# Patient Record
Sex: MALE | Race: WHITE | NOT HISPANIC OR LATINO | Employment: FULL TIME | URBAN - METROPOLITAN AREA
[De-identification: names, ages, dates, MRNs, and addresses within clinical notes are randomized per-mention and may not be internally consistent; named-entity substitution may affect disease eponyms.]

---

## 2017-01-11 ENCOUNTER — PRE VISIT (OUTPATIENT)
Dept: OTOLARYNGOLOGY | Facility: CLINIC | Age: 37
End: 2017-01-11

## 2017-01-11 DIAGNOSIS — E84.0 CYSTIC FIBROSIS WITH PULMONARY MANIFESTATIONS (H): Primary | ICD-10-CM

## 2017-01-11 RX ORDER — OSELTAMIVIR PHOSPHATE 75 MG/1
75 CAPSULE ORAL 2 TIMES DAILY
Qty: 10 CAPSULE | Refills: 1 | Status: SHIPPED | OUTPATIENT
Start: 2017-01-11 | End: 2018-03-01

## 2017-01-11 NOTE — TELEPHONE ENCOUNTER
1.  Date/reason for appt: 1/16/17 sinus pain   2.  Referring provider: ABBY JOSEPH   3.  Call to patient (Yes / No - short description): no, transferred from    4.  Previous care at / records requested from: [facility] and [facility]   5.  Other: Emailed ROIs

## 2017-01-12 ENCOUNTER — CARE COORDINATION (OUTPATIENT)
Dept: PULMONOLOGY | Facility: CLINIC | Age: 37
End: 2017-01-12

## 2017-01-12 NOTE — PROGRESS NOTES
Pt called requesting a Tamiflu script to have available in case he develops flu-like sx. Also, he inquired about getting in to see Dr. Sara Pickering for his CF sinus disease. Given phone number to call for scheduling an appt.

## 2017-01-16 ENCOUNTER — OFFICE VISIT (OUTPATIENT)
Dept: OTOLARYNGOLOGY | Facility: CLINIC | Age: 37
End: 2017-01-16

## 2017-01-16 DIAGNOSIS — J32.4 CHRONIC PANSINUSITIS: Primary | ICD-10-CM

## 2017-01-16 ASSESSMENT — PAIN SCALES - GENERAL: PAINLEVEL: MILD PAIN (2)

## 2017-01-16 NOTE — PROGRESS NOTES
Otolaryngology Adult Consultation    Patient: Philip Delacruz   : 1980     Patient presents with:  Consult:   Chief Complaint   Patient presents with     Consult     cf pt concerned nebulizing giving ghim ulcers        Referring Provider: Pepe Valenzuela   Consulting Physician:  Sara Pickering MD       Assessment/Plan: ASSESSMENT AND PLAN:  Philip has chronic sinusitis related to cystic fibrosis.  His exam today shows ongoing active disease.  I cultured his secretions.  I am also ordering a CT scan to determine the extent of disease.  We agreed that putting him back on nebulized antibiotics may be beneficial as it has helped him in the past.  We will work with his insurance and the pharmacy on getting back on some form of a nebulized antibiotic.  Depending on his CT scan and cultures, I may consider additional endoscopic sinus surgery.  I will see him back in a month to discuss.  I am happy to continue to follow him for his sinus issues while he is here at the [facility] to care for his cystic fibrosis  condition.          HPI: Philip Delacruz is a 36 year old male seen today in the Otolaryngology Clinic for ongoing issues with chronic sinusitis related to cystic fibrosis.  He states that early on in his life, he began to have sinus difficulties.  In sixth grade, he had his first polyp surgery.  He was diagnosed with allergies and given shots in college as a sophomore.  He had a second polyp surgery and dealt with a lot of problems with nasal congestion and mucus.  As a senior in college, he underwent his last endoscopic sinus surgery.  A few years ago, he and his wife underwent workup for infertility.  At that time, he was diagnosed with cystic fibrosis.  No family history of CF is known.  In the past, he has been following with other ear, nose and throat doctors who have provided nebulized antibiotic treatments.  He had taken a lot of oral antibiotics early on and was  hoping to avoid ongoing use of oral antibiotics.  Due to insurance issues, he no longer was able to obtain antibiotics for the nebulizer and now has been performing nasal saline rinsing.  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  His ophthalmologist would like him to hold off on irrigating as it could be contributing to his eye issues.  The last time he was on antibiotics orally was in November.  A week ago, he had a sore throat.  He has had increased plugging in his nose.  He is currently not irrigating.  He has minimal other issues related to cystic fibrosis.           Current Outpatient Prescriptions on File Prior to Visit:  oseltamivir (TAMIFLU) 75 MG capsule Take 1 capsule (75 mg) by mouth 2 times daily   fluticasone (FLONASE) 50 MCG/ACT spray Spray 2 sprays into both nostrils daily   CREON 73343 UNITS CPEP Take 3-4 capsules (36,000-48,000 Units) by mouth 3 times daily (with meals)   fish oil-omega-3 fatty acids 1000 MG capsule Take 2 g by mouth daily   psyllium (METAMUCIL) 0.52 G capsule Take 1 capsule (0.52 g) by mouth 2 times daily   LACTOBACILLUS EXTRA STRENGTH CAPS Take 1 tablet by mouth 2 times daily (with meals)   Multiple Vitamins-Minerals (ONE-A-DAY MENS HEALTH FORMULA PO) Take 1 tablet by mouth daily   Sodium Chloride-Sodium Bicarb (SINUFLO READYRINSE) KIT    acyclovir (ZOVIRAX) 800 MG tablet Take 1 tablet (800 mg) by mouth daily   albuterol (PROAIR HFA/PROVENTIL HFA/VENTOLIN HFA) 108 (90 BASE) MCG/ACT Inhaler Inhale 2 puffs into the lungs every 6 hours as needed for shortness of breath / dyspnea or wheezing   Cholecalciferol 3000 UNITS TABS Take 3,000 Units by mouth daily     No current facility-administered medications on file prior to visit.       Allergies   Allergen Reactions     Tegaderm Transparent Dressing (Informational Only) Rash       Past Medical History   Diagnosis Date     Cystic fibrosis (H)      Delta F508 and F4922M       Congenital absence of vas deferens, bilateral      Sinusitis, chronic      Chronic sinusitis      Nasal polyps          Review of Systems   ENT ROS 1/16/2017   Ears, Nose, Throat Nasal congestion or drainage, Sore throat        14 point ROS neg other than the symptoms noted above.    Physical Exam:    General Assessment   The patient is alert, oriented and in no acute distress.   Head/Face/Scalp  Grossly normal.   Ears  Normal canals, auricles and tympanic membranes.   Nose  External nose is straight, skin is normal. Intranasal exam (anterior rhinoscopy) reveals normal moist mucosa, turbinate tissue without edema, erythema or crusting.  Septum mainly in the midline.  Nasal crusting, mild to moderate.  Mouth  Oral cavity shows healthy mucosa with out ulceration, masses or other lesions involving the tongue, palate, buccal mucosa, floor of mouth or gingiva.  Dentition in good repair.  Oropharynx with normal tonsils, posterior wall and base of tongue.  Neck  No significant adenopathy, thyroid or salivary gland abnormality.     Procedure:  Nasal endoscopy performed to examine the posterior nasal passages for pathology.    Verbal consent obtained. Topical anesthetic/decongestant solution applied.   A rigid endoscope was passed into each nasal cavity separately.  Findings are as follows:   Evidence of bilateral sinus surgery with altered landmarks.  Bilateral polypoid inflammation, mucopurulence.  Cultures taken of secretions.

## 2017-01-16 NOTE — PATIENT INSTRUCTIONS
Plan of care:  CT at your convenience  We will call you with test results  Clinic contact information:  1. To schedule an appointment call [phone], option 1  2. To talk to the Triage RN call [phone], option 3  3. If you need to speak to Pinky or get a message to your doctor on a Friday, call the triage RN  4. PinkyRN: [phone]  5. Surgery scheduling:      Leticia Diaz: [phone]      Emily Campos: [phone]  6. Fax: [phone]

## 2017-01-16 NOTE — NURSING NOTE
Chief Complaint   Patient presents with     Consult     cf pt concerned nebulizing giving ghim ulcers     Danielle Urban Medical Assistant

## 2017-01-16 NOTE — MR AVS SNAPSHOT
After Visit Summary   1/16/2017    Philip Delacruz    MRN: [id number]           Patient Information     Date Of Birth          1980        Visit Information        Provider Department      1/16/2017 1:15 PM Sara Pickering MD [facility] Ear Nose and Throat        Today's Diagnoses     Chronic pansinusitis    -  1       Care Instructions    Plan of care:  CT at your convenience  We will call you with test results  Clinic contact information:  1. To schedule an appointment call [phone], option 1  2. To talk to the Triage RN call [phone], option 3  3. If you need to speak to Pinky or get a message to your doctor on a Friday, call the triage RN  4. PinkyRN: [phone]  5. Surgery scheduling:      Leticia Diaz: [phone]      Emilyseu Simpsonter: [phone]  6. Fax: [phone]          Follow-ups after your visit        Your next 10 appointments already scheduled     Apr 07, 2017  9:00 AM   Infusion 180 with UC SPEC INFUSION, UC 47 ATC   [facility] Specialty and Procedure ([facility])    [address] [place] [address] [phone]            Apr 07, 2017 12:00 PM   (Arrive by 11:45 AM)   XR CHEST 2 VIEWS with XR1   [facility] Xray ([facility])    [address] [place] [address] [phone]           Please bring a list of your current medicines to your exam. (Include vitamins, minerals and over-thecounter medicines.) Leave your valuables at home.  Tell your doctor if there is a chance you may be pregnant.  You do not need to do anything special for this exam.            Apr 07, 2017 12:30 PM   DX HIP/PELVIS/SPINE with UCDX1   [facility] Dexa ([facility])    [address] [place] [address] [phone]           Please do not take any of the following 48 hours prior to your  exam: vitamins, calcium tablets, antacids.            Apr 07, 2017  1:30 PM   PFT VISIT with ALINE MCKEON   [facility] Pulmonary Function Testing ([facility])    [address] [address] [phone]            Apr 07, 2017  1:50 PM   (Arrive by 1:35 PM)   RETURN CYSTIC FIBROSIS VISIT with True Sahni MD   [facility] ([facility])    [address] [address] [phone]              Future tests that were ordered for you today     Open Future Orders        Priority Expected Expires Ordered    CT Maxillofacial w/o contrast Routine  1/16/2018 1/16/2017            Who to contact     Please call your clinic at [phone] to:    Ask questions about your health    Make or cancel appointments    Discuss your medicines    Learn about your test results    Speak to your doctor   If you have compliments or concerns about an experience at your clinic, or if you wish to file a complaint, please contact [facility] Patient Relations at [phone] or email us at [email or web address]         Additional Information About Your Visit        [employer] Information     [employer] gives you secure access to your electronic health record. If you see a primary care provider, you can also send messages to your care team and make appointments. If you have questions, please call your primary care clinic.  If you do not have a primary care provider, please call [phone] and they will assist you.      [employer] is an electronic gateway that provides easy, online access to your medical records. With [employer], you can request a clinic appointment, read your test results, renew a prescription or communicate with your care team.     To access your existing account, please contact your [facility] or call [phone] for  assistance.        Care EveryWhere ID     This is your Care EveryWhere ID. This could be used by other organizations to access your [place] medical records  [id number]         Blood Pressure from Last 3 Encounters:   12/02/16 122/81   08/22/16 138/82   05/13/16 135/90    Weight from Last 3 Encounters:   12/02/16 77 kg (169 lb 12.1 oz)   08/22/16 76.1 kg (167 lb 12.3 oz)   05/13/16 75.7 kg (166 lb 14.2 oz)              We Performed the Following     Cystic Fibrosis Culture Aerob Bacterial     NASAL ENDOSCOPY, DIAGNOSTIC        Primary Care Provider Fax #    [facility] [phone] [address]  [facility] [address]        Thank you!     Thank you for choosing [facility]AR [facility]SE [facility] THRO[facility]  for your care. Our goal is always to provide you with excellent care. Hearing back from our patients is one way we can continue to improve our services. Please take a few minutes to complete the written survey that you may receive in the mail after your visit with us. Thank you!             Your Updated Medication List - Protect others around you: Learn how to safely use, store and throw away your medicines at www.disposemymeds.org.          This list is accurate as of: 1/16/17  1:52 PM.  Always use your most recent med list.                   Brand Name Dispense Instructions for use    acyclovir 800 MG tablet    ZOVIRAX    50 tablet    Take 1 tablet (800 mg) by mouth daily       albuterol 108 (90 BASE) MCG/ACT Inhaler    PROAIR HFA/PROVENTIL HFA/VENTOLIN HFA    1 Inhaler    Inhale 2 puffs into the lungs every 6 hours as needed for shortness of breath / dyspnea or wheezing       Cholecalciferol 3000 UNITS Tabs      Take 3,000 Units by mouth daily       CREON 53863 UNITS Cpep   Generic drug:  amylase-lipase-protease     270 capsule    Take 3-4 capsules (36,000-48,000 Units) by mouth 3 times daily (with meals)       fish oil-omega-3 fatty acids 1000 MG capsule      Take 2 g by mouth daily        fluticasone 50 MCG/ACT spray    FLONASE    1 Bottle    Spray 2 sprays into both nostrils daily       LACTOBACILLUS EXTRA STRENGTH Caps     60 capsule    Take 1 tablet by mouth 2 times daily (with meals)       METAMUCIL 0.52 G capsule   Generic drug:  psyllium     540 capsule    Take 1 capsule (0.52 g) by mouth 2 times daily       ONE-A-DAY MENS HEALTH FORMULA PO      Take 1 tablet by mouth daily       oseltamivir 75 MG capsule    TAMIFLU    10 capsule    Take 1 capsule (75 mg) by mouth 2 times daily       SINUFLO READYRINSE Kit

## 2017-01-16 NOTE — Clinical Note
2017       RE: Philip Delacruz  [address] [facility] [address]     Dear Colleague,    Thank you for referring your patient, Philip Delacruz, to the [facility] EAR NOSE AND THROAT at [facility]. Please see a copy of my visit note below.      Otolaryngology Adult Consultation    Patient: Philip Delacruz   : 1980     Patient presents with:  Consult:   Chief Complaint   Patient presents with     Consult     cf pt concerned nebulizing giving ghim ulcers        Referring Provider: Pepe Valenzuela   Consulting Physician:  Sara Pickering MD       Assessment/Plan: ASSESSMENT AND PLAN:  Philip has chronic sinusitis related to cystic fibrosis.  His exam today shows ongoing active disease.  I cultured his secretions.  I am also ordering a CT scan to determine the extent of disease.  We agreed that putting him back on nebulized antibiotics may be beneficial as it has helped him in the past.  We will work with his insurance and the pharmacy on getting back on some form of a nebulized antibiotic.  Depending on his CT scan and cultures, I may consider additional endoscopic sinus surgery.  I will see him back in a month to discuss.  I am happy to continue to follow him for his sinus issues while he is here at the [facility] to care for his cystic fibrosis  condition.          HPI: Philip Delacruz is a 36 year old male seen today in the Otolaryngology Clinic for ongoing issues with chronic sinusitis related to cystic fibrosis.  He states that early on in his life, he began to have sinus difficulties.  In sixth grade, he had his first polyp surgery.  He was diagnosed with allergies and given shots in college as a sophomore.  He had a second polyp surgery and dealt with a lot of problems with nasal congestion and mucus.  As a senior in college, he underwent his last endoscopic sinus surgery.  A few years ago, he and his wife underwent  workup for infertility.  At that time, he was diagnosed with cystic fibrosis.  No family history of CF is known.  In the past, he has been following with other ear, nose and throat doctors who have provided nebulized antibiotic treatments.  He had taken a lot of oral antibiotics early on and was hoping to avoid ongoing use of oral antibiotics.  Due to insurance issues, he no longer was able to obtain antibiotics for the nebulizer and now has been performing nasal saline rinsing.  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  His ophthalmologist would like him to hold off on irrigating as it could be contributing to his eye issues.  The last time he was on antibiotics orally was in November.  A week ago, he had a sore throat.  He has had increased plugging in his nose.  He is currently not irrigating.  He has minimal other issues related to cystic fibrosis.           Current Outpatient Prescriptions on File Prior to Visit:  oseltamivir (TAMIFLU) 75 MG capsule Take 1 capsule (75 mg) by mouth 2 times daily   fluticasone (FLONASE) 50 MCG/ACT spray Spray 2 sprays into both nostrils daily   CREON 73192 UNITS CPEP Take 3-4 capsules (36,000-48,000 Units) by mouth 3 times daily (with meals)   fish oil-omega-3 fatty acids 1000 MG capsule Take 2 g by mouth daily   psyllium (METAMUCIL) 0.52 G capsule Take 1 capsule (0.52 g) by mouth 2 times daily   LACTOBACILLUS EXTRA STRENGTH CAPS Take 1 tablet by mouth 2 times daily (with meals)   Multiple Vitamins-Minerals (ONE-A-DAY MENS HEALTH FORMULA PO) Take 1 tablet by mouth daily   Sodium Chloride-Sodium Bicarb (SINUFLO READYRINSE) KIT    acyclovir (ZOVIRAX) 800 MG tablet Take 1 tablet (800 mg) by mouth daily   albuterol (PROAIR HFA/PROVENTIL HFA/VENTOLIN HFA) 108 (90 BASE) MCG/ACT Inhaler Inhale 2 puffs into the lungs every 6 hours as needed for shortness of breath / dyspnea or wheezing   Cholecalciferol 3000 UNITS TABS Take 3,000  Units by mouth daily     No current facility-administered medications on file prior to visit.       Allergies   Allergen Reactions     Tegaderm Transparent Dressing (Informational Only) Rash       Past Medical History   Diagnosis Date     Cystic fibrosis (H)      Delta F508 and T3843E      Congenital absence of vas deferens, bilateral      Sinusitis, chronic      Chronic sinusitis      Nasal polyps          Review of Systems   ENT ROS 1/16/2017   Ears, Nose, Throat Nasal congestion or drainage, Sore throat        14 point ROS neg other than the symptoms noted above.    Physical Exam:    General Assessment   The patient is alert, oriented and in no acute distress.   Head/Face/Scalp  Grossly normal.   Ears  Normal canals, auricles and tympanic membranes.   Nose  External nose is straight, skin is normal. Intranasal exam (anterior rhinoscopy) reveals normal moist mucosa, turbinate tissue without edema, erythema or crusting.  Septum mainly in the midline.  Nasal crusting, mild to moderate.  Mouth  Oral cavity shows healthy mucosa with out ulceration, masses or other lesions involving the tongue, palate, buccal mucosa, floor of mouth or gingiva.  Dentition in good repair.  Oropharynx with normal tonsils, posterior wall and base of tongue.  Neck  No significant adenopathy, thyroid or salivary gland abnormality.     Procedure:  Nasal endoscopy performed to examine the posterior nasal passages for pathology.    Verbal consent obtained. Topical anesthetic/decongestant solution applied.   A rigid endoscope was passed into each nasal cavity separately.  Findings are as follows:   Evidence of bilateral sinus surgery with altered landmarks.  Bilateral polypoid inflammation, mucopurulence.  Cultures taken of secretions.         Again, thank you for allowing me to participate in the care of your patient.      Sincerely,    Sara Pickering MD

## 2017-01-21 LAB
BACTERIA SPEC CULT: ABNORMAL
MICRO REPORT STATUS: ABNORMAL
MICROORGANISM SPEC CULT: ABNORMAL
SPECIMEN SOURCE: ABNORMAL

## 2017-02-20 ENCOUNTER — OFFICE VISIT (OUTPATIENT)
Dept: OTOLARYNGOLOGY | Facility: CLINIC | Age: 37
End: 2017-02-20

## 2017-02-20 DIAGNOSIS — J32.4 CHRONIC PANSINUSITIS: Primary | ICD-10-CM

## 2017-02-20 ASSESSMENT — PAIN SCALES - GENERAL: PAINLEVEL: NO PAIN (0)

## 2017-02-20 NOTE — MR AVS SNAPSHOT
After Visit Summary   2017    Philip Delacruz    MRN: [id number]           Patient Information     Date Of Birth          1980        Visit Information        Provider Department      2017 3:00 PM Sara Pickering MD [facility] Ear Nose and Throat        Today's Diagnoses     Chronic pansinusitis    -  1      Care Instructions    Plan of care:  We have faxed an order to [facility] for gent irrigations and NasoNeb Atomizer. [facility] will contact you prior to shipping   Clinic contact information:  1. To schedule an appointment call [phone], option 1  2. To talk to the Triage RN call [phone], option 3  3. If you need to speak to Pinky or get a message to your doctor on a Friday, call the triage RN  4. PinkyRN: [phone]  5. Surgery scheduling:      Leticia Diaz: [phone]      Emily Campos: [phone]  6. Fax: [phone]  7. [phone]          Follow-ups after your visit        Follow-up notes from your care team     Return in about 2 months (around 2017).      Your next 10 appointments already scheduled     2017  9:00 AM CDT   Infusion 180 with UC SPEC INFUSION, UC 47 ATC   [facility] Specialty and Procedure ([facility] [place])    [address] [place] [address] [phone]            2017 12:00 PM CDT   (Arrive by 11:45 AM)   XR CHEST 2 VIEWS with UCXR1   [facility] Imaging Center Xray ([facility] [place])    [address] [place] [address] [phone]           Please bring a list of your current medicines to your exam. (Include vitamins, minerals and over-thecounter medicines.) Leave your valuables at home.  Tell your doctor if there is a chance you may be pregnant.  You do not need to do anything special for this exam.            2017 12:30 PM CDT   DX HIP/PELVIS/SPINE with UCDX1   [facility] Imaging  Center Dexa ([facility])    [address] [facility]  [address] [facility] [address] [phone]           Please do not take any of the following 48 hours prior to your exam: vitamins, calcium tablets, antacids.            Apr 07, 2017  1:30 PM CDT   PFT VISIT with ALINE MCKEON   [facility] Pulmonary Function Testing ([facility])    [address]  [address] [facility] [address] [phone]            Apr 07, 2017  1:50 PM CDT   (Arrive by 1:35 PM)   RETURN CYSTIC FIBROSIS VISIT with True Sahni MD   [facility] for [facility] Science and Health ([facility])    [address] [address] [phone]              Who to contact     Please call your clinic at [phone] to:    Ask questions about your health    Make or cancel appointments    Discuss your medicines    Learn about your test results    Speak to your doctor   If you have compliments or concerns about an experience at your clinic, or if you wish to file a complaint, please contact [facility] Patient Relations at [phone] or email us at [email or web address]         Additional Information About Your Visit        ConnectureharUserlike Live Chat Information     [employer] gives you secure access to your electronic health record. If you see a primary care provider, you can also send messages to your care team and make appointments. If you have questions, please call your primary care clinic.  If you do not have a primary care provider, please call [phone] and they will assist you.      [employer] is an electronic gateway that provides easy, online access to your medical records. With [employer], you can request a clinic appointment, read your test results, renew a prescription or communicate with your care team.     To access your existing account, please contact your [facility] Clinic or  call [phone] for assistance.        Care EveryWhere ID     This is your Care EveryWhere ID. This could be used by other organizations to access your [place] medical records  [id number]         Blood Pressure from Last 3 Encounters:   12/02/16 122/81   08/22/16 138/82   05/13/16 135/90    Weight from Last 3 Encounters:   12/02/16 77 kg (169 lb 12.1 oz)   08/22/16 76.1 kg (167 lb 12.3 oz)   05/13/16 75.7 kg (166 lb 14.2 oz)              Today, you had the following     No orders found for display       Primary Care Provider Fax #    [facility] [phone] [address]  [facility] [address]        Thank you!     Thank you for choosing [facility] EAR NOSE AND THROAT  for your care. Our goal is always to provide you with excellent care. Hearing back from our patients is one way we can continue to improve our services. Please take a few minutes to complete the written survey that you may receive in the mail after your visit with us. Thank you!             Your Updated Medication List - Protect others around you: Learn how to safely use, store and throw away your medicines at www.disposemymeds.org.          This list is accurate as of: 2/20/17 11:59 PM.  Always use your most recent med list.                   Brand Name Dispense Instructions for use    acyclovir 800 MG tablet    ZOVIRAX    50 tablet    Take 1 tablet (800 mg) by mouth daily       albuterol 108 (90 BASE) MCG/ACT Inhaler    PROAIR HFA/PROVENTIL HFA/VENTOLIN HFA    1 Inhaler    Inhale 2 puffs into the lungs every 6 hours as needed for shortness of breath / dyspnea or wheezing       Cholecalciferol 3000 UNITS Tabs      Take 3,000 Units by mouth daily       CREON 25233 UNITS Cpep   Generic drug:  amylase-lipase-protease     270 capsule    Take 3-4 capsules (36,000-48,000 Units) by mouth 3 times daily (with meals)       fish oil-omega-3 fatty acids 1000 MG capsule      Take 2 g by mouth daily       fluticasone 50 MCG/ACT  spray    FLONASE    1 Bottle    Spray 2 sprays into both nostrils daily       gentamicin 0.008% in 1000ml 0.9% NaCl Soln nasal irrigation    GARAMYCIN    1000 mL    Irrigate 30 mL between each nostril 1x/day       LACTOBACILLUS EXTRA STRENGTH Caps     60 capsule    Take 1 tablet by mouth 2 times daily (with meals)       METAMUCIL 0.52 G capsule   Generic drug:  psyllium     540 capsule    Take 1 capsule (0.52 g) by mouth 2 times daily       ONE-A-DAY MENS HEALTH FORMULA PO      Take 1 tablet by mouth daily       oseltamivir 75 MG capsule    TAMIFLU    10 capsule    Take 1 capsule (75 mg) by mouth 2 times daily       SINUFLO READYRINSE Kit

## 2017-02-20 NOTE — LETTER
2/20/2017       RE: Philip Delacruz  [address] [facility] [address]     Dear Colleague,    Thank you for referring your patient, Philip Delacruz, to the [facility] EAR NOSE AND THROAT at [facility]. Please see a copy of my visit note below.    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  When I saw him previously, he had an acute infection.  We treated him with oral Augmentin.  Cultures grew Moraxella catarrhalis.  He had also asked whether or not we could get him back on antibiotic irrigations as he had been using those in the past and was doing quite well on them but due to insurance issues had gone off of them.  He was unable to get a nebulization device, and the pharmacist that he saw was not very helpful.  He comes back today.  He is getting a CT scan today after he visits with me.      PHYSICAL EXAMINATION:  Examination today shows clear rhinorrhea on the left.  No evidence of purulence, polyps.  On the right, he has some crusting anteriorly.  I cleaned his nose using suction.  I can see back to the level of the middle meatus.  There is no purulence or polyps.  I did not perform endoscopy on him today or take new cultures.      ASSESSMENT AND PLAN:  We are going to continue to work toward getting him back on nebulized antibiotic therapy.  We prescribed gentamicin and will have him use 30 cc in a nebulization reservoir once a day.  We will show him some different options of over-the-counter nebulizer machines that he could use.  He seems comfortable with this plan.  I will see him back in a couple of months.     Again, thank you for allowing me to participate in the care of your patient.      Sincerely,    Sara Pickering MD

## 2017-02-20 NOTE — PATIENT INSTRUCTIONS
Plan of care:  We have faxed an order to [facility] for gent irrigations and NasoNeb Atomizer. [facility] will contact you prior to shipping   Clinic contact information:  1. To schedule an appointment call [phone], option 1  2. To talk to the Triage RN call [phone], option 3  3. If you need to speak to Pinky or get a message to your doctor on a Friday, call the triage RN  4. PinkyRN: [phone]  5. Surgery scheduling:      Leticia Diaz: [phone]      Emily Campos: [phone]  6. Fax: [phone]  7. [phone]

## 2017-02-20 NOTE — PROGRESS NOTES
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  When I saw him previously, he had an acute infection.  We treated him with oral Augmentin.  Cultures grew Moraxella catarrhalis.  He had also asked whether or not we could get him back on antibiotic irrigations as he had been using those in the past and was doing quite well on them but due to insurance issues had gone off of them.  He was unable to get a nebulization device, and the pharmacist that he saw was not very helpful.  He comes back today.  He is getting a CT scan today after he visits with me.      PHYSICAL EXAMINATION:  Examination today shows clear rhinorrhea on the left.  No evidence of purulence, polyps.  On the right, he has some crusting anteriorly.  I cleaned his nose using suction.  I can see back to the level of the middle meatus.  There is no purulence or polyps.  I did not perform endoscopy on him today or take new cultures.      ASSESSMENT AND PLAN:  We are going to continue to work toward getting him back on nebulized antibiotic therapy.  We prescribed gentamicin and will have him use 30 cc in a nebulization reservoir once a day.  We will show him some different options of over-the-counter nebulizer machines that he could use.  He seems comfortable with this plan.  I will see him back in a couple of months.

## 2017-04-07 ENCOUNTER — OFFICE VISIT (OUTPATIENT)
Dept: PULMONOLOGY | Facility: CLINIC | Age: 37
End: 2017-04-07
Attending: INTERNAL MEDICINE
Payer: COMMERCIAL

## 2017-04-07 ENCOUNTER — INFUSION THERAPY VISIT (OUTPATIENT)
Dept: INFUSION THERAPY | Facility: CLINIC | Age: 37
End: 2017-04-07
Attending: INTERNAL MEDICINE
Payer: COMMERCIAL

## 2017-04-07 VITALS
DIASTOLIC BLOOD PRESSURE: 80 MMHG | WEIGHT: 171.3 LBS | SYSTOLIC BLOOD PRESSURE: 122 MMHG | RESPIRATION RATE: 16 BRPM | HEART RATE: 56 BPM | BODY MASS INDEX: 23.2 KG/M2 | OXYGEN SATURATION: 98 % | HEIGHT: 72 IN

## 2017-04-07 VITALS
TEMPERATURE: 96.7 F | OXYGEN SATURATION: 98 % | HEART RATE: 68 BPM | WEIGHT: 169.6 LBS | BODY MASS INDEX: 22.72 KG/M2 | RESPIRATION RATE: 16 BRPM | SYSTOLIC BLOOD PRESSURE: 127 MMHG | DIASTOLIC BLOOD PRESSURE: 90 MMHG

## 2017-04-07 DIAGNOSIS — K86.89 PANCREATIC INSUFFICIENCY: Primary | ICD-10-CM

## 2017-04-07 DIAGNOSIS — E84.9 CF (CYSTIC FIBROSIS) (H): ICD-10-CM

## 2017-04-07 DIAGNOSIS — E84.9 CF (CYSTIC FIBROSIS) (H): Primary | ICD-10-CM

## 2017-04-07 DIAGNOSIS — E84.0 CYSTIC FIBROSIS WITH PULMONARY MANIFESTATIONS (H): ICD-10-CM

## 2017-04-07 DIAGNOSIS — E84.0 CYSTIC FIBROSIS WITH PULMONARY MANIFESTATIONS (H): Primary | ICD-10-CM

## 2017-04-07 LAB
ALBUMIN SERPL-MCNC: 4 G/DL (ref 3.4–5)
ALP SERPL-CCNC: 87 U/L (ref 40–150)
ALT SERPL W P-5'-P-CCNC: 26 U/L (ref 0–70)
ANION GAP SERPL CALCULATED.3IONS-SCNC: 6 MMOL/L (ref 3–14)
AST SERPL W P-5'-P-CCNC: 27 U/L (ref 0–45)
BASOPHILS # BLD AUTO: 0 10E9/L (ref 0–0.2)
BASOPHILS NFR BLD AUTO: 0.5 %
BUN SERPL-MCNC: 18 MG/DL (ref 7–30)
CALCIUM SERPL-MCNC: 8.9 MG/DL (ref 8.5–10.1)
CHLORIDE SERPL-SCNC: 103 MMOL/L (ref 94–109)
CHOLEST SERPL-MCNC: 176 MG/DL
CO2 SERPL-SCNC: 29 MMOL/L (ref 20–32)
CREAT SERPL-MCNC: 0.84 MG/DL (ref 0.66–1.25)
DEPRECATED CALCIDIOL+CALCIFEROL SERPL-MC: 33 UG/L (ref 20–75)
DIFFERENTIAL METHOD BLD: NORMAL
EOSINOPHIL # BLD AUTO: 0.1 10E9/L (ref 0–0.7)
EOSINOPHIL NFR BLD AUTO: 1.5 %
ERYTHROCYTE [DISTWIDTH] IN BLOOD BY AUTOMATED COUNT: 11.5 % (ref 10–15)
ERYTHROCYTE [SEDIMENTATION RATE] IN BLOOD BY WESTERGREN METHOD: 5 MM/H (ref 0–15)
EXPTIME-PRE: 7.2 SEC
FEF2575-%PRED-PRE: 101 %
FEF2575-PRE: 4.68 L/SEC
FEF2575-PRED: 4.6 L/SEC
FEFMAX-%PRED-PRE: 92 %
FEFMAX-PRE: 9.98 L/SEC
FEFMAX-PRED: 10.81 L/SEC
FEV1-%PRED-PRE: 86 %
FEV1-PRE: 4.1 L
FEV1FEV6-PRE: 85 %
FEV1FEV6-PRED: 83 %
FEV1FVC-PRE: 85 %
FEV1FVC-PRED: 81 %
FIFMAX-PRE: 9.18 L/SEC
FVC-%PRED-PRE: 82 %
FVC-PRE: 4.8 L
FVC-PRED: 5.82 L
GFR SERPL CREATININE-BSD FRML MDRD: NORMAL ML/MIN/1.7M2
GGT SERPL-CCNC: 17 U/L (ref 0–75)
GLUCOSE BLDC GLUCOMTR-MCNC: 44 MG/DL (ref 70–99)
GLUCOSE BLDC GLUCOMTR-MCNC: 56 MG/DL (ref 70–99)
GLUCOSE BLDC GLUCOMTR-MCNC: 71 MG/DL (ref 70–99)
GLUCOSE SERPL-MCNC: 120 MG/DL (ref 70–99)
GLUCOSE SERPL-MCNC: 145 MG/DL (ref 70–99)
GLUCOSE SERPL-MCNC: 53 MG/DL (ref 70–99)
GLUCOSE SERPL-MCNC: 85 MG/DL (ref 70–99)
GLUCOSE SERPL-MCNC: 86 MG/DL (ref 70–99)
GLUCOSE SERPL-MCNC: 88 MG/DL (ref 70–99)
HBA1C MFR BLD: 4.9 % (ref 4.3–6)
HCT VFR BLD AUTO: 45 % (ref 40–53)
HDLC SERPL-MCNC: 50 MG/DL
HGB BLD-MCNC: 15.8 G/DL (ref 13.3–17.7)
IGG SERPL-MCNC: 1160 MG/DL (ref 695–1620)
IMM GRANULOCYTES # BLD: 0 10E9/L (ref 0–0.4)
IMM GRANULOCYTES NFR BLD: 0.3 %
INR PPP: 1.04 (ref 0.86–1.14)
INSULIN SERPL-ACNC: 12.7 MU/L (ref 3–25)
INSULIN SERPL-ACNC: 3.9 MU/L (ref 3–25)
INSULIN SERPL-ACNC: 45.2 MU/L (ref 3–25)
IRON SERPL-MCNC: 101 UG/DL (ref 35–180)
LDLC SERPL CALC-MCNC: 116 MG/DL
LYMPHOCYTES # BLD AUTO: 2.7 10E9/L (ref 0.8–5.3)
LYMPHOCYTES NFR BLD AUTO: 41 %
MAGNESIUM SERPL-MCNC: 2.2 MG/DL (ref 1.6–2.3)
MCH RBC QN AUTO: 31.1 PG (ref 26.5–33)
MCHC RBC AUTO-ENTMCNC: 35.1 G/DL (ref 31.5–36.5)
MCV RBC AUTO: 89 FL (ref 78–100)
MONOCYTES # BLD AUTO: 0.6 10E9/L (ref 0–1.3)
MONOCYTES NFR BLD AUTO: 9.2 %
NEUTROPHILS # BLD AUTO: 3.2 10E9/L (ref 1.6–8.3)
NEUTROPHILS NFR BLD AUTO: 47.5 %
NONHDLC SERPL-MCNC: 126 MG/DL
NRBC # BLD AUTO: 0 10*3/UL
NRBC BLD AUTO-RTO: 0 /100
PHOSPHATE SERPL-MCNC: 2.4 MG/DL (ref 2.5–4.5)
PLATELET # BLD AUTO: 197 10E9/L (ref 150–450)
POTASSIUM SERPL-SCNC: 4.1 MMOL/L (ref 3.4–5.3)
PROT SERPL-MCNC: 7.6 G/DL (ref 6.8–8.8)
RBC # BLD AUTO: 5.08 10E12/L (ref 4.4–5.9)
SODIUM SERPL-SCNC: 138 MMOL/L (ref 133–144)
TRIGL SERPL-MCNC: 52 MG/DL
TSH SERPL DL<=0.005 MIU/L-ACNC: 1.25 MU/L (ref 0.4–4)
WBC # BLD AUTO: 6.6 10E9/L (ref 4–11)

## 2017-04-07 PROCEDURE — 80069 RENAL FUNCTION PANEL: CPT | Performed by: INTERNAL MEDICINE

## 2017-04-07 PROCEDURE — 84443 ASSAY THYROID STIM HORMONE: CPT | Performed by: INTERNAL MEDICINE

## 2017-04-07 PROCEDURE — 83525 ASSAY OF INSULIN: CPT | Performed by: INTERNAL MEDICINE

## 2017-04-07 PROCEDURE — 99212 OFFICE O/P EST SF 10 MIN: CPT | Mod: ZF

## 2017-04-07 PROCEDURE — 82785 ASSAY OF IGE: CPT | Performed by: INTERNAL MEDICINE

## 2017-04-07 PROCEDURE — 84075 ASSAY ALKALINE PHOSPHATASE: CPT | Performed by: INTERNAL MEDICINE

## 2017-04-07 PROCEDURE — 25000125 ZZHC RX 250: Mod: ZF | Performed by: INTERNAL MEDICINE

## 2017-04-07 PROCEDURE — 83735 ASSAY OF MAGNESIUM: CPT | Performed by: INTERNAL MEDICINE

## 2017-04-07 PROCEDURE — 82947 ASSAY GLUCOSE BLOOD QUANT: CPT | Mod: 91 | Performed by: INTERNAL MEDICINE

## 2017-04-07 PROCEDURE — 85610 PROTHROMBIN TIME: CPT | Performed by: INTERNAL MEDICINE

## 2017-04-07 PROCEDURE — 85652 RBC SED RATE AUTOMATED: CPT | Performed by: INTERNAL MEDICINE

## 2017-04-07 PROCEDURE — 84446 ASSAY OF VITAMIN E: CPT | Performed by: INTERNAL MEDICINE

## 2017-04-07 PROCEDURE — 85025 COMPLETE CBC W/AUTO DIFF WBC: CPT | Performed by: INTERNAL MEDICINE

## 2017-04-07 PROCEDURE — 84460 ALANINE AMINO (ALT) (SGPT): CPT | Performed by: INTERNAL MEDICINE

## 2017-04-07 PROCEDURE — 80061 LIPID PANEL: CPT | Performed by: INTERNAL MEDICINE

## 2017-04-07 PROCEDURE — 83540 ASSAY OF IRON: CPT | Performed by: INTERNAL MEDICINE

## 2017-04-07 PROCEDURE — 82977 ASSAY OF GGT: CPT | Performed by: INTERNAL MEDICINE

## 2017-04-07 PROCEDURE — 84590 ASSAY OF VITAMIN A: CPT | Performed by: INTERNAL MEDICINE

## 2017-04-07 PROCEDURE — 84155 ASSAY OF PROTEIN SERUM: CPT | Performed by: INTERNAL MEDICINE

## 2017-04-07 PROCEDURE — 82306 VITAMIN D 25 HYDROXY: CPT | Performed by: INTERNAL MEDICINE

## 2017-04-07 PROCEDURE — 84403 ASSAY OF TOTAL TESTOSTERONE: CPT | Performed by: INTERNAL MEDICINE

## 2017-04-07 PROCEDURE — 82784 ASSAY IGA/IGD/IGG/IGM EACH: CPT | Performed by: INTERNAL MEDICINE

## 2017-04-07 PROCEDURE — 82962 GLUCOSE BLOOD TEST: CPT

## 2017-04-07 PROCEDURE — 83036 HEMOGLOBIN GLYCOSYLATED A1C: CPT | Performed by: INTERNAL MEDICINE

## 2017-04-07 PROCEDURE — 97803 MED NUTRITION INDIV SUBSEQ: CPT | Mod: ZF | Performed by: DIETITIAN, REGISTERED

## 2017-04-07 PROCEDURE — 84450 TRANSFERASE (AST) (SGOT): CPT | Performed by: INTERNAL MEDICINE

## 2017-04-07 PROCEDURE — 87070 CULTURE OTHR SPECIMN AEROBIC: CPT | Performed by: INTERNAL MEDICINE

## 2017-04-07 RX ADMIN — ALCOHOL 75 G: 65 GEL TOPICAL at 09:31

## 2017-04-07 ASSESSMENT — ENCOUNTER SYMPTOMS
RESPIRATORY PAIN: 0
BACK PAIN: 0
SORE THROAT: 0
SINUS PAIN: 1
SNORES LOUDLY: 1
DYSPNEA ON EXERTION: 0
TROUBLE SWALLOWING: 0
SHORTNESS OF BREATH: 0
SPUTUM PRODUCTION: 1
COUGH DISTURBING SLEEP: 0
HOARSE VOICE: 0
SINUS CONGESTION: 1
POSTURAL DYSPNEA: 0
NECK PAIN: 0
MUSCLE CRAMPS: 0
WHEEZING: 0
JOINT SWELLING: 0
COUGH: 1
SMELL DISTURBANCE: 0
ARTHRALGIAS: 1
MUSCLE WEAKNESS: 0
HEMOPTYSIS: 0
TASTE DISTURBANCE: 0
STIFFNESS: 1
NECK MASS: 0
MYALGIAS: 0

## 2017-04-07 ASSESSMENT — ACTIVITIES OF DAILY LIVING (ADL)
ARE_THERE_SMOKE_DETECTORS_IN_YOUR_HOME?: Y
DO_MEMBERS_OF_YOUR_HOUSEHOLD_USE_SUNSCREEN?: Y
DO_MEMBERS_OF_YOUR_HOUSEHOLD_WEAR_SEAT_BELTS?: Y
DO_MEMBERS_OF_YOUR_HOUSEHOLD_USE_SAFETY_HELMETS?: Y
ARE_THERE_CARBON_MONOXIDE_DETECTORS_IN_YOUR_HOME?: Y
ARE_THERE_FIREARMS_IN_YOUR_HOME?: N

## 2017-04-07 ASSESSMENT — PAIN SCALES - GENERAL: PAINLEVEL: NO PAIN (0)

## 2017-04-07 NOTE — MR AVS SNAPSHOT
After Visit Summary   4/7/2017    Philip Delacruz    MRN: [id number]           Patient Information     Date Of Birth          1980        Visit Information        Provider Department      4/7/2017 9:00 AM  47 ATC;  SPEC INFUSION [facility] Specialty and Procedure        Today's Diagnoses     Cystic fibrosis with pulmonary manifestations    -  1      Care Instructions    Dear Philip Delacruz    Thank you for choosing [facility] Specialty Infusion and Procedure Center ([facility]) for your procedure.  The following information is a summary of our appointment as well as important reminders.          We look forward in seeing you on your next appointment here at [facility].  Please don t hesitate to call us at [phone] to reschedule any of your appointments or to speak with one of the [facility] registered nurses.  It was a pleasure taking care of you today.    Sincerely,  Dotty George, RN  [facility]  Specialty Infusion & Procedure Center  [address] [place] [address]  [address]  Phone:  [phone]          Follow-ups after your visit        Your next 10 appointments already scheduled     Apr 07, 2017 12:30 PM CDT   DX HIP/PELVIS/SPINE with DX1   [facility] Imaging Center Dexa ([facility])    [address] [place] [address] [phone]           Please do not take any of the following 48 hours prior to your exam: vitamins, calcium tablets, antacids.            Apr 07, 2017  1:30 PM CDT   PFT VISIT with  PFL D   [facility] Pulmonary Function Testing ([facility])    [address] [place] [address] [phone]            Apr 07, 2017  1:50 PM CDT   (Arrive by 1:35 PM)   RETURN CYSTIC FIBROSIS VISIT with True Sahni MD   [facility] for Lung Science and Health ([facility]  [place])    [address] [facility]  [address] [facility] [address] [phone]              Who to contact     If you have questions or need follow up information about today's clinic visit or your schedule please contact [facility] SPECIALTY AND PROCEDURE directly at [phone].  Normal or non-critical lab and imaging results will be communicated to you by MyChart, letter or phone within 4 business days after the clinic has received the results. If you do not hear from us within 7 days, please contact the clinic through [employer] or phone. If you have a critical or abnormal lab result, we will notify you by phone as soon as possible.  Submit refill requests through [employer] or call your pharmacy and they will forward the refill request to us. Please allow 3 business days for your refill to be completed.          Additional Information About Your Visit        MyChart Information     [employer] gives you secure access to your electronic health record. If you see a primary care provider, you can also send messages to your care team and make appointments. If you have questions, please call your primary care clinic.  If you do not have a primary care provider, please call [phone] and they will assist you.        Care EveryWhere ID     This is your Care EveryWhere ID. This could be used by other organizations to access your [place] medical records  [id number]        Your Vitals Were     Pulse Temperature Respirations Pulse Oximetry BMI (Body Mass Index)       52 96.7  F (35.9  C) (Tympanic) 16 98% 22.72 kg/m2        Blood Pressure from Last 3 Encounters:   04/07/17 121/74   12/02/16 122/81   08/22/16 138/82    Weight from Last 3 Encounters:   04/07/17 76.9 kg (169 lb 9.6 oz)   12/02/16 77 kg (169 lb 12.1 oz)   08/22/16 76.1 kg (167 lb 12.3 oz)              We Performed the Following     Albumin level     Alkaline phosphatase     ALT     AST     Basic metabolic panel     CBC with  platelets differential     Erythrocyte sedimentation rate auto     GGT     Glucose in a Series: Draw +120 minutes     Glucose in a Series: Draw +30 minutes     Glucose in a Series: Draw +60 minutes     Glucose in a Series: Draw +90 minutes     Glucose in a Series: Draw Time Zero     Hemoglobin A1c     IgE     IgG     INR     Insulin in a Series: Draw +120 minutes     Insulin in a Series: Draw +30 minutes     Insulin in a Series: Draw +60 minutes     Insulin in a Series: Draw +90 minutes     Insulin in a Series: Draw Time Zero     Iron     Lipid Profile     Magnesium     Nursing Communication 1     Nursing Communication 2     Phosphorus     Protein total     Testosterone total      TSH with free T4 reflex     Vital signs     Vitamin A     Vitamin D Deficiency     Vitamin E     Weigh patient        Primary Care Provider Fax #    [facility] [phone] [address]  [facility] [address]        Thank you!     Thank you for choosing [facility] SPECIALTY AND PROCEDURE  for your care. Our goal is always to provide you with excellent care. Hearing back from our patients is one way we can continue to improve our services. Please take a few minutes to complete the written survey that you may receive in the mail after your visit with us. Thank you!             Your Updated Medication List - Protect others around you: Learn how to safely use, store and throw away your medicines at www.disposemymeds.org.          This list is accurate as of: 4/7/17 12:14 PM.  Always use your most recent med list.                   Brand Name Dispense Instructions for use    acyclovir 800 MG tablet    ZOVIRAX    50 tablet    Take 1 tablet (800 mg) by mouth daily       albuterol 108 (90 BASE) MCG/ACT Inhaler    PROAIR HFA/PROVENTIL HFA/VENTOLIN HFA    1 Inhaler    Inhale 2 puffs into the lungs every 6 hours as needed for shortness of breath / dyspnea or wheezing       Cholecalciferol 3000  UNITS Tabs      Take 3,000 Units by mouth daily       CREON 95817 UNITS Cpep   Generic drug:  amylase-lipase-protease     270 capsule    Take 3-4 capsules (36,000-48,000 Units) by mouth 3 times daily (with meals)       fish oil-omega-3 fatty acids 1000 MG capsule      Take 2 g by mouth daily       fluticasone 50 MCG/ACT spray    FLONASE    1 Bottle    Spray 2 sprays into both nostrils daily       gentamicin 0.008% in 1000ml 0.9% NaCl Soln nasal irrigation    GARAMYCIN    1000 mL    Reported on 4/7/2017       LACTOBACILLUS EXTRA STRENGTH Caps     60 capsule    Take 1 tablet by mouth 2 times daily (with meals)       METAMUCIL 0.52 G capsule   Generic drug:  psyllium     540 capsule    Take 1 capsule (0.52 g) by mouth 2 times daily       ONE-A-DAY MENS HEALTH FORMULA PO      Take 1 tablet by mouth daily       oseltamivir 75 MG capsule    TAMIFLU    10 capsule    Take 1 capsule (75 mg) by mouth 2 times daily       SINUFLO READYRINSE Kit

## 2017-04-07 NOTE — NURSING NOTE
Chief Complaint   Patient presents with     Cystic Fibrosis     Follow up on Shar and his CF     Boaz Adams CMA at 1:42 PM on 4/7/2017

## 2017-04-07 NOTE — PATIENT INSTRUCTIONS
Dear Philip Delacruz    Thank you for choosing [facility] Specialty Infusion and Procedure Center ([facility]) for your procedure.  The following information is a summary of our appointment as well as important reminders.          We look forward in seeing you on your next appointment here at [facility].  Please don t hesitate to call us at [phone] to reschedule any of your appointments or to speak with one of the [facility] registered nurses.  It was a pleasure taking care of you today.    Sincerely,  Dotty George, ANTON  [facility]  Specialty Infusion & Procedure Center  [address] [place] [address]  [address]  Phone:  [phone]

## 2017-04-07 NOTE — PATIENT INSTRUCTIONS
Cystic Fibrosis Self-Care Plan       MRN: [id number]   Clinic Date: April 7, 2017   Patient: Philip Delacruz     Annual Studies: Done today  IGG   Date Value Ref Range Status   02/05/2016 1120 695 - 1620 mg/dL Final     Insulin   Date Value Ref Range Status   04/07/2017 3.9 3 - 25 mU/L Final     There are no preventive care reminders to display for this patient.      Pulmonary Function Tests  FEV1: amount of air you can blow out in 1 second  FVC: total amount of air you can take in and blow out    Your Goals:         PFT Latest Ref Rng & Units 4/7/2017   FVC L 4.80   FEV1 L 4.10   FVC% % 82   FEV1% % 86          Airway Clearance: The Most Important Way to Keep Your Lungs Healthy  Aerobika twice daily      Good Nutrition Can Improve Lung Function and Overall Health     Take ALL of your vitamins with food     Take 1/2 of your enzymes before EVERY meal/snack and the other 1/2 mid-meal/snack    Wt Readings from Last 3 Encounters:   04/07/17 77.7 kg (171 lb 4.8 oz)   04/07/17 76.9 kg (169 lb 9.6 oz)   12/02/16 77 kg (169 lb 12.1 oz)       Body mass index is 22.95 kg/(m^2).         National CF Foundation Recommendations for BMI in CF Adults: Women: at least 22 Men: at least 23        Controlling Blood Sugars Helps Prevent Lung Infections & Improves Nutrition  Test blood sugar:     In the morning before eating (goal is )     2 hours after a meal (goal is less than 150)     When pre-meal glucose is greater than 150 add correction     At bedtime (if less than 100 eat a snack with 15 grams of carbohydrates  Last A1C Results:   Hemoglobin A1C   Date Value Ref Range Status   04/07/2017 4.9 4.3 - 6.0 % Final         If diabetic, measure A1C every 6 months. Goal: Under 7%    Staying Healthy    Research:  If you are interested in learning about research opportunities or have questions, please contact Olga Meléndez at [phone] or [email or web address].      CF Foundation:  Compass is a personalized resource service to help  you with the insurance, financial, legal and other issues you are facing.  It's free, confidential and available to anyone with CF.  Ask your  for more information or contact Compass directly at 248-NCTGUZH (066-1323) or compass@cff.org, or learn more at cff.org/compass.          RECOMMENDATIONS:   Aerobika twice daily for 5-10 minutes with purposeful coughing.  Albuterol 2 puffs with spacer before aerobika or exercise.  Healthier diet choices.  Contact your eye doctor about the herpes and then start gentamycin nasal nebs.  Otherwise continue current medication.   Nutrition: slowly increase enzyme dose to 5-6 capsules per meal. Select MVW Complete Formulation softgel from enzyme program- take 1 per day (can stop taking additional Vitamin D, multivitamin).           CF Nurse Line:  Love Martins: [phone]    Kristi Ndiaye, RT: [phone]     Nisa Dumont: [phone] or April Cardona, Dieticians: [phone]   Migdalia Jacques, Diabetes Nurse: [phone]      Naheed Matthews: [phone] or Yvonne Gupta [phone], Social Workers   [email or web address]

## 2017-04-07 NOTE — PROGRESS NOTES
"Reason for Visit  Philip Delacruz is a 36 year old male who is being seen for Cystic Fibrosis (Follow up on Shar and his CF)    Assessment and plan: Philip Coley is a 36-year-old male with cystic fibrosis with mild lung disease and pancreatic insufficiency.  1. Pulmonary: The patient appears to be doing well from a pulmonary standpoint. He continues to oxygenate well and his exercise tolerance is stable. CXR was reviewed with patient, some CF-related changes noted but overall it is relatively stable. PFTs have been trending downward for the past few visits, and there has been an overall decline in lung function over the past few years. This recent decline is probably related to decreased airway clearance due to inadequate exercise with the birth of his child. I do not think he is having an exacerbation at this time. I have encouraged him to continue exercising as frequently as he can. We discussed a few treatment plans to optimize his lung function, and he has decided to start using albuterol and then Aerobika BID regardless of his exercise regimen. We discussed effective coughing during and after his Aerobika for optimal airway clearance, and the CF coordinator will stop in after today's appointment to discuss proper inhaler and spacer technique. If no improvement we will need to consider nebs and possibly vest therapy. He will follow-up in 6 weeks to re-evaluate his decreased PFTs.  2. CF related sinus disease: The patient has been seen by Dr. Pickering twice since his last visit. He had a sinus CT scan on 02/20/17 and was treated with oral Augmentin in 01/10/17 for an acute sinus infection. He is planning to started nebulized gentamicin once daily for his sinuses after getting clearance from the provider who managed his prior herpes outbreak. The patient has been taking Mucinex and I have advised him to avoid any \"D\" formulations to avoid drying out his secretions.  3. Pancreatic insufficiency: The " patient notes increased gas and loose stool, however he acknowledges his diet has been poor recently. He is taking 2-3 capsules of enzymes with each meal. We discussed that it would be very beneficial for him to eat a healthier diet for a variety of reasons, including the correlation between good lung function and sufficient nutrition.  4. Healthcare maintenance: Annual studies were completed today including a DEXA scan. Phosphorous mildly low (2.4), however I do not think this is significant and should improve with a healthier diet. OGTT was normal however the patient's blood glucose level continues to drop fairly low and so I have advised him to eat complex carbohydrates and a mixed diet to avoid episodes of hypoglycemia. His DEXA scan indicates bone density is low but improved from a previous scan. I have encouraged him to continue exercising regularly. Given his adequate vitamin D and calcium levels, no intervention is indicated at this time. LDL is slightly elevated (116), total cholesterol has improved. His LDL may improve as he makes an effort to eat more healthily.   5. 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  I will see the patient follow-up in three months with PFTs and sputum culture.    45 minutes of attending (Kaitlyn) time with the patient more than half in counseling regarding airway clearance options and occupations risks.  CF HPI  The patient was seen and examined by True Sahni   Philip Coley is a 36-year-old male with cystic fibrosis with mild lung disease.   The patient reports he is better rested however still tired due to his infant's sleeping habits. He has had increased nasal congestion in the past week. He has been sneezing and nasal drainage has been dry and yellow. The patient has  been using Mucinex for the past few days. He has sinus pain occasionally at night, for which he takes Mucinex. The patient has got the inhaled antibiotics but has not started that yet. He is waiting to hear back from his ophthalmologist to see if it is safe for him to take with his history of herpes. He reports a tickle in his anterior sternal area, which often precipitates his cough has increased within the past year.  Breathing is comfortable at rest. Occasional cough and intermittent sputum production. Sputum is white in color. No hemoptysis. No CP. He uses Aerobika on days he does not work out. He has been going to the gym twice weekly and has been walking. He would like to start running but activity is limited with his sore left ankle.  Review of Systems:  ENT: The patient reports a slight increase in sneezing and nasal congestion, see HPI.  Pulmonary complaints as noted in history of present illness  GI: No nausea, vomiting, or diarrhea. He has been slightly gassy and has had loose stool, but attributes this to his diet, which has been less healthy. He has been eating a lot of fatty/greasy foods. The patient has been taking 2-3 enzyme capsules with meals.  MS: He reports his joints have been very tight and his hips bilaterally and left ankle have been very sore. He has been doing PT for 6 months and has been improving - he can now jump and run. His ankle pain has not resolved but it is getting better. Previously he had been seen at [facility] and was referred to PT.    A complete ROS was otherwise negative except as noted in the HPI.    Current Outpatient Prescriptions   Medication     gentamicin 0.008% in 1000ml 0.9% NaCl (GARAMYCIN) SOLN nasal irrigation     oseltamivir (TAMIFLU) 75 MG capsule     fluticasone (FLONASE) 50 MCG/ACT spray     albuterol (PROAIR HFA/PROVENTIL HFA/VENTOLIN HFA) 108 (90 BASE) MCG/ACT Inhaler     CREON 95810 UNITS CPEP     fish oil-omega-3 fatty acids 1000 MG capsule      "Cholecalciferol 3000 UNITS TABS     psyllium (METAMUCIL) 0.52 G capsule     LACTOBACILLUS EXTRA STRENGTH CAPS     Multiple Vitamins-Minerals (ONE-A-DAY MENS HEALTH FORMULA PO)     Sodium Chloride-Sodium Bicarb (SINUFLO READYRINSE) KIT     acyclovir (ZOVIRAX) 800 MG tablet     No current facility-administered medications for this visit.      Allergies   Allergen Reactions     Tegaderm Transparent Dressing (Informational Only) Rash     Past Medical History:   Diagnosis Date     Chronic sinusitis      Congenital absence of vas deferens, bilateral      Cystic fibrosis (H)     Delta F508 and [id number]      Nasal polyps      Sinusitis, chronic        Past Surgical History:   Procedure Laterality Date     HC TOOTH EXTRACTION W/FORCEP       NASAL/SINUS POLYPECTOMY       REPAIR PECTUS EXCAVATUM  1997     SINUS SURGERY  1992, 2002, 2004    Removed tubinates and polyps OSH     Sperm harvest         Social History     Social History     Marital status: Single     Spouse name: N/A     Number of children: N/A     Years of education: N/A     Occupational History     Teacher      Social History Main Topics     Smoking status: Never Smoker     Smokeless tobacco: Not on file     Alcohol use 0.0 oz/week      Comment: 1 drink 3X per week     Drug use: No     Sexual activity: Yes     Partners: Female     Birth control/ protection: None     Other Topics Concern     Not on file     Social History Narrative    #d .  Lives in [place] with significant other         /80  Pulse 56  Resp 16  Ht 1.84 m (6' 0.44\")  Wt 77.7 kg (171 lb 4.8 oz)  SpO2 98%  BMI 22.95 kg/m2  Body mass index is 22.95 kg/(m^2).  Exam:   GENERAL APPEARANCE: Well developed, well nourished, alert, and in no apparent distress.  EYES: PERRL, EOMI  HENT: Nasal mucosa with mild edema and hyperemia. No nasal polyps.  EARS: TMs and canals obscured by cerumen  MOUTH: Oral mucosa is moist, without any lesions, no tonsillar enlargement, no oropharyngeal " exudate.  NECK: supple, no masses, no thyromegaly.  LYMPHATICS: No significant axillary, cervical, or supraclavicular nodes.  RESP: normal percussion, good air flow throughout.  No crackles. No rhonchi. No wheezes.  CV: Normal S1, S2, regular rhythm, normal rate. No murmur.  No rub. No gallop. No LE edema.   ABDOMEN:  Bowel sounds normal, soft, nontender, no HSM or masses.   MS: extremities normal. No clubbing. No cyanosis.  SKIN: no rash on limited exam  NEURO: Mentation intact, speech normal, normal strength and tone, normal gait and stance  PSYCH: mentation appears normal. and affect normal/bright  Results:  Recent Results (from the past 168 hour(s))   Glucose in a Series: Draw Time Zero    Collection Time: 04/07/17  9:32 AM   Result Value Ref Range    Glucose 86 70 - 99 mg/dL   Insulin in a Series: Draw Time Zero    Collection Time: 04/07/17  9:32 AM   Result Value Ref Range    Insulin 3.9 3 - 25 mU/L   ALT    Collection Time: 04/07/17  9:32 AM   Result Value Ref Range    ALT 26 0 - 70 U/L   Basic metabolic panel    Collection Time: 04/07/17  9:32 AM   Result Value Ref Range    Sodium 138 133 - 144 mmol/L    Potassium 4.1 3.4 - 5.3 mmol/L    Chloride 103 94 - 109 mmol/L    Carbon Dioxide 29 20 - 32 mmol/L    Anion Gap 6 3 - 14 mmol/L    Glucose 85 70 - 99 mg/dL    Urea Nitrogen 18 7 - 30 mg/dL    Creatinine 0.84 0.66 - 1.25 mg/dL    GFR Estimate >90  Non  GFR Calc   >60 mL/min/1.7m2    GFR Estimate If Black >90   GFR Calc   >60 mL/min/1.7m2    Calcium 8.9 8.5 - 10.1 mg/dL   Lipid Profile    Collection Time: 04/07/17  9:32 AM   Result Value Ref Range    Cholesterol 176 <200 mg/dL    Triglycerides 52 <150 mg/dL    HDL Cholesterol 50 >39 mg/dL    LDL Cholesterol Calculated 116 (H) <100 mg/dL    Non HDL Cholesterol 126 <130 mg/dL   Albumin level    Collection Time: 04/07/17  9:32 AM   Result Value Ref Range    Albumin 4.0 3.4 - 5.0 g/dL   Alkaline phosphatase    Collection Time:  04/07/17  9:32 AM   Result Value Ref Range    Alkaline Phosphatase 87 40 - 150 U/L   AST    Collection Time: 04/07/17  9:32 AM   Result Value Ref Range    AST 27 0 - 45 U/L   Iron    Collection Time: 04/07/17  9:32 AM   Result Value Ref Range    Iron 101 35 - 180 ug/dL   GGT    Collection Time: 04/07/17  9:32 AM   Result Value Ref Range    GGT 17 0 - 75 U/L   Hemoglobin A1c    Collection Time: 04/07/17  9:32 AM   Result Value Ref Range    Hemoglobin A1C 4.9 4.3 - 6.0 %   INR    Collection Time: 04/07/17  9:32 AM   Result Value Ref Range    INR 1.04 0.86 - 1.14   Magnesium    Collection Time: 04/07/17  9:32 AM   Result Value Ref Range    Magnesium 2.2 1.6 - 2.3 mg/dL   Phosphorus    Collection Time: 04/07/17  9:32 AM   Result Value Ref Range    Phosphorus 2.4 (L) 2.5 - 4.5 mg/dL   Protein total    Collection Time: 04/07/17  9:32 AM   Result Value Ref Range    Protein Total 7.6 6.8 - 8.8 g/dL   TSH with free T4 reflex    Collection Time: 04/07/17  9:32 AM   Result Value Ref Range    TSH 1.25 0.40 - 4.00 mU/L   Vitamin D Deficiency    Collection Time: 04/07/17  9:32 AM   Result Value Ref Range    Vitamin D Deficiency screening 33 20 - 75 ug/L   CBC with platelets differential    Collection Time: 04/07/17  9:32 AM   Result Value Ref Range    WBC 6.6 4.0 - 11.0 10e9/L    RBC Count 5.08 4.4 - 5.9 10e12/L    Hemoglobin 15.8 13.3 - 17.7 g/dL    Hematocrit 45.0 40.0 - 53.0 %    MCV 89 78 - 100 fl    MCH 31.1 26.5 - 33.0 pg    MCHC 35.1 31.5 - 36.5 g/dL    RDW 11.5 10.0 - 15.0 %    Platelet Count 197 150 - 450 10e9/L    Diff Method Automated Method     % Neutrophils 47.5 %    % Lymphocytes 41.0 %    % Monocytes 9.2 %    % Eosinophils 1.5 %    % Basophils 0.5 %    % Immature Granulocytes 0.3 %    Nucleated RBCs 0 0 /100    Absolute Neutrophil 3.2 1.6 - 8.3 10e9/L    Absolute Lymphocytes 2.7 0.8 - 5.3 10e9/L    Absolute Monocytes 0.6 0.0 - 1.3 10e9/L    Absolute Eosinophils 0.1 0.0 - 0.7 10e9/L    Absolute Basophils 0.0 0.0 -  0.2 10e9/L    Abs Immature Granulocytes 0.0 0 - 0.4 10e9/L    Absolute Nucleated RBC 0.0    Erythrocyte sedimentation rate auto    Collection Time: 04/07/17  9:32 AM   Result Value Ref Range    Sed Rate 5 0 - 15 mm/h   Glucose in a Series: Draw +30 minutes    Collection Time: 04/07/17 10:05 AM   Result Value Ref Range    Glucose 145 (H) 70 - 99 mg/dL   Glucose in a Series: Draw +60 minutes    Collection Time: 04/07/17 10:35 AM   Result Value Ref Range    Glucose 120 (H) 70 - 99 mg/dL   Glucose in a Series: Draw +90 minutes    Collection Time: 04/07/17 11:05 AM   Result Value Ref Range    Glucose 88 70 - 99 mg/dL   Glucose in a Series: Draw +120 minutes    Collection Time: 04/07/17 11:45 AM   Result Value Ref Range    Glucose 53 (L) 70 - 99 mg/dL   Glucose by meter    Collection Time: 04/07/17 11:45 AM   Result Value Ref Range    Glucose 56 (L) 70 - 99 mg/dL   Glucose by meter    Collection Time: 04/07/17 11:58 AM   Result Value Ref Range    Glucose 44 (LL) 70 - 99 mg/dL   Glucose by meter    Collection Time: 04/07/17 12:13 PM   Result Value Ref Range    Glucose 71 70 - 99 mg/dL   General PFT Lab (Please always keep checked)    Collection Time: 04/07/17  1:11 PM   Result Value Ref Range    FVC-Pred 5.82 L    FVC-Pre 4.80 L    FVC-%Pred-Pre 82 %    FEV1-Pre 4.10 L    FEV1-%Pred-Pre 86 %    FEV1FVC-Pred 81 %    FEV1FVC-Pre 85 %    FEFMax-Pred 10.81 L/sec    FEFMax-Pre 9.98 L/sec    FEFMax-%Pred-Pre 92 %    FEF2575-Pred 4.60 L/sec    FEF2575-Pre 4.68 L/sec    LXZ9863-%Pred-Pre 101 %    ExpTime-Pre 7.20 sec    FIFMax-Pre 9.18 L/sec    FEV1FEV6-Pred 83 %    FEV1FEV6-Pre 85 %                        Results as noted above.    PFT Interpretation:  Normal spirometry.  Decreased from previous.  Below recent best.   Valid Maneuver          CF Exacerbation:  absent        Scribe Disclosure:   Bonita HUERTA, am serving as a scribe; to document services personally performed by SHELBY MEDINA MD based on data collection and  the provider's statements to me.     Provider Disclosure:  I agree with above History, Review of Systems, Physical exam and Plan.  I have reviewed the content of the documentation and have edited it as needed. I have personally performed the services documented here and the documentation accurately represents those services and the decisions I have made.      Electronically signed by:  True Sahni     Answers for HPI/ROS submitted by the patient on 4/7/2017   Annual Exam:  General Symptoms: No  Skin Symptoms: No  HENT Symptoms: Yes  EYE SYMPTOMS: No  HEART SYMPTOMS: No  LUNG SYMPTOMS: Yes  INTESTINAL SYMPTOMS: No  URINARY SYMPTOMS: No  REPRODUCTIVE SYMPTOMS: No  SKELETAL SYMPTOMS: Yes  BLOOD SYMPTOMS: No  NERVOUS SYSTEM SYMPTOMS: No  MENTAL HEALTH SYMPTOMS: No  Ear pain: No  Ear discharge: No  Hearing loss: No  Tinnitus: Yes  Nosebleeds: No  Congestion: Yes  Sinus pain: Yes  Trouble swallowing: No   Voice hoarseness: No  Mouth sores: No  Sore throat: No  Tooth pain: No  Gum tenderness: No  Bleeding gums: No  Change in taste: No  Change in sense of smell: No  Dry mouth: No  Hearing aid used: No  Neck lump: No  Cough: Yes  Sputum or phlegm: Yes  Coughing up blood: No  Difficulty breating or shortness of breath: No  Snoring: Yes  Wheezing: No  Difficulty breathing on exertion: No  Respiratory pain: No  Nighttime Cough: No  Difficulty breathing when lying flat: No  Back pain: No  Muscle aches: No  Neck pain: No  Swollen joints: No  Joint pain: Yes  Bone pain: No  Muscle cramps: No  Muscle weakness: No  Joint stiffness: Yes  Bone fracture: No  Frequency of exercise:: 4-5 days/week  Duration of exercise:: 15-30 minutes

## 2017-04-07 NOTE — LETTER
2017       RE: Philip Delacruz  [address] [facility] [address]     Dear Colleague,    Thank you for referring your patient, Philip Delacruz, to the [facility] FOR LUNG SCIENCE AND HEALTH at [facility]. Please see a copy of my visit note below.    CF Annual Nutrition Assessment    Reason for Assessment  Assessed during [facility] r/t increased nutrition risk with diagnosis of CF per protocol    Nutrition Significant PMH  Mild Lung Disease   Pancreatic Insufficient - fecal elastase wnl, however benefits from enzymes  H/o sinus issues and surgeries    Social Assessment  Living situation: wife and son  Work/School/Disability: works full-time as a teacher  Food Insecurity:  None    Anthropometric Assessment  Height: 184 cm  IBW based on BMI 22 for females and 23 for males per CF Foundation recs: 78 kg  Today's Weight: 77.7 kg (actual weight)   %IBW: 100%  BMI: Body mass index is  Body mass index is 22.95 kg/(m^2).   Current weight is considered: Normal BMI and at CF goal  Weight stable, ranges 168-172 lbs.     Wt Readings from Last 5 Encounters:   17 77.7 kg (171 lb 4.8 oz)   17 76.9 kg (169 lb 9.6 oz)   16 77 kg (169 lb 12.1 oz)   16 76.1 kg (167 lb 12.3 oz)   16 75.7 kg (166 lb 14.2 oz)     Physical Activity/Exercise:  Cross-fit 2x/week, likes to walk    MALNUTRITION  % Intake:  No decreased intake noted  % Weight Loss:  None noted  Subcutaneous Fat Loss:  None observed  Muscle Loss:  None observed  Handgrip Strength:  Not Applicable  Fluid Accumulation/Edema:  None noted  Malnutrition Diagnosis: Patient does not meet two of the above criteria necessary for diagnosing malnutrition    Pancreatic Enzymes  Brand:  Creon 3000  Dosin-3 with meals, 0-1 with snacks  Are you taking enzymes as recommended: Yes, although more difficult to remember with snacks and when eating out.     High dose providing 115 units  "lipase/kg/meal which is within (low-end) the recommended range per CF Foundation to inhibit fibrosing colonopathy    Signs of Malabsorption:  Yes - continues to c/o loose, floating stools that vary in color but frequently yellow/tan appearing. Gassiness and abdominal discomfort have improved since starting on enzymes, but still noting signs of malabsorption. Pt continues to take soluble fiber (psyllium- Metamucil capsules) 4 per day (2 in PM and 2 in AM), now taking probiotics.     Enzyme Program:  None - received information about [facility] program today    Diet History and Assessment  Diet Preferences/Allergies/Intolerances: Regular diet  Appetite Stimulant Rx:  No  Intake Recall/Comments:    Dietary Recall  Breakfast: eggs, cereal with milk  Lunch: very hungry by this time, typically eats a school lunch but may buy 2 entrees  PM Snack: \"stress eating\", often nuts but getting tired of these  Dinner: more healthy, tries for a lean protein and vegetable    Pt also eats out 1x/week (fast food, pizza, ice cream, etc). Lately acknowledges that he has been making poorer choices with less healthy options d/t busy schedule and difficulty finding time for preparation. Pt and his wife share grocery shopping/cooking responsibilities.     Calcium: 2-3 servings daily, mostly milk  Salt: adds to some foods, likely adequate  Hydration: inadequate, unable to drink much during the day while teaching    Supplements: protein shake from [employer], interested in shakes/bars through Creon enzyme program    Tube Feeding:  no    Estimated Energy and Protein Needs  Estimation based on weight maintenance with Mild lung disease and mild pancreatic insufficient.    BEE: 1825  [id number] kcals/day =  150-175% BEE   g protein/day = 1.2-1.5 g/kg    Laboratory Assessment     Annual Studies Completed Today  OGTT- hypoglycemia    Vitamin A   Date Value Ref Range Status   02/05/2016 0.58  Final     Comment:     Reference range: 0.30 to " 1.20  Unit: mg/L       Vitamin D Deficiency screening   Date Value Ref Range Status   04/07/2017 33 20 - 75 ug/L Final     Comment:     Season, race, dietary intake, and treatment affect the concentration of   25-hydroxy-Vitamin D. Values may decrease during winter months and increase   during summer months. Values 20-29 ug/L may indicate Vitamin D insufficiency   and values <20 ug/L may indicate Vitamin D deficiency.   Vitamin D determination is routinely performed by an immunoassay specific for   25 hydroxyvitamin D3.  If an individual is on vitamin D2 (ergocalciferol)   supplementation, please specify 25 OH vitamin D2 and D3 level determination   by   LCMSMS test VITD23.       Vitamin E   Date Value Ref Range Status   02/05/2016 10.6  Final     Comment:     Reference range: 5.5 to 18.0  Unit: mg/L  (Note)  Test developed and characteristics determined by "[employer]. See Compliance Statement B: [email or web address]       Iron   Date Value Ref Range Status   04/07/2017 101 35 - 180 ug/dL Final     Cholesterol   Date Value Ref Range Status   04/07/2017 176 <200 mg/dL Final     Triglycerides   Date Value Ref Range Status   04/07/2017 52 <150 mg/dL Final     HDL Cholesterol   Date Value Ref Range Status   04/07/2017 50 >39 mg/dL Final     LDL Cholesterol Calculated   Date Value Ref Range Status   04/07/2017 116 (H) <100 mg/dL Final     Comment:     Above desirable:  100-129 mg/dl   Borderline High:  130-159 mg/dL   High:             160-189 mg/dL   Very high:       >189 mg/dl       VLDL-Cholesterol   Date Value Ref Range Status   12/22/2014 32 (H) 0 - 30 mg/dL Final     Cholesterol/HDL Ratio   Date Value Ref Range Status   12/22/2014 3.3 0.0 - 5.0 Final     Current Vitamin/Mineral Prescription R/T deficiency/malabsorption: general MVI (One-A-Day Men's formulation), Vitamin D 1,000 units, fish oil  Comments:  Taking daily        FOLLOW-UP FROM RECENT VISITS   --    NUTRITION DIAGNOSIS  Impaired nutrient utilization  "r/t CF hypermetabolism and suspected pancreatic insufficiency as evidenced by symptomatic improvement in steatorrhea with pancreatic enzyme replacement therapy and pt requires high kcal/protein diet to maintain nutrition and health status.   INTERVENTIONS/RECOMMENDATIONS  1) Reviewed oral intake and diet quality. Encouraged pt to try to consume more regular meals/snacks to prevent over-eating later in the day during his \"stressful eating\" time. 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 Plan to f/u with pt via [employer] with healthy snack ideas and healthy eating tips.   2) Discussed GI history and ongoing variable stools. Strongly encouraged pt to slowly increase capsule intake as he continues to show signs of malabsorption. Encouraged pt to increase by 1 capsule each meal until taking 5-6 capsules (238 units lipase/kg/meal). Discussed ability to increase capsule size to reduce the need for so many capsules. As pt increases dose, encouraged him to consume adequate fluid.   3) Provided information regarding [facility] enzyme program including co-pay assistance, supplements/shakes, and CF specific MVIs. Explained how to use the program, pt plans to sign up after visit today. Encouraged pt to select the MVW Complete softgel and start taking 1 per day, d/c additional vitamin d.      Patient Understanding: Good  Expected Compliance: Good  Follow-Up Plans: per protocol    GOALS:  1) Increase enzymes to 5-6 with meals  2) Maintain BMI >23    FOLLOW-UP/MONITORING:  Visit patient within 3 month(s) to check-in on increased enzyme dose, as well as further discussion regarding healthy eating.     Time Spent In Face-to-Face Patient Interactions: 15 minutes    Nisa Dumont RD, LD  Pager [phone]      Reason for Visit  Philip Delacruz is a 36 year old male who is being seen for Cystic Fibrosis (Follow up on Shar and his " "CF)    Assessment and plan: Philip Coley is a 36-year-old male with cystic fibrosis with mild lung disease and pancreatic insufficiency.  1. Pulmonary: The patient appears to be doing well from a pulmonary standpoint. He continues to oxygenate well and his exercise tolerance is stable. CXR was reviewed with patient, some CF-related changes noted but overall it is relatively stable. PFTs have been trending downward for the past few visits, and there has been an overall decline in lung function over the past few years. This recent decline is probably related to decreased airway clearance due to inadequate exercise with the birth of his child. I do not think he is having an exacerbation at this time. I have encouraged him to continue exercising as frequently as he can. We discussed a few treatment plans to optimize his lung function, and he has decided to start using albuterol and then Aerobika BID regardless of his exercise regimen. We discussed effective coughing during and after his Aerobika for optimal airway clearance, and the CF coordinator will stop in after today's appointment to discuss proper inhaler and spacer technique. If no improvement we will need to consider nebs and possibly vest therapy. He will follow-up in 6 weeks to re-evaluate his decreased PFTs.  2. CF related sinus disease: The patient has been seen by Dr. Pickering twice since his last visit. He had a sinus CT scan on 02/20/17 and was treated with oral Augmentin in 01/10/17 for an acute sinus infection. He is planning to started nebulized gentamicin once daily for his sinuses after getting clearance from the provider who managed his prior herpes outbreak. The patient has been taking Mucinex and I have advised him to avoid any \"D\" formulations to avoid drying out his secretions.  3. Pancreatic insufficiency: The patient notes increased gas and loose stool, however he acknowledges his diet has been poor recently. He is taking 2-3 capsules of " enzymes with each meal. We discussed that it would be very beneficial for him to eat a healthier diet for a variety of reasons, including the correlation between good lung function and sufficient nutrition.  4. Healthcare maintenance: Annual studies were completed today including a DEXA scan. Phosphorous mildly low (2.4), however I do not think this is significant and should improve with a healthier diet. OGTT was normal however the patient's blood glucose level continues to drop fairly low and so I have advised him to eat complex carbohydrates and a mixed diet to avoid episodes of hypoglycemia. His DEXA scan indicates bone density is low but improved from a previous scan. I have encouraged him to continue exercising regularly. Given his adequate vitamin D and calcium levels, no intervention is indicated at this time. LDL is slightly elevated (116), total cholesterol has improved. His LDL may improve as he makes an effort to eat more healthily.   5. 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  I will see the patient follow-up in three months with PFTs and sputum culture.    45 minutes of attending (Kaitlyn) time with the patient more than half in counseling regarding airway clearance options and occupations risks.  CF HPI  The patient was seen and examined by True Sahni   Philip Coley is a 36-year-old male with cystic fibrosis with mild lung disease.   The patient reports he is better rested however still tired due to his infant's sleeping habits. He has had increased nasal congestion in the past week. He has been sneezing and nasal drainage has been dry and yellow. The patient has been using Mucinex for the past few days. He has sinus pain occasionally at night, for which he takes Mucinex. The patient has got the  inhaled antibiotics but has not started that yet. He is waiting to hear back from his ophthalmologist to see if it is safe for him to take with his history of herpes. He reports a tickle in his anterior sternal area, which often precipitates his cough has increased within the past year.  Breathing is comfortable at rest. Occasional cough and intermittent sputum production. Sputum is white in color. No hemoptysis. No CP. He uses Aerobika on days he does not work out. He has been going to the gym twice weekly and has been walking. He would like to start running but activity is limited with his sore left ankle.  Review of Systems:  ENT: The patient reports a slight increase in sneezing and nasal congestion, see HPI.  Pulmonary complaints as noted in history of present illness  GI: No nausea, vomiting, or diarrhea. He has been slightly gassy and has had loose stool, but attributes this to his diet, which has been less healthy. He has been eating a lot of fatty/greasy foods. The patient has been taking 2-3 enzyme capsules with meals.  MS: He reports his joints have been very tight and his hips bilaterally and left ankle have been very sore. He has been doing PT for 6 months and has been improving - he can now jump and run. His ankle pain has not resolved but it is getting better. Previously he had been seen at [facility] and was referred to PT.    A complete ROS was otherwise negative except as noted in the HPI.    Current Outpatient Prescriptions   Medication     gentamicin 0.008% in 1000ml 0.9% NaCl (GARAMYCIN) SOLN nasal irrigation     oseltamivir (TAMIFLU) 75 MG capsule     fluticasone (FLONASE) 50 MCG/ACT spray     albuterol (PROAIR HFA/PROVENTIL HFA/VENTOLIN HFA) 108 (90 BASE) MCG/ACT Inhaler     CREON 82133 UNITS CPEP     fish oil-omega-3 fatty acids 1000 MG capsule     Cholecalciferol 3000 UNITS TABS     psyllium (METAMUCIL) 0.52 G capsule     LACTOBACILLUS EXTRA STRENGTH CAPS     Multiple Vitamins-Minerals  "(ONE-A-DAY MENS HEALTH FORMULA PO)     Sodium Chloride-Sodium Bicarb (SINUFLO READYRINSE) KIT     acyclovir (ZOVIRAX) 800 MG tablet     No current facility-administered medications for this visit.      Allergies   Allergen Reactions     Tegaderm Transparent Dressing (Informational Only) Rash     Past Medical History:   Diagnosis Date     Chronic sinusitis      Congenital absence of vas deferens, bilateral      Cystic fibrosis (H)     Delta F508 and [id number]      Nasal polyps      Sinusitis, chronic        Past Surgical History:   Procedure Laterality Date     HC TOOTH EXTRACTION W/FORCEP       NASAL/SINUS POLYPECTOMY       REPAIR PECTUS EXCAVATUM  1997     SINUS SURGERY  1992, 2002, 2004    Removed tubinates and polyps OSH     Sperm harvest         Social History     Social History     Marital status: Single     Spouse name: N/A     Number of children: N/A     Years of education: N/A     Occupational History     Teacher      Social History Main Topics     Smoking status: Never Smoker     Smokeless tobacco: Not on file     Alcohol use 0.0 oz/week      Comment: 1 drink 3X per week     Drug use: No     Sexual activity: Yes     Partners: Female     Birth control/ protection: None     Other Topics Concern     Not on file     Social History Narrative    #d .  Lives in [place] with significant other         /80  Pulse 56  Resp 16  Ht 1.84 m (6' 0.44\")  Wt 77.7 kg (171 lb 4.8 oz)  SpO2 98%  BMI 22.95 kg/m2  Body mass index is 22.95 kg/(m^2).  Exam:   GENERAL APPEARANCE: Well developed, well nourished, alert, and in no apparent distress.  EYES: PERRL, EOMI  HENT: Nasal mucosa with mild edema and hyperemia. No nasal polyps.  EARS: TMs and canals obscured by cerumen  MOUTH: Oral mucosa is moist, without any lesions, no tonsillar enlargement, no oropharyngeal exudate.  NECK: supple, no masses, no thyromegaly.  LYMPHATICS: No significant axillary, cervical, or supraclavicular nodes.  RESP: normal " percussion, good air flow throughout.  No crackles. No rhonchi. No wheezes.  CV: Normal S1, S2, regular rhythm, normal rate. No murmur.  No rub. No gallop. No LE edema.   ABDOMEN:  Bowel sounds normal, soft, nontender, no HSM or masses.   MS: extremities normal. No clubbing. No cyanosis.  SKIN: no rash on limited exam  NEURO: Mentation intact, speech normal, normal strength and tone, normal gait and stance  PSYCH: mentation appears normal. and affect normal/bright  Results:  Recent Results (from the past 168 hour(s))   Glucose in a Series: Draw Time Zero    Collection Time: 04/07/17  9:32 AM   Result Value Ref Range    Glucose 86 70 - 99 mg/dL   Insulin in a Series: Draw Time Zero    Collection Time: 04/07/17  9:32 AM   Result Value Ref Range    Insulin 3.9 3 - 25 mU/L   ALT    Collection Time: 04/07/17  9:32 AM   Result Value Ref Range    ALT 26 0 - 70 U/L   Basic metabolic panel    Collection Time: 04/07/17  9:32 AM   Result Value Ref Range    Sodium 138 133 - 144 mmol/L    Potassium 4.1 3.4 - 5.3 mmol/L    Chloride 103 94 - 109 mmol/L    Carbon Dioxide 29 20 - 32 mmol/L    Anion Gap 6 3 - 14 mmol/L    Glucose 85 70 - 99 mg/dL    Urea Nitrogen 18 7 - 30 mg/dL    Creatinine 0.84 0.66 - 1.25 mg/dL    GFR Estimate >90  Non  GFR Calc   >60 mL/min/1.7m2    GFR Estimate If Black >90   GFR Calc   >60 mL/min/1.7m2    Calcium 8.9 8.5 - 10.1 mg/dL   Lipid Profile    Collection Time: 04/07/17  9:32 AM   Result Value Ref Range    Cholesterol 176 <200 mg/dL    Triglycerides 52 <150 mg/dL    HDL Cholesterol 50 >39 mg/dL    LDL Cholesterol Calculated 116 (H) <100 mg/dL    Non HDL Cholesterol 126 <130 mg/dL   Albumin level    Collection Time: 04/07/17  9:32 AM   Result Value Ref Range    Albumin 4.0 3.4 - 5.0 g/dL   Alkaline phosphatase    Collection Time: 04/07/17  9:32 AM   Result Value Ref Range    Alkaline Phosphatase 87 40 - 150 U/L   AST    Collection Time: 04/07/17  9:32 AM   Result Value  Ref Range    AST 27 0 - 45 U/L   Iron    Collection Time: 04/07/17  9:32 AM   Result Value Ref Range    Iron 101 35 - 180 ug/dL   GGT    Collection Time: 04/07/17  9:32 AM   Result Value Ref Range    GGT 17 0 - 75 U/L   Hemoglobin A1c    Collection Time: 04/07/17  9:32 AM   Result Value Ref Range    Hemoglobin A1C 4.9 4.3 - 6.0 %   INR    Collection Time: 04/07/17  9:32 AM   Result Value Ref Range    INR 1.04 0.86 - 1.14   Magnesium    Collection Time: 04/07/17  9:32 AM   Result Value Ref Range    Magnesium 2.2 1.6 - 2.3 mg/dL   Phosphorus    Collection Time: 04/07/17  9:32 AM   Result Value Ref Range    Phosphorus 2.4 (L) 2.5 - 4.5 mg/dL   Protein total    Collection Time: 04/07/17  9:32 AM   Result Value Ref Range    Protein Total 7.6 6.8 - 8.8 g/dL   TSH with free T4 reflex    Collection Time: 04/07/17  9:32 AM   Result Value Ref Range    TSH 1.25 0.40 - 4.00 mU/L   Vitamin D Deficiency    Collection Time: 04/07/17  9:32 AM   Result Value Ref Range    Vitamin D Deficiency screening 33 20 - 75 ug/L   CBC with platelets differential    Collection Time: 04/07/17  9:32 AM   Result Value Ref Range    WBC 6.6 4.0 - 11.0 10e9/L    RBC Count 5.08 4.4 - 5.9 10e12/L    Hemoglobin 15.8 13.3 - 17.7 g/dL    Hematocrit 45.0 40.0 - 53.0 %    MCV 89 78 - 100 fl    MCH 31.1 26.5 - 33.0 pg    MCHC 35.1 31.5 - 36.5 g/dL    RDW 11.5 10.0 - 15.0 %    Platelet Count 197 150 - 450 10e9/L    Diff Method Automated Method     % Neutrophils 47.5 %    % Lymphocytes 41.0 %    % Monocytes 9.2 %    % Eosinophils 1.5 %    % Basophils 0.5 %    % Immature Granulocytes 0.3 %    Nucleated RBCs 0 0 /100    Absolute Neutrophil 3.2 1.6 - 8.3 10e9/L    Absolute Lymphocytes 2.7 0.8 - 5.3 10e9/L    Absolute Monocytes 0.6 0.0 - 1.3 10e9/L    Absolute Eosinophils 0.1 0.0 - 0.7 10e9/L    Absolute Basophils 0.0 0.0 - 0.2 10e9/L    Abs Immature Granulocytes 0.0 0 - 0.4 10e9/L    Absolute Nucleated RBC 0.0    Erythrocyte sedimentation rate auto    Collection  Time: 04/07/17  9:32 AM   Result Value Ref Range    Sed Rate 5 0 - 15 mm/h   Glucose in a Series: Draw +30 minutes    Collection Time: 04/07/17 10:05 AM   Result Value Ref Range    Glucose 145 (H) 70 - 99 mg/dL   Glucose in a Series: Draw +60 minutes    Collection Time: 04/07/17 10:35 AM   Result Value Ref Range    Glucose 120 (H) 70 - 99 mg/dL   Glucose in a Series: Draw +90 minutes    Collection Time: 04/07/17 11:05 AM   Result Value Ref Range    Glucose 88 70 - 99 mg/dL   Glucose in a Series: Draw +120 minutes    Collection Time: 04/07/17 11:45 AM   Result Value Ref Range    Glucose 53 (L) 70 - 99 mg/dL   Glucose by meter    Collection Time: 04/07/17 11:45 AM   Result Value Ref Range    Glucose 56 (L) 70 - 99 mg/dL   Glucose by meter    Collection Time: 04/07/17 11:58 AM   Result Value Ref Range    Glucose 44 (LL) 70 - 99 mg/dL   Glucose by meter    Collection Time: 04/07/17 12:13 PM   Result Value Ref Range    Glucose 71 70 - 99 mg/dL   General PFT Lab (Please always keep checked)    Collection Time: 04/07/17  1:11 PM   Result Value Ref Range    FVC-Pred 5.82 L    FVC-Pre 4.80 L    FVC-%Pred-Pre 82 %    FEV1-Pre 4.10 L    FEV1-%Pred-Pre 86 %    FEV1FVC-Pred 81 %    FEV1FVC-Pre 85 %    FEFMax-Pred 10.81 L/sec    FEFMax-Pre 9.98 L/sec    FEFMax-%Pred-Pre 92 %    FEF2575-Pred 4.60 L/sec    FEF2575-Pre 4.68 L/sec    GAR7008-%Pred-Pre 101 %    ExpTime-Pre 7.20 sec    FIFMax-Pre 9.18 L/sec    FEV1FEV6-Pred 83 %    FEV1FEV6-Pre 85 %                        Results as noted above.    PFT Interpretation:  Normal spirometry.  Decreased from previous.  Below recent best.   Valid Maneuver          CF Exacerbation:  absent        Scribe Disclosure:   I, Bonita Guadalupe, am serving as a scribe; to document services personally performed by SHELBY MEDINA MD based on data collection and the provider's statements to me.     Provider Disclosure:  I agree with above History, Review of Systems, Physical exam and Plan.  I have  reviewed the content of the documentation and have edited it as needed. I have personally performed the services documented here and the documentation accurately represents those services and the decisions I have made.      Electronically signed by:  True Sahni

## 2017-04-07 NOTE — NURSING NOTE
"Respiratory Therapist Note:    Vest    Model:    Settings:    Size Vest:    Frequency of therapy: times per day    Oxygen:     Review Cleaning: Yes    Alternative Airway Clearance: Aerobika    Manual Therapy:     Pulmonary Rehab:     Other/ Comments: I reviewed with Shar the proper technique of using the Aerobika,10minutes  6-8 breaths then \"purposely\" cough. I also instructed how to use the inhaler with a spacer. Shar verbalized good understanding    "

## 2017-04-07 NOTE — MR AVS SNAPSHOT
After Visit Summary   4/7/2017    Philip Delacruz    MRN: [id number]           Patient Information     Date Of Birth          1980        Visit Information        Provider Department      4/7/2017 1:50 PM True Sahni MD [facility]        Today's Diagnoses     Pancreatic insufficiency    -  1    CF (cystic fibrosis) (H)          Care Instructions    Cystic Fibrosis Self-Care Plan       MRN: [id number]   Clinic Date: April 7, 2017   Patient: Philip Delacruz     Annual Studies: Done today  IGG   Date Value Ref Range Status   02/05/2016 1120 695 - 1620 mg/dL Final     Insulin   Date Value Ref Range Status   04/07/2017 3.9 3 - 25 mU/L Final     There are no preventive care reminders to display for this patient.      Pulmonary Function Tests  FEV1: amount of air you can blow out in 1 second  FVC: total amount of air you can take in and blow out    Your Goals:         PFT Latest Ref Rng & Units 4/7/2017   FVC L 4.80   FEV1 L 4.10   FVC% % 82   FEV1% % 86          Airway Clearance: The Most Important Way to Keep Your Lungs Healthy  Aerobika twice daily      Good Nutrition Can Improve Lung Function and Overall Health     Take ALL of your vitamins with food     Take 1/2 of your enzymes before EVERY meal/snack and the other 1/2 mid-meal/snack    Wt Readings from Last 3 Encounters:   04/07/17 77.7 kg (171 lb 4.8 oz)   04/07/17 76.9 kg (169 lb 9.6 oz)   12/02/16 77 kg (169 lb 12.1 oz)       Body mass index is 22.95 kg/(m^2).         National CF Foundation Recommendations for BMI in CF Adults: Women: at least 22 Men: at least 23        Controlling Blood Sugars Helps Prevent Lung Infections & Improves Nutrition  Test blood sugar:     In the morning before eating (goal is )     2 hours after a meal (goal is less than 150)     When pre-meal glucose is greater than 150 add correction     At bedtime (if less than 100 eat a snack with 15 grams of  carbohydrates  Last A1C Results:   Hemoglobin A1C   Date Value Ref Range Status   04/07/2017 4.9 4.3 - 6.0 % Final         If diabetic, measure A1C every 6 months. Goal: Under 7%    Staying Healthy    Research:  If you are interested in learning about research opportunities or have questions, please contact Olga Meléndez at [phone] or [email or web address].      CF Foundation:  Compass is a personalized resource service to help you with the insurance, financial, legal and other issues you are facing.  It's free, confidential and available to anyone with CF.  Ask your  for more information or contact Compass directly at [phone]-COMPASS ([phone]) or compass@cff.org, or learn more at cff.org/compass.          RECOMMENDATIONS:   Aerobika twice daily for 5-10 minutes with purposeful coughing.  Albuterol 2 puffs with spacer before aerobika or exercise.  Healthier diet choices.  Contact your eye doctor about the herpes and then start gentamycin nasal nebs.  Otherwise continue current medication.   Nutrition: slowly increase enzyme dose to 5-6 capsules per meal. Select MVW Complete Formulation softgel from enzyme program- take 1 per day (can stop taking additional Vitamin D, multivitamin).           CF Nurse Line:  Love Martins: [phone]    Kristi Ndiaye, RT: [phone]     Nisa Dumont: [phone] or April Cardona, [employer]: [phone]   Migdalia Jacques, Diabetes Nurse: [phone]      Naheed Matthews: [phone] or Yvonne Gupta [phone], Social Workers   [email or web address]                 Follow-ups after your visit        Follow-up notes from your care team     Return in about 6 weeks (around 5/19/2017).      Your next 10 appointments already scheduled     Jun 09, 2017  2:00 PM CDT   PFT VISIT with ALINE JAMESON   [facility] Function [facility] ([facility])    [address] [place] [id number] [phone]             Jun 09, 2017  2:30 PM CDT   (Arrive by 2:15 PM)   RETURN CYSTIC FIBROSIS VISIT with True Sahni MD   [facility] ([facility])    [address] [id number] [phone]              Future tests that were ordered for you today     Open Future Orders        Priority Expected Expires Ordered    Cystic Fibrosis Culture Aerob Bacterial Routine 5/19/2017 6/9/2017 4/7/2017    Spirometry, Breathing Capacity Routine 5/19/2017 6/9/2017 4/7/2017            Who to contact     If you have questions or need follow up information about today's clinic visit or your schedule please contact [facility] directly at [phone].  Normal or non-critical lab and imaging results will be communicated to you by MyChart, letter or phone within 4 business days after the clinic has received the results. If you do not hear from us within 7 days, please contact the clinic through [employer] or phone. If you have a critical or abnormal lab result, we will notify you by phone as soon as possible.  Submit refill requests through [employer] or call your pharmacy and they will forward the refill request to us. Please allow 3 business days for your refill to be completed.          Additional Information About Your Visit        [employer] Information     [employer] gives you secure access to your electronic health record. If you see a primary care provider, you can also send messages to your care team and make appointments. If you have questions, please call your primary care clinic.  If you do not have a primary care provider, please call [phone] and they will assist you.        Care EveryWhere ID     This is your Care EveryWhere ID. This could be used by other organizations to access your [facility] medical records  [id number]        Your Vitals Were     Pulse Respirations Height Pulse Oximetry BMI (Body Mass Index)        "56 16 1.84 m (6' 0.44\") 98% 22.95 kg/m2        Blood Pressure from Last 3 Encounters:   04/07/17 122/80   04/07/17 127/90   12/02/16 122/81    Weight from Last 3 Encounters:   04/07/17 77.7 kg (171 lb 4.8 oz)   04/07/17 76.9 kg (169 lb 9.6 oz)   12/02/16 77 kg (169 lb 12.1 oz)                 Today's Medication Changes          These changes are accurate as of: 4/7/17  3:21 PM.  If you have any questions, ask your nurse or doctor.               These medicines have changed or have updated prescriptions.        Dose/Directions    METAMUCIL 0.52 G capsule   This may have changed:    - how much to take  - when to take this   Generic drug:  psyllium   Changed by:  True Sahni MD        Dose:  2 capsule   Take 2 capsules (1.04 g) by mouth daily   Quantity:  540 capsule   Refills:  0         Stop taking these medicines if you haven't already. Please contact your care team if you have questions.     acyclovir 800 MG tablet   Commonly known as:  ZOVIRAX   Stopped by:  True Sahni MD                    Primary Care Provider Fax #    [facility] [phone] [address]  [facility] [address]        Thank you!     Thank you for choosing [facility]  for your care. Our goal is always to provide you with excellent care. Hearing back from our patients is one way we can continue to improve our services. Please take a few minutes to complete the written survey that you may receive in the mail after your visit with us. Thank you!             Your Updated Medication List - Protect others around you: Learn how to safely use, store and throw away your medicines at www.disposemymeds.org.          This list is accurate as of: 4/7/17  3:21 PM.  Always use your most recent med list.                   Brand Name Dispense Instructions for use    albuterol 108 (90 BASE) MCG/ACT Inhaler    PROAIR HFA/PROVENTIL HFA/VENTOLIN HFA    1 Inhaler    Inhale 2 puffs " into the lungs every 6 hours as needed for shortness of breath / dyspnea or wheezing       Cholecalciferol 3000 UNITS Tabs      Take 3,000 Units by mouth daily       CREON 75029 UNITS Cpep   Generic drug:  amylase-lipase-protease     270 capsule    Take 3-4 capsules (36,000-48,000 Units) by mouth 3 times daily (with meals)       fish oil-omega-3 fatty acids 1000 MG capsule      Take 2 g by mouth daily       fluticasone 50 MCG/ACT spray    FLONASE    1 Bottle    Spray 2 sprays into both nostrils daily       gentamicin 0.008% in 1000ml 0.9% NaCl Soln nasal irrigation    GARAMYCIN    1000 mL    Reported on 4/7/2017       LACTOBACILLUS EXTRA STRENGTH Caps     60 capsule    Take 1 tablet by mouth 2 times daily (with meals)       METAMUCIL 0.52 G capsule   Generic drug:  psyllium     540 capsule    Take 2 capsules (1.04 g) by mouth daily       ONE-A-DAY MENS HEALTH FORMULA PO      Take 1 tablet by mouth daily       oseltamivir 75 MG capsule    TAMIFLU    10 capsule    Take 1 capsule (75 mg) by mouth 2 times daily       SINUFLO READYRINSE Kit

## 2017-04-07 NOTE — PROGRESS NOTES
Philip Delacruz [facility] for [facility] glucose tolerance test.  During today's [facility] appointment orders from Dr. Okeefe were completed.    Patient NPO: YES  CF annual labs completed YES  Patient drank 75 grams glucola at 0935 within 10 minutes.    Administrations This Visit     glucose tolerence test (GLUCOLA) 25% oral liquid 75 g     Admin Date Action Dose Route Administered By             04/07/2017 Given 75 g Oral Dotty George RN                          Labs drawn at baseline, +30, +60, +90 and +120 minutes post glucola.    Pt tolerated glucose tolerance testfairly well    Post test blood sugar 56, double snack provided. Recheck BG 44 at 1200, a second snack provided. Recheck BG 71 at 1215. Pt report a headache at the end of the test and stated his headache was slightly better upon discharge.     Patient given snack YES    Patient discharged at 1215 [facility] to [facility].    Dotty George RN

## 2017-04-07 NOTE — PROGRESS NOTES
CF Annual Nutrition Assessment    Reason for Assessment  Assessed during Dr. Sahni [facility] r/t increased nutrition risk with diagnosis of CF per protocol    Nutrition Significant PMH  Mild Lung Disease   Pancreatic Insufficient - fecal elastase wnl, however benefits from enzymes  H/o sinus issues and surgeries    Social Assessment  Living situation: wife and son  Work/School/Disability: works full-time as a teacher  Food Insecurity:  None    Anthropometric Assessment  Height: 184 cm  IBW based on BMI 22 for females and 23 for males per CF Foundation recs: 78 kg  Today's Weight: 77.7 kg (actual weight)   %IBW: 100%  BMI: Body mass index is  Body mass index is 22.95 kg/(m^2).   Current weight is considered: Normal BMI and at CF goal  Weight stable, ranges 168-172 lbs.     Wt Readings from Last 5 Encounters:   17 77.7 kg (171 lb 4.8 oz)   17 76.9 kg (169 lb 9.6 oz)   16 77 kg (169 lb 12.1 oz)   16 76.1 kg (167 lb 12.3 oz)   16 75.7 kg (166 lb 14.2 oz)     Physical Activity/Exercise:  Cross-fit 2x/week, likes to walk    MALNUTRITION  % Intake:  No decreased intake noted  % Weight Loss:  None noted  Subcutaneous Fat Loss:  None observed  Muscle Loss:  None observed  Handgrip Strength:  Not Applicable  Fluid Accumulation/Edema:  None noted  Malnutrition Diagnosis: Patient does not meet two of the above criteria necessary for diagnosing malnutrition    Pancreatic Enzymes  Brand:  Creon 3000  Dosin-3 with meals, 0-1 with snacks  Are you taking enzymes as recommended: Yes, although more difficult to remember with snacks and when eating out.     High dose providing 115 units lipase/kg/meal which is within (low-end) the recommended range per CF Foundation to inhibit fibrosing colonopathy    Signs of Malabsorption:  Yes - continues to c/o loose, floating stools that vary in color but frequently yellow/tan appearing. Gassiness and abdominal discomfort have improved since starting on enzymes, but  "still noting signs of malabsorption. Pt continues to take soluble fiber (psyllium- Metamucil capsules) 4 per day (2 in PM and 2 in AM), now taking probiotics.     Enzyme Program:  None - received information about Care Forward program today    Diet History and Assessment  Diet Preferences/Allergies/Intolerances: Regular diet  Appetite Stimulant Rx:  No  Intake Recall/Comments:    Dietary Recall  Breakfast: eggs, cereal with milk  Lunch: very hungry by this time, typically eats a school lunch but may buy 2 entrees  PM Snack: \"stress eating\", often nuts but getting tired of these  Dinner: more healthy, tries for a lean protein and vegetable    Pt also eats out 1x/week (fast food, pizza, ice cream, etc). Lately acknowledges that he has been making poorer choices with less healthy options d/t busy schedule and difficulty finding time for preparation. Pt and his wife share grocery shopping/cooking responsibilities.     Calcium: 2-3 servings daily, mostly milk  Salt: adds to some foods, likely adequate  Hydration: inadequate, unable to drink much during the day while teaching    Supplements: protein shake from [employer], interested in shakes/bars through [employer] enzyme program    Tube Feeding:  no    Estimated Energy and Protein Needs  Estimation based on weight maintenance with Mild lung disease and mild pancreatic insufficient.    BEE: 1825  2867-7083 kcals/day =  150-175% BEE   g protein/day = 1.2-1.5 g/kg    Laboratory Assessment     Annual Studies Completed Today  OGTT- hypoglycemia    Vitamin A   Date Value Ref Range Status   02/05/2016 0.58  Final     Comment:     Reference range: 0.30 to 1.20  Unit: mg/L       Vitamin D Deficiency screening   Date Value Ref Range Status   04/07/2017 33 20 - 75 ug/L Final     Comment:     Season, race, dietary intake, and treatment affect the concentration of   25-hydroxy-Vitamin D. Values may decrease during winter months and increase   during summer months. Values 20-29 ug/L " may indicate Vitamin D insufficiency   and values <20 ug/L may indicate Vitamin D deficiency.   Vitamin D determination is routinely performed by an immunoassay specific for   25 hydroxyvitamin D3.  If an individual is on vitamin D2 (ergocalciferol)   supplementation, please specify 25 OH vitamin D2 and D3 level determination   by   LCMSMS test VITD23.       Vitamin E   Date Value Ref Range Status   02/05/2016 10.6  Final     Comment:     Reference range: 5.5 to 18.0  Unit: mg/L  (Note)  Test developed and characteristics determined by [employer]. See Compliance Statement B: [employer]/CS       Iron   Date Value Ref Range Status   04/07/2017 101 35 - 180 ug/dL Final     Cholesterol   Date Value Ref Range Status   04/07/2017 176 <200 mg/dL Final     Triglycerides   Date Value Ref Range Status   04/07/2017 52 <150 mg/dL Final     HDL Cholesterol   Date Value Ref Range Status   04/07/2017 50 >39 mg/dL Final     LDL Cholesterol Calculated   Date Value Ref Range Status   04/07/2017 116 (H) <100 mg/dL Final     Comment:     Above desirable:  100-129 mg/dl   Borderline High:  130-159 mg/dL   High:             160-189 mg/dL   Very high:       >189 mg/dl       VLDL-Cholesterol   Date Value Ref Range Status   12/22/2014 32 (H) 0 - 30 mg/dL Final     Cholesterol/HDL Ratio   Date Value Ref Range Status   12/22/2014 3.3 0.0 - 5.0 Final     Current Vitamin/Mineral Prescription R/T deficiency/malabsorption: general MVI (One-A-Day Men's formulation), Vitamin D 1,000 units, fish oil  Comments:  Taking daily        FOLLOW-UP FROM RECENT VISITS   --    NUTRITION DIAGNOSIS  Impaired nutrient utilization r/t CF hypermetabolism and suspected pancreatic insufficiency as evidenced by symptomatic improvement in steatorrhea with pancreatic enzyme replacement therapy and pt requires high kcal/protein diet to maintain nutrition and health status.   INTERVENTIONS/RECOMMENDATIONS  1) Reviewed oral intake and diet quality. Encouraged pt  "to try to consume more regular meals/snacks to prevent over-eating later in the day during his \"stressful eating\" time. 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 Plan to f/u with pt via [employer] with healthy snack ideas and healthy eating tips.   2) Discussed GI history and ongoing variable stools. Strongly encouraged pt to slowly increase capsule intake as he continues to show signs of malabsorption. Encouraged pt to increase by 1 capsule each meal until taking 5-6 capsules (238 units lipase/kg/meal). Discussed ability to increase capsule size to reduce the need for so many capsules. As pt increases dose, encouraged him to consume adequate fluid.   3) Provided information regarding [facility] CF Care Forward enzyme program including co-pay assistance, supplements/shakes, and CF specific MVIs. Explained how to use the program, pt plans to sign up after visit today. Encouraged pt to select the MVW Complete softgel and start taking 1 per day, d/c additional vitamin d.      Patient Understanding: Good  Expected Compliance: Good  Follow-Up Plans: per protocol    GOALS:  1) Increase enzymes to 5-6 with meals  2) Maintain BMI >23    FOLLOW-UP/MONITORING:  Visit patient within 3 month(s) to check-in on increased enzyme dose, as well as further discussion regarding healthy eating.     Time Spent In Face-to-Face Patient Interactions: 15 minutes    Nisa Dumont RD, LD  Pager [phone]    "

## 2017-04-08 LAB
INSULIN SERPL-ACNC: 23.8 MU/L (ref 3–25)
INSULIN SERPL-ACNC: 3.2 MU/L (ref 3–25)

## 2017-04-09 LAB
A-TOCOPHEROL VIT E SERPL-MCNC: 8.2
ANNOTATION COMMENT IMP: NORMAL
BETA+GAMMA TOCOPHEROL SERPL-MCNC: 0.5 MG/DL
RETINYL PALMITATE SERPL-MCNC: NORMAL UG/ML
VIT A SERPL-MCNC: 0.41 UG/ML

## 2017-04-10 LAB — TESTOST SERPL-MCNC: 440 NG/DL (ref 240–950)

## 2017-04-11 LAB — IGE SERPL-ACNC: 19 KIU/L (ref 0–114)

## 2017-04-12 LAB
BACTERIA SPEC CULT: NORMAL
MICRO REPORT STATUS: NORMAL
SPECIMEN SOURCE: NORMAL

## 2017-05-02 ENCOUNTER — TELEPHONE (OUTPATIENT)
Dept: OTOLARYNGOLOGY | Facility: CLINIC | Age: 37
End: 2017-05-02

## 2017-05-02 NOTE — TELEPHONE ENCOUNTER
Using nebulizer to instill gentimicin solution but the nebulizer gets gummed up and  asks to soak in vinegar, still with problems. Offered new prescription for alternative nebulizer brand. He would like to know more about different brands, and asks to speak with Dr garces's nurse for recommendations,

## 2017-05-09 DIAGNOSIS — J32.4 CHRONIC PANSINUSITIS: Primary | ICD-10-CM

## 2017-05-09 DIAGNOSIS — E84.9 CF (CYSTIC FIBROSIS) (H): ICD-10-CM

## 2017-06-08 DIAGNOSIS — E84.0 CYSTIC FIBROSIS WITH PULMONARY MANIFESTATIONS (H): Primary | ICD-10-CM

## 2017-06-09 ENCOUNTER — OFFICE VISIT (OUTPATIENT)
Dept: CARE COORDINATION | Facility: CLINIC | Age: 37
End: 2017-06-09

## 2017-06-09 ENCOUNTER — OFFICE VISIT (OUTPATIENT)
Dept: PULMONOLOGY | Facility: CLINIC | Age: 37
End: 2017-06-09
Attending: INTERNAL MEDICINE
Payer: COMMERCIAL

## 2017-06-09 VITALS
WEIGHT: 171 LBS | OXYGEN SATURATION: 97 % | HEART RATE: 60 BPM | DIASTOLIC BLOOD PRESSURE: 77 MMHG | SYSTOLIC BLOOD PRESSURE: 133 MMHG | RESPIRATION RATE: 18 BRPM | HEIGHT: 72 IN | TEMPERATURE: 98.1 F | BODY MASS INDEX: 23.16 KG/M2

## 2017-06-09 DIAGNOSIS — K86.89 PANCREATIC INSUFFICIENCY: ICD-10-CM

## 2017-06-09 DIAGNOSIS — E84.0 CYSTIC FIBROSIS WITH PULMONARY MANIFESTATIONS (H): ICD-10-CM

## 2017-06-09 DIAGNOSIS — E84.9 CF (CYSTIC FIBROSIS) (H): ICD-10-CM

## 2017-06-09 DIAGNOSIS — Z71.9 ENCOUNTER FOR COUNSELING: Primary | ICD-10-CM

## 2017-06-09 LAB
ALBUMIN SERPL-MCNC: 4.2 G/DL (ref 3.4–5)
ALP SERPL-CCNC: 90 U/L (ref 40–150)
ALT SERPL W P-5'-P-CCNC: 28 U/L (ref 0–70)
AST SERPL W P-5'-P-CCNC: 24 U/L (ref 0–45)
BILIRUB DIRECT SERPL-MCNC: 0.2 MG/DL (ref 0–0.2)
BILIRUB SERPL-MCNC: 0.7 MG/DL (ref 0.2–1.3)
EXPTIME-PRE: 7.31 SEC
FEF2575-%PRED-PRE: 110 %
FEF2575-PRE: 5.09 L/SEC
FEF2575-PRED: 4.59 L/SEC
FEFMAX-%PRED-PRE: 97 %
FEFMAX-PRE: 10.56 L/SEC
FEFMAX-PRED: 10.8 L/SEC
FEV1-%PRED-PRE: 89 %
FEV1-PRE: 4.19 L
FEV1FEV6-PRE: 86 %
FEV1FEV6-PRED: 82 %
FEV1FVC-PRE: 86 %
FEV1FVC-PRED: 81 %
FIFMAX-PRE: 8.76 L/SEC
FVC-%PRED-PRE: 83 %
FVC-PRE: 4.87 L
FVC-PRED: 5.81 L
PROT SERPL-MCNC: 7.5 G/DL (ref 6.8–8.8)

## 2017-06-09 PROCEDURE — 36415 COLL VENOUS BLD VENIPUNCTURE: CPT | Performed by: INTERNAL MEDICINE

## 2017-06-09 PROCEDURE — 87186 SC STD MICRODIL/AGAR DIL: CPT | Performed by: INTERNAL MEDICINE

## 2017-06-09 PROCEDURE — 87077 CULTURE AEROBIC IDENTIFY: CPT | Performed by: INTERNAL MEDICINE

## 2017-06-09 PROCEDURE — 99212 OFFICE O/P EST SF 10 MIN: CPT | Mod: ZF

## 2017-06-09 PROCEDURE — 87070 CULTURE OTHR SPECIMN AEROBIC: CPT | Performed by: INTERNAL MEDICINE

## 2017-06-09 PROCEDURE — 80076 HEPATIC FUNCTION PANEL: CPT | Performed by: INTERNAL MEDICINE

## 2017-06-09 RX ORDER — PANCRELIPASE 60000; 12000; 38000 [USP'U]/1; [USP'U]/1; [USP'U]/1
4-5 CAPSULE, DELAYED RELEASE PELLETS ORAL
Qty: 630 CAPSULE | Refills: 11 | Status: SHIPPED | OUTPATIENT
Start: 2017-06-09 | End: 2018-03-01

## 2017-06-09 RX ORDER — FLUTICASONE PROPIONATE 50 MCG
2 SPRAY, SUSPENSION (ML) NASAL DAILY
Qty: 1 BOTTLE | Refills: 3 | Status: SHIPPED | OUTPATIENT
Start: 2017-06-09 | End: 2017-10-30

## 2017-06-09 ASSESSMENT — PAIN SCALES - GENERAL: PAINLEVEL: NO PAIN (0)

## 2017-06-09 NOTE — MR AVS SNAPSHOT
After Visit Summary   6/9/2017    Philip Delacruz    MRN: [id number]           Patient Information     Date Of Birth          1980        Visit Information        Provider Department      6/9/2017 2:30 PM True Sahni MD [facility]        Today's Diagnoses     Pancreatic insufficiency        Cystic fibrosis with pulmonary manifestations          Care Instructions    Cystic Fibrosis Self-Care Plan       MRN: [id number]   Clinic Date: June 9, 2017   Patient: Philip Delacruz     Annual Studies:   IGG   Date Value Ref Range Status   04/07/2017 1160 695 - 1620 mg/dL Final     Insulin   Date Value Ref Range Status   04/07/2017 3.2 3 - 25 mU/L Final     Comment:     Assay Method:  Chemiluminescence using Siemens Centaur XP     There are no preventive care reminders to display for this patient.      Pulmonary Function Tests  FEV1: amount of air you can blow out in 1 second  FVC: total amount of air you can take in and blow out    Your Goals:         PFT Latest Ref Rng & Units 6/9/2017   FVC L 4.87   FEV1 L 4.19   FVC% % 83   FEV1% % 89          Airway Clearance: The Most Important Way to Keep Your Lungs Healthy  Vest Settings:    Hill-Rom Frequencies: 8, 9, 10 Pressure 10 Then, Frequencies 18, 19, 20 Pressure 6      RespirTech: Quick Start with Pressure of     Do each frequency for 5 minutes; Deflate vest after each frequency & cough 3 times before beginning the next setting.    Vest and Neb Therapy should be done 2 times/day.    Good Nutrition Can Improve Lung Function and Overall Health     Take ALL of your vitamins with food     Take 1/2 of your enzymes before EVERY meal/snack and the other 1/2 mid-meal/snack    Wt Readings from Last 3 Encounters:   06/09/17 77.6 kg (171 lb)   04/07/17 77.7 kg (171 lb 4.8 oz)   04/07/17 76.9 kg (169 lb 9.6 oz)       Body mass index is 23.19 kg/(m^2).         National CF Foundation Recommendations for BMI in CF  Adults: Women: at least 22 Men: at least 23        Controlling Blood Sugars Helps Prevent Lung Infections & Improves Nutrition  Test blood sugar:     In the morning before eating (goal is )     2 hours after a meal (goal is less than 150)     When pre-meal glucose is greater than 150 add correction     At bedtime (if less than 100 eat a snack with 15 grams of carbohydrates  Last A1C Results:   Hemoglobin A1C   Date Value Ref Range Status   04/07/2017 4.9 4.3 - 6.0 % Final         If diabetic, measure A1C every 6 months. Goal: Under 7%    Staying Healthy    Research:  If you are interested in learning about research opportunities or have questions, please contact Olga Meléndez at [phone] or [email or web address].      CF Foundation:  Compass is a personalized resource service to help you with the insurance, financial, legal and other issues you are facing.  It's free, confidential and available to anyone with CF.  Ask your  for more information or contact Compass directly at [facility] ([phone]) or compass@cff.org, or learn more at cff.org/compass.          RECOMMENDATIONS:   Gentamicin: use 30 cc in a nebulization reservoir once a day.   ENT Contact info:  1. To schedule an appointment call [phone], option 1  2. To talk to the Triage RN call [phone], option 3  3. If you need to speak to Pinky or get a message to your doctor on a Friday, call the triage RN  4. PinkyRN: [phone]      Ivacaftor (Kalydeco) 150mg every 12 hours with fatty food.  Increase enzymes with protein shake.  Otherwise continue current medication, nebs and aerobika therapy.             CF Nurse Line:  Love Martins: [phone]   Kristi Ndiaye, RT: [phone]     Nisa Dumont: [phone] or April Cardona, Dieticians: [phone]   Migdalia Jacques, Diabetes Nurse: [phone]      Naheed Matthews: [phone] or Yvonne Gupta [phone], Social Workers    [email or web address]                   Follow-ups after your visit        Follow-up notes from your care team     Return in about 12 weeks (around 9/1/2017).      Your next 10 appointments already scheduled     Jun 09, 2017  4:30 PM CDT   Lab with UC LAB   [facility] ([facility])    [address] [address]   [phone]            Sep 15, 2017  3:15 PM CDT   Lab with UC LAB   [facility] ([facility])    [address] [place] [address]   [phone]            Sep 15, 2017  3:30 PM CDT   CF LOOP with  PFL CF   [facility] Function [facility] ([facility])    [address] [place] [address]   [phone]            Sep 15, 2017  3:50 PM CDT   (Arrive by 3:35 PM)   RETURN CYSTIC FIBROSIS VISIT with True Sahni MD   [facility] ([facility])    [address] [place] [address]   [phone]              Future tests that were ordered for you today     Open Standing Orders        Priority Remaining Interval Expires Ordered    AFB Stain Non Blood Routine 5/5 6/8/2018 6/8/2017    PFT General Lab Testing Routine 50/50  6/8/2018 6/8/2017    Cystic Fibrosis Culture Aerob Bacterial Routine 99/100  6/8/2018 6/8/2017    AFB Culture Non Blood Routine 5/5 6/8/2018 6/8/2017          Open Future Orders        Priority Expected Expires Ordered    Cystic Fibrosis Culture Aerob Bacterial Routine 8/18/2017 9/29/2017 6/9/2017    Spirometry, Breathing Capacity Routine 8/18/2017 9/29/2017 6/9/2017    Hepatic panel Routine 8/18/2017 9/29/2017 6/9/2017    Hepatic panel Routine 6/9/2017 6/14/2017 6/9/2017            Who to contact     If you have questions or need follow up information about today's clinic visit or your schedule please contact [facility] LUNG SCIENCE  AND [facility] directly at [phone].  Normal or non-critical lab and imaging results will be communicated to you by DeNovaMedhart, letter or phone within 4 business days after the clinic has received the results. If you do not hear from us within 7 days, please contact the clinic through DeNovaMedhart or phone. If you have a critical or abnormal lab result, we will notify you by phone as soon as possible.  Submit refill requests through KonaWare or call your pharmacy and they will forward the refill request to us. Please allow 3 business days for your refill to be completed.          Additional Information About Your Visit        DeNovaMedharFlash Valet Information     KonaWare gives you secure access to your electronic health record. If you see a primary care provider, you can also send messages to your care team and make appointments. If you have questions, please call your primary care clinic.  If you do not have a primary care provider, please call [phone] and they will assist you.        Care EveryWhere ID     This is your Care EveryWhere ID. This could be used by other organizations to access your [facility] medical records  [id number]        Your Vitals Were     Pulse Temperature Respirations Height Pulse Oximetry BMI (Body Mass Index)    60 98.1  F (36.7  C) (Oral) 18 1.829 m (6') 97% 23.19 kg/m2       Blood Pressure from Last 3 Encounters:   06/09/17 133/77   04/07/17 122/80   04/07/17 127/90    Weight from Last 3 Encounters:   06/09/17 77.6 kg (171 lb)   04/07/17 77.7 kg (171 lb 4.8 oz)   04/07/17 76.9 kg (169 lb 9.6 oz)                 Today's Medication Changes          These changes are accurate as of: 6/9/17  4:16 PM.  If you have any questions, ask your nurse or doctor.               Start taking these medicines.        Dose/Directions    ivacaftor 150 MG tablet   Commonly known as:  ivacaftor   Used for:  Cystic fibrosis with pulmonary manifestations (H)   Started by:  True Sahni MD        Dose:  150 mg   Take 1  tablet (150 mg) by mouth every 12 hours with fat-containing food.   Quantity:  60 tablet   Refills:  11         These medicines have changed or have updated prescriptions.        Dose/Directions    CREON 63866 UNITS Cpep   This may have changed:    - how much to take  - additional instructions   Used for:  Pancreatic insufficiency   Generic drug:  amylase-lipase-protease   Changed by:  True Sahni MD        Dose:  4-5 capsule   Take 4-5 capsules (48,000-60,000 Units) by mouth 3 times daily (with meals) 2 with snacks. 3 meals and 3 snacks per day.   Quantity:  630 capsule   Refills:  11       MVW COMPLETE FORMULATION PO   This may have changed:  Another medication with the same name was removed. Continue taking this medication, and follow the directions you see here.   Changed by:  True Sahni MD        Refills:  0         Stop taking these medicines if you haven't already. Please contact your care team if you have questions.     Cholecalciferol 3000 UNITS Tabs   Stopped by:  True Sahni MD                Where to get your medicines      These medications were sent to [facility] - [address] [address]    Hours:  Mon-Fri 8:30am-5:00pm Toll Free [phone] Phone:  [phone]     ivacaftor 150 MG tablet         These medications were sent to [facility] [address] [place] [address] [facility] [place] [facility] [address] AT [facility] OF  [address]  [address] [facility] [address]     Phone:  [phone]     CREON 66966 UNITS Cpep    fluticasone 50 MCG/ACT spray                Primary Care Provider Fax #    [facility] [phone] [address] [place] [address]        Thank you!     Thank you for choosing [facility]  for your care. Our goal is always to provide you with excellent care. Hearing back from our patients is one  way we can continue to improve our services. Please take a few minutes to complete the written survey that you may receive in the mail after your visit with us. Thank you!             Your Updated Medication List - Protect others around you: Learn how to safely use, store and throw away your medicines at www.disposemymeds.org.          This list is accurate as of: 6/9/17  4:16 PM.  Always use your most recent med list.                   Brand Name Dispense Instructions for use    albuterol 108 (90 BASE) MCG/ACT Inhaler    PROAIR HFA/PROVENTIL HFA/VENTOLIN HFA    1 Inhaler    Inhale 2 puffs into the lungs every 6 hours as needed for shortness of breath / dyspnea or wheezing       CREON 81650 UNITS Cpep   Generic drug:  amylase-lipase-protease     630 capsule    Take 4-5 capsules (48,000-60,000 Units) by mouth 3 times daily (with meals) 2 with snacks. 3 meals and 3 snacks per day.       fish oil-omega-3 fatty acids 1000 MG capsule      Take 2 g by mouth daily       fluticasone 50 MCG/ACT spray    FLONASE    1 Bottle    Spray 2 sprays into both nostrils daily       gentamicin 0.008% in 1000ml 0.9% NaCl Soln nasal irrigation    GARAMYCIN    1000 mL    Reported on 4/7/2017       ivacaftor 150 MG tablet    ivacaftor    60 tablet    Take 1 tablet (150 mg) by mouth every 12 hours with fat-containing food.       LACTOBACILLUS EXTRA STRENGTH Caps     60 capsule    Take 1 tablet by mouth 2 times daily (with meals)       METAMUCIL 0.52 G capsule   Generic drug:  psyllium     540 capsule    Take 2 capsules (1.04 g) by mouth daily       MVW COMPLETE FORMULATION PO          order for DME     1 each    Use [employer] Vios nebulizer for nasal/sinus nebulizing as instructed       oseltamivir 75 MG capsule    TAMIFLU    10 capsule    Take 1 capsule (75 mg) by mouth 2 times daily       SINUFLO READYRINSE Kit

## 2017-06-09 NOTE — PROGRESS NOTES
Adult Cystic Fibrosis Program  Social Work Clinic Consult    Data:   Met with SHAR to review psychosocial needs and provide counseling as needed.    Shar reports that he has been doing generally well overall. His son will be turning 1 on 6/27. Shar reports that his son has been walking for nearly 2 months and is sleeping better at night, which has allowed him and his wife to get more rest. Shar has resumed exercising a few times per week and feels that his mood has improved and stabilized. 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 He plans to go on vacations with his family to [place] and [place] and enjoy the rest of the summer at home. He denied any questions, concerns or needs for SW at this time. He is aware that SW remains available as needs arise.      Intervention:  -Re-Assessment of recent/chronic concern(s)    Assessment: Shar was engaged and generally open during this encounter. He presented with a normal affect. He reports general psychosocial stability overall with access to relevant resources and supports. No new concerns expressed or noted.    Plan:   -Continue to follow for any psychosocial needs that may arise.  -Complete full psychosocial assessment annually.       AGUSTÍN Oropeza, [facility]  Adult Cystic Fibrosis   (Pager) [phone]  (Phone) [phone]

## 2017-06-09 NOTE — NURSING NOTE
Chief Complaint   Patient presents with     Cystic Fibrosis     Return Visit for jose angel/genesis Adams CMA at 2:51 PM on 6/9/2017

## 2017-06-09 NOTE — NURSING NOTE
Chief Complaint   Patient presents with     Cystic Fibrosis     Return Visit for f/u    Norma Jade at 2:39 PM on 6/9/2017

## 2017-06-09 NOTE — LETTER
6/9/2017       RE: Philip Delacruz  [address] [facility] [address]     Dear Colleague,    Thank you for referring your patient, Philip Delacruz, to the [facility] at [facility]. Please see a copy of my visit note below.    Reason for Visit  Philip Delacruz is a 37 year old male who is being seen for Cystic Fibrosis (Return Visit for f/u)    Assessment and plan: Philip Coley is a 37-year-old male with cystic fibrosis with mild lung disease and pancreatic insufficiency.  1. Pulmonary: The patient appears to be doing well from a pulmonary standpoint. He is oxygenating well and his exercise tolerance is stable. PFTs have increased since his last appointment. The patient's PFTs had been trending downward for the past few visits, and he has had an overall decline in lung function over the past few years. This is presumably attributed to inadequate airway clearance. Since his last visit, he started using the aerobika daily and has had some improvement with his PFTs and symptoms. He does not appears to be having a pulmonary exacerbation at this time. I have encouraged him to continue exercising as frequently as he can. He will continue current albuterol and Aerobika BID.  Additionally, the patient will initiate ivacaftor when paperwork has been processed, see item 5 for more information.  2. CF related sinus disease: The patient is followed by Dr. Pickering and reports an improvement in his sinus symptoms after using gentamicin nasal rinses for 1 month. He will continue to alternate gentamicin nasal rinses monthly. Today the patient was provided with the recommended concentration of Gentamicin in saline solution.  3. Pancreatic insufficiency: The patient notes increased gas and loose stool since starting to drink protein shakes, however he has been trying to increase his enzymes and notes symptoms of malabsorption are improving.  He reports no other symptoms consistent with malabsorption. He reports weight gain since starting protein shakes. I cautioned him against increasing enzymes too quickly due to risk of constipation. He has been seen by the CF dietician and will continue current vitamins and supplements.  4. Healthcare maintenance: Annual studies were completed in April 2017 including a DEXA scan. He was seen by the CF  today.   5. 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 [id number] was recently approved as a target mutation for ivacaftor. This medication, including possible side effects and benefits, was discussed with the patient at length today. The patient is interested in moving forward with ivacaftor. We will plan to initiate this medication as soon as his paperwork is processed. He will notify the CF clinic prior to starting any new medications. In addition he has been instructed to avoid Arnett oranges and grapefruit. He was also instructed to take Ivacaftor with fatty foods for adequate absorption. I will ask our dietician if protein shakes provide sufficient fat content. LFTs will be checked after today's appointment and then quarterly per standard protocol.  I will see the patient in follow-up in three months with PFTs and sputum culture.  45 minutes minutes face to face time with the patient, more than half spent in counseling and education regarding the risks, benefits, mechanism of action and proper administration of ivacaftor.    CF HPI  The patient was seen and examined by True Sahni   Philip Coley is a 37-year-old male with cystic fibrosis with mild lung disease.   The patient has started using albuterol prior to working out since is last appointment and feels as though his exercise tolerance has improved.  Breathing is comfortable at rest. His cough has recently diminished compared to a few months ago. He reports he will cough when he is laying down and going to bed,  however it used to occur nightly and is now less frequent. The patient does not notice any reflux symptoms when he lays down. The patient reports he used to have heartburn frequently as a child. He is producing no sputum and denies hemoptysis and chest pain. The patient will wake up with occasional night sweats.  The patient uses Aerobika 1-2 times daily. He does not expectorate sputum during or after therapies.    Review of Systems:  Constitutional: The patient was able to put on 6-7 lbs after starting protein shakes.  ENT: The patient reports his sinuses have been much better since finishing Gentamycin rinses about a month ago. Initially he used a battery operated nebulizer, and then changed to mixing Gentamycin with saline rinses. He has no sinus pain, ear pain, sore throat or rhinorrhea.   Pulmonary complaints as noted in history of present illness  GI: No nausea, vomiting, or diarrhea. The patient reports protein shakes from the CF nutrition program have caused him some GI distress. He reports increased gas and loose stools with insufficient enzyme dosing. The patient thinks he is making progress with enzyme dosing as malabsorptive symptoms are occurring less frequently.     MS: The patient reports wrist aches from picking up his baby. No joint swelling.      A complete ROS was otherwise negative except as noted in the HPI.    Current Outpatient Prescriptions   Medication     Multiple Vitamins-Minerals (MVW COMPLETE FORMULATION PO)     CREON 03612 UNITS CPEP per EC capsule     fluticasone (FLONASE) 50 MCG/ACT spray     order for DME     psyllium (METAMUCIL) 0.52 G capsule     gentamicin 0.008% in 1000ml 0.9% NaCl (GARAMYCIN) SOLN nasal irrigation     oseltamivir (TAMIFLU) 75 MG capsule     albuterol (PROAIR HFA/PROVENTIL HFA/VENTOLIN HFA) 108 (90 BASE) MCG/ACT Inhaler     fish oil-omega-3 fatty acids 1000 MG capsule     LACTOBACILLUS EXTRA STRENGTH CAPS     Sodium Chloride-Sodium Bicarb (SINUFLO READYRINSE) KIT      No current facility-administered medications for this visit.      Allergies   Allergen Reactions     Tegaderm Transparent Dressing (Informational Only) Rash     Past Medical History:   Diagnosis Date     Chronic sinusitis      Congenital absence of vas deferens, bilateral      Cystic fibrosis (H)     Delta F508 and N6309R      Nasal polyps      Sinusitis, chronic        Past Surgical History:   Procedure Laterality Date     HC TOOTH EXTRACTION W/FORCEP       NASAL/SINUS POLYPECTOMY       REPAIR PECTUS EXCAVATUM  1997     SINUS SURGERY  1992, 2002, 2004    Removed tubinates and polyps OSH     Sperm harvest         Social History     Social History     Marital status: Single     Spouse name: N/A     Number of children: N/A     Years of education: N/A     Occupational History     Teacher      Social History Main Topics     Smoking status: Never Smoker     Smokeless tobacco: Not on file     Alcohol use 0.0 oz/week      Comment: 1 drink 3X per week     Drug use: No     Sexual activity: Yes     Partners: Female     Birth control/ protection: None     Other Topics Concern     Not on file     Social History Narrative    #d .  Lives in [place] with significant other         /77  Pulse 60  Temp 98.1  F (36.7  C) (Oral)  Resp 18  Ht 1.829 m (6')  Wt 77.6 kg (171 lb)  SpO2 97%  BMI 23.19 kg/m2  Body mass index is 23.19 kg/(m^2).    Exam:   GENERAL APPEARANCE: Well developed, well nourished, alert, and in no apparent distress.  EYES: PERRL, EOMI  HENT: Nasal mucosa with mild edema and hyperemia. No nasal polyps.  EARS: Canals clear, TMs normal.   MOUTH: Oral mucosa is moist, without any lesions, no tonsillar enlargement, no oropharyngeal exudate.  NECK: supple, no masses, no thyromegaly.  LYMPHATICS: No significant axillary, cervical, or supraclavicular nodes.  RESP: normal percussion, good air flow throughout.  No crackles. No rhonchi. No wheezes.  CV: Normal S1, S2, regular rhythm, normal rate. No  murmur.  No rub. No gallop. No LE edema.   ABDOMEN:  Bowel sounds normal, soft, nontender, no HSM or masses.    MS: extremities normal. No clubbing. No cyanosis.  SKIN: no rash on limited exam  NEURO: Mentation intact, speech normal, normal strength and tone, normal gait and stance  PSYCH: mentation appears normal. and affect normal/bright  Results:  Recent Results (from the past 168 hour(s))   General PFT Lab (Please always keep checked)    Collection Time: 06/09/17  1:56 PM   Result Value Ref Range    FVC-Pred 5.81 L    FVC-Pre 4.87 L    FVC-%Pred-Pre 83 %    FEV1-Pre 4.19 L    FEV1-%Pred-Pre 89 %    FEV1FVC-Pred 81 %    FEV1FVC-Pre 86 %    FEFMax-Pred 10.80 L/sec    FEFMax-Pre 10.56 L/sec    FEFMax-%Pred-Pre 97 %    FEF2575-Pred 4.59 L/sec    FEF2575-Pre 5.09 L/sec    NGQ7834-%Pred-Pre 110 %    ExpTime-Pre 7.31 sec    FIFMax-Pre 8.76 L/sec    FEV1FEV6-Pred 82 %    FEV1FEV6-Pre 86 %                   Results as noted above.    PFT Interpretation:  Normal spirometry.  Increased from previous.  Below recent best.   Valid Maneuver      CF Exacerbation:  absent        Scribe Disclosure:   I, Bonita Guadalupe, am serving as a scribe; to document services personally performed by TRUE MEDINA MD based on data collection and the provider's statements to me.     Provider Disclosure:  I agree with above History, Review of Systems, Physical exam and Plan.  I have reviewed the content of the documentation and have edited it as needed. I have personally performed the services documented here and the documentation accurately represents those services and the decisions I have made.      Electronically signed by:  True Medina       Nutrition Note    Reason for Visit: Fat intake with starting Ivacaftor       Interventions/Recommendations:  Plans for pt to begin ivacaftor. Reviewed recommendation for taking medication with fat-containing meal or snack, in general aiming for 12-15 grams/fat per dose + enzymes to promote  adequate absorption. Pt has been obtaining nutrition supplements through the [facility] program and enjoys ENU shakes. Explained that this would be adequate - contains 17 grams fat per container. Pt also interested in trying Noemi [employer] (9 gm fat) -- encouraged pt to read product labels to assess fat intake. Also provided handouts with healthy fat ideas for taking Orkambi/Ivacaftor.     Encouraged pt to contact RD if any GI issues or further questions/concerns.       Nisa Dumont RD, LD  Cystic Fibrosis/Lung Transplant Dietitian  Pager [phone]  On weekends/holidays contact coverage RD at [phone] (inpatient use only)           Again, thank you for allowing me to participate in the care of your patient.      Sincerely,    True Sahni MD

## 2017-06-09 NOTE — PROGRESS NOTES
Reason for Visit  Philip Delacruz is a 37 year old male who is being seen for Cystic Fibrosis (Return Visit for f/u)    Assessment and plan: Philip Coley is a 37-year-old male with cystic fibrosis with mild lung disease and pancreatic insufficiency.  1. Pulmonary: The patient appears to be doing well from a pulmonary standpoint. He is oxygenating well and his exercise tolerance is stable. PFTs have increased since his last appointment. The patient's PFTs had been trending downward for the past few visits, and he has had an overall decline in lung function over the past few years. This is presumably attributed to inadequate airway clearance. Since his last visit, he started using the aerobika daily and has had some improvement with his PFTs and symptoms. He does not appears to be having a pulmonary exacerbation at this time. I have encouraged him to continue exercising as frequently as he can. He will continue current albuterol and Aerobika BID.  Additionally, the patient will initiate ivacaftor when paperwork has been processed, see item 5 for more information.  2. CF related sinus disease: The patient is followed by Dr. Pickering and reports an improvement in his sinus symptoms after using gentamicin nasal rinses for 1 month. He will continue to alternate gentamicin nasal rinses monthly. Today the patient was provided with the recommended concentration of Gentamicin in saline solution.  3. Pancreatic insufficiency: The patient notes increased gas and loose stool since starting to drink protein shakes, however he has been trying to increase his enzymes and notes symptoms of malabsorption are improving. He reports no other symptoms consistent with malabsorption. He reports weight gain since starting protein shakes. I cautioned him against increasing enzymes too quickly due to risk of constipation. He has been seen by the CF dietician and will continue current vitamins and supplements.  4. Healthcare  maintenance: Annual studies were completed in April 2017 including a DEXA scan. He was seen by the CF  today.   5. 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 [id number] was recently approved as a target mutation for ivacaftor. This medication, including possible side effects and benefits, was discussed with the patient at length today. The patient is interested in moving forward with ivacaftor. We will plan to initiate this medication as soon as his paperwork is processed. He will notify the CF clinic prior to starting any new medications. In addition he has been instructed to avoid Letts oranges and grapefruit. He was also instructed to take Ivacaftor with fatty foods for adequate absorption. I will ask our dietician if protein shakes provide sufficient fat content. LFTs will be checked after today's appointment and then quarterly per standard protocol.  I will see the patient in follow-up in three months with PFTs and sputum culture.  45 minutes minutes face to face time with the patient, more than half spent in counseling and education regarding the risks, benefits, mechanism of action and proper administration of ivacaftor.    CF HPI  The patient was seen and examined by True Sahni   Philip Coley is a 37-year-old male with cystic fibrosis with mild lung disease.   The patient has started using albuterol prior to working out since is last appointment and feels as though his exercise tolerance has improved.  Breathing is comfortable at rest. His cough has recently diminished compared to a few months ago. He reports he will cough when he is laying down and going to bed, however it used to occur nightly and is now less frequent. The patient does not notice any reflux symptoms when he lays down. The patient reports he used to have heartburn frequently as a child. He is producing no sputum and denies hemoptysis and chest pain. The patient will wake up with occasional  night sweats.  The patient uses Aerobika 1-2 times daily. He does not expectorate sputum during or after therapies.    Review of Systems:  Constitutional: The patient was able to put on 6-7 lbs after starting protein shakes.  ENT: The patient reports his sinuses have been much better since finishing Gentamycin rinses about a month ago. Initially he used a battery operated nebulizer, and then changed to mixing Gentamycin with saline rinses. He has no sinus pain, ear pain, sore throat or rhinorrhea.   Pulmonary complaints as noted in history of present illness  GI: No nausea, vomiting, or diarrhea. The patient reports protein shakes from the CF nutrition program have caused him some GI distress. He reports increased gas and loose stools with insufficient enzyme dosing. The patient thinks he is making progress with enzyme dosing as malabsorptive symptoms are occurring less frequently.     MS: The patient reports wrist aches from picking up his baby. No joint swelling.      A complete ROS was otherwise negative except as noted in the HPI.    Current Outpatient Prescriptions   Medication     Multiple Vitamins-Minerals (MVW COMPLETE FORMULATION PO)     CREON 52456 UNITS CPEP per EC capsule     fluticasone (FLONASE) 50 MCG/ACT spray     order for DME     psyllium (METAMUCIL) 0.52 G capsule     gentamicin 0.008% in 1000ml 0.9% NaCl (GARAMYCIN) SOLN nasal irrigation     oseltamivir (TAMIFLU) 75 MG capsule     albuterol (PROAIR HFA/PROVENTIL HFA/VENTOLIN HFA) 108 (90 BASE) MCG/ACT Inhaler     fish oil-omega-3 fatty acids 1000 MG capsule     LACTOBACILLUS EXTRA STRENGTH CAPS     Sodium Chloride-Sodium Bicarb (SINUFLO READYRINSE) KIT     No current facility-administered medications for this visit.      Allergies   Allergen Reactions     Tegaderm Transparent Dressing (Informational Only) Rash     Past Medical History:   Diagnosis Date     Chronic sinusitis      Congenital absence of vas deferens, bilateral      Cystic fibrosis  (H)     Delta F508 and X1369O      Nasal polyps      Sinusitis, chronic        Past Surgical History:   Procedure Laterality Date     HC TOOTH EXTRACTION W/FORCEP       NASAL/SINUS POLYPECTOMY       REPAIR PECTUS EXCAVATUM  1997     SINUS SURGERY  1992, 2002, 2004    Removed tubinates and polyps OSH     Sperm harvest         Social History     Social History     Marital status: Single     Spouse name: N/A     Number of children: N/A     Years of education: N/A     Occupational History     Teacher      Social History Main Topics     Smoking status: Never Smoker     Smokeless tobacco: Not on file     Alcohol use 0.0 oz/week      Comment: 1 drink 3X per week     Drug use: No     Sexual activity: Yes     Partners: Female     Birth control/ protection: None     Other Topics Concern     Not on file     Social History Narrative    #d .  Lives in [place] with significant other         /77  Pulse 60  Temp 98.1  F (36.7  C) (Oral)  Resp 18  Ht 1.829 m (6')  Wt 77.6 kg (171 lb)  SpO2 97%  BMI 23.19 kg/m2  Body mass index is 23.19 kg/(m^2).    Exam:   GENERAL APPEARANCE: Well developed, well nourished, alert, and in no apparent distress.  EYES: PERRL, EOMI  HENT: Nasal mucosa with mild edema and hyperemia. No nasal polyps.  EARS: Canals clear, TMs normal.   MOUTH: Oral mucosa is moist, without any lesions, no tonsillar enlargement, no oropharyngeal exudate.  NECK: supple, no masses, no thyromegaly.  LYMPHATICS: No significant axillary, cervical, or supraclavicular nodes.  RESP: normal percussion, good air flow throughout.  No crackles. No rhonchi. No wheezes.  CV: Normal S1, S2, regular rhythm, normal rate. No murmur.  No rub. No gallop. No LE edema.   ABDOMEN:  Bowel sounds normal, soft, nontender, no HSM or masses.    MS: extremities normal. No clubbing. No cyanosis.  SKIN: no rash on limited exam  NEURO: Mentation intact, speech normal, normal strength and tone, normal gait and stance  PSYCH:  mentation appears normal. and affect normal/bright  Results:  Recent Results (from the past 168 hour(s))   General PFT Lab (Please always keep checked)    Collection Time: 06/09/17  1:56 PM   Result Value Ref Range    FVC-Pred 5.81 L    FVC-Pre 4.87 L    FVC-%Pred-Pre 83 %    FEV1-Pre 4.19 L    FEV1-%Pred-Pre 89 %    FEV1FVC-Pred 81 %    FEV1FVC-Pre 86 %    FEFMax-Pred 10.80 L/sec    FEFMax-Pre 10.56 L/sec    FEFMax-%Pred-Pre 97 %    FEF2575-Pred 4.59 L/sec    FEF2575-Pre 5.09 L/sec    BCL3936-%Pred-Pre 110 %    ExpTime-Pre 7.31 sec    FIFMax-Pre 8.76 L/sec    FEV1FEV6-Pred 82 %    FEV1FEV6-Pre 86 %                   Results as noted above.    PFT Interpretation:  Normal spirometry.  Increased from previous.  Below recent best.   Valid Maneuver      CF Exacerbation:  absent        Scribe Disclosure:   I, Bonita Guadalupe, am serving as a scribe; to document services personally performed by TRUE MEDINA MD based on data collection and the provider's statements to me.     Provider Disclosure:  I agree with above History, Review of Systems, Physical exam and Plan.  I have reviewed the content of the documentation and have edited it as needed. I have personally performed the services documented here and the documentation accurately represents those services and the decisions I have made.      Electronically signed by:  True Medina

## 2017-06-09 NOTE — MR AVS SNAPSHOT
After Visit Summary   6/9/2017    Philip Delacruz    MRN: [id number]           Patient Information     Date Of Birth          1980        Visit Information        Provider Department      6/9/2017 3:02 PM Yvonne Gupta  CASE MANAGEMENT        Today's Diagnoses     Encounter for counseling    -  1       Follow-ups after your visit        Future tests that were ordered for you today     Open Standing Orders        Priority Remaining Interval Expires Ordered    AFB Stain Non Blood Routine 5/5 6/8/2018 6/8/2017    PFT General Lab Testing Routine 50/50  6/8/2018 6/8/2017    Cystic Fibrosis Culture Aerob Bacterial Routine 99/100  6/8/2018 6/8/2017    AFB Culture Non Blood Routine 5/5 6/8/2018 6/8/2017            Who to contact     If you have questions or need follow up information about today's clinic visit or your schedule please contact  CASE MANAGEMENT directly at [phone].  Normal or non-critical lab and imaging results will be communicated to you by Speedment[employer], letter or phone within 4 business days after the clinic has received the results. If you do not hear from us within 7 days, please contact the clinic through Speedmenthart or phone. If you have a critical or abnormal lab result, we will notify you by phone as soon as possible.  Submit refill requests through [employer] or call your pharmacy and they will forward the refill request to us. Please allow 3 business days for your refill to be completed.          Additional Information About Your Visit        MyChart Information     [employer] gives you secure access to your electronic health record. If you see a primary care provider, you can also send messages to your care team and make appointments. If you have questions, please call your primary care clinic.  If you do not have a primary care provider, please call [phone] and they will assist you.        Care EveryWhere ID     This is your Care EveryWhere ID. This could be used by  other organizations to access your [place] medical records  [id number]         Blood Pressure from Last 3 Encounters:   06/09/17 133/77   04/07/17 122/80   04/07/17 127/90    Weight from Last 3 Encounters:   06/09/17 77.6 kg (171 lb)   04/07/17 77.7 kg (171 lb 4.8 oz)   04/07/17 76.9 kg (169 lb 9.6 oz)              Today, you had the following     No orders found for display         Today's Medication Changes          These changes are accurate as of: 6/9/17  3:13 PM.  If you have any questions, ask your nurse or doctor.               These medicines have changed or have updated prescriptions.        Dose/Directions    CREON 85190 UNITS Cpep   This may have changed:    - how much to take  - additional instructions   Used for:  Pancreatic insufficiency   Generic drug:  amylase-lipase-protease   Changed by:  True Sahni MD        Dose:  4-5 capsule   Take 4-5 capsules (48,000-60,000 Units) by mouth 3 times daily (with meals) 2 with snacks. 3 meals and 3 snacks per day.   Quantity:  630 capsule   Refills:  11       MVW COMPLETE FORMULATION PO   This may have changed:  Another medication with the same name was removed. Continue taking this medication, and follow the directions you see here.   Changed by:  True Sahni MD        Refills:  0         Stop taking these medicines if you haven't already. Please contact your care team if you have questions.     Cholecalciferol 3000 UNITS Tabs   Stopped by:  True Sahni MD                Where to get your medicines      These medications were sent to [facility] [address] [place] [address] ARCHANA SPEARS  [address] AT [facility] OF  [address]  [address] [address]     Phone:  [phone]     CREON 64083 UNITS Cpep                Primary Care Provider Fax #    [facility] [phone] [address] [place] [address]        Thank you!     Thank you for choosing [facility]   for your care. Our goal is always to provide you with excellent care. Hearing back from our patients is one way we can continue to improve our services. Please take a few minutes to complete the written survey that you may receive in the mail after your visit with us. Thank you!             Your Updated Medication List - Protect others around you: Learn how to safely use, store and throw away your medicines at www.disposemymeds.org.          This list is accurate as of: 6/9/17  3:13 PM.  Always use your most recent med list.                   Brand Name Dispense Instructions for use    albuterol 108 (90 BASE) MCG/ACT Inhaler    PROAIR HFA/PROVENTIL HFA/VENTOLIN HFA    1 Inhaler    Inhale 2 puffs into the lungs every 6 hours as needed for shortness of breath / dyspnea or wheezing       CREON 52144 UNITS Cpep   Generic drug:  amylase-lipase-protease     630 capsule    Take 4-5 capsules (48,000-60,000 Units) by mouth 3 times daily (with meals) 2 with snacks. 3 meals and 3 snacks per day.       fish oil-omega-3 fatty acids 1000 MG capsule      Take 2 g by mouth daily       fluticasone 50 MCG/ACT spray    FLONASE    1 Bottle    Spray 2 sprays into both nostrils daily       gentamicin 0.008% in 1000ml 0.9% NaCl Soln nasal irrigation    GARAMYCIN    1000 mL    Reported on 4/7/2017       LACTOBACILLUS EXTRA STRENGTH Caps     60 capsule    Take 1 tablet by mouth 2 times daily (with meals)       METAMUCIL 0.52 G capsule   Generic drug:  psyllium     540 capsule    Take 2 capsules (1.04 g) by mouth daily       MVW COMPLETE FORMULATION PO          order for DME     1 each    Use [employer] Vios nebulizer for nasal/sinus nebulizing as instructed       oseltamivir 75 MG capsule    TAMIFLU    10 capsule    Take 1 capsule (75 mg) by mouth 2 times daily       SINUFLO READYRINSE Kit

## 2017-06-09 NOTE — PATIENT INSTRUCTIONS
Cystic Fibrosis Self-Care Plan       MRN: [id number]   Clinic Date: June 9, 2017   Patient: Philip Delacruz     Annual Studies:   IGG   Date Value Ref Range Status   04/07/2017 1160 695 - 1620 mg/dL Final     Insulin   Date Value Ref Range Status   04/07/2017 3.2 3 - 25 mU/L Final     Comment:     Assay Method:  Chemiluminescence using Siemens Centaur XP     There are no preventive care reminders to display for this patient.      Pulmonary Function Tests  FEV1: amount of air you can blow out in 1 second  FVC: total amount of air you can take in and blow out    Your Goals:         PFT Latest Ref Rng & Units 6/9/2017   FVC L 4.87   FEV1 L 4.19   FVC% % 83   FEV1% % 89          Airway Clearance: The Most Important Way to Keep Your Lungs Healthy  Vest Settings:    Hill-Rom Frequencies: 8, 9, 10 Pressure 10 Then, Frequencies 18, 19, 20 Pressure 6      RespirTech: Quick Start with Pressure of     Do each frequency for 5 minutes; Deflate vest after each frequency & cough 3 times before beginning the next setting.    Vest and Neb Therapy should be done 2 times/day.    Good Nutrition Can Improve Lung Function and Overall Health     Take ALL of your vitamins with food     Take 1/2 of your enzymes before EVERY meal/snack and the other 1/2 mid-meal/snack    Wt Readings from Last 3 Encounters:   06/09/17 77.6 kg (171 lb)   04/07/17 77.7 kg (171 lb 4.8 oz)   04/07/17 76.9 kg (169 lb 9.6 oz)       Body mass index is 23.19 kg/(m^2).         National CF Foundation Recommendations for BMI in CF Adults: Women: at least 22 Men: at least 23        Controlling Blood Sugars Helps Prevent Lung Infections & Improves Nutrition  Test blood sugar:     In the morning before eating (goal is )     2 hours after a meal (goal is less than 150)     When pre-meal glucose is greater than 150 add correction     At bedtime (if less than 100 eat a snack with 15 grams of carbohydrates  Last A1C Results:   Hemoglobin A1C   Date Value Ref  Range Status   04/07/2017 4.9 4.3 - 6.0 % Final         If diabetic, measure A1C every 6 months. Goal: Under 7%    Staying Healthy    Research:  If you are interested in learning about research opportunities or have questions, please contact Olga Meléndez at [phone] or [email or web address].      CF Foundation:  Compass is a personalized resource service to help you with the insurance, financial, legal and other issues you are facing.  It's free, confidential and available to anyone with CF.  Ask your  for more information or contact Compass directly at 343-COMPASS ([phone]) or compass@cff.org, or learn more at cff.org/compass.          RECOMMENDATIONS:   Gentamicin: use 30 cc in a nebulization reservoir once a day.   ENT Contact info:  1. To schedule an appointment call [phone], option 1  2. To talk to the Triage RN call [phone], option 3  3. If you need to speak to Pinky or get a message to your doctor on a Friday, call the triage RN  4. PinkyRN: [phone]      Ivacaftor (Kalydeco) 150mg every 12 hours with fatty food.  Increase enzymes with protein shake.  Otherwise continue current medication, nebs and aerobika therapy.             CF Nurse Line:  Love Martins: [phone]   Kristi Ndiaye, RT: [phone]     Nisa Dumont: [phone] or April Cardona, Dieticians: [phone]   Migdalia Jacques, Diabetes Nurse: [phone]      Naheed Matthews: [phone] or Yvonne Gupta [phone], Social Workers   [email or web address]

## 2017-06-13 NOTE — PROGRESS NOTES
Nutrition Note    Reason for Visit: Fat intake with starting Ivacaftor       Interventions/Recommendations:  Plans for pt to begin ivacaftor. Reviewed recommendation for taking medication with fat-containing meal or snack, in general aiming for 12-15 grams/fat per dose + enzymes to promote adequate absorption. Pt has been obtaining nutrition supplements through the [facility] program and enjoys ENU shakes. Explained that this would be adequate - contains 17 grams fat per container. Pt also interested in trying MediCard (9 gm fat) -- encouraged pt to read product labels to assess fat intake. Also provided handouts with healthy fat ideas for taking Orkambi/Ivacaftor.     Encouraged pt to contact RD if any GI issues or further questions/concerns.       Nisa Dumont RD, LD  Cystic Fibrosis/Lung Transplant Dietitian  Pager [phone]  On weekends/holidays contact coverage RD at [phone] (inpatient use only)

## 2017-07-18 ENCOUNTER — TELEPHONE (OUTPATIENT)
Dept: OTOLARYNGOLOGY | Facility: CLINIC | Age: 37
End: 2017-07-18

## 2017-07-18 ENCOUNTER — CARE COORDINATION (OUTPATIENT)
Dept: PULMONOLOGY | Facility: CLINIC | Age: 37
End: 2017-07-18

## 2017-07-18 DIAGNOSIS — E84.9 CF (CYSTIC FIBROSIS) (H): Primary | ICD-10-CM

## 2017-07-18 RX ORDER — AMOXICILLIN AND CLAVULANATE POTASSIUM 500; 125 MG/1; MG/1
1 TABLET, FILM COATED ORAL 2 TIMES DAILY
Qty: 20 TABLET | Refills: 0 | Status: SHIPPED | OUTPATIENT
Start: 2017-07-18 | End: 2017-07-28

## 2017-07-18 NOTE — PROGRESS NOTES
[facility]  July 18, 2017    Physicians, [facility]    CF Provider: True Sahni MD    Caller: Patient     Clinical information:  Philip Delacruz called with complaint of sore throat, swollen lymph nodes under jaw, L >R and yellow sputum for 3 days. He denies FCNS, states when he is feeling well he does not produce any sputum and is doing his Aerobika 2x / day with albuterol. He started Kalydeco ~ 3 weeks ago.    Plan: Per Dr. Burnette, Augmentin 500 mg twice daily for 10 days.       Call back with any new or worsening symptoms/concerns.    Caller verbalized understanding of plan and agrees with advice given.

## 2017-07-18 NOTE — TELEPHONE ENCOUNTER
This pt called with questions regarding his gentamycin.  He wants to clarify the frequency and dosage when reconstituting with saline.  He said his CF doctors were under the impression that the gentamycin was a continuous medication but the pt understood from Dr. Pickering that it was a PRN basis.  He also wanted to know how to reconstitute it when using a nasal irrigation vs a neb solution.  RN will will communicate pt questions with RNCC for Dr. Pickering.    Lennie CAVAZOSN, RN  [phone]  [facility]   Head & Neck Surgery

## 2017-09-15 ENCOUNTER — OFFICE VISIT (OUTPATIENT)
Dept: PULMONOLOGY | Facility: CLINIC | Age: 37
End: 2017-09-15
Attending: INTERNAL MEDICINE
Payer: COMMERCIAL

## 2017-09-15 VITALS
HEIGHT: 72 IN | RESPIRATION RATE: 16 BRPM | OXYGEN SATURATION: 98 % | HEART RATE: 61 BPM | WEIGHT: 172.84 LBS | DIASTOLIC BLOOD PRESSURE: 82 MMHG | SYSTOLIC BLOOD PRESSURE: 126 MMHG | BODY MASS INDEX: 23.41 KG/M2

## 2017-09-15 DIAGNOSIS — E84.0 CYSTIC FIBROSIS WITH PULMONARY MANIFESTATIONS (H): ICD-10-CM

## 2017-09-15 DIAGNOSIS — E84.0 CYSTIC FIBROSIS WITH PULMONARY MANIFESTATIONS (H): Primary | ICD-10-CM

## 2017-09-15 DIAGNOSIS — K86.89 PANCREATIC INSUFFICIENCY: ICD-10-CM

## 2017-09-15 DIAGNOSIS — E84.9 CF (CYSTIC FIBROSIS) (H): ICD-10-CM

## 2017-09-15 LAB
ALBUMIN SERPL-MCNC: 4.2 G/DL (ref 3.4–5)
ALP SERPL-CCNC: 87 U/L (ref 40–150)
ALT SERPL W P-5'-P-CCNC: 32 U/L (ref 0–70)
AST SERPL W P-5'-P-CCNC: 21 U/L (ref 0–45)
BILIRUB DIRECT SERPL-MCNC: 0.2 MG/DL (ref 0–0.2)
BILIRUB SERPL-MCNC: 0.7 MG/DL (ref 0.2–1.3)
EXPTIME-PRE: 8.58 SEC
FEF2575-%PRED-PRE: 113 %
FEF2575-PRE: 5.2 L/SEC
FEF2575-PRED: 4.58 L/SEC
FEFMAX-%PRED-PRE: 98 %
FEFMAX-PRE: 10.69 L/SEC
FEFMAX-PRED: 10.8 L/SEC
FEV1-%PRED-PRE: 92 %
FEV1-PRE: 4.35 L
FEV1FEV6-PRE: 87 %
FEV1FEV6-PRED: 82 %
FEV1FVC-PRE: 87 %
FEV1FVC-PRED: 81 %
FIFMAX-PRE: 8.29 L/SEC
FVC-%PRED-PRE: 86 %
FVC-PRE: 5.01 L
FVC-PRED: 5.81 L
PROT SERPL-MCNC: 7.8 G/DL (ref 6.8–8.8)

## 2017-09-15 PROCEDURE — 36415 COLL VENOUS BLD VENIPUNCTURE: CPT | Performed by: INTERNAL MEDICINE

## 2017-09-15 PROCEDURE — 80076 HEPATIC FUNCTION PANEL: CPT | Performed by: INTERNAL MEDICINE

## 2017-09-15 PROCEDURE — 87070 CULTURE OTHR SPECIMN AEROBIC: CPT | Performed by: INTERNAL MEDICINE

## 2017-09-15 PROCEDURE — 99212 OFFICE O/P EST SF 10 MIN: CPT

## 2017-09-15 RX ORDER — SODIUM CHLORIDE/SODIUM BICARB
KIT NASAL
COMMUNITY
Start: 2017-09-15

## 2017-09-15 RX ORDER — OMEGA-3S/DHA/EPA/FISH OIL 1000-1400
1 CAPSULE,DELAYED RELEASE (ENTERIC COATED) ORAL DAILY
COMMUNITY
End: 2018-11-23

## 2017-09-15 RX ORDER — PEDIATRIC MULTIVIT 61/D3/VIT K 1500-800
1 CAPSULE ORAL 2 TIMES DAILY
COMMUNITY
Start: 2017-09-15 | End: 2018-06-28

## 2017-09-15 RX ORDER — ACETAMINOPHEN 160 MG
1 TABLET,DISINTEGRATING ORAL DAILY
Qty: 60 CAPSULE | Refills: 4 | COMMUNITY
Start: 2017-09-15 | End: 2018-03-01

## 2017-09-15 ASSESSMENT — PAIN SCALES - GENERAL: PAINLEVEL: NO PAIN (0)

## 2017-09-15 NOTE — MR AVS SNAPSHOT
After Visit Summary   9/15/2017    Philip Delacruz    MRN: [id number]           Patient Information     Date Of Birth          1980        Visit Information        Provider Department      9/15/2017 3:50 PM True Sahni MD [facility]        Today's Diagnoses     Cystic fibrosis with pulmonary manifestations    -  1    CF (cystic fibrosis) (H)        Pancreatic insufficiency          Care Instructions    Cystic Fibrosis Self-Care Plan       MRN: [id number]   Clinic Date: September 15, 2017   Patient: Philip Delacruz     Annual Studies: April 2018  IGG   Date Value Ref Range Status   04/07/2017 1160 695 - 1620 mg/dL Final     Insulin   Date Value Ref Range Status   04/07/2017 3.2 3 - 25 mU/L Final     Comment:     Assay Method:  Chemiluminescence using Siemens Centaur XP     There are no preventive care reminders to display for this patient.      Pulmonary Function Tests  FEV1: amount of air you can blow out in 1 second  FVC: total amount of air you can take in and blow out    Your Goals:         PFT Latest Ref Rng & Units 9/15/2017   FVC L 5.01   FEV1 L 4.35   FVC% % 86   FEV1% % 92          Airway Clearance: The Most Important Way to Keep Your Lungs Healthy      Aerobika and exercise regularly    Good Nutrition Can Improve Lung Function and Overall Health     Take ALL of your vitamins with food     Take 1/2 of your enzymes before EVERY meal/snack and the other 1/2 mid-meal/snack    Wt Readings from Last 3 Encounters:   09/15/17 78.4 kg (172 lb 13.5 oz)   06/09/17 77.6 kg (171 lb)   04/07/17 77.7 kg (171 lb 4.8 oz)       Body mass index is 23.16 kg/(m^2).         National CF Foundation Recommendations for BMI in CF Adults: Women: at least 22 Men: at least 23        Controlling Blood Sugars Helps Prevent Lung Infections & Improves Nutrition  Test blood sugar:     In the morning before eating (goal is )     2 hours after a meal (goal is less  than 150)     When pre-meal glucose is greater than 150 add correction     At bedtime (if less than 100 eat a snack with 15 grams of carbohydrates  Last A1C Results:   Hemoglobin A1C   Date Value Ref Range Status   04/07/2017 4.9 4.3 - 6.0 % Final         If diabetic, measure A1C every 6 months. Goal: Under 7%    Staying Healthy    Research:  If you are interested in learning about research opportunities or have questions, please contact Olga Meléndez at [phone] or [email or web address].      CF Foundation:  Compass is a personalized resource service to help you with the insurance, financial, legal and other issues you are facing.  It's free, confidential and available to anyone with CF.  Ask your  for more information or contact Compass directly at [phone]-COMPASS ([phone]) or compass@cff.org, or learn more at cff.org/compass.          RECOMMENDATIONS:   Appointment with Dr. Watts in GI.  Otherwise continue current medication, exercise and aerobika.           CF Nurse Line:  Love Martins: [phone]   Kristi Ndiaye, RT: [phone]     Nisa Dumont: [phone] or April Cardona Dieticians: [phone]   Migdalia Jacques, Diabetes Nurse: [phone]      Naheed Matthews: [phone] or Yvonne Gupta [phone], Social Workers   [email or web address]                   Follow-ups after your visit        Additional Services     GASTROENTEROLOGY ADULT REF CONSULT ONLY       Preferred Location: Dr. Eliu Watts  - CF with gas, bloating and steatorrhea      Please be aware that coverage of these services is subject to the terms and limitations of your health insurance plan.  Call member services at your health plan with any benefit or coverage questions.  Any procedures must be performed at a [place] facility OR coordinated by your clinic's referral office.    Please bring the following with you to your appointment:    (1) Any X-Rays, CTs or MRIs which have been performed.  Contact  the facility where they were done to arrange for  prior to your scheduled appointment.    (2) List of current medications   (3) This referral request   (4) Any documents/labs given to you for this referral                  Follow-up notes from your care team     Return in about 12 weeks (around 12/8/2017).      Future tests that were ordered for you today     Open Future Orders        Priority Expected Expires Ordered    Cystic Fibrosis Culture Aerob Bacterial Routine 12/8/2017 1/5/2018 9/15/2017    Spirometry, Breathing Capacity Routine 12/8/2017 1/5/2018 9/15/2017    Hemoglobin A1c Routine 12/8/2017 1/5/2018 9/15/2017            Who to contact     If you have questions or need follow up information about today's clinic visit or your schedule please contact [facility] directly at [phone].  Normal or non-critical lab and imaging results will be communicated to you by [employer], letter or phone within 4 business days after the clinic has received the results. If you do not hear from us within 7 days, please contact the clinic through [employer] or phone. If you have a critical or abnormal lab result, we will notify you by phone as soon as possible.  Submit refill requests through [employer] or call your pharmacy and they will forward the refill request to us. Please allow 3 business days for your refill to be completed.          Additional Information About Your Visit        [employer] Information     [employer] gives you secure access to your electronic health record. If you see a primary care provider, you can also send messages to your care team and make appointments. If you have questions, please call your primary care clinic.  If you do not have a primary care provider, please call [phone] and they will assist you.        Care EveryWhere ID     This is your Care EveryWhere ID. This could be used by other organizations to access your [facility] medical records  [id number]       "  Your Vitals Were     Pulse Respirations Height Pulse Oximetry BMI (Body Mass Index)       61 16 1.84 m (6' 0.44\") 98% 23.16 kg/m2        Blood Pressure from Last 3 Encounters:   09/15/17 126/82   06/09/17 133/77   04/07/17 122/80    Weight from Last 3 Encounters:   09/15/17 78.4 kg (172 lb 13.5 oz)   06/09/17 77.6 kg (171 lb)   04/07/17 77.7 kg (171 lb 4.8 oz)              We Performed the Following     GASTROENTEROLOGY ADULT REF CONSULT ONLY          Today's Medication Changes          These changes are accurate as of: 9/15/17  4:46 PM.  If you have any questions, ask your nurse or doctor.               These medicines have changed or have updated prescriptions.        Dose/Directions    FISH OIL ULTRA 1400 MG Caps   This may have changed:  Another medication with the same name was removed. Continue taking this medication, and follow the directions you see here.   Changed by:  True Sahni MD        Dose:  1 capsule   Take 1 capsule by mouth daily   Refills:  0       LACTOBACILLUS EXTRA STRENGTH Caps   This may have changed:  when to take this   Used for:  CF (cystic fibrosis) (H)   Changed by:  True Sahni MD        Dose:  1 tablet   Take 1 tablet by mouth daily   Quantity:  60 capsule   Refills:  4       MVW COMPLETE FORMULATION Chew   This may have changed:    - how much to take  - when to take this   Changed by:  True Sahni MD        Dose:  1 capsule   Take 1 capsule by mouth 2 times daily   Refills:  0       SINUFLO READYRINSE Kit   This may have changed:  additional instructions   Changed by:  True Sahni MD        Daily PRN   Refills:  0         Stop taking these medicines if you haven't already. Please contact your care team if you have questions.     gentamicin 0.008% in 1000ml 0.9% NaCl Soln nasal irrigation   Commonly known as:  GARAMYCIN   Stopped by:  True Sahni MD                    Primary Care Provider Fax #    [facility] [phone]       [address]  [facility] [address]        Equal Access to Services     GALOMADIE GUS : Hadii aad ku hadawaiso Sojd, wamercyda luqadaha, qaybta kaalmada bao, merrill rose maryin hayaamegha aguiarkarl robin lajenniemegha smith. So [facility] [phone].    ATENCIÓN: Si habla español, tiene a s[place]land disposición servicios gratuitos de asistencia lingüística. Llame al [phone].    We comply with applicable federal civil rights laws and [place] laws. We do not discriminate on the basis of race, color, national origin, age, disability sex, sexual orientation or gender identity.            Thank you!     Thank you for choosing [facility]  for your care. Our goal is always to provide you with excellent care. Hearing back from our patients is one way we can continue to improve our services. Please take a few minutes to complete the written survey that you may receive in the mail after your visit with us. Thank you!             Your Updated Medication List - Protect others around you: Learn how to safely use, store and throw away your medicines at www.disposemymeds.org.          This list is accurate as of: 9/15/17  4:46 PM.  Always use your most recent med list.                   Brand Name Dispense Instructions for use Diagnosis    albuterol 108 (90 BASE) MCG/ACT Inhaler    PROAIR HFA/PROVENTIL HFA/VENTOLIN HFA    1 Inhaler    Inhale 2 puffs into the lungs every 6 hours as needed for shortness of breath / dyspnea or wheezing    Cystic fibrosis with pulmonary manifestations (H)       CREON 92289 UNITS Cpep   Generic drug:  amylase-lipase-protease     630 capsule    Take 4-5 capsules (48,000-60,000 Units) by mouth 3 times daily (with meals) 2 with snacks. 3 meals and 3 snacks per day.    Pancreatic insufficiency       FISH OIL ULTRA 1400 MG Caps      Take 1 capsule by mouth daily        fluticasone 50 MCG/ACT spray    FLONASE    1 Bottle    Spray 2 sprays into both nostrils daily     Cystic fibrosis with pulmonary manifestations (H)       ivacaftor 150 MG tablet    ivacaftor    60 tablet    Take 1 tablet (150 mg) by mouth every 12 hours with fat-containing food.    Cystic fibrosis with pulmonary manifestations (H)       LACTOBACILLUS EXTRA STRENGTH Caps     60 capsule    Take 1 tablet by mouth daily    CF (cystic fibrosis) (H)       METAMUCIL 0.52 G capsule   Generic drug:  psyllium     540 capsule    Take 2 capsules (1.04 g) by mouth daily        MVW COMPLETE FORMULATION Chew      Take 1 capsule by mouth 2 times daily        order for DME     1 each    Use Wendy Vios nebulizer for nasal/sinus nebulizing as instructed    Chronic pansinusitis, CF (cystic fibrosis) (H)       oseltamivir 75 MG capsule    TAMIFLU    10 capsule    Take 1 capsule (75 mg) by mouth 2 times daily    Cystic fibrosis with pulmonary manifestations (H)       PAZEO OP           SINUFLO READYRINSE Kit      Daily PRN

## 2017-09-15 NOTE — PATIENT INSTRUCTIONS
Cystic Fibrosis Self-Care Plan       MRN: [id number]   Clinic Date: September 15, 2017   Patient: Philip Delacruz     Annual Studies: April 2018  IGG   Date Value Ref Range Status   04/07/2017 1160 695 - 1620 mg/dL Final     Insulin   Date Value Ref Range Status   04/07/2017 3.2 3 - 25 mU/L Final     Comment:     Assay Method:  Chemiluminescence using Siemens Centaur XP     There are no preventive care reminders to display for this patient.      Pulmonary Function Tests  FEV1: amount of air you can blow out in 1 second  FVC: total amount of air you can take in and blow out    Your Goals:         PFT Latest Ref Rng & Units 9/15/2017   FVC L 5.01   FEV1 L 4.35   FVC% % 86   FEV1% % 92          Airway Clearance: The Most Important Way to Keep Your Lungs Healthy      Aerobika and exercise regularly    Good Nutrition Can Improve Lung Function and Overall Health     Take ALL of your vitamins with food     Take 1/2 of your enzymes before EVERY meal/snack and the other 1/2 mid-meal/snack    Wt Readings from Last 3 Encounters:   09/15/17 78.4 kg (172 lb 13.5 oz)   06/09/17 77.6 kg (171 lb)   04/07/17 77.7 kg (171 lb 4.8 oz)       Body mass index is 23.16 kg/(m^2).         National CF Foundation Recommendations for BMI in CF Adults: Women: at least 22 Men: at least 23        Controlling Blood Sugars Helps Prevent Lung Infections & Improves Nutrition  Test blood sugar:     In the morning before eating (goal is )     2 hours after a meal (goal is less than 150)     When pre-meal glucose is greater than 150 add correction     At bedtime (if less than 100 eat a snack with 15 grams of carbohydrates  Last A1C Results:   Hemoglobin A1C   Date Value Ref Range Status   04/07/2017 4.9 4.3 - 6.0 % Final         If diabetic, measure A1C every 6 months. Goal: Under 7%    Staying Healthy    Research:  If you are interested in learning about research opportunities or have questions, please contact Olga Meléndez at [phone] or  [email or web address].      CF Foundation:  Compass is a personalized resource service to help you with the insurance, financial, legal and other issues you are facing.  It's free, confidential and available to anyone with CF.  Ask your  for more information or contact Compass directly at 641-XVHSHBG ([phone]) or compass@cff.org, or learn more at cff.org/compass.          RECOMMENDATIONS:   Appointment with Dr. Watts in GI.  Otherwise continue current medication, exercise and aerobika.           CF Nurse Line:  Love Martins: [phone]   Kristi Ndiaye, RT: [phone]     Nisa Dumont: [phone] or April Cardona, Dieticians: [phone]   Migdalia Jacques, Diabetes Nurse: [phone]      Naheed Matthews: [phone] or Yvonne Gupta [phone], Social Workers   [email or web address]

## 2017-09-15 NOTE — PROGRESS NOTES
Reason for Visit  Philip Delacruz is a 37 year old male who is being seen for Cystic Fibrosis (Patient is being seen for CF follow up)    Assessment and plan: Philip Coley is a 37-year-old male with cystic fibrosis with mild lung disease and pancreatic insufficiency. Since his last appointment, the patient called in July with symptoms including a sore throat, swollen lymph nodes and yellow sputum for the last 3 days. He was given a 10 day course of  Augmentin 500 mg BID.  1. Pulmonary: The patient appears to be doing well from a pulmonary standpoint. He is oxygenating well and his exercise tolerance is stable. PFTs have increased since his last appointment. This may be due to a combination of initiating ivacaftor and increased consistency with airway clearance therapy. He does not appears to be having a pulmonary exacerbation at this time. I have encouraged him to continue exercising as frequently as he can. He will continue all medications and airway clearance therapy.  Difficulty with rapid inspiration during weightlifting maneuvers may be more related to his pectus excavatum and previous surgery than to his cystic fibrosis.  2. CF related sinus disease: The patient is followed by Dr. Pickering as well as an outside otolaryngologist. He has discontinued gentamicin nasal rinses as he could not figure out a time-efficient nebulization method.   3. Pancreatic insufficiency: The patient has ongoing greasy, loose stool, etiology is unclear. He does note a few recent dietary changes he has made - switching to whole milk and protein shakes - however he also states symptoms began around the time ivacaftor was initiated.The patient reports no other symptoms consistent with malabsorption. He is on probiotics at this time. I will refer the patient to Dr. Watts for further evaluation of current GI symptoms. He has been seen by the CF dietician today. For now, he will continue current vitamins and supplements. I have  encouraged him to increase enzymes when he is drinking whole milk.  4. Healthcare maintenance: Annual studies were completed in April 2017 including a DEXA scan. He will get an influenza vaccine as it becomes available.  5. CFTR modulation: The patient is a [id number]. He has started ivacaftor and appears to have had a good response with improved PFTs and possibly decreased cough. He has been instructed to take Ivacaftor with fatty foods for adequate absorption. It is unlikely that his current GI symptoms are related to ivacaftor. LFTs will be checked quarterly per standard protocol.   6. Family Planning: The patient reports he and his wife are currently pursuing another round of IVF. His son is now 15 months old.  I will see the patient in follow-up in three months with PFTs and sputum culture.      45 minutes face to face time with the patient, more than half spent in counseling and coordination of care regarding ongoing G.I. Symptoms, respiratory limitations and the possible impact of ivacaftor.  CF HPI  The patient was seen and examined by True Sahni   Philip Coley is a 37-year-old male with cystic fibrosis with mild lung disease. Since his last appointment, the patient called in July with symptoms including a sore throat, swollen lymph nodes and yellow sputum for the last 3 days. He was given a 10 day course of Augmentin 500 mg BID.  He reports he has not noticed a significant difference in his breathing since starting Ivacaftor. Breathing is comfortable at rest. Coughing is at baseline. No sputum production or chest pain. The patient reports excellent exercise tolerance. He continues to do cross fit classes for 10-25 minutes every other day and does Aerobika treatments daily. He feels some limitation in taking a deep breath during certain weightlifting maneuvers. In general he feels his exercise tolerance is similar to his peers.  He does note his GI symptoms have been quite variable. The  "patient started a probiotic around the same time he started ivacaftor, and he was having formed bowel movements for a while. However, his stools became loose again around the time he started drinking whole milk instead of 2%. The patient also notes he has had increased loose stools since starting a protein shake. The patient reports stools have been very greasy and foul smelling which cause chafing. He has not re-started the probiotic but has tried to \"clean up\" his diet.The patient reports his loose stools have improved, however he continues to struggle with loose, greasy stool and his symptoms wax and wane. It is unclear if this is related to his dietary choices.  Review of Systems:  ENT: He has no sinus pain, ear pain, sore throat or rhinorrhea. The patient has been using an electrical nerve stimulator to stimulate his eyes and nose provided by his otolaryngologist.  Pulmonary complaints as noted in history of present illness  GI: No nausea, vomiting. See HPI for loose, greasy stools.   MS: Hip and ankle pain, baseline and unchanged. He was having some wrist pain from picking up his kid, however it resolved after a steroid injection.    A complete ROS was otherwise negative except as noted in the HPI.    Current Outpatient Prescriptions   Medication     Olopatadine HCl (PAZEO OP)     Omega-3 Fatty Acids (FISH OIL ULTRA) 1400 MG CAPS     LACTOBACILLUS EXTRA STRENGTH CAPS     Multiple Vitamins-Minerals (MVW COMPLETE FORMULATION) CHEW     Sodium Chloride-Sodium Bicarb (SINUFLO READYRINSE) KIT     CREON 83175 UNITS CPEP per EC capsule     fluticasone (FLONASE) 50 MCG/ACT spray     ivacaftor (IVACAFTOR) 150 MG tablet     order for DME     psyllium (METAMUCIL) 0.52 G capsule     albuterol (PROAIR HFA/PROVENTIL HFA/VENTOLIN HFA) 108 (90 BASE) MCG/ACT Inhaler     oseltamivir (TAMIFLU) 75 MG capsule     No current facility-administered medications for this visit.      Allergies   Allergen Reactions     Tegaderm " "Transparent Dressing (Informational Only) Rash     Past Medical History:   Diagnosis Date     Chronic sinusitis      Congenital absence of vas deferens, bilateral      Cystic fibrosis (H)     Delta F508 and Q3145V      Nasal polyps      Sinusitis, chronic        Past Surgical History:   Procedure Laterality Date     HC TOOTH EXTRACTION W/FORCEP       NASAL/SINUS POLYPECTOMY       REPAIR PECTUS EXCAVATUM  1997     SINUS SURGERY  1992, 2002, 2004    Removed tubinates and polyps OSH     Sperm harvest         Social History     Social History     Marital status: Single     Spouse name: N/A     Number of children: N/A     Years of education: N/A     Occupational History     Teacher      Social History Main Topics     Smoking status: Never Smoker     Smokeless tobacco: Not on file     Alcohol use 0.0 oz/week      Comment: 1 drink 3X per week     Drug use: No     Sexual activity: Yes     Partners: Female     Birth control/ protection: None     Other Topics Concern     Not on file     Social History Narrative    #d .  Lives in [place] with significant other         /82 (BP Location: Right arm, Patient Position: Chair, Cuff Size: Adult Regular)  Pulse 61  Resp 16  Ht 1.84 m (6' 0.44\")  Wt 78.4 kg (172 lb 13.5 oz)  SpO2 98%  BMI 23.16 kg/m2  Body mass index is 23.16 kg/(m^2).    Exam:   GENERAL APPEARANCE: Well developed, well nourished, alert, and in no apparent distress.  EYES: PERRL, EOMI  HENT: Nasal mucosa with mild edema and no hyperemia. No nasal polyps.  EARS: Canals clear, TMs normal.   MOUTH: Oral mucosa is moist, without any lesions, no tonsillar enlargement, no oropharyngeal exudate.  NECK: supple, no masses, no thyromegaly.  LYMPHATICS: No significant axillary, cervical, or supraclavicular nodes.  RESP: mild pectus excavatum, normal percussion, good air flow throughout.  No crackles. No rhonchi. No wheezes.  CV: Normal S1, S2, regular rhythm, normal rate. No murmur.  No rub. No gallop. No " LE edema.   ABDOMEN:  Bowel sounds normal, soft, nontender, no HSM or masses.    MS: extremities normal. No clubbing. No cyanosis.  SKIN: no rash on limited exam  NEURO: Mentation intact, speech normal, normal strength and tone, normal gait and stance  PSYCH: mentation appears normal. and affect normal/bright  Results:  Recent Results (from the past 168 hour(s))   Hepatic panel    Collection Time: 09/15/17  3:37 PM   Result Value Ref Range    Bilirubin Direct 0.2 0.0 - 0.2 mg/dL    Bilirubin Total 0.7 0.2 - 1.3 mg/dL    Albumin 4.2 3.4 - 5.0 g/dL    Protein Total 7.8 6.8 - 8.8 g/dL    Alkaline Phosphatase 87 40 - 150 U/L    ALT 32 0 - 70 U/L    AST 21 0 - 45 U/L   PFT General Lab Testing    Collection Time: 09/15/17  3:46 PM   Result Value Ref Range    FVC-Pred 5.81 L    FVC-Pre 5.01 L    FVC-%Pred-Pre 86 %    FEV1-Pre 4.35 L    FEV1-%Pred-Pre 92 %    FEV1FVC-Pred 81 %    FEV1FVC-Pre 87 %    FEFMax-Pred 10.80 L/sec    FEFMax-Pre 10.69 L/sec    FEFMax-%Pred-Pre 98 %    FEF2575-Pred 4.58 L/sec    FEF2575-Pre 5.20 L/sec    YCP5860-%Pred-Pre 113 %    ExpTime-Pre 8.58 sec    FIFMax-Pre 8.29 L/sec    FEV1FEV6-Pred 82 %    FEV1FEV6-Pre 87 %                             Results as noted above.    PFT Interpretation:  Normal spirometry.  Increased from previous.  Below recent best.   Valid Maneuver            CF Exacerbation:  absent          Scribe Disclosure:   I, Bonita Guadalupe, am serving as a scribe; to document services personally performed by TRUE MEDINA MD based on data collection and the provider's statements to me.     Provider Disclosure:  I agree with above History, Review of Systems, Physical exam and Plan.  I have reviewed the content of the documentation and have edited it as needed. I have personally performed the services documented here and the documentation accurately represents those services and the decisions I have made.      Electronically signed by:  True Medina

## 2017-09-15 NOTE — LETTER
9/15/2017       RE: Philip Delacruz  [address] [facility] [address]     Dear Colleague,    Thank you for referring your patient, Philip Delacruz, to the [facility] FOR LUNG SCIENCE AND HEALTH at [facility]. Please see a copy of my visit note below.    Reason for Visit  Philip Delacruz is a 37 year old male who is being seen for Cystic Fibrosis (Patient is being seen for CF follow up)    Assessment and plan: Philip Coley is a 37-year-old male with cystic fibrosis with mild lung disease and pancreatic insufficiency. Since his last appointment, the patient called in July with symptoms including a sore throat, swollen lymph nodes and yellow sputum for the last 3 days. He was given a 10 day course of  Augmentin 500 mg BID.  1. Pulmonary: The patient appears to be doing well from a pulmonary standpoint. He is oxygenating well and his exercise tolerance is stable. PFTs have increased since his last appointment. This may be due to a combination of initiating ivacaftor and increased consistency with airway clearance therapy. He does not appears to be having a pulmonary exacerbation at this time. I have encouraged him to continue exercising as frequently as he can. He will continue all medications and airway clearance therapy.  Difficulty with rapid inspiration during weightlifting maneuvers may be more related to his pectus excavatum and previous surgery than to his cystic fibrosis.  2. CF related sinus disease: The patient is followed by Dr. Pickering as well as an outside otolaryngologist. He has discontinued gentamicin nasal rinses as he could not figure out a time-efficient nebulization method.   3. Pancreatic insufficiency: The patient has ongoing greasy, loose stool, etiology is unclear. He does note a few recent dietary changes he has made - switching to whole milk and protein shakes - however he also states symptoms began around the time ivacaftor was  initiated.The patient reports no other symptoms consistent with malabsorption. He is on probiotics at this time. I will refer the patient to Dr. Watts for further evaluation of current GI symptoms. He has been seen by the CF dietician today. For now, he will continue current vitamins and supplements. I have encouraged him to increase enzymes when he is drinking whole milk.  4. Healthcare maintenance: Annual studies were completed in April 2017 including a DEXA scan. He will get an influenza vaccine as it becomes available.  5. CFTR modulation: The patient is a [id number]. He has started ivacaftor and appears to have had a good response with improved PFTs and possibly decreased cough. He has been instructed to take Ivacaftor with fatty foods for adequate absorption. It is unlikely that his current GI symptoms are related to ivacaftor. LFTs will be checked quarterly per standard protocol.   6. Family Planning: The patient reports he and his wife are currently pursuing another round of IVF. His son is now 15 months old.  I will see the patient in follow-up in three months with PFTs and sputum culture.      45 minutes face to face time with the patient, more than half spent in counseling and coordination of care regarding ongoing G.I. Symptoms, respiratory limitations and the possible impact of ivacaftor.  CF HPI  The patient was seen and examined by True Sahni   Philip Coley is a 37-year-old male with cystic fibrosis with mild lung disease. Since his last appointment, the patient called in July with symptoms including a sore throat, swollen lymph nodes and yellow sputum for the last 3 days. He was given a 10 day course of Augmentin 500 mg BID.  He reports he has not noticed a significant difference in his breathing since starting Ivacaftor. Breathing is comfortable at rest. Coughing is at baseline. No sputum production or chest pain. The patient reports excellent exercise tolerance. He continues  "to do cross fit classes for 10-25 minutes every other day and does Aerobika treatments daily. He feels some limitation in taking a deep breath during certain weightlifting maneuvers. In general he feels his exercise tolerance is similar to his peers.  He does note his GI symptoms have been quite variable. The patient started a probiotic around the same time he started ivacaftor, and he was having formed bowel movements for a while. However, his stools became loose again around the time he started drinking whole milk instead of 2%. The patient also notes he has had increased loose stools since starting a protein shake. The patient reports stools have been very greasy and foul smelling which cause chafing. He has not re-started the probiotic but has tried to \"clean up\" his diet.The patient reports his loose stools have improved, however he continues to struggle with loose, greasy stool and his symptoms wax and wane. It is unclear if this is related to his dietary choices.  Review of Systems:  ENT: He has no sinus pain, ear pain, sore throat or rhinorrhea. The patient has been using an electrical nerve stimulator to stimulate his eyes and nose provided by his otolaryngologist.  Pulmonary complaints as noted in history of present illness  GI: No nausea, vomiting. See HPI for loose, greasy stools.   MS: Hip and ankle pain, baseline and unchanged. He was having some wrist pain from picking up his kid, however it resolved after a steroid injection.    A complete ROS was otherwise negative except as noted in the HPI.    Current Outpatient Prescriptions   Medication     Olopatadine HCl (PAZEO OP)     Omega-3 Fatty Acids (FISH OIL ULTRA) 1400 MG CAPS     LACTOBACILLUS EXTRA STRENGTH CAPS     Multiple Vitamins-Minerals (MVW COMPLETE FORMULATION) CHEW     Sodium Chloride-Sodium Bicarb (SINUFLO READYRINSE) KIT     CREON 61632 UNITS CPEP per EC capsule     fluticasone (FLONASE) 50 MCG/ACT spray     ivacaftor (IVACAFTOR) 150 MG " "tablet     order for DME     psyllium (METAMUCIL) 0.52 G capsule     albuterol (PROAIR HFA/PROVENTIL HFA/VENTOLIN HFA) 108 (90 BASE) MCG/ACT Inhaler     oseltamivir (TAMIFLU) 75 MG capsule     No current facility-administered medications for this visit.      Allergies   Allergen Reactions     Tegaderm Transparent Dressing (Informational Only) Rash     Past Medical History:   Diagnosis Date     Chronic sinusitis      Congenital absence of vas deferens, bilateral      Cystic fibrosis (H)     Delta F508 and J3457O      Nasal polyps      Sinusitis, chronic        Past Surgical History:   Procedure Laterality Date     HC TOOTH EXTRACTION W/FORCEP       NASAL/SINUS POLYPECTOMY       REPAIR PECTUS EXCAVATUM  1997     SINUS SURGERY  1992, 2002, 2004    Removed tubinates and polyps OSH     Sperm harvest         Social History     Social History     Marital status: Single     Spouse name: N/A     Number of children: N/A     Years of education: N/A     Occupational History     Teacher      Social History Main Topics     Smoking status: Never Smoker     Smokeless tobacco: Not on file     Alcohol use 0.0 oz/week      Comment: 1 drink 3X per week     Drug use: No     Sexual activity: Yes     Partners: Female     Birth control/ protection: None     Other Topics Concern     Not on file     Social History Narrative    #d .  Lives in [place] with significant other         /82 (BP Location: Right arm, Patient Position: Chair, Cuff Size: Adult Regular)  Pulse 61  Resp 16  Ht 1.84 m (6' 0.44\")  Wt 78.4 kg (172 lb 13.5 oz)  SpO2 98%  BMI 23.16 kg/m2  Body mass index is 23.16 kg/(m^2).    Exam:   GENERAL APPEARANCE: Well developed, well nourished, alert, and in no apparent distress.  EYES: PERRL, EOMI  HENT: Nasal mucosa with mild edema and no hyperemia. No nasal polyps.  EARS: Canals clear, TMs normal.   MOUTH: Oral mucosa is moist, without any lesions, no tonsillar enlargement, no oropharyngeal exudate.  NECK: " supple, no masses, no thyromegaly.  LYMPHATICS: No significant axillary, cervical, or supraclavicular nodes.  RESP: mild pectus excavatum, normal percussion, good air flow throughout.  No crackles. No rhonchi. No wheezes.  CV: Normal S1, S2, regular rhythm, normal rate. No murmur.  No rub. No gallop. No LE edema.   ABDOMEN:  Bowel sounds normal, soft, nontender, no HSM or masses.    MS: extremities normal. No clubbing. No cyanosis.  SKIN: no rash on limited exam  NEURO: Mentation intact, speech normal, normal strength and tone, normal gait and stance  PSYCH: mentation appears normal. and affect normal/bright  Results:  Recent Results (from the past 168 hour(s))   Hepatic panel    Collection Time: 09/15/17  3:37 PM   Result Value Ref Range    Bilirubin Direct 0.2 0.0 - 0.2 mg/dL    Bilirubin Total 0.7 0.2 - 1.3 mg/dL    Albumin 4.2 3.4 - 5.0 g/dL    Protein Total 7.8 6.8 - 8.8 g/dL    Alkaline Phosphatase 87 40 - 150 U/L    ALT 32 0 - 70 U/L    AST 21 0 - 45 U/L   PFT General Lab Testing    Collection Time: 09/15/17  3:46 PM   Result Value Ref Range    FVC-Pred 5.81 L    FVC-Pre 5.01 L    FVC-%Pred-Pre 86 %    FEV1-Pre 4.35 L    FEV1-%Pred-Pre 92 %    FEV1FVC-Pred 81 %    FEV1FVC-Pre 87 %    FEFMax-Pred 10.80 L/sec    FEFMax-Pre 10.69 L/sec    FEFMax-%Pred-Pre 98 %    FEF2575-Pred 4.58 L/sec    FEF2575-Pre 5.20 L/sec    WRY7758-%Pred-Pre 113 %    ExpTime-Pre 8.58 sec    FIFMax-Pre 8.29 L/sec    FEV1FEV6-Pred 82 %    FEV1FEV6-Pre 87 %                             Results as noted above.    PFT Interpretation:  Normal spirometry.  Increased from previous.  Below recent best.   Valid Maneuver            CF Exacerbation:  absent          Scribe Disclosure:   Bonita HUERTA, am serving as a scribe; to document services personally performed by SHELBY MEDINA MD based on data collection and the provider's statements to me.     Provider Disclosure:  I agree with above History, Review of Systems, Physical exam and Plan.   I have reviewed the content of the documentation and have edited it as needed. I have personally performed the services documented here and the documentation accurately represents those services and the decisions I have made.      Electronically signed by:  True Sahni

## 2017-09-15 NOTE — NURSING NOTE
Chief Complaint   Patient presents with     Cystic Fibrosis     Patient is being seen for CF follow up      Kate Jaquez CMA at 4:06 PM on 9/15/2017

## 2017-09-20 LAB
BACTERIA SPEC CULT: NORMAL
Lab: NORMAL
SPECIMEN SOURCE: NORMAL

## 2017-10-30 DIAGNOSIS — E84.0 CYSTIC FIBROSIS WITH PULMONARY MANIFESTATIONS (H): ICD-10-CM

## 2017-10-30 RX ORDER — FLUTICASONE PROPIONATE 50 MCG
2 SPRAY, SUSPENSION (ML) NASAL DAILY
Qty: 1 BOTTLE | Refills: 3 | Status: SHIPPED | OUTPATIENT
Start: 2017-10-30 | End: 2018-06-18

## 2017-12-27 DIAGNOSIS — E84.0 CYSTIC FIBROSIS WITH PULMONARY MANIFESTATIONS (H): Primary | ICD-10-CM

## 2018-01-05 ENCOUNTER — OFFICE VISIT (OUTPATIENT)
Dept: PULMONOLOGY | Facility: CLINIC | Age: 38
End: 2018-01-05
Attending: INTERNAL MEDICINE
Payer: COMMERCIAL

## 2018-01-05 VITALS
RESPIRATION RATE: 16 BRPM | OXYGEN SATURATION: 97 % | SYSTOLIC BLOOD PRESSURE: 118 MMHG | DIASTOLIC BLOOD PRESSURE: 80 MMHG | HEART RATE: 72 BPM | BODY MASS INDEX: 23.5 KG/M2 | WEIGHT: 175.4 LBS

## 2018-01-05 DIAGNOSIS — E84.9 CF (CYSTIC FIBROSIS) (H): ICD-10-CM

## 2018-01-05 DIAGNOSIS — R14.0 ABDOMINAL BLOATING: Primary | ICD-10-CM

## 2018-01-05 DIAGNOSIS — E84.0 CYSTIC FIBROSIS WITH PULMONARY MANIFESTATIONS (H): ICD-10-CM

## 2018-01-05 DIAGNOSIS — K86.89 PANCREATIC INSUFFICIENCY: ICD-10-CM

## 2018-01-05 DIAGNOSIS — L29.0 PRURITUS ANI: ICD-10-CM

## 2018-01-05 LAB
ALBUMIN SERPL-MCNC: 4.1 G/DL (ref 3.4–5)
ALP SERPL-CCNC: 83 U/L (ref 40–150)
ALT SERPL W P-5'-P-CCNC: 31 U/L (ref 0–70)
AST SERPL W P-5'-P-CCNC: 24 U/L (ref 0–45)
BILIRUB DIRECT SERPL-MCNC: 0.2 MG/DL (ref 0–0.2)
BILIRUB SERPL-MCNC: 1 MG/DL (ref 0.2–1.3)
HBA1C MFR BLD: 4.8 % (ref 4.3–6)
PROT SERPL-MCNC: 7.7 G/DL (ref 6.8–8.8)

## 2018-01-05 PROCEDURE — 36415 COLL VENOUS BLD VENIPUNCTURE: CPT | Performed by: INTERNAL MEDICINE

## 2018-01-05 PROCEDURE — G0463 HOSPITAL OUTPT CLINIC VISIT: HCPCS | Mod: ZF

## 2018-01-05 PROCEDURE — 80076 HEPATIC FUNCTION PANEL: CPT | Performed by: INTERNAL MEDICINE

## 2018-01-05 PROCEDURE — 83036 HEMOGLOBIN GLYCOSYLATED A1C: CPT | Performed by: INTERNAL MEDICINE

## 2018-01-05 ASSESSMENT — PAIN SCALES - GENERAL: PAINLEVEL: NO PAIN (0)

## 2018-01-05 NOTE — MR AVS SNAPSHOT
After Visit Summary   1/5/2018    Philip Delacruz    MRN: [id number]           Patient Information     Date Of Birth          1980        Visit Information        Provider Department      1/5/2018 8:00 AM Eliu Watts MD [facility] for Lung Science and Health        Care Instructions    I've included a brief summary of our discussion and care plan from today's visit below.  Please review this information with your primary care provider.  _______________________________________________________________________      1. It was wonderful getting a chance to meet with you to discuss your Chronic Abdominal Bloating/Discomfort, particularly given its association with intermittent fecal leakage of mucus and variable stool consistencies - discussed potential next steps in evaluation and management at length together today.  - Suspect some component of pancreatic enzyme (PERT, Creon) and oral intake mismatch which might be contributing to these symptoms, might warrant adjustment of your PERT dosing accordingly as we gather more data.  - Cannot rule out component of Small Intestinal Bacterial Overgrowth as well (common in CF) which might warrant consideration for a short course of oral antibiotic with close monitoring of response (potentially cycled at some interval if effective/needed).  - Discussed component of stool variability and bloating as common symptoms sometimes associated with use of CF potentiators/correctors (currently on Kalydeco). I do think the benefit outweighs the risks from a GI symptom standpoint at this time.  - Perianal/Rectal exam was encouraging for normal intact external anal sphincter function, no evidence to suggest fecal incontinence related to sphincter laxity at this time.    2. I would recommend keeping a food/symptom diary to review at next visit, particularly as this may help identify other dietary/stress/volume triggers for your symptoms.  - Please be in contact  with [facility] and CF Nutrition team in the next 2-3 weeks via Drivewyzet as we may need to adjust your PERT dosing (possibly changing to Creon 24K for ease of use and dose escalation).    3. Recommend routine studies including nutritional markers as we discussed today. These will be done at your upcoming CF Annual Studies in February 2018.  - Will readdress role/timing for endoscopic evaluation (i.e. Upper Endoscopy + Colonoscopy) in your ongoing care, sooner depending on CBC/iron profiles.    4. Continue to monitor for the danger signs/symptoms we reviewed together today:  worsening abdominal pain, worsening diarrhea, obstructive symptoms (nausea/vomiting, abdominal distention, inability to pass stool/flatus), blood mixed into stools, persistent fevers/chills, progressive anemia (particulary with iron deficiency), unexpected weight loss, etc.  - Contact us via orangutrans should these symptoms occur, particularly as we may advise further evaluation accordingly.    5. Return to GI Clinic with me in 2-3 months to review your progress, sooner if symptomatic.   - If you are unable to schedule this follow-up appointment today, please contact Zora Guidry at [phone] within the next week to help set up this necessary appointment.    _______________________________________________________________________    It was a pleasure seeing you in clinic today - please be in touch if there are any further questions that arise following today's visit.  During business hours, you may reach Clinic Nurse Triage Line at [phone].  For urgent/emergent questions after business hours, you may reach the on-call GI Fellow by contacting the [facility]  at [phone].    Any benign/non-urgent test results are usually communicated via letter or PayDivvyhart message within 1-2 weeks after completion.  Urgent results (those that require a change in the previously-discussed care plan) are usually communicated via a  phone call once available from our clinic staff to discuss the results and the next steps in your evaluation.    I recommend signing up for Snaptu access if you have not already done so and are comfortable with using a computer.  This allows for online access to your lab results and also helps you communicate efficiently with my clinic should any questions arise in your care.    We have Financial Counseling services available through our clinic.  If you have questions about your insurance coverage or payment responsibilities, particularly before you undergo any tests or procedures, please let us know and we can arrange a consultation accordingly to help you make informed decisions about your healthcare.    Sincerely,    Eliu Watts MD    [facility] - Department of Medicine  Division of Gastroenterology            Follow-ups after your visit        Follow-up notes from your care team     Return in about 3 months (around 4/5/2018).      Your next 10 appointments already scheduled     Jovan 15, 2018  6:15 PM CST   (Arrive by 6:00 PM)   RETURN RHINOLOGY with Sara Pickering MD   [facility]ro[facility] ([facility])    [address] [place] [address]  4th [facility] [id number] [phone]            Feb 19, 2018  7:00 AM CST   LAB with  LAB   [facility] ([facility])    [address] [place] [id number] [phone]           Please do not eat 10-12 hours before your appointment if you are coming in fasting for labs on lipids, cholesterol, or glucose (sugar). This does not apply to pregnant women. Water, hot tea and black coffee (with nothing added) are okay. Do not drink other fluids, diet soda or chew gum.            Mar 01, 2018 12:00 PM CST   CF LOOP with  PFL CF   [facility] Pulmonary Function Testing ([facility])    [address] [place] [address]  [id number] [phone]            Mar 01, 2018  2:10 PM CST   (Arrive by 1:55 PM)   RETURN CYSTIC FIBROSIS VISIT with True Sahni MD   [facility] Lung Science and Health ([facility])    [address] [facility]  [address] [place] [address] [id number] [phone]              Who to contact     If you have questions or need follow up information about today's clinic visit or your schedule please contact [facility] LUNG SCIENCE AND HEALTH directly at [phone].  Normal or non-critical lab and imaging results will be communicated to you by KVK TEAMhart, letter or phone within 4 business days after the clinic has received the results. If you do not hear from us within 7 days, please contact the clinic through NetClarityt or phone. If you have a critical or abnormal lab result, we will notify you by phone as soon as possible.  Submit refill requests through PlaySight or call your pharmacy and they will forward the refill request to us. Please allow 3 business days for your refill to be completed.          Additional Information About Your Visit        MyChart Information     PlaySight gives you secure access to your electronic health record. If you see a primary care provider, you can also send messages to your care team and make appointments. If you have questions, please call your primary care clinic.  If you do not have a primary care provider, please call [phone] and they will assist you.        Care EveryWhere ID     This is your Care EveryWhere ID. This could be used by other organizations to access your [facility] medical records  [id number]        Your Vitals Were     Pulse Respirations Pulse Oximetry BMI (Body Mass Index)          72 16 97% 23.5 kg/m2         Blood Pressure from Last 3 Encounters:   01/05/18 118/80   09/15/17 126/82   06/09/17 133/77    Weight from Last 3 Encounters:   01/05/18 79.6 kg (175 lb 6.4 oz)   09/15/17 78.4 kg (172 lb 13.5 oz)   06/09/17  77.6 kg (171 lb)              Today, you had the following     No orders found for display       Primary Care Provider Fax #    [facility] [phone] [address]  [facility] [address]        Equal Access to Services     MINA WEBER : Hadverónica ekta ku mireyao Somanishali, waaxda luqadaha, qaybta kaalmada adeegyada, merrill robin laNguyễnginger smith. So [facility] [phone].    ATENCIÓN: Si habla español, tiene a sutherland disposición servicios gratuitos de asistencia lingüística. Llame al [phone].    We comply with applicable federal civil rights laws and [place] laws. We do not discriminate on the basis of race, color, national origin, age, disability, sex, sexual orientation, or gender identity.            Thank you!     Thank you for choosing [facility] FOR LUNG SCIENCE AND HEALTH  for your care. Our goal is always to provide you with excellent care. Hearing back from our patients is one way we can continue to improve our services. Please take a few minutes to complete the written survey that you may receive in the mail after your visit with us. Thank you!             Your Updated Medication List - Protect others around you: Learn how to safely use, store and throw away your medicines at www.disposemymeds.org.          This list is accurate as of: 1/5/18  9:04 AM.  Always use your most recent med list.                   Brand Name Dispense Instructions for use Diagnosis    albuterol 108 (90 BASE) MCG/ACT Inhaler    PROAIR HFA/PROVENTIL HFA/VENTOLIN HFA    1 Inhaler    Inhale 2 puffs into the lungs every 6 hours as needed for shortness of breath / dyspnea or wheezing    Cystic fibrosis with pulmonary manifestations (H)       CREON 92757 UNITS Cpep   Generic drug:  amylase-lipase-protease     630 capsule    Take 4-5 capsules (48,000-60,000 Units) by mouth 3 times daily (with meals) 2 with snacks. 3 meals and 3 snacks per day.    Pancreatic insufficiency       FISH OIL ULTRA 1400  MG Caps      Take 1 capsule by mouth daily        fluticasone 50 MCG/ACT spray    FLONASE    1 Bottle    Spray 2 sprays into both nostrils daily    Cystic fibrosis with pulmonary manifestations (H)       ivacaftor 150 MG tablet    KALYDECO    60 tablet    Take 1 tablet (150 mg) by mouth every 12 hours with fat-containing food.    Cystic fibrosis with pulmonary manifestations (H)       LACTOBACILLUS EXTRA STRENGTH Caps     60 capsule    Take 1 tablet by mouth daily    CF (cystic fibrosis) (H)       METAMUCIL 0.52 G capsule   Generic drug:  psyllium     540 capsule    Take 2 capsules (1.04 g) by mouth daily        MVW COMPLETE FORMULATION Chew      Take 1 capsule by mouth 2 times daily        order for DME     1 each    Use Rattle Vios nebulizer for nasal/sinus nebulizing as instructed    Chronic pansinusitis, CF (cystic fibrosis) (H)       oseltamivir 75 MG capsule    TAMIFLU    10 capsule    Take 1 capsule (75 mg) by mouth 2 times daily    Cystic fibrosis with pulmonary manifestations (H)       PAZEO OP           SINUFLO READYRINSE Kit      Daily PRN

## 2018-01-05 NOTE — PROGRESS NOTES
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 Reports having 3-5 variable consistency stools/day w/o blood (baseline), typically feels better when closer to 5 soft stools/day w/ decr bloating. 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 +intermittent fecal leakage of mucus and moisture, +assoc perianal irritation/sensitivity (more likely to occur at current PERT supplementation, has improved w/ tighter adherence). Denies any tenesmus or insecurity passing flatus, no fecal incontinence or new perianal/nocturnal sxs noted. Denies any N/V/F/C/HA/NS or other const/syst/cardiopulmonary sxs, no BRBPR/melena/urinary changes, no unintentional wt loss or appetite/satiety changes. No other bowel/bladder habit changes, no dysphagia/odynophagia. No jaundice/icterus/pruritus, no acholic stools/steatorrhea, no new lumps/bumps, no jt pain/oral ulcer/rash/eye sxs noted.    ROS: 10pt ROS performed and otherwise negative.    PERTINENT PAST MEDICAL HISTORY:  As noted above.    PREVIOUS ABDOMINAL/GYNECOLOGIC SURGERIES:  None.    PREVIOUS ENDOSCOPY:  None.    PERTINENT MEDICATIONS:  - Kalydeco  - Creon 12K 5-6tabs/meals, 3-4tabs/snacks (~30tabs/day reported)  Medications reviewed with patient today,  see Medication List/Assessment for details.  No other NSAID/anticoagulation reported by patient.  No other OTC/herbal/supplements reported by patient.    SOCIAL HISTORY:  Denies any tob/rd/ivda. Drinks 1 drink (usually wine or beer w/ dinner) 3-4x/wk. Works as a  in the [employer] system. Previous Cross-Fit, stopped d/t increasing L wrist discomfort.    FAMILY HISTORY:  MGGF w/ CRC, Sister w/ psoriasis. No panc/esophageal/other GI CA, no other Childers or other HPS-related [place]. No IBD/celiac, no other AI/liver/thyroid disease.    PHYSICAL EXAMINATION (w/ MA Nathan present):  Vitals reviewed, AFVSS  Wt 175# today ( incr)  Gen: aaox3, cooperative, pleasant, not diaphoretic, nad  HEENT: ncat, neck supple, no clad/sclad, normal op w/o ulcer/exudate, anicteric, mmm  Mallampati Score 2  Resp/CV without acute findings, not dyspneic/tachycardic  Abd: +nabs, soft, nt, nd, no peritoneal s/s noted. No ecchymoses, +subxiphoid partial chevron scar w/ sternotomy scars from prior Pectus Excavatum repair, no CVA/spinal tenderness noted.  Ext: no c/c/e  Skin: warm, perfused, no jaundice  Neuro: grossly intact, no asterixis noted  Perianal/Rectal: No external anal sphincter laxity on challenge/voluntary squeeze. +mild perianal moisture w/ assoc perianal redness (likely contributing to pruritus ani). +normal PBR descent w/ Valsalva, no large IH w/ small EH noted. No perianal skin tags.    PERTINENT STUDIES:  None today, CF Annual Studies pending 2/2018    ASSESSMENT/PLAN:    1. Chronic abdominal bloating/discomfort + intermittent fecal mucoid leakage, clinically suspicious for PERT/intake mismatch +/- component of SIBO - ongoing diagnostic and therapeutic evaluation  1. It was wonderful getting a chance to meet with you to discuss your Chronic Abdominal Bloating/Discomfort, particularly given its association with intermittent fecal leakage of mucus and variable stool consistencies - discussed potential next  steps in evaluation and management at length together today.  - Suspect some component of pancreatic enzyme (PERT, Creon) and oral intake mismatch which might be contributing to these symptoms, might warrant adjustment of your PERT dosing accordingly as we gather more data.  - Cannot rule out component of Small Intestinal Bacterial Overgrowth as well (common in CF) which might warrant consideration for a short course of oral antibiotic with close monitoring of response (potentially cycled at some interval if effective/needed).  - Discussed component of stool variability and bloating as common symptoms sometimes associated with use of CF potentiators/correctors (currently on Kalydeco). I do think the benefit outweighs the risks from a GI symptom standpoint at this time.  - Perianal/Rectal exam was encouraging for normal intact external anal sphincter function, no evidence to suggest fecal incontinence related to sphincter laxity at this time.    2. I would recommend keeping a food/symptom diary to review at next visit, particularly as this may help identify other dietary/stress/volume triggers for your symptoms.  - Please be in contact with [facility] and CF Nutrition team in the next 2-3 weeks via [employer] as we may need to adjust your PERT dosing (possibly changing to Creon 24K for ease of use and dose escalation).    3. Recommend routine studies including nutritional markers as we discussed today. These will be done at your upcoming CF Annual Studies in February 2018.  - Will readdress role/timing for endoscopic evaluation (i.e. Upper Endoscopy + Colonoscopy) in your ongoing care, sooner depending on CBC/iron profiles.    4. Continue to monitor for the danger signs/symptoms we reviewed together today:  worsening abdominal pain, worsening diarrhea, obstructive symptoms (nausea/vomiting, abdominal distention, inability to pass stool/flatus), blood mixed into stools, persistent fevers/chills, progressive anemia  (particulary with iron deficiency), unexpected weight loss, etc.  - Contact us via MyChart should these symptoms occur, particularly as we may advise further evaluation accordingly.    5. Recommend perianal hygiene as we discussed today (loose-fitting cotton underwear, gentle cleansing during bathing and with adequate air-drying of the area to reduce further irritation), recommend checking the perianal area at least monthly for any new skin changes that would warrant further evaluation.  - Continue to monitor for dietary/lifestyle triggers for these symptoms (including dietary irritants likely alcohol, tobacco, acidic drinks/foods, caffeine, etc.).    2. Colorectal Cancer Screening  No high-risk FH CRC (MGGF w/ CRC), will readdress once current diagnostic evaluation is completed.    RTC 2-3 months, sooner if symptomatic.      Thank you for this consultation. It was a pleasure to participate in the care of this patient; please contact us with any further questions.    Eliu Watts MD   of Medicine  [facility] - Department of Medicine  Division of Gastroenterology

## 2018-01-05 NOTE — LETTER
1/5/2018       RE: Philip Delacruz  [address] [facility] [address]     Dear Colleague,    Thank you for referring your patient, Philip Delacruz, to the [facility] at [facility]. Please see a copy of my visit note below.    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 Reports having 3-5 variable consistency stools/day w/o blood (baseline), typically feels better when closer to 5 soft stools/day w/ decr bloating. 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 +intermittent fecal leakage of mucus and moisture, +assoc perianal irritation/sensitivity (more likely to occur at current PERT supplementation, has improved w/ tighter adherence). Denies any tenesmus or insecurity passing flatus, no fecal incontinence or new perianal/nocturnal sxs noted. Denies any N/V/F/C/HA/NS or other const/syst/cardiopulmonary sxs, no BRBPR/melena/urinary changes, no unintentional wt loss or appetite/satiety changes. No other bowel/bladder habit changes, no dysphagia/odynophagia. No jaundice/icterus/pruritus, no acholic stools/steatorrhea, no new lumps/bumps, no jt pain/oral ulcer/rash/eye sxs noted.    ROS: 10pt ROS  performed and otherwise negative.    PERTINENT PAST MEDICAL HISTORY:  As noted above.    PREVIOUS ABDOMINAL/GYNECOLOGIC SURGERIES:  None.    PREVIOUS ENDOSCOPY:  None.    PERTINENT MEDICATIONS:  - Kalydeco  - Creon 12K 5-6tabs/meals, 3-4tabs/snacks (~30tabs/day reported)  Medications reviewed with patient today, see Medication List/Assessment for details.  No other NSAID/anticoagulation reported by patient.  No other OTC/herbal/supplements reported by patient.    SOCIAL HISTORY:  Denies any tob/rd/ivda. Drinks 1 drink (usually wine or beer w/ dinner) 3-4x/wk. Works as a  in the [employer] system. Previous Cross-Fit, stopped d/t increasing L wrist discomfort.    FAMILY HISTORY:  MGGF w/ CRC, Sister w/ psoriasis. No panc/esophageal/other GI CA, no other Childers or other HPS-related [place]. No IBD/celiac, no other AI/liver/thyroid disease.    PHYSICAL EXAMINATION (w/ MA Nathan present):  Vitals reviewed, AFVSS  Wt 175# today ( incr)  Gen: aaox3, cooperative, pleasant, not diaphoretic, nad  HEENT: ncat, neck supple, no clad/sclad, normal op w/o ulcer/exudate, anicteric, mmm  Mallampati Score 2  Resp/CV without acute findings, not dyspneic/tachycardic  Abd: +nabs, soft, nt, nd, no peritoneal s/s noted. No ecchymoses, +subxiphoid partial chevron scar w/ sternotomy scars from prior Pectus Excavatum repair, no CVA/spinal tenderness noted.  Ext: no c/c/e  Skin: warm, perfused, no jaundice  Neuro: grossly intact, no asterixis noted  Perianal/Rectal: No external anal sphincter laxity on challenge/voluntary squeeze. +mild perianal moisture w/ assoc perianal redness (likely contributing to pruritus ani). +normal PBR descent w/ Valsalva, no large IH w/ small EH noted. No perianal skin tags.    PERTINENT STUDIES:  None today, CF Annual Studies pending 2/2018    ASSESSMENT/PLAN:    1. Chronic abdominal bloating/discomfort + intermittent fecal mucoid leakage, clinically suspicious for PERT/intake mismatch +/-  component of SIBO - ongoing diagnostic and therapeutic evaluation  1. It was wonderful getting a chance to meet with you to discuss your Chronic Abdominal Bloating/Discomfort, particularly given its association with intermittent fecal leakage of mucus and variable stool consistencies - discussed potential next steps in evaluation and management at length together today.  - Suspect some component of pancreatic enzyme (PERT, Creon) and oral intake mismatch which might be contributing to these symptoms, might warrant adjustment of your PERT dosing accordingly as we gather more data.  - Cannot rule out component of Small Intestinal Bacterial Overgrowth as well (common in CF) which might warrant consideration for a short course of oral antibiotic with close monitoring of response (potentially cycled at some interval if effective/needed).  - Discussed component of stool variability and bloating as common symptoms sometimes associated with use of CF potentiators/correctors (currently on Kalydeco). I do think the benefit outweighs the risks from a GI symptom standpoint at this time.  - Perianal/Rectal exam was encouraging for normal intact external anal sphincter function, no evidence to suggest fecal incontinence related to sphincter laxity at this time.    2. I would recommend keeping a food/symptom diary to review at next visit, particularly as this may help identify other dietary/stress/volume triggers for your symptoms.  - Please be in contact with [facility] and CF Nutrition team in the next 2-3 weeks via [email or web address] as we may need to adjust your PERT dosing (possibly changing to Creon 24K for ease of use and dose escalation).    3. Recommend routine studies including nutritional markers as we discussed today. These will be done at your upcoming CF Annual Studies in February 2018.  - Will readdress role/timing for endoscopic evaluation (i.e. Upper Endoscopy + Colonoscopy) in your ongoing care, sooner depending on  CBC/iron profiles.    4. Continue to monitor for the danger signs/symptoms we reviewed together today:  worsening abdominal pain, worsening diarrhea, obstructive symptoms (nausea/vomiting, abdominal distention, inability to pass stool/flatus), blood mixed into stools, persistent fevers/chills, progressive anemia (particulary with iron deficiency), unexpected weight loss, etc.  - Contact us via MyChart should these symptoms occur, particularly as we may advise further evaluation accordingly.    5. Recommend perianal hygiene as we discussed today (loose-fitting cotton underwear, gentle cleansing during bathing and with adequate air-drying of the area to reduce further irritation), recommend checking the perianal area at least monthly for any new skin changes that would warrant further evaluation.  - Continue to monitor for dietary/lifestyle triggers for these symptoms (including dietary irritants likely alcohol, tobacco, acidic drinks/foods, caffeine, etc.).    2. Colorectal Cancer Screening  No high-risk FH CRC (MGGF w/ CRC), will readdress once current diagnostic evaluation is completed.    RTC 2-3 months, sooner if symptomatic.      Thank you for this consultation. It was a pleasure to participate in the care of this patient; please contact us with any further questions.    Elui Watts MD   of Medicine  [facility] - Department of Medicine  Division of Gastroenterology

## 2018-01-05 NOTE — PATIENT INSTRUCTIONS
I've included a brief summary of our discussion and care plan from today's visit below.  Please review this information with your primary care provider.  _______________________________________________________________________      1. It was wonderful getting a chance to meet with you to discuss your Chronic Abdominal Bloating/Discomfort, particularly given its association with intermittent fecal leakage of mucus and variable stool consistencies - discussed potential next steps in evaluation and management at length together today.  - Suspect some component of pancreatic enzyme (PERT, Creon) and oral intake mismatch which might be contributing to these symptoms, might warrant adjustment of your PERT dosing accordingly as we gather more data.  - Cannot rule out component of Small Intestinal Bacterial Overgrowth as well (common in CF) which might warrant consideration for a short course of oral antibiotic with close monitoring of response (potentially cycled at some interval if effective/needed).  - Discussed component of stool variability and bloating as common symptoms sometimes associated with use of CF potentiators/correctors (currently on Kalydeco). I do think the benefit outweighs the risks from a GI symptom standpoint at this time.  - Perianal/Rectal exam was encouraging for normal intact external anal sphincter function, no evidence to suggest fecal incontinence related to sphincter laxity at this time.    2. I would recommend keeping a food/symptom diary to review at next visit, particularly as this may help identify other dietary/stress/volume triggers for your symptoms.  - Please be in contact with [facility] and CF Nutrition team in the next 2-3 weeks via [employer] as we may need to adjust your PERT dosing (possibly changing to Creon 24K for ease of use and dose escalation).    3. Recommend routine studies including nutritional markers as we discussed today. These will be done at your upcoming CF Annual  Studies in February 2018.  - Will readdress role/timing for endoscopic evaluation (i.e. Upper Endoscopy + Colonoscopy) in your ongoing care, sooner depending on CBC/iron profiles.    4. Continue to monitor for the danger signs/symptoms we reviewed together today:  worsening abdominal pain, worsening diarrhea, obstructive symptoms (nausea/vomiting, abdominal distention, inability to pass stool/flatus), blood mixed into stools, persistent fevers/chills, progressive anemia (particulary with iron deficiency), unexpected weight loss, etc.  - Contact us via [employer] should these symptoms occur, particularly as we may advise further evaluation accordingly.    5. Recommend perianal hygiene as we discussed today (loose-fitting cotton underwear, gentle cleansing during bathing and with adequate air-drying of the area to reduce further irritation), recommend checking the perianal area at least monthly for any new skin changes that would warrant further evaluation.  - Continue to monitor for dietary/lifestyle triggers for these symptoms (including dietary irritants likely alcohol, tobacco, acidic drinks/foods, caffeine, etc.).    6. Return to GI Clinic with me in 2-3 months to review your progress, sooner if symptomatic.   - If you are unable to schedule this follow-up appointment today, please contact Zora Guidry at [phone] within the next week to help set up this necessary appointment.    _______________________________________________________________________    It was a pleasure seeing you in clinic today - please be in touch if there are any further questions that arise following today's visit.  During business hours, you may reach Clinic Nurse Triage Line at [phone].  For urgent/emergent questions after business hours, you may reach the on-call GI Fellow by contacting the [facility] at [phone].    Any benign/non-urgent test results are usually communicated via letter or MyChart message  within 1-2 weeks after completion.  Urgent results (those that require a change in the previously-discussed care plan) are usually communicated via a phone call once available from our clinic staff to discuss the results and the next steps in your evaluation.    I recommend signing up for [employer] access if you have not already done so and are comfortable with using a computer.  This allows for online access to your lab results and also helps you communicate efficiently with my clinic should any questions arise in your care.    We have Financial Counseling services available through our clinic.  If you have questions about your insurance coverage or payment responsibilities, particularly before you undergo any tests or procedures, please let us know and we can arrange a consultation accordingly to help you make informed decisions about your healthcare.    Sincerely,    Eliu Watts MD    [facility] - Department of Medicine  Division of Gastroenterology

## 2018-01-05 NOTE — NURSING NOTE
Chief Complaint   Patient presents with     Gastrointestinal Problem     Patient is being seen for consultation of GI discomfort      Kate Jaquez CMA at 7:57 AM on 1/5/2018

## 2018-02-12 DIAGNOSIS — E84.0 CYSTIC FIBROSIS WITH PULMONARY MANIFESTATIONS (H): Primary | ICD-10-CM

## 2018-02-21 ENCOUNTER — OFFICE VISIT (OUTPATIENT)
Dept: OTOLARYNGOLOGY | Facility: CLINIC | Age: 38
End: 2018-02-21
Payer: COMMERCIAL

## 2018-02-21 VITALS — BODY MASS INDEX: 23.46 KG/M2 | HEIGHT: 73 IN | WEIGHT: 177 LBS

## 2018-02-21 DIAGNOSIS — J32.4 CHRONIC PANSINUSITIS: Primary | ICD-10-CM

## 2018-02-21 DIAGNOSIS — J34.89 NASAL DRYNESS: ICD-10-CM

## 2018-02-21 RX ORDER — MUPIROCIN 20 MG/G
OINTMENT TOPICAL
Qty: 22 G | Refills: 1 | Status: SHIPPED | OUTPATIENT
Start: 2018-02-21 | End: 2018-03-03

## 2018-02-21 ASSESSMENT — PAIN SCALES - GENERAL: PAINLEVEL: NO PAIN (0)

## 2018-02-21 NOTE — NURSING NOTE
"Chief Complaint   Patient presents with     RECHECK     Follow up chronic sinusittis      Height 1.842 m (6' 0.5\"), weight 80.3 kg (177 lb).    Eze Boyd    "

## 2018-02-21 NOTE — LETTER
2/21/2018       RE: Philip Delacruz  [address] [facility] [address]     Dear Colleague,    Thank you for referring your patient, Philip Delacruz, to the [facility] EAR NOSE [facility] THROAT at [facility]. Please see a copy of my visit note below.    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 S/P prior surgery.  Last time I saw him he wanted to try nebulized gentamicin, has transitioned into gent irrigations for convenience.  Does have some nasal irritation.  Also uses a device called Truetear for dry eyes.  Thinks maybe it helps with the nose.     Exam;  No acute distress, anterior rhinoscopy with nasal vestibule irritation.    Endoscopy to look for infection:  Rigid scope passed to middle meatus - clear with post surgical changes.  Same on left.      A/P:  Nasal vestibulitis, clear sinuses.  Will prescribe mupirocin.  See as needed for stable sinus disease.    Again, thank you for allowing me to participate in the care of your patient.      Sincerely,    Sara Pickering MD

## 2018-02-21 NOTE — PROGRESS NOTES
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 S/P prior surgery.  Last time I saw him he wanted to try nebulized gentamicin, has transitioned into gent irrigations for convenience.  Does have some nasal irritation.  Also uses a device called Truetear for dry eyes.  Thinks maybe it helps with the nose.     Exam;  No acute distress, anterior rhinoscopy with nasal vestibule irritation.    Endoscopy to look for infection:  Rigid scope passed to middle meatus - clear with post surgical changes.  Same on left.      A/P:  Nasal vestibulitis, clear sinuses.  Will prescribe mupirocin.  See as needed for stable sinus disease.

## 2018-02-21 NOTE — MR AVS SNAPSHOT
After Visit Summary   2018    Philip Delacruz    MRN: [id number]           Patient Information     Date Of Birth          1980        Visit Information        Provider Department      2018 3:45 PM Sara Pickering MD [facility] Nose [facility] Th[facility]        Today's Diagnoses     Chronic pansinusitis    -  1    Nasal dryness          Care Instructions    Plan of care:  Mupirocin ointment 2 x/day for 10 days, then as needed  Clinic contact information:  1. To schedule an appointment call [phone], option 1  2. To talk to the Triage RN call [phone], option 3  3. If you need to speak to Pinky or get a message to your doctor on a Friday, call the triage RN  4. PinkyRN: [phone]  5. Surgery scheduling:      Leticia Diaz: [phone]      Emily Campos: [phone]  6. Fax: [phone]  7. [phone]            Follow-ups after your visit        Your next 10 appointments already scheduled     Mar 01, 2018  1:10 PM CST   (Arrive by 12:55 PM)   XR CHEST 2 VIEWS with ORTHXR1   [facility] Orthopaedics XRay ([facility])    [address]  4th [employer] [address] [phone]           Please bring a list of your current medicines to your exam. (Include vitamins, minerals and over-thecounter medicines.) Leave your valuables at home.  Tell your doctor if there is a chance you may be pregnant.  You do not need to do anything special for this exam.            Mar 01, 2018  1:30 PM CST   CF LOOP with  PFL CF   [facility] Pulmonary Function Testing ([facility])    [address]  3rd Floor  [facility] [address] [phone]            Mar 01, 2018  2:10 PM CST   (Arrive by 1:55 PM)   RETURN CYSTIC FIBROSIS VISIT with True Sahni MD,  Cf    [facility] ([facility])    [address] [place] [address]  [address] [place] [address]  "[id number] [phone]            Apr 06, 2018  7:00 AM CDT   Infusion 180 with UC SPEC INFUSION, UC 51 ATC   [facility] Specialty [facility] Procedure ([facility])    [address]  [address]  [facility] [id number] [phone]            Apr 06, 2018 10:40 AM CDT   (Arrive by 10:25 AM)   NEW CYSTIC FIBROSIS VISIT with Eliu Watts MD   [facility] for [facility] Science [facility] Health ([facility])    [address]  [address]  [facility] [address] [phone]              Who to contact     Please call your clinic at [phone] to:    Ask questions about your health    Make or cancel appointments    Discuss your medicines    Learn about your test results    Speak to your doctor            Additional Information About Your Visit        milabenthart Information     SmartNews gives you secure access to your electronic health record. If you see a primary care provider, you can also send messages to your care team and make appointments. If you have questions, please call your primary care clinic.  If you do not have a primary care provider, please call [phone] and they will assist you.      SmartNews is an electronic gateway that provides easy, online access to your medical records. With SmartNews, you can request a clinic appointment, read your test results, renew a prescription or communicate with your care team.     To access your existing account, please contact your [facility] or call [phone] for assistance.        Care EveryWhere ID     This is your Care EveryWhere ID. This could be used by other organizations to access your [place] medical records  [id number]        Your Vitals Were     Height BMI (Body Mass Index)                1.842 m (6' 0.5\") 23.68 kg/m2           Blood Pressure from Last 3 Encounters:   01/05/18 118/80   09/15/17 126/82   06/09/17 133/77    Weight from Last 3 " Encounters:   02/21/18 80.3 kg (177 lb)   01/05/18 79.6 kg (175 lb 6.4 oz)   09/15/17 78.4 kg (172 lb 13.5 oz)              We Performed the Following     NASAL ENDOSCOPY, DIAGNOSTIC          Today's Medication Changes          These changes are accurate as of 2/21/18  4:28 PM.  If you have any questions, ask your nurse or doctor.               Start taking these medicines.        Dose/Directions    mupirocin 2 % ointment   Commonly known as:  BACTROBAN   Used for:  Chronic pansinusitis, Nasal dryness   Started by:  Sara Pickering MD        Apply a small amount to affected nostrils2 times a day for 10 days   Quantity:  22 g   Refills:  1            Where to get your medicines      These medications were sent to [facility] [address] [place] [address] - [address] AT [facility] OF  [address]  [address] [facility] [address]     Phone:  [phone]     mupirocin 2 % ointment                Primary Care Provider Fax #    [facility] [phone] [address]  [facility] [address]        Equal Access to Services     MADIE WEBER AH: Hadii aad ku hadasho Soomaali, waaxda luqadaha, qaybta kaalmada adeegyada, waxay rose maryin haykamilan tiffanie smith. So [facility] [phone].    ATENCIÓN: Si habla español, tiene a [place] disposición servicios gratuitos de asistencia lingüística. [facility] [phone].    We comply with applicable federal civil rights laws and [place] laws. We do not discriminate on the basis of race, color, national origin, age, disability, sex, sexual orientation, or gender identity.            Thank you!     Thank you for choosing [facility] EAR NOSE [facility] TH[facility]AT  for your care. Our goal is always to provide you with excellent care. Hearing back from our patients is one way we can continue to improve our services. Please take a few minutes to complete the written survey that you may receive in the mail after your visit with us. Thank you!             Your  Updated Medication List - Protect others around you: Learn how to safely use, store and throw away your medicines at www.disposemymeds.org.          This list is accurate as of 2/21/18  4:28 PM.  Always use your most recent med list.                   Brand Name Dispense Instructions for use Diagnosis    albuterol 108 (90 BASE) MCG/ACT Inhaler    PROAIR HFA/PROVENTIL HFA/VENTOLIN HFA    1 Inhaler    Inhale 2 puffs into the lungs every 6 hours as needed for shortness of breath / dyspnea or wheezing    Cystic fibrosis with pulmonary manifestations (H)       CREON 11329 UNITS Cpep   Generic drug:  amylase-lipase-protease     630 capsule    Take 4-5 capsules (48,000-60,000 Units) by mouth 3 times daily (with meals) 2 with snacks. 3 meals and 3 snacks per day.    Pancreatic insufficiency       FISH OIL ULTRA 1400 MG Caps      Take 1 capsule by mouth daily        fluticasone 50 MCG/ACT spray    FLONASE    1 Bottle    Spray 2 sprays into both nostrils daily    Cystic fibrosis with pulmonary manifestations (H)       ivacaftor 150 MG tablet    KALYDECO    60 tablet    Take 1 tablet (150 mg) by mouth every 12 hours with fat-containing food.    Cystic fibrosis with pulmonary manifestations (H)       LACTOBACILLUS EXTRA STRENGTH Caps     60 capsule    Take 1 tablet by mouth daily    CF (cystic fibrosis) (H)       METAMUCIL 0.52 G capsule   Generic drug:  psyllium     540 capsule    Take 2 capsules (1.04 g) by mouth daily        mupirocin 2 % ointment    BACTROBAN    22 g    Apply a small amount to affected nostrils2 times a day for 10 days    Chronic pansinusitis, Nasal dryness       MVW COMPLETE FORMULATION Chew      Take 1 capsule by mouth 2 times daily        order for DME     1 each    Use Wendy Vios nebulizer for nasal/sinus nebulizing as instructed    Chronic pansinusitis, CF (cystic fibrosis) (H)       oseltamivir 75 MG capsule    TAMIFLU    10 capsule    Take 1 capsule (75 mg) by mouth 2 times daily    Cystic fibrosis  with pulmonary manifestations (H)       PAZEO OP           SINUFLO READYRINSE Kit      Daily PRN

## 2018-02-21 NOTE — PATIENT INSTRUCTIONS
Plan of care:  Mupirocin ointment 2 x/day for 10 days, then as needed  Clinic contact information:  1. To schedule an appointment call [phone], option 1  2. To talk to the Triage RN call [phone], option 3  3. If you need to speak to Pinky or get a message to your doctor on a Friday, call the triage RN  4. PinkyRN: [phone]  5. Surgery scheduling:      Leticia Diaz: [phone]      Emily Campos: [phone]  6. Fax: [phone]  7. [phone]

## 2018-02-27 ENCOUNTER — ALLIED HEALTH/NURSE VISIT (OUTPATIENT)
Dept: PHARMACY | Facility: CLINIC | Age: 38
End: 2018-02-27
Payer: COMMERCIAL

## 2018-02-27 DIAGNOSIS — E84.0 CYSTIC FIBROSIS WITH PULMONARY MANIFESTATIONS (H): Primary | ICD-10-CM

## 2018-02-27 PROCEDURE — 99207 ZZC NO CHARGE LOS: CPT | Performed by: PHARMACIST

## 2018-02-27 NOTE — PROGRESS NOTES
At the request of patient's provider, a pre-visit chart review was completed.    CFTR:   Patient is eligible for treatment with Symdeko (tezacaftor/ivacaftor) based on their CF genotype and age.  Patient s genotype is [id number]  Patient is currently on Kalydeco 150mg twice daily which was started June 2017.  Side effects of Kalydeco/Orkambi include loose stools and gas  LFTs have been normal  Drug-drug interactions with Symdeko (tezacaftor/ivacaftor) no significant drug-drug interactions identified  Dose adjustment needed for Symdeko none  Dose adjustment needed for concomitant medications none    Recommendation: If Philip is still having difficulty with GI side effects on Kalydeco he could try switching to Symdeko to see if this medication will produce the same benefits without the GI side effects.        Iris August PharmD  [facility] Clinic Pharmacist  Phone: [phone]  E-mail: [email or web address]

## 2018-02-27 NOTE — MR AVS SNAPSHOT
After Visit Summary   2/27/2018    Philip Delacruz    MRN: [id number]           Patient Information     Date Of Birth          1980        Visit Information        Provider Department      2/27/2018 1:45 PM Iris August RPH [facility]        Today's Diagnoses     Cystic fibrosis with pulmonary manifestations    -  1       Follow-ups after your visit        Your next 10 appointments already scheduled     Mar 01, 2018  1:10 PM CST   (Arrive by 12:55 PM)   XR CHEST 2 VIEWS with UCXR1   [facility] [place] Xray ([facility])    [address]  1st Floor  [facility] [address] [phone]           Please bring a list of your current medicines to your exam. (Include vitamins, minerals and over-thecounter medicines.) Leave your valuables at home.  Tell your doctor if there is a chance you may be pregnant.  You do not need to do anything special for this exam.            Mar 01, 2018  1:30 PM CST   CF LOOP with  PFL CF   [facility] Pulmonary Function Testing ([facility])    [address]  3rd Floor  [facility] [address]   [phone]            Mar 01, 2018  2:10 PM CST   (Arrive by 1:55 PM)   RETURN CYSTIC FIBROSIS VISIT with True Sahni MD,  Cf Nutritionist,  Cf    [facility] Health ([facility])    [address]  [address]  [facility] [address]   [phone]            Apr 06, 2018  7:00 AM CDT   Infusion 180 with  SPEC INFUSION,  51 ATC   [facility] Specialty and Procedure ([facility])    [address] [place] [address]  [address]  [facility] [address]   [phone]            Apr 06, 2018 10:40 AM CDT   (Arrive by 10:25 AM)   NEW CYSTIC FIBROSIS VISIT with Eliu Watts MD   [facility] Health (  [facility])    [address]  [address]  [facility] [address] [phone]              Who to contact     If you have questions or need follow up information about today's clinic visit or your schedule please contact [facility] CYSTIC FIBROSIS CENTER [facility] directly at [phone].  Normal or non-critical lab and imaging results will be communicated to you by MyChart, letter or phone within 4 business days after the clinic has received the results. If you do not hear from us within 7 days, please contact the clinic through MyChart or phone. If you have a critical or abnormal lab result, we will notify you by phone as soon as possible.  Submit refill requests through Norse or call your pharmacy and they will forward the refill request to us. Please allow 3 business days for your refill to be completed.          Additional Information About Your Visit        [email or web address] Information     Norse gives you secure access to your electronic health record. If you see a primary care provider, you can also send messages to your care team and make appointments. If you have questions, please call your primary care clinic.  If you do not have a primary care provider, please call [phone] and they will assist you.        Care EveryWhere ID     This is your Care EveryWhere ID. This could be used by other organizations to access your [place] medical records  [id number]         Blood Pressure from Last 3 Encounters:   01/05/18 118/80   09/15/17 126/82   06/09/17 133/77    Weight from Last 3 Encounters:   02/21/18 177 lb (80.3 kg)   01/05/18 175 lb 6.4 oz (79.6 kg)   09/15/17 172 lb 13.5 oz (78.4 kg)              Today, you had the following     No orders found for display       Primary Care Provider Fax #    [facility] [phone] [address]  [facility] [address]        Equal Access to Services     MINA WEBER AH: Aarti reed  Teenajd, jeremiahda luqadaha, qasimranta karonal goer, merrill rose maryjuan moreno alhajistevan lajenniemegha ulis. So [facility] [phone].    ATENCIÓN: Si festus messer, tiene a [place] disposición servicios gratuitos de asistencia lingüística. Miguel al [phone].    We comply with applicable federal civil rights laws and [place] laws. We do not discriminate on the basis of race, color, national origin, age, disability, sex, sexual orientation, or gender identity.            Thank you!     Thank you for choosing [facility] CYSTIC FIBROSIS CENTER [facility] PEDIATRIC CLINIC  for your care. Our goal is always to provide you with excellent care. Hearing back from our patients is one way we can continue to improve our services. Please take a few minutes to complete the written survey that you may receive in the mail after your visit with us. Thank you!             Your Updated Medication List - Protect others around you: Learn how to safely use, store and throw away your medicines at www.disposemymeds.org.          This list is accurate as of 2/27/18  1:48 PM.  Always use your most recent med list.                   Brand Name Dispense Instructions for use Diagnosis    albuterol 108 (90 BASE) MCG/ACT Inhaler    PROAIR HFA/PROVENTIL HFA/VENTOLIN HFA    1 Inhaler    Inhale 2 puffs into the lungs every 6 hours as needed for shortness of breath / dyspnea or wheezing    Cystic fibrosis with pulmonary manifestations (H)       CREON 73806 UNITS Cpep   Generic drug:  amylase-lipase-protease     630 capsule    Take 4-5 capsules (48,000-60,000 Units) by mouth 3 times daily (with meals) 2 with snacks. 3 meals and 3 snacks per day.    Pancreatic insufficiency       FISH OIL ULTRA 1400 MG Caps      Take 1 capsule by mouth daily        fluticasone 50 MCG/ACT spray    FLONASE    1 Bottle    Spray 2 sprays into both nostrils daily    Cystic fibrosis with pulmonary manifestations (H)       ivacaftor 150 MG tablet    KALYDECO    60 tablet    Take 1 tablet  (150 mg) by mouth every 12 hours with fat-containing food.    Cystic fibrosis with pulmonary manifestations (H)       LACTOBACILLUS EXTRA STRENGTH Caps     60 capsule    Take 1 tablet by mouth daily    CF (cystic fibrosis) (H)       METAMUCIL 0.52 G capsule   Generic drug:  psyllium     540 capsule    Take 2 capsules (1.04 g) by mouth daily        mupirocin 2 % ointment    BACTROBAN    22 g    Apply a small amount to affected nostrils2 times a day for 10 days    Chronic pansinusitis, Nasal dryness       MVW COMPLETE FORMULATION Chew      Take 1 capsule by mouth 2 times daily        order for DME     1 each    Use Wendy Vios nebulizer for nasal/sinus nebulizing as instructed    Chronic pansinusitis, CF (cystic fibrosis) (H)       oseltamivir 75 MG capsule    TAMIFLU    10 capsule    Take 1 capsule (75 mg) by mouth 2 times daily    Cystic fibrosis with pulmonary manifestations (H)       PAZEO OP           SINUFLO READYRINSE Kit      Daily PRN

## 2018-03-01 ENCOUNTER — RADIANT APPOINTMENT (OUTPATIENT)
Dept: GENERAL RADIOLOGY | Facility: CLINIC | Age: 38
End: 2018-03-01
Attending: INTERNAL MEDICINE
Payer: COMMERCIAL

## 2018-03-01 ENCOUNTER — OFFICE VISIT (OUTPATIENT)
Dept: PULMONOLOGY | Facility: CLINIC | Age: 38
End: 2018-03-01
Attending: INTERNAL MEDICINE
Payer: COMMERCIAL

## 2018-03-01 ENCOUNTER — ALLIED HEALTH/NURSE VISIT (OUTPATIENT)
Dept: CARE COORDINATION | Facility: CLINIC | Age: 38
End: 2018-03-01

## 2018-03-01 ENCOUNTER — OFFICE VISIT (OUTPATIENT)
Dept: PHARMACY | Facility: CLINIC | Age: 38
End: 2018-03-01
Payer: COMMERCIAL

## 2018-03-01 VITALS
WEIGHT: 171.08 LBS | RESPIRATION RATE: 16 BRPM | BODY MASS INDEX: 22.88 KG/M2 | SYSTOLIC BLOOD PRESSURE: 146 MMHG | HEART RATE: 69 BPM | OXYGEN SATURATION: 96 % | DIASTOLIC BLOOD PRESSURE: 92 MMHG

## 2018-03-01 DIAGNOSIS — Z13.9 RISK AND FUNCTIONAL ASSESSMENT: Primary | ICD-10-CM

## 2018-03-01 DIAGNOSIS — E84.0 CYSTIC FIBROSIS WITH PULMONARY MANIFESTATIONS (H): ICD-10-CM

## 2018-03-01 DIAGNOSIS — K86.89 PANCREATIC INSUFFICIENCY: ICD-10-CM

## 2018-03-01 DIAGNOSIS — E84.0 CYSTIC FIBROSIS WITH PULMONARY MANIFESTATIONS (H): Primary | ICD-10-CM

## 2018-03-01 DIAGNOSIS — J32.4 CHRONIC PANSINUSITIS: ICD-10-CM

## 2018-03-01 LAB
EXPTIME-PRE: 7.48 SEC
FEF2575-%PRED-PRE: 115 %
FEF2575-PRE: 5.29 L/SEC
FEF2575-PRED: 4.56 L/SEC
FEFMAX-%PRED-PRE: 101 %
FEFMAX-PRE: 10.91 L/SEC
FEFMAX-PRED: 10.8 L/SEC
FEV1-%PRED-PRE: 94 %
FEV1-PRE: 4.44 L
FEV1FEV6-PRE: 87 %
FEV1FEV6-PRED: 82 %
FEV1FVC-PRE: 87 %
FEV1FVC-PRED: 81 %
FIFMAX-PRE: 8 L/SEC
FVC-%PRED-PRE: 87 %
FVC-PRE: 5.1 L
FVC-PRED: 5.8 L

## 2018-03-01 PROCEDURE — 97803 MED NUTRITION INDIV SUBSEQ: CPT | Mod: ZF | Performed by: DIETITIAN, REGISTERED

## 2018-03-01 PROCEDURE — 99605 MTMS BY PHARM NP 15 MIN: CPT | Performed by: PHARMACIST

## 2018-03-01 PROCEDURE — G0463 HOSPITAL OUTPT CLINIC VISIT: HCPCS | Mod: ZF

## 2018-03-01 RX ORDER — OSELTAMIVIR PHOSPHATE 75 MG/1
75 CAPSULE ORAL 2 TIMES DAILY
Qty: 10 CAPSULE | Refills: 1 | Status: SHIPPED | OUTPATIENT
Start: 2018-03-01 | End: 2018-11-23

## 2018-03-01 RX ORDER — PANCRELIPASE 60000; 12000; 38000 [USP'U]/1; [USP'U]/1; [USP'U]/1
CAPSULE, DELAYED RELEASE PELLETS ORAL
Qty: 1200 CAPSULE | Refills: 11 | Status: SHIPPED | OUTPATIENT
Start: 2018-03-01 | End: 2019-05-02

## 2018-03-01 ASSESSMENT — PAIN SCALES - GENERAL: PAINLEVEL: NO PAIN (0)

## 2018-03-01 NOTE — PROGRESS NOTES
Reason for Visit  Philip Delacruz is a 37 year old male who is being seen for Cystic Fibrosis (Patient is being seen for CF follow up)    Assessment and plan: Philip Coley is a 37-year-old male with cystic fibrosis with mild lung disease and pancreatic insufficiency.  1. Pulmonary: The patient appears to be doing well from a pulmonary standpoint. PFTs are improved from his last appointment and at the high end of his baseline range. He is oxygenating well at 96%. CXR was reviewed with the patient and unremarkable. He does not appears to be having a pulmonary exacerbation at this time. He will continue all medications and airway clearance therapy.  2. CF related sinus disease: The patient is followed by Dr. Pickering as well as an outside otolaryngologist.  3. Pancreatic insufficiency: The patient has ongoing greasy, loose stool which he notes being worse in the last month.  He notes reduced symptoms with Creon. I have recommended that he increase Creon to 4-5 capsules with snacks and 6-7 tablets with meal. He may further increase Creon to 8-9 capsules with meals if his symptoms do not improve after one week. I have advising the patient that he should also drink about 60 oz water per day to prevent constipation with increased Creon. The patient will continue to follow with Dr. Watts. He will continue current vitamins and supplements.  4. CFTR modulation: The patient is a [id number]. He will switch to Symdeko from Kalydeco. LFTs from his 1/5 appointment will be used as a baseline. Will continue to check LFTs quarterly as is standard.  5. Family Planning: The patient reports he and his wife are currently pursuing another round of IVF. His son is now 20 months old.  6. Healthcare maintenance: Annual studies were completed in April 2017 including a DEXA scan. The patient will receive his influenza vaccine after today's appointment. Insurance company denied Tamiflu prescription - I will re-order it.     I will  see the patient in follow-up in 3 months with labs, PFTs and sputum culture.    CF HPI  The patient was seen and examined by True Sahni   Philip Coley is a 37-year-old male with cystic fibrosis with mild lung disease.   The patient reports a recent illness with chills and sweats which has resolved. He noted associated abdominal pain in the LLQ and lower left back which has improved but is not yet back to baseline. He also notes being more tired than usual after not sleeping as much with this illness. No nausea or vomiting. BMs are soft but formed occasionally x1 month. He also reports continued gas and leakage. GI symptoms have been worse since starting Kalydeco and are improved with Creon. He recently saw Dr. Watts (1/5) and increased his Creon dose from 4-5 to 5-6 with meals and 3 to 3-4 with snacks.    Breathing is comfortable at rest and exercise is well tolerated. He is coughing infrequently with minimal sputum production. In January he was exercising more regularly - weight lifting and walking. He has not been exercising as regularly in the last month however. No hemoptysis. No CP. He is not doing vest therapy.    Review of Systems:  CONST: Appetite is good.  ENT: Green PND. No sinus/ear pain, sore throat, or rhinorrhea.   GI: No nausea, vomiting, or loose stools. No abdominal pain.  MS: Joint pain persists - improved with cortisone injection. Muscle tension in the head an neck.     A complete ROS was otherwise negative except as noted in the HPI.    Current Outpatient Prescriptions   Medication     Nutritional Supplements (BIFIDO-GENIC GROWTH FACTORS PO)     mupirocin (BACTROBAN) 2 % ointment     fluticasone (FLONASE) 50 MCG/ACT spray     Omega-3 Fatty Acids (FISH OIL ULTRA) 1400 MG CAPS     Multiple Vitamins-Minerals (MVW COMPLETE FORMULATION) CHEW     Sodium Chloride-Sodium Bicarb (SINUFLO READYRINSE) KIT     CREON 85570 UNITS CPEP per EC capsule     ivacaftor (IVACAFTOR) 150 MG tablet      order for DME     psyllium (METAMUCIL) 0.52 G capsule     albuterol (PROAIR HFA/PROVENTIL HFA/VENTOLIN HFA) 108 (90 BASE) MCG/ACT Inhaler     LACTOBACILLUS EXTRA STRENGTH CAPS     oseltamivir (TAMIFLU) 75 MG capsule     No current facility-administered medications for this visit.      Allergies   Allergen Reactions     Tegaderm Transparent Dressing (Informational Only) Rash     Past Medical History:   Diagnosis Date     Chronic sinusitis      Congenital absence of vas deferens, bilateral      Cystic fibrosis (H)     Delta F508 and [id number]      Nasal polyps      Sinusitis, chronic        Past Surgical History:   Procedure Laterality Date     HC TOOTH EXTRACTION W/FORCEP       NASAL/SINUS POLYPECTOMY       REPAIR PECTUS EXCAVATUM  1997     SINUS SURGERY  1992, 2002, 2004    Removed tubinates and polyps OSH     Sperm harvest         Social History     Social History     Marital status: Single     Spouse name: N/A     Number of children: N/A     Years of education: N/A     Occupational History     Teacher      Social History Main Topics     Smoking status: Never Smoker     Smokeless tobacco: Never Used     Alcohol use 0.0 oz/week      Comment: 1 drink 3X per week     Drug use: No     Sexual activity: Yes     Partners: Female     Birth control/ protection: None     Other Topics Concern     Not on file     Social History Narrative    #d .  Lives in [place] with significant other         BP (!) 146/92 (BP Location: Right arm, Patient Position: Chair, Cuff Size: Adult Regular)  Pulse 69  Resp 16  Wt 77.6 kg (171 lb 1.2 oz)  SpO2 96%  BMI 22.88 kg/m2  Body mass index is 22.88 kg/(m^2).    Exam:   GENERAL APPEARANCE: Well developed, well nourished, alert, and in no apparent distress.  EYES: PERRL, EOMI  HENT: Nasal mucosa with mild edema and mild hyperemia. No nasal polyps.  EARS: Canals clear, TMs normal.   MOUTH: Oral mucosa is moist, without any lesions, no tonsillar enlargement, no oropharyngeal  exudate.  NECK: supple, no masses, no thyromegaly.  LYMPHATICS: No significant axillary, cervical, or supraclavicular nodes.  RESP: mild pectus excavatum, normal percussion, good air flow throughout.  No crackles. No rhonchi. No wheezes.  CV: Normal S1, S2, regular rhythm, normal rate. No murmur.  No rub. No gallop. No LE edema.   ABDOMEN:  Bowel sounds normal, soft, nontender, no HSM or masses.    MS: extremities normal. No clubbing. No cyanosis.  SKIN: no rash on limited exam  NEURO: Mentation intact, speech normal, normal strength and tone, normal gait and stance  PSYCH: mentation appears normal. and affect normal/bright  Results:  Recent Results (from the past 168 hour(s))   General PFT Lab (Please always keep checked)    Collection Time: 03/01/18  1:07 PM   Result Value Ref Range    FVC-Pred 5.80 L    FVC-Pre 5.10 L    FVC-%Pred-Pre 87 %    FEV1-Pre 4.44 L    FEV1-%Pred-Pre 94 %    FEV1FVC-Pred 81 %    FEV1FVC-Pre 87 %    FEFMax-Pred 10.80 L/sec    FEFMax-Pre 10.91 L/sec    FEFMax-%Pred-Pre 101 %    FEF2575-Pred 4.56 L/sec    FEF2575-Pre 5.29 L/sec    ZNP2408-%Pred-Pre 115 %    ExpTime-Pre 7.48 sec    FIFMax-Pre 8.00 L/sec    FEV1FEV6-Pred 82 %    FEV1FEV6-Pre 87 %                 Results as noted above.    PFT Interpretation:  Normal spirometry.  Increased from previous.  Similar to recent best.  Valid Maneuver      CF Exacerbation:  absent      Scribe Disclosure:   I, Akash Guadarrama, am serving as a scribe; to document services personally performed by True Sahni MD based on data collection and the provider's statements to me.     Provider Disclosure:  I agree with above History, Review of Systems, Physical exam and Plan.  I have reviewed the content of the documentation and have edited it as needed. I have personally performed the services documented here and the documentation accurately represents those services and the decisions I have made.      Electronically signed by:  True Sahni

## 2018-03-01 NOTE — LETTER
3/1/2018       RE: Philip Delacruz  [address] [facility] [address]     Dear Colleague,    Thank you for referring your patient, Philip Delacruz, to the [facility] FOR LUNG SCIENCE AND HEALTH at [facility]. Please see a copy of my visit note below.    Reason for Visit  Philip Delacruz is a 37 year old male who is being seen for Cystic Fibrosis (Patient is being seen for CF follow up)    Assessment and plan: Philip Coley is a 37-year-old male with cystic fibrosis with mild lung disease and pancreatic insufficiency.  1. Pulmonary: The patient appears to be doing well from a pulmonary standpoint. PFTs are improved from his last appointment and at the high end of his baseline range. He is oxygenating well at 96%. CXR was reviewed with the patient and unremarkable. He does not appears to be having a pulmonary exacerbation at this time. He will continue all medications and airway clearance therapy.  2. CF related sinus disease: The patient is followed by Dr. Pickering as well as an outside otolaryngologist.  3. Pancreatic insufficiency: The patient has ongoing greasy, loose stool which he notes being worse in the last month.  He notes reduced symptoms with Creon. I have recommended that he increase Creon to 4-5 capsules with snacks and 6-7 tablets with meal. He may further increase Creon to 8-9 capsules with meals if his symptoms do not improve after one week. I have advising the patient that he should also drink about 60 oz water per day to prevent constipation with increased Creon. The patient will continue to follow with Dr. Watts. He will continue current vitamins and supplements.  4. CFTR modulation: The patient is a [id number]. He will switch to Symdeko from Kalydeco. LFTs from his 1/5 appointment will be used as a baseline. Will continue to check LFTs quarterly as is standard.  5. Family Planning: The patient reports he and his wife are  currently pursuing another round of IVF. His son is now 20 months old.  6. Healthcare maintenance: Annual studies were completed in April 2017 including a DEXA scan. The patient will receive his influenza vaccine after today's appointment. Insurance company denied Tamiflu prescription - I will re-order it.     I will see the patient in follow-up in 3 months with labs, PFTs and sputum culture.    CF HPI  The patient was seen and examined by True Sahni   Philip Coley is a 37-year-old male with cystic fibrosis with mild lung disease.   The patient reports a recent illness with chills and sweats which has resolved. He noted associated abdominal pain in the LLQ and lower left back which has improved but is not yet back to baseline. He also notes being more tired than usual after not sleeping as much with this illness. No nausea or vomiting. BMs are soft but formed occasionally x1 month. He also reports continued gas and leakage. GI symptoms have been worse since starting Kalydeco and are improved with Creon. He recently saw Dr. Watts (1/5) and increased his Creon dose from 4-5 to 5-6 with meals and 3 to 3-4 with snacks.    Breathing is comfortable at rest and exercise is well tolerated. He is coughing infrequently with minimal sputum production. In January he was exercising more regularly - weight lifting and walking. He has not been exercising as regularly in the last month however. No hemoptysis. No CP. He is not doing vest therapy.    Review of Systems:  CONST: Appetite is good.  ENT: Green PND. No sinus/ear pain, sore throat, or rhinorrhea.   GI: No nausea, vomiting, or loose stools. No abdominal pain.  MS: Joint pain persists - improved with cortisone injection. Muscle tension in the head an neck.     A complete ROS was otherwise negative except as noted in the HPI.    Current Outpatient Prescriptions   Medication     Nutritional Supplements (BIFIDO-GENIC GROWTH FACTORS PO)     mupirocin  (BACTROBAN) 2 % ointment     fluticasone (FLONASE) 50 MCG/ACT spray     Omega-3 Fatty Acids (FISH OIL ULTRA) 1400 MG CAPS     Multiple Vitamins-Minerals (MVW COMPLETE FORMULATION) CHEW     Sodium Chloride-Sodium Bicarb (SINUFLO READYRINSE) KIT     CREON 59007 UNITS CPEP per EC capsule     ivacaftor (IVACAFTOR) 150 MG tablet     order for DME     psyllium (METAMUCIL) 0.52 G capsule     albuterol (PROAIR HFA/PROVENTIL HFA/VENTOLIN HFA) 108 (90 BASE) MCG/ACT Inhaler     LACTOBACILLUS EXTRA STRENGTH CAPS     oseltamivir (TAMIFLU) 75 MG capsule     No current facility-administered medications for this visit.      Allergies   Allergen Reactions     Tegaderm Transparent Dressing (Informational Only) Rash     Past Medical History:   Diagnosis Date     Chronic sinusitis      Congenital absence of vas deferens, bilateral      Cystic fibrosis (H)     Delta F508 and [id number]      Nasal polyps      Sinusitis, chronic        Past Surgical History:   Procedure Laterality Date     HC TOOTH EXTRACTION W/FORCEP       NASAL/SINUS POLYPECTOMY       REPAIR PECTUS EXCAVATUM  1997     SINUS SURGERY  1992, 2002, 2004    Removed tubinates and polyps OSH     Sperm harvest         Social History     Social History     Marital status: Single     Spouse name: N/A     Number of children: N/A     Years of education: N/A     Occupational History     Teacher      Social History Main Topics     Smoking status: Never Smoker     Smokeless tobacco: Never Used     Alcohol use 0.0 oz/week      Comment: 1 drink 3X per week     Drug use: No     Sexual activity: Yes     Partners: Female     Birth control/ protection: None     Other Topics Concern     Not on file     Social History Narrative    #d .  Lives in [place] with significant other         BP (!) 146/92 (BP Location: Right arm, Patient Position: Chair, Cuff Size: Adult Regular)  Pulse 69  Resp 16  Wt 77.6 kg (171 lb 1.2 oz)  SpO2 96%  BMI 22.88 kg/m2  Body mass index is 22.88  kg/(m^2).    Exam:   GENERAL APPEARANCE: Well developed, well nourished, alert, and in no apparent distress.  EYES: PERRL, EOMI  HENT: Nasal mucosa with mild edema and mild hyperemia. No nasal polyps.  EARS: Canals clear, TMs normal.   MOUTH: Oral mucosa is moist, without any lesions, no tonsillar enlargement, no oropharyngeal exudate.  NECK: supple, no masses, no thyromegaly.  LYMPHATICS: No significant axillary, cervical, or supraclavicular nodes.  RESP: mild pectus excavatum, normal percussion, good air flow throughout.  No crackles. No rhonchi. No wheezes.  CV: Normal S1, S2, regular rhythm, normal rate. No murmur.  No rub. No gallop. No LE edema.   ABDOMEN:  Bowel sounds normal, soft, nontender, no HSM or masses.    MS: extremities normal. No clubbing. No cyanosis.  SKIN: no rash on limited exam  NEURO: Mentation intact, speech normal, normal strength and tone, normal gait and stance  PSYCH: mentation appears normal. and affect normal/bright  Results:  Recent Results (from the past 168 hour(s))   General PFT Lab (Please always keep checked)    Collection Time: 03/01/18  1:07 PM   Result Value Ref Range    FVC-Pred 5.80 L    FVC-Pre 5.10 L    FVC-%Pred-Pre 87 %    FEV1-Pre 4.44 L    FEV1-%Pred-Pre 94 %    FEV1FVC-Pred 81 %    FEV1FVC-Pre 87 %    FEFMax-Pred 10.80 L/sec    FEFMax-Pre 10.91 L/sec    FEFMax-%Pred-Pre 101 %    FEF2575-Pred 4.56 L/sec    FEF2575-Pre 5.29 L/sec    OGX1012-%Pred-Pre 115 %    ExpTime-Pre 7.48 sec    FIFMax-Pre 8.00 L/sec    FEV1FEV6-Pred 82 %    FEV1FEV6-Pre 87 %                 Results as noted above.    PFT Interpretation:  Normal spirometry.  Increased from previous.  Similar to recent best.  Valid Maneuver      CF Exacerbation:  absent      Scribe Disclosure:   Akash HUERTA, am serving as a scribe; to document services personally performed by True Sahni MD based on data collection and the provider's statements to me.     Provider Disclosure:  I agree with above  History, Review of Systems, Physical exam and Plan.  I have reviewed the content of the documentation and have edited it as needed. I have personally performed the services documented here and the documentation accurately represents those services and the decisions I have made.      Electronically signed by:  True Sahni         CF Annual Nutrition Assessment    Reason for Assessment  Assessed during [facility] r/t increased nutrition risk with diagnosis of CF per protocol    Nutrition Significant PMH  Mild Lung Disease   -- Taking Kalydeco, will switch to Symdeko  Pancreatic Insufficient - fecal elastase wnl, however benefits from enzymes  H/o sinus issues and surgeries    Social Assessment  Living situation: wife and son  Work/School/Disability: works full-time as a teacher  Food Insecurity:  None    Anthropometric Assessment  Height: 184 cm  IBW based on BMI 22 for females and 23 for males per CF Foundation recs: 78 kg / 172#  Today's Weight: 77.6 kg (actual weight)   %IBW: 99%    Body mass index is 22.88 kg/(m^2).   Current weight is considered: Normal BMI, slightly below CF goal. No malnutrition.   Weight has been relatively stable between 170-175#; pt believes that measurement from 18 (177#) is an outlier and still had his winter coat/boots/keys. Overall, weight trending up from previous baseline ~165-168#.     Wt Readings from Last 5 Encounters:   18 77.6 kg (171 lb 1.2 oz)   18 80.3 kg (177 lb)   18 79.6 kg (175 lb 6.4 oz)   09/15/17 78.4 kg (172 lb 13.5 oz)   17 77.6 kg (171 lb)     Physical Activity/Exercise: no longer doing Cross-fit but enjoys strength training.     Pancreatic Enzymes  Brand: Creon 12,000  Dosin-6 with meals, 2-3 with snacks -- increased, previously 1-3 Creon 3s with meals (100 units lipase/kg/meal)  Estimated Daily Intake: 30 caps/day = 4600 units lipase/kg/day  Are you taking enzymes as recommended: Yes, although more difficult to  "remember with snacks and when eating out.     High dose providing 923 units lipase/kg/meal which is within (lower-end) the recommended range per CF Foundation to inhibit fibrosing colonopathy    Signs of Malabsorption:  Yes - see below.     Enzyme Program: Yes - [facility]    Gastrointestinal/Malabsorption Assessment  GI Complaints: Continues to c/o loose, floating, and greasy/oily stools that vary in color but frequently yellow/tan appearing. Notes worse symptoms when he consumes high-fat foods. Continues to complain of abdominal pain, gas, and leakage. Symptoms seem to have worsened since starting Kalydeco.   Medications: Take soluble fiber (psyllium- Metamucil capsules) 4 per day (2 in PM and 2 in AM).  Other Therapies: also taking probiotic.   GI Physician: Dr. Watts - clinic visit 1/5/18; recommended increased PERT and food/symptom diary. May also have some component of SIBO.     Diet History and Assessment  Diet Preferences/Allergies/Intolerances: Regular diet  Appetite Stimulant Rx:  No  Intake Recall/Comments:    Patient has been using a phone lin to track his daily intakes; unfortunately when he downloaded the reports this only provided nutrient breakdown, not the food diary. Will try to follow-up in order to better correlate symptoms with foods consumed.     Pt says that he started eating healthier in January when he started tracking foods, and found some symptom improvement because he was avoiding high-fat/greasy foods. Also noticed that he eats a lot of peanut butter. Has now been eating less \"healthy\" due to vacations/parties/etc.     Dietary Recall  Breakfast: eggs, cereal with milk  Lunch: very hungry by this time, typically eats a school lunch but may buy 2 entrees  PM Snack: \"stress eating\", often nuts or candy but getting tired of these  Dinner: more healthy, tries for a lean protein and vegetable    Pt also eats out 1x/week (fast food, pizza, ice cream, etc).Lately acknowledges that he has " been making poorer choices with less healthy options d/t busy schedule and difficulty finding time for preparation. Pt and his wife share grocery shopping/cooking responsibilities.     Calcium: 2-3 servings daily, mostly milk  Salt: adds to some foods, likely adequate  Hydration: inadequate, unable to drink much during the day while teaching    Supplements: ENU shakes or [employer] daily from [facility]    Tube Feeding:  no    Estimated Energy and Protein Needs  Estimation based on weight maintenance with Mild lung disease and mild pancreatic insufficient.    BEE: 1825  7501-7676 kcals/day =  150-175% BEE   g protein/day = 1.2-1.5 g/kg    Laboratory Assessment   OGTT- hypoglycemia    Vitamin A   Date Value Ref Range Status   04/07/2017 0.41  Final     Comment:     Reference range: 0.30 to 1.20  Unit: mg/L       Vitamin D Deficiency screening   Date Value Ref Range Status   04/07/2017 33 20 - 75 ug/L Final     Comment:     Season, race, dietary intake, and treatment affect the concentration of   25-hydroxy-Vitamin D. Values may decrease during winter months and increase   during summer months. Values 20-29 ug/L may indicate Vitamin D insufficiency   and values <20 ug/L may indicate Vitamin D deficiency.   Vitamin D determination is routinely performed by an immunoassay specific for   25 hydroxyvitamin D3.  If an individual is on vitamin D2 (ergocalciferol)   supplementation, please specify 25 OH vitamin D2 and D3 level determination   by   LCMSMS test VITD23.       Vitamin E   Date Value Ref Range Status   04/07/2017 8.2  Final     Comment:     Reference range: 5.5 to 18.0  Unit: mg/L  (Note)  Test developed and characteristics determined by [facility]. See Compliance Statement B: [email or web address]       Iron   Date Value Ref Range Status   04/07/2017 101 35 - 180 ug/dL Final     Cholesterol   Date Value Ref Range Status   04/07/2017 176 <200 mg/dL Final     Triglycerides   Date Value Ref Range Status    04/07/2017 52 <150 mg/dL Final     HDL Cholesterol   Date Value Ref Range Status   04/07/2017 50 >39 mg/dL Final     LDL Cholesterol Calculated   Date Value Ref Range Status   04/07/2017 116 (H) <100 mg/dL Final     Comment:     Above desirable:  100-129 mg/dl   Borderline High:  130-159 mg/dL   High:             160-189 mg/dL   Very high:       >189 mg/dl       VLDL-Cholesterol   Date Value Ref Range Status   12/22/2014 32 (H) 0 - 30 mg/dL Final     Cholesterol/HDL Ratio   Date Value Ref Range Status   12/22/2014 3.3 0.0 - 5.0 Final     Current Vitamin/Mineral Prescription R/T deficiency/malabsorption: general MVI (One-A-Day Men's formulation), MVW Complete 2 softgels/day (enzyme program), fish oil  Comments:  Taking daily    NUTRITION DIAGNOSIS  Impaired nutrient utilization r/t CF hypermetabolism and suspected pancreatic insufficiency as evidenced by symptomatic improvement in steatorrhea with pancreatic enzyme replacement therapy and pt requires high kcal/protein diet to maintain nutrition and health status.   INTERVENTIONS/RECOMMENDATIONS  1) Reviewed oral intake and weight trends. Unfortunately, unable to see pt's food diary from his phone lin as the reports only show nutrient content - will attempt to follow-up as pt in a hurry to leave after today's apt. Discussed the benefits of seeing daily food intakes in order to correlate symptoms with specific foods.     2) Reviewed pt's GI symptoms and recommendations from GI apt. Pt's symptoms continue to appear malabsorptive and will therefore continue to increase enzyme dose. Pt will increase to 6-7 caps with meals and 3-4 with snacks. Reviewed that pt needs to take enzymes with protein shakes and whole milk. Encouraged pt to increase to 8-10 caps if he has a very high-fat/greasy meal (like pizza) as he tends to notice worse GI symptoms with these high-fat foods (1500 units lipase/kg/meal). Discussed that we can increase his capsule size once we have a set  meal-time dose that works for him while we are continuing to adjust dose.     Patient Understanding: Good  Expected Compliance: Good  Follow-Up Plans: per protocol    GOALS:  1) Increase enzymes to 6-7 with meals  2) Weight to increase towards BMI >23 kg/m2    FOLLOW-UP/MONITORING:  Visit patient within 6-12 month(s) to follow-up on GI issues/increase in enzyme dose    Time Spent In Face-to-Face Patient Interactions: 15 minutes      Nisa Dumont RD, LD  Cystic Fibrosis/Lung Transplant Dietitian  Pager [phone]  On weekends/holidays contact coverage RD at [phone] (inpatient use only)       Again, thank you for allowing me to participate in the care of your patient.      Sincerely,    True Sahni MD

## 2018-03-01 NOTE — NURSING NOTE
Chief Complaint   Patient presents with     Cystic Fibrosis     Patient is being seen for CF follow up      Kate Jaquez CMA at 1:33 PM on 3/1/2018

## 2018-03-01 NOTE — PROGRESS NOTES
SUBJECTIVE/OBJECTIVE:                           Philip Delacruz is a 37 year old male coming in for routine clinic visit.  His/her primary pulmonologist is Dr. Sahni.    Allergies/ADRs: Reviewed in Epic  Tobacco: No tobacco use  Alcohol: 1-3 beverages / week  PMH: Reviewed in Epic  CF Genotype: [id number]    Medication Adherence  The patient misses their medication 1-2 times per week.    Patient is responsible for his/her own medications.   Patient estimated adherence level: %  Pharmacy MPR is 0.98 for Kalydeco which indicates excellent adherence  Barriers to medication adherence include: no issues identified  Facilitators to medication adherence include: pill box and schedule/routine    Medication Access  The patient fills specialty prescriptions at [facility] and non-specialty medications at [facility].    Medication access barriers: no issues reported by patient.  Has the patient been offered to fill at [facility]? Yes  Programs or coupons used: GPS    CF  Inhaled medications   Bronchodilator: albuterol inhaler as needed   Mucolytic: none  Antibiotic: not indicated  Other: none  Oral medications   Azithromycin: not indicated  CFTR modulator: Kalydeco, which was started June 2017.  He has had a good response from a pulmonary standpoint but has had ongoing GI symptoms (greasy, loose stools) which seem to have gotten worse since starting Kalydeco.  He also had increased joint pain in his wrist but is not sure it is related to Kalydeco.   Other: Tamiflu if needed for influenza  Pulmonary symptoms are stable  PFTs are increased  Current FEV1 94%  Cultures: throat cultures grow Staph  Current exacerbation: no    Sinusitis: Taking Flonase nasal spray and an outside ENT doctor also recommended inhaled gentamicin via face mask.    Pancreatic Insufficiency/Nutrition: Pancreatic enzyme replacement with Creon 38613 units.  Patient is taking 4-5 capsules with meals and 2 capsules with snacks.    Acid  Reducer: none  Bowel regimen: probiotics and fiber  Weight and BMI are decreased  Vitamins include: MVW and fish oil    Lab Results   Component Value Date    VITDT 33 04/07/2017    VITDT 45 02/05/2016       PFTs:      Weight/BMI:      ASSESSMENT:                             Current medications were reviewed today.     Medication Adherence: no issues identified    Medication Access: no issues identified    CF: Needs Improvement. Patient would benefit from the following medication changes: change Kalydeco (ivacaftor) to Symdeko (tezacaftor/ivacaftor).  Tezacaftor/ivacaftor has demonstrated better efficacy in terms of improvement in lung function when compared to ivacaftor alone.      Sinusitis: Stable, no changes.    Pancreatic Insufficiency/Nutrition: Needs Improvement.  Patient would benefit from increasing Creon dose based on recommendation from RD and Dr. Sahni.      PLAN:                            1. Complete remaining supply of Kalydeco at home (has about 8 days left).  2. When Kalydeco is gone, start Symdeko 1 tablet BID.  3. LFTs will be checked today, then every 3 months x 1 year, then annually thereafter.    I spent 15 minutes with this patient today.      Will follow up in 1 month to assess response to Symdeko.    The patient was provided a summary of these recommendations in the AVS from CF care team visit.    Amanda Gonzalez PharmD  CF Medication Therapy Management Pharmacist  [facility]  [phone]

## 2018-03-01 NOTE — MR AVS SNAPSHOT
After Visit Summary   3/1/2018    Philip Delacruz    MRN: [id number]           Patient Information     Date Of Birth          1980        Visit Information        Provider Department      3/1/2018 3:00 PM Amber Gonzalez RPH [facility]        Today's Diagnoses     Cystic fibrosis with pulmonary manifestations    -  1    Pancreatic insufficiency           Follow-ups after your visit        Your next 10 appointments already scheduled     Apr 06, 2018  7:00 AM CDT   Infusion 180 with UC SPEC INFUSION, UC 51 ATC   [facility] [place] [facility] ([facility])    [address]  [address]  [facility] [id number] [phone]            Apr 06, 2018 10:40 AM CDT   (Arrive by 10:25 AM)   NEW CYSTIC FIBROSIS VISIT with Eliu Watts MD   [facility] ([facility])    [address] [place] [address]  [address]  [facility] [address] [phone]              Future tests that were ordered for you today     Open Future Orders        Priority Expected Expires Ordered    Cystic Fibrosis Culture Aerob Bacterial Routine 5/24/2018 6/21/2018 3/1/2018    Spirometry, Breathing Capacity Routine 5/24/2018 6/21/2018 3/1/2018    Hepatic panel Routine 5/24/2018 6/21/2018 3/1/2018            Who to contact     If you have questions or need follow up information about today's clinic visit or your schedule please contact [facility]STIC FIBROSIS CENTER [facility] directly at [phone].  Normal or non-critical lab and imaging results will be communicated to you by MyChart, letter or phone within 4 business days after the clinic has received the results. If you do not hear from us within 7 days, please contact the clinic through MyChart or phone. If you have a critical or abnormal lab result, we will notify you by phone as soon as  possible.  Submit refill requests through Pretty Simple or call your pharmacy and they will forward the refill request to us. Please allow 3 business days for your refill to be completed.          Additional Information About Your Visit        PublikDemandhart Information     Pretty Simple gives you secure access to your electronic health record. If you see a primary care provider, you can also send messages to your care team and make appointments. If you have questions, please call your primary care clinic.  If you do not have a primary care provider, please call [phone] and they will assist you.        Care EveryWhere ID     This is your Care EveryWhere ID. This could be used by other organizations to access your [facility] medical records  [id number]         Blood Pressure from Last 3 Encounters:   03/01/18 (!) 146/92   01/05/18 118/80   09/15/17 126/82    Weight from Last 3 Encounters:   03/01/18 171 lb 1.2 oz (77.6 kg)   02/21/18 177 lb (80.3 kg)   01/05/18 175 lb 6.4 oz (79.6 kg)              Today, you had the following     No orders found for display         Today's Medication Changes          These changes are accurate as of 3/1/18  4:22 PM.  If you have any questions, ask your nurse or doctor.               These medicines have changed or have updated prescriptions.        Dose/Directions    CREON 77626 UNITS Cpep   This may have changed:    - how much to take  - how to take this  - when to take this  - additional instructions   Used for:  Pancreatic insufficiency   Generic drug:  amylase-lipase-protease   Changed by:  True Sahni MD        Take 8-9 capsules with meals and and 4-5 capsules with meals for 3 meals and 3 snacks per day   Quantity:  1200 capsule   Refills:  11       METAMUCIL 0.52 G capsule   This may have changed:  how much to take   Generic drug:  psyllium   Changed by:  True Sahni MD        Dose:  3 capsule   Take 3 capsules (1.56 g) by mouth daily   Quantity:  540 capsule    Refills:  0       oseltamivir 75 MG capsule   Commonly known as:  TAMIFLU   This may have changed:  additional instructions   Used for:  Cystic fibrosis with pulmonary manifestations (H)   Changed by:  True Sahni MD        Dose:  75 mg   Take 1 capsule (75 mg) by mouth 2 times daily The patient will call and  the medication if needed.   Quantity:  10 capsule   Refills:  1         Stop taking these medicines if you haven't already. Please contact your care team if you have questions.     BIFIDO-GENIC GROWTH FACTORS PO   Stopped by:  True Sahni MD           LACTOBACILLUS EXTRA STRENGTH Caps   Stopped by:  True Sahni MD                Where to get your medicines      These medications were sent to [facility] [address] [place] [address] - [address] AT [facility] OF  [address]  [address] [place] [address]     Phone:  [phone]     CREON 48339 UNITS Cpep    oseltamivir 75 MG capsule                Primary Care Provider Fax #    [facility] [phone] [address]  [facility] [address]        Equal Access to Services     [facility] HospitalMAN[facility] : Hadii aad ku hadasho Soomaali, waaxda luqadaha, qaybta kaalmada adefidel, merrill smiht. So [facility] [phone].    ATENCIÓN: Si habla español, tiene a sutherland disposición servicios gratuitos de asistencia lingüística. Miguel al [phone].    We comply with applicable federal civil rights laws and [place] laws. We do not discriminate on the basis of race, color, national origin, age, disability, sex, sexual orientation, or gender identity.            Thank you!     Thank you for choosing [facility]STIC FIBROSIS [facility] ADULT [facility]  for your care. Our goal is always to provide you with excellent care. Hearing back from our patients is one way we can continue to improve our services. Please take a few minutes to complete the written  survey that you may receive in the mail after your visit with us. Thank you!             Your Updated Medication List - Protect others around you: Learn how to safely use, store and throw away your medicines at www.disposemymeds.org.          This list is accurate as of 3/1/18  4:22 PM.  Always use your most recent med list.                   Brand Name Dispense Instructions for use Diagnosis    albuterol 108 (90 BASE) MCG/ACT Inhaler    PROAIR HFA/PROVENTIL HFA/VENTOLIN HFA    1 Inhaler    Inhale 2 puffs into the lungs every 6 hours as needed for shortness of breath / dyspnea or wheezing    Cystic fibrosis with pulmonary manifestations (H)       CREON 21927 UNITS Cpep   Generic drug:  amylase-lipase-protease     1200 capsule    Take 8-9 capsules with meals and and 4-5 capsules with meals for 3 meals and 3 snacks per day    Pancreatic insufficiency       FISH OIL ULTRA 1400 MG Caps      Take 1 capsule by mouth daily        FLORAJEN BIFIDOBLEND PO      Take 1 tablet by mouth 3 times daily (before meals)        fluticasone 50 MCG/ACT spray    FLONASE    1 Bottle    Spray 2 sprays into both nostrils daily    Cystic fibrosis with pulmonary manifestations (H)       ivacaftor 150 MG tablet    KALYDECO    60 tablet    Take 1 tablet (150 mg) by mouth every 12 hours with fat-containing food.    Cystic fibrosis with pulmonary manifestations (H)       METAMUCIL 0.52 G capsule   Generic drug:  psyllium     540 capsule    Take 3 capsules (1.56 g) by mouth daily        mupirocin 2 % ointment    BACTROBAN    22 g    Apply a small amount to affected nostrils2 times a day for 10 days    Chronic pansinusitis, Nasal dryness       MVW COMPLETE FORMULATION Chew      Take 1 capsule by mouth 2 times daily        order for DME     1 each    Use Wendy Vios nebulizer for nasal/sinus nebulizing as instructed    Chronic pansinusitis, CF (cystic fibrosis) (H)       oseltamivir 75 MG capsule    TAMIFLU    10 capsule    Take 1 capsule (75 mg) by  mouth 2 times daily The patient will call and  the medication if needed.    Cystic fibrosis with pulmonary manifestations (H)       SINUFLO READYRINSE Kit      Daily PRN

## 2018-03-01 NOTE — MR AVS SNAPSHOT
After Visit Summary   3/1/2018    Philip Delacruz    MRN: [id number]           Patient Information     Date Of Birth          1980        Visit Information        Provider Department      3/1/2018 2:10 PM Worker,  Cf Social; Nutritionist,  Cf; True Sahni MD [facility]        Today's Diagnoses     Cystic fibrosis with pulmonary manifestations    -  1    Pancreatic insufficiency        Chronic pansinusitis        Cystic fibrosis with pulmonary manifestations (H)          Care Instructions    Cystic Fibrosis Self-Care Plan       MRN: [id number]   Clinic Date: March 1, 2018   Patient: Philip Delacruz     RECOMMENDATIONS:   Increase creon to 7-8 capsules with meals and if improving but still loose stool increase to 8-9 with meals.  Increase creon to 4-5 with snacks.  Drink at least 60oz water per day.  Otherwise continue current medication.    YOUR GOAL:    CF Nurse Line:  Love Martins: [phone]   Kristi Ndiaye, RT: [phone]     Nisa Dumont: [phone] or April Cardona, Dieticians: [phone]   Barbara Birmingham, Diabetes Nurse: [phone]      Melissa Calle: [phone] or Yvonne Gupta [phone], Social Workers   [email or web address]    Annual Studies:   IGG   Date Value Ref Range Status   04/07/2017 1160 695 - 1620 mg/dL Final     Insulin   Date Value Ref Range Status   04/07/2017 3.2 3 - 25 mU/L Final     Comment:     Assay Method:  Chemiluminescence using Siemens Centaur XP     There are no preventive care reminders to display for this patient.      Pulmonary Function Tests  FEV1: amount of air you can blow out in 1 second  FVC: total amount of air you can take in and blow out    Your Goals:         PFT Latest Ref Rng & Units 3/1/2018   FVC L 5.10   FEV1 L 4.44   FVC% % 87   FEV1% % 94          Airway Clearance: The Most Important Way to Keep Your Lungs Healthy  Vest Settings:    Hill-Rom Frequencies: 8,  9, 10 Pressure 10 Then, Frequencies 18, 19, 20 Pressure 6      RespirTech: Quick Start with Pressure of     Do each frequency for 5 minutes; Deflate vest after each frequency & cough 3 times before beginning the next setting.    Vest and Neb Therapy should be done 1-2 times/day.    Good Nutrition Can Improve Lung Function and Overall Health     Take ALL of your vitamins with food     Take 1/2 of your enzymes before EVERY meal/snack and the other 1/2 mid-meal/snack    Wt Readings from Last 3 Encounters:   03/01/18 77.6 kg (171 lb 1.2 oz)   02/21/18 80.3 kg (177 lb)   01/05/18 79.6 kg (175 lb 6.4 oz)       Body mass index is 22.88 kg/(m^2).         National CF Foundation Recommendations for BMI in CF Adults: Women: at least 22 Men: at least 23        Controlling Blood Sugars Helps Prevent Lung Infections & Improves Nutrition  Test blood sugar:     In the morning before eating (goal is )     2 hours after a meal (goal is less than 150)     When pre-meal glucose is greater than 150 add correction     At bedtime (if less than 100 eat a snack with 15 grams of carbohydrates  Last A1C Results:   Hemoglobin A1C   Date Value Ref Range Status   01/05/2018 4.8 4.3 - 6.0 % Final         If diabetic, measure A1C every 6 months. Goal: Under 7%    Staying Healthy    Research:  If you are interested in learning about research opportunities or have questions, please contact Olga Meléndez at [phone] or [email or web address].      CF Foundation:  Compass is a personalized resource service to help you with the insurance, financial, legal and other issues you are facing.  It's free, confidential and available to anyone with CF.  Ask your  for more information or contact Compass directly at 223-COMPASS (090-7046) or compass@cff.org, or learn more at cff.org/compass.                             Follow-ups after your visit        Follow-up notes from your care team     Return in about 12 weeks (around 5/24/2018).       Your next 10 appointments already scheduled     Apr 06, 2018  7:00 AM CDT   Infusion 180 with UC SPEC INFUSION, UC 51 ATC   [facility] ([facility])    [address]  [address]  [facility] [address] [phone]            Apr 06, 2018 10:40 AM CDT   (Arrive by 10:25 AM)   NEW CYSTIC FIBROSIS VISIT with Eliu Watts MD   [facility] ([facility] [place])    [address]  [address]  [facility] [address] [phone]              Who to contact     If you have questions or need follow up information about today's clinic visit or your schedule please contact [facility] L[facility]G SCIENCE [facility] HEALTH directly at [phone].  Normal or non-critical lab and imaging results will be communicated to you by CribFroghart, letter or phone within 4 business days after the clinic has received the results. If you do not hear from us within 7 days, please contact the clinic through CribFroghart or phone. If you have a critical or abnormal lab result, we will notify you by phone as soon as possible.  Submit refill requests through Phase Vision or call your pharmacy and they will forward the refill request to us. Please allow 3 business days for your refill to be completed.          Additional Information About Your Visit        [email or web address] Information     Phase Vision gives you secure access to your electronic health record. If you see a primary care provider, you can also send messages to your care team and make appointments. If you have questions, please call your primary care clinic.  If you do not have a primary care provider, please call [phone] and they will assist you.        Care EveryWhere ID     This is your Care EveryWhere ID. This could be used by other organizations to access your [facility] medical records  [id number]        Your Vitals Were     Pulse Respirations Pulse Oximetry BMI  (Body Mass Index)          69 16 96% 22.88 kg/m2         Blood Pressure from Last 3 Encounters:   03/01/18 (!) 146/92   01/05/18 118/80   09/15/17 126/82    Weight from Last 3 Encounters:   03/01/18 77.6 kg (171 lb 1.2 oz)   02/21/18 80.3 kg (177 lb)   01/05/18 79.6 kg (175 lb 6.4 oz)                 Today's Medication Changes          These changes are accurate as of 3/1/18 11:59 PM.  If you have any questions, ask your nurse or doctor.               These medicines have changed or have updated prescriptions.        Dose/Directions    CREON 96595 UNITS Cpep   This may have changed:    - how much to take  - how to take this  - when to take this  - additional instructions   Used for:  Pancreatic insufficiency   Generic drug:  amylase-lipase-protease   Changed by:  True Sahni MD        Take 8-9 capsules with meals and and 4-5 capsules with meals for 3 meals and 3 snacks per day   Quantity:  1200 capsule   Refills:  11       METAMUCIL 0.52 G capsule   This may have changed:  how much to take   Generic drug:  psyllium   Changed by:  True Sahni MD        Dose:  3 capsule   Take 3 capsules (1.56 g) by mouth daily   Quantity:  540 capsule   Refills:  0       oseltamivir 75 MG capsule   Commonly known as:  TAMIFLU   This may have changed:  additional instructions   Used for:  Cystic fibrosis with pulmonary manifestations (H)   Changed by:  True Sahni MD        Dose:  75 mg   Take 1 capsule (75 mg) by mouth 2 times daily The patient will call and  the medication if needed.   Quantity:  10 capsule   Refills:  1         Stop taking these medicines if you haven't already. Please contact your care team if you have questions.     BIFIDO-GENIC GROWTH FACTORS PO   Stopped by:  True Sahni MD           LACTOBACILLUS EXTRA STRENGTH Caps   Stopped by:  True Sahni MD                Where to get your medicines      These medications were sent to [facility]  [id number] - [place] - [address] AT [facility] OF [address]  [address] [place] [address]     Phone:  [phone]     CREON 42763 UNITS Cpep    oseltamivir 75 MG capsule                Primary Care Provider Fax #    [facility] [phone] [address] [place] [address]        Equal Access to Services     MINA WEBER AH: Hadii aad ku hadasho Soomaali, waaxda luqadaha, qaybta kaalmada adeegyada, waxay idiin hayaan adeeg kharash la'aan ah. So [facility] [phone].    ATENCIÓN: Si xiomarala espnigel, tiene a sutherland disposición servicios gratuitos de asistencia lingüística. [facility] [phone].    We comply with applicable federal civil rights laws and [place] laws. We do not discriminate on the basis of race, color, national origin, age, disability, sex, sexual orientation, or gender identity.            Thank you!     Thank you for choosing [facility] FOR LUNG SCIENCE AND HEALTH  for your care. Our goal is always to provide you with excellent care. Hearing back from our patients is one way we can continue to improve our services. Please take a few minutes to complete the written survey that you may receive in the mail after your visit with us. Thank you!             Your Updated Medication List - Protect others around you: Learn how to safely use, store and throw away your medicines at www.disposemymeds.org.          This list is accurate as of 3/1/18 11:59 PM.  Always use your most recent med list.                   Brand Name Dispense Instructions for use Diagnosis    albuterol 108 (90 BASE) MCG/ACT Inhaler    PROAIR HFA/PROVENTIL HFA/VENTOLIN HFA    1 Inhaler    Inhale 2 puffs into the lungs every 6 hours as needed for shortness of breath / dyspnea or wheezing    Cystic fibrosis with pulmonary manifestations (H)       CREON 70150 UNITS Cpep   Generic drug:  amylase-lipase-protease     1200 capsule    Take 8-9 capsules with meals and and 4-5 capsules with  meals for 3 meals and 3 snacks per day    Pancreatic insufficiency       FISH OIL ULTRA 1400 MG Caps      Take 1 capsule by mouth daily        FLORAJEN BIFIDOBLEND PO      Take 1 tablet by mouth 3 times daily (before meals)        fluticasone 50 MCG/ACT spray    FLONASE    1 Bottle    Spray 2 sprays into both nostrils daily    Cystic fibrosis with pulmonary manifestations (H)       ivacaftor 150 MG tablet    KALYDECO    60 tablet    Take 1 tablet (150 mg) by mouth every 12 hours with fat-containing food.    Cystic fibrosis with pulmonary manifestations (H)       METAMUCIL 0.52 G capsule   Generic drug:  psyllium     540 capsule    Take 3 capsules (1.56 g) by mouth daily        mupirocin 2 % ointment    BACTROBAN    22 g    Apply a small amount to affected nostrils2 times a day for 10 days    Chronic pansinusitis, Nasal dryness       MVW COMPLETE FORMULATION Chew      Take 1 capsule by mouth 2 times daily        order for DME     1 each    Use GOOD Vios nebulizer for nasal/sinus nebulizing as instructed    Chronic pansinusitis, CF (cystic fibrosis) (H)       oseltamivir 75 MG capsule    TAMIFLU    10 capsule    Take 1 capsule (75 mg) by mouth 2 times daily The patient will call and  the medication if needed.    Cystic fibrosis with pulmonary manifestations (H)       SINUFLO READYRINSE Kit      Daily PRN

## 2018-03-01 NOTE — PATIENT INSTRUCTIONS
Cystic Fibrosis Self-Care Plan       MRN: [id number]   Clinic Date: March 1, 2018   Patient: Philip Delacruz     RECOMMENDATIONS:   Increase creon to 7-8 capsules with meals and if improving but still loose stool increase to 8-9 with meals.  Increase creon to 4-5 with snacks.  Drink at least 60oz water per day.  Otherwise continue current medication.    YOUR GOAL:    CF Nurse Line:  Love Martins: [phone]   Kristi Ndiaye, RT: [phone]     Nisa Dumont: [phone] or April Cardona, Dieticians: [phone]   Barbara Birmingham, Diabetes Nurse: [phone]      Melissa Calle: [phone] or Yvonne Gupta [phone], Social Workers   [email or web address]    Annual Studies:   IGG   Date Value Ref Range Status   04/07/2017 1160 695 - 1620 mg/dL Final     Insulin   Date Value Ref Range Status   04/07/2017 3.2 3 - 25 mU/L Final     Comment:     Assay Method:  Chemiluminescence using Siemens Centaur XP     There are no preventive care reminders to display for this patient.      Pulmonary Function Tests  FEV1: amount of air you can blow out in 1 second  FVC: total amount of air you can take in and blow out    Your Goals:         PFT Latest Ref Rng & Units 3/1/2018   FVC L 5.10   FEV1 L 4.44   FVC% % 87   FEV1% % 94          Airway Clearance: The Most Important Way to Keep Your Lungs Healthy  Vest Settings:    Hill-Rom Frequencies: 8, 9, 10 Pressure 10 Then, Frequencies 18, 19, 20 Pressure 6      RespirTech: Quick Start with Pressure of     Do each frequency for 5 minutes; Deflate vest after each frequency & cough 3 times before beginning the next setting.    Vest and Neb Therapy should be done 1-2 times/day.    Good Nutrition Can Improve Lung Function and Overall Health     Take ALL of your vitamins with food     Take 1/2 of your enzymes before EVERY meal/snack and the other 1/2 mid-meal/snack    Wt Readings from Last 3 Encounters:   03/01/18 77.6 kg (171 lb 1.2 oz)   02/21/18 80.3 kg  (177 lb)   01/05/18 79.6 kg (175 lb 6.4 oz)       Body mass index is 22.88 kg/(m^2).         National CF Foundation Recommendations for BMI in CF Adults: Women: at least 22 Men: at least 23        Controlling Blood Sugars Helps Prevent Lung Infections & Improves Nutrition  Test blood sugar:     In the morning before eating (goal is )     2 hours after a meal (goal is less than 150)     When pre-meal glucose is greater than 150 add correction     At bedtime (if less than 100 eat a snack with 15 grams of carbohydrates  Last A1C Results:   Hemoglobin A1C   Date Value Ref Range Status   01/05/2018 4.8 4.3 - 6.0 % Final         If diabetic, measure A1C every 6 months. Goal: Under 7%    Staying Healthy    Research:  If you are interested in learning about research opportunities or have questions, please contact Olga Meléndez at [phone] or [email or web address].      CF Foundation:  Compass is a personalized resource service to help you with the insurance, financial, legal and other issues you are facing.  It's free, confidential and available to anyone with CF.  Ask your  for more information or contact Compass directly at 317-COMPASS ([phone]) or compass@cff.org, or learn more at cff.org/compass.

## 2018-03-02 ENCOUNTER — TELEPHONE (OUTPATIENT)
Dept: PULMONOLOGY | Facility: CLINIC | Age: 38
End: 2018-03-02

## 2018-03-02 ASSESSMENT — ANXIETY QUESTIONNAIRES
1. FEELING NERVOUS, ANXIOUS, OR ON EDGE: NOT AT ALL
6. BECOMING EASILY ANNOYED OR IRRITABLE: SEVERAL DAYS
IF YOU CHECKED OFF ANY PROBLEMS ON THIS QUESTIONNAIRE, HOW DIFFICULT HAVE THESE PROBLEMS MADE IT FOR YOU TO DO YOUR WORK, TAKE CARE OF THINGS AT HOME, OR GET ALONG WITH OTHER PEOPLE: NOT DIFFICULT AT ALL
5. BEING SO RESTLESS THAT IT IS HARD TO SIT STILL: NOT AT ALL
2. NOT BEING ABLE TO STOP OR CONTROL WORRYING: NOT AT ALL
3. WORRYING TOO MUCH ABOUT DIFFERENT THINGS: SEVERAL DAYS
7. FEELING AFRAID AS IF SOMETHING AWFUL MIGHT HAPPEN: SEVERAL DAYS
GAD7 TOTAL SCORE: 4

## 2018-03-02 ASSESSMENT — PATIENT HEALTH QUESTIONNAIRE - PHQ9: 5. POOR APPETITE OR OVEREATING: SEVERAL DAYS

## 2018-03-02 NOTE — PROGRESS NOTES
CF Annual Nutrition Assessment    Reason for Assessment  Assessed during Dr. Sahni [facility] r/t increased nutrition risk with diagnosis of CF per protocol    Nutrition Significant PMH  Mild Lung Disease   -- Taking Kalydeco, will switch to Symdeko  Pancreatic Insufficient - fecal elastase wnl, however benefits from enzymes  H/o sinus issues and surgeries    Social Assessment  Living situation: wife and son  Work/School/Disability: works full-time as a teacher  Food Insecurity:  None    Anthropometric Assessment  Height: 184 cm  IBW based on BMI 22 for females and 23 for males per CF Foundation recs: 78 kg / 172#  Today's Weight: 77.6 kg (actual weight)   %IBW: 99%    Body mass index is 22.88 kg/(m^2).   Current weight is considered: Normal BMI, slightly below CF goal. No malnutrition.   Weight has been relatively stable between 170-175#; pt believes that measurement from 18 (177#) is an outlier and still had his winter coat/boots/keys. Overall, weight trending up from previous baseline ~165-168#.     Wt Readings from Last 5 Encounters:   18 77.6 kg (171 lb 1.2 oz)   18 80.3 kg (177 lb)   18 79.6 kg (175 lb 6.4 oz)   09/15/17 78.4 kg (172 lb 13.5 oz)   17 77.6 kg (171 lb)     Physical Activity/Exercise: no longer doing Cross-fit but enjoys strength training.     Pancreatic Enzymes  Brand: Creon 12,000  Dosin-6 with meals, 2-3 with snacks -- increased, previously 1-3 Creon 3s with meals (100 units lipase/kg/meal)  Estimated Daily Intake: 30 caps/day = 4600 units lipase/kg/day  Are you taking enzymes as recommended: Yes, although more difficult to remember with snacks and when eating out.     High dose providing 923 units lipase/kg/meal which is within (lower-end) the recommended range per CF Foundation to inhibit fibrosing colonopathy    Signs of Malabsorption:  Yes - see below.     Enzyme Program: Yes - [facility]    Gastrointestinal/Malabsorption Assessment  GI Complaints:  "Continues to c/o loose, floating, and greasy/oily stools that vary in color but frequently yellow/tan appearing. Notes worse symptoms when he consumes high-fat foods. Continues to complain of abdominal pain, gas, and leakage. Symptoms seem to have worsened since starting Kalydeco.   Medications: Take soluble fiber (psyllium- Metamucil capsules) 4 per day (2 in PM and 2 in AM).  Other Therapies: also taking probiotic.   GI Physician: Dr. Watts - clinic visit 1/5/18; recommended increased PERT and food/symptom diary. May also have some component of SIBO.     Diet History and Assessment  Diet Preferences/Allergies/Intolerances: Regular diet  Appetite Stimulant Rx:  No  Intake Recall/Comments:    Patient has been using a phone lin to track his daily intakes; unfortunately when he downloaded the reports this only provided nutrient breakdown, not the food diary. Will try to follow-up in order to better correlate symptoms with foods consumed.     Pt says that he started eating healthier in January when he started tracking foods, and found some symptom improvement because he was avoiding high-fat/greasy foods. Also noticed that he eats a lot of peanut butter. Has now been eating less \"healthy\" due to vacations/parties/etc.     Dietary Recall  Breakfast: eggs, cereal with milk  Lunch: very hungry by this time, typically eats a school lunch but may buy 2 entrees  PM Snack: \"stress eating\", often nuts or candy but getting tired of these  Dinner: more healthy, tries for a lean protein and vegetable    Pt also eats out 1x/week (fast food, pizza, ice cream, etc).Lately acknowledges that he has been making poorer choices with less healthy options d/t busy schedule and difficulty finding time for preparation. Pt and his wife share grocery shopping/cooking responsibilities.     Calcium: 2-3 servings daily, mostly milk  Salt: adds to some foods, likely adequate  Hydration: inadequate, unable to drink much during the day while " teaching    Supplements: LAURA hicks or Noemilinsey Milligan daily from [facility]    Tube Feeding:  no    Estimated Energy and Protein Needs  Estimation based on weight maintenance with Mild lung disease and mild pancreatic insufficient.    BEE: 1825  6096-6570 kcals/day =  150-175% BEE   g protein/day = 1.2-1.5 g/kg    Laboratory Assessment   OGTT- hypoglycemia    Vitamin A   Date Value Ref Range Status   04/07/2017 0.41  Final     Comment:     Reference range: 0.30 to 1.20  Unit: mg/L       Vitamin D Deficiency screening   Date Value Ref Range Status   04/07/2017 33 20 - 75 ug/L Final     Comment:     Season, race, dietary intake, and treatment affect the concentration of   25-hydroxy-Vitamin D. Values may decrease during winter months and increase   during summer months. Values 20-29 ug/L may indicate Vitamin D insufficiency   and values <20 ug/L may indicate Vitamin D deficiency.   Vitamin D determination is routinely performed by an immunoassay specific for   25 hydroxyvitamin D3.  If an individual is on vitamin D2 (ergocalciferol)   supplementation, please specify 25 OH vitamin D2 and D3 level determination   by   LCMSMS test VITD23.       Vitamin E   Date Value Ref Range Status   04/07/2017 8.2  Final     Comment:     Reference range: 5.5 to 18.0  Unit: mg/L  (Note)  Test developed and characteristics determined by [employer]. See Compliance Statement B: [email or web address]       Iron   Date Value Ref Range Status   04/07/2017 101 35 - 180 ug/dL Final     Cholesterol   Date Value Ref Range Status   04/07/2017 176 <200 mg/dL Final     Triglycerides   Date Value Ref Range Status   04/07/2017 52 <150 mg/dL Final     HDL Cholesterol   Date Value Ref Range Status   04/07/2017 50 >39 mg/dL Final     LDL Cholesterol Calculated   Date Value Ref Range Status   04/07/2017 116 (H) <100 mg/dL Final     Comment:     Above desirable:  100-129 mg/dl   Borderline High:  130-159 mg/dL   High:             160-189 mg/dL    Very high:       >189 mg/dl       VLDL-Cholesterol   Date Value Ref Range Status   12/22/2014 32 (H) 0 - 30 mg/dL Final     Cholesterol/HDL Ratio   Date Value Ref Range Status   12/22/2014 3.3 0.0 - 5.0 Final     Current Vitamin/Mineral Prescription R/T deficiency/malabsorption: general MVI (One-A-Day Men's formulation), MVW Complete 2 softgels/day (enzyme program), fish oil  Comments:  Taking daily    NUTRITION DIAGNOSIS  Impaired nutrient utilization r/t CF hypermetabolism and suspected pancreatic insufficiency as evidenced by symptomatic improvement in steatorrhea with pancreatic enzyme replacement therapy and pt requires high kcal/protein diet to maintain nutrition and health status.   INTERVENTIONS/RECOMMENDATIONS  1) Reviewed oral intake and weight trends. Unfortunately, unable to see pt's food diary from his phone lin as the reports only show nutrient content - will attempt to follow-up as pt in a hurry to leave after today's apt. Discussed the benefits of seeing daily food intakes in order to correlate symptoms with specific foods.     2) Reviewed pt's GI symptoms and recommendations from GI apt. Pt's symptoms continue to appear malabsorptive and will therefore continue to increase enzyme dose. Pt will increase to 6-7 caps with meals and 3-4 with snacks. Reviewed that pt needs to take enzymes with protein shakes and whole milk. Encouraged pt to increase to 8-10 caps if he has a very high-fat/greasy meal (like pizza) as he tends to notice worse GI symptoms with these high-fat foods (1500 units lipase/kg/meal). Discussed that we can increase his capsule size once we have a set meal-time dose that works for him while we are continuing to adjust dose.     Patient Understanding: Good  Expected Compliance: Good  Follow-Up Plans: per protocol    GOALS:  1) Increase enzymes to 6-7 with meals  2) Weight to increase towards BMI >23 kg/m2    FOLLOW-UP/MONITORING:  Visit patient within 6-12 month(s) to follow-up on GI  issues/increase in enzyme dose    Time Spent In Face-to-Face Patient Interactions: 15 minutes      Nisa Dumont RD, ENRRIQUE  Cystic Fibrosis/Lung Transplant Dietitian  Pager [phone]  On weekends/holidays contact coverage RD at [phone] (inpatient use only)

## 2018-03-02 NOTE — PROGRESS NOTES
"Adult Cystic Fibrosis Program  Annual Psychosocial Assessment    Presenting Information:  SHAR is a 37-year-old male with cystic fibrosis, presenting in CF clinic for a regular follow up with primary CF provider, Dr. True Sahni.  Met with Shar for annual psychosocial assessment.     Living situation:  Shar lives with his wife, Aimee, and their 20 month old son, Shay in [place], [place].   They own a house and have lived there since 2012.  They have 2 cats.  He denies any current concerns about his living situation.    Family Constellation:  Shra was raised by his biological parents, in [place], [place].  He has 3 sibling(s): 2 older brothers and a younger sister. Many of his family members are located in the [place] area. He is not aware of any other family members having CF. One brother has children.     Shar and Aimee have been  since 2012 and together since 2006.  Their 20 month old son Shay was conceived via IVF. He notes that he is enjoying being a father but that it is a lot of work, he is low on sleep and often quite tired, but acknowledges that this is normal for a parent of a young child.    Social Support:  Shar reports good social support.  He gets along well with his wife and other family members and draws additional support from friends as well as a mentor who he tries to meet with regularly. Shar does not have any connections in the CF Community.     Adjustment to Illness:  Shar  was diagnosed with CF at age 34, in the summer of 2014. 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 As a child, he notes he experienced allergy and sinus problems, including 3 surgeries, one at age 12, and 2 in college. He was bothered by congestion and headaches and thought of himself as \"the sick one\" in his family. He had pectus excavatum in adolescence.    Overall, Shar describes his current health status as \"okay\" " noting that he has a cold and has had it since Tuesday. He does acknowledge feeling better than he did yesterday, so he is hopeful that it will pass quickly. He previously noted experiencing adjustment challenges with the new CF diagnosis, with a mix of relief as the diagnosis explained some concerns, yet also caused some confusion, sadness, and anger in adjusting to the new diagnosis.   We did not discuss this specifically today. He has not been advised to use vest therapy, and uses Aerobika and exercise for airway clearance. He notes that he used to belong to a CrossFit gym, however he quit due to joint pain issues which he thinks may be due to Kalydeco. He also wonders if his GI symptoms could be related to this medication as well. He now belongs to a regular gym where he uses the pool to swim laps and rows and runs when he is feeling up to higher impact activity.    At this point, Shar is private about his CF diagnosis.  He did share the information with his wife, mentor, and some close family members.    Health Care Directive:  This SW reviewed HCD education, including concept/purpose of health care directive, default health care agent (his wife) and how to complete a directive.  Shar reported that he is comfortable with his default decision-maker (his wife) and declined interest in completing a directive today.    Education:  Shar completed a Masters degree in Teaching at [employer] in Fall 2013. 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 He denies plans for further education at this time.    Employment:  Shar is employed full-time as a  at a [employer] school. He teaches a small group of children at a time, which he enjoys but notes that it is stressful and hard work. He worries about exposure to germs and reports questioning the pros and cons. He is hoping to change roles at the end of this school year and take on a role in administrative school work or at another school.  "Shar has discussed his CF diagnosis with the principal and so far his employer has been supportive re: appointments for CF care. He has access to health insurance, long term disability, and short term disability through work.     Shar's wife Aimee is employed as a . She also has health insurance and other benefits through work.    Finances:  Shar and his wife receive income from wages. IVF was expensive, but they were able to manage the OOP costs. Shar denies any financial concerns.      Insurance:  Shar is insured through his employer ([employer] insurance). The coverage is adequate although there is some OOP cost stress especially related to his clinic appointments (affordable but more expensive than he would prefer). He noted that some specialty providers were more expensive than others and he was not sure why. SW encouraged him to call his insurance company to learn more about his benefits or suggested he reach out to [facility] to see how services were being billed. His wife is insured through her employer. This SW previously provided education re: [employer] and Compass as resources.    Mental Health/Coping:  Shar denies any current or past symptoms indicative of mood, anxiety, eating, learning or other mental health disorder. He also denies family history of mental health concerns.     Shar describes his mood recently as \"pretty good; stable.\" He does not take any psychotropic medications and he does not currently see a therapist (although he discusses stressors with his mentor). His FELIX-7 score is a 4 and his PHQ-9 score is a 1 (negative for SI), indicating minimal symptoms of anxiety and depression (both described as \"not difficult\"). He feels that any positive symptoms are likely related to work stress and healthcare maintenance. He notes that, he has undergone several treatments for his joint pain and if they do not work, he may have to have surgery. KAHLIL encouraged him to " discuss this SE with CF PharmD to see if it was related to his medication. SW also encouraged him to consider working with a Health Psychologist for extra support. He declined a referral today. He is also working with a specialist outside of this clinic. He generally kenan with stressors through taking some time to relax, exercising, and spending time with his son.     Shar does not currently identify tg/spirituality as important in his life.    Chemical Health:  Shar denies tobacco or illicit drug use.  He drinks alcohol on occasion, consuming 1 - 2 drinks per occasion, 4x/month on average.  He wonders if any amount of alcohol is safe/recommended with Kalydeco. He plans to discuss this with CF PharmD today. He denies any current/past psychosocial impairment caused by alcohol use.      Leisure Activities/Interests:   Shar enjoys traveling, going running or working out with his wife, and spending time with friends.     Intervention:  -Psychosocial Assessment  -Supportive counseling; psychoeducation    Assessment:  Shar was open and reflective in his responses. He presented with a flat affect but this appears to be his baseline. Risk factors/vulnerabilities include the stresses of parenthood, stressful job, joint pain/GI symptoms & late CF diagnosis. Strengths include academic and professional history of success; plans to look for a new/less stressful job, strong social supports; global psychosocial stability.  Shar seems to be psychosocially stable overall, with access to relevant resources and supports.  Recommend f/u SW support as noted in plan below.      Plan:  - Continue to follow for intermittent clinic consult to assess level of stress and coping.  - Re-consult for any psychosocial needs that may arise.    - Complete psychosocial assessment annually.      AGUSTÍN Oropeza, [facility]  Adult Cystic Fibrosis   (Pager) [phone]  (Phone) [phone]

## 2018-03-02 NOTE — TELEPHONE ENCOUNTER
PA Initiation    Medication: SYMDEKO  Insurance Company: [facility] - Phone [phone] Fax [phone]  Pharmacy Filling the Rx: [facility] - [place], [place] - [facility] [address]  Filling Pharmacy Phone:    Filling Pharmacy Fax:    Start Date: 3/2/2018

## 2018-03-03 ASSESSMENT — PATIENT HEALTH QUESTIONNAIRE - PHQ9: SUM OF ALL RESPONSES TO PHQ QUESTIONS 1-9: 1

## 2018-03-03 ASSESSMENT — ANXIETY QUESTIONNAIRES: GAD7 TOTAL SCORE: 4

## 2018-03-05 NOTE — TELEPHONE ENCOUNTER
Prior Authorization Approval    Authorization Effective Date: 2/2/2018  Authorization Expiration Date: 3/2/2019  Medication: SYMDEKO (APPROVED)  Approved Dose/Quantity: 56 PER 28 DAYS  Reference #:     Insurance Company: [employer] - Phone [phone] Fax [phone]  Expected CoPay:       CoPay Card Available:      Foundation Assistance Needed:    Which Pharmacy is filling the prescription (Not needed for infusion/clinic administered): [facility] - [place] [address] [address]  Pharmacy Notified:  YES  Patient Notified:

## 2018-04-06 ENCOUNTER — OFFICE VISIT (OUTPATIENT)
Dept: PULMONOLOGY | Facility: CLINIC | Age: 38
End: 2018-04-06
Attending: INTERNAL MEDICINE
Payer: COMMERCIAL

## 2018-04-06 ENCOUNTER — INFUSION THERAPY VISIT (OUTPATIENT)
Dept: INFUSION THERAPY | Facility: CLINIC | Age: 38
End: 2018-04-06
Attending: INTERNAL MEDICINE
Payer: COMMERCIAL

## 2018-04-06 VITALS
TEMPERATURE: 97.8 F | SYSTOLIC BLOOD PRESSURE: 120 MMHG | HEART RATE: 63 BPM | DIASTOLIC BLOOD PRESSURE: 75 MMHG | WEIGHT: 174.5 LBS | OXYGEN SATURATION: 97 % | RESPIRATION RATE: 16 BRPM | BODY MASS INDEX: 23.34 KG/M2

## 2018-04-06 VITALS
HEIGHT: 72 IN | BODY MASS INDEX: 23.63 KG/M2 | RESPIRATION RATE: 18 BRPM | WEIGHT: 174.5 LBS | DIASTOLIC BLOOD PRESSURE: 85 MMHG | SYSTOLIC BLOOD PRESSURE: 132 MMHG | HEART RATE: 65 BPM | OXYGEN SATURATION: 100 %

## 2018-04-06 DIAGNOSIS — E84.0 CYSTIC FIBROSIS WITH PULMONARY MANIFESTATIONS (H): Primary | ICD-10-CM

## 2018-04-06 DIAGNOSIS — K86.81 EXOCRINE PANCREATIC INSUFFICIENCY: ICD-10-CM

## 2018-04-06 DIAGNOSIS — E84.0 CYSTIC FIBROSIS WITH PULMONARY MANIFESTATIONS (H): ICD-10-CM

## 2018-04-06 LAB
ALBUMIN SERPL-MCNC: 4 G/DL (ref 3.4–5)
ALBUMIN UR-MCNC: NEGATIVE MG/DL
ALP SERPL-CCNC: 80 U/L (ref 40–150)
ALT SERPL W P-5'-P-CCNC: 31 U/L (ref 0–70)
AMORPH CRY #/AREA URNS HPF: ABNORMAL /HPF
ANION GAP SERPL CALCULATED.3IONS-SCNC: 6 MMOL/L (ref 3–14)
APPEARANCE UR: CLEAR
AST SERPL W P-5'-P-CCNC: 25 U/L (ref 0–45)
BASOPHILS # BLD AUTO: 0 10E9/L (ref 0–0.2)
BASOPHILS NFR BLD AUTO: 0.3 %
BILIRUB DIRECT SERPL-MCNC: 0.2 MG/DL (ref 0–0.2)
BILIRUB SERPL-MCNC: 0.9 MG/DL (ref 0.2–1.3)
BILIRUB UR QL STRIP: NEGATIVE
BUN SERPL-MCNC: 17 MG/DL (ref 7–30)
CALCIUM SERPL-MCNC: 8.9 MG/DL (ref 8.5–10.1)
CHLORIDE SERPL-SCNC: 104 MMOL/L (ref 94–109)
CHOLEST SERPL-MCNC: 191 MG/DL
CO2 SERPL-SCNC: 30 MMOL/L (ref 20–32)
COLOR UR AUTO: YELLOW
CREAT SERPL-MCNC: 0.88 MG/DL (ref 0.66–1.25)
CREAT UR-MCNC: 78 MG/DL
DEPRECATED CALCIDIOL+CALCIFEROL SERPL-MC: 38 UG/L (ref 20–75)
DIFFERENTIAL METHOD BLD: NORMAL
EOSINOPHIL # BLD AUTO: 0.1 10E9/L (ref 0–0.7)
EOSINOPHIL NFR BLD AUTO: 1.5 %
ERYTHROCYTE [DISTWIDTH] IN BLOOD BY AUTOMATED COUNT: 11.9 % (ref 10–15)
ERYTHROCYTE [SEDIMENTATION RATE] IN BLOOD BY WESTERGREN METHOD: 2 MM/H (ref 0–15)
GFR SERPL CREATININE-BSD FRML MDRD: >90 ML/MIN/1.7M2
GGT SERPL-CCNC: 19 U/L (ref 0–75)
GLUCOSE BLDC GLUCOMTR-MCNC: 85 MG/DL (ref 70–99)
GLUCOSE SERPL-MCNC: 104 MG/DL (ref 70–99)
GLUCOSE SERPL-MCNC: 111 MG/DL (ref 70–99)
GLUCOSE SERPL-MCNC: 126 MG/DL (ref 70–99)
GLUCOSE SERPL-MCNC: 155 MG/DL (ref 70–99)
GLUCOSE SERPL-MCNC: 84 MG/DL (ref 70–99)
GLUCOSE SERPL-MCNC: 88 MG/DL (ref 70–99)
GLUCOSE UR STRIP-MCNC: NEGATIVE MG/DL
HBA1C MFR BLD: 4.6 % (ref 0–6.4)
HCT VFR BLD AUTO: 46.7 % (ref 40–53)
HDLC SERPL-MCNC: 52 MG/DL
HGB BLD-MCNC: 16.5 G/DL (ref 13.3–17.7)
HGB UR QL STRIP: NEGATIVE
IGE SERPL-ACNC: 11 KIU/L (ref 0–114)
IGG SERPL-MCNC: 1140 MG/DL (ref 695–1620)
IMM GRANULOCYTES # BLD: 0 10E9/L (ref 0–0.4)
IMM GRANULOCYTES NFR BLD: 0.3 %
INR PPP: 1.05 (ref 0.86–1.14)
INSULIN SERPL-ACNC: 13 MU/L (ref 3–25)
INSULIN SERPL-ACNC: 20.3 MU/L (ref 3–25)
INSULIN SERPL-ACNC: 21.7 MU/L (ref 3–25)
INSULIN SERPL-ACNC: 4.2 MU/L (ref 3–25)
INSULIN SERPL-ACNC: 46.3 MU/L (ref 3–25)
IRON SERPL-MCNC: 127 UG/DL (ref 35–180)
KETONES UR STRIP-MCNC: NEGATIVE MG/DL
LDLC SERPL CALC-MCNC: 118 MG/DL
LEUKOCYTE ESTERASE UR QL STRIP: NEGATIVE
LYMPHOCYTES # BLD AUTO: 2.3 10E9/L (ref 0.8–5.3)
LYMPHOCYTES NFR BLD AUTO: 37.3 %
MAGNESIUM SERPL-MCNC: 2.2 MG/DL (ref 1.6–2.3)
MCH RBC QN AUTO: 32 PG (ref 26.5–33)
MCHC RBC AUTO-ENTMCNC: 35.3 G/DL (ref 31.5–36.5)
MCV RBC AUTO: 91 FL (ref 78–100)
MICROALBUMIN UR-MCNC: <5 MG/L
MICROALBUMIN/CREAT UR: NORMAL MG/G CR (ref 0–17)
MONOCYTES # BLD AUTO: 0.4 10E9/L (ref 0–1.3)
MONOCYTES NFR BLD AUTO: 7.2 %
MUCOUS THREADS #/AREA URNS LPF: PRESENT /LPF
NEUTROPHILS # BLD AUTO: 3.3 10E9/L (ref 1.6–8.3)
NEUTROPHILS NFR BLD AUTO: 53.4 %
NITRATE UR QL: NEGATIVE
NONHDLC SERPL-MCNC: 139 MG/DL
NRBC # BLD AUTO: 0 10*3/UL
NRBC BLD AUTO-RTO: 0 /100
PH UR STRIP: 7 PH (ref 5–7)
PHOSPHATE SERPL-MCNC: 3.1 MG/DL (ref 2.5–4.5)
PLATELET # BLD AUTO: 181 10E9/L (ref 150–450)
POTASSIUM SERPL-SCNC: 4 MMOL/L (ref 3.4–5.3)
PROT SERPL-MCNC: 7.4 G/DL (ref 6.8–8.8)
RBC # BLD AUTO: 5.16 10E12/L (ref 4.4–5.9)
RBC #/AREA URNS AUTO: 1 /HPF (ref 0–2)
SODIUM SERPL-SCNC: 139 MMOL/L (ref 133–144)
SOURCE: ABNORMAL
SP GR UR STRIP: 1.01 (ref 1–1.03)
TRIGL SERPL-MCNC: 102 MG/DL
TSH SERPL DL<=0.005 MIU/L-ACNC: 1.41 MU/L (ref 0.4–4)
UROBILINOGEN UR STRIP-MCNC: 0 MG/DL (ref 0–2)
WBC # BLD AUTO: 6.1 10E9/L (ref 4–11)
WBC #/AREA URNS AUTO: 0 /HPF (ref 0–5)

## 2018-04-06 PROCEDURE — 25000125 ZZHC RX 250: Mod: ZF | Performed by: INTERNAL MEDICINE

## 2018-04-06 PROCEDURE — 36415 COLL VENOUS BLD VENIPUNCTURE: CPT | Performed by: INTERNAL MEDICINE

## 2018-04-06 PROCEDURE — 83525 ASSAY OF INSULIN: CPT | Mod: 91 | Performed by: INTERNAL MEDICINE

## 2018-04-06 PROCEDURE — 82947 ASSAY GLUCOSE BLOOD QUANT: CPT | Performed by: INTERNAL MEDICINE

## 2018-04-06 PROCEDURE — 82962 GLUCOSE BLOOD TEST: CPT

## 2018-04-06 PROCEDURE — G0463 HOSPITAL OUTPT CLINIC VISIT: HCPCS | Mod: ZF

## 2018-04-06 RX ADMIN — ALCOHOL 75 G: 65 GEL TOPICAL at 07:59

## 2018-04-06 ASSESSMENT — PAIN SCALES - GENERAL: PAINLEVEL: NO PAIN (0)

## 2018-04-06 NOTE — PATIENT INSTRUCTIONS
Dear Philip Delacruz    Thank you for choosing [facility] ([facility]) for your GTT testing.  The following information is a summary of our appointment as well as important reminders.      Additional information: Your CF glucola testing today.      We look forward in seeing you on your next appointment here at [facility].  Please don t hesitate to call us at [phone] to reschedule any of your appointments or to speak with one of the [facility] registered nurses.  It was a pleasure taking care of you today.    Sincerely,    [facility]  [address] [place] [address]  [id number]  Phone:  [phone]

## 2018-04-06 NOTE — PATIENT INSTRUCTIONS
I've included a brief summary of our discussion and care plan from today's visit below.  Please review this information with your primary care provider.  _______________________________________________________________________      1. 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  - Continue Creon 12K 8-9tabs/meals, 4-5tabs/snacks as ordered for now. I'm very proud of your clinical response thus far, particularly given the interval reduction in bloating/discomfort and loose stools, as well as the marked reduction in fecal leakage (now only occurring once weekly).  - Recommend transition to daily powdered fiber supplementation with Benefiber or Citrucel as the first step to help improve your stool consistency, particularly as these formulations are generally very well-tolerated in terms of bloating. Use this consistently for at least 2-3 months daily for best effect, particularly as this does take some time to work.  - No immediate indication for Imodium to treat the fecal mucoid leakage for now, particularly given the clinical response to the above measures.  - No immediate need for diagnostic Colonoscopy at this time given the clinical improvement, will readdress in your ongoing care depending on clinical progress. Recommend starting routine screening at age 40 unless symptoms sooner.    2. Can readdress role for empiric antibiotic therapy for SIBO depending on your ongoing clinical progress, will hold off for now given the interval improvement with the above measures.  - Discussed potential  antibiotic options and parameter to  improvement (and role for cycled therapy) today.    3. Continue follow-up with CF Nutrition and your primary CF physician to help further dial in your PERT dosage, excellent response/progress thus far!  - Can readdress role for transition to higher-dose formulation (simplified regimen) once your average daily dosage has been further titrated.    4. Return to GI Clinic with me in 3-4 months to review your progress, sooner if symptomatic.   - If you are unable to schedule this follow-up appointment today, please contact our  at [phone] within the next week to help set up this necessary appointment.    _______________________________________________________________________    It was a pleasure seeing you in clinic today - please be in touch if there are any further questions that arise following today's visit.  During business hours, you may reach Clinic Nurse Triage Line at [phone].  For urgent/emergent questions after business hours, you may reach the on-call GI Fellow by contacting the [facility]  at [phone].    Any benign/non-urgent test results are usually communicated via letter or Shuropodyhart message within 1-2 weeks after completion.  Urgent results (those that require a change in the previously-discussed care plan) are usually communicated via a phone call once available from our clinic staff to discuss the results and the next steps in your evaluation.    I recommend signing up for [employer] access if you have not already done so and are comfortable with using a computer.  This allows for online access to your lab results and also helps you communicate efficiently with my clinic should any questions arise in your care.    We have Financial Counseling services available through our clinic.  If you have questions about your insurance coverage or payment responsibilities, particularly before you undergo any tests or procedures,  please let us know and we can arrange a consultation accordingly to help you make informed decisions about your healthcare.    Sincerely,    Eliu Watts MD    [facility] - Department of Medicine  Division of Gastroenterology

## 2018-04-06 NOTE — MR AVS SNAPSHOT
After Visit Summary   4/6/2018    Philip Delacruz    MRN: [id number]           Patient Information     Date Of Birth          1980        Visit Information        Provider Department      4/6/2018 7:00 AM  51 ATC; [facility]        Today's Diagnoses     Cystic fibrosis with pulmonary manifestations    -  1      Care Instructions    Dear Philip Delacruz    Thank you for choosing [facility] ([facility]) for your GTT testing.  The following information is a summary of our appointment as well as important reminders.      Additional information: Your CF glucola testing today.      We look forward in seeing you on your next appointment here at [facility].  Please don t hesitate to call us at [phone] to reschedule any of your appointments or to speak with one of the [facility] registered nurses.  It was a pleasure taking care of you today.    Sincerely,    [facility]  [facility] Center  [address] [place] [address]  [id number]  Phone:  [phone]            Follow-ups after your visit        Your next 10 appointments already scheduled     Apr 06, 2018 10:15 AM CDT   Lab with  LAB   [facility] ([facility])    [address] [place] [address]  1st Floor  [facility] [address] [phone]            Apr 06, 2018 10:40 AM CDT   (Arrive by 10:25 AM)   RETURN CYSTIC FIBROSIS VISIT with Eliu Watts MD   [facility] ([facility])    [address] [place] [address]  [address] [place] [id number] [phone]              Who to contact     If you have questions or need follow up information about today's clinic visit or your schedule please contact [facility] SPECIAL[facility] PROCEDURE directly at [phone].  Normal or  non-critical lab and imaging results will be communicated to you by MyChart, letter or phone within 4 business days after the clinic has received the results. If you do not hear from us within 7 days, please contact the clinic through [employer] or phone. If you have a critical or abnormal lab result, we will notify you by phone as soon as possible.  Submit refill requests through [employer] or call your pharmacy and they will forward the refill request to us. Please allow 3 business days for your refill to be completed.          Additional Information About Your Visit        [employer] Information     [employer] gives you secure access to your electronic health record. If you see a primary care provider, you can also send messages to your care team and make appointments. If you have questions, please call your primary care clinic.  If you do not have a primary care provider, please call [phone] and they will assist you.        Care EveryWhere ID     This is your Care EveryWhere ID. This could be used by other organizations to access your [facility] medical records  [id number]        Your Vitals Were     Pulse Temperature Respirations Pulse Oximetry BMI (Body Mass Index)       63 97.8  F (36.6  C) (Oral) 16 97% 23.34 kg/m2        Blood Pressure from Last 3 Encounters:   04/06/18 120/75   03/01/18 (!) 146/92   01/05/18 118/80    Weight from Last 3 Encounters:   04/06/18 79.2 kg (174 lb 8 oz)   03/01/18 77.6 kg (171 lb 1.2 oz)   02/21/18 80.3 kg (177 lb)              We Performed the Following     Glucose in a Series: Draw +120 minutes     Glucose in a Series: Draw +30 minutes     Glucose in a Series: Draw +60 minutes     Glucose in a Series: Draw +90 minutes     Glucose in a Series: Draw Time Zero     Insulin in a Series: Draw +120 minutes     Insulin in a Series: Draw +30 minutes     Insulin in a Series: Draw +60 minutes     Insulin in a Series: Draw +90 minutes     Insulin in a Series: Draw Time Zero        Primary Care  Provider Fax #    [facility] [phone] [address]  [facility] [address]        Equal Access to Services     MINA WEBER : Hadii ekta loredo brianna Dominguez, wamercyda luqsandra, qaybta kaalmada bao, merrill robin lajenniemegha luis. So [facility] [phone].    ATENCIÓN: Si habla español, tiene a s[place]land disposición servicios gratuitos de asistencia lingüística. Miguel al [phone].    We comply with applicable federal civil rights laws and [place] laws. We do not discriminate on the basis of race, color, national origin, age, disability, sex, sexual orientation, or gender identity.            Thank you!     Thank you for choosing [facility] SPECIAL[facility] PROCEDURE  for your care. Our goal is always to provide you with excellent care. Hearing back from our patients is one way we can continue to improve our services. Please take a few minutes to complete the written survey that you may receive in the mail after your visit with us. Thank you!             Your Updated Medication List - Protect others around you: Learn how to safely use, store and throw away your medicines at www.disposemymeds.org.          This list is accurate as of 4/6/18 10:12 AM.  Always use your most recent med list.                   Brand Name Dispense Instructions for use Diagnosis    albuterol 108 (90 BASE) MCG/ACT Inhaler    PROAIR HFA/PROVENTIL HFA/VENTOLIN HFA    1 Inhaler    Inhale 2 puffs into the lungs every 6 hours as needed for shortness of breath / dyspnea or wheezing    Cystic fibrosis with pulmonary manifestations (H)       CREON 31895 UNITS Cpep   Generic drug:  amylase-lipase-protease     1200 capsule    Take 8-9 capsules with meals and and 4-5 capsules with meals for 3 meals and 3 snacks per day    Pancreatic insufficiency       FISH OIL ULTRA 1400 MG Caps      Take 1 capsule by mouth daily        FLORAJEN BIFIDOBLEND PO      Take 1 tablet by mouth 3 times daily  (before meals)        fluticasone 50 MCG/ACT spray    FLONASE    1 Bottle    Spray 2 sprays into both nostrils daily    Cystic fibrosis with pulmonary manifestations (H)       ivacaftor 150 MG tablet    KALYDECO    60 tablet    Take 1 tablet (150 mg) by mouth every 12 hours with fat-containing food.    Cystic fibrosis with pulmonary manifestations (H)       METAMUCIL 0.52 G capsule   Generic drug:  psyllium     540 capsule    Take 3 capsules (1.56 g) by mouth daily        MVW COMPLETE FORMULATION Chew      Take 1 capsule by mouth 2 times daily        order for DME     1 each    Use Wendy Vios nebulizer for nasal/sinus nebulizing as instructed    Chronic pansinusitis, CF (cystic fibrosis) (H)       oseltamivir 75 MG capsule    TAMIFLU    10 capsule    Take 1 capsule (75 mg) by mouth 2 times daily The patient will call and  the medication if needed.    Cystic fibrosis with pulmonary manifestations (H)       SINUFLO READYRINSE Kit      Daily PRN        tezacaftor-ivacaftor & ivacaftor 100-150 & 150 MG tablet pack    SYMDEKO     Take 1 tablet by mouth 2 times daily Take 1 tablet (yellow) by mouth every morning and 1 tablet (light blue) in the evening.  Swallow whole and take with fat-containing food.

## 2018-04-06 NOTE — LETTER
4/6/2018       RE: Philip Delacruz  [address] [facility] [address]     Dear Colleague,    Thank you for referring your patient, Philip Delacruz, to the [facility] at [facility]. Please see a copy of my visit note below.    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 Doing much better overall w/ adjustment in PERT dosage/adherence since last visit, +much improved post-prandial abd bloating/discomfort (as well as late-day discomfort) and marked reduction in fecal mucoid leakage (now occurring ~1x/wk vs. daily prior to adjustment). Did prove the mechanism of sxs w/ recent trip to [place] when he forgot his PERT at home, +prompt return of prior sxs (since abated w/ resumption of PERT). Denies any tenesmus or insecurity passing flatus, no fecal incontinence or new perianal/nocturnal sxs noted. Denies any N/V/F/C/HA/NS or other const/syst/cardiopulmonary sxs, no BRBPR/melena/urinary changes, no unintentional wt loss or appetite/satiety changes. No other bowel/bladder habit changes, no dysphagia/odynophagia. No jaundice/icterus/pruritus, no acholic stools/steatorrhea, no new lumps/bumps, no jt pain/oral ulcer/rash/eye sxs noted.    ROS: 10pt ROS performed and otherwise negative.    PERTINENT PAST MEDICAL/SURGICAL HISTORY:  As noted above.    PERTINENT MEDICATIONS:  - Kalydeco  - Creon 12K 8-9tabs/meals, 4-5tabs/snacks  Medications reviewed with patient today, see Medication List/Assessment for details.  No  other NSAID/anticoagulation reported by patient.  No other OTC/herbal/supplements reported by patient.    SOCIAL HISTORY: Tobacco: none.    PHYSICAL EXAMINATION:  Vitals reviewed, AFVSS  Wt 174# today (stable)  Gen: aaox3, cooperative, pleasant, not diaphoretic, nad  HEENT: ncat, neck supple, no clad/sclad, normal op w/o ulcer/exudate, anicteric, mmm  Resp/CV without acute findings, not dyspneic/tachycardic  Abd: +nabs, soft, nt, nd, no peritoneal s/s noted  Ext: no c/c/e  Skin: warm, perfused, no jaundice  Neuro: grossly intact, no asterixis noted    PERTINENT STUDIES:  None today    ASSESSMENT/PLAN:    1. Chronic abdominal bloating/discomfort + variable stool consistencies + intermittent fecal mucoid leakage, excellent interval clinical response further mindful adjustment and adherence to PERT, suspect symptoms driven by EPI and evolving treatment mismatch - continue supportive care as ordered for now  1. 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  - Continue Creon 12K 8-9tabs/meals, 4-5tabs/snacks as ordered for now. I'm very proud of your clinical response thus far, particularly given the interval reduction in bloating/discomfort and loose stools, as well as the marked reduction in fecal leakage (now only occurring once weekly).  - Recommend transition to daily powdered fiber supplementation with Benefiber or Citrucel as the first step to help improve your stool consistency, particularly as these formulations are generally very well-tolerated in terms of bloating. Use this consistently  for at least 2-3 months daily for best effect, particularly as this does take some time to work.  - No immediate indication for Imodium to treat the fecal mucoid leakage for now, particularly given the clinical response to the above measures.  - No immediate need for diagnostic Colonoscopy at this time given the clinical improvement, will readdress in your ongoing care depending on clinical progress. Recommend starting routine screening at age 40 unless symptoms sooner.    2. Can readdress role for empiric antibiotic therapy for SIBO depending on your ongoing clinical progress, will hold off for now given the interval improvement with the above measures.  - Discussed potential antibiotic options and parameters to  improvement (and role for cycled therapy) today.    3. Continue follow-up with CF Nutrition and your primary CF physician to help further dial in your PERT dosage, excellent response/progress thus far!  - Can readdress role for transition to higher-dose formulation (simplified regimen) once your average daily dosage has been further titrated.    2. Cancer Screening  No high-risk FH CRC (MGGF w/ CRC), recommend starting routine CRC screening at age 40 unless symptomatic sooner.    RTC 3-4 months, sooner if symptomatic.      Thank you for this consultation. It was a pleasure to participate in the care of this patient; please contact us with any further questions.     Eliu Watts MD   of Medicine  [facility] - Department of Medicine  Division of Gastroenterology

## 2018-04-06 NOTE — PROGRESS NOTES
Philip Delacruz [facility] for a glucose tolerance test.  During today's [facility] appointment orders from Dr. Mccarthy were completed.    Patient NPO: YES  CF annual labs completed YES  Patient drank 75 grams glucola at 8:00am within 10 minutes.    Administrations This Visit     glucose tolerence test (GLUCOLA) 25% oral liquid 75 g     Admin Date Action Dose Route Administered By             04/06/2018 Given 75 g Oral Carmenza Stephen RN                          Labs drawn at baseline, +30, +60, +90 and +120 minutes post glucola.    Pt tolerated glucose tolerance testwell    Post test blood sugar 84 mg/dL    Patient given snack YES    Patient discharged at 10:20 and sent to next appointment.    ANTON Jenkins, CMA

## 2018-04-06 NOTE — MR AVS SNAPSHOT
After Visit Summary   4/6/2018    Philip Delacruz    MRN: [id number]           Patient Information     Date Of Birth          1980        Visit Information        Provider Department      4/6/2018 10:40 AM Eliu Watts MD [facility]        Care Instructions    I've included a brief summary of our discussion and care plan from today's visit below.  Please review this information with your primary care provider.  _______________________________________________________________________      1. It was wonderful getting a chance to meet with you to discuss your chronic abdominal bloating/discomfort + variable stool consistencies + intermittent fecal mucoid leakage, particularly given the excellent clinical response you've had following further mindful adjustment and adherence to your Pancreatic Enzyme Replacement Therapy (PERT) - this is great to see, keep up the good work!  - Continue Creon 12K 8-9tabs/meals, 4-5tabs/snacks as ordered for now. I'm very proud of your clinical response thus far, particularly given the interval reduction in bloating/discomfort and loose stools, as well as the marked reduction in fecal leakage (now only occurring once weekly).  - Recommend transition to daily powdered fiber supplementation with Benefiber or Citrucel as the first step to help improve your stool consistency, particularly as these formulations are generally very well-tolerated in terms of bloating. Use this consistently for at least 2-3 months daily for best effect, particularly as this does take some time to work.  - No immediate indication for Imodium to treat the fecal mucoid leakage for now, particularly given the clinical response to the above measures.  - No immediate need for diagnostic Colonoscopy at this time given the clinical improvement, will readdress in your ongoing care depending on clinical progress. Recommend starting routine screening at age 40 unless  symptoms sooner.    2. Can readdress role for empiric antibiotic therapy for SIBO depending on your ongoing clinical progress, will hold off for now given the interval improvement with the above measures.  - Discussed potential antibiotic options and parameter to  improvement (and role for cycled therapy) today.    3. Continue follow-up with CF Nutrition and your primary CF physician to help further dial in your PERT dosage, excellent response/progress thus far!  - Can readdress role for transition to higher-dose formulation (simplified regimen) once your average daily dosage has been further titrated.    4. Return to GI Clinic with me in 3-4 months to review your progress, sooner if symptomatic.   - If you are unable to schedule this follow-up appointment today, please contact our  at [phone] within the next week to help set up this necessary appointment.    _______________________________________________________________________    It was a pleasure seeing you in clinic today - please be in touch if there are any further questions that arise following today's visit.  During business hours, you may reach Clinic Nurse Triage Line at [phone].  For urgent/emergent questions after business hours, you may reach the on-call GI Fellow by contacting the [facility]  at [phone].    Any benign/non-urgent test results are usually communicated via letter or VesLabshart message within 1-2 weeks after completion.  Urgent results (those that require a change in the previously-discussed care plan) are usually communicated via a phone call once available from our clinic staff to discuss the results and the next steps in your evaluation.    I recommend signing up for [employer] access if you have not already done so and are comfortable with using a computer.  This allows for online access to your lab results and also helps you communicate efficiently with my clinic should any questions  arise in your care.    We have Financial Counseling services available through our clinic.  If you have questions about your insurance coverage or payment responsibilities, particularly before you undergo any tests or procedures, please let us know and we can arrange a consultation accordingly to help you make informed decisions about your healthcare.    Sincerely,    Eliu Watts MD    [facility] Department of Medicine  Division of Gastroenterology            Follow-ups after your visit        Follow-up notes from your care team     Return in about 3 months (around 7/6/2018).      Who to contact     If you have questions or need follow up information about today's clinic visit or your schedule please contact [facility] directly at [phone].  Normal or non-critical lab and imaging results will be communicated to you by Open[employer], letter or phone within 4 business days after the clinic has received the results. If you do not hear from us within 7 days, please contact the clinic through Openethart or phone. If you have a critical or abnormal lab result, we will notify you by phone as soon as possible.  Submit refill requests through [employer] or call your pharmacy and they will forward the refill request to us. Please allow 3 business days for your refill to be completed.          Additional Information About Your Visit        MyChart Information     [employer] gives you secure access to your electronic health record. If you see a primary care provider, you can also send messages to your care team and make appointments. If you have questions, please call your primary care clinic.  If you do not have a primary care provider, please call [phone] and they will assist you.        Care EveryWhere ID     This is your Care EveryWhere ID. This could be used by other organizations to access your [facility] medical records  [id number]        Your Vitals Were      Pulse Respirations Height Pulse Oximetry BMI (Body Mass Index)       65 18 1.829 m (6') 100% 23.67 kg/m2        Blood Pressure from Last 3 Encounters:   04/06/18 132/85   04/06/18 120/75   03/01/18 (!) 146/92    Weight from Last 3 Encounters:   04/06/18 79.2 kg (174 lb 8 oz)   04/06/18 79.2 kg (174 lb 8 oz)   03/01/18 77.6 kg (171 lb 1.2 oz)              We Performed the Following     Glucose by meter        Primary Care Provider Fax #    [facility] [phone] [address]  [facility] [address]        Equal Access to Services     MINA WEBER : Aarti Dominguez, vincent lam, yamil kaalmatruong gore, merrill koehler . So [facility] [phone].    ATENCIÓN: Si habla español, tiene a [place] disposición servicios gratuitos de asistencia lingüística. Llame al [phone].    We comply with applicable federal civil rights laws and [place] laws. We do not discriminate on the basis of race, color, national origin, age, disability, sex, sexual orientation, or gender identity.            Thank you!     Thank you for choosing [facility]  for your care. Our goal is always to provide you with excellent care. Hearing back from our patients is one way we can continue to improve our services. Please take a few minutes to complete the written survey that you may receive in the mail after your visit with us. Thank you!             Your Updated Medication List - Protect others around you: Learn how to safely use, store and throw away your medicines at www.disposemymeds.org.          This list is accurate as of 4/6/18 11:01 AM.  Always use your most recent med list.                   Brand Name Dispense Instructions for use Diagnosis    albuterol 108 (90 BASE) MCG/ACT Inhaler    PROAIR HFA/PROVENTIL HFA/VENTOLIN HFA    1 Inhaler    Inhale 2 puffs into the lungs every 6 hours as needed for shortness of breath / dyspnea or  wheezing    Cystic fibrosis with pulmonary manifestations (H)       CREON 22774 UNITS Cpep   Generic drug:  amylase-lipase-protease     1200 capsule    Take 8-9 capsules with meals and and 4-5 capsules with meals for 3 meals and 3 snacks per day    Pancreatic insufficiency       FISH OIL ULTRA 1400 MG Caps      Take 1 capsule by mouth daily        FLORAJEN BIFIDOBLEND PO      Take 1 tablet by mouth 3 times daily (before meals)        fluticasone 50 MCG/ACT spray    FLONASE    1 Bottle    Spray 2 sprays into both nostrils daily    Cystic fibrosis with pulmonary manifestations (H)       ivacaftor 150 MG tablet    KALYDECO    60 tablet    Take 1 tablet (150 mg) by mouth every 12 hours with fat-containing food.    Cystic fibrosis with pulmonary manifestations (H)       METAMUCIL 0.52 G capsule   Generic drug:  psyllium     540 capsule    Take 3 capsules (1.56 g) by mouth daily        MVW COMPLETE FORMULATION Chew      Take 1 capsule by mouth 2 times daily        order for DME     1 each    Use [email or web address] Vios nebulizer for nasal/sinus nebulizing as instructed    Chronic pansinusitis, CF (cystic fibrosis) (H)       oseltamivir 75 MG capsule    TAMIFLU    10 capsule    Take 1 capsule (75 mg) by mouth 2 times daily The patient will call and  the medication if needed.    Cystic fibrosis with pulmonary manifestations (H)       SINUFLO READYRINSE Kit      Daily PRN        tezacaftor-ivacaftor & ivacaftor 100-150 & 150 MG tablet pack    SYMDEKO     Take 1 tablet by mouth 2 times daily Take 1 tablet (yellow) by mouth every morning and 1 tablet (light blue) in the evening.  Swallow whole and take with fat-containing food.

## 2018-04-06 NOTE — PROGRESS NOTES
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 Doing much better overall w/ adjustment in PERT dosage/adherence since last visit, +much improved post-prandial abd bloating/discomfort (as well as late-day discomfort) and marked reduction in fecal mucoid leakage (now occurring ~1x/wk vs. daily prior to adjustment). Did prove the mechanism of sxs w/ recent trip to [place] when he forgot his PERT at home, +prompt return of prior sxs (since abated w/ resumption of PERT). Denies any tenesmus or insecurity passing flatus, no fecal incontinence or new perianal/nocturnal sxs noted. Denies any N/V/F/C/HA/NS or other const/syst/cardiopulmonary sxs, no BRBPR/melena/urinary changes, no unintentional wt loss or appetite/satiety changes. No other bowel/bladder habit changes, no dysphagia/odynophagia. No jaundice/icterus/pruritus, no acholic stools/steatorrhea, no new lumps/bumps, no jt pain/oral ulcer/rash/eye sxs noted.    ROS: 10pt ROS performed and otherwise negative.    PERTINENT PAST MEDICAL/SURGICAL HISTORY:  As noted above.    PERTINENT MEDICATIONS:  - Kalydeco  - Creon 12K 8-9tabs/meals, 4-5tabs/snacks  Medications reviewed with patient today, see Medication List/Assessment for details.  No other NSAID/anticoagulation reported by patient.  No other OTC/herbal/supplements reported by patient.    SOCIAL HISTORY: Tobacco: none.    PHYSICAL EXAMINATION:  Vitals reviewed, AFVSS  Wt 174# today (stable)  Gen: aaox3, cooperative, pleasant, not diaphoretic, nad  HEENT: ncat, neck supple, no clad/sclad,  normal op w/o ulcer/exudate, anicteric, mmm  Resp/CV without acute findings, not dyspneic/tachycardic  Abd: +nabs, soft, nt, nd, no peritoneal s/s noted  Ext: no c/c/e  Skin: warm, perfused, no jaundice  Neuro: grossly intact, no asterixis noted    PERTINENT STUDIES:  None today    ASSESSMENT/PLAN:    1. Chronic abdominal bloating/discomfort + variable stool consistencies + intermittent fecal mucoid leakage, excellent interval clinical response further mindful adjustment and adherence to PERT, suspect symptoms driven by EPI and evolving treatment mismatch - continue supportive care as ordered for now  1. 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  - Continue Creon 12K 8-9tabs/meals, 4-5tabs/snacks as ordered for now. I'm very proud of your clinical response thus far, particularly given the interval reduction in bloating/discomfort and loose stools, as well as the marked reduction in fecal leakage (now only occurring once weekly).  - Recommend transition to daily powdered fiber supplementation with Benefiber or Citrucel as the first step to help improve your stool consistency, particularly as these formulations are generally very well-tolerated in terms of bloating. Use this consistently for at least 2-3 months daily for best effect, particularly as this does take some time to work.  - No immediate indication for Imodium to treat the fecal mucoid leakage for now, particularly given the clinical response to the above measures.  - No immediate need for diagnostic Colonoscopy at this time  given the clinical improvement, will readdress in your ongoing care depending on clinical progress. Recommend starting routine screening at age 40 unless symptoms sooner.    2. Can readdress role for empiric antibiotic therapy for SIBO depending on your ongoing clinical progress, will hold off for now given the interval improvement with the above measures.  - Discussed potential antibiotic options and parameters to  improvement (and role for cycled therapy) today.    3. Continue follow-up with CF Nutrition and your primary CF physician to help further dial in your PERT dosage, excellent response/progress thus far!  - Can readdress role for transition to higher-dose formulation (simplified regimen) once your average daily dosage has been further titrated.    2. Cancer Screening  No high-risk FH CRC (MGGF w/ CRC), recommend starting routine CRC screening at age 40 unless symptomatic sooner.    RTC 3-4 months, sooner if symptomatic.      Thank you for this consultation. It was a pleasure to participate in the care of this patient; please contact us with any further questions.     Eliu Watts MD   of Medicine  [facility] - Department of Medicine  Division of Gastroenterology

## 2018-04-06 NOTE — PROGRESS NOTES
"Philip is here for a glucose tolerance test for a history of Cystic Fibrosis.  Orders written by *** on ***.  Philip arrived NPO, last eating at ***.  IV placed *** difficulty by ***.  Requested labs drawn.  Glucose and Insulin levels drawn at -0- ***, +30 ***, +60 ***, +90 ***, and + 120 ***.  Philip started drinking the Glucola at *** and completed within *** minutes.  Post blood sugar tested and was ***.  Snack given to patient prior to discharge.   PIV discontinued without difficulty.    Philip will follow up, as planned, with {HIS/HER:[id number]::\"his\",\"her\"} CF team for test results and all future appointments.     "

## 2018-04-06 NOTE — NURSING NOTE
Chief Complaint   Patient presents with     RECHECK     Follow up on Shar and his CF GI issues     Boaz Adams CMA at 10:23 AM on 4/6/2018

## 2018-04-09 LAB
A-TOCOPHEROL VIT E SERPL-MCNC: 23.5 MG/L (ref 5.5–18)
ANNOTATION COMMENT IMP: NORMAL
BETA+GAMMA TOCOPHEROL SERPL-MCNC: 0.2 MG/L (ref 0–6)
RETINYL PALMITATE SERPL-MCNC: 0.06 MG/L (ref 0–0.1)
TESTOST SERPL-MCNC: 566 NG/DL (ref 240–950)
VIT A SERPL-MCNC: 0.81 MG/L (ref 0.3–1.2)

## 2018-04-10 PROBLEM — K86.81 EXOCRINE PANCREATIC INSUFFICIENCY: Status: ACTIVE | Noted: 2018-04-10

## 2018-05-01 DIAGNOSIS — E84.9 CF (CYSTIC FIBROSIS) (H): Primary | ICD-10-CM

## 2018-05-04 DIAGNOSIS — J32.4 CHRONIC PANSINUSITIS: ICD-10-CM

## 2018-06-18 DIAGNOSIS — E84.0 CYSTIC FIBROSIS WITH PULMONARY MANIFESTATIONS (H): ICD-10-CM

## 2018-06-18 RX ORDER — FLUTICASONE PROPIONATE 50 MCG
2 SPRAY, SUSPENSION (ML) NASAL DAILY
Qty: 1 BOTTLE | Refills: 0 | Status: SHIPPED | OUTPATIENT
Start: 2018-06-18 | End: 2018-07-19

## 2018-06-28 ENCOUNTER — OFFICE VISIT (OUTPATIENT)
Dept: PULMONOLOGY | Facility: CLINIC | Age: 38
End: 2018-06-28
Attending: INTERNAL MEDICINE
Payer: COMMERCIAL

## 2018-06-28 VITALS
HEART RATE: 65 BPM | OXYGEN SATURATION: 98 % | BODY MASS INDEX: 23.74 KG/M2 | SYSTOLIC BLOOD PRESSURE: 122 MMHG | RESPIRATION RATE: 17 BRPM | DIASTOLIC BLOOD PRESSURE: 80 MMHG | HEIGHT: 72 IN | WEIGHT: 175.27 LBS

## 2018-06-28 DIAGNOSIS — K86.89 PANCREATIC INSUFFICIENCY: ICD-10-CM

## 2018-06-28 DIAGNOSIS — E84.0 CYSTIC FIBROSIS WITH PULMONARY MANIFESTATIONS (H): Primary | ICD-10-CM

## 2018-06-28 DIAGNOSIS — J32.4 CHRONIC PANSINUSITIS: ICD-10-CM

## 2018-06-28 LAB
EXPTIME-PRE: 7 SEC
FEF2575-%PRED-PRE: 123 %
FEF2575-PRE: 5.61 L/SEC
FEF2575-PRED: 4.54 L/SEC
FEFMAX-%PRED-PRE: 104 %
FEFMAX-PRE: 11.33 L/SEC
FEFMAX-PRED: 10.79 L/SEC
FEV1-%PRED-PRE: 96 %
FEV1-PRE: 4.5 L
FEV1FEV6-PRE: 88 %
FEV1FEV6-PRED: 82 %
FEV1FVC-PRE: 88 %
FEV1FVC-PRED: 81 %
FIFMAX-PRE: 8.06 L/SEC
FVC-%PRED-PRE: 88 %
FVC-PRE: 5.11 L
FVC-PRED: 5.79 L

## 2018-06-28 PROCEDURE — 87070 CULTURE OTHR SPECIMN AEROBIC: CPT | Performed by: INTERNAL MEDICINE

## 2018-06-28 PROCEDURE — G0463 HOSPITAL OUTPT CLINIC VISIT: HCPCS | Mod: ZF

## 2018-06-28 RX ORDER — PEDIATRIC MULTIVIT 61/D3/VIT K 1500-800
1 CAPSULE ORAL DAILY
COMMUNITY
Start: 2018-06-28 | End: 2022-10-27

## 2018-06-28 RX ORDER — LORATADINE 10 MG/1
10 TABLET ORAL DAILY
COMMUNITY
End: 2018-11-23

## 2018-06-28 RX ORDER — GATIFLOXACIN 5 MG/ML
SOLUTION/ DROPS OPHTHALMIC
Refills: 0 | COMMUNITY
Start: 2018-06-16 | End: 2018-11-23

## 2018-06-28 RX ORDER — GANCICLOVIR 1.5 MG/G
GEL OPHTHALMIC
COMMUNITY
Start: 2018-06-18 | End: 2018-11-23

## 2018-06-28 ASSESSMENT — PAIN SCALES - GENERAL: PAINLEVEL: NO PAIN (0)

## 2018-06-28 NOTE — PROGRESS NOTES
Reason for Visit  Philip Delacruz is a 38 year old male who is being seen for RECHECK (Cystic Fibrosis)    Assessment and plan: Philip Coley is a 38-year-old male with cystic fibrosis with mild lung disease and pancreatic insufficiency.  1. Pulmonary: The patient appears to be doing well from a pulmonary standpoint. PFTs are improved from his last appointment and at his recent best. He is oxygenating well at 98%. He does not appear to be having a pulmonary exacerbation at this time. He will continue his current medications and exercise for airway clearance.  2. CF related sinus disease: Patient reports symptomatic improvement following recent operation. No acute infection. Continue sinus irrigations.  3. Pancreatic insufficiency: The patient reports less frequent greasy stools since increasing enzymes with meals. He also notes that stools improved while he was on antibiotics for his sinuses. This suggests that there may be a component of small bowel overgrowth as previously suggested by Dr. Watts. If new or worsening greasy/ loose stools, may consider further treatment for small bowel overgrowth. He will continue current vitamins and supplements.  4. CFTR modulation: The patient is a [id number]. Patient appears to be doing well on Symdeko with improved PFTs and rare exacerbations. Will continue to check LFTs quarterly as is standard.  5. Family Planning: The patient reports that recent round of IVF was unsuccessful in producing embryos. Will be trying another round in the near future.   6. Healthcare maintenance: Annual studies were completed in two parts preceding today's appointment (today and 4/6/18). Vitamin E is slightly elevated - reduce CF multivitamin to QD. LDL cholesterol is elevated though HDL and total cholesterol were wnl. Patient does report family history of heart disease with paternal grandfather having multiple MIs in his 50s and maternal grandfather having CHF in his 80s. I will  review the current recommendations for prescribing statins before adding another medication.     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    I will see the patient in follow-up in 3 months with labs, PFTs and sputum culture.    ADDENDUM: On review of current lipid lowering recommendations, will continue to monitor LDL but will not initiate therapy at this time.    CF HPI  The patient was seen and examined by True Sahni   Philip Coley is a 38-year-old male with cystic fibrosis with mild lung disease.   He reports no recent acute respiratory illnesses. Breathing is comfortable at rest and exercise is well tolerated. He has been doing a yoga/crossfit class daily and occasionally biking or running for regular exercise. Infrequent dry cough. No hemoptysis. No CP. No fever, chills, or night sweats. He is not doing vest therapy.    Review of Systems:  CONST: Appetite is good.  ENT: No sinus/ear pain, sore throat, or rhinorrhea.   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 Initially with pain, dry/buring/gritty sensation. Slowly improving with Zirgan and steroid treatment.   GI: No nausea or vomiting. No abdominal pain. Occasional leakage but largely improved with increased enzyme dose. Was also improved while on antibiotics for sinuses.   NEURO: Reports having a few headaches attributed to eye infection.    A complete ROS was otherwise negative except as noted in the HPI.    Current Outpatient Prescriptions   Medication     albuterol (PROAIR HFA/PROVENTIL HFA/VENTOLIN HFA) 108 (90 BASE) MCG/ACT Inhaler     CREON 39684 UNITS CPEP per EC capsule     fluticasone (FLONASE) 50 MCG/ACT spray     gatifloxacin (ZYMAXID) 0.5 % ophthalmic solution     gentamicin 0.008% in 1000ml 0.9% NaCl (GARAMYCIN) SOLN nasal irrigation     ivacaftor (IVACAFTOR) 150 MG tablet     Multiple Vitamins-Minerals (MVW COMPLETE FORMULATION) CHEW     Omega-3 Fatty Acids (FISH OIL ULTRA) 1400 MG CAPS     order for  DME     Probiotic Product (FLORAJEN BIFIDOBLEND PO)     psyllium (METAMUCIL) 0.52 G capsule     Sodium Chloride-Sodium Bicarb (SINUFLO READYRINSE) KIT     tezacaftor-ivacaftor & ivacaftor (SYMDEKO) 100-150 & 150 mg tablet pack     ZIRGAN 0.15 % GEL     oseltamivir (TAMIFLU) 75 MG capsule     No current facility-administered medications for this visit.      Allergies   Allergen Reactions     Tegaderm Transparent Dressing (Informational Only) Rash     Past Medical History:   Diagnosis Date     Chronic sinusitis      Congenital absence of vas deferens, bilateral      Cystic fibrosis (H)     Delta F508 and [id number]      Nasal polyps      Sinusitis, chronic        Past Surgical History:   Procedure Laterality Date     HC TOOTH EXTRACTION W/FORCEP       NASAL/SINUS POLYPECTOMY       REPAIR PECTUS EXCAVATUM  1997     SINUS SURGERY  1992, 2002, 2004    Removed tubinates and polyps OSH     Sperm harvest         Social History     Social History     Marital status: Single     Spouse name: N/A     Number of children: N/A     Years of education: N/A     Occupational History     Teacher      Social History Main Topics     Smoking status: Never Smoker     Smokeless tobacco: Never Used     Alcohol use 0.0 oz/week      Comment: 1 drink 3X per week     Drug use: No     Sexual activity: Yes     Partners: Female     Birth control/ protection: None     Other Topics Concern     Not on file     Social History Narrative    #d .  Lives in [place] with significant other         /80  Pulse 65  Resp 17  Ht 1.829 m (6')  Wt 79.5 kg (175 lb 4.3 oz)  SpO2 98%  BMI 23.77 kg/m2  Body mass index is 23.77 kg/(m^2).    Exam:   GENERAL APPEARANCE: Well developed, well nourished, alert, and in no apparent distress.  EYES: PERRL, EOMI  HENT: Nasal mucosa with mild edema and no hyperemia. No nasal polyps.  EARS: Canals clear, TMs normal.   MOUTH: Oral mucosa is moist, without any lesions, no tonsillar enlargement, no oropharyngeal  exudate.  NECK: supple, no masses, no thyromegaly.  LYMPHATICS: No significant axillary, cervical, or supraclavicular nodes.  RESP: mild pectus excavatum, normal percussion, good air flow throughout.  No crackles. No rhonchi. No wheezes.  CV: Normal S1, S2, regular rhythm, normal rate. No murmur.  No rub. No gallop. No LE edema.   ABDOMEN:  Bowel sounds normal, soft, nontender, no HSM or masses.    MS: extremities normal. No clubbing. No cyanosis.  SKIN: no rash on limited exam  NEURO: Mentation intact, speech normal, normal strength and tone, normal gait and stance  PSYCH: mentation appears normal. and affect normal/bright  Results:  Recent Results (from the past 168 hour(s))   General PFT Lab (Please always keep checked)    Collection Time: 06/28/18  3:51 PM   Result Value Ref Range    FVC-Pred 5.79 L    FVC-Pre 5.11 L    FVC-%Pred-Pre 88 %    FEV1-Pre 4.50 L    FEV1-%Pred-Pre 96 %    FEV1FVC-Pred 81 %    FEV1FVC-Pre 88 %    FEFMax-Pred 10.79 L/sec    FEFMax-Pre 11.33 L/sec    FEFMax-%Pred-Pre 104 %    FEF2575-Pred 4.54 L/sec    FEF2575-Pre 5.61 L/sec    JAT0386-%Pred-Pre 123 %    ExpTime-Pre 7.00 sec    FIFMax-Pre 8.06 L/sec    FEV1FEV6-Pred 82 %    FEV1FEV6-Pre 88 %                 Results as noted above.    PFT Interpretation:  Normal spirometry.  Increased from previous.  Similar to recent best.  Valid Maneuver        CF Exacerbation  Absent          Scribe Disclosure:   I, Akash Guadarrama, am serving as a scribe; to document services personally performed by True Sahni MD based on data collection and the provider's statements to me.     Provider Disclosure:  I agree with above History, Review of Systems, Physical Exam and Plan.  I have reviewed the content of the documentation and have edited it as needed. I have personally performed the services documented here and the documentation accurately represents those services and the decisions I have made.      Electronically signed by:  True Wayne  Kaitlyn

## 2018-06-28 NOTE — PROGRESS NOTES
Respiratory Therapist Note:    Alternative Airway Clearance: Exercise and (rarely) AerobiKa    Other/ Comments: Premedicates with Albuterol MDI prior to both exercise and AerobiKa. Reviewed AerobiKa instruction with patient.

## 2018-06-28 NOTE — NURSING NOTE
Chief Complaint   Patient presents with     RECHECK     Cystic Fibrosis    Darrian Velázquez, PARTH

## 2018-06-28 NOTE — MR AVS SNAPSHOT
After Visit Summary   6/28/2018    Philip Delacruz    MRN: [id number]           Patient Information     Date Of Birth          1980        Visit Information        Provider Department      6/28/2018 4:10 PM True Sahni MD [facility]        Today's Diagnoses     Cystic fibrosis with pulmonary manifestations    -  1    Pancreatic insufficiency        Chronic pansinusitis          Care Instructions    Cystic Fibrosis Self-Care Plan       MRN: [id number]   Clinic Date: June 28, 2018   Patient: Philip Delacruz     RECOMMENDATIONS:   Continue exercise.  Reduce MVW vitamin to once daily.  Otherwise continue current medication, nebs and vest therapy.       YOUR GOAL:    CF Nurse Line:  Love Martins: [phone]   Kristi Ndiaye, RT: [phone]     Nisa Dumont: [phone] or April Cardona Dieticians: [phone]   Barbara Birmingham, Diabetes Nurse: [phone]      Melissa Calle: [phone] or Yvonne Gupta [phone], Social Workers   [email or web address]    Annual Studies:   IGG   Date Value Ref Range Status   04/06/2018 1140 695 - 1620 mg/dL Final     Insulin   Date Value Ref Range Status   04/06/2018 13.0 3 - 25 mU/L Final     Comment:     Starting 7/13/2017, insulin results will decrease by approximately 37%   compared to insulin results reported in EPIC between (12/16/2016-7/12/2017),   and should be interpreted accordingly. Insulin results reported prior to   12/16/16 are comparable to current insulin results and therefore no adjustment   is needed.       There are no preventive care reminders to display for this patient.      Pulmonary Function Tests  FEV1: amount of air you can blow out in 1 second  FVC: total amount of air you can take in and blow out    Your Goals:         PFT Latest Ref Rng & Units 6/28/2018   FVC L 5.11   FEV1 L 4.50   FVC% % 88   FEV1% % 96          Airway Clearance: The Most Important Way to  Keep Your Lungs Healthy  Daily exercise    Good Nutrition Can Improve Lung Function and Overall Health     Take ALL of your vitamins with food     Take 1/2 of your enzymes before EVERY meal/snack and the other 1/2 mid-meal/snack    Wt Readings from Last 3 Encounters:   06/28/18 79.5 kg (175 lb 4.3 oz)   04/06/18 79.2 kg (174 lb 8 oz)   04/06/18 79.2 kg (174 lb 8 oz)       Body mass index is 23.77 kg/(m^2).         National CF Foundation Recommendations for BMI in CF Adults: Women: at least 22 Men: at least 23        Controlling Blood Sugars Helps Prevent Lung Infections & Improves Nutrition  Test blood sugar:     In the morning before eating (goal is )     2 hours after a meal (goal is less than 150)     When pre-meal glucose is greater than 150 add correction     At bedtime (if less than 100 eat a snack with 15 grams of carbohydrates  Last A1C Results:   Hemoglobin A1C   Date Value Ref Range Status   04/06/2018 4.6 0 - 6.4 % Final     Comment:     Normal <5.7% Prediabetes 5.7-6.4%  Diabetes 6.5% or higher - adopted from ADA   consensus guidelines.           If diabetic, measure A1C every 6 months. Goal: Under 7%    Staying Healthy    Research:  If you are interested in learning about research opportunities or have questions, please contact Olga Meléndez at [phone] or [email or web address].      CF Foundation:  Compass is a personalized resource service to help you with the insurance, financial, legal and other issues you are facing.  It's free, confidential and available to anyone with CF.  Ask your  for more information or contact Compass directly at [phone]-COMPASS ([phone]) or compass@cff.org, or learn more at cff.org/compass.                             Follow-ups after your visit        Follow-up notes from your care team     Return in about 12 weeks (around 9/20/2018).      Your next 10 appointments already scheduled     Jul 13, 2018  8:00 AM CDT   (Arrive by 7:45 AM)   RETURN CYSTIC FIBROSIS  VISIT with Eliu Watts MD   [facility] ([facility])    [address]  [address]  [place] [address] [phone]            Oct 04, 2018  3:15 PM CDT   Lab with  LAB   [facility] ([facility])    [address]  1st [address]  [place] [address]   [phone]            Oct 04, 2018  3:30 PM CDT   CF LOOP with  PFL CF   [facility] Pulmonary Function Testing ([facility])    [address]  3rd Floor  [place] [address] [phone]            Oct 04, 2018  4:10 PM CDT   (Arrive by 3:55 PM)   RETURN CYSTIC FIBROSIS VISIT with True Sahni MD   [facility] ([facility])    [address]  [address]  [place] [address] [phone]              Future tests that were ordered for you today     Open Future Orders        Priority Expected Expires Ordered    Cystic Fibrosis Culture Aerob Bacterial Routine 10/4/2018 10/28/2018 6/28/2018    Spirometry, Breathing Capacity Routine 10/4/2018 10/28/2018 6/28/2018    Hepatic panel Routine 10/4/2018 10/28/2018 6/28/2018            Who to contact     If you have questions or need follow up information about today's clinic visit or your schedule please contact [facility] directly at [phone].  Normal or non-critical lab and imaging results will be communicated to you by MyChart, letter or phone within 4 business days after the clinic has received the results. If you do not hear from us within 7 days, please contact the clinic through MyChart or phone. If you have a critical or abnormal lab result, we will notify you by phone as soon as possible.  Submit refill requests through [employer] or call your pharmacy and they will forward the refill request to us. Please allow 3 business days for your refill to be completed.           Additional Information About Your Visit        [employer] Information     [employer] gives you secure access to your electronic health record. If you see a primary care provider, you can also send messages to your care team and make appointments. If you have questions, please call your primary care clinic.  If you do not have a primary care provider, please call [phone] and they will assist you.        Care EveryWhere ID     This is your Care EveryWhere ID. This could be used by other organizations to access your [place] medical records  [id number]        Your Vitals Were     Pulse Respirations Height Pulse Oximetry BMI (Body Mass Index)       65 17 1.829 m (6') 98% 23.77 kg/m2        Blood Pressure from Last 3 Encounters:   06/28/18 122/80   04/06/18 132/85   04/06/18 120/75    Weight from Last 3 Encounters:   06/28/18 79.5 kg (175 lb 4.3 oz)   04/06/18 79.2 kg (174 lb 8 oz)   04/06/18 79.2 kg (174 lb 8 oz)              We Performed the Following     Cystic Fibrosis Culture Aerob Bacterial          Today's Medication Changes          These changes are accurate as of 6/28/18  4:59 PM.  If you have any questions, ask your nurse or doctor.               These medicines have changed or have updated prescriptions.        Dose/Directions    FLORAJEN BIFIDOBLEND Caps   This may have changed:    - how much to take  - when to take this   Changed by:  True Sahni MD        Dose:  1 capsule   Take 1 capsule by mouth daily   Refills:  0       multivitamin CF formula chewable tablet   This may have changed:    - how much to take  - when to take this   Changed by:  True Sahni MD        Dose:  1 tablet   Take 1 tablet by mouth daily   Refills:  0       psyllium 58.6 % Powd   Commonly known as:  METAMUCIL   This may have changed:  Another medication with the same name was removed. Continue taking this medication, and follow the directions you see here.   Changed by:  True Sahni MD         Dose:  1 Tablespoonful   Take 1 Tablespoonful by mouth daily   Refills:  0         Stop taking these medicines if you haven't already. Please contact your care team if you have questions.     ivacaftor 150 MG tablet   Commonly known as:  KALYDECO   Stopped by:  True Sahni MD                    Primary Care Provider Fax #    [facility] [phone] [address] [place] [address]        Equal Access to Services     [facility]: Hadii aad ku hadasho Soomaali, waaxda luqadaha, qaybta kaalmada adeegyada, waxay idiin hayaan adeeg kharash la'aan . So [facility] [phone].    ATENCIÓN: Si habla español, tiene a sutherland disposición servicios gratuitos de asistencia lingüística. Llame al [phone].    We comply with applicable federal civil rights laws and [place] laws. We do not discriminate on the basis of race, color, national origin, age, disability, sex, sexual orientation, or gender identity.            Thank you!     Thank you for choosing [facility]  for your care. Our goal is always to provide you with excellent care. Hearing back from our patients is one way we can continue to improve our services. Please take a few minutes to complete the written survey that you may receive in the mail after your visit with us. Thank you!             Your Updated Medication List - Protect others around you: Learn how to safely use, store and throw away your medicines at www.disposemymeds.org.          This list is accurate as of 6/28/18  4:59 PM.  Always use your most recent med list.                   Brand Name Dispense Instructions for use Diagnosis    albuterol 108 (90 Base) MCG/ACT Inhaler    PROAIR HFA/PROVENTIL HFA/VENTOLIN HFA    1 Inhaler    Inhale 2 puffs into the lungs every 6 hours as needed for shortness of breath / dyspnea or wheezing    Cystic fibrosis with pulmonary manifestations (H)       CREON 26266 units Cpep   Generic drug:   "amylase-lipase-protease     1200 capsule    Take 8-9 capsules with meals and and 4-5 capsules with meals for 3 meals and 3 snacks per day    Pancreatic insufficiency       FISH OIL ULTRA 1400 MG Caps      Take 1 capsule by mouth daily        FLORAJEN BIFIDOBLEND Caps      Take 1 capsule by mouth daily        fluticasone 50 MCG/ACT spray    FLONASE    1 Bottle    Spray 2 sprays into both nostrils daily    Cystic fibrosis with pulmonary manifestations (H)       gatifloxacin 0.5 % ophthalmic solution    ZYMAXID     INT 1 GTT IN OD QID        gentamicin 0.008% in 1000ml 0.9% NaCl Soln nasal irrigation    GARAMYCIN    1000 mL    Gentamicin 80 mg capsule - Dissolve contents of 1 capsule to 240 mL of saline and irrigate each nostril with 120 mL once daily for 30 days\".    Chronic pansinusitis       loratadine 10 MG tablet    CLARITIN     Take 10 mg by mouth daily        multivitamin CF formula chewable tablet      Take 1 tablet by mouth daily        order for DME     1 each    Use Vizify Vios nebulizer for nasal/sinus nebulizing as instructed    Chronic pansinusitis, CF (cystic fibrosis) (H)       oseltamivir 75 MG capsule    TAMIFLU    10 capsule    Take 1 capsule (75 mg) by mouth 2 times daily The patient will call and  the medication if needed.    Cystic fibrosis with pulmonary manifestations (H)       psyllium 58.6 % Powd    METAMUCIL     Take 1 Tablespoonful by mouth daily        SINUFLO READYRINSE Kit      Daily PRN        tezacaftor-ivacaftor & ivacaftor 100-150 & 150 MG tablet pack    SYMDEKO     Take 1 tablet by mouth 2 times daily Take 1 tablet (yellow) by mouth every morning and 1 tablet (light blue) in the evening.  Swallow whole and take with fat-containing food.        ZIRGAN 0.15 % Gel   Generic drug:  ganciclovir             "

## 2018-06-28 NOTE — LETTER
6/28/2018       RE: Philip Delacruz  [address] [place] [facility] [address]     Dear Colleague,    Thank you for referring your patient, Philip Delacruz, to the [facility] at [facility]. Please see a copy of my visit note below.    Reason for Visit  Philip Delacruz is a 38 year old male who is being seen for RECHECK (Cystic Fibrosis)    Assessment and plan: Philip Coley is a 38-year-old male with cystic fibrosis with mild lung disease and pancreatic insufficiency.  1. Pulmonary: The patient appears to be doing well from a pulmonary standpoint. PFTs are improved from his last appointment and at his recent best. He is oxygenating well at 98%. He does not appear to be having a pulmonary exacerbation at this time. He will continue his current medications and exercise for airway clearance.  2. CF related sinus disease: Patient reports symptomatic improvement following recent operation. No acute infection. Continue sinus irrigations.  3. Pancreatic insufficiency: The patient reports less frequent greasy stools since increasing enzymes with meals. He also notes that stools improved while he was on antibiotics for his sinuses. This suggests that there may be a component of small bowel overgrowth as previously suggested by Dr. Watts. If new or worsening greasy/ loose stools, may consider further treatment for small bowel overgrowth. He will continue current vitamins and supplements.  4. CFTR modulation: The patient is a D5302Y/[id number]. Patient appears to be doing well on Symdeko with improved PFTs and rare exacerbations. Will continue to check LFTs quarterly as is standard.  5. Family Planning: The patient reports that recent round of IVF was unsuccessful in producing embryos. Will be trying another round in the near future.   6. Healthcare maintenance: Annual studies were completed in two parts preceding today's appointment (today  and 4/6/18). Vitamin E is slightly elevated - reduce CF multivitamin to QD. LDL cholesterol is elevated though HDL and total cholesterol were wnl. Patient does report family history of heart disease with paternal grandfather having multiple MIs in his 50s and maternal grandfather having CHF in his 80s. I will review the current recommendations for prescribing statins before adding another medication.     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    I will see the patient in follow-up in 3 months with labs, PFTs and sputum culture.    ADDENDUM: On review of current lipid lowering recommendations, will continue to monitor LDL but will not initiate therapy at this time.    CF HPI  The patient was seen and examined by True Sahni   Philip Coley is a 38-year-old male with cystic fibrosis with mild lung disease.   He reports no recent acute respiratory illnesses. Breathing is comfortable at rest and exercise is well tolerated. He has been doing a yoga/crossfit class daily and occasionally biking or running for regular exercise. Infrequent dry cough. No hemoptysis. No CP. No fever, chills, or night sweats. He is not doing vest therapy.    Review of Systems:  CONST: Appetite is good.  ENT: No sinus/ear pain, sore throat, or rhinorrhea.   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 Initially with pain, dry/buring/gritty sensation. Slowly improving with Zirgan and steroid treatment.   GI: No nausea or vomiting. No abdominal pain. Occasional leakage but largely improved with increased enzyme dose. Was also improved while on antibiotics for sinuses.   NEURO: Reports having a few headaches attributed to eye infection.    A complete ROS was otherwise negative except as noted in the HPI.    Current Outpatient Prescriptions   Medication     albuterol (PROAIR HFA/PROVENTIL HFA/VENTOLIN HFA) 108 (90 BASE) MCG/ACT Inhaler     CREON 42986 UNITS CPEP per EC capsule     fluticasone (FLONASE) 50  MCG/ACT spray     gatifloxacin (ZYMAXID) 0.5 % ophthalmic solution     gentamicin 0.008% in 1000ml 0.9% NaCl (GARAMYCIN) SOLN nasal irrigation     ivacaftor (IVACAFTOR) 150 MG tablet     Multiple Vitamins-Minerals (MVW COMPLETE FORMULATION) CHEW     Omega-3 Fatty Acids (FISH OIL ULTRA) 1400 MG CAPS     order for DME     Probiotic Product (FLORAJEN BIFIDOBLEND PO)     psyllium (METAMUCIL) 0.52 G capsule     Sodium Chloride-Sodium Bicarb (SINUFLO READYRINSE) KIT     tezacaftor-ivacaftor & ivacaftor (SYMDEKO) 100-150 & 150 mg tablet pack     ZIRGAN 0.15 % GEL     oseltamivir (TAMIFLU) 75 MG capsule     No current facility-administered medications for this visit.      Allergies   Allergen Reactions     Tegaderm Transparent Dressing (Informational Only) Rash     Past Medical History:   Diagnosis Date     Chronic sinusitis      Congenital absence of vas deferens, bilateral      Cystic fibrosis (H)     Delta F508 and N0740H      Nasal polyps      Sinusitis, chronic        Past Surgical History:   Procedure Laterality Date     HC TOOTH EXTRACTION W/FORCEP       NASAL/SINUS POLYPECTOMY       REPAIR PECTUS EXCAVATUM  1997     SINUS SURGERY  1992, 2002, 2004    Removed tubinates and polyps OSH     Sperm harvest         Social History     Social History     Marital status: Single     Spouse name: N/A     Number of children: N/A     Years of education: N/A     Occupational History     Teacher      Social History Main Topics     Smoking status: Never Smoker     Smokeless tobacco: Never Used     Alcohol use 0.0 oz/week      Comment: 1 drink 3X per week     Drug use: No     Sexual activity: Yes     Partners: Female     Birth control/ protection: None     Other Topics Concern     Not on file     Social History Narrative    #d .  Lives in [place] with significant other         /80  Pulse 65  Resp 17  Ht 1.829 m (6')  Wt 79.5 kg (175 lb 4.3 oz)  SpO2 98%  BMI 23.77 kg/m2  Body mass index is 23.77  kg/(m^2).    Exam:   GENERAL APPEARANCE: Well developed, well nourished, alert, and in no apparent distress.  EYES: PERRL, EOMI  HENT: Nasal mucosa with mild edema and no hyperemia. No nasal polyps.  EARS: Canals clear, TMs normal.   MOUTH: Oral mucosa is moist, without any lesions, no tonsillar enlargement, no oropharyngeal exudate.  NECK: supple, no masses, no thyromegaly.  LYMPHATICS: No significant axillary, cervical, or supraclavicular nodes.  RESP: mild pectus excavatum, normal percussion, good air flow throughout.  No crackles. No rhonchi. No wheezes.  CV: Normal S1, S2, regular rhythm, normal rate. No murmur.  No rub. No gallop. No LE edema.   ABDOMEN:  Bowel sounds normal, soft, nontender, no HSM or masses.    MS: extremities normal. No clubbing. No cyanosis.  SKIN: no rash on limited exam  NEURO: Mentation intact, speech normal, normal strength and tone, normal gait and stance  PSYCH: mentation appears normal. and affect normal/bright  Results:  Recent Results (from the past 168 hour(s))   General PFT Lab (Please always keep checked)    Collection Time: 06/28/18  3:51 PM   Result Value Ref Range    FVC-Pred 5.79 L    FVC-Pre 5.11 L    FVC-%Pred-Pre 88 %    FEV1-Pre 4.50 L    FEV1-%Pred-Pre 96 %    FEV1FVC-Pred 81 %    FEV1FVC-Pre 88 %    FEFMax-Pred 10.79 L/sec    FEFMax-Pre 11.33 L/sec    FEFMax-%Pred-Pre 104 %    FEF2575-Pred 4.54 L/sec    FEF2575-Pre 5.61 L/sec    KIB2894-%Pred-Pre 123 %    ExpTime-Pre 7.00 sec    FIFMax-Pre 8.06 L/sec    FEV1FEV6-Pred 82 %    FEV1FEV6-Pre 88 %                 Results as noted above.    PFT Interpretation:  Normal spirometry.  Increased from previous.  Similar to recent best.  Valid Maneuver        CF Exacerbation  Absent          Scribe Disclosure:   Akash HUERTA, am serving as a scribe; to document services personally performed by True Sahni MD based on data collection and the provider's statements to me.     Provider Disclosure:  I agree with above  History, Review of Systems, Physical Exam and Plan.  I have reviewed the content of the documentation and have edited it as needed. I have personally performed the services documented here and the documentation accurately represents those services and the decisions I have made.      Electronically signed by:  True Sahni       Respiratory Therapist Note:    Alternative Airway Clearance: Exercise and (rarely) AerobiKa    Other/ Comments: Premedicates with Albuterol MDI prior to both exercise and AerobiKa. Reviewed AerobiKa instruction with patient.      Again, thank you for allowing me to participate in the care of your patient.      Sincerely,    True Sahni MD

## 2018-06-28 NOTE — PATIENT INSTRUCTIONS
Cystic Fibrosis Self-Care Plan       MRN: [id number]   Clinic Date: June 28, 2018   Patient: Philip Delacruz     RECOMMENDATIONS:   Continue exercise.  Reduce MVW vitamin to once daily.  Otherwise continue current medication, nebs and vest therapy.       YOUR GOAL:    CF Nurse Line:  Aura Martins and Jennifer: [phone]   Kristi Ndiaye, RT: [phone]     Nisa Dumont: [phone] or April Cardona, Dieticians: [phone]   Barbara Birmingham, Diabetes Nurse: [phone]      Melissa Calle: [phone] or Yvonne Gupta [phone], Social Workers   [email or web address]    Annual Studies:   IGG   Date Value Ref Range Status   04/06/2018 1140 695 - 1620 mg/dL Final     Insulin   Date Value Ref Range Status   04/06/2018 13.0 3 - 25 mU/L Final     Comment:     Starting 7/13/2017, insulin results will decrease by approximately 37%   compared to insulin results reported in EPIC between (12/16/2016-7/12/2017),   and should be interpreted accordingly. Insulin results reported prior to   12/16/16 are comparable to current insulin results and therefore no adjustment   is needed.       There are no preventive care reminders to display for this patient.      Pulmonary Function Tests  FEV1: amount of air you can blow out in 1 second  FVC: total amount of air you can take in and blow out    Your Goals:         PFT Latest Ref Rng & Units 6/28/2018   FVC L 5.11   FEV1 L 4.50   FVC% % 88   FEV1% % 96          Airway Clearance: The Most Important Way to Keep Your Lungs Healthy  Daily exercise    Good Nutrition Can Improve Lung Function and Overall Health     Take ALL of your vitamins with food     Take 1/2 of your enzymes before EVERY meal/snack and the other 1/2 mid-meal/snack    Wt Readings from Last 3 Encounters:   06/28/18 79.5 kg (175 lb 4.3 oz)   04/06/18 79.2 kg (174 lb 8 oz)   04/06/18 79.2 kg (174 lb 8 oz)       Body mass index is 23.77 kg/(m^2).         National CF Foundation Recommendations for BMI in CF  Adults: Women: at least 22 Men: at least 23        Controlling Blood Sugars Helps Prevent Lung Infections & Improves Nutrition  Test blood sugar:     In the morning before eating (goal is )     2 hours after a meal (goal is less than 150)     When pre-meal glucose is greater than 150 add correction     At bedtime (if less than 100 eat a snack with 15 grams of carbohydrates  Last A1C Results:   Hemoglobin A1C   Date Value Ref Range Status   04/06/2018 4.6 0 - 6.4 % Final     Comment:     Normal <5.7% Prediabetes 5.7-6.4%  Diabetes 6.5% or higher - adopted from ADA   consensus guidelines.           If diabetic, measure A1C every 6 months. Goal: Under 7%    Staying Healthy    Research:  If you are interested in learning about research opportunities or have questions, please contact Olga Meléndez at [phone] or [email or web address].      CF Foundation:  Compass is a personalized resource service to help you with the insurance, financial, legal and other issues you are facing.  It's free, confidential and available to anyone with CF.  Ask your  for more information or contact Compass directly at 922-COMPASS ([phone]) or compass@cff.org, or learn more at cff.org/compass.

## 2018-07-03 LAB
BACTERIA SPEC CULT: NORMAL
SPECIMEN SOURCE: NORMAL

## 2018-07-13 ENCOUNTER — OFFICE VISIT (OUTPATIENT)
Dept: PULMONOLOGY | Facility: CLINIC | Age: 38
End: 2018-07-13
Attending: INTERNAL MEDICINE
Payer: COMMERCIAL

## 2018-07-13 VITALS
HEART RATE: 60 BPM | WEIGHT: 174.9 LBS | SYSTOLIC BLOOD PRESSURE: 117 MMHG | DIASTOLIC BLOOD PRESSURE: 79 MMHG | OXYGEN SATURATION: 98 % | BODY MASS INDEX: 23.69 KG/M2 | RESPIRATION RATE: 18 BRPM | HEIGHT: 72 IN

## 2018-07-13 DIAGNOSIS — K86.81 EXOCRINE PANCREATIC INSUFFICIENCY: Primary | ICD-10-CM

## 2018-07-13 DIAGNOSIS — K63.8219 SMALL INTESTINAL BACTERIAL OVERGROWTH: ICD-10-CM

## 2018-07-13 DIAGNOSIS — E84.0 CYSTIC FIBROSIS WITH PULMONARY MANIFESTATIONS (H): ICD-10-CM

## 2018-07-13 PROCEDURE — G0463 HOSPITAL OUTPT CLINIC VISIT: HCPCS | Mod: ZF

## 2018-07-13 ASSESSMENT — PAIN SCALES - GENERAL: PAINLEVEL: NO PAIN (0)

## 2018-07-13 NOTE — LETTER
7/13/2018       RE: Philip Delacruz  [address] [facility] [address]     Dear Colleague,    Thank you for referring your patient, Philip Delacruz, to the [facility] at [facility]. Please see a copy of my visit note below.    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 Doing well overall, reports having 3-4 soft stools/day w/o blood (baseline). +denies any further fecal mucoid leakage for the last 2-3wks (much improved!). Denies any tenesmus or insecurity passing flatus, no fecal incontinence or new perianal/nocturnal sxs noted. Denies any N/V/F/C/HA/NS or other const/syst/cardiopulmonary sxs, no BRBPR/melena/urinary changes, no unintentional wt loss or appetite/satiety changes. No other bowel/bladder habit changes, no dysphagia/odynophagia. No jaundice/icterus/pruritus, no acholic stools/steatorrhea, no new lumps/bumps, no jt pain/oral ulcer/rash/eye sxs noted.    ROS: 10pt ROS performed and otherwise negative.    PERTINENT PAST MEDICAL/SURGICAL HISTORY:  As noted above.    PERTINENT MEDICATIONS:  - Symdeko  - Creon 12K 8-9tabs/meals, 4-5tabs/snacks  Medications reviewed with patient today, see Medication List/Assessment for details.  No other NSAID/anticoagulation reported by patient.  No other OTC/herbal/supplements reported by patient.    SOCIAL HISTORY: Tobacco: none.    PHYSICAL EXAMINATION:  Vitals reviewed, AFVSS  Wt 174# today (stable)  Gen: aaox3, cooperative,  pleasant, not diaphoretic, nad  HEENT: ncat, neck supple, no clad/sclad, normal op w/o ulcer/exudate, anicteric, mmm  Resp/CV without acute findings, not dyspneic/tachycardic  Abd: +nabs, soft, nt, nd, no peritoneal s/s noted  Ext: no c/c/e  Skin: warm, perfused, no jaundice  Neuro: grossly intact, no asterixis noted    PERTINENT STUDIES:  None today    ASSESSMENT/PLAN:    1. Chronic abdominal bloating/discomfort + variable stool consistencies + intermittent fecal mucoid leakage, excellent interval clinical response further mindful adjustment and adherence to PERT (and some recent additional response to adjunct PO antibiotic exposure) - suspect EPI + SIBO at play with some possible component of P/C therapy effect, continue supportive care as ordered for now  1. 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  - Continue Creon 12K 8-9tabs/meals, 4-5tabs/snacks as ordered for now. I'm very proud of your clinical response thus far, particularly given the interval reduction in bloating/discomfort and loose stools, as well as the marked reduction in fecal leakage (no such symptoms in the last 2-3 weeks).  - Cannot rule out some contribution of Symdeko to symptom set (common), not intolerable at present and would not discontinue therapy if otherwise deriving clinical response.  - Recommend transition to daily powdered fiber supplementation with Benefiber or Citrucel as the first step to help improve your stool consistency, particularly as these  formulations are generally very well-tolerated in terms of bloating. Use this consistently for at least 2-3 months daily for best effect, particularly as this does take some time to work.  - No immediate indication for Imodium to treat the fecal mucoid leakage for now, particularly given the clinical response to the above measures.  - No immediate need for diagnostic Colonoscopy at this time given the clinical improvement, will readdress in your ongoing care depending on clinical progress. Recommend starting routine screening at age 40 unless symptoms sooner.     2. Can readdress role for empiric antibiotic therapy for SIBO depending on your ongoing clinical progress, will hold off for now given the interval improvement with the above measures.  - Discussed potential antibiotic options and parameters to  improvement (and role for cycled therapy) today.     3. Continue follow-up with CF Nutrition and your primary CF physician to help further dial in your PERT dosage, excellent response/progress thus far!  - Can readdress role for transition to higher-dose formulation (simplified regimen) once your average daily dosage has been further titrated.    2. Cancer Screening  No high-risk FH CRC (MGGF w/ CRC), recommend starting routine CRC screening at age 40 unless symptomatic sooner.    RTC as needed.     Thank you for this consultation. It was a pleasure to participate in the care of this patient; please contact us with any further questions.     Eliu Watts MD   of Medicine  [facility] - Department of Medicine  Division of Gastroenterology

## 2018-07-13 NOTE — NURSING NOTE
Chief Complaint   Patient presents with     Cystic Fibrosis     Follow up on Shar and his CF related GI issues     Boaz Adams CMA at 7:46 AM on 7/13/2018

## 2018-07-13 NOTE — PATIENT INSTRUCTIONS
I've included a brief summary of our discussion and care plan from today's visit below.  Please review this information with your primary care provider.  _______________________________________________________________________      1. 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  - Continue Creon 12K 8-9tabs/meals, 4-5tabs/snacks as ordered for now. I'm very proud of your clinical response thus far, particularly given the interval reduction in bloating/discomfort and loose stools, as well as the marked reduction in fecal leakage (no such symptoms in the last 2-3 weeks).  - Cannot rule out some contribution of Symdeko to symptom set (common), not intolerable at present and would not discontinue therapy if otherwise deriving clinical response.  - Recommend transition to daily powdered fiber supplementation with Benefiber or Citrucel as the first step to help improve your stool consistency, particularly as these formulations are generally very well-tolerated in terms of bloating. Use this consistently for at least 2-3 months daily for best effect, particularly as this does take some time to work.  - No immediate indication for Imodium to treat the fecal mucoid leakage for now, particularly given the clinical response to the above measures.  - No immediate need for diagnostic Colonoscopy at this time given the clinical improvement, will readdress in your ongoing care depending on clinical progress. Recommend starting routine screening at age  40 unless symptoms sooner.     2. Can readdress role for empiric antibiotic therapy for SIBO depending on your ongoing clinical progress, will hold off for now given the interval improvement with the above measures.  - Discussed potential antibiotic options and parameters to  improvement (and role for cycled therapy) today.     3. Continue follow-up with CF Nutrition and your primary CF physician to help further dial in your PERT dosage, excellent response/progress thus far!  - Can readdress role for transition to higher-dose formulation (simplified regimen) once your average daily dosage has been further titrated.    4. Return to GI Clinic with me as needed.   - If you are unable to schedule this follow-up appointment today, please contact our  at [phone] within the next week to help set up this necessary appointment.    _______________________________________________________________________    It was a pleasure seeing you in clinic today - please be in touch if there are any further questions that arise following today's visit.  During business hours, you may reach Clinic Nurse Triage Line at [phone].  For urgent/emergent questions after business hours, you may reach the on-call GI Fellow by contacting the [facility]  at [phone].    Any benign/non-urgent test results are usually communicated via letter or thinkingphoneshart message within 1-2 weeks after completion.  Urgent results (those that require a change in the previously-discussed care plan) are usually communicated via a phone call once available from our clinic staff to discuss the results and the next steps in your evaluation.    I recommend signing up for Abzenat access if you have not already done so and are comfortable with using a computer.  This allows for online access to your lab results and also helps you communicate efficiently with my clinic should any questions arise in your care.    We have Financial  Counseling services available through our clinic.  If you have questions about your insurance coverage or payment responsibilities, particularly before you undergo any tests or procedures, please let us know and we can arrange a consultation accordingly to help you make informed decisions about your healthcare.    Sincerely,    Eliu Watts MD    [facility] - Department of Medicine  Division of Gastroenterology

## 2018-07-13 NOTE — MR AVS SNAPSHOT
After Visit Summary   7/13/2018    Philip Delacruz    MRN: [id number]           Patient Information     Date Of Birth          1980        Visit Information        Provider Department      7/13/2018 8:00 AM Eliu Watts MD [facility] for Lung Science and Health        Care Instructions    I've included a brief summary of our discussion and care plan from today's visit below.  Please review this information with your primary care provider.  _______________________________________________________________________      1. 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  - Continue Creon 12K 8-9tabs/meals, 4-5tabs/snacks as ordered for now. I'm very proud of your clinical response thus far, particularly given the interval reduction in bloating/discomfort and loose stools, as well as the marked reduction in fecal leakage (no such symptoms in the last 2-3 weeks).  - Cannot rule out some contribution of Symdeko to symptom set (common), not intolerable at present and would not discontinue therapy if otherwise deriving clinical response.  - Recommend transition to daily powdered fiber supplementation with Benefiber or Citrucel as the first step to help improve your stool consistency, particularly as these formulations are generally very well-tolerated in terms of bloating. Use this consistently for at least 2-3 months daily for best effect, particularly as this does take some time to work.  - No immediate indication for  Imodium to treat the fecal mucoid leakage for now, particularly given the clinical response to the above measures.  - No immediate need for diagnostic Colonoscopy at this time given the clinical improvement, will readdress in your ongoing care depending on clinical progress. Recommend starting routine screening at age 40 unless symptoms sooner.     2. Can readdress role for empiric antibiotic therapy for SIBO depending on your ongoing clinical progress, will hold off for now given the interval improvement with the above measures.  - Discussed potential antibiotic options and parameters to  improvement (and role for cycled therapy) today.     3. Continue follow-up with CF Nutrition and your primary CF physician to help further dial in your PERT dosage, excellent response/progress thus far!  - Can readdress role for transition to higher-dose formulation (simplified regimen) once your average daily dosage has been further titrated.    4. Return to GI Clinic with me as needed.   - If you are unable to schedule this follow-up appointment today, please contact our  at [phone] within the next week to help set up this necessary appointment.    _______________________________________________________________________    It was a pleasure seeing you in clinic today - please be in touch if there are any further questions that arise following today's visit.  During business hours, you may reach Clinic Nurse Triage Line at [phone].  For urgent/emergent questions after business hours, you may reach the on-call GI Fellow by contacting the [facility]  at [phone].    Any benign/non-urgent test results are usually communicated via letter or MyChart message within 1-2 weeks after completion.  Urgent results (those that require a change in the previously-discussed care plan) are usually communicated via a phone call once available from our clinic staff to discuss the results and the  next steps in your evaluation.    I recommend signing up for Showkickerhart access if you have not already done so and are comfortable with using a computer.  This allows for online access to your lab results and also helps you communicate efficiently with my clinic should any questions arise in your care.    We have Financial Counseling services available through our clinic.  If you have questions about your insurance coverage or payment responsibilities, particularly before you undergo any tests or procedures, please let us know and we can arrange a consultation accordingly to help you make informed decisions about your healthcare.    Sincerely,    Eliu Watts MD    [facility] - Department of Medicine  Division of Gastroenterology            Follow-ups after your visit        Follow-up notes from your care team     Return if symptoms worsen or fail to improve.      Your next 10 appointments already scheduled     Oct 04, 2018  3:15 PM CDT   Lab with  LAB   [facility] ([facility])    [address]  1st Floor  [facility] [id number] [phone]            Oct 04, 2018  3:30 PM CDT   CF LOOP with  PFL CF   [facility] Pulmonary Function Testing ([facility])    [address]  3rd Floor  [facility] [address] [phone]            Oct 04, 2018  4:10 PM CDT   (Arrive by 3:55 PM)   RETURN CYSTIC FIBROSIS VISIT with True Sahni MD   [facility] ([facility])    [address] [place] [address]  [address] [place] [address] [phone]              Who to contact     If you have questions or need follow up information about today's clinic visit or your schedule please contact [facility] directly at [phone].  Normal or non-critical lab and imaging results will be communicated to you by MyChart, letter or phone  within 4 business days after the clinic has received the results. If you do not hear from us within 7 days, please contact the clinic through CGTrader or phone. If you have a critical or abnormal lab result, we will notify you by phone as soon as possible.  Submit refill requests through [employer] or call your pharmacy and they will forward the refill request to us. Please allow 3 business days for your refill to be completed.          Additional Information About Your Visit        [employer] (formerly [employer] Notify) Information     [employer] gives you secure access to your electronic health record. If you see a primary care provider, you can also send messages to your care team and make appointments. If you have questions, please call your primary care clinic.  If you do not have a primary care provider, please call [phone] and they will assist you.        Care EveryWhere ID     This is your Care EveryWhere ID. This could be used by other organizations to access your [place] medical records  [id number]        Your Vitals Were     Pulse Respirations Height Pulse Oximetry BMI (Body Mass Index)       60 18 1.829 m (6') 98% 23.72 kg/m2        Blood Pressure from Last 3 Encounters:   07/13/18 117/79   06/28/18 122/80   04/06/18 132/85    Weight from Last 3 Encounters:   07/13/18 79.3 kg (174 lb 14.4 oz)   06/28/18 79.5 kg (175 lb 4.3 oz)   04/06/18 79.2 kg (174 lb 8 oz)              Today, you had the following     No orders found for display       Primary Care Provider Fax #    [facility] [phone] [address]  [facility] [address]        Equal Access to Services     [facility]: Hadii aad ku hadasho Soomaali, waaxda luqadaha, qaybta kaalmada merrill gore . So [facility] [phone].    ATENCIÓN: Si habla español, tiene a [place] disposición servicios gratuitos de asistencia lingüística. Miguel al [phone].    We comply with applicable federal civil rights laws and [place]  laws. We do not discriminate on the basis of race, color, national origin, age, disability, sex, sexual orientation, or gender identity.            Thank you!     Thank you for choosing [facility] FOR LUNG SCIENCE AND HEALTH  for your care. Our goal is always to provide you with excellent care. Hearing back from our patients is one way we can continue to improve our services. Please take a few minutes to complete the written survey that you may receive in the mail after your visit with us. Thank you!             Your Updated Medication List - Protect others around you: Learn how to safely use, store and throw away your medicines at www.disposemymeds.org.          This list is accurate as of 7/13/18  8:19 AM.  Always use your most recent med list.                   Brand Name Dispense Instructions for use Diagnosis    ACYCLOVIR PO      Take 400 mg by mouth daily        albuterol 108 (90 Base) MCG/ACT Inhaler    PROAIR HFA/PROVENTIL HFA/VENTOLIN HFA    1 Inhaler    Inhale 2 puffs into the lungs every 6 hours as needed for shortness of breath / dyspnea or wheezing    Cystic fibrosis with pulmonary manifestations (H)       CREON 04053 units Cpep   Generic drug:  amylase-lipase-protease     1200 capsule    Take 8-9 capsules with meals and and 4-5 capsules with meals for 3 meals and 3 snacks per day    Pancreatic insufficiency       FISH OIL ULTRA 1400 MG Caps      Take 1 capsule by mouth daily        FLORAJEN BIFIDOBLEND Caps      Take 1 capsule by mouth daily        fluticasone 50 MCG/ACT spray    FLONASE    1 Bottle    Spray 2 sprays into both nostrils daily    Cystic fibrosis with pulmonary manifestations (H)       gatifloxacin 0.5 % ophthalmic solution    ZYMAXID     INT 1 GTT IN OD QID        gentamicin 0.008% in 1000ml 0.9% NaCl Soln nasal irrigation    GARAMYCIN    1000 mL    Gentamicin 80 mg capsule - Dissolve contents of 1 capsule to 240 mL of saline and irrigate each nostril with 120 mL once daily for 30  "days\".    Chronic pansinusitis       loratadine 10 MG tablet    CLARITIN     Take 10 mg by mouth daily        multivitamin CF formula chewable tablet      Take 1 tablet by mouth daily        order for DME     1 each    Use Wendy Vios nebulizer for nasal/sinus nebulizing as instructed    Chronic pansinusitis, CF (cystic fibrosis) (H)       oseltamivir 75 MG capsule    TAMIFLU    10 capsule    Take 1 capsule (75 mg) by mouth 2 times daily The patient will call and  the medication if needed.    Cystic fibrosis with pulmonary manifestations (H)       psyllium 58.6 % Powd    METAMUCIL     Take 1 Tablespoonful by mouth daily        SINUFLO READYRINSE Kit      Daily PRN        tezacaftor-ivacaftor & ivacaftor 100-150 & 150 MG tablet pack    SYMDEKO     Take 1 tablet by mouth 2 times daily Take 1 tablet (yellow) by mouth every morning and 1 tablet (light blue) in the evening.  Swallow whole and take with fat-containing food.        ZIRGAN 0.15 % Gel   Generic drug:  ganciclovir             "

## 2018-07-19 DIAGNOSIS — E84.0 CYSTIC FIBROSIS WITH PULMONARY MANIFESTATIONS (H): ICD-10-CM

## 2018-07-19 RX ORDER — FLUTICASONE PROPIONATE 50 MCG
2 SPRAY, SUSPENSION (ML) NASAL DAILY
Qty: 1 BOTTLE | Refills: 3 | Status: SHIPPED | OUTPATIENT
Start: 2018-07-19 | End: 2018-11-19

## 2018-08-23 DIAGNOSIS — E84.0 CYSTIC FIBROSIS WITH PULMONARY MANIFESTATIONS (H): ICD-10-CM

## 2018-08-23 RX ORDER — ALBUTEROL SULFATE 90 UG/1
2 AEROSOL, METERED RESPIRATORY (INHALATION) EVERY 6 HOURS PRN
Qty: 1 INHALER | Refills: 11 | Status: SHIPPED | OUTPATIENT
Start: 2018-08-23 | End: 2022-10-27

## 2018-11-19 DIAGNOSIS — E84.0 CYSTIC FIBROSIS WITH PULMONARY MANIFESTATIONS (H): ICD-10-CM

## 2018-11-19 RX ORDER — FLUTICASONE PROPIONATE 50 MCG
2 SPRAY, SUSPENSION (ML) NASAL DAILY
Qty: 1 BOTTLE | Refills: 3 | Status: SHIPPED | OUTPATIENT
Start: 2018-11-19 | End: 2019-07-28

## 2018-11-23 ENCOUNTER — OFFICE VISIT (OUTPATIENT)
Dept: PULMONOLOGY | Facility: CLINIC | Age: 38
End: 2018-11-23
Attending: INTERNAL MEDICINE
Payer: COMMERCIAL

## 2018-11-23 VITALS
DIASTOLIC BLOOD PRESSURE: 77 MMHG | RESPIRATION RATE: 16 BRPM | HEIGHT: 72 IN | SYSTOLIC BLOOD PRESSURE: 124 MMHG | HEART RATE: 81 BPM | WEIGHT: 176.37 LBS | BODY MASS INDEX: 23.89 KG/M2 | OXYGEN SATURATION: 96 %

## 2018-11-23 DIAGNOSIS — J32.4 CHRONIC PANSINUSITIS: ICD-10-CM

## 2018-11-23 DIAGNOSIS — E84.0 CYSTIC FIBROSIS WITH PULMONARY MANIFESTATIONS (H): Primary | ICD-10-CM

## 2018-11-23 DIAGNOSIS — E84.0 CYSTIC FIBROSIS WITH PULMONARY MANIFESTATIONS (H): ICD-10-CM

## 2018-11-23 DIAGNOSIS — K86.89 PANCREATIC INSUFFICIENCY: ICD-10-CM

## 2018-11-23 DIAGNOSIS — K86.81 EXOCRINE PANCREATIC INSUFFICIENCY: ICD-10-CM

## 2018-11-23 LAB
ALBUMIN SERPL-MCNC: 4 G/DL (ref 3.4–5)
ALP SERPL-CCNC: 94 U/L (ref 40–150)
ALT SERPL W P-5'-P-CCNC: 30 U/L (ref 0–70)
AST SERPL W P-5'-P-CCNC: 24 U/L (ref 0–45)
BILIRUB DIRECT SERPL-MCNC: 0.2 MG/DL (ref 0–0.2)
BILIRUB SERPL-MCNC: 0.6 MG/DL (ref 0.2–1.3)
EXPTIME-PRE: 6.35 SEC
FEF2575-%PRED-PRE: 116 %
FEF2575-PRE: 5.25 L/SEC
FEF2575-PRED: 4.52 L/SEC
FEFMAX-%PRED-PRE: 101 %
FEFMAX-PRE: 10.93 L/SEC
FEFMAX-PRED: 10.79 L/SEC
FEV1-%PRED-PRE: 95 %
FEV1-PRE: 4.43 L
FEV1FEV6-PRE: 87 %
FEV1FEV6-PRED: 82 %
FEV1FVC-PRE: 87 %
FEV1FVC-PRED: 81 %
FIFMAX-PRE: 9.23 L/SEC
FVC-%PRED-PRE: 88 %
FVC-PRE: 5.12 L
FVC-PRED: 5.78 L
PROT SERPL-MCNC: 7.8 G/DL (ref 6.8–8.8)

## 2018-11-23 PROCEDURE — 25000128 H RX IP 250 OP 636: Mod: ZF | Performed by: INTERNAL MEDICINE

## 2018-11-23 PROCEDURE — 36415 COLL VENOUS BLD VENIPUNCTURE: CPT | Performed by: INTERNAL MEDICINE

## 2018-11-23 PROCEDURE — G0008 ADMIN INFLUENZA VIRUS VAC: HCPCS | Mod: ZF

## 2018-11-23 PROCEDURE — 87070 CULTURE OTHR SPECIMN AEROBIC: CPT | Performed by: INTERNAL MEDICINE

## 2018-11-23 PROCEDURE — 80076 HEPATIC FUNCTION PANEL: CPT | Performed by: INTERNAL MEDICINE

## 2018-11-23 PROCEDURE — 90686 IIV4 VACC NO PRSV 0.5 ML IM: CPT | Mod: ZF | Performed by: INTERNAL MEDICINE

## 2018-11-23 PROCEDURE — G0463 HOSPITAL OUTPT CLINIC VISIT: HCPCS | Mod: 25

## 2018-11-23 RX ORDER — PANCRELIPASE 24000; 76000; 120000 [USP'U]/1; [USP'U]/1; [USP'U]/1
4-5 CAPSULE, DELAYED RELEASE PELLETS ORAL
Qty: 750 CAPSULE | Refills: 11 | Status: SHIPPED | OUTPATIENT
Start: 2018-11-23 | End: 2019-11-29

## 2018-11-23 RX ORDER — OSELTAMIVIR PHOSPHATE 75 MG/1
75 CAPSULE ORAL 2 TIMES DAILY
Qty: 10 CAPSULE | Refills: 1 | Status: SHIPPED | OUTPATIENT
Start: 2018-11-23 | End: 2019-12-26

## 2018-11-23 RX ADMIN — INFLUENZA A VIRUS A/MICHIGAN/45/2015 X-275 (H1N1) ANTIGEN (FORMALDEHYDE INACTIVATED), INFLUENZA A VIRUS A/SINGAPORE/INFIMH-16-0019/2016 IVR-186 (H3N2) ANTIGEN (FORMALDEHYDE INACTIVATED), INFLUENZA B VIRUS B/PHUKET/3073/2013 ANTIGEN (FORMALDEHYDE INACTIVATED), AND INFLUENZA B VIRUS B/MARYLAND/15/2016 BX-69A ANTIGEN (FORMALDEHYDE INACTIVATED) 0.5 ML: 15; 15; 15; 15 INJECTION, SUSPENSION INTRAMUSCULAR at 10:27

## 2018-11-23 ASSESSMENT — PAIN SCALES - GENERAL: PAINLEVEL: NO PAIN (0)

## 2018-11-23 NOTE — MR AVS SNAPSHOT
After Visit Summary   11/23/2018    Philip Delacruz    MRN: [id number]           Patient Information     Date Of Birth          1980        Visit Information        Provider Department      11/23/2018 9:20 AM True Sahni MD [facility]        Today's Diagnoses     Cystic fibrosis with pulmonary manifestations    -  1    Cystic fibrosis with pulmonary manifestations (H)        Chronic pansinusitis        Pancreatic insufficiency        Exocrine pancreatic insufficiency          Care Instructions    Cystic Fibrosis Self-Care Plan    RECOMMENDATIONS:   Sports medicine visit for your elbow.  Increase your water intake.  Restart exercise when you can.  Melissa will call you about counseling.  Change to Creon 24 4-5 capsules with meals and 2-3 capsules with snacks.  Otherwise continue current medication.          CF Nurse line:          Love Martins   [phone]            CF Resp Therapist: Kristi Ndiaye            [phone]   CF Dietitians:           Nisa Dumont            [phone]                       April Cardona                       [phone]   CF Diabetes Nurse: Barbara Birmingham             [phone]    CF Social Workers:  Melissa Calle             [phone]                Yvonne Gupta            [phone]  CF Pharmacist:        Amanda Gonzalez                              [phone]  [email or web address]         MRN: [id number]   Clinic Date: November 23, 2018   Patient: Philip Delacruz     Annual Studies:   IGG   Date Value Ref Range Status   04/06/2018 1140 695 - 1620 mg/dL Final     Insulin   Date Value Ref Range Status   04/06/2018 13.0 3 - 25 mU/L Final     Comment:     Starting 7/13/2017, insulin results will decrease by approximately 37%   compared to insulin results reported in EPIC between (12/16/2016-7/12/2017),   and should be interpreted accordingly. Insulin results reported prior to   12/16/16 are  comparable to current insulin results and therefore no adjustment   is needed.       There are no preventive care reminders to display for this patient.      Pulmonary Function Tests  FEV1: amount of air you can blow out in 1 second  FVC: total amount of air you can take in and blow out    Your Goals:         PFT Latest Ref Rng & Units 11/23/2018   FVC L 5.12   FEV1 L 4.43   FVC% % 88   FEV1% % 95          Airway Clearance: The Most Important Way to Keep Your Lungs Healthy  EXERCISE!!    Good Nutrition Can Improve Lung Function and Overall Health     Take ALL of your vitamins with food     Take 1/2 of your enzymes before EVERY meal/snack and the other 1/2 mid-meal/snack    Wt Readings from Last 3 Encounters:   11/23/18 80 kg (176 lb 5.9 oz)   07/13/18 79.3 kg (174 lb 14.4 oz)   06/28/18 79.5 kg (175 lb 4.3 oz)       Body mass index is 23.91 kg/(m^2).         National CF Foundation Recommendations for BMI in CF Adults: Women: at least 22 Men: at least 23        Controlling Blood Sugars Helps Prevent Lung Infections & Improves Nutrition  Test blood sugar:     In the morning before eating (goal is )     2 hours after a meal (goal is less than 150)     When pre-meal glucose is greater than 150 add correction     At bedtime (if less than 100 eat a snack with 15 grams of carbohydrates  Last A1C Results:   Hemoglobin A1C   Date Value Ref Range Status   04/06/2018 4.6 0 - 6.4 % Final     Comment:     Normal <5.7% Prediabetes 5.7-6.4%  Diabetes 6.5% or higher - adopted from ADA   consensus guidelines.           If diabetic, measure A1C every 6 months. Goal: Under 7%    Staying Healthy    Research:  If you are interested in learning about research opportunities or have questions, please contact the CF Research Team at [phone] or [email or web address].      CF Foundation:  Compass is a personalized resource service to help you with the insurance, financial, legal and other issues you are facing.  It's free, confidential  and available to anyone with CF.  Ask your  for more information or contact Compass directly at [facility] ([phone]) or compass@cff.org, or learn more at cff.org/compass.                             Follow-ups after your visit        Follow-up notes from your care team     Return in about 12 weeks (around 2/15/2019).      Your next 10 appointments already scheduled     Feb 28, 2019  3:15 PM CST   Lab with  LAB   [facility] ([facility])    [address]  1st Floor  [facility] [id number] [phone]            Feb 28, 2019  3:30 PM CST   CF LOOP with  PFL CF   [facility] Pulmonary Function Testing ([facility])    [address]  3rd Floor  [facility] [address] [phone]            Feb 28, 2019  4:10 PM CST   (Arrive by 3:55 PM)   RETURN CYSTIC FIBROSIS VISIT with True Sahni MD   [facility] ([facility])    [address] [place] [address]  [address]  [facility] [address] [phone]              Future tests that were ordered for you today     Open Future Orders        Priority Expected Expires Ordered    Cystic Fibrosis Culture Aerob Bacterial Routine 2/15/2019 3/15/2019 11/23/2018    Spirometry, Breathing Capacity Routine 2/15/2019 3/15/2019 11/23/2018    Hepatic panel Routine 2/15/2019 3/15/2019 11/23/2018            Who to contact     If you have questions or need follow up information about today's clinic visit or your schedule please contact [facility] directly at [phone].  Normal or non-critical lab and imaging results will be communicated to you by MyChart, letter or phone within 4 business days after the clinic has received the results. If you do not hear from us within 7 days, please contact the clinic through MyChart or phone. If you have a critical or abnormal lab result, we will notify you by phone as soon  "as possible.  Submit refill requests through [employer] or call your pharmacy and they will forward the refill request to us. Please allow 3 business days for your refill to be completed.          Additional Information About Your Visit        [employer] Information     [employer] gives you secure access to your electronic health record. If you see a primary care provider, you can also send messages to your care team and make appointments. If you have questions, please call your primary care clinic.  If you do not have a primary care provider, please call [phone] and they will assist you.        Care EveryWhere ID     This is your Care EveryWhere ID. This could be used by other organizations to access your [facility] medical records  [id number]        Your Vitals Were     Pulse Respirations Height Pulse Oximetry BMI (Body Mass Index)       81 16 1.829 m (6' 0.01\") 96% 23.91 kg/m2        Blood Pressure from Last 3 Encounters:   11/23/18 124/77   07/13/18 117/79   06/28/18 122/80    Weight from Last 3 Encounters:   11/23/18 80 kg (176 lb 5.9 oz)   07/13/18 79.3 kg (174 lb 14.4 oz)   06/28/18 79.5 kg (175 lb 4.3 oz)              We Performed the Following     Cystic Fibrosis Culture Aerob Bacterial          Today's Medication Changes          These changes are accurate as of 11/23/18 12:35 PM.  If you have any questions, ask your nurse or doctor.               These medicines have changed or have updated prescriptions.        Dose/Directions    * CREON 84349 units Cpep   This may have changed:  Another medication with the same name was added. Make sure you understand how and when to take each.   Used for:  Pancreatic insufficiency   Generic drug:  amylase-lipase-protease   Changed by:  True Sahni MD        Take 8-9 capsules with meals and and 4-5 capsules with meals for 3 meals and 3 snacks per day   Quantity:  1200 capsule   Refills:  11       * CREON [address]9835-44241 units Cpep per EC capsule   This may have " changed:  You were already taking a medication with the same name, and this prescription was added. Make sure you understand how and when to take each.   Used for:  Exocrine pancreatic insufficiency   Generic drug:  amylase-lipase-protease   Changed by:  True Sahni MD        Dose:  4-5 capsule   Take 4-5 capsules (96,000-120,000 Units) by mouth 3 times daily (with meals) And 2-3 with snacks. Maximum of 25 capsules per day.   Quantity:  750 capsule   Refills:  11       Fish Oil 1200 MG Cpdr   This may have changed:  Another medication with the same name was removed. Continue taking this medication, and follow the directions you see here.   Changed by:  True Sahni MD        Dose:  2 capsule   Take 2 capsules by mouth daily   Refills:  0       FLORAJEN BIFIDOBLEND Caps   This may have changed:  when to take this   Changed by:  True Sahni MD        Dose:  1 capsule   Take 1 capsule by mouth 2 times daily   Refills:  0       * Notice:  This list has 2 medication(s) that are the same as other medications prescribed for you. Read the directions carefully, and ask your doctor or other care provider to review them with you.      Stop taking these medicines if you haven't already. Please contact your care team if you have questions.     gatifloxacin 0.5 % ophthalmic solution   Commonly known as:  ZYMAXID   Stopped by:  True Sahni MD           loratadine 10 MG tablet   Commonly known as:  CLARITIN   Stopped by:  True Sahni MD           ZIRGAN 0.15 % Gel   Generic drug:  ganciclovir   Stopped by:  True Sahni MD                Where to get your medicines      These medications were sent to [facility] [address] [place] [address] [place], [place] - [address] AT [facility] OF  [address]  [address] [facility] [place] [address]     Phone:  [phone]     CREON 20167-[address]484 units Cpep per EC capsule    oseltamivir 75 MG capsule                Primary  Care Provider Fax #    [facility] [phone] [address]  [facility] [address]        Equal Access to Services     MINA WEBER : Hadii aad ku hadjayda Dominguez, wamercyda suzanjr, yamil kasomda bao, merrill maldonadomegha luis. So [facility] [phone].    ATENCIÓN: Si habla español, tiene a sutherland disposición servicios gratuitos de asistencia lingüística. Llame al [phone].    We comply with applicable federal civil rights laws and [place] laws. We do not discriminate on the basis of race, color, national origin, age, disability, sex, sexual orientation, or gender identity.            Thank you!     Thank you for choosing [facility]  for your care. Our goal is always to provide you with excellent care. Hearing back from our patients is one way we can continue to improve our services. Please take a few minutes to complete the written survey that you may receive in the mail after your visit with us. Thank you!             Your Updated Medication List - Protect others around you: Learn how to safely use, store and throw away your medicines at www.disposemymeds.org.          This list is accurate as of 11/23/18 12:35 PM.  Always use your most recent med list.                   Brand Name Dispense Instructions for use Diagnosis    ACYCLOVIR PO      Take 400 mg by mouth daily        albuterol 108 (90 Base) MCG/ACT inhaler    PROAIR HFA/PROVENTIL HFA/VENTOLIN HFA    1 Inhaler    Inhale 2 puffs into the lungs every 6 hours as needed for shortness of breath / dyspnea or wheezing    Cystic fibrosis with pulmonary manifestations (H)       * CREON 09724 units Cpep   Generic drug:  amylase-lipase-protease     1200 capsule    Take 8-9 capsules with meals and and 4-5 capsules with meals for 3 meals and 3 snacks per day    Pancreatic insufficiency       * CREON [address]9529-17758 units Cpep per EC capsule   Generic drug:  amylase-lipase-protease     750  "capsule    Take 4-5 capsules (96,000-120,000 Units) by mouth 3 times daily (with meals) And 2-3 with snacks. Maximum of 25 capsules per day.    Exocrine pancreatic insufficiency       Fish Oil 1200 MG Cpdr      Take 2 capsules by mouth daily        FLORAJEN BIFIDOBLEND Caps      Take 1 capsule by mouth 2 times daily        fluticasone 50 MCG/ACT spray    FLONASE    1 Bottle    Spray 2 sprays into both nostrils daily    Cystic fibrosis with pulmonary manifestations (H)       gentamicin 0.008% in 1000ml 0.9% NaCl Soln nasal irrigation    GARAMYCIN    1000 mL    Gentamicin 80 mg capsule - Dissolve contents of 1 capsule to 240 mL of saline and irrigate each nostril with 120 mL once daily for 30 days\".    Chronic pansinusitis       multivitamin CF formula chewable tablet      Take 1 tablet by mouth daily        order for DME     1 each    Use Wendy Vios nebulizer for nasal/sinus nebulizing as instructed    Chronic pansinusitis, CF (cystic fibrosis) (H)       oseltamivir 75 MG capsule    TAMIFLU    10 capsule    Take 1 capsule (75 mg) by mouth 2 times daily The patient will call and  the medication if needed.    Cystic fibrosis with pulmonary manifestations (H)       psyllium 58.6 % Powd    METAMUCIL     Take 1 Tablespoonful by mouth daily        SINUFLO READYRINSE Kit      Daily PRN        tezacaftor-ivacaftor & ivacaftor 100-150 & 150 MG tablet pack    SYMDEKO     Take 1 tablet by mouth 2 times daily Take 1 tablet (yellow) by mouth every morning and 1 tablet (light blue) in the evening.  Swallow whole and take with fat-containing food.        * Notice:  This list has 2 medication(s) that are the same as other medications prescribed for you. Read the directions carefully, and ask your doctor or other care provider to review them with you.      "

## 2018-11-23 NOTE — PROGRESS NOTES
Reason for Visit  Philip Delacruz is a 38 year old year old male who is being seen for RECHECK (Cystic Fibrosis )      Assessment and plan:   Philip Delacruz is a 38-year-old male with cystic fibrosis with mild lung disease and pancreatic insufficiency.    Pulmonary: The patient reports excellent exercise tolerance.  He is oxygenating well.  PFTs are in a range similar to his baseline.  He did have a recent cold but it appears that it has not precipitated a pulmonary exacerbation.  I have encouraged him to reinitiate exercise as this is his main form of airway clearance.  He does not appear to be having an exacerbation at this time.    CF-related sinusitis: The patient had a recent cold but appears to have responded well to Augmentin.  I do not think she requires any additional antibiotics at this time.  He will continue with his antibiotic nasal washes.  He is managed by an ENT outside the [facility] system.    Pancreatic insufficiency: The patient reports symptoms are generally improved with consistent use of pancreatic enzymes.  His current increase in loose stool is likely related to antibiotics.  He will change from Creon 12 to Creon 24 today in an effort to reduce the number of pills he was to take.  He will otherwise continue his current enzymes and vitamins.    Gas and bloating: This has improved previously with the addition of pancreatic enzymes.  He also notes symptomatic improvement with antibiotics suggesting the possibility of small bowel overgrowth syndrome.  If symptoms do not improve with Augmentin, a trial of metronidazole could be considered.    CFTR Modulation: The patient appears to be tolerating Symdeko well.  He rarely has exacerbations.  LFTs were checked today and reviewed, within normal limits.  These will be rechecked with his 3-month follow-up.    Herpes conjunctivitis: Adequately controlled with current daily acyclovir.    Family planning: Not addressed with today's  "visit.    Wrist and elbow pain: The patient was referred to the walk-in sports medicine clinic.    Work stress: I will ask our  to contact the patient to discuss the option of counseling related to his work-related stressors.    Healthcare maintenance: The patient is up-to-date with his annual studies.  He will receive his influenza vaccine today.  I have encouraged him to be more consistent with his water intake.    I will see the patient in follow-up in 3 months with PFTs, sputum culture and LFTs for Symdeko monitoring.    True Sahni   CF HPI  The patient was seen and examined by True Sahni   Philip Delacruz is a 38-year-old male with cystic fibrosis with mild lung disease and pancreatic insufficiency.  The patient recently caught a \"cold\" from his toddler son.  He noted foul tasting mucus and some chest tightness.  He was seen by his primary care physician.  Strep test was negative.  He was started on a 10-day course of Augmentin which will finish in the next couple of days.  He reports that he is generally feeling better.  He still has a slight \"tickle\" in his chest.  Breathing is comfortable at rest.  He ran a 5K yesterday without difficulty.  Currently he has no sputum.  He did have increased cough with his cold but it is improving.  He still coughs when he first lies down.  He denies any hemoptysis.  He denies any chest pain.  He did have fever to 100.9 with his cold but this has since resolved.  No associated chills or night sweats.  He does not do any formal airway clearance therapy.    Review of systems:  Appetite is good.  Sore throat and hoarse voice with his recent cold which has since resolved.  Noted increased gas recently prior to initiating antibiotics.  He has noted that antibiotics typically improve his GI symptoms but thus far it has not been the case.  He was having loose stool earlier in this week but it has normalized.  No abdominal pain.  No nausea or " vomiting.  Left wrist and elbow pain which he attributes to activities of childcare with his toddler son.  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  A complete ROS was otherwise negative except as noted in the HPI.    Current Outpatient Prescriptions   Medication     ACYCLOVIR PO     albuterol (PROAIR HFA/PROVENTIL HFA/VENTOLIN HFA) 108 (90 Base) MCG/ACT inhaler     CREON 34744 UNITS CPEP per EC capsule     CREON 19942-46106 units CPEP per EC capsule     fluticasone (FLONASE) 50 MCG/ACT spray     gentamicin 0.008% in 1000ml 0.9% NaCl (GARAMYCIN) SOLN nasal irrigation     multivitamin CF formula (MVW COMPLETE FORMULATION) chewable tablet     Omega-3 Fatty Acids (FISH OIL) 1200 MG CPDR     order for DME     oseltamivir (TAMIFLU) 75 MG capsule     Probiotic Product (FLORAJEN BIFIDOBLEND) CAPS     psyllium (METAMUCIL) 58.6 % POWD     tezacaftor-ivacaftor & ivacaftor (SYMDEKO) 100-150 & 150 mg tablet pack     Sodium Chloride-Sodium Bicarb (SINUFLO READYRINSE) KIT     [DISCONTINUED] oseltamivir (TAMIFLU) 75 MG capsule     No current facility-administered medications for this visit.      Allergies   Allergen Reactions     Tegaderm Transparent Dressing (Informational Only) Rash     Past Medical History:   Diagnosis Date     Chronic sinusitis      Congenital absence of vas deferens, bilateral      Cystic fibrosis (H)     Delta F508 and [id number]      Nasal polyps      Sinusitis, chronic        Past Surgical History:   Procedure Laterality Date     HC TOOTH EXTRACTION W/FORCEP       NASAL/SINUS POLYPECTOMY       REPAIR PECTUS EXCAVATUM  1997     SINUS SURGERY  1992, 2002, 2004    Removed tubinates and polyps OSH     Sperm harvest         Social History     Social History     Marital status: Single     Spouse name: N/A     Number of children: N/A     Years of education: N/A     Occupational History     Teacher      Social History Main  "Topics     Smoking status: Never Smoker     Smokeless tobacco: Never Used     Alcohol use 0.0 oz/week      Comment: 1 drink 3X per week     Drug use: No     Sexual activity: Yes     Partners: Female     Birth control/ protection: None     Other Topics Concern     Not on file     Social History Narrative    #d .  Lives in [place] with significant other         /77 (BP Location: Right arm, Patient Position: Chair, Cuff Size: Adult Regular)  Pulse 81  Resp 16  Ht 1.829 m (6' 0.01\")  Wt 80 kg (176 lb 5.9 oz)  SpO2 96%  BMI 23.91 kg/m2  Body mass index is 23.91 kg/(m^2).  Exam:   GENERAL APPEARANCE: Well developed, well nourished, alert, and in no apparent distress.  EYES: PERRL, EOMI  HENT: Nasal mucosa with no edema and no hyperemia. No nasal polyps.  EARS: Canals clear, TMs normal  MOUTH: Oral mucosa is moist, without any lesions, no tonsillar enlargement, no oropharyngeal exudate.  NECK: supple, no masses, no thyromegaly.  LYMPHATICS: No significant axillary, cervical, or supraclavicular nodes.  RESP: normal percussion, good air flow throughout.  No crackles. No rhonchi. No wheezes.  CV: Normal S1, S2, regular rhythm, normal rate. No murmur.  No rub. No gallop. No LE edema.   ABDOMEN:  Bowel sounds normal, soft, nontender, no HSM or masses.   MS: extremities normal. No clubbing. No cyanosis.  SKIN: no rash on limited exam  NEURO: Mentation intact, speech normal, normal strength and tone, normal gait and stance  PSYCH: mentation appears normal. and affect normal/bright  Results:  Recent Results (from the past 168 hour(s))   Hepatic panel    Collection Time: 11/23/18  8:45 AM   Result Value Ref Range    Bilirubin Direct 0.2 0.0 - 0.2 mg/dL    Bilirubin Total 0.6 0.2 - 1.3 mg/dL    Albumin 4.0 3.4 - 5.0 g/dL    Protein Total 7.8 6.8 - 8.8 g/dL    Alkaline Phosphatase 94 40 - 150 U/L    ALT 30 0 - 70 U/L    AST 24 0 - 45 U/L   General PFT Lab (Please always keep checked)    Collection Time: " 11/23/18  8:54 AM   Result Value Ref Range    FVC-Pred 5.78 L    FVC-Pre 5.12 L    FVC-%Pred-Pre 88 %    FEV1-Pre 4.43 L    FEV1-%Pred-Pre 95 %    FEV1FVC-Pred 81 %    FEV1FVC-Pre 87 %    FEFMax-Pred 10.79 L/sec    FEFMax-Pre 10.93 L/sec    FEFMax-%Pred-Pre 101 %    FEF2575-Pred 4.52 L/sec    FEF2575-Pre 5.25 L/sec    YNL1119-%Pred-Pre 116 %    ExpTime-Pre 6.35 sec    FIFMax-Pre 9.23 L/sec    FEV1FEV6-Pred 82 %    FEV1FEV6-Pre 87 %   Cystic Fibrosis Culture Aerob Bacterial    Collection Time: 11/23/18  9:00 AM   Result Value Ref Range    Specimen Description Throat     Special Requests Specimen collected in eSwab transport (white cap)     Culture Micro PENDING                              Results as noted above.    PFT Interpretation:  Normal spirometry.  Unchanged from previous.   Similar to recent best.  Valid Maneuver                  CF Exacerbation  Absent

## 2018-11-23 NOTE — NURSING NOTE
Chief Complaint   Patient presents with     RECHECK     Cystic Fibrosis      Patricia Prince CMA

## 2018-11-23 NOTE — LETTER
11/23/2018       RE: Philip Delacruz  [address] [facility] [address]     Dear Colleague,    Thank you for referring your patient, Philip Delacruz, to the [facility] LUNG [facility]ENCE AND HE[facility] at [facility]. Please see a copy of my visit note below.    Reason for Visit  Philip Delacruz is a 38 year old year old male who is being seen for RECHECK (Cystic Fibrosis )      Assessment and plan:   Philip Delacruz is a 38-year-old male with cystic fibrosis with mild lung disease and pancreatic insufficiency.    Pulmonary: The patient reports excellent exercise tolerance.  He is oxygenating well.  PFTs are in a range similar to his baseline.  He did have a recent cold but it appears that it has not precipitated a pulmonary exacerbation.  I have encouraged him to reinitiate exercise as this is his main form of airway clearance.  He does not appear to be having an exacerbation at this time.    CF-related sinusitis: The patient had a recent cold but appears to have responded well to Augmentin.  I do not think she requires any additional antibiotics at this time.  He will continue with his antibiotic nasal washes.  He is managed by an ENT outside the [place] system.    Pancreatic insufficiency: The patient reports symptoms are generally improved with consistent use of pancreatic enzymes.  His current increase in loose stool is likely related to antibiotics.  He will change from Creon 12 to Creon 24 today in an effort to reduce the number of pills he was to take.  He will otherwise continue his current enzymes and vitamins.    Gas and bloating: This has improved previously with the addition of pancreatic enzymes.  He also notes symptomatic improvement with antibiotics suggesting the possibility of small bowel overgrowth syndrome.  If symptoms do not improve with Augmentin, a trial of metronidazole could be considered.    CFTR Modulation: The patient appears  "to be tolerating Symdeko well.  He rarely has exacerbations.  LFTs were checked today and reviewed, within normal limits.  These will be rechecked with his 3-month follow-up.    Herpes conjunctivitis: Adequately controlled with current daily acyclovir.    Family planning: Not addressed with today's visit.    Wrist and elbow pain: The patient was referred to the walk-in sports medicine clinic.    Work stress: I will ask our  to contact the patient to discuss the option of counseling related to his work-related stressors.    Healthcare maintenance: The patient is up-to-date with his annual studies.  He will receive his influenza vaccine today.  I have encouraged him to be more consistent with his water intake.    I will see the patient in follow-up in 3 months with PFTs, sputum culture and LFTs for Symdeko monitoring.    True Sahni   CF HPI  The patient was seen and examined by True Mongebernard Delacruz is a 38-year-old male with cystic fibrosis with mild lung disease and pancreatic insufficiency.  The patient recently caught a \"cold\" from his toddler son.  He noted foul tasting mucus and some chest tightness.  He was seen by his primary care physician.  Strep test was negative.  He was started on a 10-day course of Augmentin which will finish in the next couple of days.  He reports that he is generally feeling better.  He still has a slight \"tickle\" in his chest.  Breathing is comfortable at rest.  He ran a 5K yesterday without difficulty.  Currently he has no sputum.  He did have increased cough with his cold but it is improving.  He still coughs when he first lies down.  He denies any hemoptysis.  He denies any chest pain.  He did have fever to 100.9 with his cold but this has since resolved.  No associated chills or night sweats.  He does not do any formal airway clearance therapy.    Review of systems:  Appetite is good.  Sore throat and hoarse voice with his recent cold " which has since resolved.  Noted increased gas recently prior to initiating antibiotics.  He has noted that antibiotics typically improve his GI symptoms but thus far it has not been the case.  He was having loose stool earlier in this week but it has normalized.  No abdominal pain.  No nausea or vomiting.  Left wrist and elbow pain which he attributes to activities of childcare with his toddler son.  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  A complete ROS was otherwise negative except as noted in the HPI.    Current Outpatient Prescriptions   Medication     ACYCLOVIR PO     albuterol (PROAIR HFA/PROVENTIL HFA/VENTOLIN HFA) 108 (90 Base) MCG/ACT inhaler     CREON 03858 UNITS CPEP per EC capsule     CREON 23172-10366 units CPEP per EC capsule     fluticasone (FLONASE) 50 MCG/ACT spray     gentamicin 0.008% in 1000ml 0.9% NaCl (GARAMYCIN) SOLN nasal irrigation     multivitamin CF formula (MVW COMPLETE FORMULATION) chewable tablet     Omega-3 Fatty Acids (FISH OIL) 1200 MG CPDR     order for DME     oseltamivir (TAMIFLU) 75 MG capsule     Probiotic Product (FLORAJEN BIFIDOBLEND) CAPS     psyllium (METAMUCIL) 58.6 % POWD     tezacaftor-ivacaftor & ivacaftor (SYMDEKO) 100-150 & 150 mg tablet pack     Sodium Chloride-Sodium Bicarb (SINUFLO READYRINSE) KIT     [DISCONTINUED] oseltamivir (TAMIFLU) 75 MG capsule     No current facility-administered medications for this visit.      Allergies   Allergen Reactions     Tegaderm Transparent Dressing (Informational Only) Rash     Past Medical History:   Diagnosis Date     Chronic sinusitis      Congenital absence of vas deferens, bilateral      Cystic fibrosis (H)     Delta F508 and H1569R      Nasal polyps      Sinusitis, chronic        Past Surgical History:   Procedure Laterality Date     HC TOOTH EXTRACTION W/FORCEP       NASAL/SINUS POLYPECTOMY       REPAIR PECTUS EXCAVATUM  1997  "    SINUS SURGERY  1992, 2002, 2004    Removed tubinates and polyps OSH     Sperm harvest         Social History     Social History     Marital status: Single     Spouse name: N/A     Number of children: N/A     Years of education: N/A     Occupational History     Teacher      Social History Main Topics     Smoking status: Never Smoker     Smokeless tobacco: Never Used     Alcohol use 0.0 oz/week      Comment: 1 drink 3X per week     Drug use: No     Sexual activity: Yes     Partners: Female     Birth control/ protection: None     Other Topics Concern     Not on file     Social History Narrative    #d .  Lives in [place] with significant other         /77 (BP Location: Right arm, Patient Position: Chair, Cuff Size: Adult Regular)  Pulse 81  Resp 16  Ht 1.829 m (6' 0.01\")  Wt 80 kg (176 lb 5.9 oz)  SpO2 96%  BMI 23.91 kg/m2  Body mass index is 23.91 kg/(m^2).  Exam:   GENERAL APPEARANCE: Well developed, well nourished, alert, and in no apparent distress.  EYES: PERRL, EOMI  HENT: Nasal mucosa with no edema and no hyperemia. No nasal polyps.  EARS: Canals clear, TMs normal  MOUTH: Oral mucosa is moist, without any lesions, no tonsillar enlargement, no oropharyngeal exudate.  NECK: supple, no masses, no thyromegaly.  LYMPHATICS: No significant axillary, cervical, or supraclavicular nodes.  RESP: normal percussion, good air flow throughout.  No crackles. No rhonchi. No wheezes.  CV: Normal S1, S2, regular rhythm, normal rate. No murmur.  No rub. No gallop. No LE edema.   ABDOMEN:  Bowel sounds normal, soft, nontender, no HSM or masses.   MS: extremities normal. No clubbing. No cyanosis.  SKIN: no rash on limited exam  NEURO: Mentation intact, speech normal, normal strength and tone, normal gait and stance  PSYCH: mentation appears normal. and affect normal/bright  Results:  Recent Results (from the past 168 hour(s))   Hepatic panel    Collection Time: 11/23/18  8:45 AM   Result Value Ref Range "    Bilirubin Direct 0.2 0.0 - 0.2 mg/dL    Bilirubin Total 0.6 0.2 - 1.3 mg/dL    Albumin 4.0 3.4 - 5.0 g/dL    Protein Total 7.8 6.8 - 8.8 g/dL    Alkaline Phosphatase 94 40 - 150 U/L    ALT 30 0 - 70 U/L    AST 24 0 - 45 U/L   General PFT Lab (Please always keep checked)    Collection Time: 11/23/18  8:54 AM   Result Value Ref Range    FVC-Pred 5.78 L    FVC-Pre 5.12 L    FVC-%Pred-Pre 88 %    FEV1-Pre 4.43 L    FEV1-%Pred-Pre 95 %    FEV1FVC-Pred 81 %    FEV1FVC-Pre 87 %    FEFMax-Pred 10.79 L/sec    FEFMax-Pre 10.93 L/sec    FEFMax-%Pred-Pre 101 %    FEF2575-Pred 4.52 L/sec    FEF2575-Pre 5.25 L/sec    XSH0293-%Pred-Pre 116 %    ExpTime-Pre 6.35 sec    FIFMax-Pre 9.23 L/sec    FEV1FEV6-Pred 82 %    FEV1FEV6-Pre 87 %   Cystic Fibrosis Culture Aerob Bacterial    Collection Time: 11/23/18  9:00 AM   Result Value Ref Range    Specimen Description Throat     Special Requests Specimen collected in eSwab transport (white cap)     Culture Micro PENDING      Results as noted above.    PFT Interpretation:  Normal spirometry.  Unchanged from previous.   Similar to recent best.  Valid Maneuver    CF Exacerbation  Absent    Again, thank you for allowing me to participate in the care of your patient.      Sincerely,    True Sahni MD

## 2018-11-23 NOTE — PATIENT INSTRUCTIONS
Cystic Fibrosis Self-Care Plan    RECOMMENDATIONS:   Sports medicine visit for your elbow.  Increase your water intake.  Restart exercise when you can.  Melissa will call you about counseling.  Change to Creon 24 4-5 capsules with meals and 2-3 capsules with snacks.  Otherwise continue current medication.          CF Nurse line:          Lakshmi Love   [phone]            CF Resp Therapist: Kristi Ndiaye            [phone]   CF Dietitians:           Nisa Dumont            [phone]                       April Cardona                       [phone]   CF Diabetes Nurse: Barbara Birmingham             [phone]    CF Social Workers:  Melissa Calle             [phone]                Yvonne Gupta            [phone]  CF Pharmacist:        Amanda Gonzalez                              [phone]  [email or web address]         MRN: [id number]   Clinic Date: November 23, 2018   Patient: Philip Delacruz     Annual Studies:   IGG   Date Value Ref Range Status   04/06/2018 1140 695 - 1620 mg/dL Final     Insulin   Date Value Ref Range Status   04/06/2018 13.0 3 - 25 mU/L Final     Comment:     Starting 7/13/2017, insulin results will decrease by approximately 37%   compared to insulin results reported in EPIC between (12/16/2016-7/12/2017),   and should be interpreted accordingly. Insulin results reported prior to   12/16/16 are comparable to current insulin results and therefore no adjustment   is needed.       There are no preventive care reminders to display for this patient.      Pulmonary Function Tests  FEV1: amount of air you can blow out in 1 second  FVC: total amount of air you can take in and blow out    Your Goals:         PFT Latest Ref Rng & Units 11/23/2018   FVC L 5.12   FEV1 L 4.43   FVC% % 88   FEV1% % 95          Airway Clearance: The Most Important Way to Keep Your Lungs Healthy  EXERCISE!!    Good Nutrition Can Improve Lung Function and Overall Health     Take ALL of your  vitamins with food     Take 1/2 of your enzymes before EVERY meal/snack and the other 1/2 mid-meal/snack    Wt Readings from Last 3 Encounters:   11/23/18 80 kg (176 lb 5.9 oz)   07/13/18 79.3 kg (174 lb 14.4 oz)   06/28/18 79.5 kg (175 lb 4.3 oz)       Body mass index is 23.91 kg/(m^2).         National CF Foundation Recommendations for BMI in CF Adults: Women: at least 22 Men: at least 23        Controlling Blood Sugars Helps Prevent Lung Infections & Improves Nutrition  Test blood sugar:     In the morning before eating (goal is )     2 hours after a meal (goal is less than 150)     When pre-meal glucose is greater than 150 add correction     At bedtime (if less than 100 eat a snack with 15 grams of carbohydrates  Last A1C Results:   Hemoglobin A1C   Date Value Ref Range Status   04/06/2018 4.6 0 - 6.4 % Final     Comment:     Normal <5.7% Prediabetes 5.7-6.4%  Diabetes 6.5% or higher - adopted from ADA   consensus guidelines.           If diabetic, measure A1C every 6 months. Goal: Under 7%    Staying Healthy    Research:  If you are interested in learning about research opportunities or have questions, please contact the CF Research Team at [phone] or [email or web address].      CF Foundation:  Compass is a personalized resource service to help you with the insurance, financial, legal and other issues you are facing.  It's free, confidential and available to anyone with CF.  Ask your  for more information or contact Compass directly at 798-OSZEAFW ([phone]) or compass@[email or web address], or learn more at [email or web address].[email or web address].

## 2018-11-28 LAB
BACTERIA SPEC CULT: NORMAL
Lab: NORMAL
SPECIMEN SOURCE: NORMAL

## 2018-11-30 ENCOUNTER — TELEPHONE (OUTPATIENT)
Dept: CARE COORDINATION | Facility: CLINIC | Age: 38
End: 2018-11-30

## 2018-12-06 NOTE — TELEPHONE ENCOUNTER
Adult Cystic Fibrosis Program  Social Work Phone Consult    Data:   This SW is covering for Philip's primary SW (Yvonne).  SW notified by Dr. 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    Intervention/Plan:  SW left Philip a message, offering to help discuss resource options, will await call back.      AGUSTÍN [place], [facility]  Adult Cystic Fibrosis   Ph: [phone]  Pager: [phone]

## 2019-02-25 DIAGNOSIS — E84.0 CYSTIC FIBROSIS WITH PULMONARY MANIFESTATIONS (H): Primary | ICD-10-CM

## 2019-02-25 RX ORDER — TEZACAFTOR AND IVACAFTOR 100-150 MG
KIT ORAL
Qty: 56 TABLET | Refills: 12 | Status: SHIPPED | OUTPATIENT
Start: 2019-02-25 | End: 2019-08-15

## 2019-02-28 ENCOUNTER — OFFICE VISIT (OUTPATIENT)
Dept: PULMONOLOGY | Facility: CLINIC | Age: 39
End: 2019-02-28
Attending: INTERNAL MEDICINE
Payer: COMMERCIAL

## 2019-02-28 VITALS
OXYGEN SATURATION: 96 % | HEIGHT: 72 IN | HEART RATE: 62 BPM | SYSTOLIC BLOOD PRESSURE: 129 MMHG | RESPIRATION RATE: 16 BRPM | DIASTOLIC BLOOD PRESSURE: 82 MMHG | WEIGHT: 176 LBS | BODY MASS INDEX: 23.84 KG/M2

## 2019-02-28 DIAGNOSIS — K86.81 EXOCRINE PANCREATIC INSUFFICIENCY: ICD-10-CM

## 2019-02-28 DIAGNOSIS — E84.0 CYSTIC FIBROSIS WITH PULMONARY MANIFESTATIONS (H): Primary | ICD-10-CM

## 2019-02-28 DIAGNOSIS — E84.0 CYSTIC FIBROSIS WITH PULMONARY MANIFESTATIONS (H): ICD-10-CM

## 2019-02-28 DIAGNOSIS — K86.89 PANCREATIC INSUFFICIENCY: ICD-10-CM

## 2019-02-28 DIAGNOSIS — J32.4 CHRONIC PANSINUSITIS: ICD-10-CM

## 2019-02-28 LAB
ALBUMIN SERPL-MCNC: 4 G/DL (ref 3.4–5)
ALP SERPL-CCNC: 82 U/L (ref 40–150)
ALT SERPL W P-5'-P-CCNC: 21 U/L (ref 0–70)
AST SERPL W P-5'-P-CCNC: 20 U/L (ref 0–45)
BILIRUB DIRECT SERPL-MCNC: 0.2 MG/DL (ref 0–0.2)
BILIRUB SERPL-MCNC: 0.6 MG/DL (ref 0.2–1.3)
EXPTIME-PRE: 6.27 SEC
FEF2575-%PRED-PRE: 107 %
FEF2575-PRE: 4.91 L/SEC
FEF2575-PRED: 4.55 L/SEC
FEFMAX-%PRED-PRE: 94 %
FEFMAX-PRE: 10.25 L/SEC
FEFMAX-PRED: 10.8 L/SEC
FEV1-%PRED-PRE: 90 %
FEV1-PRE: 4.22 L
FEV1FEV6-PRE: 86 %
FEV1FEV6-PRED: 82 %
FEV1FVC-PRE: 86 %
FEV1FVC-PRED: 81 %
FIFMAX-PRE: 8.4 L/SEC
FVC-%PRED-PRE: 84 %
FVC-PRE: 4.91 L
FVC-PRED: 5.79 L
PROT SERPL-MCNC: 7.5 G/DL (ref 6.8–8.8)

## 2019-02-28 PROCEDURE — 97803 MED NUTRITION INDIV SUBSEQ: CPT | Mod: ZF | Performed by: DIETITIAN, REGISTERED

## 2019-02-28 PROCEDURE — 87070 CULTURE OTHR SPECIMN AEROBIC: CPT | Performed by: INTERNAL MEDICINE

## 2019-02-28 PROCEDURE — 36415 COLL VENOUS BLD VENIPUNCTURE: CPT | Performed by: INTERNAL MEDICINE

## 2019-02-28 PROCEDURE — 80076 HEPATIC FUNCTION PANEL: CPT | Performed by: INTERNAL MEDICINE

## 2019-02-28 PROCEDURE — G0463 HOSPITAL OUTPT CLINIC VISIT: HCPCS

## 2019-02-28 ASSESSMENT — PAIN SCALES - GENERAL: PAINLEVEL: NO PAIN (0)

## 2019-02-28 ASSESSMENT — MIFFLIN-ST. JEOR: SCORE: 1766.46

## 2019-02-28 NOTE — PATIENT INSTRUCTIONS
"Cystic Fibrosis Self-Care Plan    RECOMMENDATIONS:   Nutrition:  - Can increase Creon to 8 caps with very high fat meals (like pizza). If dosing based on amount of fat, would take 1 capsule of Creon 24 per 8 grams of fat.   - Can trial dairy-free diet for ~1 week and monitor symptoms. If no improvement, ok to add back into diet.   - Trial switch from Metamucil to Benefiber or Citrucel fiber supplements.  - May benefit from different probiotic.    Try Loratadine (Claritin), Fexofenadine (Allegra) or Cetirizine (Zyrtec) for allergy symptoms. Use according to package directions. Do NOT use the decongestant (\"D\") formulation.  Albuterol inhaler, 2 puffs and aerobika before bed.  Otherwise continue current medication.            CF Nurse line:          Love Martins   [phone]            CF Resp Therapist: Kristi Ndiaye            [phone]   CF Dietitians:           Nisa Dumont            [phone]                       April Cardona                       [phone]   CF Diabetes Nurse: Barbara Birmingham             [phone]    CF Social Workers:  Melissa Calle             [phone]                Yvonne Gupta            [phone]  CF Pharmacist:        Amanda Gonzalez                              [phone]  [email or web address]         MRN: [id number]   Clinic Date: February 28, 2019   Patient: Philip Delacruz     Annual Studies:   IGG   Date Value Ref Range Status   04/06/2018 1,140 695 - 1,620 mg/dL Final     Insulin   Date Value Ref Range Status   04/06/2018 13.0 3 - 25 mU/L Final     Comment:     Starting 7/13/2017, insulin results will decrease by approximately 37%   compared to insulin results reported in EPIC between (12/16/2016-7/12/2017),   and should be interpreted accordingly. Insulin results reported prior to   12/16/16 are comparable to current insulin results and therefore no adjustment   is needed.       There are no preventive care reminders to display for this " patient.      Pulmonary Function Tests  FEV1: amount of air you can blow out in 1 second  FVC: total amount of air you can take in and blow out    Your Goals:         PFT Latest Ref Rng & Units 2/28/2019   FVC L 4.91   FEV1 L 4.22   FVC% % 84   FEV1% % 90          Airway Clearance: The Most Important Way to Keep Your Lungs Healthy  Aerobika Daily    Wt Readings from Last 3 Encounters:   02/28/19 79.8 kg (176 lb)   11/23/18 80 kg (176 lb 5.9 oz)   07/13/18 79.3 kg (174 lb 14.4 oz)       Body mass index is 23.86 kg/m .         National CF Foundation Recommendations for BMI in CF Adults: Women: at least 22 Men: at least 23        Controlling Blood Sugars Helps Prevent Lung Infections & Improves Nutrition  Test blood sugar:     In the morning before eating (goal is )     2 hours after a meal (goal is less than 150)     When pre-meal glucose is greater than 150 add correction     At bedtime (if less than 100 eat a snack with 15 grams of carbohydrates  Last A1C Results:   Hemoglobin A1C   Date Value Ref Range Status   04/06/2018 4.6 0 - 6.4 % Final     Comment:     Normal <5.7% Prediabetes 5.7-6.4%  Diabetes 6.5% or higher - adopted from ADA   consensus guidelines.           If diabetic, measure A1C every 6 months. Goal: Under 7%    Staying Healthy    Research:  If you are interested in learning about research opportunities or have questions, please contact the CF Research Team at [phone] or [email or web address].      CF Foundation:  Compass is a personalized resource service to help you with the insurance, financial, legal and other issues you are facing.  It's free, confidential and available to anyone with CF.  Ask your  for more information or contact Compass directly at [phone]-COMPASS ([phone]) or compass@[email or web address], or learn more at [email or web address].[email or web address].

## 2019-02-28 NOTE — PROGRESS NOTES
"Attending attestation: I, True Sahni M.D., have seen and examined the patient with Dr. Keegan Alcaraz MD. I have reviewed labs and radiographs. We have discussed the patient and I agree with the findings and plan of care as noted below.  Briefly, the patient is a 38-year-old male with cystic fibrosis with mild lung disease and pancreatic insufficiency.  He is generally feeling well except for an occasional \"tickle\" with a mild nonproductive cough which is worse when the patient is supine.  Generally he has good exercise tolerance although some limitations with his rowing machine.  He has had multiple sick exposures.  He does not do any formal airway clearance.  He reports left foot pain.  Lungs are clear with good airflow.  The patient is generally doing well.  He does not appear to be having an exacerbation at this time.  I suspect that his nighttime cough is either related to reflux or postnasal drip.  I recommended a trial of antihistamines.  If symptoms persist, PPI or H2 blocker will be initiated.  Recommended nighttime albuterol and Aerobika for airway clearance including case respiratory secretions are contributing to his nighttime cough.  Follow-up in 1 month annual studies.        [facility] CF-Pulmonology   Date: February 28, 2019    Reason for Visit  Philip Delacruz is a 38 year old year old male who is being seen for RECHECK (3 month follow up for cystic fibrosis )      Assessment and plan:  Shar Delacruz is a 39 yo man w/ PMHx of CF w/ mild lung disease and pancreatic insufficiency who presents for 3 month follow-up of his CF.     Chronic intermittent cough: worse at night. Worsened when he started intranasal gentamicin but has improved somewhat. Likely secondary to post-nasal drip. Other DDx include GERD, lower-respiratory tree mucus production, or gentamicin-related irritation.   - recommend daily 2nd generation anti-histamine w/o decongestant   - 2 puffs albuterol w/ aerobika 30 min before bed "    -  Consider anti-acid therapy if no improvement     CF pulmonary disease: Decreased FEV1, FVC on PFTs today compared to prior. FEV1 95% --> 90% (4.43 --> 4.22) and FVC 88% --> 84% (5.12-->4.91). Decreased time exercising over the winter. Possible mild upper respiratory illness, has had many sick contacts. Less likely bacterial CF pulmonary exacerbation.    - continue exercise as method of airway clearance    - added nightly aerobika w/ albuterol, as above    - no antibiotics needed at this time    - RTC in 5 weeks for annual visit w/ labs, cultures, CXR, repeat PFTs    CF-related sinusitis: previously responded to amoxacillin-clavulanate. Following w/ ENT. Taking intranasal gentamicin washes.    - continue gentamicin and follow-up w/ ENT, as scheduled     Pancreatic insufficiency: worse w/ heavy pizza intake/high fat meals. Taking Creon. Saw RD today.    - increase Creon to 8 caps w/ high-fat meals (Creon 24 1 capsule per 8g fat)    Gas and bloating: improved after amoxacillin-clavulanate course for sinusitis in November. Likely component of SIBO. Still w/ some bloating.    - switch Metamucil to Benefiber or Citrucel     CFTR modulation: tolerating Symdeko. Normal transaminases.    - continue Symdeko w/ laboratory monitoring     Pt seen and discussed w/ Dr. Kaitlyn Alcaraz MD  Internal Medicine PGY-2        CF HPI  The patient was seen and examined by Keegan Alcaraz (resident) and True Sahni (attending).    Shar Delacruz is a 37 yo man w/ PMHx of CF w/ mild lung disease and pancreatic insufficiency who presents for 3 month follow-up of his CF.     He is feeling pretty well apart from a persistent intermittent substernal chest tickle. Associated w/ mild, non-productive cough that is worse when lying down. Some decreased exercise-rowing tolerance, but able to tolerate other forms of exercise. No resting dyspnea, orthopnea, chest pain, rhinorrhea, or sinus/chest congestion. No fevers, night sweats, chills,  weight loss, rashes, or myalgias. People around him have been sick, including toddler son who got over a respiratory illness and his in-laws who both had a cough. Sister in law had bronchitis. Multiple people at school have been sick. Has been exercising less the past couple of months. Does not have vest or nebs as part of therapy plan.     Finished his amoxacillin-clavulanate he was taking in November for sinusitis. This also helped his abdomenal bloating/SIBO. Feels this has been better lately, the still has some occasional bloating. No diarrhea. Appetite normal.     Pt also notes he has some L foot tendon discomfort similar to his L elbow pain this summer. Involved the dorsal aspect and has since involved the lateral ankle close to the heal. No trauma or injury that he can recall.     PFTs today showed slight reduction in FEV1 (95-->90%, 4.43-->4.22) and FVC (88-->84%, 5.12-->4.91). Pt wondering if that can be worse in the winter. Also noted some black discoloration around windows at home and if he should be tested for lung fungal infection.     A complete ROS was otherwise negative except as noted in the HPI.    Current Outpatient Medications   Medication     ACYCLOVIR PO     albuterol (PROAIR HFA/PROVENTIL HFA/VENTOLIN HFA) 108 (90 Base) MCG/ACT inhaler     CREON 58597 UNITS CPEP per EC capsule     CREON 69585-53561 units CPEP per EC capsule     fluticasone (FLONASE) 50 MCG/ACT spray     gentamicin 0.008% in 1000ml 0.9% NaCl (GARAMYCIN) SOLN nasal irrigation     multivitamin CF formula (MVW COMPLETE FORMULATION) chewable tablet     Omega-3 Fatty Acids (FISH OIL) 1200 MG CPDR     order for DME     oseltamivir (TAMIFLU) 75 MG capsule     Probiotic Product (FLORAJEN BIFIDOBLEND) CAPS     psyllium (METAMUCIL) 58.6 % POWD     Sodium Chloride-Sodium Bicarb (SINUFLO READYRINSE) KIT     SYMDEKO 100-150 & 150 MG tablet pack     No current facility-administered medications for this visit.      Allergies   Allergen  Reactions     Tegaderm Transparent Dressing (Informational Only) Rash     Past Medical History:   Diagnosis Date     Chronic sinusitis      Congenital absence of vas deferens, bilateral      Cystic fibrosis (H)     Delta F508 and [id number]      Nasal polyps      Sinusitis, chronic       -  Past Surgical History:   Procedure Laterality Date     HC TOOTH EXTRACTION W/FORCEP       NASAL/SINUS POLYPECTOMY       REPAIR PECTUS EXCAVATUM  1997     SINUS SURGERY  1992, 2002, 2004    Removed tubinates and polyps OSH     Sperm harvest         Social History     Socioeconomic History     Marital status: Single     Spouse name: Not on file     Number of children: Not on file     Years of education: Not on file     Highest education level: Not on file   Occupational History     Occupation: Teacher   Social Needs     Financial resource strain: Not on file     Food insecurity:     Worry: Not on file     Inability: Not on file     Transportation needs:     Medical: Not on file     Non-medical: Not on file   Tobacco Use     Smoking status: Never Smoker     Smokeless tobacco: Never Used   Substance and Sexual Activity     Alcohol use: Yes     Alcohol/week: 0.0 oz     Comment: 1 drink 3X per week     Drug use: No     Sexual activity: Yes     Partners: Female     Birth control/protection: None   Lifestyle     Physical activity:     Days per week: Not on file     Minutes per session: Not on file     Stress: Not on file   Relationships     Social connections:     Talks on phone: Not on file     Gets together: Not on file     Attends Orthodox service: Not on file     Active member of club or organization: Not on file     Attends meetings of clubs or organizations: Not on file     Relationship status: Not on file     Intimate partner violence:     Fear of current or ex partner: Not on file     Emotionally abused: Not on file     Physically abused: Not on file     Forced sexual activity: Not on file   Other Topics Concern     Parent/sibling  "w/ CABG, MI or angioplasty before 65F 55M? Not Asked   Social History Narrative    #d .  Lives in [place] with significant other         /82 (BP Location: Right arm, Patient Position: Chair, Cuff Size: Adult Regular)   Pulse 62   Resp 16   Ht 1.829 m (6' 0.01\")   Wt 79.8 kg (176 lb)   SpO2 96%   BMI 23.86 kg/m    Body mass index is 23.86 kg/m .  Exam:   GENERAL APPEARANCE: Well developed, well nourished, alert, and in no apparent distress.  EYES: PERRL, EOMI  HENT: Nasal mucosa with no edema and no hyperemia. No nasal polyps.  EARS: Canals clear, TMs normal  MOUTH: Oral mucosa is moist, without any lesions, no tonsillar enlargement, no oropharyngeal exudate.  NECK: supple, no masses, no thyromegaly.  LYMPHATICS: No significant axillary, cervical, or supraclavicular nodes.  RESP: normal percussion, good air flow throughout.  No crackles. No rhonchi. No wheezes.  CV: Normal S1, S2, regular rhythm, normal rate. No murmur.  No rub. No gallop. No LE edema.   ABDOMEN:  Bowel sounds normal, soft, nontender, no HSM or masses.   MS: extremities normal. No clubbing. No cyanosis.  SKIN: no rash on limited exam  NEURO: Mentation intact, speech normal, normal strength and tone, normal gait and stance  PSYCH: mentation appears normal. and affect normal/bright    Results:  Recent Results (from the past 168 hour(s))   Hepatic panel    Collection Time: 02/28/19  3:21 PM   Result Value Ref Range    Bilirubin Direct 0.2 0.0 - 0.2 mg/dL    Bilirubin Total 0.6 0.2 - 1.3 mg/dL    Albumin 4.0 3.4 - 5.0 g/dL    Protein Total 7.5 6.8 - 8.8 g/dL    Alkaline Phosphatase 82 40 - 150 U/L    ALT 21 0 - 70 U/L    AST 20 0 - 45 U/L   General PFT Lab (Please always keep checked)    Collection Time: 02/28/19  3:33 PM   Result Value Ref Range    FVC-Pred 5.79 L    FVC-Pre 4.91 L    FVC-%Pred-Pre 84 %    FEV1-Pre 4.22 L    FEV1-%Pred-Pre 90 %    FEV1FVC-Pred 81 %    FEV1FVC-Pre 86 %    FEFMax-Pred 10.80 L/sec    FEFMax-Pre " 10.25 L/sec    FEFMax-%Pred-Pre 94 %    FEF2575-Pred 4.55 L/sec    FEF2575-Pre 4.91 L/sec    EGE5258-%Pred-Pre 107 %    ExpTime-Pre 6.27 sec    FIFMax-Pre 8.40 L/sec    FEV1FEV6-Pred 82 %    FEV1FEV6-Pre 86 %                Results as noted above.    PFT Interpretation:  Normal spirometry.  Decreased from previous.  Below recent best.   Valid Maneuver      CF Exacerbation:  Absent

## 2019-02-28 NOTE — NURSING NOTE
Chief Complaint   Patient presents with     RECHECK     3 month follow up for cystic fibrosis      Patricia Prince CMA

## 2019-03-01 NOTE — PROGRESS NOTES
CF Annual Nutrition Assessment    Reason for Assessment  Assessed during Dr. [facility] r/t increased nutrition risk with diagnosis of CF per protocol    Nutrition Significant PMH  Mild Lung Disease   -- Taking Symdeko  Pancreatic Insufficient - fecal elastase wnl, however benefits from enzymes  H/o sinus issues and surgeries    Social Assessment  Living situation: wife (pregnant and due in ) and toddler son  Work/School/Disability: works full-time as a teacher  Food Insecurity:  None    Anthropometric Assessment  Height: 184 cm  IBW based on BMI 22 for females and 23 for males per CF Foundation recs: 78 kg / 172#  Today's Weight: 79.8 kg (actual weight)   %IBW: 102%    Body mass index is 23.86 kg/m .   Current weight is considered: Normal BMI and at CF goal. No malnutrition.   Weight has trended up from previous baseline 165-168#, now at 176#.     Wt Readings from Last 5 Encounters:   19 79.8 kg (176 lb)   18 80 kg (176 lb 5.9 oz)   18 79.3 kg (174 lb 14.4 oz)   18 79.5 kg (175 lb 4.3 oz)   18 79.2 kg (174 lb 8 oz)     Physical Activity/Exercise: trying to do strength training although has some concerns with joints    Pancreatic Enzymes  Brand: Creon 24,000  Dosin-5 with meals, 2-3 with snacks -- switched from Creon 12s  Estimated Daily Intake: 20 caps/day = 6000 units lipase/kg/day  Are you taking enzymes as recommended: Yes - makes sure to take dose throughout the meal    High dose providing 1500 units lipase/kg/meal which is within the recommended range per CF Foundation to inhibit fibrosing colonopathy    Signs of Malabsorption:  Yes - see below.     Enzyme Program: Yes - [facility]    Gastrointestinal/Malabsorption Assessment  GI Complaints: Continues to c/o loose, floating, and greasy/oily stools that vary in color but frequently yellow/tan appearing. Symptoms worsen after high-fat foods. For example, took several days for GI to return to normal after pizza for  both lunch and dinner. Continues to complain of abdominal pain, gas, and leakage.   Medications: Take soluble fiber (psyllium- Metamucil) - 1 tbsp in evening  Other Therapies: also taking Florajen probiotic BID.   GI Physician: Dr. Watts - clinic visit July 2018.  Pt notices greatest improvement in GI symptoms/gas after taking antibiotics (likely r/t SIBO).     Said today that he always has gas, it is just somewhat improved after oral abx.   Experiences greasy/oily leakage if he misses enzymes or if he is eating poorly (high-fat foods)    Diet History and Assessment  Diet Preferences/Allergies/Intolerances: Regular diet  Intake Recall/Comments: Continues with good appetite/PO. Makes an effort to choose healthy options as avoiding high-fat/greasy foods like pizza helps improve GI symptoms. Consumes large amounts of milk, either 2% of whole milk ([place] Continues to track intake in [facility].     Diet Recall:  Breakfast: eggs, cereal with milk, or english muffin with toast  Lunch: eats school lunch, may buy 2 entrees  PM Snack: often candy or nuts  Dinner: tries for healthy option like lean protein and vegetable.     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    BEE: 1825  1261-8752 kcals/day =  150-175% BEE   g protein/day = 1.2-1.5 g/kg    Laboratory Assessment     Vitamin A   Date Value Ref Range Status   04/06/2018 0.81 0.30 - 1.20 mg/L Final     Vitamin D Deficiency screening   Date Value Ref Range Status   04/06/2018 38 20 - 75 ug/L Final      Comment:     Season, race, dietary intake, and treatment affect the concentration of   25-hydroxy-Vitamin D. Values may decrease during winter months and increase   during summer months. Values 20-29 ug/L may indicate Vitamin D insufficiency   and values <20 ug/L may indicate Vitamin D deficiency.  Vitamin D determination is routinely performed by an immunoassay specific for   25 hydroxyvitamin D3.  If an individual is on vitamin D2 (ergocalciferol)   supplementation, please specify 25 OH vitamin D2 and D3 level determination by   LCMSMS test [id number].       Vitamin E   Date Value Ref Range Status   04/06/2018 23.5 (H) 5.5 - 18.0 mg/L Final     Comment:     (Note)  Test developed and characteristics determined by [employer]. See Compliance Statement B: [email or web address]       Iron   Date Value Ref Range Status   04/06/2018 127 35 - 180 ug/dL Final     Cholesterol   Date Value Ref Range Status   04/06/2018 191 <200 mg/dL Final     Triglycerides   Date Value Ref Range Status   04/06/2018 102 <150 mg/dL Final     HDL Cholesterol   Date Value Ref Range Status   04/06/2018 52 >39 mg/dL Final     LDL Cholesterol Calculated   Date Value Ref Range Status   04/06/2018 118 (H) <100 mg/dL Final     Comment:     Above desirable:  100-129 mg/dl  Borderline High:  130-159 mg/dL  High:             160-189 mg/dL  Very high:       >189 mg/dl       VLDL-Cholesterol   Date Value Ref Range Status   12/22/2014 32 (H) 0 - 30 mg/dL Final     Cholesterol/HDL Ratio   Date Value Ref Range Status   12/22/2014 3.3 0.0 - 5.0 Final     OGTT- 2018, WNL    Current Vitamin/Mineral Prescription R/T deficiency/malabsorption: MVW Complete 1 softgels/day (enzyme program), fish oil  Comments:  Taking daily. Obtains MVW from [facility]. Dose was decreased following annual studies due to elevated Vitamin E.     NUTRITION DIAGNOSIS  Impaired nutrient utilization r/t CF hypermetabolism and suspected pancreatic insufficiency as evidenced by  symptomatic improvement in steatorrhea with pancreatic enzyme replacement therapy and pt requires high kcal/protein diet to maintain nutrition and health status.   INTERVENTIONS/RECOMMENDATIONS  1) Discussed current nutritional status including reviewed adequacy of oral intakes and weight trends. Discussed that pt is now at CFF goal BMI but would aim for maintenance at this time vs increased LBM. Encouraged ongoing healthy eating choices and strength training.     2) Discussed GI symptoms and correlation with food intake. Pt continues to associate worsening GI symptoms with higher-fat foods (which also tend to include dairy). Reviewed several interventions that pt may trial, including an increase in enzyme dose up to 8 caps with very high fat meals = 2400 units lipase/kg/meal. Pt wondering if there is a more exact way to dose enzymes. Briefly discussed dosing based on gram of fat. Provided instructions for 1 cap Creon 24 per 8 grams of fat (3000 units lipase/g fat). Discussed other interventions including brief dairy elimination as pt currently consuming 40+ oz 2% or whole milk daily. Per GI recs, encouraged pt to trial switching fiber supplements which may improve gas, as well as trial different probiotic brand.     Patient Understanding: Good  Expected Compliance: Good  Follow-Up Plans: per protocol    GOALS:  1) Takes enzymes/vitamins as recommended  2) Weight maintenance with BMI >23 kg/m2    FOLLOW-UP/MONITORING:  Visit patient within 6-12 month(s) to follow-up on GI issues/increase in enzyme dose    Time Spent In Face-to-Face Patient Interactions: 15 minutes      Nisa Dumont RD, LD  Cystic Fibrosis/Lung Transplant Dietitian  Pager [phone]  On weekends/holidays contact coverage RD at [phone] (inpatient use only)

## 2019-03-04 ENCOUNTER — TELEPHONE (OUTPATIENT)
Dept: PULMONOLOGY | Facility: CLINIC | Age: 39
End: 2019-03-04

## 2019-03-04 NOTE — TELEPHONE ENCOUNTER
PA Initiation    Medication: SYMDEKO 100-150 & 150 MG tablet pack- pa initaited  Insurance Company: [employer] - Phone [phone] Fax [phone]  Pharmacy Filling the Rx: [facility] - [place], [place] - [facility] [address]  Filling Pharmacy Phone: [phone]  Filling Pharmacy Fax: [phone]  Start Date: 3/4/2019

## 2019-03-05 LAB
BACTERIA SPEC CULT: NORMAL
Lab: NORMAL
SPECIMEN SOURCE: NORMAL

## 2019-03-05 NOTE — TELEPHONE ENCOUNTER
Insurance called to have me answer additional questions   If medication is well tolerated and effective and when the patient was last seen at clinic

## 2019-03-06 NOTE — TELEPHONE ENCOUNTER
Prior Authorization Approval    Authorization Effective Date: 2/5/2019  Authorization Expiration Date: 3/5/2020  Medication: SYMDEKO 100-150 & 150 MG tablet pack- pa approved  Approved Dose/Quantity: ud  Reference #:     Insurance Company: [employer] - Phone [phone] Fax [phone]  Expected CoPay:       CoPay Card Available:      Foundation Assistance Needed:    Which Pharmacy is filling the prescription (Not needed for infusion/clinic administered): [facility] - [place], [place] - [address]  Pharmacy Notified: Yes  Patient Notified: NoComment:  proactive pa

## 2019-04-02 ENCOUNTER — TELEPHONE (OUTPATIENT)
Dept: OTOLARYNGOLOGY | Facility: CLINIC | Age: 39
End: 2019-04-02

## 2019-04-02 NOTE — TELEPHONE ENCOUNTER
[facility] Call Center    Phone Message    May a detailed message be left on voicemail: yes    Reason for Call: Other: PT:  Requesting a call back from a Nurse, pt states having sinus issues and would like to discuss his SX's over the phone. Writer did offer to Critical access hospital appt, pt is Critical access hospital for 6/3/19 next available.     Action Taken: Message routed to:  [facility] ([facility] ENT

## 2019-05-02 ENCOUNTER — OFFICE VISIT (OUTPATIENT)
Dept: PULMONOLOGY | Facility: CLINIC | Age: 39
End: 2019-05-02
Attending: INTERNAL MEDICINE
Payer: COMMERCIAL

## 2019-05-02 ENCOUNTER — OFFICE VISIT (OUTPATIENT)
Dept: PHARMACY | Facility: CLINIC | Age: 39
End: 2019-05-02
Payer: COMMERCIAL

## 2019-05-02 ENCOUNTER — ANCILLARY PROCEDURE (OUTPATIENT)
Dept: BONE DENSITY | Facility: CLINIC | Age: 39
End: 2019-05-02
Attending: INTERNAL MEDICINE
Payer: COMMERCIAL

## 2019-05-02 ENCOUNTER — ANCILLARY PROCEDURE (OUTPATIENT)
Dept: GENERAL RADIOLOGY | Facility: CLINIC | Age: 39
End: 2019-05-02
Attending: INTERNAL MEDICINE
Payer: COMMERCIAL

## 2019-05-02 ENCOUNTER — INFUSION THERAPY VISIT (OUTPATIENT)
Dept: INFUSION THERAPY | Facility: CLINIC | Age: 39
End: 2019-05-02
Attending: INTERNAL MEDICINE
Payer: COMMERCIAL

## 2019-05-02 VITALS
HEIGHT: 73 IN | SYSTOLIC BLOOD PRESSURE: 138 MMHG | HEART RATE: 80 BPM | WEIGHT: 172.5 LBS | DIASTOLIC BLOOD PRESSURE: 85 MMHG | OXYGEN SATURATION: 97 % | BODY MASS INDEX: 22.86 KG/M2 | TEMPERATURE: 98.1 F

## 2019-05-02 VITALS
TEMPERATURE: 98 F | WEIGHT: 172.8 LBS | HEART RATE: 67 BPM | OXYGEN SATURATION: 96 % | BODY MASS INDEX: 23.43 KG/M2 | DIASTOLIC BLOOD PRESSURE: 93 MMHG | RESPIRATION RATE: 16 BRPM | SYSTOLIC BLOOD PRESSURE: 130 MMHG

## 2019-05-02 DIAGNOSIS — K86.89 PANCREATIC INSUFFICIENCY: ICD-10-CM

## 2019-05-02 DIAGNOSIS — K86.81 EXOCRINE PANCREATIC INSUFFICIENCY: Primary | ICD-10-CM

## 2019-05-02 DIAGNOSIS — E84.0 CYSTIC FIBROSIS WITH PULMONARY MANIFESTATIONS (H): ICD-10-CM

## 2019-05-02 DIAGNOSIS — E84.0 CYSTIC FIBROSIS WITH PULMONARY MANIFESTATIONS (H): Primary | ICD-10-CM

## 2019-05-02 DIAGNOSIS — R31.29 MICROSCOPIC HEMATURIA: ICD-10-CM

## 2019-05-02 LAB
ALBUMIN SERPL-MCNC: 4.2 G/DL (ref 3.4–5)
ALBUMIN UR-MCNC: NEGATIVE MG/DL
ALP SERPL-CCNC: 84 U/L (ref 40–150)
ALT SERPL W P-5'-P-CCNC: 25 U/L (ref 0–70)
ANION GAP SERPL CALCULATED.3IONS-SCNC: 7 MMOL/L (ref 3–14)
APPEARANCE UR: ABNORMAL
AST SERPL W P-5'-P-CCNC: 22 U/L (ref 0–45)
BASOPHILS # BLD AUTO: 0 10E9/L (ref 0–0.2)
BASOPHILS NFR BLD AUTO: 0.4 %
BILIRUB UR QL STRIP: NEGATIVE
BUN SERPL-MCNC: 19 MG/DL (ref 7–30)
CALCIUM SERPL-MCNC: 9.4 MG/DL (ref 8.5–10.1)
CHLORIDE SERPL-SCNC: 104 MMOL/L (ref 94–109)
CHOLEST SERPL-MCNC: 211 MG/DL
CO2 SERPL-SCNC: 27 MMOL/L (ref 20–32)
COLOR UR AUTO: YELLOW
CREAT SERPL-MCNC: 0.96 MG/DL (ref 0.66–1.25)
CREAT UR-MCNC: 363 MG/DL
DEPRECATED CALCIDIOL+CALCIFEROL SERPL-MC: 40 UG/L (ref 20–75)
DIFFERENTIAL METHOD BLD: ABNORMAL
EOSINOPHIL # BLD AUTO: 0.1 10E9/L (ref 0–0.7)
EOSINOPHIL NFR BLD AUTO: 1.1 %
ERYTHROCYTE [DISTWIDTH] IN BLOOD BY AUTOMATED COUNT: 12 % (ref 10–15)
ERYTHROCYTE [SEDIMENTATION RATE] IN BLOOD BY WESTERGREN METHOD: 4 MM/H (ref 0–15)
EXPTIME-PRE: 7.03 SEC
FEF2575-%PRED-PRE: 111 %
FEF2575-PRE: 5.09 L/SEC
FEF2575-PRED: 4.55 L/SEC
FEFMAX-%PRED-PRE: 96 %
FEFMAX-PRE: 10.38 L/SEC
FEFMAX-PRED: 10.8 L/SEC
FEV1-%PRED-PRE: 93 %
FEV1-PRE: 4.39 L
FEV1FEV6-PRE: 86 %
FEV1FEV6-PRED: 82 %
FEV1FVC-PRE: 86 %
FEV1FVC-PRED: 81 %
FIFMAX-PRE: 8.66 L/SEC
FVC-%PRED-PRE: 87 %
FVC-PRE: 5.09 L
FVC-PRED: 5.79 L
GFR SERPL CREATININE-BSD FRML MDRD: >90 ML/MIN/{1.73_M2}
GGT SERPL-CCNC: 20 U/L (ref 0–75)
GLUCOSE BLDC GLUCOMTR-MCNC: 96 MG/DL (ref 70–99)
GLUCOSE SERPL-MCNC: 114 MG/DL (ref 70–99)
GLUCOSE SERPL-MCNC: 136 MG/DL (ref 70–99)
GLUCOSE SERPL-MCNC: 140 MG/DL (ref 70–99)
GLUCOSE SERPL-MCNC: 89 MG/DL (ref 70–99)
GLUCOSE SERPL-MCNC: 99 MG/DL (ref 70–99)
GLUCOSE UR STRIP-MCNC: NEGATIVE MG/DL
HBA1C MFR BLD: 5.2 % (ref 0–5.6)
HCT VFR BLD AUTO: 46.4 % (ref 40–53)
HDLC SERPL-MCNC: 59 MG/DL
HGB BLD-MCNC: 17 G/DL (ref 13.3–17.7)
HGB UR QL STRIP: NEGATIVE
IMM GRANULOCYTES # BLD: 0.1 10E9/L (ref 0–0.4)
IMM GRANULOCYTES NFR BLD: 0.6 %
INR PPP: 1.1 (ref 0.86–1.14)
INSULIN SERPL-ACNC: 12.8 MU/L (ref 3–25)
INSULIN SERPL-ACNC: 20.3 MU/L (ref 3–25)
INSULIN SERPL-ACNC: 25.3 MU/L (ref 3–25)
INSULIN SERPL-ACNC: 5.6 MU/L (ref 3–25)
INSULIN SERPL-ACNC: 9 MU/L (ref 3–25)
IRON SERPL-MCNC: 150 UG/DL (ref 35–180)
KETONES UR STRIP-MCNC: NEGATIVE MG/DL
LDLC SERPL CALC-MCNC: 129 MG/DL
LEUKOCYTE ESTERASE UR QL STRIP: NEGATIVE
LYMPHOCYTES # BLD AUTO: 2.4 10E9/L (ref 0.8–5.3)
LYMPHOCYTES NFR BLD AUTO: 29.2 %
MAGNESIUM SERPL-MCNC: 2.3 MG/DL (ref 1.6–2.3)
MCH RBC QN AUTO: 32.1 PG (ref 26.5–33)
MCHC RBC AUTO-ENTMCNC: 36.6 G/DL (ref 31.5–36.5)
MCV RBC AUTO: 88 FL (ref 78–100)
MICROALBUMIN UR-MCNC: 21 MG/L
MICROALBUMIN/CREAT UR: 5.9 MG/G CR (ref 0–17)
MONOCYTES # BLD AUTO: 0.7 10E9/L (ref 0–1.3)
MONOCYTES NFR BLD AUTO: 8.6 %
MUCOUS THREADS #/AREA URNS LPF: PRESENT /LPF
NEUTROPHILS # BLD AUTO: 5 10E9/L (ref 1.6–8.3)
NEUTROPHILS NFR BLD AUTO: 60.1 %
NITRATE UR QL: NEGATIVE
NONHDLC SERPL-MCNC: 152 MG/DL
NRBC # BLD AUTO: 0 10*3/UL
NRBC BLD AUTO-RTO: 0 /100
PH UR STRIP: 6 PH (ref 5–7)
PHOSPHATE SERPL-MCNC: 3.3 MG/DL (ref 2.5–4.5)
PLATELET # BLD AUTO: 196 10E9/L (ref 150–450)
POTASSIUM SERPL-SCNC: 3.9 MMOL/L (ref 3.4–5.3)
PROT SERPL-MCNC: 7.8 G/DL (ref 6.8–8.8)
RBC # BLD AUTO: 5.3 10E12/L (ref 4.4–5.9)
RBC #/AREA URNS AUTO: 4 /HPF (ref 0–2)
SODIUM SERPL-SCNC: 138 MMOL/L (ref 133–144)
SOURCE: ABNORMAL
SP GR UR STRIP: 1.02 (ref 1–1.03)
SQUAMOUS #/AREA URNS AUTO: <1 /HPF (ref 0–1)
TRIGL SERPL-MCNC: 113 MG/DL
TSH SERPL DL<=0.005 MIU/L-ACNC: 1.26 MU/L (ref 0.4–4)
UROBILINOGEN UR STRIP-MCNC: 0 MG/DL (ref 0–2)
WBC # BLD AUTO: 8.3 10E9/L (ref 4–11)
WBC #/AREA URNS AUTO: 1 /HPF (ref 0–5)

## 2019-05-02 PROCEDURE — 84446 ASSAY OF VITAMIN E: CPT | Performed by: INTERNAL MEDICINE

## 2019-05-02 PROCEDURE — 82306 VITAMIN D 25 HYDROXY: CPT | Performed by: INTERNAL MEDICINE

## 2019-05-02 PROCEDURE — 85652 RBC SED RATE AUTOMATED: CPT | Performed by: INTERNAL MEDICINE

## 2019-05-02 PROCEDURE — 82784 ASSAY IGA/IGD/IGG/IGM EACH: CPT | Performed by: INTERNAL MEDICINE

## 2019-05-02 PROCEDURE — 84075 ASSAY ALKALINE PHOSPHATASE: CPT | Performed by: INTERNAL MEDICINE

## 2019-05-02 PROCEDURE — 84450 TRANSFERASE (AST) (SGOT): CPT | Performed by: INTERNAL MEDICINE

## 2019-05-02 PROCEDURE — 85610 PROTHROMBIN TIME: CPT | Performed by: INTERNAL MEDICINE

## 2019-05-02 PROCEDURE — 25000125 ZZHC RX 250: Mod: ZF | Performed by: INTERNAL MEDICINE

## 2019-05-02 PROCEDURE — 84155 ASSAY OF PROTEIN SERUM: CPT | Performed by: INTERNAL MEDICINE

## 2019-05-02 PROCEDURE — 85025 COMPLETE CBC W/AUTO DIFF WBC: CPT | Performed by: INTERNAL MEDICINE

## 2019-05-02 PROCEDURE — 99605 MTMS BY PHARM NP 15 MIN: CPT | Performed by: PHARMACIST

## 2019-05-02 PROCEDURE — 82785 ASSAY OF IGE: CPT | Performed by: INTERNAL MEDICINE

## 2019-05-02 PROCEDURE — 83036 HEMOGLOBIN GLYCOSYLATED A1C: CPT | Performed by: INTERNAL MEDICINE

## 2019-05-02 PROCEDURE — 80069 RENAL FUNCTION PANEL: CPT | Performed by: INTERNAL MEDICINE

## 2019-05-02 PROCEDURE — 84403 ASSAY OF TOTAL TESTOSTERONE: CPT | Performed by: INTERNAL MEDICINE

## 2019-05-02 PROCEDURE — 80061 LIPID PANEL: CPT | Performed by: INTERNAL MEDICINE

## 2019-05-02 PROCEDURE — 84460 ALANINE AMINO (ALT) (SGPT): CPT | Performed by: INTERNAL MEDICINE

## 2019-05-02 PROCEDURE — 82977 ASSAY OF GGT: CPT | Performed by: INTERNAL MEDICINE

## 2019-05-02 PROCEDURE — 83735 ASSAY OF MAGNESIUM: CPT | Performed by: INTERNAL MEDICINE

## 2019-05-02 PROCEDURE — G0463 HOSPITAL OUTPT CLINIC VISIT: HCPCS

## 2019-05-02 PROCEDURE — 84590 ASSAY OF VITAMIN A: CPT | Performed by: INTERNAL MEDICINE

## 2019-05-02 PROCEDURE — 87070 CULTURE OTHR SPECIMN AEROBIC: CPT | Performed by: INTERNAL MEDICINE

## 2019-05-02 PROCEDURE — 83540 ASSAY OF IRON: CPT | Performed by: INTERNAL MEDICINE

## 2019-05-02 PROCEDURE — 82947 ASSAY GLUCOSE BLOOD QUANT: CPT | Mod: 91 | Performed by: INTERNAL MEDICINE

## 2019-05-02 PROCEDURE — 82962 GLUCOSE BLOOD TEST: CPT

## 2019-05-02 PROCEDURE — 82043 UR ALBUMIN QUANTITATIVE: CPT | Performed by: INTERNAL MEDICINE

## 2019-05-02 PROCEDURE — 83525 ASSAY OF INSULIN: CPT | Performed by: INTERNAL MEDICINE

## 2019-05-02 PROCEDURE — 84443 ASSAY THYROID STIM HORMONE: CPT | Performed by: INTERNAL MEDICINE

## 2019-05-02 PROCEDURE — 81001 URINALYSIS AUTO W/SCOPE: CPT | Performed by: INTERNAL MEDICINE

## 2019-05-02 RX ORDER — GANCICLOVIR 1.5 MG/G
GEL OPHTHALMIC
Refills: 0 | COMMUNITY
Start: 2019-03-26 | End: 2019-08-15

## 2019-05-02 RX ORDER — LOTEPREDNOL ETABONATE 5 MG/ML
SUSPENSION/ DROPS OPHTHALMIC
Refills: 0 | COMMUNITY
Start: 2019-04-29 | End: 2019-06-03

## 2019-05-02 RX ADMIN — ALCOHOL 75 G: 65 GEL TOPICAL at 07:50

## 2019-05-02 ASSESSMENT — MIFFLIN-ST. JEOR: SCORE: 1766.33

## 2019-05-02 ASSESSMENT — PAIN SCALES - GENERAL
PAINLEVEL: NO PAIN (0)
PAINLEVEL: NO PAIN (0)
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
## 2019-05-02 NOTE — NURSING NOTE
Chief Complaint   Patient presents with     Follow Up     3mo fu     Vitals were taken and medications were reconciled.     JENNIFER Barry

## 2019-05-02 NOTE — PROGRESS NOTES
Respiratory Therapist Note:    Vest    Brand:    Settings:    Cough Pause:    Vest Garment Size:    Last Fitting Date:    Frequency of therapy:  times per week   Concerns:     Exercise: rides a bike or runs daily for airway clearance    Alternative Airway Clearance: AerobiKa      Nebulized Medications   Bronchodilators: Albuterol   Mucolytic:    Antibiotics:    Additional Inhaled Medications:    Spacer Use: yes     Review Cleaning:     Education and Transition Information   Correct order of inhaled medications: Yes   Mechanism of Action of inhaled medications: Yes   Frequency of inhaled medications: Yes   Dosage of inhaled medications: Yes   Other:     Home Care:   Nebulizer Cups (Brand/Type):    Nebulizer Compressor    Year Purchased:    Home Care Company:         Oxygen:    Pulmonary Rehab   Site:    Date Completed:     Plan of Care and Goals for next visit: will try to increase exercise daily for airway clearance

## 2019-05-02 NOTE — PATIENT INSTRUCTIONS
Cystic Fibrosis Self-Care Plan    RECOMMENDATIONS:   Continue current medication and aerobika.   Sport medicine clinic for joint pains.          [facility] Nurse line:  Suzie Martins    [phone]     [facility] Fax Number:      [phone]         Cystic fibrosis Respiratory Therapist:   Kristi Ndiaye              [phone]   Cystic fibrosis Dietitians:              Nisa Dumont              [phone]                            April Cardona                        [phone]   Cystic fibrosis Diabetes Nurse:    Barbara Birmingham   [phone]    Cystic fibrosis Social Workers:     Melissa Calle               [phone]                     Debora Joseph               [phone]  Cystic fibrosis Pharmacist:           Amanda Gonzalez                               [phone]         Iris August   [phone]  Cystic Fibrosis Genetic Counselor:   Faby Flores    [phone]    [facility] website:  [email or web address]         MRN: [id number]   Clinic Date: May 2, 2019   Patient: Philip Delacruz     Annual Studies:   IGG   Date Value Ref Range Status   04/06/2018 1,140 695 - 1,620 mg/dL Final     Insulin   Date Value Ref Range Status   05/02/2019 9.0 3 - 25 mU/L Final     There are no preventive care reminders to display for this patient.    Pulmonary Function Tests  FEV1: amount of air you can blow out in 1 second  FVC: total amount of air you can take in and blow out    Your Goals:         PFT Latest Ref Rng & Units 5/2/2019   FVC L 5.09   FEV1 L 4.39   FVC% % 87   FEV1% % 93          Airway Clearance: The Most Important Way to Keep Your Lungs Healthy  Aerobika 1-2 times daily    Good Nutrition Can Improve Lung Function and Overall Health     Take ALL of your vitamins with food     Take 1/2 of your enzymes before EVERY meal/snack and the other 1/2 mid-meal/snack    Wt Readings from Last 3 Encounters:   05/02/19 78.2 kg (172  lb 8 oz)   05/02/19 78.4 kg (172 lb 12.8 oz)   02/28/19 79.8 kg (176 lb)       Body mass index is 22.76 kg/m .         National CF Foundation Recommendations for BMI in CF Adults: Women: at least 22 Men: at least 23        Controlling Blood Sugars Helps Prevent Lung Infections & Improves Nutrition  Test blood sugar:     In the morning before eating (goal is )     2 hours after a meal (goal is less than 150)     When pre-meal glucose is greater than 150 add correction     At bedtime (if less than 100 eat a snack with 15 grams of carbohydrates  Last A1C Results:   Hemoglobin A1C   Date Value Ref Range Status   05/02/2019 5.2 0 - 5.6 % Final     Comment:     Normal <5.7% Prediabetes 5.7-6.4%  Diabetes 6.5% or higher - adopted from ADA   consensus guidelines.           If diabetic, measure A1C every 6 months. Goal: Under 7%    Staying Healthy    Research:  If you are interested in learning about research opportunities or have questions, please contact the CF Research Team at [phone] or [email or web address].      CF Foundation:  Compass is a personalized resource service to help you with the insurance, financial, legal and other issues you are facing.  It's free, confidential and available to anyone with CF.  Ask your  for more information or contact Compass directly at 390-COMPASS ([phone]) or compass@cff.org, or learn more at cff.org/compass.

## 2019-05-02 NOTE — PROGRESS NOTES
Saleembernard NBA Jayme is here for a glucose tolerance test for a history of Cystic Fibrosis. Orders written by True Sahni MD on 05/02/2019. Patient arrived NPO, last eating at 05/01/2019.  IV placed in left upper forearm without difficulty.  Requested labs drawn. Urine collected. Glucose and Insulin levels drawn at -0- 0747, +30 0820, +60 0850, +90 0920, and +120 0950. Patient started drinking the Glucola at 0750 and completed within 10 minutes. Post blood sugar tested and was 96.  Snack given to patient prior to discharge. PIV discontinued without difficulty.    Patient will follow up, as planned, with his CF team for test results and all future appointments.     /79   Pulse 61   Temp 98  F (36.7  C) (Oral)   Resp 16   Wt 78.4 kg (172 lb 12.8 oz)   SpO2 96%   BMI 23.43 kg/m      Administrations This Visit     glucose tolerence test (GLUCOLA) 25% oral liquid 75 g     Admin Date  05/02/2019 Action  Given Dose  75 g Route  Oral Administered By  Kaci Romero, CMA
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
## 2019-05-02 NOTE — PROGRESS NOTES
SUBJECTIVE/OBJECTIVE:                           Philip Delacruz is a 38 year old male coming in for routine clinic visit.      Allergies/ADRs: Reviewed in Epic  Tobacco: No tobacco use  Alcohol: 1-3 beverages / week  PMH: Reviewed in Epic  CF Genotype: [id number]    Medication Adherence  Medication adherence flowsheet 5/6/2019   Patient medication administration: Responsible for own medications   Patient estimated adherence level: %   Pharmacist assessment of adherence: Good   Patient reported doses missed per week: 0-1   Pharmacy MPR: MPR   Pharmacy MPR: 1.0 for Symdeko   Facilitators to medication adherence  Schedule/routine   Patient reported barriers to medication adherence  No issues identified      Medication Access  Medication access flowsheet 5/6/2019   Number of pharmacies used: 2   Pharmacy: [facility]   Enrolled in [facility]? Yes   Programs or coupons used: [facility] [employer];GPS   Patient reported barriers to accessing medications: No issues reported by patient   Medication access interventions: No issues identified        CF  Inhaled medications   Bronchodilator: albuterol inhaler   Mucolytic: none  Antibiotic: not indicated  Other: Flonase nasal spray, gentamicin sinus rinse  Oral medications   Azithromycin: not indicated  CFTR modulator: Symdeko, which he is tolerating well and rarely misses doses.  LFTs today are WNL.  Pulmonary symptoms are stable  PFTs are increased  Current FEV1 93%  Cultures: sputum cultures grow normal haile  Current exacerbation: no    Pancreatic Insufficiency/Nutrition: Pancreatic enzyme replacement with Creon 01988 units.  Patient is taking 4-5 capsules with meals and 2-3 capsules with snacks. Patient is not experiencing sign/symptoms of malabsorption.  Bowel regimen: probiotics and fiber  Weight and BMI are stable  Vitamins include: MVW, fish oil    Lab Results   Component Value Date    VITDT 40 05/02/2019    VITDT 38 04/06/2018  "      PFTs:      Weight/BMI:  Estimated body mass index is 22.76 kg/m  as calculated from the following:    Height as of an earlier encounter on 5/2/19: 6' 1\" (1.854 m).    Weight as of an earlier encounter on 5/2/19: 172 lb 8 oz (78.2 kg).      ASSESSMENT:                             Current medications were reviewed today.     CF: Stable. Patient would benefit from no changes at this time. Continue Symdeko and monitor LFTs yearly.    Pancreatic Insufficiency/Nutrition: Stable.  Patient would benefit from no changes at this time.    PLAN:                            Continue current medications and keep up the good work!    I spent 15 minutes with this patient today.      Will follow up in 1 year or sooner if needed.    The patient was provided a summary of these recommendations in the AVS from CF care team visit.    Amanda Gonzalez PharmD  CF Medication Therapy Management Pharmacist  [facility]  [phone]  "

## 2019-05-03 LAB
IGE SERPL-ACNC: 23 KIU/L (ref 0–114)
IGG SERPL-MCNC: 1210 MG/DL (ref 695–1620)
TESTOST SERPL-MCNC: 531 NG/DL (ref 240–950)

## 2019-05-05 LAB
A-TOCOPHEROL VIT E SERPL-MCNC: 17.4 MG/L (ref 5.5–18)
ANNOTATION COMMENT IMP: NORMAL
BETA+GAMMA TOCOPHEROL SERPL-MCNC: 0.4 MG/L (ref 0–6)
RETINYL PALMITATE SERPL-MCNC: 0.03 MG/L (ref 0–0.1)
VIT A SERPL-MCNC: 0.74 MG/L (ref 0.3–1.2)

## 2019-05-07 ENCOUNTER — TELEPHONE (OUTPATIENT)
Dept: PULMONOLOGY | Facility: CLINIC | Age: 39
End: 2019-05-07

## 2019-05-07 DIAGNOSIS — M81.0 OSTEOPOROSIS: Primary | ICD-10-CM

## 2019-05-07 LAB
BACTERIA SPEC CULT: NORMAL
Lab: NORMAL
SPECIMEN SOURCE: NORMAL

## 2019-05-07 NOTE — TELEPHONE ENCOUNTER
Left patient VM that endo referral was placed with schedulers number to call to set up appt.     ----- Message from Khushboo Knapp MD sent at 5/7/2019  8:15 AM CDT -----  Regarding: RE: Abnormal DEXA  I would be happy to see him and assess if there are any other risk factors that may be effecting his bones    Thanks  Khushboo      ----- Message -----  From: True Sahni MD  Sent: 5/2/2019   7:15 PM  To: Khushboo Knapp MD, Genesis Arshad RN, #  Subject: Abnormal DEXA                                    Amir,    Can you take a look at Shar's DEXA scan. It is much worse than I would have expected. He was only diagnosed with CF recently. He has never received prednisone. He is rarely infected. He does not smoke. He has a fairly good diet. He is very physically active.    Do you think it warrants an endocrine appointment?    ThanksTrue

## 2019-05-13 ENCOUNTER — MYC MEDICAL ADVICE (OUTPATIENT)
Dept: PHARMACY | Facility: CLINIC | Age: 39
End: 2019-05-13

## 2019-05-15 ENCOUNTER — ANCILLARY PROCEDURE (OUTPATIENT)
Dept: GENERAL RADIOLOGY | Facility: CLINIC | Age: 39
End: 2019-05-15
Attending: FAMILY MEDICINE
Payer: COMMERCIAL

## 2019-05-15 ENCOUNTER — OFFICE VISIT (OUTPATIENT)
Dept: ORTHOPEDICS | Facility: CLINIC | Age: 39
End: 2019-05-15
Payer: COMMERCIAL

## 2019-05-15 ENCOUNTER — ALLIED HEALTH/NURSE VISIT (OUTPATIENT)
Dept: PHARMACY | Facility: CLINIC | Age: 39
End: 2019-05-15
Payer: COMMERCIAL

## 2019-05-15 VITALS — RESPIRATION RATE: 16 BRPM | HEIGHT: 73 IN | WEIGHT: 172 LBS | BODY MASS INDEX: 22.8 KG/M2

## 2019-05-15 DIAGNOSIS — M77.02 MEDIAL EPICONDYLITIS OF ELBOW, LEFT: ICD-10-CM

## 2019-05-15 DIAGNOSIS — M79.672 LEFT FOOT PAIN: Primary | ICD-10-CM

## 2019-05-15 DIAGNOSIS — E84.0 CYSTIC FIBROSIS WITH PULMONARY MANIFESTATIONS (H): Primary | ICD-10-CM

## 2019-05-15 DIAGNOSIS — M76.72 PERONEAL TENDINITIS OF LEFT LOWER EXTREMITY: ICD-10-CM

## 2019-05-15 PROCEDURE — 99207 ZZC NO CHARGE LOS: CPT | Performed by: PHARMACIST

## 2019-05-15 ASSESSMENT — MIFFLIN-ST. JEOR: SCORE: 1764.07

## 2019-05-15 NOTE — PROGRESS NOTES
SPORTS & ORTHOPEDIC WALK-IN VISIT 5/15/2019    Primary Care Physician:      General issues with joints and tendons but most problematic is left foot. This winter was on treadmill and started intermittemently having pain in lateral left foot. Doesn't notice it when he starts moving around but more painful when standing or sitting in one spot for a long time.   Also reports sharp pain in medial elbow with lifting his 3 yr old child    Just talked to pharmacist, on symdeko. Plans to stop taking it because issues have started since taking it.      Reason for visit:     What part of your body is injured / painful?  left foot    What caused the injury /pain? Treadmill walking    How long ago did your injury occur or pain begin? several months ago    What are your most bothersome symptoms? Pain    How would you characterize your symptom?  sharp    What makes your symptoms better? Other: keep walking    What makes your symptoms worse? Standing and Sitting in one spot    Have you been previously seen for this problem? Yes, for elbow and wrist     Medical History:    Any recent changes to your medical history? No    Any new medication prescribed since last visit? No    Have you had surgery on this body part before? No    Social History:    Occupation: [employer]     Handedness:     Exercise: limited because of this     Review of Systems:    Do you have fever, chills, weight loss? No    Do you have any vision problems? No    Do you have any chest pain or edema? No    Do you have any shortness of breath or wheezing?  No    Do you have stomach problems? No    Do you have any numbness or focal weakness? No    Do you have diabetes? No    Do you have problems with bleeding or clotting? No    Do you have an rashes or other skin lesions? No           PMH:  Past Medical History:   Diagnosis Date     Chronic sinusitis      Congenital absence of vas deferens, bilateral      Cystic fibrosis (H)     Delta F508 and  [id number]      Nasal polyps      Sinusitis, chronic        Active problem list:  Patient Active Problem List   Diagnosis     Cystic fibrosis with pulmonary manifestations     Chronic sinusitis     Pancreatic insufficiency     Exocrine pancreatic insufficiency     Small intestinal bacterial overgrowth       FH:  Family History   Problem Relation Age of Onset     C.A.D. Maternal Grandfather      Diabetes Maternal Grandfather      Heart Disease Maternal Grandfather      Aneurysm Paternal Grandfather         Aortic     Gastrointestinal Disease Father         Reflux     Cancer Paternal Grandmother         Lymphoma     Diabetes Maternal Grandmother      Thyroid Disease Mother       () Other        SH:  Social History     Socioeconomic History     Marital status: Single     Spouse name: Not on file     Number of children: Not on file     Years of education: Not on file     Highest education level: Not on file   Occupational History     Occupation: Teacher   Social Needs     Financial resource strain: Not on file     Food insecurity:     Worry: Not on file     Inability: Not on file     Transportation needs:     Medical: Not on file     Non-medical: Not on file   Tobacco Use     Smoking status: Never Smoker     Smokeless tobacco: Never Used   Substance and Sexual Activity     Alcohol use: Yes     Alcohol/week: 0.0 oz     Comment: 1 drink 3X per week     Drug use: No     Sexual activity: Yes     Partners: Female     Birth control/protection: None   Lifestyle     Physical activity:     Days per week: Not on file     Minutes per session: Not on file     Stress: Not on file   Relationships     Social connections:     Talks on phone: Not on file     Gets together: Not on file     Attends Quaker service: Not on file     Active member of club or organization: Not on file     Attends meetings of clubs or organizations: Not on file     Relationship status: Not on file     Intimate partner violence:     Fear of current or ex partner:  Not on file     Emotionally abused: Not on file     Physically abused: Not on file     Forced sexual activity: Not on file   Other Topics Concern     Parent/sibling w/ CABG, MI or angioplasty before 65F 55M? Not Asked   Social History Narrative    #d .  Lives in [place] with significant other       MEDS:  See EMR, reviewed  ALL:  See EMR, reviewed    REVIEW OF SYSTEMS:  CONSTITUTIONAL:NEGATIVE for fever, chills, change in weight  INTEGUMENTARY/SKIN: NEGATIVE for worrisome rashes, moles or lesions  EYES: NEGATIVE for vision changes or irritation  ENT/MOUTH: NEGATIVE for ear, mouth and throat problems  RESP:NEGATIVE for significant cough or SOB  BREAST: NEGATIVE for masses, tenderness or discharge  CV: NEGATIVE for chest pain, palpitations or peripheral edema  GI: NEGATIVE for nausea, abdominal pain, heartburn, or change in bowel habits  :NEGATIVE for frequency, dysuria, or hematuria  :NEGATIVE for frequency, dysuria, or hematuria  NEURO: NEGATIVE for weakness, dizziness or paresthesias  ENDOCRINE: NEGATIVE for temperature intolerance, skin/hair changes  HEME/ALLERGY/IMMUNE: NEGATIVE for bleeding problems  PSYCHIATRIC: NEGATIVE for changes in mood or affect        Objective: The left foot appears normal.  He is mildly tender over the peroneal attachment at the proximal fifth metatarsal but nontender over the bony metatarsal proximally or over the midshaft of the metatarsal.  Nontender at the fourth metatarsal.  Nontender at the Achilles or posterior tibial tendon.  He has normal range of motion of the great toe.  He has a tendency towards a pronated heel.  There is no effusion at the ankle or midfoot.  He has some mild tenderness at the medial epicondyle of the left elbow with full range of motion through flexion extension.  He is nontender at the ulnar groove or olecranon or lateral epicondyle.  Grasp strength is normal.  Wrist flexion against resistance reproduces medial elbow discomfort.   Appropriate in conversation and affect.    An x-ray of the left foot shows no bony abnormality at the proximal fifth metatarsal    Assessment peroneal tendinitis left ankle.  Pronated heel.  Medial epicondylitis left elbow.    Plan: For the elbow we discussed localized friction massage, relative rest, and he has a counterforce brace that he use for the next 3 weeks during the day.  He was taught how to use it properly.  For the peroneal tendinitis he is recently started his outdoor running program and been running a mile at a time on flat surfaces.  He can make it through the run but he would have discomfort afterwards.  I asked him to try to cross train over the next 3 to 4 weeks with biking and weightlifting and things that do not involve running.  He will do hamstring calf and heel cord stretches.  He has a custom orthotic already with a proper shoe.  And then this is helpful he may need to spend 2 weeks in a cam walker boot so he can rest the peroneal tendon completely.  He will follow-up in sports medicine clinic if not improving.      This note was created with the use of Dragon software and unintentional spelling or errors may have occurred.

## 2019-05-15 NOTE — LETTER
5/15/2019       RE: Philip Delacruz  [address] [facility] [address]     Dear Colleague,    Thank you for referring your patient, Philip Delacruz, to the [facility] SPORTS AND ORTHOPAEDIC WALK IN CLINIC at [facility]. Please see a copy of my visit note below.          SPORTS & ORTHOPEDIC WALK-IN VISIT 5/15/2019    Primary Care Physician:      General issues with joints and tendons but most problematic is left foot. This winter was on treadmill and started intermittemently having pain in lateral left foot. Doesn't notice it when he starts moving around but more painful when standing or sitting in one spot for a long time.   Also reports sharp pain in medial elbow with lifting his 3 yr old child    Just talked to pharmacist, on symdeko. Plans to stop taking it because issues have started since taking it.      Reason for visit:     What part of your body is injured / painful?  left foot    What caused the injury /pain? Treadmill walking    How long ago did your injury occur or pain begin? several months ago    What are your most bothersome symptoms? Pain    How would you characterize your symptom?  sharp    What makes your symptoms better? Other: keep walking    What makes your symptoms worse? Standing and Sitting in one spot    Have you been previously seen for this problem? Yes, for elbow and wrist     Medical History:    Any recent changes to your medical history? No    Any new medication prescribed since last visit? No    Have you had surgery on this body part before? No    Social History:    Occupation: [employer]     Handedness:     Exercise: limited because of this     Review of Systems:    Do you have fever, chills, weight loss? No    Do you have any vision problems? No    Do you have any chest pain or edema? No    Do you have any shortness of breath or wheezing?  No    Do you have stomach problems? No    Do you have any numbness or focal  weakness? No    Do you have diabetes? No    Do you have problems with bleeding or clotting? No    Do you have an rashes or other skin lesions? No           PMH:  Past Medical History:   Diagnosis Date     Chronic sinusitis      Congenital absence of vas deferens, bilateral      Cystic fibrosis (H)     Delta F508 and [id number]      Nasal polyps      Sinusitis, chronic        Active problem list:  Patient Active Problem List   Diagnosis     Cystic fibrosis with pulmonary manifestations     Chronic sinusitis     Pancreatic insufficiency     Exocrine pancreatic insufficiency     Small intestinal bacterial overgrowth       FH:  Family History   Problem Relation Age of Onset     C.A.D. Maternal Grandfather      Diabetes Maternal Grandfather      Heart Disease Maternal Grandfather      Aneurysm Paternal Grandfather         Aortic     Gastrointestinal Disease Father         Reflux     Cancer Paternal Grandmother         Lymphoma     Diabetes Maternal Grandmother      Thyroid Disease Mother       () Other        SH:  Social History     Socioeconomic History     Marital status: Single     Spouse name: Not on file     Number of children: Not on file     Years of education: Not on file     Highest education level: Not on file   Occupational History     Occupation: Teacher   Social Needs     Financial resource strain: Not on file     Food insecurity:     Worry: Not on file     Inability: Not on file     Transportation needs:     Medical: Not on file     Non-medical: Not on file   Tobacco Use     Smoking status: Never Smoker     Smokeless tobacco: Never Used   Substance and Sexual Activity     Alcohol use: Yes     Alcohol/week: 0.0 oz     Comment: 1 drink 3X per week     Drug use: No     Sexual activity: Yes     Partners: Female     Birth control/protection: None   Lifestyle     Physical activity:     Days per week: Not on file     Minutes per session: Not on file     Stress: Not on file   Relationships     Social connections:      Talks on phone: Not on file     Gets together: Not on file     Attends Jainism service: Not on file     Active member of club or organization: Not on file     Attends meetings of clubs or organizations: Not on file     Relationship status: Not on file     Intimate partner violence:     Fear of current or ex partner: Not on file     Emotionally abused: Not on file     Physically abused: Not on file     Forced sexual activity: Not on file   Other Topics Concern     Parent/sibling w/ CABG, MI or angioplasty before 65F 55M? Not Asked   Social History Narrative    #d .  Lives in [place] with significant other       MEDS:  See EMR, reviewed  ALL:  See EMR, reviewed    REVIEW OF SYSTEMS:  CONSTITUTIONAL:NEGATIVE for fever, chills, change in weight  INTEGUMENTARY/SKIN: NEGATIVE for worrisome rashes, moles or lesions  EYES: NEGATIVE for vision changes or irritation  ENT/MOUTH: NEGATIVE for ear, mouth and throat problems  RESP:NEGATIVE for significant cough or SOB  BREAST: NEGATIVE for masses, tenderness or discharge  CV: NEGATIVE for chest pain, palpitations or peripheral edema  GI: NEGATIVE for nausea, abdominal pain, heartburn, or change in bowel habits  :NEGATIVE for frequency, dysuria, or hematuria  :NEGATIVE for frequency, dysuria, or hematuria  NEURO: NEGATIVE for weakness, dizziness or paresthesias  ENDOCRINE: NEGATIVE for temperature intolerance, skin/hair changes  HEME/ALLERGY/IMMUNE: NEGATIVE for bleeding problems  PSYCHIATRIC: NEGATIVE for changes in mood or affect        Objective: The left foot appears normal.  He is mildly tender over the peroneal attachment at the proximal fifth metatarsal but nontender over the bony metatarsal proximally or over the midshaft of the metatarsal.  Nontender at the fourth metatarsal.  Nontender at the Achilles or posterior tibial tendon.  He has normal range of motion of the great toe.  He has a tendency towards a pronated heel.  There is no effusion at the  ankle or midfoot.  He has some mild tenderness at the medial epicondyle of the left elbow with full range of motion through flexion extension.  He is nontender at the ulnar groove or olecranon or lateral epicondyle.  Grasp strength is normal.  Wrist flexion against resistance reproduces medial elbow discomfort.  Appropriate in conversation and affect.    An x-ray of the left foot shows no bony abnormality at the proximal fifth metatarsal    Assessment peroneal tendinitis left ankle.  Pronated heel.  Medial epicondylitis left elbow.    Plan: For the elbow we discussed localized friction massage, relative rest, and he has a counterforce brace that he use for the next 3 weeks during the day.  He was taught how to use it properly.  For the peroneal tendinitis he is recently started his outdoor running program and been running a mile at a time on flat surfaces.  He can make it through the run but he would have discomfort afterwards.  I asked him to try to cross train over the next 3 to 4 weeks with biking and weightlifting and things that do not involve running.  He will do hamstring calf and heel cord stretches.  He has a custom orthotic already with a proper shoe.  And then this is helpful he may need to spend 2 weeks in a cam walker boot so he can rest the peroneal tendon completely.  He will follow-up in sports medicine clinic if not improving.      This note was created with the use of Dragon software and unintentional spelling or errors may have occurred.        Again, thank you for allowing me to participate in the care of your patient.      Sincerely,    Jose Bowman MD

## 2019-05-15 NOTE — PROGRESS NOTES
Therapy Management:                                                    Philip Delacruz is a 38 year old male called for a therapy management visit.  He was referred to me from the CF care coordinator nurse.     Reason for Consult: Symdeko discontinuation    Discussion: Shar is contemplating stopping his Symdeko.  He has been on CFTR modulators for about 3 years and feels like there have been many negative changes to his health since that time.  He was started on Kalydeco in June 2017 and was switched to Symdeko in March 2018.  He had an increase in FEV1 from 89% to 96% from June 2017 to June 2018 and PFTs have declined slightly since that time.  Despite these improvements, Shar feels there have been other negative changes to his health during that time.  He reports increased GI symptoms (abdominal bloating/discomfort, gas, bloating, loose stools) and has needed to double his pancreatic enzyme dose.  He has experienced pain in a variety of joints (wrist, leg, ankle) requiring physical therapy.  Shra would like to stop Symdeko to see if any of these symptoms improve when he is not on a CFTR modulator.    Plan:  Discussed expected benefits of CFTR modulators, that it is difficult to assess if the medication is helpful on a day-to-day basis, and that the main benefit comes from long term stability and/or increased lung function.  It appears Kalydeco and Symdeko have improved his lung function.  However, since the only way to know for sure is to stop the medication, Shar feels strongly that he would like to try a couple months off Symdeko to see how he feels.  He plans to stop Symdeko now and will re-assess at his next appointment with Dr. Sahni in August.    Will inform Dr. Sahni and will follow-up with patient in 1 month to see how he is feeling.    Amanda Gonzalez PharmD  CF Medication Therapy Management Pharmacist  [facility]  [phone]

## 2019-06-03 ENCOUNTER — OFFICE VISIT (OUTPATIENT)
Dept: OTOLARYNGOLOGY | Facility: CLINIC | Age: 39
End: 2019-06-03
Payer: COMMERCIAL

## 2019-06-03 VITALS
TEMPERATURE: 98.3 F | DIASTOLIC BLOOD PRESSURE: 82 MMHG | HEIGHT: 72 IN | WEIGHT: 174 LBS | BODY MASS INDEX: 23.57 KG/M2 | SYSTOLIC BLOOD PRESSURE: 122 MMHG | RESPIRATION RATE: 18 BRPM | HEART RATE: 81 BPM

## 2019-06-03 DIAGNOSIS — J32.4 CHRONIC PANSINUSITIS: Primary | ICD-10-CM

## 2019-06-03 ASSESSMENT — PAIN SCALES - GENERAL: PAINLEVEL: NO PAIN (0)

## 2019-06-03 ASSESSMENT — MIFFLIN-ST. JEOR: SCORE: 1753.01

## 2019-06-03 NOTE — PATIENT INSTRUCTIONS
Thank you for choosing [facility] today.    Please follow-up with Dr. Pickering as needed.     If you have any further questions or concerns, call us at [phone].

## 2019-06-03 NOTE — LETTER
6/3/2019       RE: Philip Delacruz  [address] [facility] [address]     Dear Colleague,    Thank you for referring your patient, Philip Delacruz, to the [facility] EAR NOSE AND THROAT at [facility]. Please see a copy of my visit note below.    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 Multiple prior sinus surgeries, last was around 2004. His sinus symptoms flared last fall. He was on antibiotics in November (amoxicilling) and March. He tried to use Gentamicin irrigations but felt that made his cough worse. Additionally, he took Augmentin and eventually his symptoms improved. He again had increased symptoms 10 days ago and has been taking doxycycline. Feels pain on left, green drainage. Has been irrigating with plain saline as well. Last CT was 2017 - sinuses surgically open and very clear.     Patient Supplied Answers to Review of Systems  [facility] ROS 6/3/2019   Ears, Nose, Throat Ringing/noise in ears, Nasal congestion or drainage   Cardiopulmonary Cough     Exam: alert oriented. Anterior rhinoscopy appears clear.     Procedure:  Endoscopy indicated for exam to rule out sinusitis  Topical anesthetic/decongestant spray applied.  Rigid scope used for visualization.  Findings: bilateral passages and middle meatus clear and disease free    A/P: No signs of infection today. He would like to try other topical agents and different deliver mechanisms in the event that he has a flare up again. This is a reasonable approach. Will review prior meds to determine which other topicals he has tried.           Again, thank you for allowing me to participate in the care of your patient.      Sincerely,    Sara Pickering MD

## 2019-06-03 NOTE — NURSING NOTE
"Chief Complaint   Patient presents with     RECHECK     sinus problems      Resp. rate 18, height 1.83 m (6' 0.05\"), weight 78.9 kg (174 lb).    Angel Ponce LPN    "

## 2019-06-03 NOTE — PROGRESS NOTES
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 Multiple prior sinus surgeries, last was around 2004. His sinus symptoms flared last fall. He was on antibiotics in November (amoxicilling) and March. He tried to use Gentamicin irrigations but felt that made his cough worse. Additionally, he took Augmentin and eventually his symptoms improved. He again had increased symptoms 10 days ago and has been taking doxycycline. Feels pain on left, green drainage. Has been irrigating with plain saline as well. Last CT was 2017 - sinuses surgically open and very clear.     Patient Supplied Answers to Review of Systems   ENT ROS 6/3/2019   Ears, Nose, Throat Ringing/noise in ears, Nasal congestion or drainage   Cardiopulmonary Cough     Exam: alert oriented. Anterior rhinoscopy appears clear.     Procedure:  Endoscopy indicated for exam to rule out sinusitis  Topical anesthetic/decongestant spray applied.  Rigid scope used for visualization.  Findings: bilateral passages and middle meatus clear and disease free    A/P: No signs of infection today. He would like to try other topical agents and different deliver mechanisms in the event that he has a flare up again. This is a reasonable approach. Will review prior meds to determine which other topicals he has tried.

## 2019-07-02 ENCOUNTER — OFFICE VISIT (OUTPATIENT)
Dept: ENDOCRINOLOGY | Facility: CLINIC | Age: 39
End: 2019-07-02
Attending: INTERNAL MEDICINE
Payer: COMMERCIAL

## 2019-07-02 VITALS
DIASTOLIC BLOOD PRESSURE: 84 MMHG | RESPIRATION RATE: 16 BRPM | HEART RATE: 82 BPM | WEIGHT: 176.5 LBS | BODY MASS INDEX: 23.91 KG/M2 | HEIGHT: 72 IN | OXYGEN SATURATION: 95 % | SYSTOLIC BLOOD PRESSURE: 129 MMHG | TEMPERATURE: 98.3 F

## 2019-07-02 DIAGNOSIS — M85.80 BONE LOSS: Primary | ICD-10-CM

## 2019-07-02 PROCEDURE — G0463 HOSPITAL OUTPT CLINIC VISIT: HCPCS | Mod: ZF

## 2019-07-02 ASSESSMENT — ENCOUNTER SYMPTOMS
BACK PAIN: 0
JAUNDICE: 0
MUSCLE WEAKNESS: 0
BLOOD IN STOOL: 0
EYE IRRITATION: 1
EYE REDNESS: 1
MYALGIAS: 1
DIARRHEA: 0
CONSTIPATION: 0
NAUSEA: 0
MUSCLE CRAMPS: 0
BLOATING: 0
ABDOMINAL PAIN: 0
STIFFNESS: 0
BOWEL INCONTINENCE: 0
VOMITING: 0
ARTHRALGIAS: 1
NECK PAIN: 1
HEARTBURN: 0
RECTAL PAIN: 0
JOINT SWELLING: 0

## 2019-07-02 ASSESSMENT — PAIN SCALES - GENERAL: PAINLEVEL: NO PAIN (0)

## 2019-07-02 ASSESSMENT — MIFFLIN-ST. JEOR: SCORE: 1764.35

## 2019-07-02 NOTE — PROGRESS NOTES
CF Endocrinology  Consultation:  Low bone density  :   Patient: Philip Delacruz MRN# [id number]   YOB: 1980 Age: 39 year old   Date of Visit: 2019    Dear Dr. Sahni    I had the pleasure of seeing your patient, Philip Delacruz in the CF Endocrinology Clinic, [facility], on 2019 for a consultation regarding bone loss.           Problem list:     Patient Active Problem List    Diagnosis Date Noted     Small intestinal bacterial overgrowth 2018     Priority: Medium     Exocrine pancreatic insufficiency 04/10/2018     Priority: Medium     Pancreatic insufficiency 2018     Priority: Medium     Chronic sinusitis      Priority: Medium     Cystic fibrosis with pulmonary manifestations 2014     Priority: Medium     SWEAT TEST:  Date:  2014           Laboratory: [facility]  Sample #1   mg     30  mmol/L Cl  Sample #2   mg     27  mmol/L Cl     GENOTYPING:  Date:  2014        [facility]  Genotype:  [id number]  Poly T Variant:  [ 7T ]/[9T  ]              HPI:   Philip is a 39 year old male with Bone loss.    I have reviewed the available past laboratory evaluations, imaging studies, and medical records available to me at this visit. I have reviewed  Philip'height and weight.    History was obtained from the patient and the medical record.  I have reviewed the notes of the pulmonary care team entered into the medical record.    On last DEXA scan he was noted to have significant decline in bone density about 8% at the lumbar spine and 4% at hip.  Lowest Z score was -1.6 at lumbar spine L1-L3    Patient reports that he was diagnosed with CF in his early 30s while undergoing evaluation for infertility.  He reports that last 2 to 3 years have been difficult due to several medical issues.  He complains of worsening GI symptoms with requiring higher doses of Creon.  Reports that his lung function has also fluctuated but overall  stable.  He stopped Symdeko recently due to concern about side effects.    He reports remote history of 2 hand bone fractures in the setting of trauma.  No history of osteoporotic type fractures.  He takes CF multivitamin with 1500 units of vitamin D daily.  Does not take calcium supplements.  Usually has 1 serving of dairy products daily.  Reports that higher intake of dairy products worsen GI symptoms in the past.  No history of renal stones.    His recent vitamin D, testosterone and TSH levels were normal.  Any history of hospitalization related to CF exacerbation.  Complains of joint pains.  Reports that over the last 2 to 3 years he has not been able to exercise regularly.  There was some improvement in bone density between 2014 and 2017 which he attributes to doing more CrossFit.  Complains of some anxiety and mood issues related to increased stress, he attributes to work.    No weight loss over the last few years.          Past Medical History:     Past Medical History:   Diagnosis Date     Chronic sinusitis      Congenital absence of vas deferens, bilateral      Cystic fibrosis (H)     Delta F508 and H4817C      Nasal polyps      Sinusitis, chronic             Past Surgical History:     Past Surgical History:   Procedure Laterality Date     HC TOOTH EXTRACTION W/FORCEP       NASAL/SINUS POLYPECTOMY       REPAIR PECTUS EXCAVATUM  1997     SINUS SURGERY  1992, 2002, 2004    Removed tubinates and polyps OSH     Sperm harvest                 Social History:     Social History     Social History Narrative    #d .  Lives in [place] with significant other              Family History:     Family History   Problem Relation Age of Onset     C.A.D. Maternal Grandfather      Diabetes Maternal Grandfather      Heart Disease Maternal Grandfather      Aneurysm Paternal Grandfather         Aortic     Gastrointestinal Disease Father         Reflux     Cancer Paternal Grandmother         Lymphoma     Diabetes  "Maternal Grandmother      Thyroid Disease Mother       () Other    Both parents had history of renal stones         Allergies:     Allergies   Allergen Reactions     Liquid Adhesive Rash     Tagederm     Tegaderm Transparent Dressing (Informational Only) Rash             Medications:     Current Outpatient Rx   Medication Sig Dispense Refill     ACYCLOVIR PO Take 400 mg by mouth daily       albuterol (PROAIR HFA/PROVENTIL HFA/VENTOLIN HFA) 108 (90 Base) MCG/ACT inhaler Inhale 2 puffs into the lungs every 6 hours as needed for shortness of breath / dyspnea or wheezing 1 Inhaler 11     CREON 36470-39838 units CPEP per EC capsule Take 4-5 capsules (96,000-120,000 Units) by mouth 3 times daily (with meals) And 2-3 with snacks. Maximum of 25 capsules per day. 750 capsule 11     fluticasone (FLONASE) 50 MCG/ACT spray Spray 2 sprays into both nostrils daily 1 Bottle 3     gentamicin 0.008% in 1000ml 0.9% NaCl (GARAMYCIN) SOLN nasal irrigation Gentamicin 80 mg capsule - Dissolve contents of 1 capsule to 240 mL of saline and irrigate each nostril with 120 mL once daily for 30 days\". 1000 mL 6     multivitamin CF formula (MVW COMPLETE FORMULATION) chewable tablet Take 1 tablet by mouth daily       Omega-3 Fatty Acids (FISH OIL) 1200 MG CPDR Take 2 capsules by mouth daily       order for DME Use Wendy Vios nebulizer for nasal/sinus nebulizing as instructed 1 each 1     Probiotic Product (FLORAJEN BIFIDOBLEND) CAPS Take 1 capsule by mouth 2 times daily       psyllium (METAMUCIL) 58.6 % POWD Take 1 Tablespoonful by mouth daily       SYMDEKO 100-150 & 150 MG tablet pack TAKE ONE TABLET BY MOUTH TWICE A DAY WITH FAT-CONTAINING FOOD APPROXIMATELY 12 HOURS APART AS DIRECTED BY PACKAGE 56 tablet 12     oseltamivir (TAMIFLU) 75 MG capsule Take 1 capsule (75 mg) by mouth 2 times daily The patient will call and  the medication if needed. (Patient not taking: Reported on 6/3/2019) 10 capsule 1     Sodium Chloride-Sodium Bicarb " "(SINUFLO READYRINSE) KIT Daily PRN       ZIRGAN 0.15 % GEL INT 1 GTT IN OU 6 TIMES QD  0             Review of Systems:     See below          Physical Exam:   Blood pressure 129/84, pulse 82, temperature 98.3  F (36.8  C), temperature source Oral, resp. rate 16, height 1.83 m (6' 0.05\"), weight 80.1 kg (176 lb 8 oz), SpO2 95 %.  Blood pressure percentiles are not available for patients who are 18 years or older.  Height: 6' .047\", Normalized stature-for-age data not available for patients older than 20 years.  Weight: 176 lbs 8 oz, Normalized weight-for-age data not available for patients older than 20 years.  BMI: Body mass index is 23.91 kg/m ., Normalized BMI data available only for age 0 to 20 years.      CONSTITUTIONAL:   Awake, alert, and in no apparent distress.  HEAD: Normocephalic, without obvious abnormality.  EYES: sclera clear, conjunctiva normal.  ENT:oral pharynx with moist mucus membranes.  NECK: Supple, symmetrical, trachea midline.  THYROID: symmetric, not enlarged and no tenderness.  HEMATOLOGIC/LYMPHATIC: No cervical lymphadenopathy.  LUNGS: No increased work of breathing, clear to auscultation  with good air entry  CARDIOVASCULAR: Regular rate and rhythm, no murmurs.  ABDOMEN: Soft, non-distended, non-tender, no masses palpated, no hepatosplenomegally.  NEUROLOGIC:No focal deficits noted.   PSYCHIATRIC: Cooperative, no agitation.  SKIN: No history of lower abdomen  MUSCULOSKELETAL: No spinal tenderness or deformity          Laboratory results:     Reviewed in epic           Assessment and Plan:   Philip is a 39 year old male with CF and decline in bone density    Bone loss: Patient was counseled about factors that contribute to bone loss in patients with CF.  We discussed the various modifiable risk factors for bone loss.  His recent  thyroid, testosterone and vitamin D levels were normal.  He has had significant decline in bone density but overall Z score are still within the expected " range for age.  He is not on steroids.  Does not meet criteria for pharmacological therapy for low bone density at this time.    Patient inquired about the impact of CFTR modulators on bone density.  Reviewed that there is limited data on that but preliminary studies suggest potential benefit.  However future studies are needed to understand if CF care modulation would be directly beneficial for bone.    Recommend that he increase weightbearing exercise as tolerated.  Also add calcium supplement 600 mg daily.  Continue CF multivitamin with vitamin D.  May consider bisphosphonate therapy in case he requires steroid therapy for more than 3 months.  Follow-up DEXA in 2 years    Return to clinic as needed    Thank you for allowing me to participate in the care of your patient.  Please do not hesitate to call with questions or concerns.    Sincerely,    AVIS Mckeon    CC  [facility]    Note: Chart documentation done in part with Dragon Voice Recognition software. Although reviewed after completion, some word and grammatical errors may remain.  Please consider this when interpreting information in this chart    Answers for HPI/ROS submitted by the patient on 7/2/2019   General Symptoms: No  Skin Symptoms: No  HENT Symptoms: No  EYE SYMPTOMS: Yes  HEART SYMPTOMS: No  LUNG SYMPTOMS: No  INTESTINAL SYMPTOMS: Yes  URINARY SYMPTOMS: No  REPRODUCTIVE SYMPTOMS: No  SKELETAL SYMPTOMS: Yes  BLOOD SYMPTOMS: No  NERVOUS SYSTEM SYMPTOMS: No  MENTAL HEALTH SYMPTOMS: No  Dry eyes: Yes  Redness: Yes  Eye irritation: Yes  Heart burn or indigestion: No  Nausea: No  Vomiting: No  Abdominal pain: No  Bloating: No  Constipation: No  Diarrhea: No  Blood in stool: No  Black stools: No  Rectal or Anal pain: No  Fecal incontinence: No  Yellowing of skin or eyes: No  Vomit with blood: No  Change in stools: Yes  Back pain: No  Muscle aches: Yes  Neck pain: Yes  Swollen joints: No  Joint pain: Yes  Bone pain: No  Muscle  cramps: No  Muscle weakness: No  Joint stiffness: No  Bone fracture: No

## 2019-07-02 NOTE — NURSING NOTE
Chief Complaint   Patient presents with     Consult     Cystic Fibrosis, Abnormal DEXA     Patricia Prince, CMA

## 2019-07-02 NOTE — LETTER
2019       RE: Philip Delacruz  [address] [facility] [address]     Dear Colleague,    Thank you for referring your patient, Philip Delacruz, to the [facility] at [facility]. Please see a copy of my visit note below.    CF Endocrinology  Consultation:  Low bone density  :   Patient: Philip Delacruz MRN# [id number]   YOB: 1980 Age: 39 year old   Date of Visit: 2019    Dear Dr. Sahni    I had the pleasure of seeing your patient, Philip Delacruz in the CF Endocrinology Clinic, [facility], on 2019 for a consultation regarding bone loss.           Problem list:     Patient Active Problem List    Diagnosis Date Noted     Small intestinal bacterial overgrowth 2018     Priority: Medium     Exocrine pancreatic insufficiency 04/10/2018     Priority: Medium     Pancreatic insufficiency 2018     Priority: Medium     Chronic sinusitis      Priority: Medium     Cystic fibrosis with pulmonary manifestations 2014     Priority: Medium     SWEAT TEST:  Date:  2014           Laboratory: [facility]  Sample #1   mg     30  mmol/L Cl  Sample #2   mg     27  mmol/L Cl     GENOTYPING:  Date:  2014        [facility]  Genotype:  [id number]  Poly T Variant:  [ 7T ]/[9T  ]              HPI:   Philip is a 39 year old male with Bone loss.    I have reviewed the available past laboratory evaluations, imaging studies, and medical records available to me at this visit. I have reviewed  Philip'height and weight.    History was obtained from the patient and the medical record.  I have reviewed the notes of the pulmonary care team entered into the medical record.    On last DEXA scan he was noted to have significant decline in bone density about 8% at the lumbar spine and 4% at hip.  Lowest Z score was -1.6 at lumbar spine L1-L3    Patient reports that he  was diagnosed with CF in his early 30s while undergoing evaluation for infertility.  He reports that last 2 to 3 years have been difficult due to several medical issues.  He complains of worsening GI symptoms with requiring higher doses of Creon.  Reports that his lung function has also fluctuated but overall stable.  He stopped Symdeko recently due to concern about side effects.    He reports remote history of 2 hand bone fractures in the setting of trauma.  No history of osteoporotic type fractures.  He takes CF multivitamin with 1500 units of vitamin D daily.  Does not take calcium supplements.  Usually has 1 serving of dairy products daily.  Reports that higher intake of dairy products worsen GI symptoms in the past.  No history of renal stones.    His recent vitamin D, testosterone and TSH levels were normal.  Any history of hospitalization related to CF exacerbation.  Complains of joint pains.  Reports that over the last 2 to 3 years he has not been able to exercise regularly.  There was some improvement in bone density between 2014 and 2017 which he attributes to doing more CrossFit.  Complains of some anxiety and mood issues related to increased stress, he attributes to work.    No weight loss over the last few years.          Past Medical History:     Past Medical History:   Diagnosis Date     Chronic sinusitis      Congenital absence of vas deferens, bilateral      Cystic fibrosis (H)     Delta F508 and [id number]      Nasal polyps      Sinusitis, chronic             Past Surgical History:     Past Surgical History:   Procedure Laterality Date     HC TOOTH EXTRACTION W/FORCEP       NASAL/SINUS POLYPECTOMY       REPAIR PECTUS EXCAVATUM  1997     SINUS SURGERY  1992, 2002, 2004    Removed tubinates and polyps OSH     Sperm harvest                 Social History:     Social History     Social History Narrative    #d .  Lives in [place] with significant other              Family History:     Family  "History   Problem Relation Age of Onset     C.A.D. Maternal Grandfather      Diabetes Maternal Grandfather      Heart Disease Maternal Grandfather      Aneurysm Paternal Grandfather         Aortic     Gastrointestinal Disease Father         Reflux     Cancer Paternal Grandmother         Lymphoma     Diabetes Maternal Grandmother      Thyroid Disease Mother       () Other    Both parents had history of renal stones         Allergies:     Allergies   Allergen Reactions     Liquid Adhesive Rash     Tagederm     Tegaderm Transparent Dressing (Informational Only) Rash             Medications:     Current Outpatient Rx   Medication Sig Dispense Refill     ACYCLOVIR PO Take 400 mg by mouth daily       albuterol (PROAIR HFA/PROVENTIL HFA/VENTOLIN HFA) 108 (90 Base) MCG/ACT inhaler Inhale 2 puffs into the lungs every 6 hours as needed for shortness of breath / dyspnea or wheezing 1 Inhaler 11     CREON 72422-49033 units CPEP per EC capsule Take 4-5 capsules (96,000-120,000 Units) by mouth 3 times daily (with meals) And 2-3 with snacks. Maximum of 25 capsules per day. 750 capsule 11     fluticasone (FLONASE) 50 MCG/ACT spray Spray 2 sprays into both nostrils daily 1 Bottle 3     gentamicin 0.008% in 1000ml 0.9% NaCl (GARAMYCIN) SOLN nasal irrigation Gentamicin 80 mg capsule - Dissolve contents of 1 capsule to 240 mL of saline and irrigate each nostril with 120 mL once daily for 30 days\". 1000 mL 6     multivitamin CF formula (MVW COMPLETE FORMULATION) chewable tablet Take 1 tablet by mouth daily       Omega-3 Fatty Acids (FISH OIL) 1200 MG CPDR Take 2 capsules by mouth daily       order for DME Use Wendy Vios nebulizer for nasal/sinus nebulizing as instructed 1 each 1     Probiotic Product (FLORAJEN BIFIDOBLEND) CAPS Take 1 capsule by mouth 2 times daily       psyllium (METAMUCIL) 58.6 % POWD Take 1 Tablespoonful by mouth daily       SYMDEKO 100-150 & 150 MG tablet pack TAKE ONE TABLET BY MOUTH TWICE A DAY WITH FAT-CONTAINING " "FOOD APPROXIMATELY 12 HOURS APART AS DIRECTED BY PACKAGE 56 tablet 12     oseltamivir (TAMIFLU) 75 MG capsule Take 1 capsule (75 mg) by mouth 2 times daily The patient will call and  the medication if needed. (Patient not taking: Reported on 6/3/2019) 10 capsule 1     Sodium Chloride-Sodium Bicarb (SINUFLO READYRINSE) KIT Daily PRN       ZIRGAN 0.15 % GEL INT 1 GTT IN OU 6 TIMES QD  0             Review of Systems:     See below          Physical Exam:   Blood pressure 129/84, pulse 82, temperature 98.3  F (36.8  C), temperature source Oral, resp. rate 16, height 1.83 m (6' 0.05\"), weight 80.1 kg (176 lb 8 oz), SpO2 95 %.  Blood pressure percentiles are not available for patients who are 18 years or older.  Height: 6' .047\", Normalized stature-for-age data not available for patients older than 20 years.  Weight: 176 lbs 8 oz, Normalized weight-for-age data not available for patients older than 20 years.  BMI: Body mass index is 23.91 kg/m ., Normalized BMI data available only for age 0 to 20 years.      CONSTITUTIONAL:   Awake, alert, and in no apparent distress.  HEAD: Normocephalic, without obvious abnormality.  EYES: sclera clear, conjunctiva normal.  ENT:oral pharynx with moist mucus membranes.  NECK: Supple, symmetrical, trachea midline.  THYROID: symmetric, not enlarged and no tenderness.  HEMATOLOGIC/LYMPHATIC: No cervical lymphadenopathy.  LUNGS: No increased work of breathing, clear to auscultation  with good air entry  CARDIOVASCULAR: Regular rate and rhythm, no murmurs.  ABDOMEN: Soft, non-distended, non-tender, no masses palpated, no hepatosplenomegally.  NEUROLOGIC:No focal deficits noted.   PSYCHIATRIC: Cooperative, no agitation.  SKIN: No history of lower abdomen  MUSCULOSKELETAL: No spinal tenderness or deformity          Laboratory results:     Reviewed in epic           Assessment and Plan:   Philip is a 39 year old male with CF and decline in bone density    Bone loss: Patient was " counseled about factors that contribute to bone loss in patients with CF.  We discussed the various modifiable risk factors for bone loss.  His recent  thyroid, testosterone and vitamin D levels were normal.  He has had significant decline in bone density but overall Z score are still within the expected range for age.  He is not on steroids.  Does not meet criteria for pharmacological therapy for low bone density at this time.    Patient inquired about the impact of CFTR modulators on bone density.  Reviewed that there is limited data on that but preliminary studies suggest potential benefit.  However future studies are needed to understand if CF care modulation would be directly beneficial for bone.    Recommend that he increase weightbearing exercise as tolerated.  Also add calcium supplement 600 mg daily.  Continue CF multivitamin with vitamin D.  May consider bisphosphonate therapy in case he requires steroid therapy for more than 3 months.  Follow-up DEXA in 2 years    Return to clinic as needed    Thank you for allowing me to participate in the care of your patient.  Please do not hesitate to call with questions or concerns.    Sincerely,    AVIS Mckeon    CC  [facility]    Note: Chart documentation done in part with Dragon Voice Recognition software. Although reviewed after completion, some word and grammatical errors may remain.  Please consider this when interpreting information in this chart    Answers for HPI/ROS submitted by the patient on 7/2/2019   General Symptoms: No  Skin Symptoms: No  HENT Symptoms: No  EYE SYMPTOMS: Yes  HEART SYMPTOMS: No  LUNG SYMPTOMS: No  INTESTINAL SYMPTOMS: Yes  URINARY SYMPTOMS: No  REPRODUCTIVE SYMPTOMS: No  SKELETAL SYMPTOMS: Yes  BLOOD SYMPTOMS: No  NERVOUS SYSTEM SYMPTOMS: No  MENTAL HEALTH SYMPTOMS: No  Dry eyes: Yes  Redness: Yes  Eye irritation: Yes  Heart burn or indigestion: No  Nausea: No  Vomiting: No  Abdominal pain: No  Bloating:  No  Constipation: No  Diarrhea: No  Blood in stool: No  Black stools: No  Rectal or Anal pain: No  Fecal incontinence: No  Yellowing of skin or eyes: No  Vomit with blood: No  Change in stools: Yes  Back pain: No  Muscle aches: Yes  Neck pain: Yes  Swollen joints: No  Joint pain: Yes  Bone pain: No  Muscle cramps: No  Muscle weakness: No  Joint stiffness: No  Bone fracture: No      Again, thank you for allowing me to participate in the care of your patient.      Sincerely,    Khushboo Knapp MD

## 2019-07-16 ENCOUNTER — OFFICE VISIT (OUTPATIENT)
Dept: PULMONOLOGY | Facility: CLINIC | Age: 39
End: 2019-07-16
Attending: PSYCHIATRY & NEUROLOGY
Payer: COMMERCIAL

## 2019-07-16 VITALS
OXYGEN SATURATION: 96 % | BODY MASS INDEX: 23.81 KG/M2 | TEMPERATURE: 97.7 F | DIASTOLIC BLOOD PRESSURE: 80 MMHG | RESPIRATION RATE: 18 BRPM | HEIGHT: 72 IN | SYSTOLIC BLOOD PRESSURE: 133 MMHG | HEART RATE: 54 BPM | WEIGHT: 175.8 LBS

## 2019-07-16 DIAGNOSIS — F41.1 GENERALIZED ANXIETY DISORDER: ICD-10-CM

## 2019-07-16 DIAGNOSIS — F41.1 GENERALIZED ANXIETY DISORDER: Primary | ICD-10-CM

## 2019-07-16 LAB
ALBUMIN UR-MCNC: NEGATIVE MG/DL
APPEARANCE UR: CLEAR
BILIRUB UR QL STRIP: NEGATIVE
COLOR UR AUTO: NORMAL
FERRITIN SERPL-MCNC: 211 NG/ML (ref 26–388)
GLUCOSE UR STRIP-MCNC: NEGATIVE MG/DL
HGB UR QL STRIP: NEGATIVE
KETONES UR STRIP-MCNC: NEGATIVE MG/DL
LEUKOCYTE ESTERASE UR QL STRIP: NEGATIVE
NITRATE UR QL: NEGATIVE
PH UR STRIP: 6 PH (ref 5–7)
PTH-INTACT SERPL-MCNC: 28 PG/ML (ref 18–80)
SOURCE: NORMAL
SP GR UR STRIP: 1 (ref 1–1.03)
T3 SERPL-MCNC: 95 NG/DL (ref 60–181)
T3FREE SERPL-MCNC: 3 PG/ML (ref 2.3–4.2)
T4 FREE SERPL-MCNC: 0.94 NG/DL (ref 0.76–1.46)
T4 SERPL-MCNC: 8.6 UG/DL (ref 4.5–13.9)
UROBILINOGEN UR STRIP-MCNC: 0 MG/DL (ref 0–2)
VIT B12 SERPL-MCNC: 903 PG/ML (ref 193–986)

## 2019-07-16 PROCEDURE — 84481 FREE ASSAY (FT-3): CPT | Performed by: PSYCHIATRY & NEUROLOGY

## 2019-07-16 PROCEDURE — 93005 ELECTROCARDIOGRAM TRACING: CPT | Mod: ZF

## 2019-07-16 PROCEDURE — 86038 ANTINUCLEAR ANTIBODIES: CPT | Performed by: PSYCHIATRY & NEUROLOGY

## 2019-07-16 PROCEDURE — 82747 ASSAY OF FOLIC ACID RBC: CPT | Performed by: PSYCHIATRY & NEUROLOGY

## 2019-07-16 PROCEDURE — 84439 ASSAY OF FREE THYROXINE: CPT | Performed by: PSYCHIATRY & NEUROLOGY

## 2019-07-16 PROCEDURE — 36415 COLL VENOUS BLD VENIPUNCTURE: CPT | Performed by: PSYCHIATRY & NEUROLOGY

## 2019-07-16 PROCEDURE — 86780 TREPONEMA PALLIDUM: CPT | Performed by: PSYCHIATRY & NEUROLOGY

## 2019-07-16 PROCEDURE — G0463 HOSPITAL OUTPT CLINIC VISIT: HCPCS | Mod: ZF

## 2019-07-16 PROCEDURE — 93010 ELECTROCARDIOGRAM REPORT: CPT | Mod: ZP | Performed by: INTERNAL MEDICINE

## 2019-07-16 PROCEDURE — 82728 ASSAY OF FERRITIN: CPT | Performed by: PSYCHIATRY & NEUROLOGY

## 2019-07-16 PROCEDURE — 82607 VITAMIN B-12: CPT | Performed by: PSYCHIATRY & NEUROLOGY

## 2019-07-16 PROCEDURE — 83970 ASSAY OF PARATHORMONE: CPT | Performed by: PSYCHIATRY & NEUROLOGY

## 2019-07-16 PROCEDURE — 84436 ASSAY OF TOTAL THYROXINE: CPT | Performed by: PSYCHIATRY & NEUROLOGY

## 2019-07-16 PROCEDURE — 40000358 ZZHCL STATISTIC DRUG SCREEN MULTIPLE (METRO): Performed by: PSYCHIATRY & NEUROLOGY

## 2019-07-16 PROCEDURE — 84480 ASSAY TRIIODOTHYRONINE (T3): CPT | Performed by: PSYCHIATRY & NEUROLOGY

## 2019-07-16 PROCEDURE — 80307 DRUG TEST PRSMV CHEM ANLYZR: CPT | Performed by: PSYCHIATRY & NEUROLOGY

## 2019-07-16 PROCEDURE — 81003 URINALYSIS AUTO W/O SCOPE: CPT | Mod: XU | Performed by: PSYCHIATRY & NEUROLOGY

## 2019-07-16 PROCEDURE — 86431 RHEUMATOID FACTOR QUANT: CPT | Performed by: PSYCHIATRY & NEUROLOGY

## 2019-07-16 RX ORDER — ESCITALOPRAM OXALATE 10 MG/1
10 TABLET ORAL
Qty: 30 TABLET | Refills: 1 | Status: SHIPPED | OUTPATIENT
Start: 2019-07-16 | End: 2019-12-26

## 2019-07-16 ASSESSMENT — ENCOUNTER SYMPTOMS
ARTHRALGIAS: 1
MYALGIAS: 1

## 2019-07-16 ASSESSMENT — MIFFLIN-ST. JEOR: SCORE: 1761.17

## 2019-07-16 ASSESSMENT — PAIN SCALES - GENERAL: PAINLEVEL: NO PAIN (0)

## 2019-07-16 NOTE — NURSING NOTE
Chief Complaint   Patient presents with     Consult      HEALTH PSYCH     Patricia Prince, PARTH     Subjective:       Patient ID: Floyd Nunes is a 63 y.o. male.    Chief Complaint: Follow-up (one month follow up) and Stress (patient has been very stressed )    HPI patient is here for follow-up as scheduled his main complaint today stressed how patient wife has been sick due to the aortic valve need to be replaced has been the hospital for 3 time in the last 4 weeks patient working full-time had spent a time in hospice his wife stressed the fill not appreciated but also explained the patient his wife probably very scare and fearful for her life now try to support her as much he can patient deny any physical symptoms still smoking but aware that he needs to quit hard to do with the situation at the current time no short of breath chest pain suicidal thought or ideation or depression  Review of Systems    Objective:      Physical Exam   Constitutional: He is oriented to person, place, and time. He appears well-developed and well-nourished. No distress.   HENT:   Head: Normocephalic and atraumatic.   Right Ear: External ear normal.   Left Ear: External ear normal.   Nose: Nose normal.   Mouth/Throat: Oropharynx is clear and moist. No oropharyngeal exudate.   Eyes: Pupils are equal, round, and reactive to light. Conjunctivae and EOM are normal. Right eye exhibits no discharge. Left eye exhibits no discharge.   Neck: Normal range of motion. Neck supple. No thyromegaly present.   Cardiovascular: Normal rate, regular rhythm, normal heart sounds and intact distal pulses. Exam reveals no gallop and no friction rub.   No murmur heard.  Pulmonary/Chest: Effort normal and breath sounds normal. No respiratory distress. He has no wheezes. He has no rales. He exhibits no tenderness.   Abdominal: Soft. Bowel sounds are normal. He exhibits no distension. There is no tenderness. There is no rebound and no guarding.   Musculoskeletal: Normal range of motion. He exhibits no edema, tenderness or deformity.   Lymphadenopathy:     He  has no cervical adenopathy.   Neurological: He is alert and oriented to person, place, and time.   Skin: Skin is warm and dry. Capillary refill takes less than 2 seconds. No rash noted. No erythema.   Psychiatric: He has a normal mood and affect. Judgment and thought content normal.   Nursing note and vitals reviewed.      Assessment:       1. Anxiety    2. Stress and adjustment reaction    3. Essential hypertension    4. Tobacco use        Plan:       Anxiety  -     diazePAM (VALIUM) 5 MG tablet; Take 1 tablet (5 mg total) by mouth every 12 (twelve) hours as needed for Anxiety.  Dispense: 30 tablet; Refill: 0    Stress and adjustment reaction  Comments:  Speak and counseling patient and for 20 min patient aware of situation and will try medications and awareness of situation to reduce stress  Orders:  -     escitalopram oxalate (LEXAPRO) 20 MG tablet; Take 1 tablet (20 mg total) by mouth once daily.  Dispense: 30 tablet; Refill: 11    Essential hypertension  -     losartan (COZAAR) 50 MG tablet; Take 1 tablet (50 mg total) by mouth once daily.  Dispense: 90 tablet; Refill: 3    Tobacco use  -     nicotine (NICODERM CQ) 21 mg/24 hr; Place 1 patch onto the skin once daily.; Refill: 0

## 2019-07-16 NOTE — LETTER
7/16/2019       RE: Philip Delacruz  [address] [facility] [address]     Dear Colleague,    Thank you for referring your patient, Philip Delacruz, to the [facility] FOR LUNG SCIENCE AND HEALTH at [facility]. Please see a copy of my visit note below.    Service Date: 07/16/2019      IDENTIFICATION:  The patient is a 39-year-old,  male with 2 children, presenting here for his first psychiatric evaluation at the [facility].      HISTORY OF PRESENT ILLNESS:  The patient denies depression.  Patient denies suicidal or homicidal ideations or plans.  Patient denies any past history of suicide attempts.  While patient denies overt anxiety or panic attacks, he refers to continous worries about his health, especially his diagnosis of cystic fibrosis for the past 6 years, which impact his life and job adversely.  Patient denies hallucinations or delusions.  Patient denies ideas of reference or paranoid ideations.  Patient denies giovanni or hypomania.  Patient denies obsessive-compulsive symptoms.      PAST PSYCHIATRIC HISTORY:  Remarkable for health issue concerns and worries for the past 6 years.  Patient has not sought psychiatric help nor been hospitalized psychiatrically.  The patient has not been given any psychiatric medications nor been counseled by a psychologist.      CURRENT MEDICATIONS:   1. Albuterol.   2. Creon.   3. Flonase.   4. Oral acyclovir.   5. Multivitamins.   6. Fish oil.   7. Probiotics.   8. Metamucil.   9. Calcium.   10. Claritin.      MEDICAL HISTORY:  Significant for cystic fibrosis, multiple sinus surgeries for chronic sinusitis, exocrine pancreatic disease, small intestinal overgrowth, and pectus excavatum surgery.      ALLERGIES:  Tegaderm dressing.      CHEMICAL DEPENDENCY HISTORY:  Positive for remote history of increased alcohol use while in college with current history of 1-2 drinks per week.  Patient has 1 positive  answer out of 4 in response to the CAGE questionnaire and admits to remote history of blackouts, but not recently.  There is no evidence for delirium tremens.  The patient admits to remote history of cannabis use, but denies other recreational drug use.      REVIEW OF SYSTEMS:  Positive for unilateral headaches once to twice a month, light sensitivity secondary to herpes of the right eye, and urinary blood of unknown etiology.      FAMILY HISTORY:  Positive for cancer, heart disease, and diabetes.      SOCIAL HISTORY:  Patient has 1 brother and 1 sister, and denies any history of abuse as a child.  Patient has a BA and a Masters in psychology and teaching respectively.  He currently works as an .  Patient feels that his family and wife are supportive.      MENTAL STATUS EXAMINATION:  The patient is cooperative and denies any history of dystonia or tardive dyskinesia.  Affect is depressed and anxious.  Mood is anxious.  Speech is of normal rate, rhythm, and volume.  Thought form is devoid of tangentiality or circumstantiality.  Thought content is positive for anxiety, but negative for depression, suicidal ideations, suicidal history, or delusions.  Perceptual abnormalities are negative.  Patient denies auditory or visual hallucinations.  Cognition was tested using a mini-mental status exam.  Patient received a score of 30 out of 30.  Knowledge base is normal.  Judgment is fair.  Insight is partial.      DIAGNOSES:   1. Generalized anxiety disorder.   2. Rule out anxiety disorder of unknown etiology.   3. Rule out depression of unknown etiology.      TEST SCORES:   1. Mini Mental Status Exam = 30.   2. PHQ-9 = 2.   3. FELIX-7 = 2.   4. Goel Depression Inventory = 4.      PLAN:   1. Start escitalopram 10 mg po qam.  We discussed side effects, benefits, risks, and alternatives to this medication.  Patient fully consented to its use.   2. Obtain labs to rule out various etiologies.  Patient fully  consented.   3. Return to see me in 4-5 weeks.      TOTAL TIME SPENT:  I spent a total of 60 minutes face-to-face with patient during today's office visit.  Over 50% of this time was spent counseling the patient about the etiology of anxiety, and depression, and how to treat these symptoms.      BHAKTI Crystal MD, PhD               ljl         RADHA CRYSTAL MD             D: 2019   T: 2019   MT:       Name:     DAVE RINCON   MRN:      [id number]        Account:      [id number]   :      1980           Service Date: 2019      Document: [id number]        Again, thank you for allowing me to participate in the care of your patient.      Sincerely,    BHAKTI Crystal MD

## 2019-07-17 LAB
ACETAMINOPHEN QUAL: NEGATIVE
AMANTADINE: NEGATIVE
AMITRIPTYLINE QUAL: NEGATIVE
AMOXAPINE: NEGATIVE
AMPHETAMINES QUAL: NEGATIVE
ANA SER QL IF: NEGATIVE
ATROPINE: NEGATIVE
BENZODIAZ UR QL: NEGATIVE
BUPROPION QUAL: NEGATIVE
CAFFEINE QUAL: POSITIVE
CANNABINOIDS UR QL SCN: NEGATIVE
CARBAMAZEPINE QUAL: NEGATIVE
CHLORPHENIRAMINE: NEGATIVE
CHLORPROMAZINE: NEGATIVE
CITALOPRAM QUAL: NEGATIVE
CLOMIPRAMINE QUAL: NEGATIVE
COCAINE QUAL: NEGATIVE
COCAINE UR QL: NEGATIVE
CODEINE QUAL: NEGATIVE
DESIPRAMINE QUAL: NEGATIVE
DEXTROMETHORPHAN: NEGATIVE
DIPHENHYDRAMINE: NEGATIVE
DOXEPIN/METABOLITE: NEGATIVE
DOXYLAMINE: NEGATIVE
EPHEDRINE OR PSEUDO: NEGATIVE
FENTANYL QUAL: NEGATIVE
FLUOXETINE AND METAB: NEGATIVE
HYDROCODONE QUAL: NEGATIVE
HYDROMORPHONE QUAL: NEGATIVE
IBUPROFEN QUAL: NEGATIVE
IMIPRAMINE QUAL: NEGATIVE
INTERPRETATION ECG - MUSE: NORMAL
KETAMINE QUAL: NEGATIVE
LAMOTRIGINE QUAL: NEGATIVE
LIDOCAIN SPEC QL: NEGATIVE
LOXAPINE: NEGATIVE
MAPROTYLINE: NEGATIVE
MDMA QUAL: NEGATIVE
MEPERIDINE QUAL: NEGATIVE
METHAMPHETAMINE: NEGATIVE
METHODONE QUAL: NEGATIVE
MIRTAZAPINE QUAL: NEGATIVE
MORPHINE QUAL: NEGATIVE
NICOTINE: NEGATIVE
NORTRIPTYLINE QUAL: NEGATIVE
OLANZAPINE QUAL: NEGATIVE
OPIATES UR QL SCN: NEGATIVE
OXYCODONE QUAL: NEGATIVE
PENTAZOCINE: NEGATIVE
PHENCYCLIDINE QUAL: NEGATIVE
PHENTERMINE: NEGATIVE
PROPOFOL QUAL: NEGATIVE
PROPOXPHENE QUAL: NEGATIVE
PROPRANOLOL QUAL: NEGATIVE
PYRILAMINE: NEGATIVE
QUETIAPINE METAB QUAL: NEGATIVE
RHEUMATOID FACT SER NEPH-ACNC: <20 IU/ML (ref 0–20)
SALICYLATE QUAL: NEGATIVE
SERTRALINE QUAL: NEGATIVE
T PALLIDUM AB SER QL: NONREACTIVE
THEOBROMINE: POSITIVE
TOPIRAMATE QUAL: NEGATIVE
TRAMADOL QUAL: NEGATIVE
TRIMIPRAMINE QUAL: NEGATIVE
VENLAFAXINE QUAL: NEGATIVE

## 2019-07-17 NOTE — PROGRESS NOTES
Service Date: 07/16/2019      IDENTIFICATION:  The patient is a 39-year-old,  male with 2 children, presenting here for his first psychiatric evaluation at the [facility].      HISTORY OF PRESENT ILLNESS:  The patient denies depression.  Patient denies suicidal or homicidal ideations or plans.  Patient denies any past history of suicide attempts.  While patient denies overt anxiety or panic attacks, he refers to continous worries about his health, especially his diagnosis of cystic fibrosis for the past 6 years, which impact his life and job adversely.  Patient denies hallucinations or delusions.  Patient denies ideas of reference or paranoid ideations.  Patient denies giovanni or hypomania.  Patient denies obsessive-compulsive symptoms.      PAST PSYCHIATRIC HISTORY:  Remarkable for health issue concerns and worries for the past 6 years.  Patient has not sought psychiatric help nor been hospitalized psychiatrically.  The patient has not been given any psychiatric medications nor been counseled by a psychologist.      CURRENT MEDICATIONS:   1. Albuterol.   2. Creon.   3. Flonase.   4. Oral acyclovir.   5. Multivitamins.   6. Fish oil.   7. Probiotics.   8. Metamucil.   9. Calcium.   10. Claritin.      MEDICAL HISTORY:  Significant for cystic fibrosis, multiple sinus surgeries for chronic sinusitis, exocrine pancreatic disease, small intestinal overgrowth, and pectus excavatum surgery.      ALLERGIES:  Tegaderm dressing.      CHEMICAL DEPENDENCY HISTORY:  Positive for remote history of increased alcohol use while in college with current history of 1-2 drinks per week.  Patient has 1 positive answer out of 4 in response to the CAGE questionnaire and admits to remote history of blackouts, but not recently.  There is no evidence for delirium tremens.  The patient admits to remote history of cannabis use, but denies other recreational drug use.      REVIEW OF SYSTEMS:  Positive for unilateral headaches  once to twice a month, light sensitivity secondary to herpes of the right eye, and urinary blood of unknown etiology.      FAMILY HISTORY:  Positive for cancer, heart disease, and diabetes.      SOCIAL HISTORY:  Patient has 1 brother and 1 sister, and denies any history of abuse as a child.  Patient has a BA and a Masters in psychology and teaching respectively.  He currently works as an .  Patient feels that his family and wife are supportive.      MENTAL STATUS EXAMINATION:  The patient is cooperative and denies any history of dystonia or tardive dyskinesia.  Affect is depressed and anxious.  Mood is anxious.  Speech is of normal rate, rhythm, and volume.  Thought form is devoid of tangentiality or circumstantiality.  Thought content is positive for anxiety, but negative for depression, suicidal ideations, suicidal history, or delusions.  Perceptual abnormalities are negative.  Patient denies auditory or visual hallucinations.  Cognition was tested using a mini-mental status exam.  Patient received a score of 30 out of 30.  Knowledge base is normal.  Judgment is fair.  Insight is partial.      DIAGNOSES:   1. Generalized anxiety disorder.   2. Rule out anxiety disorder of unknown etiology.   3. Rule out depression of unknown etiology.      TEST SCORES:   1. Mini Mental Status Exam = 30.   2. PHQ-9 = 2.   3. FELIX-7 = 2.   4. Goel Depression Inventory = 4.      PLAN:   1. Start escitalopram 10 mg po qam.  We discussed side effects, benefits, risks, and alternatives to this medication.  Patient fully consented to its use.   2. Obtain labs to rule out various etiologies.  Patient fully consented.   3. Return to see me in 4-5 weeks.      TOTAL TIME SPENT:  I spent a total of 60 minutes face-to-face with patient during today's office visit.  Over 50% of this time was spent counseling the patient about the etiology of anxiety, and depression, and how to treat these symptoms.      BHAKTI Goetz  MD Marah, PhD               [facility]         RADHA JUILO MD             D: 2019   T: 2019   MT: MONICA      Name:     DAVE RINCON   MRN:      [id number]        Account:      [id number]   :      1980           Service Date: 2019      Document: [id number]

## 2019-07-19 LAB — PAIN DRUG SCR UR W RPTD MEDS: NORMAL

## 2019-07-24 ENCOUNTER — TELEPHONE (OUTPATIENT)
Dept: OTOLARYNGOLOGY | Facility: CLINIC | Age: 39
End: 2019-07-24

## 2019-07-24 ENCOUNTER — MYC MEDICAL ADVICE (OUTPATIENT)
Dept: OTOLARYNGOLOGY | Facility: CLINIC | Age: 39
End: 2019-07-24

## 2019-07-24 DIAGNOSIS — J32.4 CHRONIC PANSINUSITIS: Primary | ICD-10-CM

## 2019-07-24 NOTE — TELEPHONE ENCOUNTER
Health Call Center    Phone Message    May a detailed message be left on voicemail: yes    Reason for Call: Medication Question or concern regarding medication   Prescription Clarification  Name of Medication: Levofloxacin 1 mg/mL nasal irrigation  6 Liters  Prescribing Provider: Dr. Sara Pickering    [facility]   What on the order needs clarification?  [facility] as they received RX; however, it was not signed and it needs to be faxed to the  [facility] at [phone].  Thank you!      Action Taken: Message routed to:  [facility] ([facility]): [facility]

## 2019-07-28 DIAGNOSIS — E84.0 CYSTIC FIBROSIS WITH PULMONARY MANIFESTATIONS (H): ICD-10-CM

## 2019-07-29 RX ORDER — FLUTICASONE PROPIONATE 50 MCG
SPRAY, SUSPENSION (ML) NASAL
Qty: 16 ML | Refills: 11 | Status: SHIPPED | OUTPATIENT
Start: 2019-07-29 | End: 2019-12-30

## 2019-08-07 ENCOUNTER — TELEPHONE (OUTPATIENT)
Dept: OTOLARYNGOLOGY | Facility: CLINIC | Age: 39
End: 2019-08-07

## 2019-08-13 LAB
FOLATE RBC-MCNC: NORMAL NG/ML
HCT VFR BLD CALC: NORMAL %

## 2019-08-15 ENCOUNTER — ALLIED HEALTH/NURSE VISIT (OUTPATIENT)
Dept: CARE COORDINATION | Facility: CLINIC | Age: 39
End: 2019-08-15

## 2019-08-15 ENCOUNTER — OFFICE VISIT (OUTPATIENT)
Dept: PULMONOLOGY | Facility: CLINIC | Age: 39
End: 2019-08-15
Attending: INTERNAL MEDICINE
Payer: COMMERCIAL

## 2019-08-15 VITALS
HEART RATE: 66 BPM | DIASTOLIC BLOOD PRESSURE: 87 MMHG | BODY MASS INDEX: 23.08 KG/M2 | WEIGHT: 174.16 LBS | SYSTOLIC BLOOD PRESSURE: 133 MMHG | HEIGHT: 73 IN | OXYGEN SATURATION: 97 % | TEMPERATURE: 99.1 F | RESPIRATION RATE: 18 BRPM

## 2019-08-15 DIAGNOSIS — Z13.9 RISK AND FUNCTIONAL ASSESSMENT: Primary | ICD-10-CM

## 2019-08-15 DIAGNOSIS — K86.89 PANCREATIC INSUFFICIENCY: ICD-10-CM

## 2019-08-15 DIAGNOSIS — E84.0 CYSTIC FIBROSIS WITH PULMONARY MANIFESTATIONS (H): ICD-10-CM

## 2019-08-15 DIAGNOSIS — E84.0 CYSTIC FIBROSIS WITH PULMONARY MANIFESTATIONS (H): Primary | ICD-10-CM

## 2019-08-15 LAB
EXPTIME-PRE: 7.94 SEC
FEF2575-%PRED-PRE: 109 %
FEF2575-PRE: 4.92 L/SEC
FEF2575-PRED: 4.5 L/SEC
FEFMAX-%PRED-PRE: 96 %
FEFMAX-PRE: 10.44 L/SEC
FEFMAX-PRED: 10.78 L/SEC
FEV1-%PRED-PRE: 94 %
FEV1-PRE: 4.38 L
FEV1FEV6-PRE: 84 %
FEV1FEV6-PRED: 82 %
FEV1FVC-PRE: 84 %
FEV1FVC-PRED: 81 %
FIFMAX-PRE: 8.24 L/SEC
FVC-%PRED-PRE: 90 %
FVC-PRE: 5.21 L
FVC-PRED: 5.77 L

## 2019-08-15 PROCEDURE — 87070 CULTURE OTHR SPECIMN AEROBIC: CPT | Performed by: INTERNAL MEDICINE

## 2019-08-15 PROCEDURE — G0463 HOSPITAL OUTPT CLINIC VISIT: HCPCS | Mod: ZF

## 2019-08-15 RX ORDER — GANCICLOVIR 1.5 MG/G
GEL OPHTHALMIC
Refills: 0 | COMMUNITY
Start: 2019-08-15 | End: 2022-10-27

## 2019-08-15 ASSESSMENT — PAIN SCALES - GENERAL: PAINLEVEL: NO PAIN (0)

## 2019-08-15 ASSESSMENT — MIFFLIN-ST. JEOR: SCORE: 1768.88

## 2019-08-15 NOTE — LETTER
8/15/2019       RE: Philip Delacruz  [address] [facility] [address]     Dear Colleague,    Thank you for referring your patient, Philip Delacruz, to the [facility] FOR LUNG SCIENCE AND HEALTH at [facility]. Please see a copy of my visit note below.    Reason for Visit  Philip Delacruz is a 39 year old year old male who is being seen for RECHECK (3 month follow up for Cystic Fibrosis)    Assessment and plan:   Shar Delacruz is a 39-year-old male with cystic fibrosis with mild lung disease and pancreatic insufficiency.     Pulmonary: Patient appears to be doing well from pulmonary standpoint.  He has excellent exercise tolerance.  He is oxygenating well.  PFTs are similar to his recent best.  He does not appear to have had any significant decline since stopping Symdeko.  He continues to use exercise as his main form of airway clearance.  He does not appear to be having an exacerbation at this time.    CFTR Modulation: The patient was on Symdeko but feels that it worsens his GI symptoms and caused some clouding of his thinking.  He generally feels better since stopping shortly after his last visit.    CF sinusitis: Patient reports symptoms are adequately controlled after a sinus cleanout and regular nasal washes.  Levofloxacin nasal infusions were recommended but he is still trying to work through insurance issues and availability.    Pancreatic insufficiency: Patient's weight is stable at the low end of the adequate range.  He continues to have difficulty with gas.  I recommended he reinitiate his Prilosec to improve pancreatic enzyme efficacy.    Bone density: Bone density was noted to be reduced on a recent DEXA scan.  He was evaluated by endocrinology who recommended calcium 600 mg daily and continued vitamin D supplementation.  They also recommended biphosphonate if he requires steroids for more than 3 months and follow-up DEXA with the standard  2-year interval.    Psych: Patient was recently evaluated in the CF psychiatry clinic.  He was diagnosed with generalized anxiety disorder.  He started escitalopram 10 mg daily but feels it is too soon to  whether it is working.    Healthcare maintenance: Patient is up-to-date on annual studies.    Patient will be seen in follow-up in 3 months with PFTs and sputum he will follow-up with sports medicine as needed for his orthopedic complaints.      CF HPI:  The patient was seen and examined by True Sahni   Shar Delacruz is a 39-year-old male with cystic fibrosis with mild lung disease and pancreatic insufficiency.   Breathing is comfortable at rest.  No dyspnea on exertion.  Biking 20-30 minutes daily.  Reduced running due to joint stress.  Occasional cough.  No sputum production.  No chest pain.  No hemoptysis.  He does not do any formal airway clearance therapy.  No recent fever, chills or night sweats.    Review of systems:  Appetite is very good  Patient was seen by Dr. Pickering as well as his local ENT.  He underwent an office cleanout with his local ENT and has remained clear with daily nasal washes.  No nausea, vomiting or diarrhea.  Reports left lower abdominal discomfort attributed to abdominal wall muscles.  Intermittent gas, at baseline. Leakage resolved with consistent use of enzymes.  GI symptoms generally better since stopping Symdeko.  Occasional foot and elbow pain, improved from previous.  Feels that he is thinking more clearly since stopping Symdeko.  A complete ROS was otherwise negative except as noted in the HPI.    Current Outpatient Medications   Medication     Wheat Dextrin (BENEFIBER PO)     ZIRGAN 0.15 % GEL     ACYCLOVIR PO     albuterol (PROAIR HFA/PROVENTIL HFA/VENTOLIN HFA) 108 (90 Base) MCG/ACT inhaler     CREON 16805-52613 units CPEP per EC capsule     escitalopram (LEXAPRO) 10 MG tablet     fluticasone (FLONASE) 50 MCG/ACT nasal spray     multivitamin CF formula (MVW  COMPLETE FORMULATION) chewable tablet     Omega-3 Fatty Acids (FISH OIL) 1200 MG CPDR     order for DME     order for DME     oseltamivir (TAMIFLU) 75 MG capsule     Probiotic Product (FLORAJEN PHILOMENA) CAPS     Sodium Chloride-Sodium Bicarb (SINUFLO READYRINSE) KIT     No current facility-administered medications for this visit.      Allergies   Allergen Reactions     Liquid Adhesive Rash     Tagederm     Tegaderm Transparent Dressing (Informational Only) Rash     Past Medical History:   Diagnosis Date     Chronic sinusitis      Congenital absence of vas deferens, bilateral      Cystic fibrosis (H)     Delta F508 and T4461D      Nasal polyps      Sinusitis, chronic        Past Surgical History:   Procedure Laterality Date     HC TOOTH EXTRACTION W/FORCEP       NASAL/SINUS POLYPECTOMY       REPAIR PECTUS EXCAVATUM  1997     SINUS SURGERY  1992, 2002, 2004    Removed tubinates and polyps OSH     Sperm harvest         Social History     Socioeconomic History     Marital status: Single     Spouse name: Not on file     Number of children: Not on file     Years of education: Not on file     Highest education level: Not on file   Occupational History     Occupation: Teacher   Social Needs     Financial resource strain: Not on file     Food insecurity:     Worry: Not on file     Inability: Not on file     Transportation needs:     Medical: Not on file     Non-medical: Not on file   Tobacco Use     Smoking status: Never Smoker     Smokeless tobacco: Never Used   Substance and Sexual Activity     Alcohol use: Yes     Alcohol/week: 0.0 oz     Comment: 1 drink 3X per week     Drug use: No     Sexual activity: Yes     Partners: Female     Birth control/protection: None   Lifestyle     Physical activity:     Days per week: Not on file     Minutes per session: Not on file     Stress: Not on file   Relationships     Social connections:     Talks on phone: Not on file     Gets together: Not on file     Attends Zoroastrian  "service: Not on file     Active member of club or organization: Not on file     Attends meetings of clubs or organizations: Not on file     Relationship status: Not on file     Intimate partner violence:     Fear of current or ex partner: Not on file     Emotionally abused: Not on file     Physically abused: Not on file     Forced sexual activity: Not on file   Other Topics Concern     Parent/sibling w/ CABG, MI or angioplasty before 65F 55M? Not Asked   Social History Narrative    #d .  Lives in [place] with significant other         /87 (BP Location: Right arm, Patient Position: Chair, Cuff Size: Adult Regular)   Pulse 66   Temp 99.1  F (37.3  C) (Oral)   Resp 18   Ht 1.854 m (6' 1\")   Wt 79 kg (174 lb 2.6 oz)   SpO2 97%   BMI 22.98 kg/m     Body mass index is 22.98 kg/m .  Exam:   GENERAL APPEARANCE: Well developed, well nourished, alert, and in no apparent distress.  EYES: PERRL, EOMI  HENT: Nasal mucosa with edema and hyperemia. No nasal polyps.  EARS: Canals clear, TMs normal  MOUTH: Oral mucosa is moist, without any lesions, no tonsillar enlargement, no oropharyngeal exudate.  NECK: supple, no masses, no thyromegaly.  LYMPHATICS: No significant axillary, cervical, or supraclavicular nodes.  RESP: Pectus excavatum, normal percussion, good air flow throughout.  No crackles. No rhonchi. No wheezes.  CV: Normal S1, S2, regular rhythm, normal rate. No murmur.  No rub. No gallop. No LE edema.   ABDOMEN:  Bowel sounds normal, soft, nontender, no HSM or masses.   MS: extremities normal. No clubbing. No cyanosis.  SKIN: no rash on limited exam  NEURO: Mentation intact, speech normal, normal strength and tone, normal gait and stance  PSYCH: mentation appears normal. and affect normal/bright  Results:  Recent Results (from the past 168 hour(s))   General PFT Lab (Please always keep checked)    Collection Time: 08/15/19  3:31 PM   Result Value Ref Range    FVC-Pred 5.77 L    FVC-Pre 5.21 L    " FVC-%Pred-Pre 90 %    FEV1-Pre 4.38 L    FEV1-%Pred-Pre 94 %    FEV1FVC-Pred 81 %    FEV1FVC-Pre 84 %    FEFMax-Pred 10.78 L/sec    FEFMax-Pre 10.44 L/sec    FEFMax-%Pred-Pre 96 %    FEF2575-Pred 4.50 L/sec    FEF2575-Pre 4.92 L/sec    XSC0156-%Pred-Pre 109 %    ExpTime-Pre 7.94 sec    FIFMax-Pre 8.24 L/sec    FEV1FEV6-Pred 82 %    FEV1FEV6-Pre 84 %                   Results as noted above.    PFT Interpretation:  Normal spirometry.  Unchanged from previous.   Similar to recent best.  Valid Maneuver             CF Exacerbation  Absent          Again, thank you for allowing me to participate in the care of your patient.      Sincerely,    True Sahni MD

## 2019-08-15 NOTE — PATIENT INSTRUCTIONS
Cystic Fibrosis Self-Care Plan    RECOMMENDATIONS:   Prilosec 20mg once daily to see if it helps your enzymes work better.  Works best if taken on an empty stomach.  Otherwise continue current medication and exercise.       [facility] Nurse line:  Eladia Martinsia    [phone]     [facility] Fax Number:      [phone]         Cystic fibrosis Respiratory Therapist:   Kristi Ndiaye              [phone]   Cystic fibrosis Dietitians:              Nisa Dumont              [phone]                            April Cardona                        [phone]   Cystic fibrosis Diabetes Nurse:    Barbara Birmingham   [phone]    Cystic fibrosis Social Workers:     Melissa Calle               [phone]                     Debora Joseph               [phone]  Cystic fibrosis Pharmacist:           Amanda Gonzalez                               [phone]         Iris August   [phone]  Cystic Fibrosis Genetic Counselor:   Faby Flores    [phone]    [facility] website:  [email or web address]         MRN: [id number]   Clinic Date: August 15, 2019   Patient: Philip Delacruz     Annual Studies:   IGG   Date Value Ref Range Status   05/02/2019 1,210 695 - 1,620 mg/dL Final     Insulin   Date Value Ref Range Status   05/02/2019 9.0 3 - 25 mU/L Final     There are no preventive care reminders to display for this patient.    Pulmonary Function Tests  FEV1: amount of air you can blow out in 1 second  FVC: total amount of air you can take in and blow out    Your Goals:         PFT Latest Ref Rng & Units 8/15/2019   FVC L 5.21   FEV1 L 4.38   FVC% % 90   FEV1% % 94          Airway Clearance: The Most Important Way to Keep Your Lungs Healthy  Continue regular exercise    Good Nutrition Can Improve Lung Function and Overall Health     Take ALL of your vitamins with food     Take 1/2 of your enzymes before EVERY meal/snack and the  other 1/2 mid-meal/snack    Wt Readings from Last 3 Encounters:   08/15/19 79 kg (174 lb 2.6 oz)   07/16/19 79.7 kg (175 lb 12.8 oz)   07/02/19 80.1 kg (176 lb 8 oz)       Body mass index is 22.98 kg/m .         National CF Foundation Recommendations for BMI in CF Adults: Women: at least 22 Men: at least 23        Controlling Blood Sugars Helps Prevent Lung Infections & Improves Nutrition  Test blood sugar:     In the morning before eating (goal is )     2 hours after a meal (goal is less than 150)     When pre-meal glucose is greater than 150 add correction     At bedtime (if less than 100 eat a snack with 15 grams of carbohydrates  Last A1C Results:   Hemoglobin A1C   Date Value Ref Range Status   05/02/2019 5.2 0 - 5.6 % Final     Comment:     Normal <5.7% Prediabetes 5.7-6.4%  Diabetes 6.5% or higher - adopted from ADA   consensus guidelines.           If diabetic, measure A1C every 6 months. Goal: Under 7%    Staying Healthy    Research:  If you are interested in learning about research opportunities or have questions, please contact the CF Research Team at [phone] or [email or web address].      CF Foundation:  Compass is a personalized resource service to help you with the insurance, financial, legal and other issues you are facing.  It's free, confidential and available to anyone with CF.  Ask your  for more information or contact Compass directly at 799-COMPASS ([phone]) or compass@cff.org, or learn more at cff.org/compass.

## 2019-08-15 NOTE — PROGRESS NOTES
Reason for Visit  Philip Delacruz is a 39 year old year old male who is being seen for RECHECK (3 month follow up for Cystic Fibrosis)    Assessment and plan:   Shar Delacruz is a 39-year-old male with cystic fibrosis with mild lung disease and pancreatic insufficiency.     Pulmonary: Patient appears to be doing well from pulmonary standpoint.  He has excellent exercise tolerance.  He is oxygenating well.  PFTs are similar to his recent best.  He does not appear to have had any significant decline since stopping Symdeko.  He continues to use exercise as his main form of airway clearance.  He does not appear to be having an exacerbation at this time.    CFTR Modulation: The patient was on Symdeko but feels that it worsens his GI symptoms and caused some clouding of his thinking.  He generally feels better since stopping shortly after his last visit.    CF sinusitis: Patient reports symptoms are adequately controlled after a sinus cleanout and regular nasal washes.  Levofloxacin nasal infusions were recommended but he is still trying to work through insurance issues and availability.    Pancreatic insufficiency: Patient's weight is stable at the low end of the adequate range.  He continues to have difficulty with gas.  I recommended he reinitiate his Prilosec to improve pancreatic enzyme efficacy.    Bone density: Bone density was noted to be reduced on a recent DEXA scan.  He was evaluated by endocrinology who recommended calcium 600 mg daily and continued vitamin D supplementation.  They also recommended biphosphonate if he requires steroids for more than 3 months and follow-up DEXA with the standard 2-year interval.    Psych: Patient was recently evaluated in the CF psychiatry clinic.  He was diagnosed with generalized anxiety disorder.  He started escitalopram 10 mg daily but feels it is too soon to  whether it is working.    Healthcare maintenance: Patient is up-to-date on annual studies.    Patient will  be seen in follow-up in 3 months with PFTs and sputum he will follow-up with sports medicine as needed for his orthopedic complaints.      CF HPI:  The patient was seen and examined by True Sahni   Shar Delacruz is a 39-year-old male with cystic fibrosis with mild lung disease and pancreatic insufficiency.   Breathing is comfortable at rest.  No dyspnea on exertion.  Biking 20-30 minutes daily.  Reduced running due to joint stress.  Occasional cough.  No sputum production.  No chest pain.  No hemoptysis.  He does not do any formal airway clearance therapy.  No recent fever, chills or night sweats.    Review of systems:  Appetite is very good  Patient was seen by Dr. Pickering as well as his local ENT.  He underwent an office cleanout with his local ENT and has remained clear with daily nasal washes.  No nausea, vomiting or diarrhea.  Reports left lower abdominal discomfort attributed to abdominal wall muscles.  Intermittent gas, at baseline. Leakage resolved with consistent use of enzymes.  GI symptoms generally better since stopping Symdeko.  Occasional foot and elbow pain, improved from previous.  Feels that he is thinking more clearly since stopping Symdeko.  A complete ROS was otherwise negative except as noted in the HPI.    Current Outpatient Medications   Medication     Wheat Dextrin (BENEFIBER PO)     ZIRGAN 0.15 % GEL     ACYCLOVIR PO     albuterol (PROAIR HFA/PROVENTIL HFA/VENTOLIN HFA) 108 (90 Base) MCG/ACT inhaler     CREON 74488-87909 units CPEP per EC capsule     escitalopram (LEXAPRO) 10 MG tablet     fluticasone (FLONASE) 50 MCG/ACT nasal spray     multivitamin CF formula (MVW COMPLETE FORMULATION) chewable tablet     Omega-3 Fatty Acids (FISH OIL) 1200 MG CPDR     order for DME     order for DME     oseltamivir (TAMIFLU) 75 MG capsule     Probiotic Product (FLORAJEN BIFIDOBLEND) CAPS     Sodium Chloride-Sodium Bicarb (SINUFLO READYRINSE) KIT     No current facility-administered medications for  this visit.      Allergies   Allergen Reactions     Liquid Adhesive Rash     Tagederm     Tegaderm Transparent Dressing (Informational Only) Rash     Past Medical History:   Diagnosis Date     Chronic sinusitis      Congenital absence of vas deferens, bilateral      Cystic fibrosis (H)     Delta F508 and N9519T      Nasal polyps      Sinusitis, chronic        Past Surgical History:   Procedure Laterality Date     HC TOOTH EXTRACTION W/FORCEP       NASAL/SINUS POLYPECTOMY       REPAIR PECTUS EXCAVATUM  1997     SINUS SURGERY  1992, 2002, 2004    Removed tubinates and polyps OSH     Sperm harvest         Social History     Socioeconomic History     Marital status: Single     Spouse name: Not on file     Number of children: Not on file     Years of education: Not on file     Highest education level: Not on file   Occupational History     Occupation: Teacher   Social Needs     Financial resource strain: Not on file     Food insecurity:     Worry: Not on file     Inability: Not on file     Transportation needs:     Medical: Not on file     Non-medical: Not on file   Tobacco Use     Smoking status: Never Smoker     Smokeless tobacco: Never Used   Substance and Sexual Activity     Alcohol use: Yes     Alcohol/week: 0.0 oz     Comment: 1 drink 3X per week     Drug use: No     Sexual activity: Yes     Partners: Female     Birth control/protection: None   Lifestyle     Physical activity:     Days per week: Not on file     Minutes per session: Not on file     Stress: Not on file   Relationships     Social connections:     Talks on phone: Not on file     Gets together: Not on file     Attends Oriental orthodox service: Not on file     Active member of club or organization: Not on file     Attends meetings of clubs or organizations: Not on file     Relationship status: Not on file     Intimate partner violence:     Fear of current or ex partner: Not on file     Emotionally abused: Not on file     Physically abused: Not on file      "Forced sexual activity: Not on file   Other Topics Concern     Parent/sibling w/ CABG, MI or angioplasty before 65F 55M? Not Asked   Social History Narrative    #d .  Lives in [place] with significant other         /87 (BP Location: Right arm, Patient Position: Chair, Cuff Size: Adult Regular)   Pulse 66   Temp 99.1  F (37.3  C) (Oral)   Resp 18   Ht 1.854 m (6' 1\")   Wt 79 kg (174 lb 2.6 oz)   SpO2 97%   BMI 22.98 kg/m    Body mass index is 22.98 kg/m .  Exam:   GENERAL APPEARANCE: Well developed, well nourished, alert, and in no apparent distress.  EYES: PERRL, EOMI  HENT: Nasal mucosa with edema and hyperemia. No nasal polyps.  EARS: Canals clear, TMs normal  MOUTH: Oral mucosa is moist, without any lesions, no tonsillar enlargement, no oropharyngeal exudate.  NECK: supple, no masses, no thyromegaly.  LYMPHATICS: No significant axillary, cervical, or supraclavicular nodes.  RESP: Pectus excavatum, normal percussion, good air flow throughout.  No crackles. No rhonchi. No wheezes.  CV: Normal S1, S2, regular rhythm, normal rate. No murmur.  No rub. No gallop. No LE edema.   ABDOMEN:  Bowel sounds normal, soft, nontender, no HSM or masses.   MS: extremities normal. No clubbing. No cyanosis.  SKIN: no rash on limited exam  NEURO: Mentation intact, speech normal, normal strength and tone, normal gait and stance  PSYCH: mentation appears normal. and affect normal/bright  Results:  Recent Results (from the past 168 hour(s))   General PFT Lab (Please always keep checked)    Collection Time: 08/15/19  3:31 PM   Result Value Ref Range    FVC-Pred 5.77 L    FVC-Pre 5.21 L    FVC-%Pred-Pre 90 %    FEV1-Pre 4.38 L    FEV1-%Pred-Pre 94 %    FEV1FVC-Pred 81 %    FEV1FVC-Pre 84 %    FEFMax-Pred 10.78 L/sec    FEFMax-Pre 10.44 L/sec    FEFMax-%Pred-Pre 96 %    FEF2575-Pred 4.50 L/sec    FEF2575-Pre 4.92 L/sec    YNU8528-%Pred-Pre 109 %    ExpTime-Pre 7.94 sec    FIFMax-Pre 8.24 L/sec    FEV1FEV6-Pred 82 " %    FEV1FEV6-Pre 84 %                   Results as noted above.    PFT Interpretation:  Normal spirometry.  Unchanged from previous.   Similar to recent best.  Valid Maneuver             CF Exacerbation  Absent

## 2019-08-16 ASSESSMENT — ANXIETY QUESTIONNAIRES
2. NOT BEING ABLE TO STOP OR CONTROL WORRYING: NOT AT ALL
GAD7 TOTAL SCORE: 1
7. FEELING AFRAID AS IF SOMETHING AWFUL MIGHT HAPPEN: NOT AT ALL
5. BEING SO RESTLESS THAT IT IS HARD TO SIT STILL: NOT AT ALL
1. FEELING NERVOUS, ANXIOUS, OR ON EDGE: NOT AT ALL
3. WORRYING TOO MUCH ABOUT DIFFERENT THINGS: SEVERAL DAYS
6. BECOMING EASILY ANNOYED OR IRRITABLE: NOT AT ALL
IF YOU CHECKED OFF ANY PROBLEMS ON THIS QUESTIONNAIRE, HOW DIFFICULT HAVE THESE PROBLEMS MADE IT FOR YOU TO DO YOUR WORK, TAKE CARE OF THINGS AT HOME, OR GET ALONG WITH OTHER PEOPLE: NOT DIFFICULT AT ALL

## 2019-08-16 ASSESSMENT — PATIENT HEALTH QUESTIONNAIRE - PHQ9
SUM OF ALL RESPONSES TO PHQ QUESTIONS 1-9: 2
5. POOR APPETITE OR OVEREATING: NOT AT ALL

## 2019-08-17 ASSESSMENT — ANXIETY QUESTIONNAIRES: GAD7 TOTAL SCORE: 1

## 2019-08-20 LAB
BACTERIA SPEC CULT: NORMAL
Lab: NORMAL
SPECIMEN SOURCE: NORMAL

## 2019-08-23 NOTE — PROGRESS NOTES
"Adult Cystic Fibrosis Program  Annual Psychosocial Assessment    Presenting Information:  SHAR is a 39-year-old male with cystic fibrosis, presenting in CF clinic for a regular follow up with primary CF provider, Dr. True Sahni.  Met with Shar for annual psychosocial assessment.     Living situation:  Shar lives with his wife, Aimee, and their 3 year old son, Shay, and 3 month old son, Serafin, in [place], [place].   They own a house and have lived there since 2012.  They have 2 cats.  He denies any current concerns about his living situation.     Family Constellation:  Shar was raised by his biological parents, in [place], [place].  He has 3 sibling(s): 2 older brothers and a younger sister. He has several nieces and nephews Many of his family members are located in the [place] area. He is not aware of any other family members having CF. One brother has children.      Shar and Aimee have been  since 2012 and together since 2006.  Their sons were conceived via IVF. He notes that he is enjoying being a father but that it is a lot of work, he is low on sleep and often quite tired, but acknowledges that this is normal for a parent of a young children.     Social Support:  Shar reports good social support.  He gets along well with his wife and other family members and draws additional support from friends as well as a mentor who he tries to meet with regularly. Shar has a colleague with a daughter who has CF, otherwise he has no other contact within the CF community.      Adjustment to Illness:  Shar  was diagnosed with CF at age 34, in the summer of 2014. 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 As a child, he notes he experienced allergy and sinus problems, including 3 surgeries, one at age 12, and 2 in college. He was bothered by congestion and headaches and thought of himself as \"the sick one\" in his family. " "He had pectus excavatum in adolescence.     Overall, Shar describes his current health status as \"good\" noting he has had some issues with his tendons recently. Shar has not been advised to use vest therapy, and uses Aerobika and exercise for airway clearance. He notes that he works out often, doing weights, circuits, assault bike, and rowing.      Shar is private about his CF diagnosis, although reports he is working on being more open.  He did share the information with his wife, mentor, and some close family members and friends.     Health Care Directive:  This SW reviewed [facility] education, including concept/purpose of health care directive, default health care agent (his wife) and how to complete a directive.  Shar reported that he is comfortable with his default decision-maker (his wife) and declined interest in completing a directive today.     Education:  Shar completed a Masters degree in Teaching at [employer] in Fall 2013. 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 He denies plans for further education at this time.     Employment:  Shar is employed full-time as a  at a [employer] school. 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 He teaches a small group of children at a time, which he enjoys but notes that it is stressful and hard work. He states it can be stressful because a lot of the children at his school have a history of trauma and abuse. His employer has been supportive re: appointments for CF care. He has access to health insurance, long term disability, and short term disability through work.      Shar's wife Aimee is employed as a . She also has health insurance and other benefits through work.     Finances:  Shar and his wife receive income from wages. IVF was expensive, but they were able to manage the OOP costs. Shar denies any financial " "concerns.       Insurance:  Shar is insured through his employer ([employer] insurance). The coverage is adequate although there is some OOP cost stress especially related to his clinic appointments (affordable but more expensive than he would prefer). He noted that some specialty providers were more expensive than others and he was not sure why. SW encouraged him to call his insurance company to learn more about his benefits or suggested he reach out to [facility] to see how services were being billed. His wife is insured through her employer. This SW previously provided education re: [employer] and [employer] as resources.     Mental Health/Coping:  Shar denies any current or past symptoms indicative of mood, anxiety, eating, learning or other mental health disorder. He also denies family history of mental health concerns.      Shar describes his mood recently as \"pretty good\" which he attributes mostly to being out of work for the summer. He admits to feeling \"dread\" about returning to work which he has felt in past years. He recently started seeing Dr. Crystal and taking lexapro which he finds to be helpful. He is interested to see if it continues to benefit him during the school year.     FELIX-7 score: 1, indicating minimal presence of anxiety symptoms (as described as \"not difficult\")  PHQ-9 score: 2 indicating minimal symptoms of depression (negative for SI, as described as \"not difficult\")     He feels that any positive symptoms are likely related to work stress and healthcare maintenance. Earlier this year Shar was experiencing significant work related stress and SW left message to offer resources with no return call. Shar declined needing these resources today and feels he is doing fine on medication. He generally kenan with stressors through taking some time to relax, exercising, and spending time with his son.      Shar does not currently identify tg/spirituality as important in his " life.     Chemical Health:  Shar denies tobacco or illicit drug use.  He drinks alcohol on occasion, consuming 1 - 2 drinks per occasion, 4x/month on average.  He denies any current/past psychosocial impairment caused by alcohol use.       Leisure Activities/Interests:   Shar enjoys traveling, going running or working out with his wife, biking, riding recreational vehicles, and spending time with friends.      Intervention:  -Introduction to   -Psychosocial Assessment  -Health Care Directive education  -Supportive counseling/psycho-education  -Motivational interviewing  -Insurance/financial counseling    Assessment:  Shar presented with a normal affect and appeared to be open in his responses. He seems to be psychosocially stable overall, with access to relevant resources and supports.  No concerns expressed/noted.    Plan:  Re-consult for any psychosocial needs that may arise.    Complete psychosocial assessment annually.  Continue to follow for regular clinic consult.    AGUSTÍN Erickson, [facility]  Adult Cystic Fibrosis   Ph: [phone], Pager: [phone]

## 2019-09-10 ENCOUNTER — OFFICE VISIT (OUTPATIENT)
Dept: PULMONOLOGY | Facility: CLINIC | Age: 39
End: 2019-09-10
Attending: PSYCHIATRY & NEUROLOGY
Payer: COMMERCIAL

## 2019-09-10 VITALS
TEMPERATURE: 98.5 F | SYSTOLIC BLOOD PRESSURE: 131 MMHG | BODY MASS INDEX: 23.06 KG/M2 | OXYGEN SATURATION: 96 % | HEIGHT: 73 IN | RESPIRATION RATE: 18 BRPM | DIASTOLIC BLOOD PRESSURE: 86 MMHG | WEIGHT: 174 LBS | HEART RATE: 80 BPM

## 2019-09-10 DIAGNOSIS — F41.1 GENERALIZED ANXIETY DISORDER: Primary | ICD-10-CM

## 2019-09-10 PROCEDURE — G0463 HOSPITAL OUTPT CLINIC VISIT: HCPCS | Mod: ZF

## 2019-09-10 RX ORDER — ESCITALOPRAM OXALATE 20 MG/1
20 TABLET ORAL
Qty: 30 TABLET | Refills: 3 | Status: SHIPPED | OUTPATIENT
Start: 2019-09-10 | End: 2019-12-26

## 2019-09-10 ASSESSMENT — PAIN SCALES - GENERAL: PAINLEVEL: NO PAIN (0)

## 2019-09-10 ASSESSMENT — MIFFLIN-ST. JEOR: SCORE: 1768.01

## 2019-09-10 NOTE — NURSING NOTE
Chief Complaint   Patient presents with     Consult     Cystic Fibrosis Health Psych     Patricia Prince, [facility]

## 2019-09-10 NOTE — LETTER
9/10/2019       RE: Philip Delacruz  [address] [facility] [address]     Dear Colleague,    Thank you for referring your patient, Philip Delacruz, to the [facility] FOR LUNG SCIENCE AND HEALTH at [facility]. Please see a copy of my visit note below.    Service Date: 09/10/2019      Patient was last seen on 19.  Patient reports here today denying depression.  Patient admits to mild decrease in anxiety.  Patient, however, denies panic attacks.  Patient reports sleep of about 5-7 hours a night, normal appetite with current weight of 176.3 pounds, and low energy.  Patient denies suicidal ideations or plans.  Patient denies racing thoughts.  Patient does admit to mild irritability.  Patient denies hallucinations or delusions.  Patient denies giovanni or hypomania.  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  Review of labs is completely normal.      DIAGNOSIS:  Generalized anxiety disorder.      TEST SCORES:   1. PHQ-9 = 4.   2. FELIX-7 = 4.   3. Goel Depression Inventory = 5.      PLAN:   1. Increase escitalopram to 20 mg po qam.  Patient fully consented to the increase in dosage.   2. Reduce coffee intake from 4 cups to less than 2 and abstain from alcohol to help with reduction in anxiety.   3. Return to see me in early 2019.      TOTAL TIME SPENT:  I spent a total of 40 minutes face-to-face with patient during today's office visit.  Over 50% of this time was spent counseling the patient about the etiology of anxiety and how to deal with it.      BHAKTI Crystal MD, PhD               [facility]         RADHA CRYSTAL MD             D: 09/10/2019   T: 2019   MT: MONICA      Name:     PHILIP DELACRUZ   MRN:      [id number]        Account:      [id number]   :      1980           Service Date: 09/10/2019      Document: [id number]         Again, thank you for allowing me to participate in the care of your patient.      Sincerely,    BHAKTI Crystal MD

## 2019-09-11 NOTE — PROGRESS NOTES
Service Date: 09/10/2019      Patient was last seen on 19.  Patient reports here today denying depression.  Patient admits to mild decrease in anxiety.  Patient, however, denies panic attacks.  Patient reports sleep of about 5-7 hours a night, normal appetite with current weight of 176.3 pounds, and low energy.  Patient denies suicidal ideations or plans.  Patient denies racing thoughts.  Patient does admit to mild irritability.  Patient denies hallucinations or delusions.  Patient denies giovanni or hypomania.  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  Review of labs is completely normal.      DIAGNOSIS:  Generalized anxiety disorder.      TEST SCORES:   1. PHQ-9 = 4.   2. FELIX-7 = 4.   3. Goel Depression Inventory = 5.      PLAN:   1. Increase escitalopram to 20 mg po qam.  Patient fully consented to the increase in dosage.   2. Reduce coffee intake from 4 cups to less than 2 and abstain from alcohol to help with reduction in anxiety.   3. Return to see me in early 2019.      TOTAL TIME SPENT:  I spent a total of 40 minutes face-to-face with patient during today's office visit.  Over 50% of this time was spent counseling the patient about the etiology of anxiety and how to deal with it.      BHAKTI Crystal MD, PhD               rober CRYSTAL MD             D: 09/10/2019   T: 2019   MT: MONICA      Name:     DAVE RINCON   MRN:      [id number]        Account:      [id number]   :      1980           Service Date: 09/10/2019      Document: [id number]

## 2019-09-26 ENCOUNTER — TELEPHONE (OUTPATIENT)
Dept: PULMONOLOGY | Facility: CLINIC | Age: 39
End: 2019-09-26

## 2019-09-26 DIAGNOSIS — E84.0 CYSTIC FIBROSIS WITH PULMONARY MANIFESTATIONS (H): Primary | ICD-10-CM

## 2019-09-26 NOTE — TELEPHONE ENCOUNTER
Patient was seen at an urgent care last Friday for URI, he was placed on a 7 day course of Augmentin and his symptoms haven't yet completely resolved. Per Dr Sahni extend Augmentin for another 2 weeks. Left patient VM with plan.

## 2019-10-01 ENCOUNTER — DOCUMENTATION ONLY (OUTPATIENT)
Dept: OTOLARYNGOLOGY | Facility: CLINIC | Age: 39
End: 2019-10-01

## 2019-10-01 NOTE — PROGRESS NOTES
Left voicemail for patient advising it would be ok to stop the levaquin (levofloxacin)per . call back number was provided on voicemail in the event of any further questions or concerns.

## 2019-10-03 ENCOUNTER — HEALTH MAINTENANCE LETTER (OUTPATIENT)
Age: 39
End: 2019-10-03

## 2019-11-05 ENCOUNTER — OFFICE VISIT (OUTPATIENT)
Dept: PULMONOLOGY | Facility: CLINIC | Age: 39
End: 2019-11-05
Attending: PSYCHIATRY & NEUROLOGY
Payer: COMMERCIAL

## 2019-11-05 VITALS
DIASTOLIC BLOOD PRESSURE: 81 MMHG | WEIGHT: 177 LBS | RESPIRATION RATE: 17 BRPM | BODY MASS INDEX: 23.46 KG/M2 | SYSTOLIC BLOOD PRESSURE: 121 MMHG | HEIGHT: 73 IN

## 2019-11-05 DIAGNOSIS — F41.1 GAD (GENERALIZED ANXIETY DISORDER): Primary | ICD-10-CM

## 2019-11-05 PROCEDURE — G0463 HOSPITAL OUTPT CLINIC VISIT: HCPCS | Mod: ZF

## 2019-11-05 RX ORDER — ESCITALOPRAM OXALATE 20 MG/1
20 TABLET ORAL
Qty: 30 TABLET | Refills: 11 | Status: SHIPPED | OUTPATIENT
Start: 2019-11-05 | End: 2020-10-15

## 2019-11-05 ASSESSMENT — PAIN SCALES - GENERAL: PAINLEVEL: NO PAIN (0)

## 2019-11-05 ASSESSMENT — MIFFLIN-ST. JEOR: SCORE: 1781.59

## 2019-11-05 NOTE — NURSING NOTE
Chief Complaint   Patient presents with     Consult     CF Health Psych    Medications reviewed and vital signs taken.   Darrian Velázquez CMA

## 2019-11-05 NOTE — LETTER
2019       RE: Philip Delacruz  [address] [facility] [address]     Dear Colleague,    Thank you for referring your patient, Philip Delacruz, to the [facility] FOR LUNG SCIENCE AND HEALTH at [facility]. Please see a copy of my visit note below.    Service Date: 2019      Patient was last seen on 09-10-19.  Patient reports here today denying depression.  Patient denies anxiety or panic attacks.  Patient denies suicidal ideations or plans.  Patient reports normal sleep, normal appetite, and normal energy with current weight of 177 lbs.  Patient denies irritability, racing thoughts, giovanni, or hypomania.  Patient denies hallucinations or delusions.  Patient continues to drink 3-4 cups of coffee per day and has an occasional alcoholic drink.  Patient feels that he is doing well at home and at work.      DIAGNOSIS:  Generalized anxiety disorder.      TEST SCORES:   1. PHQ-9 = 2.   2. FELIX-7 = 1   3. Goel Depression Inventory = 1.      PLAN:   1. Continue escitalopram 20 mg po qam.  Patient received multiple refills.   2. Return to see me in 3 months.      TOTAL TIME SPENT:  I spent a total of 20 minutes face-to-face with patient during today's office visit.  Over 50% of this time was spent counseling the patient about continuing his medication and following up with me in clinic in 3 months.      BHAKTI Crystal MD, PhD               [facility]         RADHA CRYSTAL MD             D: 2019   T: 2019   MT: MONICA      Name:     PHILIP DELACRUZ   MRN:      [id number]        Account:      [id number]   :      1980           Service Date: 2019      Document: [id number]

## 2019-11-06 PROCEDURE — 87077 CULTURE AEROBIC IDENTIFY: CPT | Performed by: INTERNAL MEDICINE

## 2019-11-06 PROCEDURE — 87070 CULTURE OTHR SPECIMN AEROBIC: CPT | Performed by: INTERNAL MEDICINE

## 2019-11-06 PROCEDURE — 87186 SC STD MICRODIL/AGAR DIL: CPT | Performed by: INTERNAL MEDICINE

## 2019-11-07 DIAGNOSIS — E84.0 CYSTIC FIBROSIS WITH PULMONARY MANIFESTATIONS (H): ICD-10-CM

## 2019-11-07 DIAGNOSIS — K86.89 PANCREATIC INSUFFICIENCY: ICD-10-CM

## 2019-11-12 LAB
BACTERIA SPEC CULT: ABNORMAL
BACTERIA SPEC CULT: ABNORMAL
SPECIMEN SOURCE: ABNORMAL

## 2019-11-14 ENCOUNTER — TELEPHONE (OUTPATIENT)
Dept: PULMONOLOGY | Facility: CLINIC | Age: 39
End: 2019-11-14

## 2019-11-14 DIAGNOSIS — E84.0 CYSTIC FIBROSIS WITH PULMONARY MANIFESTATIONS (H): Primary | ICD-10-CM

## 2019-11-14 RX ORDER — SULFAMETHOXAZOLE/TRIMETHOPRIM 800-160 MG
1 TABLET ORAL 3 TIMES DAILY
Qty: 63 TABLET | Refills: 0 | Status: SHIPPED | OUTPATIENT
Start: 2019-11-14 | End: 2019-12-26

## 2019-11-14 NOTE — TELEPHONE ENCOUNTER
Patient reports cough, yellow sputum, sinus congestion/pain and night sweats. He did have GI bug over the weekend and was able to produce sputum sample last week. Reviewed culture  with Dr. Sahni and plan is for a 3 week course of bactrim. Patient agrees to plan.

## 2019-11-29 DIAGNOSIS — K86.81 EXOCRINE PANCREATIC INSUFFICIENCY: ICD-10-CM

## 2019-12-02 RX ORDER — PANCRELIPASE 24000; 76000; 120000 [USP'U]/1; [USP'U]/1; [USP'U]/1
CAPSULE, DELAYED RELEASE PELLETS ORAL
Qty: 750 CAPSULE | Refills: 11 | Status: SHIPPED | OUTPATIENT
Start: 2019-12-02 | End: 2019-12-26

## 2019-12-12 DIAGNOSIS — E84.9 CF (CYSTIC FIBROSIS) (H): Primary | ICD-10-CM

## 2019-12-26 ENCOUNTER — OFFICE VISIT (OUTPATIENT)
Dept: PULMONOLOGY | Facility: CLINIC | Age: 39
End: 2019-12-26
Attending: INTERNAL MEDICINE
Payer: COMMERCIAL

## 2019-12-26 ENCOUNTER — TELEPHONE (OUTPATIENT)
Dept: PULMONOLOGY | Facility: CLINIC | Age: 39
End: 2019-12-26

## 2019-12-26 ENCOUNTER — OFFICE VISIT (OUTPATIENT)
Dept: PHARMACY | Facility: CLINIC | Age: 39
End: 2019-12-26
Payer: COMMERCIAL

## 2019-12-26 VITALS
SYSTOLIC BLOOD PRESSURE: 128 MMHG | DIASTOLIC BLOOD PRESSURE: 85 MMHG | OXYGEN SATURATION: 98 % | WEIGHT: 177.47 LBS | HEIGHT: 72 IN | HEART RATE: 60 BPM | BODY MASS INDEX: 24.04 KG/M2

## 2019-12-26 DIAGNOSIS — E84.0 CYSTIC FIBROSIS WITH PULMONARY MANIFESTATIONS (H): Primary | ICD-10-CM

## 2019-12-26 DIAGNOSIS — E84.0 CYSTIC FIBROSIS WITH PULMONARY MANIFESTATIONS (H): ICD-10-CM

## 2019-12-26 DIAGNOSIS — K86.89 PANCREATIC INSUFFICIENCY: ICD-10-CM

## 2019-12-26 DIAGNOSIS — J32.4 CHRONIC PANSINUSITIS: ICD-10-CM

## 2019-12-26 DIAGNOSIS — K86.81 EXOCRINE PANCREATIC INSUFFICIENCY: ICD-10-CM

## 2019-12-26 DIAGNOSIS — E84.9 CF (CYSTIC FIBROSIS) (H): Primary | ICD-10-CM

## 2019-12-26 LAB
ALBUMIN SERPL-MCNC: 4.1 G/DL (ref 3.4–5)
ALP SERPL-CCNC: 101 U/L (ref 40–150)
ALT SERPL W P-5'-P-CCNC: 34 U/L (ref 0–70)
AST SERPL W P-5'-P-CCNC: 25 U/L (ref 0–45)
BILIRUB DIRECT SERPL-MCNC: 0.1 MG/DL (ref 0–0.2)
BILIRUB SERPL-MCNC: 0.6 MG/DL (ref 0.2–1.3)
CK SERPL-CCNC: 145 U/L (ref 30–300)
EXPTIME-PRE: 6.06 SEC
FEF2575-%PRED-PRE: 117 %
FEF2575-PRE: 5.29 L/SEC
FEF2575-PRED: 4.5 L/SEC
FEFMAX-%PRED-PRE: 99 %
FEFMAX-PRE: 10.72 L/SEC
FEFMAX-PRED: 10.78 L/SEC
FEV1-%PRED-PRE: 94 %
FEV1-PRE: 4.39 L
FEV1FEV6-PRE: 85 %
FEV1FEV6-PRED: 82 %
FEV1FVC-PRE: 85 %
FEV1FVC-PRED: 81 %
FIFMAX-PRE: 7.95 L/SEC
FVC-%PRED-PRE: 89 %
FVC-PRE: 5.17 L
FVC-PRED: 5.77 L
PROT SERPL-MCNC: 8 G/DL (ref 6.8–8.8)

## 2019-12-26 PROCEDURE — 99207 ZZC NO CHARGE LOS: CPT | Performed by: PHARMACIST

## 2019-12-26 PROCEDURE — 87070 CULTURE OTHR SPECIMN AEROBIC: CPT | Performed by: INTERNAL MEDICINE

## 2019-12-26 PROCEDURE — 80076 HEPATIC FUNCTION PANEL: CPT | Performed by: INTERNAL MEDICINE

## 2019-12-26 PROCEDURE — G0463 HOSPITAL OUTPT CLINIC VISIT: HCPCS

## 2019-12-26 PROCEDURE — 36415 COLL VENOUS BLD VENIPUNCTURE: CPT | Performed by: INTERNAL MEDICINE

## 2019-12-26 PROCEDURE — 82550 ASSAY OF CK (CPK): CPT | Performed by: INTERNAL MEDICINE

## 2019-12-26 RX ORDER — OSELTAMIVIR PHOSPHATE 75 MG/1
75 CAPSULE ORAL 2 TIMES DAILY
Qty: 10 CAPSULE | Refills: 1 | Status: SHIPPED | OUTPATIENT
Start: 2019-12-26 | End: 2020-03-18

## 2019-12-26 ASSESSMENT — MIFFLIN-ST. JEOR: SCORE: 1775

## 2019-12-26 ASSESSMENT — PAIN SCALES - GENERAL: PAINLEVEL: NO PAIN (0)

## 2019-12-26 NOTE — PROGRESS NOTES
Therapy Management:                                                    Philip Delacruz is a 39 year old male coming in for a therapy management visit.  He was referred to me from Dr. Sahni.     Reason for Consult: Trikafta initiation    Discussion:      Patient is eligible for treatment with CFTR modulator therapy. Most appropriate choice for patient of currently available CFTR modulators is: elexacaftor/tezacaftor/ivacaftor based on age, CFTR mutation genotype, past medical history and current medications. Patient was previously on tezacaftor/ivacaftor (Symdeko).  He had stopped Symdeko several months ago due to GI related side effects.  He re-started Symdeko about a week ago (had some medication left over at home) and has noticed improvement with his sinus symptoms.  Because of the improvement with his sinus symptoms he is interested in switching to Trikafta.    Recommended dose two orange elexacaftor 100mg/tezacaftor 50mg/ivacaftor 75mg in the morning and one blue ivacaftor 150mg tablet in the evening     Drug interactions with CFTR modulator: none    Dose should be given with age-appropriate fat containing food. Provided appropriate examples of fat-containing foods to patient (e.g. Whole milk, cheese, avocado, peanut butter).    Patient will not need dilated eye exam prior to initiation.    Baseline LFTs and CK drawn and are within normal limits Recommend continuing to monitor LFTs and CK quarterly for the first year of treatment then annually therafter.  Additional recommended monitoring: none.    Educated patient on potential side effects, including headache, GI disturbances, rash and increased respiratory or sinus symptoms during the first few days of taking Trikafta..  Education provided on how to administer medication, what to do if a dose is missed, monitoring prior to and while on therapy, medications/foods to avoid, and how to transition from Symdeko.  Discussed with patient how to obtain CFTR  modulator from specialty pharmacy and informed patient they will need to bring home supply if hospitalized. Patient was engaged in teaching and verbalized understanding.    Patient is already enrolled in [employer] .      Plan:  1. We will work on obtaining insurance coverage for Trikafta.  Once insurance authorization is approved we will send the prescription to [facility].  They will contact you to arrange a delivery.  Continue taking Symdeko for now (has a 1 week supply remaining)  2. Once you receive the medication, start taking Trikafta 2 orange tablets in the morning and 1 blue tablet in the evening, 12 hours apart with fat-containing food.  3. We discussed the possibility of increased GI symptoms after starting Trikafta, such as loose stools, diarrhea, abdominal pain or gas.  Some of these side effects may resolve with time.  He should stay well hydrated and could take Gas-X or Phazyme for gas and Tylenol or ibuprofen for pain, if needed.  4. Shar is interested in filling all his medications at [facility].  Instructed him to work with the CF therapy  to have prescriptions transferred from his [facility] pharmacy.  5. Shar is also interested in trying a different pancreatic enzyme.  Plan is for him to start Trikafta first and make sure he tolerates it.  Once he is stable on Trikafta, will consider switching his Creon to Zenpep or Pertzye.    Will follow-up in 3 months to assess response to Trikafta.    Amanda Gonzalez PharmD  CF Medication Therapy Management Pharmacist  [facility]  [phone]

## 2019-12-26 NOTE — PATIENT INSTRUCTIONS
Cystic Fibrosis Self-Care Plan    RECOMMENDATIONS:   Who can use Trikafta?   Patients with cystic fibrosis 12 years and older with at least 1 copy of G101png.    How does it work?  It partially fixes the underlying defect in CF cells.  It helps the chloride channel (CFTR) work better in airway lining cells and throughout the body.    What did the clinical trials show?   Patients had an average of 10-14% absolute improvement in FEV1.   There was more than 60% reduction in pulmonary exacerbations.  Patients had reduced breathing symptoms like cough, sputum and shortness of breath.   Patients had an increase in weight.  THESE ARE AVERAGES ACROSS ALL PATINTS IN THE TRIALS. RESULTS IN INDVIDUAL PATIENTS WILL VARY.    What are common side effects?   Headache   Colds  Abdominal (belly) pain   Diarrhea   Rash   Runny nose   Influenza (flu)   Increased blood pressure   Abnormal labs (liver test and muscle enzymes)  IF YOU DEVELOP ANY NEW SYMPTOMS AFTER STARTING TRIKAFTA, PLEASE CALL THE CF OFFICE  [phone]    SOME OF OUR PATIENTS HAVE REPORTED LARGE INCREASES IN SPUTUM PRODUCTION THE FIRST FEW DAYS ON TRIKAFTA. SOME HAVE ALSO HAD BODY ACHES AND HEADACHES THE FIRST FEW DAYS. WE HAVE FOUND THAT INCREASING FLUID INTAKE AND SOMETIMES TAKING ACETAMINOPHEN (TYLENOL) REDUCES THE SYMPTOMS.    Do I need any extra blood tests?  You will need blood tests to check your liver and muscle enzymes before starting the medication and at least every 3 months (with each clinic visit). Labs will be checked more frequently if liver or muscle enzymes have been elevated in the past or increase while on Trikafta.    Can I stop my other therapies?  All of the trials with Trikafta were performed with patients continuing all of their standard treatments. We do not know if the medication will work as well if you are not doing your usual therapy. We will monitor your symptoms and PFTs for 6 months so we know how you respond to Trikafta and then  discuss what changes might be safe. This is an area of active research at the CF Foundation.    Can I take Trikafta if I have liver disease?   You can use the standard dose if you have mild liver disease.     (Mild liver disease = Child-Miller Class A; 5-6 points)  Trikafta is not recommended for patients with moderate liver disease but can be used cautiously if benefits outweigh risks.  The dose needs to be adjusted for moderate liver disease.    (Moderate liver disease = Child-Miller Class B; 7-9 points)   Trikafta cannot be used safely with severe liver disease.    (Severe liver disease = Child-Miller Class C; 10-15 points)    Does Trikafta interact with other medication?  Trikafta interacts with a number of medications. Our pharmacists have reviewed your medication and:  XX It does not interact with any of your current medication    TRIKAFTA INTERACTS WITH GRAPEFRUIT. PLEASE AVOID GRAPEFRUIT OR GRAPEFRUIT JUICE.  PLEASE CONTACT THE CF OFFICE IF ANY PROVIDER RECOMMENDS YOU START OR STOP ANY MEDICATION.  TRIKAFTA DOES NOT INTERACT WITH ORAL CONTRACEPTIVES.    What is my Trikafta dose?     2 orange tablets in the morning and 1 blue tablet in the evening      How do I take my Trikafta?   Take the morning (orange tablets) and evening (blue tablets) dose 12 hours apart.   Take all doses with a fat containing meal.    What should I do if I miss a dose?   Try NOT to miss any doses.   If LESS than 6 hours has passed since you missed the dose, take it as soon as possible.   If MORE than 6 hours has passed since you missed your dose:  If you missed the morning dose (orange pills), you should take the morning dose (orange pills) as soon as possible and SKIP the evening dose (blue pills).  If you missed the evening dose (blue pills), do not take it and just start with the usual morning dose (orange pills) the next morning.    Can I take Trikafta if I am pregnant or nursing (breastfeeding)?  There is no information regarding safety  in pregnancy. Limited animal data did not show fetal damage with the individual components but effects of Trikafta in human pregnancies is unknown.  There is no information regarding the presence of Trikafta in human milk, the effects on  infants or the effects on milk production.      Stop Symdeko and start Tikafta when the Trikafta arrives.  Otherwise continue current medication.    Begin Tamiflu if you have influenza (flu) symptoms. This is usually fever above 100.5, body aches and increased cough.  If you start tamiflu, please call the CF office.          [facility] Nurse line:  Suzie Martins    [phone]     [facility] Fax Number:      [phone]         Cystic fibrosis Respiratory Therapist:   Kristi Ndiaye              [phone]   Cystic fibrosis Dietitians:              Nisa Dumont              [phone]                            April Cardona                        [phone]   Cystic fibrosis Diabetes Nurse:    Barbara Birmingham   [phone]    Cystic fibrosis Social Workers:     Melissa Calle               [phone]                     Debora Joseph               [phone]  Cystic fibrosis Pharmacist:           Amanda Gonzalez                               [phone]         Iris August   [phone]  Cystic Fibrosis Genetic Counselor:   Faby Flores    [phone]    [facility] website:  [email or web address]         MRN: [id number]   Clinic Date: December 26, 2019   Patient: Philip Delacruz     Annual Studies:   IGG   Date Value Ref Range Status   05/02/2019 1,210 695 - 1,620 mg/dL Final     Insulin   Date Value Ref Range Status   05/02/2019 9.0 3 - 25 mU/L Final     There are no preventive care reminders to display for this patient.    Pulmonary Function Tests  FEV1: amount of air you can blow out in 1 second  FVC: total amount of air you can take in and blow out    Your Goals:         PFT  Latest Ref Rng & Units 12/26/2019   FVC L 5.17   FEV1 L 4.39   FVC% % 89   FEV1% % 94        Good Nutrition Can Improve Lung Function and Overall Health     Take ALL of your vitamins with food     Take 1/2 of your enzymes before EVERY meal/snack and the other 1/2 mid-meal/snack    Wt Readings from Last 3 Encounters:   12/26/19 80.5 kg (177 lb 7.5 oz)   11/05/19 80.3 kg (177 lb)   09/10/19 78.9 kg (174 lb)       Body mass index is 23.78 kg/m .         National CF Foundation Recommendations for BMI in CF Adults: Women: at least 22 Men: at least 23        Controlling Blood Sugars Helps Prevent Lung Infections & Improves Nutrition  Test blood sugar:     In the morning before eating (goal is )     2 hours after a meal (goal is less than 150)     When pre-meal glucose is greater than 150 add correction     At bedtime (if less than 100 eat a snack with 15 grams of carbohydrates  Last A1C Results:   Hemoglobin A1C   Date Value Ref Range Status   05/02/2019 5.2 0 - 5.6 % Final     Comment:     Normal <5.7% Prediabetes 5.7-6.4%  Diabetes 6.5% or higher - adopted from ADA   consensus guidelines.           If diabetic, measure A1C every 6 months. Goal: Under 7%    Staying Healthy    Research:  If you are interested in learning about research opportunities or have questions, please contact the CF Research Team at [phone] or [email or web address].      CF Foundation:  Compass is a personalized resource service to help you with the insurance, financial, legal and other issues you are facing.  It's free, confidential and available to anyone with CF.  Ask your  for more information or contact Compass directly at 422-VGXILPT ([phone]) or compass@cff.org, or learn more at cff.org/compass.

## 2019-12-26 NOTE — NURSING NOTE
Chief Complaint   Patient presents with     Follow Up     cf     Vitals were taken and medications were reconciled.     JENNIFER Barry

## 2019-12-30 DIAGNOSIS — K86.89 PANCREATIC INSUFFICIENCY: ICD-10-CM

## 2019-12-30 DIAGNOSIS — E84.0 CYSTIC FIBROSIS WITH PULMONARY MANIFESTATIONS (H): ICD-10-CM

## 2019-12-30 RX ORDER — FLUTICASONE PROPIONATE 50 MCG
SPRAY, SUSPENSION (ML) NASAL
Qty: 16 ML | Refills: 11 | Status: SHIPPED | OUTPATIENT
Start: 2019-12-30 | End: 2021-07-08

## 2019-12-30 RX ORDER — ACYCLOVIR 400 MG/1
400 TABLET ORAL DAILY
OUTPATIENT
Start: 2019-12-30

## 2019-12-30 NOTE — TELEPHONE ENCOUNTER
Prior Authorization Approval    Authorization Effective Date: 11/27/2019  Authorization Expiration Date: 3/27/2020  Medication: elexacaftor-tezacaftor-ivacaftor & ivacaftor (TRIKAFTA) 100-50-75 & 150 MG tablet pack (APPROVED)  Approved Dose/Quantity: 84 per 28 days  Reference #:     Insurance Company: [employer] - Phone [phone] Fax [phone]  Expected CoPay:       CoPay Card Available: Yes    Foundation Assistance Needed:    Which Pharmacy is filling the prescription (Not needed for infusion/clinic administered): [facility] - [place], [place] - [address]  Pharmacy Notified: Yes  Patient Notified: No

## 2019-12-30 NOTE — PROGRESS NOTES
Nutrition Note    Reason for Visit: Per pt/provider request to discuss enzymes    Ongoing GI concerns. Currently taking Creon 24,000 - 4-5 caps with meals and 2-3 with snacks = 1500 units lipase/kg/meal. Reports he did trial increasing enzyme dose to 7-8 caps with meals (2400 units lipase/kg/meal) without improvement. Also trialed omeprazole.     Thinks GI symptoms were most improved when off Symdeko and reports stools more firm only 1-2x/day. Was off Symdeko May-December and just restarted 1 week ago. Now back up to stools 4-5x/day however may be multi-factorial as pt also started antibiotics at this time.     Interventions/Recommendations:  Per provider, plan for pt to switch from Symdeko to Trikafta. Discussed with pt about only making one change at a time. When pt has transitioned to Trikafta for 2-4 weeks and if/when GI symptoms seem stable (not getting worse or better), could then trial changing enzyme brands as pt has been on Creon since 2015. Pt specifically asked about Pertzye enzyme brand, however this will not be covered by insurance until he has tried/failed 2 enzyme brands.     Will send follow-up message via [employer] with enzyme plan. Will encourage pt to contact RD after stable on Trikafta; then RD may send prescription for Zenpep 25,000 - 4-5 caps with meals and 3 with snacks (1560 units lipase/kg/meal) if appropriate for nutrition plan.        Nisa Dumont RD, LD  Cystic Fibrosis/Lung Transplant Dietitian  Pager [phone]

## 2019-12-31 LAB
BACTERIA SPEC CULT: NORMAL
Lab: NORMAL
SPECIMEN SOURCE: NORMAL

## 2019-12-31 NOTE — PROGRESS NOTES
Reason for Visit  Philip Delacruz is a 39 year old year old male who is being seen for Follow Up (cf)      Assessment and plan:   Shar Delacruz is a 39-year-old male with cystic fibrosis with mild lung disease and pancreatic insufficiency.     Pulmonary: The patient appears to be doing well from a pulmonary standpoint.  He has excellent exercise tolerance.  Minimal cough or sputum production.  He is oxygenating well.  PFTs are similar to his recent best.  He does not appear to be having an exacerbation at this time.  Currently he does not do any formal airway clearance therapy.  He was encouraged to exercise regularly.  If there is any decline and PFTs, formal airway clearance will be recommended.    Pancreatic insufficiency: The patient reports loose stools although improving.  Is unclear what is related to antibiotics, Symdeko or pancreatic insufficiency.  His weight has gradually improved over the past few years and his BMI is adequate.  He will continue his current pancreatic enzymes and supplemental vitamins.  The patient inquired about changing to Pertzye.  This will be considered with his next visit depending on his response to adjustment of his CFTR Modulation.    CFTR Modulation:  The patient has 1 copy of [id number] and 1 copy of D2720E. The patient is currently on Symdeko with marked improvement in his pulmonary and sinus complaints..  In view of the improved lung function and symptoms with the Trikafta clinical trial, I have recommended that he change to the triple therapy modulator.  I have reviewed the indications, side effects, trial results, medication interactions and dosing with the patient (see AVS for details).  I have submitted a prescription and have recommended that he change once the medication is available.  LFTs and CK will be checked today and quarterly for the next year.    CF related sinus disease: The patient had recurrent sinusitis through the fall.  He has had marked symptomatic  "improvement since reinitiating Symdeko.  See above for recommendations for his CFTR modulator.    Mental health: The patient reports improvement in his anxiety symptoms with Lexapro.    Healthcare maintenance: The patient received his influenza vaccine in October.  Annual studies will be due in May.    LFTs and CK will be checked today in anticipation of initiating Trikafta.  Follow-up in 3 months with LFTs, CK, PFTs and sputum culture.  CF HPI  The patient was seen and examined by True Sahni MD   Shar Delacruz is a 39-year-old male with cystic fibrosis with mild lung disease and pancreatic insufficiency.   The patient reports feeling poorly all fall with sinus and respiratory complaints.  He has received multiple courses of antibiotics including Augmentin in September and Bactrim in November.  He also received Cefdinir for a \"skin infection in his nose\".  He has had a waxing and waning cough and sputum production as well as thick nasal secretions partially relieved with nasal washes.  He had been on Symdeko in the past but stopped it due to GI side effects.  He reinitiated it last week with marked improvement in his sinus symptoms, cough and sputum production.  Currently breathing is comfortable at rest and with all activity.  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  He reports his cough is infrequent with minimal yellow sputum.  He denies hemoptysis.  He denies chest pain.  No recent fever, chills or night sweats.  He does not do any formal airway clearance therapy.    Review of systems:  Appetite is excellent  Currently no ear pain, sore throat, sinus pain or rhinorrhea.  At his last visit, he was encouraged to use Prilosec to improve the efficacy of his pancreatic enzymes.  He used it for about 2 weeks and then stopped due to concerns about side effects from long-term use.  He reports loose stools but is unclear if this is related to " recent antibiotics or reinitiation of Symdeko.  He did note that there was some symptomatic improvement with Lexapro.  Anxiety improved with Lexapro.  A complete ROS was otherwise negative except as noted in the HPI.    Current Outpatient Medications   Medication     calcium carbonate 600 mg-vitamin D 400 units (CALTRATE) 600-400 MG-UNIT per tablet     elexacaftor-tezacaftor-ivacaftor & ivacaftor (TRIKAFTA) 100-50-75 & 150 MG tablet pack     oseltamivir (TAMIFLU) 75 MG capsule     Probiotic Product (FLORAJEN BIFIDOBLEND) CAPS     ACYCLOVIR PO     albuterol (PROAIR HFA/PROVENTIL HFA/VENTOLIN HFA) 108 (90 Base) MCG/ACT inhaler     amylase-lipase-protease (CREON) 49214 units CPEP     escitalopram (LEXAPRO) 20 MG tablet     fluticasone (FLONASE) 50 MCG/ACT nasal spray     multivitamin CF formula (MVW COMPLETE FORMULATION) chewable tablet     Omega-3 Fatty Acids (FISH OIL) 1200 MG CPDR     order for DME     order for DME     Sodium Chloride-Sodium Bicarb (SINUFLO READYRINSE) KIT     Wheat Dextrin (BENEFIBER PO)     ZIRGAN 0.15 % GEL     No current facility-administered medications for this visit.      Allergies   Allergen Reactions     Liquid Adhesive Rash     Tagederm     Tegaderm Transparent Dressing (Informational Only) Rash     Past Medical History:   Diagnosis Date     Chronic sinusitis      Congenital absence of vas deferens, bilateral      Cystic fibrosis (H)     Delta F508 and H8854G      Nasal polyps      Sinusitis, chronic        Past Surgical History:   Procedure Laterality Date     HC TOOTH EXTRACTION W/FORCEP       NASAL/SINUS POLYPECTOMY       REPAIR PECTUS EXCAVATUM  1997     SINUS SURGERY  1992, 2002, 2004    Removed tubinates and polyps OSH     Sperm harvest         Social History     Socioeconomic History     Marital status: Single     Spouse name: Not on file     Number of children: Not on file     Years of education: Not on file     Highest education level: Not on file   Occupational History      "Occupation: Teacher   Social Needs     Financial resource strain: Not on file     Food insecurity:     Worry: Not on file     Inability: Not on file     Transportation needs:     Medical: Not on file     Non-medical: Not on file   Tobacco Use     Smoking status: Never Smoker     Smokeless tobacco: Never Used   Substance and Sexual Activity     Alcohol use: Yes     Alcohol/week: 0.0 standard drinks     Comment: 1 drink 3X per week     Drug use: No     Sexual activity: Yes     Partners: Female     Birth control/protection: None   Lifestyle     Physical activity:     Days per week: Not on file     Minutes per session: Not on file     Stress: Not on file   Relationships     Social connections:     Talks on phone: Not on file     Gets together: Not on file     Attends Yazidi service: Not on file     Active member of club or organization: Not on file     Attends meetings of clubs or organizations: Not on file     Relationship status: Not on file     Intimate partner violence:     Fear of current or ex partner: Not on file     Emotionally abused: Not on file     Physically abused: Not on file     Forced sexual activity: Not on file   Other Topics Concern     Parent/sibling w/ CABG, MI or angioplasty before 65F 55M? Not Asked   Social History Narrative    .  Lives in [place] with wife, Aimee, and sons, Shay (3) Serafin (3mo's).         /85   Pulse 60   Ht 1.84 m (6' 0.44\")   Wt 80.5 kg (177 lb 7.5 oz)   SpO2 98%   BMI 23.78 kg/m    Body mass index is 23.78 kg/m .  Exam:   GENERAL APPEARANCE: Well developed, well nourished, alert, and in no apparent distress.  EYES: PERRL, EOMI  HENT: Nasal mucosa with edema and hyperemia. No nasal polyps.  EARS: Canals clear, TMs normal  MOUTH: Oral mucosa is moist, without any lesions, no tonsillar enlargement, no oropharyngeal exudate.  NECK: supple, no masses, no thyromegaly.  LYMPHATICS: No significant axillary, cervical, or supraclavicular nodes.  RESP: " normal percussion, good air flow throughout.  No crackles. No rhonchi. No wheezes.  CV: Normal S1, S2, regular rhythm, normal rate. No murmur.  No rub. No gallop. No LE edema.   ABDOMEN:  Bowel sounds normal, soft, nontender, no HSM or masses.   MS: extremities normal. No clubbing. No cyanosis.  SKIN: no rash on limited exam  NEURO: Mentation intact, speech normal, normal strength and tone, normal gait and stance  PSYCH: mentation appears normal. and affect normal/bright  Results:  Recent Results (from the past 168 hour(s))   General PFT Lab (Please always keep checked)    Collection Time: 12/26/19  9:52 AM   Result Value Ref Range    FVC-Pred 5.77 L    FVC-Pre 5.17 L    FVC-%Pred-Pre 89 %    FEV1-Pre 4.39 L    FEV1-%Pred-Pre 94 %    FEV1FVC-Pred 81 %    FEV1FVC-Pre 85 %    FEFMax-Pred 10.78 L/sec    FEFMax-Pre 10.72 L/sec    FEFMax-%Pred-Pre 99 %    FEF2575-Pred 4.50 L/sec    FEF2575-Pre 5.29 L/sec    XKI7497-%Pred-Pre 117 %    ExpTime-Pre 6.06 sec    FIFMax-Pre 7.95 L/sec    FEV1FEV6-Pred 82 %    FEV1FEV6-Pre 85 %   Cystic Fibrosis Culture Aerob Bacterial    Collection Time: 12/26/19 10:30 AM   Result Value Ref Range    Specimen Description Throat     Special Requests Specimen collected in eSwab transport (white cap)     Culture Micro Moderate growth  Normal haile      Hepatic panel    Collection Time: 12/26/19 12:15 PM   Result Value Ref Range    Bilirubin Direct 0.1 0.0 - 0.2 mg/dL    Bilirubin Total 0.6 0.2 - 1.3 mg/dL    Albumin 4.1 3.4 - 5.0 g/dL    Protein Total 8.0 6.8 - 8.8 g/dL    Alkaline Phosphatase 101 40 - 150 U/L    ALT 34 0 - 70 U/L    AST 25 0 - 45 U/L   CK total    Collection Time: 12/26/19 12:15 PM   Result Value Ref Range    CK Total 145 30 - 300 U/L                   Results as noted above.    PFT Interpretation:  Normal spirometry.  Unchanged from previous.   Similar to recent best.  Valid Maneuver             CF Exacerbation  Absent

## 2020-01-24 ENCOUNTER — TELEPHONE (OUTPATIENT)
Dept: NUTRITION | Facility: CLINIC | Age: 40
End: 2020-01-24

## 2020-01-24 NOTE — PROGRESS NOTES
"Nutrition Note    Spoke with pt via phone call to follow-up on GI/enzymes. Pt initiated Trikafta ~1 month ago. Reports that GI symptoms are \"doing okay\" and that it feels similar to when he was off Symdeko with more firm stools 1-2x/day compared to 4-5x/day. May be experiencing some increased gas. Currently taking Creon 4-6 caps per day, not needing the 7-8 caps that he needed while on Symdeko. Also reports he has gained weight.     Interventions/Recommendations:  Given improvement in symptoms since stopping Symdeko and with initiation of Trikafta, will plan to have pt stay on Creon at this time. Will follow-up with pt at next clinic visit or as able to continue to monitor potential benefit/effects of medication. If worsening GI symptoms could then consider trial switch to Zenpep.       Nisa Dumont RD, LD  Cystic Fibrosis/Lung Transplant Dietitian  Pager [phone]         "

## 2020-01-28 ENCOUNTER — OFFICE VISIT (OUTPATIENT)
Dept: PULMONOLOGY | Facility: CLINIC | Age: 40
End: 2020-01-28
Attending: PSYCHIATRY & NEUROLOGY
Payer: COMMERCIAL

## 2020-01-28 VITALS
TEMPERATURE: 97.8 F | HEART RATE: 63 BPM | DIASTOLIC BLOOD PRESSURE: 78 MMHG | OXYGEN SATURATION: 98 % | BODY MASS INDEX: 23.84 KG/M2 | SYSTOLIC BLOOD PRESSURE: 128 MMHG | HEIGHT: 72 IN | WEIGHT: 176 LBS | RESPIRATION RATE: 18 BRPM

## 2020-01-28 DIAGNOSIS — F41.1 GENERALIZED ANXIETY DISORDER: ICD-10-CM

## 2020-01-28 DIAGNOSIS — F32.5 MAJOR DEPRESSION IN COMPLETE REMISSION (H): Primary | ICD-10-CM

## 2020-01-28 PROCEDURE — G0463 HOSPITAL OUTPT CLINIC VISIT: HCPCS | Mod: ZF

## 2020-01-28 ASSESSMENT — MIFFLIN-ST. JEOR: SCORE: 1768.33

## 2020-01-28 ASSESSMENT — PAIN SCALES - GENERAL: PAINLEVEL: NO PAIN (0)

## 2020-01-28 NOTE — LETTER
RE: Philip Delacruz  [address] [facility] [address]     Dear Colleague,    Thank you for referring your patient, Philip Delacruz, to the [facility] at [facility]. Please see a copy of my visit note below.    Service Date: 01/28/2020      Patient was last seen on 11-05-19.  Patient reports here today denying depression.  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  Patient reports normal sleep, increased appetite, and normal energy with current weight of 176 lbs.  Patient denies auditory or visual hallucinations.  Patient denies delusions.  Patient denies giovanni or hypomania.  Patient denies suicidal ideations or plans.  Patient has reduced his intake of caffeine by 2 cups, currently drinking only 2 cups of coffee a day.   Patient feels that he is doing well at home and at work.      DIAGNOSEIS:   1.         Generalized anxiety disorder.   2.         Major depression - in remission.      TEST SCORES:   1.         PHQ-9 = 4.   2.         FELIX-7 = 3.   3.         Goel Depression Inventory = 4.      PLAN:   1.         Continue escitalopram 20 mg po qam.   2.         Return to see me in 3 months.      TOTAL TIME SPENT:  I spent a total of 15 minutes face-to-face with patient during today's office visit.  Over 50% of this time was spent in counseling the patient about continuing his medication and following up with me in clinic in 3 months.      BHAKTI Crystal MD, PhD               Service Date: 01/28/2020      Patient was last seen on 11-05-19.  Patient reports here today denying depression.  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  Patient reports normal sleep, increased appetite, and normal energy with current weight of 176 lbs.  Patient denies auditory or visual hallucinations.  Patient denies  delusions.  Patient denies giovanni or hypomania.  Patient denies suicidal ideations or plans.  Patient has reduced his intake of caffeine by 2 cups, currently drinking only 2 cups of coffee a day.   Patient feels that he is doing well at home and at work.      DIAGNOSEIS:   1.         Generalized anxiety disorder.   2.         Major depression - in remission.      TEST SCORES:   1.         PHQ-9 = 4.   2.         FELIX-7 = 3.   3.         Goel Depression Inventory = 4.      PLAN:   1.         Continue escitalopram 20 mg po qam.   2.         Return to see me in 3 months.      TOTAL TIME SPENT:  I spent a total of 15 minutes face-to-face with patient during today's office visit.  Over 50% of this time was spent in counseling the patient about continuing his medication and following up with me in clinic in 3 months.      BHAKTI Crystal MD, PhD               Service Date: 01/28/2020      Patient was last seen on 11-05-19.  Patient reports here today denying depression.  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  Patient reports normal sleep, increased appetite, and normal energy with current weight of 176 lbs.  Patient denies auditory or visual hallucinations.  Patient denies delusions.  Patient denies giovanni or hypomania.  Patient denies suicidal ideations or plans.  Patient has reduced his intake of caffeine by 2 cups, currently drinking only 2 cups of coffee a day.   Patient feels that he is doing well at home and at work.      DIAGNOSEIS:   1.         Generalized anxiety disorder.   2.         Major depression - in remission.      TEST SCORES:   1.         PHQ-9 = 4.   2.         FELIX-7 = 3.   3.         Goel Depression Inventory = 4.      PLAN:   1.         Continue escitalopram 20 mg po qam.   2.         Return to see me in 3 months.      TOTAL TIME SPENT:  I spent a total of 15 minutes face-to-face with patient during today's office visit.  Over 50%  of this time was spent in counseling the patient about continuing his medication and following up with me in clinic in 3 months.      BHAKTI Crystal MD, PhD               Service Date: 01/28/2020      Patient was last seen on 11-05-19.  Patient reports here today denying depression.  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  Patient reports normal sleep, increased appetite, and normal energy with current weight of 176 lbs.  Patient denies auditory or visual hallucinations.  Patient denies delusions.  Patient denies giovanni or hypomania.  Patient denies suicidal ideations or plans.  Patient has reduced his intake of caffeine by 2 cups, currently drinking only 2 cups of coffee a day.   Patient feels that he is doing well at home and at work.      DIAGNOSEIS:   1.         Generalized anxiety disorder.   2.         Major depression - in remission.      TEST SCORES:   1.         PHQ-9 = 4.   2.         FELIX-7 = 3.   3.         Goel Depression Inventory = 4.      PLAN:   1.         Continue escitalopram 20 mg po qam.   2.         Return to see me in 3 months.      TOTAL TIME SPENT:  I spent a total of 15 minutes face-to-face with patient during today's office visit.  Over 50% of this time was spent in counseling the patient about continuing his medication and following up with me in clinic in 3 months.      BHAKTI Crystal MD, PhD               Service Date: 01/28/2020      Patient was last seen on 11-05-19.  Patient reports here today denying depression.  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  Patient reports normal sleep, increased appetite, and normal energy with current weight of 176 lbs.  Patient denies auditory or visual hallucinations.  Patient denies delusions.  Patient denies giovanni or hypomania.  Patient denies suicidal ideations or plans.  Patient has reduced his intake of  caffeine by 2 cups, currently drinking only 2 cups of coffee a day.   Patient feels that he is doing well at home and at work.      DIAGNOSEIS:   1.         Generalized anxiety disorder.   2.         Major depression - in remission.      TEST SCORES:   1.         PHQ-9 = 4.   2.         FELIX-7 = 3.   3.         Goel Depression Inventory = 4.      PLAN:   1.         Continue escitalopram 20 mg po qam.   2.         Return to see me in 3 months.      TOTAL TIME SPENT:  I spent a total of 15 minutes face-to-face with patient during today's office visit.  Over 50% of this time was spent in counseling the patient about continuing his medication and following up with me in clinic in 3 months.      BHAKTI Crystal MD, PhD               Service Date: 01/28/2020      Patient was last seen on 11-05-19.  Patient reports here today denying depression.  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  Patient reports normal sleep, increased appetite, and normal energy with current weight of 176 lbs.  Patient denies auditory or visual hallucinations.  Patient denies delusions.  Patient denies giovanni or hypomania.  Patient denies suicidal ideations or plans.  Patient has reduced his intake of caffeine by 2 cups, currently drinking only 2 cups of coffee a day.   Patient feels that he is doing well at home and at work.      DIAGNOSEIS:   1.         Generalized anxiety disorder.   2.         Major depression - in remission.      TEST SCORES:   1.         PHQ-9 = 4.   2.         FELIX-7 = 3.   3.         Goel Depression Inventory = 4.      PLAN:   1.         Continue escitalopram 20 mg po qam.   2.         Return to see me in 3 months.      TOTAL TIME SPENT:  I spent a total of 15 minutes face-to-face with patient during today's office visit.  Over 50% of this time was spent in counseling the patient about continuing his medication and following up with me in clinic in 3 months.       BHAKTI Crystal MD, PhD               Service Date: 01/28/2020      Patient was last seen on 11-05-19.  Patient reports here today denying depression.  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  Patient reports normal sleep, increased appetite, and normal energy with current weight of 176 lbs.  Patient denies auditory or visual hallucinations.  Patient denies delusions.  Patient denies giovanni or hypomania.  Patient denies suicidal ideations or plans.  Patient has reduced his intake of caffeine by 2 cups, currently drinking only 2 cups of coffee a day.   Patient feels that he is doing well at home and at work.      DIAGNOSEIS:   1.         Generalized anxiety disorder.   2.         Major depression - in remission.      TEST SCORES:   1.         PHQ-9 = 4.   2.         FELIX-7 = 3.   3.         Goel Depression Inventory = 4.      PLAN:   1.         Continue escitalopram 20 mg po qam.   2.         Return to see me in 3 months.      TOTAL TIME SPENT:  I spent a total of 15 minutes face-to-face with patient during today's office visit.  Over 50% of this time was spent in counseling the patient about continuing his medication and following up with me in clinic in 3 months.      BHAKTI Crystal MD, PhD               Service Date: 01/28/2020      Patient was last seen on 11-05-19.  Patient reports here today denying depression.  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  Patient reports normal sleep, increased appetite, and normal energy with current weight of 176 lbs.  Patient denies auditory or visual hallucinations.  Patient denies delusions.  Patient denies giovanni or hypomania.  Patient denies suicidal ideations or plans.  Patient has reduced his intake of caffeine by 2 cups, currently drinking only 2 cups of coffee a day.   Patient feels that he is doing well at home and at work.       DIAGNOSEIS:   1.         Generalized anxiety disorder.   2.         Major depression - in remission.      TEST SCORES:   1.         PHQ-9 = 4.   2.         FELIX-7 = 3.   3.         Goel Depression Inventory = 4.      PLAN:   1.         Continue escitalopram 20 mg po qam.   2.         Return to see me in 3 months.      TOTAL TIME SPENT:  I spent a total of 15 minutes face-to-face with patient during today's office visit.  Over 50% of this time was spent in counseling the patient about continuing his medication and following up with me in clinic in 3 months.      BHAKTI Crystal MD, PhD               Service Date: 01/28/2020      Patient was last seen on 11-05-19.  Patient reports here today denying depression.  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  Patient reports normal sleep, increased appetite, and normal energy with current weight of 176 lbs.  Patient denies auditory or visual hallucinations.  Patient denies delusions.  Patient denies giovanni or hypomania.  Patient denies suicidal ideations or plans.  Patient has reduced his intake of caffeine by 2 cups, currently drinking only 2 cups of coffee a day.   Patient feels that he is doing well at home and at work.      DIAGNOSEIS:   1.         Generalized anxiety disorder.   2.         Major depression - in remission.      TEST SCORES:   1.         PHQ-9 = 4.   2.         FELIX-7 = 3.   3.         Goel Depression Inventory = 4.      PLAN:   1.         Continue escitalopram 20 mg po qam.   2.         Return to see me in 3 months.      TOTAL TIME SPENT:  I spent a total of 15 minutes face-to-face with patient during today's office visit.  Over 50% of this time was spent in counseling the patient about continuing his medication and following up with me in clinic in 3 months.      BHAKTI Crystal MD, PhD               Service Date: 01/28/2020      Patient was last seen on 11-05-19.  Patient reports  here today denying depression.  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  Patient reports normal sleep, increased appetite, and normal energy with current weight of 176 lbs.  Patient denies auditory or visual hallucinations.  Patient denies delusions.  Patient denies giovanni or hypomania.  Patient denies suicidal ideations or plans.  Patient has reduced his intake of caffeine by 2 cups, currently drinking only 2 cups of coffee a day.   Patient feels that he is doing well at home and at work.      DIAGNOSEIS:   1.         Generalized anxiety disorder.   2.         Major depression - in remission.      TEST SCORES:   1.         PHQ-9 = 4.   2.         FELIX-7 = 3.   3.         Goel Depression Inventory = 4.      PLAN:   1.         Continue escitalopram 20 mg po qam.   2.         Return to see me in 3 months.      TOTAL TIME SPENT:  I spent a total of 15 minutes face-to-face with patient during today's office visit.  Over 50% of this time was spent in counseling the patient about continuing his medication and following up with me in clinic in 3 months.      BHAKTI Crystal MD, PhD               Service Date: 01/28/2020      Patient was last seen on 11-05-19.  Patient reports here today denying depression.  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  Patient reports normal sleep, increased appetite, and normal energy with current weight of 176 lbs.  Patient denies auditory or visual hallucinations.  Patient denies delusions.  Patient denies giovanni or hypomania.  Patient denies suicidal ideations or plans.  Patient has reduced his intake of caffeine by 2 cups, currently drinking only 2 cups of coffee a day.   Patient feels that he is doing well at home and at work.      DIAGNOSEIS:   1.         Generalized anxiety disorder.   2.         Major depression - in remission.      TEST SCORES:   1.          PHQ-9 = 4.   2.         FELIX-7 = 3.   3.         Goel Depression Inventory = 4.      PLAN:   1.         Continue escitalopram 20 mg po qam.   2.         Return to see me in 3 months.      TOTAL TIME SPENT:  I spent a total of 15 minutes face-to-face with patient during today's office visit.  Over 50% of this time was spent in counseling the patient about continuing his medication and following up with me in clinic in 3 months.      BHAKTI Crystal MD, PhD               Service Date: 01/28/2020      Patient was last seen on 11-05-19.  Patient reports here today denying depression.  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  Patient reports normal sleep, increased appetite, and normal energy with current weight of 176 lbs.  Patient denies auditory or visual hallucinations.  Patient denies delusions.  Patient denies giovanni or hypomania.  Patient denies suicidal ideations or plans.  Patient has reduced his intake of caffeine by 2 cups, currently drinking only 2 cups of coffee a day.   Patient feels that he is doing well at home and at work.      DIAGNOSEIS:   1.         Generalized anxiety disorder.   2.         Major depression - in remission.      TEST SCORES:   1.         PHQ-9 = 4.   2.         FELIX-7 = 3.   3.         Goel Depression Inventory = 4.      PLAN:   1.         Continue escitalopram 20 mg po qam.   2.         Return to see me in 3 months.      TOTAL TIME SPENT:  I spent a total of 15 minutes face-to-face with patient during today's office visit.  Over 50% of this time was spent in counseling the patient about continuing his medication and following up with me in clinic in 3 months.      BHAKTI Crystal MD, PhD

## 2020-01-28 NOTE — NURSING NOTE
Chief Complaint   Patient presents with     RECHECK     Cystic Fibrosis      Medications reviewed and updated.  Vitals taken  Patricia Prince CMA

## 2020-01-29 NOTE — PROGRESS NOTES
Service Date: 01/28/2020      Patient was last seen on 11-05-19.  Patient reports here today denying depression.  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  Patient reports normal sleep, increased appetite, and normal energy with current weight of 176 lbs.  Patient denies auditory or visual hallucinations.  Patient denies delusions.  Patient denies giovanni or hypomania.  Patient denies suicidal ideations or plans.  Patient has reduced his intake of caffeine by 2 cups, currently drinking only 2 cups of coffee a day.   Patient feels that he is doing well at home and at work.      DIAGNOSEIS:   1.         Generalized anxiety disorder.   2.         Major depression - in remission.      TEST SCORES:   1.         PHQ-9 = 4.   2.         FELIX-7 = 3.   3.         Goel Depression Inventory = 4.      PLAN:   1.         Continue escitalopram 20 mg po qam.   2.         Return to see me in 3 months.      TOTAL TIME SPENT:  I spent a total of 15 minutes face-to-face with patient during today's office visit.  Over 50% of this time was spent in counseling the patient about continuing his medication and following up with me in clinic in 3 months.      BHKATI Crystal MD, PhD               Service Date: 01/28/2020      Patient was last seen on 11-05-19.  Patient reports here today denying depression.  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  Patient reports normal sleep, increased appetite, and normal energy with current weight of 176 lbs.  Patient denies auditory or visual hallucinations.  Patient denies delusions.  Patient denies giovanni or hypomania.  Patient denies suicidal ideations or plans.  Patient has reduced his intake of caffeine by 2 cups, currently drinking only 2 cups of coffee a day.   Patient feels that he is doing well at home and at work.      DIAGNOSEIS:   1.         Generalized anxiety  disorder.   2.         Major depression - in remission.      TEST SCORES:   1.         PHQ-9 = 4.   2.         FELIX-7 = 3.   3.         Goel Depression Inventory = 4.      PLAN:   1.         Continue escitalopram 20 mg po qam.   2.         Return to see me in 3 months.      TOTAL TIME SPENT:  I spent a total of 15 minutes face-to-face with patient during today's office visit.  Over 50% of this time was spent in counseling the patient about continuing his medication and following up with me in clinic in 3 months.      BHAKTI Crystal MD, PhD               Service Date: 01/28/2020      Patient was last seen on 11-05-19.  Patient reports here today denying depression.  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  Patient reports normal sleep, increased appetite, and normal energy with current weight of 176 lbs.  Patient denies auditory or visual hallucinations.  Patient denies delusions.  Patient denies giovanni or hypomania.  Patient denies suicidal ideations or plans.  Patient has reduced his intake of caffeine by 2 cups, currently drinking only 2 cups of coffee a day.   Patient feels that he is doing well at home and at work.      DIAGNOSEIS:   1.         Generalized anxiety disorder.   2.         Major depression - in remission.      TEST SCORES:   1.         PHQ-9 = 4.   2.         FELIX-7 = 3.   3.         Goel Depression Inventory = 4.      PLAN:   1.         Continue escitalopram 20 mg po qam.   2.         Return to see me in 3 months.      TOTAL TIME SPENT:  I spent a total of 15 minutes face-to-face with patient during today's office visit.  Over 50% of this time was spent in counseling the patient about continuing his medication and following up with me in clinic in 3 months.      BHAKTI Crystal MD, PhD               Service Date: 01/28/2020      Patient was last seen on 11-05-19.  Patient reports here today denying depression.  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  Patient reports normal sleep, increased appetite, and normal energy with current weight of 176 lbs.  Patient denies auditory or visual hallucinations.  Patient denies delusions.  Patient denies giovanni or hypomania.  Patient denies suicidal ideations or plans.  Patient has reduced his intake of caffeine by 2 cups, currently drinking only 2 cups of coffee a day.   Patient feels that he is doing well at home and at work.      DIAGNOSEIS:   1.         Generalized anxiety disorder.   2.         Major depression - in remission.      TEST SCORES:   1.         PHQ-9 = 4.   2.         FELIX-7 = 3.   3.         Goel Depression Inventory = 4.      PLAN:   1.         Continue escitalopram 20 mg po qam.   2.         Return to see me in 3 months.      TOTAL TIME SPENT:  I spent a total of 15 minutes face-to-face with patient during today's office visit.  Over 50% of this time was spent in counseling the patient about continuing his medication and following up with me in clinic in 3 months.      BHAKTI Crystal MD, PhD               Service Date: 01/28/2020      Patient was last seen on 11-05-19.  Patient reports here today denying depression.  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  Patient reports normal sleep, increased appetite, and normal energy with current weight of 176 lbs.  Patient denies auditory or visual hallucinations.  Patient denies delusions.  Patient denies giovanni or hypomania.  Patient denies suicidal ideations or plans.  Patient has reduced his intake of caffeine by 2 cups, currently drinking only 2 cups of coffee a day.   Patient feels that he is doing well at home and at work.      DIAGNOSEIS:   1.         Generalized anxiety disorder.   2.         Major depression - in remission.      TEST SCORES:   1.         PHQ-9 = 4.   2.         FELIX-7 = 3.   3.          Goel Depression Inventory = 4.      PLAN:   1.         Continue escitalopram 20 mg po qam.   2.         Return to see me in 3 months.      TOTAL TIME SPENT:  I spent a total of 15 minutes face-to-face with patient during today's office visit.  Over 50% of this time was spent in counseling the patient about continuing his medication and following up with me in clinic in 3 months.      BHAKTI Crystal MD, PhD               Service Date: 01/28/2020      Patient was last seen on 11-05-19.  Patient reports here today denying depression.  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  Patient reports normal sleep, increased appetite, and normal energy with current weight of 176 lbs.  Patient denies auditory or visual hallucinations.  Patient denies delusions.  Patient denies giovanni or hypomania.  Patient denies suicidal ideations or plans.  Patient has reduced his intake of caffeine by 2 cups, currently drinking only 2 cups of coffee a day.   Patient feels that he is doing well at home and at work.      DIAGNOSEIS:   1.         Generalized anxiety disorder.   2.         Major depression - in remission.      TEST SCORES:   1.         PHQ-9 = 4.   2.         FELIX-7 = 3.   3.         Goel Depression Inventory = 4.      PLAN:   1.         Continue escitalopram 20 mg po qam.   2.         Return to see me in 3 months.      TOTAL TIME SPENT:  I spent a total of 15 minutes face-to-face with patient during today's office visit.  Over 50% of this time was spent in counseling the patient about continuing his medication and following up with me in clinic in 3 months.      BHAKTI Crystal MD, PhD               Service Date: 01/28/2020      Patient was last seen on 11-05-19.  Patient reports here today denying depression.  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  Patient reports normal sleep,  increased appetite, and normal energy with current weight of 176 lbs.  Patient denies auditory or visual hallucinations.  Patient denies delusions.  Patient denies giovanni or hypomania.  Patient denies suicidal ideations or plans.  Patient has reduced his intake of caffeine by 2 cups, currently drinking only 2 cups of coffee a day.   Patient feels that he is doing well at home and at work.      DIAGNOSEIS:   1.         Generalized anxiety disorder.   2.         Major depression - in remission.      TEST SCORES:   1.         PHQ-9 = 4.   2.         FELIX-7 = 3.   3.         Goel Depression Inventory = 4.      PLAN:   1.         Continue escitalopram 20 mg po qam.   2.         Return to see me in 3 months.      TOTAL TIME SPENT:  I spent a total of 15 minutes face-to-face with patient during today's office visit.  Over 50% of this time was spent in counseling the patient about continuing his medication and following up with me in clinic in 3 months.      BHAKTI Crystal MD, PhD               Service Date: 01/28/2020      Patient was last seen on 11-05-19.  Patient reports here today denying depression.  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  Patient reports normal sleep, increased appetite, and normal energy with current weight of 176 lbs.  Patient denies auditory or visual hallucinations.  Patient denies delusions.  Patient denies giovanni or hypomania.  Patient denies suicidal ideations or plans.  Patient has reduced his intake of caffeine by 2 cups, currently drinking only 2 cups of coffee a day.   Patient feels that he is doing well at home and at work.      DIAGNOSEIS:   1.         Generalized anxiety disorder.   2.         Major depression - in remission.      TEST SCORES:   1.         PHQ-9 = 4.   2.         FELIX-7 = 3.   3.         Goel Depression Inventory = 4.      PLAN:   1.         Continue escitalopram 20 mg po qam.   2.         Return to see  me in 3 months.      TOTAL TIME SPENT:  I spent a total of 15 minutes face-to-face with patient during today's office visit.  Over 50% of this time was spent in counseling the patient about continuing his medication and following up with me in clinic in 3 months.      BHAKTI Crystal MD, PhD               Service Date: 01/28/2020      Patient was last seen on 11-05-19.  Patient reports here today denying depression.  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  Patient reports normal sleep, increased appetite, and normal energy with current weight of 176 lbs.  Patient denies auditory or visual hallucinations.  Patient denies delusions.  Patient denies giovanni or hypomania.  Patient denies suicidal ideations or plans.  Patient has reduced his intake of caffeine by 2 cups, currently drinking only 2 cups of coffee a day.   Patient feels that he is doing well at home and at work.      DIAGNOSEIS:   1.         Generalized anxiety disorder.   2.         Major depression - in remission.      TEST SCORES:   1.         PHQ-9 = 4.   2.         FELIX-7 = 3.   3.         Goel Depression Inventory = 4.      PLAN:   1.         Continue escitalopram 20 mg po qam.   2.         Return to see me in 3 months.      TOTAL TIME SPENT:  I spent a total of 15 minutes face-to-face with patient during today's office visit.  Over 50% of this time was spent in counseling the patient about continuing his medication and following up with me in clinic in 3 months.      BHAKTI Crystal MD, PhD               Service Date: 01/28/2020      Patient was last seen on 11-05-19.  Patient reports here today denying depression.  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  Patient reports normal sleep, increased appetite, and normal energy with current weight of 176 lbs.  Patient denies auditory or visual hallucinations.  Patient  denies delusions.  Patient denies giovanni or hypomania.  Patient denies suicidal ideations or plans.  Patient has reduced his intake of caffeine by 2 cups, currently drinking only 2 cups of coffee a day.   Patient feels that he is doing well at home and at work.      DIAGNOSEIS:   1.         Generalized anxiety disorder.   2.         Major depression - in remission.      TEST SCORES:   1.         PHQ-9 = 4.   2.         FELIX-7 = 3.   3.         Goel Depression Inventory = 4.      PLAN:   1.         Continue escitalopram 20 mg po qam.   2.         Return to see me in 3 months.      TOTAL TIME SPENT:  I spent a total of 15 minutes face-to-face with patient during today's office visit.  Over 50% of this time was spent in counseling the patient about continuing his medication and following up with me in clinic in 3 months.      BHAKTI Crystal MD, PhD               Service Date: 01/28/2020      Patient was last seen on 11-05-19.  Patient reports here today denying depression.  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  Patient reports normal sleep, increased appetite, and normal energy with current weight of 176 lbs.  Patient denies auditory or visual hallucinations.  Patient denies delusions.  Patient denies giovanni or hypomania.  Patient denies suicidal ideations or plans.  Patient has reduced his intake of caffeine by 2 cups, currently drinking only 2 cups of coffee a day.   Patient feels that he is doing well at home and at work.      DIAGNOSEIS:   1.         Generalized anxiety disorder.   2.         Major depression - in remission.      TEST SCORES:   1.         PHQ-9 = 4.   2.         FELIX-7 = 3.   3.         Geol Depression Inventory = 4.      PLAN:   1.         Continue escitalopram 20 mg po qam.   2.         Return to see me in 3 months.      TOTAL TIME SPENT:  I spent a total of 15 minutes face-to-face with patient during today's office visit.   Over 50% of this time was spent in counseling the patient about continuing his medication and following up with me in clinic in 3 months.      BHAKTI Crystal MD, PhD               Service Date: 01/28/2020      Patient was last seen on 11-05-19.  Patient reports here today denying depression.  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  Patient reports normal sleep, increased appetite, and normal energy with current weight of 176 lbs.  Patient denies auditory or visual hallucinations.  Patient denies delusions.  Patient denies giovanni or hypomania.  Patient denies suicidal ideations or plans.  Patient has reduced his intake of caffeine by 2 cups, currently drinking only 2 cups of coffee a day.   Patient feels that he is doing well at home and at work.      DIAGNOSEIS:   1.         Generalized anxiety disorder.   2.         Major depression - in remission.      TEST SCORES:   1.         PHQ-9 = 4.   2.         FELIX-7 = 3.   3.         Goel Depression Inventory = 4.      PLAN:   1.         Continue escitalopram 20 mg po qam.   2.         Return to see me in 3 months.      TOTAL TIME SPENT:  I spent a total of 15 minutes face-to-face with patient during today's office visit.  Over 50% of this time was spent in counseling the patient about continuing his medication and following up with me in clinic in 3 months.      BHAKTI Crystal MD, PhD

## 2020-03-18 DIAGNOSIS — E84.0 CYSTIC FIBROSIS WITH PULMONARY MANIFESTATIONS (H): ICD-10-CM

## 2020-03-18 RX ORDER — OSELTAMIVIR PHOSPHATE 75 MG/1
75 CAPSULE ORAL 2 TIMES DAILY
Qty: 10 CAPSULE | Refills: 1 | Status: SHIPPED | OUTPATIENT
Start: 2020-03-18 | End: 2020-10-15

## 2020-04-03 ENCOUNTER — VIRTUAL VISIT (OUTPATIENT)
Dept: PULMONOLOGY | Facility: CLINIC | Age: 40
End: 2020-04-03
Attending: INTERNAL MEDICINE
Payer: COMMERCIAL

## 2020-04-03 DIAGNOSIS — K86.81 EXOCRINE PANCREATIC INSUFFICIENCY: Primary | ICD-10-CM

## 2020-04-03 DIAGNOSIS — K86.81 EXOCRINE PANCREATIC INSUFFICIENCY: ICD-10-CM

## 2020-04-03 DIAGNOSIS — E84.9 CF (CYSTIC FIBROSIS) (H): ICD-10-CM

## 2020-04-03 DIAGNOSIS — J32.4 CHRONIC PANSINUSITIS: ICD-10-CM

## 2020-04-03 DIAGNOSIS — E84.0 CYSTIC FIBROSIS WITH PULMONARY MANIFESTATIONS (H): Primary | ICD-10-CM

## 2020-04-03 RX ORDER — OLOPATADINE HYDROCHLORIDE 2 MG/ML
1 SOLUTION/ DROPS OPHTHALMIC DAILY
COMMUNITY
End: 2020-06-25

## 2020-04-03 NOTE — PROGRESS NOTES
Nutrition Note-    Spoke with pt via phone call on 4/3/2020 following virtual visit with pulmonologist.   Phone call duration: 8 minutes    Pt reports overall improvement in GI symptoms with Trikafta with less bloating/gas. Now taking Creon 24,000 - 4-5 caps with meals and 2-3 caps with snacks. Estimated daily intake ~20 caps/day.     Pt verbalized some concerns with weight gain and believes he is now up 10# in the last few months. Has experienced some increase in appetite but also may be eating more during the day now that he is staying at home.     Recommendations/Interventions:  1) Offered to review PO intake and discuss strategies to avoid additional unwanted weight gain. Pt declined today and prefers to wait until next in-person clinic visit.     2) Pt wondering about enzyme prescription- thinks his old prescription for Creon 12,000 is active. Re-ordered Creon 24,000 - 4-5 caps with meals per home regimen to [facility] (1500 units lipase/kg/meal).     Nisa Dumont RD, LD  Cystic Fibrosis/Lung Transplant Dietitian  Pager [phone]  On weekends/holidays contact coverage RD at [phone] (inpatient use only)

## 2020-04-03 NOTE — PATIENT INSTRUCTIONS
Cystic Fibrosis Self-Care Plan    RECOMMENDATIONS:   Labs in the next few days at any [facility] clinic  Continue current medication.      [facility] Nurse line:  Suzie Martins    [phone]     [facility] Fax Number:      [phone]         Cystic fibrosis Respiratory Therapist:   Kristi Ndiaye              [phone]   Cystic fibrosis Dietitians:              Nisa Dumont              [phone]                            April Cardona                        [phone]   Cystic fibrosis Diabetes Nurse:    Barbara Birmingham   [phone]    Cystic fibrosis Social Workers:     Melissa Calle               [phone]                     Debora Joseph               [phone]  Cystic fibrosis Pharmacist:           Amanda Gonzalez                               [phone]         Iris August   [phone]  Cystic Fibrosis Genetic Counselor:   Faby Flores    [phone]    [facility] website:  [email or web address]         MRN: [id number]   Clinic Date: April 3, 2020   Patient: Philip Delacruz     Annual Studies:   IGG   Date Value Ref Range Status   05/02/2019 1,210 695 - 1,620 mg/dL Final     Insulin   Date Value Ref Range Status   05/02/2019 9.0 3 - 25 mU/L Final     There are no preventive care reminders to display for this patient.    Pulmonary Function Tests  FEV1: amount of air you can blow out in 1 second  FVC: total amount of air you can take in and blow out    Your Goals:         PFT Latest Ref Rng & Units 12/26/2019   FVC L 5.17   FEV1 L 4.39   FVC% % 89   FEV1% % 94          Good Nutrition Can Improve Lung Function and Overall Health     Take ALL of your vitamins with food     Take 1/2 of your enzymes before EVERY meal/snack and the other 1/2 mid-meal/snack    Wt Readings from Last 3 Encounters:   01/28/20 79.8 kg (176 lb)   12/26/19 80.5 kg (177 lb 7.5 oz)   11/05/19 80.3 kg (177 lb)       There is no height or weight  on file to calculate BMI.         National CF Foundation Recommendations for BMI in CF Adults: Women: at least 22 Men: at least 23        Controlling Blood Sugars Helps Prevent Lung Infections & Improves Nutrition  Test blood sugar:     In the morning before eating (goal is )     2 hours after a meal (goal is less than 150)     When pre-meal glucose is greater than 150 add correction     At bedtime (if less than 100 eat a snack with 15 grams of carbohydrates  Last A1C Results:   Hemoglobin A1C   Date Value Ref Range Status   05/02/2019 5.2 0 - 5.6 % Final     Comment:     Normal <5.7% Prediabetes 5.7-6.4%  Diabetes 6.5% or higher - adopted from ADA   consensus guidelines.           If diabetic, measure A1C every 6 months. Goal: Under 7%    Staying Healthy    Research:  If you are interested in learning about research opportunities or have questions, please contact the CF Research Team at [phone] or [email or web address].      CF Foundation:  Compass is a personalized resource service to help you with the insurance, financial, legal and other issues you are facing.  It's free, confidential and available to anyone with CF.  Ask your  for more information or contact Compass directly at 791-COMPASS ([phone]) or compass@cff.org, or learn more at cff.org/compass.

## 2020-04-06 NOTE — PROGRESS NOTES
"Philip Delacruz is a 39 year old male who is being evaluated via a billable video visit.      The patient has been notified of following:     \"This video visit will be conducted via a call between you and your physician/provider. We have found that certain health care needs can be provided without the need for an in-person physical exam.  This service lets us provide the care you need with a video conversation.  If a prescription is necessary we can send it directly to your pharmacy.  If lab work is needed we can place an order for that and you can then stop by our lab to have the test done at a later time.    If during the course of the call the physician/provider feels a video visit is not appropriate, you will not be charged for this service.\"     Patient has given verbal consent for Video visit? Yes    Patient would like the video invitation sent by: Send to e-mail at: [email or web address]    Video Start Time: 11:21    Philip Delacruz complains of  No chief complaint on file.      I have reviewed and updated the patient's Past Medical History, Social History, Family History and Medication List.    ALLERGIES  Liquid adhesive and Tegaderm transparent dressing (informational only)    Reason for Visit  Philip Delacruz is a 39 year old year old male who is being seen for No chief complaint on file.      Assessment and plan:   Shar Delacruz is a 39-year-old male with cystic fibrosis with mild lung disease and pancreatic insufficiency  The patient had a febrile illness with mostly GI symptoms about a month ago.  This resolved spontaneously.  Likely a viral gastroenteritis.  No further evaluation is required at this time.  Pulmonary: The patient appears to be doing well from pulmonary standpoint.  He has excellent exercise tolerance.  Oxygen saturation and PFTs are not available because this was a video visit due to the COVID  Epidemic.  He does not appear to have a pulmonary exacerbation at this time.  He " will continue to use exercise as his primary form of airway clearance.    CFTR Modulation: The patient was initiated on Trikafta at his last visit.  He has had an excellent symptomatic response with almost complete resolution of his cough and marked improvement in his GI symptoms.  Continue Trikafta.  Monitoring labs will be checked in the next couple of weeks at his local lab and again with his quarterly visit in 3 months.    Pancreatic insufficiency: The patient reports marked improvement in his digestive symptoms with initiation of Trikafta.  He will continue his current enzymes and vitamins.  The dietitian will contact the patient for annual evaluation.    CF related sinus disease: Quiescent at this time.    Follow-up in 3 months with CF annual studies.  With glucose tolerance test.  No DEXA.      CF HPI  The patient was seen and examined by True Sahni MD   Shar Delacruz is a 39-year-old male with cystic fibrosis with mild lung disease and pancreatic insufficiency.   The patient was ill approximately 1 month ago with fever, chills, diarrhea, nausea and one of vomiting as well as body aches.  Symptoms resolved for him but his 2 children have subsequently had similar symptoms.  He reports the right side of his jaw hurts but otherwise he is back to baseline.  Breathing is comfortable at rest.  He reports dyspnea only with heavy exertion.  He is running for exercise.  He reports an occasional cough which he described as a tickle in his chest which largely resolved with Trikafta.  No sputum production.  No chest pain.  No fever, chills or night sweats other than as noted above.    Review of systems:  Appetite is very good, weight is increasing.  No ear pain, sore throat, sinus pain or rhinorrhea.  Decreased stool frequency and gas since initiating Trikafta.  Much improvement in GI symptoms.  No nausea or vomiting.  Possible increased urinary frequency.  No dysuria.  A complete ROS was otherwise negative  except as noted in the HPI.    Current Outpatient Medications   Medication     ACYCLOVIR PO     albuterol (PROAIR HFA/PROVENTIL HFA/VENTOLIN HFA) 108 (90 Base) MCG/ACT inhaler     calcium carbonate 600 mg-vitamin D 400 units (CALTRATE) 600-400 MG-UNIT per tablet     elexacaftor-tezacaftor-ivacaftor & ivacaftor (TRIKAFTA) 100-50-75 & 150 MG tablet pack     escitalopram (LEXAPRO) 20 MG tablet     fluticasone (FLONASE) 50 MCG/ACT nasal spray     Loratadine-Pseudoephedrine (PX ALLERGY RELIEF D, LORATID, PO)     multivitamin CF formula (MVW COMPLETE FORMULATION) chewable tablet     olopatadine (PATADAY) 0.2 % ophthalmic solution     Omega-3 Fatty Acids (FISH OIL) 1200 MG CPDR     oseltamivir (TAMIFLU) 75 MG capsule     Sodium Chloride-Sodium Bicarb (SINUFLO READYRINSE) KIT     Wheat Dextrin (BENEFIBER PO)     ZIRGAN 0.15 % GEL     amylase-lipase-protease (CREON) 90854-81511 units CPEP per EC capsule     order for DME     order for DME     No current facility-administered medications for this visit.      Allergies   Allergen Reactions     Liquid Adhesive Rash     Tagederm     Tegaderm Transparent Dressing (Informational Only) Rash     Past Medical History:   Diagnosis Date     Chronic sinusitis      Congenital absence of vas deferens, bilateral      Cystic fibrosis (H)     Delta F508 and [id number]      Nasal polyps      Sinusitis, chronic        Past Surgical History:   Procedure Laterality Date     HC TOOTH EXTRACTION W/FORCEP       NASAL/SINUS POLYPECTOMY       REPAIR PECTUS EXCAVATUM  1997     SINUS SURGERY  1992, 2002, 2004    Removed tubinates and polyps OSH     Sperm harvest         Social History     Socioeconomic History     Marital status: Single     Spouse name: Not on file     Number of children: Not on file     Years of education: Not on file     Highest education level: Not on file   Occupational History     Occupation: Teacher   Social Needs     Financial resource strain: Not on file     Food insecurity      Worry: Not on file     Inability: Not on file     Transportation needs     Medical: Not on file     Non-medical: Not on file   Tobacco Use     Smoking status: Never Smoker     Smokeless tobacco: Never Used   Substance and Sexual Activity     Alcohol use: Yes     Alcohol/week: 0.0 standard drinks     Comment: 1 drink 3X per week     Drug use: No     Sexual activity: Yes     Partners: Female     Birth control/protection: None   Lifestyle     Physical activity     Days per week: Not on file     Minutes per session: Not on file     Stress: Not on file   Relationships     Social connections     Talks on phone: Not on file     Gets together: Not on file     Attends Moravian service: Not on file     Active member of club or organization: Not on file     Attends meetings of clubs or organizations: Not on file     Relationship status: Not on file     Intimate partner violence     Fear of current or ex partner: Not on file     Emotionally abused: Not on file     Physically abused: Not on file     Forced sexual activity: Not on file   Other Topics Concern     Parent/sibling w/ CABG, MI or angioplasty before 65F 55M? Not Asked   Social History Narrative    .  Lives in [place] with wife, Aimee, and sons, Shay (3) Serafin (3mo's).                    CF Exacerbation  Absent              Video-Visit Details    Type of service:  Video Visit    Video End Time (time video stopped): 11:37     Originating Location (pt. Location): Home    Distant Location (provider location):  [facility]     Mode of Communication:  Video Conference via Lj Sahni MD

## 2020-04-08 ENCOUNTER — TELEPHONE (OUTPATIENT)
Dept: PULMONOLOGY | Facility: CLINIC | Age: 40
End: 2020-04-08

## 2020-04-09 NOTE — TELEPHONE ENCOUNTER
Prior Authorization Approval    Authorization Effective Date: 3/9/2020  Authorization Expiration Date: 4/9/2021  Medication: elexacaftor-tezacaftor-ivacaftor & ivacaftor (TRIKAFTA) 100-50-75 & 150 MG tablet pack - pa approved  Approved Dose/Quantity: UD  Reference #:     Insurance Company: [employer] - Phone [phone] Fax [phone]  Expected CoPay:       CoPay Card Available:      Foundation Assistance Needed:    Which Pharmacy is filling the prescription (Not needed for infusion/clinic administered): [facility] - [place], [place] - [address]  Pharmacy Notified: Yes  Patient Notified: Yes

## 2020-04-15 DIAGNOSIS — K86.81 EXOCRINE PANCREATIC INSUFFICIENCY: ICD-10-CM

## 2020-04-15 DIAGNOSIS — E84.0 CYSTIC FIBROSIS WITH PULMONARY MANIFESTATIONS (H): ICD-10-CM

## 2020-04-15 DIAGNOSIS — J32.4 CHRONIC PANSINUSITIS: ICD-10-CM

## 2020-04-15 LAB
ALBUMIN SERPL-MCNC: 4 G/DL (ref 3.4–5)
ALBUMIN UR-MCNC: NEGATIVE MG/DL
ALP SERPL-CCNC: 85 U/L (ref 40–150)
ALT SERPL W P-5'-P-CCNC: 37 U/L (ref 0–70)
ANION GAP SERPL CALCULATED.3IONS-SCNC: 8 MMOL/L (ref 3–14)
APPEARANCE UR: CLEAR
AST SERPL W P-5'-P-CCNC: 26 U/L (ref 0–45)
BASOPHILS # BLD AUTO: 0 10E9/L (ref 0–0.2)
BASOPHILS NFR BLD AUTO: 0.1 %
BILIRUB DIRECT SERPL-MCNC: 0.2 MG/DL (ref 0–0.2)
BILIRUB SERPL-MCNC: 1 MG/DL (ref 0.2–1.3)
BILIRUB UR QL STRIP: NEGATIVE
BUN SERPL-MCNC: 22 MG/DL (ref 7–30)
CALCIUM SERPL-MCNC: 9 MG/DL (ref 8.5–10.1)
CHLORIDE SERPL-SCNC: 102 MMOL/L (ref 94–109)
CHOLEST SERPL-MCNC: 275 MG/DL
CK SERPL-CCNC: 228 U/L (ref 30–300)
CO2 SERPL-SCNC: 26 MMOL/L (ref 20–32)
COLOR UR AUTO: YELLOW
CREAT SERPL-MCNC: 0.88 MG/DL (ref 0.66–1.25)
CREAT UR-MCNC: 259 MG/DL
DIFFERENTIAL METHOD BLD: NORMAL
EOSINOPHIL # BLD AUTO: 0.1 10E9/L (ref 0–0.7)
EOSINOPHIL NFR BLD AUTO: 1.4 %
ERYTHROCYTE [DISTWIDTH] IN BLOOD BY AUTOMATED COUNT: 13 % (ref 10–15)
ERYTHROCYTE [SEDIMENTATION RATE] IN BLOOD BY WESTERGREN METHOD: 3 MM/H (ref 0–15)
GFR SERPL CREATININE-BSD FRML MDRD: >90 ML/MIN/{1.73_M2}
GGT SERPL-CCNC: 18 U/L (ref 0–75)
GLUCOSE SERPL-MCNC: 91 MG/DL (ref 70–99)
GLUCOSE UR STRIP-MCNC: NEGATIVE MG/DL
HBA1C MFR BLD: 4.8 % (ref 0–5.6)
HCT VFR BLD AUTO: 47.4 % (ref 40–53)
HDLC SERPL-MCNC: 62 MG/DL
HGB BLD-MCNC: 16.7 G/DL (ref 13.3–17.7)
HGB UR QL STRIP: NEGATIVE
INR PPP: 0.98 (ref 0.86–1.14)
IRON SERPL-MCNC: 145 UG/DL (ref 35–180)
KETONES UR STRIP-MCNC: NEGATIVE MG/DL
LDLC SERPL CALC-MCNC: 176 MG/DL
LEUKOCYTE ESTERASE UR QL STRIP: NEGATIVE
LYMPHOCYTES # BLD AUTO: 2.6 10E9/L (ref 0.8–5.3)
LYMPHOCYTES NFR BLD AUTO: 37.5 %
MAGNESIUM SERPL-MCNC: 2.2 MG/DL (ref 1.6–2.3)
MCH RBC QN AUTO: 31.6 PG (ref 26.5–33)
MCHC RBC AUTO-ENTMCNC: 35.2 G/DL (ref 31.5–36.5)
MCV RBC AUTO: 90 FL (ref 78–100)
MICROALBUMIN UR-MCNC: 28 MG/L
MICROALBUMIN/CREAT UR: 10.66 MG/G CR (ref 0–17)
MONOCYTES # BLD AUTO: 0.6 10E9/L (ref 0–1.3)
MONOCYTES NFR BLD AUTO: 8.7 %
MUCOUS THREADS #/AREA URNS LPF: PRESENT /LPF
NEUTROPHILS # BLD AUTO: 3.6 10E9/L (ref 1.6–8.3)
NEUTROPHILS NFR BLD AUTO: 52.3 %
NITRATE UR QL: NEGATIVE
NONHDLC SERPL-MCNC: 213 MG/DL
PH UR STRIP: 6 PH (ref 5–7)
PHOSPHATE SERPL-MCNC: 3.2 MG/DL (ref 2.5–4.5)
PLATELET # BLD AUTO: 209 10E9/L (ref 150–450)
POTASSIUM SERPL-SCNC: 4 MMOL/L (ref 3.4–5.3)
PROT SERPL-MCNC: 8 G/DL (ref 6.8–8.8)
RBC # BLD AUTO: 5.28 10E12/L (ref 4.4–5.9)
RBC #/AREA URNS AUTO: ABNORMAL /HPF
SODIUM SERPL-SCNC: 136 MMOL/L (ref 133–144)
SOURCE: ABNORMAL
SP GR UR STRIP: 1.02 (ref 1–1.03)
TRIGL SERPL-MCNC: 187 MG/DL
TSH SERPL DL<=0.005 MIU/L-ACNC: 1.48 MU/L (ref 0.4–4)
UROBILINOGEN UR STRIP-ACNC: 0.2 EU/DL (ref 0.2–1)
WBC # BLD AUTO: 6.9 10E9/L (ref 4–11)
WBC #/AREA URNS AUTO: ABNORMAL /HPF

## 2020-04-15 PROCEDURE — 80048 BASIC METABOLIC PNL TOTAL CA: CPT | Performed by: INTERNAL MEDICINE

## 2020-04-15 PROCEDURE — 84446 ASSAY OF VITAMIN E: CPT | Mod: 90 | Performed by: INTERNAL MEDICINE

## 2020-04-15 PROCEDURE — 83735 ASSAY OF MAGNESIUM: CPT | Performed by: INTERNAL MEDICINE

## 2020-04-15 PROCEDURE — 82785 ASSAY OF IGE: CPT | Performed by: INTERNAL MEDICINE

## 2020-04-15 PROCEDURE — 83540 ASSAY OF IRON: CPT | Performed by: INTERNAL MEDICINE

## 2020-04-15 PROCEDURE — 84403 ASSAY OF TOTAL TESTOSTERONE: CPT | Performed by: INTERNAL MEDICINE

## 2020-04-15 PROCEDURE — 82784 ASSAY IGA/IGD/IGG/IGM EACH: CPT | Performed by: INTERNAL MEDICINE

## 2020-04-15 PROCEDURE — 85610 PROTHROMBIN TIME: CPT | Performed by: INTERNAL MEDICINE

## 2020-04-15 PROCEDURE — 84590 ASSAY OF VITAMIN A: CPT | Mod: 90 | Performed by: INTERNAL MEDICINE

## 2020-04-15 PROCEDURE — 83036 HEMOGLOBIN GLYCOSYLATED A1C: CPT | Performed by: INTERNAL MEDICINE

## 2020-04-15 PROCEDURE — 80061 LIPID PANEL: CPT | Performed by: INTERNAL MEDICINE

## 2020-04-15 PROCEDURE — 85652 RBC SED RATE AUTOMATED: CPT | Performed by: INTERNAL MEDICINE

## 2020-04-15 PROCEDURE — 82043 UR ALBUMIN QUANTITATIVE: CPT | Performed by: INTERNAL MEDICINE

## 2020-04-15 PROCEDURE — 82550 ASSAY OF CK (CPK): CPT | Performed by: INTERNAL MEDICINE

## 2020-04-15 PROCEDURE — 36415 COLL VENOUS BLD VENIPUNCTURE: CPT | Performed by: INTERNAL MEDICINE

## 2020-04-15 PROCEDURE — 84443 ASSAY THYROID STIM HORMONE: CPT | Performed by: INTERNAL MEDICINE

## 2020-04-15 PROCEDURE — 81001 URINALYSIS AUTO W/SCOPE: CPT | Performed by: INTERNAL MEDICINE

## 2020-04-15 PROCEDURE — 80076 HEPATIC FUNCTION PANEL: CPT | Performed by: INTERNAL MEDICINE

## 2020-04-15 PROCEDURE — 82977 ASSAY OF GGT: CPT | Performed by: INTERNAL MEDICINE

## 2020-04-15 PROCEDURE — 85025 COMPLETE CBC W/AUTO DIFF WBC: CPT | Performed by: INTERNAL MEDICINE

## 2020-04-15 PROCEDURE — 82306 VITAMIN D 25 HYDROXY: CPT | Performed by: INTERNAL MEDICINE

## 2020-04-15 PROCEDURE — 99000 SPECIMEN HANDLING OFFICE-LAB: CPT | Performed by: INTERNAL MEDICINE

## 2020-04-15 PROCEDURE — 84100 ASSAY OF PHOSPHORUS: CPT | Performed by: INTERNAL MEDICINE

## 2020-04-16 LAB
DEPRECATED CALCIDIOL+CALCIFEROL SERPL-MC: 37 UG/L (ref 20–75)
TESTOST SERPL-MCNC: 486 NG/DL (ref 240–950)

## 2020-04-17 LAB — IGG SERPL-MCNC: 1236 MG/DL (ref 610–1616)

## 2020-04-18 LAB
A-TOCOPHEROL VIT E SERPL-MCNC: 17.6 MG/L (ref 5.5–18)
ANNOTATION COMMENT IMP: NORMAL
BETA+GAMMA TOCOPHEROL SERPL-MCNC: 0.5 MG/L (ref 0–6)
RETINYL PALMITATE SERPL-MCNC: 0.06 MG/L (ref 0–0.1)
VIT A SERPL-MCNC: 0.89 MG/L (ref 0.3–1.2)

## 2020-04-20 ENCOUNTER — TELEPHONE (OUTPATIENT)
Dept: PULMONOLOGY | Facility: CLINIC | Age: 40
End: 2020-04-20

## 2020-04-20 LAB — IGE SERPL-ACNC: 13 KIU/L (ref 0–114)

## 2020-04-21 ENCOUNTER — VIRTUAL VISIT (OUTPATIENT)
Dept: PULMONOLOGY | Facility: CLINIC | Age: 40
End: 2020-04-21
Attending: PSYCHIATRY & NEUROLOGY
Payer: COMMERCIAL

## 2020-04-21 DIAGNOSIS — F32.5 MAJOR DEPRESSION IN COMPLETE REMISSION (H): Primary | ICD-10-CM

## 2020-04-21 DIAGNOSIS — F41.1 GENERALIZED ANXIETY DISORDER: ICD-10-CM

## 2020-04-21 NOTE — PROGRESS NOTES
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    DX: Major Depression ,in remission ; Generalized anxiety disorder ,in remission.    PLAN :  1- Continue escitalopram 20 mg po qam.  2-Follow up with me July 28 /20 at 3 pm.  3-See a dentist or ENT specialist regarding right sided jaw tightness .    TOTAL TIME SPENT : I spent a total of 20 minutes via telephone with patient during today's office visit.Over 50% of this time was spent in counseling the patient about continuing escitalopram,monitoring his weight and following up with me in 3 months.    Chacha Crystal MD ,Ph.D.

## 2020-06-24 DIAGNOSIS — E84.9 CF (CYSTIC FIBROSIS) (H): Primary | ICD-10-CM

## 2020-06-25 ENCOUNTER — VIRTUAL VISIT (OUTPATIENT)
Dept: PULMONOLOGY | Facility: CLINIC | Age: 40
End: 2020-06-25
Attending: INTERNAL MEDICINE
Payer: COMMERCIAL

## 2020-06-25 DIAGNOSIS — J30.89 NON-SEASONAL ALLERGIC RHINITIS, UNSPECIFIED TRIGGER: Primary | ICD-10-CM

## 2020-06-25 DIAGNOSIS — E84.0 CYSTIC FIBROSIS WITH PULMONARY MANIFESTATIONS (H): ICD-10-CM

## 2020-06-25 DIAGNOSIS — K86.81 EXOCRINE PANCREATIC INSUFFICIENCY: ICD-10-CM

## 2020-06-25 DIAGNOSIS — E84.9 CF (CYSTIC FIBROSIS) (H): ICD-10-CM

## 2020-06-25 DIAGNOSIS — K86.89 PANCREATIC INSUFFICIENCY: ICD-10-CM

## 2020-06-25 DIAGNOSIS — J32.4 CHRONIC PANSINUSITIS: ICD-10-CM

## 2020-06-25 LAB
ALBUMIN SERPL-MCNC: 4.1 G/DL (ref 3.4–5)
ALP SERPL-CCNC: 67 U/L (ref 40–150)
ALT SERPL W P-5'-P-CCNC: 36 U/L (ref 0–70)
AST SERPL W P-5'-P-CCNC: 29 U/L (ref 0–45)
BILIRUB DIRECT SERPL-MCNC: 0.2 MG/DL (ref 0–0.2)
BILIRUB SERPL-MCNC: 1.2 MG/DL (ref 0.2–1.3)
CHOLEST SERPL-MCNC: 228 MG/DL
CK SERPL-CCNC: 367 U/L (ref 30–300)
HDLC SERPL-MCNC: 56 MG/DL
LDLC SERPL CALC-MCNC: 162 MG/DL
NONHDLC SERPL-MCNC: 172 MG/DL
PROT SERPL-MCNC: 7.7 G/DL (ref 6.8–8.8)
TRIGL SERPL-MCNC: 49 MG/DL

## 2020-06-25 PROCEDURE — 87070 CULTURE OTHR SPECIMN AEROBIC: CPT | Performed by: INTERNAL MEDICINE

## 2020-06-25 PROCEDURE — 87077 CULTURE AEROBIC IDENTIFY: CPT | Performed by: INTERNAL MEDICINE

## 2020-06-25 PROCEDURE — 87186 SC STD MICRODIL/AGAR DIL: CPT | Performed by: INTERNAL MEDICINE

## 2020-06-25 PROCEDURE — 80076 HEPATIC FUNCTION PANEL: CPT | Performed by: INTERNAL MEDICINE

## 2020-06-25 PROCEDURE — 36415 COLL VENOUS BLD VENIPUNCTURE: CPT | Performed by: INTERNAL MEDICINE

## 2020-06-25 PROCEDURE — 82550 ASSAY OF CK (CPK): CPT | Performed by: INTERNAL MEDICINE

## 2020-06-25 PROCEDURE — 80061 LIPID PANEL: CPT | Performed by: INTERNAL MEDICINE

## 2020-06-25 PROCEDURE — 87107 FUNGI IDENTIFICATION MOLD: CPT | Performed by: INTERNAL MEDICINE

## 2020-06-25 RX ORDER — OLOPATADINE HYDROCHLORIDE 2 MG/ML
1 SOLUTION/ DROPS OPHTHALMIC DAILY
Qty: 2.5 ML | Refills: 11 | Status: SHIPPED | OUTPATIENT
Start: 2020-06-25 | End: 2021-01-07

## 2020-06-25 NOTE — PATIENT INSTRUCTIONS
Cystic Fibrosis Self-Care Plan    RECOMMENDATIONS:   -Continue current medication, inhalers and exercise.  -Call the CF office if your chest tightness is not better by next week.  -Check in with the sports medicine clinic or a podiatrist latter this summer for your foot pain.  -We will contact you about your cholesterol and triglycerides.  -I will check with the pharmacists about medication interactions with the garlic and bergamot.  -Follow up in 3 months with labs, PFTs and sputum culture. Someone will call you in the next few weeks for the appointment.        [facility] Nurse line:  Shelley Martins    [phone]     [facility] Fax Number:      [phone]         Cystic Fibrosis Respiratory Therapists:   Kristi Ndiaye              [phone]          Loida Trinidad   [phone]  Cystic Fibrosis Dietitians:              Nisa Dumont              [phone]                            April Cardona                        [phone]   Cystic Fibrosis Diabetes Nurse:    Barbara Birmingham   [phone]    Cystic Fibrosis Social Workers:     Melissa Calle               [phone]                     Debora Joseph               [phone]  Cystic Fibrosis Pharmacists:           Amanda Gonzalez                               [phone]         Iris August   [phone]  Cystic Fibrosis Genetic Counselor:   Faby Flores    [phone]    [facility] website:  [email or web address]         MRN: [id number]   Clinic Date: June 25, 2020   Patient: Philip Delacruz     Annual Studies:   IGG   Date Value Ref Range Status   04/15/2020 1,236 610 - 1,616 mg/dL Final     Insulin   Date Value Ref Range Status   05/02/2019 9.0 3 - 25 mU/L Final     There are no preventive care reminders to display for this patient.    Pulmonary Function Tests  FEV1: amount of air you can blow out in 1 second  FVC: total amount of air you can take in and blow  out    Your Goals:         PFT Latest Ref Rng & Units 12/26/2019   FVC L 5.17   FEV1 L 4.39   FVC% % 89   FEV1% % 94          Airway Clearance: The Most Important Way to Keep Your Lungs Healthy  Continue regular exercise.    Good Nutrition Can Improve Lung Function and Overall Health     Take ALL of your vitamins with food     Take 1/2 of your enzymes before EVERY meal/snack and the other 1/2 mid-meal/snack    Wt Readings from Last 3 Encounters:   01/28/20 79.8 kg (176 lb)   12/26/19 80.5 kg (177 lb 7.5 oz)   11/05/19 80.3 kg (177 lb)       There is no height or weight on file to calculate BMI.         National CF Foundation Recommendations for BMI in CF Adults: Women: at least 22 Men: at least 23        Controlling Blood Sugars Helps Prevent Lung Infections & Improves Nutrition  Test blood sugar:     In the morning before eating (goal is )     2 hours after a meal (goal is less than 150)     When pre-meal glucose is greater than 150 add correction     At bedtime (if less than 100 eat a snack with 15 grams of carbohydrates  Last A1C Results:   Hemoglobin A1C   Date Value Ref Range Status   04/15/2020 4.8 0 - 5.6 % Final     Comment:     Normal <5.7% Prediabetes 5.7-6.4%  Diabetes 6.5% or higher - adopted from ADA   consensus guidelines.           If diabetic, measure A1C every 6 months. Goal: Under 7%    Staying Healthy    Research:  If you are interested in learning about research opportunities or have questions, please contact the CF Research Team at [phone] or [email or web address].      CF Foundation:  Compass is a personalized resource service to help you with the insurance, financial, legal and other issues you are facing.  It's free, confidential and available to anyone with CF.  Ask your  for more information or contact Compass directly at 108-COMPASS (364-9754) or compass@cff.org, or learn more at cff.org/compass.

## 2020-06-25 NOTE — LETTER
"    6/25/2020         RE: Philip Delacruz  [address]  [place] [facility] [place] [address]        Dear Colleague,    Thank you for referring your patient, Philip Delacruz, to the [facility]. Please see a copy of my visit note below.    Philip Delacruz is a 40 year old male who is being evaluated via a billable video visit.      The patient has been notified of following:     \"This video visit will be conducted via a call between you and your physician/provider. We have found that certain health care needs can be provided without the need for an in-person physical exam.  This service lets us provide the care you need with a video conversation.  If a prescription is necessary we can send it directly to your pharmacy.  If lab work is needed we can place an order for that and you can then stop by our lab to have the test done at a later time.    Video visits are billed at different rates depending on your insurance coverage.  Please reach out to your insurance provider with any questions.    If during the course of the call the physician/provider feels a video visit is not appropriate, you will not be charged for this service.\"    Patient has given verbal consent for Video visit? Yes    Will anyone else be joining your video visit? No        Video-Visit Details    Type of service:  Video Visit    Video Start Time: 2:10 PM  Video End Time: 2:44 PM    Originating Location (pt. Location): Home    Distant Location (provider location):  [facility]     Platform used for Video Visit: Marcelo Sahni MD    Reason for Visit  Philip Delacruz is a 40 year old year old male who is being seen for Video Visit (cf follow up)      Assessment and plan:   Shar Delacruz is a 39-year-old male with cystic fibrosis with mild lung disease and pancreatic insufficiency.    Pulmonary: The patient reports very good exercise tolerance.  He is does " note some increased chest tightness and cough following a mold exposure while doing  earlier this week.  IgE will be checked to screen for ABPA.  If this is unrevealing and symptoms persist, further evaluation will be pursued.  No PFTs or oxygen saturation measurements are available with today's video visit.  In the past the patient has had excellent PFTs and does not do any formal airway clearance therapy.  PFTs will be checked with next visit or in the interim if his respiratory symptoms persist.    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  He has had an excellent response to Trikafta with decreased cough and sputum production and marked improvement in his GI symptoms.  LFTs remain in the normal range.  CK is mildly elevated, likely related to the patient's activities with Trikafta.  This will be rechecked in 1 month.  Patient has recently developed loose stool.  He is using garlic and bergamot for cholesterol.  I will ask our pharmacist to review to determine if there is any drug interaction.    Pancreatic insufficiency: The patient has developed some loose stools since initiating garlic and bergamot for elevated triglycerides and cholesterol.  I do not think this represents malabsorption.  He will continue his current enzymes and vitamin replacement.  Vitamin levels in April were within normal limits.    Elevated cholesterol and triglycerides: The patient's cholesterol, LDL and triglycerides were elevated during annual studies in April.  The patient initiated garlic and bergamot.  Cholesterol and LDL remain elevated.  Triglycerides are improved.  Based on the ACC/AHA ASCVD risk calculator, statins are not indicated at this time.    CF related sinus disease: No evidence of active sinusitis at this time.    Foot pain: The patient was instructed to follow-up with the sports medicine clinic.    Follow-up in 3 months with Trikafta labs, PFTs and sputum culture.  Check IgE this week.  " Recheck LFTs and CK in 1 month.    True Sahni MD     CF HPI  The patient was seen and examined by True Sahni MD   Shar Delacruz is a 39-year-old male with cystic fibrosis with mild lung disease and pancreatic insufficiency.  The patient has started a summer job doing .  Although he is active in his job he is getting less exercise than he used to.  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  This is been present for the past few days.  He is otherwise been well since his last visit.    Breathing is comfortable at rest and with all activity.  Previously his cough had resolved entirely with Trikafta, since the exposure to the moldy floor he has noted some tightness and a \"tickle cough\".  He rarely brings up sputum and when he does there is minimal volume and it is clear.  No chest pain.  No fever, chills or night sweats.  He does not do any vest therapy.    Review of systems:  Appetite is good  No ear pain, sore throat, sinus pain or rhinorrhea  The patient recently started garlic and bergamot through a [employer] store to try to control cholesterol and triglycerides.  Since starting those 2 agents he has noted loose stool.  Patient continues to have a number of body aches, wrists are improved but he has persistent foot pain from the outside of the foot to the arch.  This has been present for many months.  Previous visit to sports medicine recommended some changes in his exercise regimen but this has not helped.    A complete ROS was otherwise negative except as noted in the HPI.    Current Outpatient Medications   Medication     ACYCLOVIR PO     albuterol (PROAIR HFA/PROVENTIL HFA/VENTOLIN HFA) 108 (90 Base) MCG/ACT inhaler     amylase-lipase-protease (CREON) 35362-24917 units CPEP per EC capsule     calcium carbonate 600 mg-vitamin D 400 units (CALTRATE) 600-400 MG-UNIT per tablet     CITRUS " BERGAMOT PO     elexacaftor-tezacaftor-ivacaftor & ivacaftor (TRIKAFTA) 100-50-75 & 150 MG tablet pack     escitalopram (LEXAPRO) 20 MG tablet     fluticasone (FLONASE) 50 MCG/ACT nasal spray     GARLIC PO     Homeopathic Products (BERGAMOT SERENITY IN)     Loratadine-Pseudoephedrine (PX ALLERGY RELIEF D, LORATID, PO)     multivitamin CF formula (MVW COMPLETE FORMULATION) chewable tablet     olopatadine (PATADAY) 0.2 % ophthalmic solution     Omega-3 Fatty Acids (FISH OIL) 1200 MG CPDR     order for DME     order for DME     Sodium Chloride-Sodium Bicarb (SINUFLO READYRINSE) KIT     Wheat Dextrin (BENEFIBER PO)     ZIRGAN 0.15 % GEL     oseltamivir (TAMIFLU) 75 MG capsule     No current facility-administered medications for this visit.      Allergies   Allergen Reactions     Liquid Adhesive Rash     Tagederm     Tegaderm Transparent Dressing (Informational Only) Rash     Past Medical History:   Diagnosis Date     Chronic sinusitis      Congenital absence of vas deferens, bilateral      Cystic fibrosis (H)     Delta F508 and [id number]      Nasal polyps      Sinusitis, chronic        Past Surgical History:   Procedure Laterality Date     HC TOOTH EXTRACTION W/FORCEP       NASAL/SINUS POLYPECTOMY       REPAIR PECTUS EXCAVATUM  1997     SINUS SURGERY  1992, 2002, 2004    Removed tubinates and polyps OSH     Sperm harvest         Social History     Socioeconomic History     Marital status: Single     Spouse name: Not on file     Number of children: Not on file     Years of education: Not on file     Highest education level: Not on file   Occupational History     Occupation: Teacher   Social Needs     Financial resource strain: Not on file     Food insecurity     Worry: Not on file     Inability: Not on file     Transportation needs     Medical: Not on file     Non-medical: Not on file   Tobacco Use     Smoking status: Never Smoker     Smokeless tobacco: Never Used   Substance and Sexual Activity     Alcohol use: Yes      Alcohol/week: 0.0 standard drinks     Comment: 1 drink 3X per week     Drug use: No     Sexual activity: Yes     Partners: Female     Birth control/protection: None   Lifestyle     Physical activity     Days per week: Not on file     Minutes per session: Not on file     Stress: Not on file   Relationships     Social connections     Talks on phone: Not on file     Gets together: Not on file     Attends Sabianism service: Not on file     Active member of club or organization: Not on file     Attends meetings of clubs or organizations: Not on file     Relationship status: Not on file     Intimate partner violence     Fear of current or ex partner: Not on file     Emotionally abused: Not on file     Physically abused: Not on file     Forced sexual activity: Not on file   Other Topics Concern     Parent/sibling w/ CABG, MI or angioplasty before 65F 55M? Not Asked   Social History Narrative    .  Lives in [place] with wife, Aimee, and sons, Shay (3) Serafin (3mo's).           Exam:   [unfilled]  Constitutional - looks well, in no apparent distress  Eyes - no redness or discharge  Respiratory -breathing appears comfortable.  No cough. Speaking in full sentences.  Skin - No appreciable discoloration or lesions (very limited exam)  Neurological - No apparent tremors. Speech fluent and articlate  Psychiatric - no signs of delirium or anxiety     Exam limited to that easily identified on a virtual visit. The rest of a comprehensive physical examination is deferred due to PHE (public health emergency) video visit restrictions.    Results:  Recent Results (from the past 168 hour(s))   Lipid Profile    Collection Time: 06/25/20 10:01 AM   Result Value Ref Range    Cholesterol 228 (H) <200 mg/dL    Triglycerides 49 <150 mg/dL    HDL Cholesterol 56 >39 mg/dL    LDL Cholesterol Calculated 162 (H) <100 mg/dL    Non HDL Cholesterol 172 (H) <130 mg/dL   CK total    Collection Time: 06/25/20 10:01 AM    Result Value Ref Range    CK Total 367 (H) 30 - 300 U/L   Hepatic panel    Collection Time: 06/25/20 10:01 AM   Result Value Ref Range    Bilirubin Direct 0.2 0.0 - 0.2 mg/dL    Bilirubin Total 1.2 0.2 - 1.3 mg/dL    Albumin 4.1 3.4 - 5.0 g/dL    Protein Total 7.7 6.8 - 8.8 g/dL    Alkaline Phosphatase 67 40 - 150 U/L    ALT 36 0 - 70 U/L    AST 29 0 - 45 U/L   Cystic Fibrosis Culture Aerob Bacterial    Collection Time: 06/25/20 10:08 AM    Specimen: Sputum   Result Value Ref Range    Specimen Description Sputum     Culture Micro Heavy growth  Normal haile       Culture Micro Culture in progress                    No PFTs due to safety concerns during the COVID-19 outbreak.    CF Exacerbation  Absent        Again, thank you for allowing me to participate in the care of your patient.        Sincerely,        True Sahni MD

## 2020-06-25 NOTE — PROGRESS NOTES
"Philip Delacruz is a 40 year old male who is being evaluated via a billable video visit.      The patient has been notified of following:     \"This video visit will be conducted via a call between you and your physician/provider. We have found that certain health care needs can be provided without the need for an in-person physical exam.  This service lets us provide the care you need with a video conversation.  If a prescription is necessary we can send it directly to your pharmacy.  If lab work is needed we can place an order for that and you can then stop by our lab to have the test done at a later time.    Video visits are billed at different rates depending on your insurance coverage.  Please reach out to your insurance provider with any questions.    If during the course of the call the physician/provider feels a video visit is not appropriate, you will not be charged for this service.\"    Patient has given verbal consent for Video visit? Yes    Will anyone else be joining your video visit? No        Video-Visit Details    Type of service:  Video Visit    Video Start Time: 2:10 PM  Video End Time: 2:44 PM    Originating Location (pt. Location): Home    Distant Location (provider location):  [facility]     Platform used for Video Visit: Marcelo Sahni MD    Reason for Visit  Philip Delacruz is a 40 year old year old male who is being seen for Video Visit (cf follow up)      Assessment and plan:   Shar Delacruz is a 39-year-old male with cystic fibrosis with mild lung disease and pancreatic insufficiency.    Pulmonary: The patient reports very good exercise tolerance.  He is does note some increased chest tightness and cough following a mold exposure while doing  earlier this week.  IgE will be checked to screen for ABPA.  If this is unrevealing and symptoms persist, further evaluation will be pursued.  No PFTs or oxygen saturation " measurements are available with today's video visit.  In the past the patient has had excellent PFTs and does not do any formal airway clearance therapy.  PFTs will be checked with next visit or in the interim if his respiratory symptoms persist.    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  He has had an excellent response to Trikafta with decreased cough and sputum production and marked improvement in his GI symptoms.  LFTs remain in the normal range.  CK is mildly elevated, likely related to the patient's activities with Trikafta.  This will be rechecked in 1 month.  Patient has recently developed loose stool.  He is using garlic and bergamot for cholesterol.  I will ask our pharmacist to review to determine if there is any drug interaction.    Pancreatic insufficiency: The patient has developed some loose stools since initiating garlic and bergamot for elevated triglycerides and cholesterol.  I do not think this represents malabsorption.  He will continue his current enzymes and vitamin replacement.  Vitamin levels in April were within normal limits.    Elevated cholesterol and triglycerides: The patient's cholesterol, LDL and triglycerides were elevated during annual studies in April.  The patient initiated garlic and bergamot.  Cholesterol and LDL remain elevated.  Triglycerides are improved.  Based on the ACC/AHA ASCVD risk calculator, statins are not indicated at this time.    CF related sinus disease: No evidence of active sinusitis at this time.    Foot pain: The patient was instructed to follow-up with the sports medicine clinic.    Follow-up in 3 months with Trikafta labs, PFTs and sputum culture.  Check IgE this week.  Recheck LFTs and CK in 1 month.    True Sahni MD     CF HPI  The patient was seen and examined by True Sahni MD   Shar Delacruz is a 39-year-old male with cystic fibrosis with mild lung disease and pancreatic insufficiency.  The patient has started a  "summer job doing .  Although he is active in his job he is getting less exercise than he used to.  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  This is been present for the past few days.  He is otherwise been well since his last visit.    Breathing is comfortable at rest and with all activity.  Previously his cough had resolved entirely with Trikafta, since the exposure to the moldy floor he has noted some tightness and a \"tickle cough\".  He rarely brings up sputum and when he does there is minimal volume and it is clear.  No chest pain.  No fever, chills or night sweats.  He does not do any vest therapy.    Review of systems:  Appetite is good  No ear pain, sore throat, sinus pain or rhinorrhea  The patient recently started garlic and bergamot through a [employer] store to try to control cholesterol and triglycerides.  Since starting those 2 agents he has noted loose stool.  Patient continues to have a number of body aches, wrists are improved but he has persistent foot pain from the outside of the foot to the arch.  This has been present for many months.  Previous visit to sports medicine recommended some changes in his exercise regimen but this has not helped.    A complete ROS was otherwise negative except as noted in the HPI.    Current Outpatient Medications   Medication     ACYCLOVIR PO     albuterol (PROAIR HFA/PROVENTIL HFA/VENTOLIN HFA) 108 (90 Base) MCG/ACT inhaler     amylase-lipase-protease (CREON) 66032-39676 units CPEP per EC capsule     calcium carbonate 600 mg-vitamin D 400 units (CALTRATE) 600-400 MG-UNIT per tablet     CITRUS BERGAMOT PO     elexacaftor-tezacaftor-ivacaftor & ivacaftor (TRIKAFTA) 100-50-75 & 150 MG tablet pack     escitalopram (LEXAPRO) 20 MG tablet     fluticasone (FLONASE) 50 MCG/ACT nasal spray     GARLIC PO     Homeopathic Products (BERGAMOT SERENITY IN)     " Loratadine-Pseudoephedrine (PX ALLERGY RELIEF D, LORATID, PO)     multivitamin CF formula (MVW COMPLETE FORMULATION) chewable tablet     olopatadine (PATADAY) 0.2 % ophthalmic solution     Omega-3 Fatty Acids (FISH OIL) 1200 MG CPDR     order for DME     order for DME     Sodium Chloride-Sodium Bicarb (SINUFLO READYRINSE) KIT     Wheat Dextrin (BENEFIBER PO)     ZIRGAN 0.15 % GEL     oseltamivir (TAMIFLU) 75 MG capsule     No current facility-administered medications for this visit.      Allergies   Allergen Reactions     Liquid Adhesive Rash     Tagederm     Tegaderm Transparent Dressing (Informational Only) Rash     Past Medical History:   Diagnosis Date     Chronic sinusitis      Congenital absence of vas deferens, bilateral      Cystic fibrosis (H)     Delta F508 and D0921R      Nasal polyps      Sinusitis, chronic        Past Surgical History:   Procedure Laterality Date     HC TOOTH EXTRACTION W/FORCEP       NASAL/SINUS POLYPECTOMY       REPAIR PECTUS EXCAVATUM  1997     SINUS SURGERY  1992, 2002, 2004    Removed tubinates and polyps OSH     Sperm harvest         Social History     Socioeconomic History     Marital status: Single     Spouse name: Not on file     Number of children: Not on file     Years of education: Not on file     Highest education level: Not on file   Occupational History     Occupation: Teacher   Social Needs     Financial resource strain: Not on file     Food insecurity     Worry: Not on file     Inability: Not on file     Transportation needs     Medical: Not on file     Non-medical: Not on file   Tobacco Use     Smoking status: Never Smoker     Smokeless tobacco: Never Used   Substance and Sexual Activity     Alcohol use: Yes     Alcohol/week: 0.0 standard drinks     Comment: 1 drink 3X per week     Drug use: No     Sexual activity: Yes     Partners: Female     Birth control/protection: None   Lifestyle     Physical activity     Days per week: Not on file     Minutes per session: Not on  file     Stress: Not on file   Relationships     Social connections     Talks on phone: Not on file     Gets together: Not on file     Attends Quaker service: Not on file     Active member of club or organization: Not on file     Attends meetings of clubs or organizations: Not on file     Relationship status: Not on file     Intimate partner violence     Fear of current or ex partner: Not on file     Emotionally abused: Not on file     Physically abused: Not on file     Forced sexual activity: Not on file   Other Topics Concern     Parent/sibling w/ CABG, MI or angioplasty before 65F 55M? Not Asked   Social History Narrative    .  Lives in [place] with wife, Aimee, and sons, Shay (3) Serafin (3mo's).           Exam:   [unfilled]  Constitutional - looks well, in no apparent distress  Eyes - no redness or discharge  Respiratory -breathing appears comfortable.  No cough. Speaking in full sentences.  Skin - No appreciable discoloration or lesions (very limited exam)  Neurological - No apparent tremors. Speech fluent and articlate  Psychiatric - no signs of delirium or anxiety     Exam limited to that easily identified on a virtual visit. The rest of a comprehensive physical examination is deferred due to PHE (public health emergency) video visit restrictions.    Results:  Recent Results (from the past 168 hour(s))   Lipid Profile    Collection Time: 06/25/20 10:01 AM   Result Value Ref Range    Cholesterol 228 (H) <200 mg/dL    Triglycerides 49 <150 mg/dL    HDL Cholesterol 56 >39 mg/dL    LDL Cholesterol Calculated 162 (H) <100 mg/dL    Non HDL Cholesterol 172 (H) <130 mg/dL   CK total    Collection Time: 06/25/20 10:01 AM   Result Value Ref Range    CK Total 367 (H) 30 - 300 U/L   Hepatic panel    Collection Time: 06/25/20 10:01 AM   Result Value Ref Range    Bilirubin Direct 0.2 0.0 - 0.2 mg/dL    Bilirubin Total 1.2 0.2 - 1.3 mg/dL    Albumin 4.1 3.4 - 5.0 g/dL    Protein Total 7.7  6.8 - 8.8 g/dL    Alkaline Phosphatase 67 40 - 150 U/L    ALT 36 0 - 70 U/L    AST 29 0 - 45 U/L   Cystic Fibrosis Culture Aerob Bacterial    Collection Time: 06/25/20 10:08 AM    Specimen: Sputum   Result Value Ref Range    Specimen Description Sputum     Culture Micro Heavy growth  Normal haile       Culture Micro Culture in progress                    No PFTs due to safety concerns during the COVID-19 outbreak.    CF Exacerbation  Absent

## 2020-06-26 ENCOUNTER — ALLIED HEALTH/NURSE VISIT (OUTPATIENT)
Dept: PHARMACY | Facility: CLINIC | Age: 40
End: 2020-06-26
Payer: COMMERCIAL

## 2020-06-26 DIAGNOSIS — E84.0 CYSTIC FIBROSIS WITH PULMONARY MANIFESTATIONS (H): Primary | ICD-10-CM

## 2020-06-26 PROCEDURE — 99207 ZZC NO CHARGE LOS: CPT | Performed by: PHARMACIST

## 2020-06-26 NOTE — PROGRESS NOTES
Clinical Pharmacy Consult:                                                    Philip Delacruz is a 40 year old male called for a clinical pharmacist consult.  He was referred to me from Dr. Sahni.     Reason for Consult: Potential drug-drug interactions    Discussion: Shar was recently started on garlic and bergamont herbs for cholesterol.  His lipid panel is improved over 2 months ago on these.  Garlic is ok and there are no concerns with drug-drug interactions or drug-disease interactions    Bergamont is from an orange that is closely related the grapefruit.  Grapefruit should be avoided with Trikafta due to inhibition of the enzymes in the liver that metabolize Trikafta, therefore leading to excess levels of Trikafta in the body.  Hepatic panel from 6/25 is all within normal limits so no evidence of liver adverse effects from this combination. Shar did have some diarrhea recently which may be attributed to excess Trikafta levels in the setting of Bergamont use.     Shar is open to stopping Bergamont but just started a month's supply that is somewhat expensive.  Agreed that since his liver function is ok and he is not currently having side effects it would be ok to continue the rest of the month on Bergamont then stop.  It can be difficult to know what the purity or concentration of Bergamont is with a product that has not been verified by a third party.   Will plan to re-check hepatic panel and lipid panel fasting last week of July or first week of August to ensure safety.  At that time he will likely stop Bergamont and his cholesterol management will need to be evaluated.      Plan:  1. Continue your medications as is for now and we will re-evaluate after next fasting labs in about a month.   2. If you notice side effects from Trikafta before then you can stop your Bergamont early.     Iris August, PharmD  CF Clinic Pharmacist  Phone: [phone]  E-mail: [email or web address]

## 2020-06-29 DIAGNOSIS — E84.9 CF (CYSTIC FIBROSIS) (H): Primary | ICD-10-CM

## 2020-07-07 DIAGNOSIS — E84.9 CF (CYSTIC FIBROSIS) (H): Primary | ICD-10-CM

## 2020-07-07 LAB
BACTERIA SPEC CULT: ABNORMAL
SPECIMEN SOURCE: ABNORMAL

## 2020-07-07 RX ORDER — LEVOFLOXACIN 750 MG/1
750 TABLET, FILM COATED ORAL DAILY
Qty: 14 TABLET | Refills: 0 | Status: SHIPPED | OUTPATIENT
Start: 2020-07-07 | End: 2020-10-15

## 2020-07-13 ENCOUNTER — MYC MEDICAL ADVICE (OUTPATIENT)
Dept: PULMONOLOGY | Facility: CLINIC | Age: 40
End: 2020-07-13

## 2020-07-28 ENCOUNTER — VIRTUAL VISIT (OUTPATIENT)
Dept: PULMONOLOGY | Facility: CLINIC | Age: 40
End: 2020-07-28
Attending: PSYCHIATRY & NEUROLOGY
Payer: COMMERCIAL

## 2020-07-28 DIAGNOSIS — F33.42 RECURRENT MAJOR DEPRESSIVE DISORDER, IN FULL REMISSION (H): Primary | ICD-10-CM

## 2020-07-28 RX ORDER — ESCITALOPRAM OXALATE 20 MG/1
20 TABLET ORAL DAILY
Qty: 90 TABLET | Refills: 1 | Status: SHIPPED | OUTPATIENT
Start: 2020-07-28 | End: 2021-02-19

## 2020-07-28 RX ORDER — TAMSULOSIN HYDROCHLORIDE 0.4 MG/1
0.4 CAPSULE ORAL DAILY
COMMUNITY
End: 2020-10-15

## 2020-07-28 NOTE — LETTER
7/28/2020         RE: Philip Delacruz  [address]  [place] [address]        Dear Colleague,    Thank you for referring your patient, Philip Delacruz, to the [facility]. Please see a copy of my visit note below.    Service Date: 07/28/2020      Patient was last interviewed on 04-21-20.  Today's visit is via telephone due to COVID-19 epidemic.  Patient denies depression.  Patient denies giovanni or hypomania.  Patient admits to minor anxiety.  The patient denies suicidal ideations or suicidal plans.  Patient reports 61/2-7 hours of sleep per night.  (He is able to sleep longer, but his son awakes him earlier).  Appetite is normal with current weight of 180 pounds, and patient has normal energy.  Patient denies racing thoughts, irritability, paranoid delusions, ideas of reference, or auditory or visual hallucinations.  Patient's right-sided jaw tightness is no longer problematic following his visit to a dentist.  Patient also feels that his CF symptoms are better controlled due to Trikafta use.  I informed the patient about my rotation off of cystic fibrosis service at the end of September and indicated that he would most likely be visited/seen by a new psychiatrist following my [facility].  The patient was offered a follow-up appointment in September, but he declined.  Patient denied other questions or complaints.      DIAGNOSES:   1. Major depression - in remission.   2. Generalized anxiety disorder - in partial remission.      PLAN:   1. Continue escitalopram 20 mg po qam.  The patient was provided with a 3-month supply and 1 further refill  2. Follow-up with the new psychiatrist in October of 2020.      TOTAL TIME SPENT:  I spent a total of 15 minutes via telephone with patient during today's office visit.  Over 50% of this time was spent in counseling the patient about continuing his antidepressant medication, monitoring his weight, and following up with  the new psychiatrist in 3 months.      BHAKTI Crystal MD, PHD               ljl         RADHA CRYSTAL MD             D: 2020   T: 2020   MT: MONICA      Name:     DAVE RINCON   MRN:      [id number]        Account:      [id number]   :      1980           Service Date: 2020      Document: [id number]        Again, thank you for allowing me to participate in the care of your patient.        Sincerely,        BHAKTI Crystal MD

## 2020-07-29 NOTE — PROGRESS NOTES
Service Date: 2020      Patient was last interviewed on 20.  Today's visit is via telephone due to COVID-19 epidemic.  Patient denies depression.  Patient denies giovanni or hypomania.  Patient admits to minor anxiety.  The patient denies suicidal ideations or suicidal plans.  Patient reports 61/2-7 hours of sleep per night.  (He is able to sleep longer, but his son awakes him earlier).  Appetite is normal with current weight of 180 pounds, and patient has normal energy.  Patient denies racing thoughts, irritability, paranoid delusions, ideas of reference, or auditory or visual hallucinations.  Patient's right-sided jaw tightness is no longer problematic following his visit to a dentist.  Patient also feels that his CF symptoms are better controlled due to Trikafta use.  I informed the patient about my rotation off of cystic fibrosis service at the end of September and indicated that he would most likely be visited/seen by a new psychiatrist following my [facility].  The patient was offered a follow-up appointment in September, but he declined.  Patient denied other questions or complaints.      DIAGNOSES:   1. Major depression - in remission.   2. Generalized anxiety disorder - in partial remission.      PLAN:   1. Continue escitalopram 20 mg po qam.  The patient was provided with a 3-month supply and 1 further refill  2. Follow-up with the new psychiatrist in 2020.      TOTAL TIME SPENT:  I spent a total of 15 minutes via telephone with patient during today's office visit.  Over 50% of this time was spent in counseling the patient about continuing his antidepressant medication, monitoring his weight, and following up with the new psychiatrist in 3 months.      BHAKTI Crystal MD, PHD               becki         RADHA CRYSTAL MD             D: 2020   T: 2020   MT: MONICA      Name:     DAVE RINCON   MRN:      [id number]        Account:      [id number]   :       1980           Service Date: 07/28/2020      Document: [id number]

## 2020-08-19 ENCOUNTER — TELEPHONE (OUTPATIENT)
Dept: PHARMACY | Facility: CLINIC | Age: 40
End: 2020-08-19

## 2020-08-19 NOTE — TELEPHONE ENCOUNTER
Left message for patient, was supposed to have one month re-check of labs around end of July, let him know to call clinic to schedule lab appt.    Iris August PharmD  CF Clinic Pharmacist  Phone: [phone]  E-mail: [email or web address]

## 2020-08-24 ENCOUNTER — TELEPHONE (OUTPATIENT)
Dept: CARE COORDINATION | Facility: CLINIC | Age: 40
End: 2020-08-24

## 2020-08-24 NOTE — TELEPHONE ENCOUNTER
Adult Cystic Fibrosis Program  Social Work Phone/E-mail Communication    Data:   Shar sent [employer] message to CF team with insurance questions. Per message, Shar is either going to be switching to his wife's insurance ([employer]) or will be looking for a plan on the marketplace. He asked if someone could call him and talk through his options. KAHLIL left message for Shar and will await a return call.    Intervention:   -Insurance/financial counseling    Assessment: Shar is exploring his insurance options for next year and would like to discuss his options and whether or not the [facility] is in network. KAHLIL unable to reach Shar at the time of this note and will await a return call.    Plan:   Continue to assist with insurance concern(s)  Continue to follow through regular clinic consult and annual visit    AGUSTÍN Erickson, [facility]  Adult Cystic Fibrosis   Ph: [phone], Pager: [phone]

## 2020-08-25 ENCOUNTER — TELEPHONE (OUTPATIENT)
Dept: PHARMACY | Facility: CLINIC | Age: 40
End: 2020-08-25

## 2020-08-25 ENCOUNTER — TELEPHONE (OUTPATIENT)
Dept: CARE COORDINATION | Facility: CLINIC | Age: 40
End: 2020-08-25

## 2020-08-25 NOTE — TELEPHONE ENCOUNTER
Called patient to remind him he's overdue for Trikafta labs. He stated he'll go in tomorrow to have them drawn.

## 2020-08-27 DIAGNOSIS — E84.9 CF (CYSTIC FIBROSIS) (H): ICD-10-CM

## 2020-08-27 DIAGNOSIS — K86.89 PANCREATIC INSUFFICIENCY: ICD-10-CM

## 2020-08-27 DIAGNOSIS — E84.0 CYSTIC FIBROSIS WITH PULMONARY MANIFESTATIONS (H): ICD-10-CM

## 2020-08-27 LAB
ALBUMIN SERPL-MCNC: 4.2 G/DL (ref 3.4–5)
ALP SERPL-CCNC: 76 U/L (ref 40–150)
ALT SERPL W P-5'-P-CCNC: 38 U/L (ref 0–70)
AST SERPL W P-5'-P-CCNC: 31 U/L (ref 0–45)
BILIRUB DIRECT SERPL-MCNC: 0.2 MG/DL (ref 0–0.2)
BILIRUB SERPL-MCNC: 1 MG/DL (ref 0.2–1.3)
CHOLEST SERPL-MCNC: 263 MG/DL
CK SERPL-CCNC: 306 U/L (ref 30–300)
HDLC SERPL-MCNC: 66 MG/DL
LDLC SERPL CALC-MCNC: 182 MG/DL
NONHDLC SERPL-MCNC: 197 MG/DL
PROT SERPL-MCNC: 7.9 G/DL (ref 6.8–8.8)
TRIGL SERPL-MCNC: 75 MG/DL

## 2020-08-27 PROCEDURE — 82550 ASSAY OF CK (CPK): CPT | Performed by: INTERNAL MEDICINE

## 2020-08-27 PROCEDURE — 80076 HEPATIC FUNCTION PANEL: CPT | Performed by: INTERNAL MEDICINE

## 2020-08-27 PROCEDURE — 36415 COLL VENOUS BLD VENIPUNCTURE: CPT | Performed by: INTERNAL MEDICINE

## 2020-08-27 PROCEDURE — 80061 LIPID PANEL: CPT | Performed by: INTERNAL MEDICINE

## 2020-08-28 ENCOUNTER — ALLIED HEALTH/NURSE VISIT (OUTPATIENT)
Dept: PHARMACY | Facility: CLINIC | Age: 40
End: 2020-08-28
Payer: COMMERCIAL

## 2020-08-28 DIAGNOSIS — E84.0 CYSTIC FIBROSIS WITH PULMONARY MANIFESTATIONS (H): Primary | ICD-10-CM

## 2020-08-28 PROCEDURE — 99207 ZZC NO CHARGE LOS: CPT | Performed by: PHARMACIST

## 2020-08-28 NOTE — PROGRESS NOTES
Clinical Pharmacy Consult:                                                    Philip Delacruz is a 40 year old male called for a clinical pharmacist consult.  He was referred to me from Dr. Sahni.     Reason for Consult: Lab results and follow-up    Discussion: Shar's most recent hepatic panel and CK were within acceptable limits.  He finished his supply of bergamont supplement and then stopped, it does not appear that this in combination with his Trikafta is causing any liver abnormalities.     His labs were fasting, his cholesterol remains high. Particularly his LDL cholesterol.   Will likely need intervention, plan to collaborate with CF dietitian and Dr. Sahni regarding this.    Lab Results   Component Value Date    AST 31 08/27/2020     Lab Results   Component Value Date    ALT 38 08/27/2020     No results found for: BILICONJ   Lab Results   Component Value Date    BILITOTAL 1.0 08/27/2020     Lab Results   Component Value Date    ALBUMIN 4.2 08/27/2020     Lab Results   Component Value Date    PROTTOTAL 7.9 08/27/2020      Lab Results   Component Value Date    ALKPHOS 76 08/27/2020     Cholesterol   Date Value Ref Range Status   08/27/2020 263 (H) <200 mg/dL Final     Comment:     Desirable:       <200 mg/dl   06/25/2020 228 (H) <200 mg/dL Final     Comment:     Desirable:       <200 mg/dl     HDL Cholesterol   Date Value Ref Range Status   08/27/2020 66 >39 mg/dL Final   06/25/2020 56 >39 mg/dL Final     LDL Cholesterol Calculated   Date Value Ref Range Status   08/27/2020 182 (H) <100 mg/dL Final     Comment:     Above desirable:  100-129 mg/dl  Borderline High:  130-159 mg/dL  High:             160-189 mg/dL  Very high:       >189 mg/dl     06/25/2020 162 (H) <100 mg/dL Final     Comment:     Above desirable:  100-129 mg/dl  Borderline High:  130-159 mg/dL  High:             160-189 mg/dL  Very high:       >189 mg/dl       Triglycerides   Date Value Ref Range Status   08/27/2020 75 <150 mg/dL Final      Comment:     Non Fasting   06/25/2020 49 <150 mg/dL Final     Cholesterol/HDL Ratio   Date Value Ref Range Status   12/22/2014 3.3 0.0 - 5.0 Final         Plan:  1. Continue Trikafta - re-check hepatic panel/ck in 3 months  2. Collaborate with MD/RD on cholesterol - Shar has not taken cholesterol medication in the past.     Iris August PharmD  CF Clinic Pharmacist  Phone: [phone]  E-mail: [email or web address]

## 2020-09-04 NOTE — TELEPHONE ENCOUNTER
Adult Cystic Fibrosis Program  Social Work Phone/E-mail Communication    Data:     Shar reached out to the CF office and wanted to discuss insurance. KAHLIL reached out to Shar via phone. 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 He is unsure of what to do about insurance as his current coverage ends on 9/1. Shar shared that his new employer does not offer coverage but will give him $300 a month towards his premium. He has looked into a few different marketplace plans, and he is also able to get in his wife's insurance but he feels her insurance is not good. KAHLIL inquired about why it is not good and Shar stated that it was a high deductible plan. He can also have COBRA coverage through the school district, although has not gotten this information in the mail yet.     We discussed comparing his COBRA premium to the marketplace. KAHLIL encouraged him to consider doing COBRA as the COBRA plan would likely go off of his existing deductible. With a new plan he would need to start a new deductible, and with being on trikafta he might see significant expenses.     We also discussed which plans utilize co-pay cards. Shar will look into his wife's plan to see if they accept co-pay cards or not. All of the plans he was looking at through the marketplace do accept copay cards but they are all accumulator plans.     Shar plans to look into the different plans and will get back to KAHLIL with questions. KAHLIL also sent the following email to Shar on 8/27:    For some reason I can t view the attachment you included in your last email regarding your wife s plan. Maybe she can tell from her paycheck how much her premium is. It may say right on her insurance card what her deductible and OOP max are too. The reason I am bringing this up is I m thinking you should still compare this plan with the marketplace ones even though it doesn t take copay cards, because it still may be  cheaper for you in the long run. You just might have higher costs at the beginning of the year.     All of the plans you sent me take co-pay cards, however, they are  accumulator  plans. This means that if you have a $15 copay card, only the $15 you pay out of pocket will go towards your deductible. In the past there were  non-accumulator  plans where say the drug costs $400, and you use the $15 copay card, the $400 would go towards your deductible, not just the $15 you paid. Per Prsicilla, most non accumulator plans have gone away. I am not sure what type of plan you had with the [facility].     I have crunched the numbers on all the plans you sent. What I did was add the yearly premium to the OOP max (the deductible is included in the OOP max). This will be the maximum amount you will pay out of pocket for the year. But, if your new employer gives you $300 a month for a premium than you will have minimal payments for your premium so keep that in mind. Here is what I calculated without your employer discount.    [employer]: Premium ($254) + OOP max ($6,500) = $9,553.04 maximum total costs for the year  [employer]: Premium ($337.77) + OOP max ($8,000) = $12,053.24 maximum total costs for the year  BCBS: Premium ($324.88) + OOP max ($6,900) = $10,798.56 maximum total costs for the year    I see 15% co-insurance for one plan and 20% co-insurance for another. What the  co-insurance  means is that once you meet your deductible, you will pay (depending on which plan you choose) 15% or 20% of office visits or drug costs until your OOP max is met. Then you should not have any expenses for the rest of the year.    Compare these plans with your COBRA coverage though. Even though the COBRA is expensive, if you can get your new employer to contribute $300 a month towards it, and you wouldn t have a new deductible or OOP max for the rest of the year. Just something to think about!    Let me know if you have  questions.    eDbora    Intervention:   -Insurance/financial counseling    Assessment: Shar is looking into insurance options through his wife's employer, the marketplace, and his COBRA policy. He is aware he can reach out to SW with questions as they arise.    Plan:   Continue to assist with insurance concern(s)  Continue to follow through regular clinic consult and annual visit    AGUSTÍN Erickson, [facility]  Adult Cystic Fibrosis   Ph: [phone], Pager: [phone]

## 2020-10-07 ENCOUNTER — TELEPHONE (OUTPATIENT)
Dept: PULMONOLOGY | Facility: CLINIC | Age: 40
End: 2020-10-07

## 2020-10-07 NOTE — TELEPHONE ENCOUNTER
PA Initiation    Medication: Trikafta 100-50-75 &150mg- pa pending  Insurance Company: [employer] - Phone [phone] Fax [phone]  Pharmacy Filling the Rx: [facility] - [place], [place] - [facility] [address]  Filling Pharmacy Phone: [phone]  Filling Pharmacy Fax: [phone]  Start Date: 10/7/2020

## 2020-10-08 NOTE — TELEPHONE ENCOUNTER
Prior Authorization Approval    Authorization Effective Date: 9/7/2020  Authorization Expiration Date: 10/7/2023  Medication: Trikafta 100-50-75 &150mg- pa approved  Approved Dose/Quantity: UD  Reference #:     Insurance Company: [employer] - Phone [phone] Fax [phone]  Expected CoPay:       CoPay Card Available:      Foundation Assistance Needed:    Which Pharmacy is filling the prescription (Not needed for infusion/clinic administered): [facility] - [place], [place] - [address]  Pharmacy Notified: Yes  Patient Notified: Yes

## 2020-10-14 NOTE — PROGRESS NOTES
Attending attestation: I, True Sahni M.D., have seen and examined the patient with Dr. Paresh Motley MD. I have reviewed labs and radiographs. We have discussed the patient and I agree with the findings and plan of care as discussed below.  Briefly, the patient has been generally well since his last visit.  Breathing is comfortable at rest and with all activity.  He does note occasional chest tightness.  Since starting Trikafta, he reports no cough and no sputum production.  He uses exercise as his main form of airway clearance.  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  Previous loose stool and gas have improved but not resolved with Trikafta.  Lungs are clear, heart sounds are normal, abdomen is soft and nontender.  PFTs are normal and similar to his recent baseline.  Philip Delacruz is a 40-year-old male with cystic fibrosis with mild lung disease and pancreatic insufficiency.  He has had an excellent response to Trikafta with decreased cough and sputum and decreased GI complaints.  LFTs in August were normal but CK was mildly elevated.  LFTs will be rechecked in the next couple of weeks and then quarterly.  He will continue his current pancreatic enzyme replacement and vitamins.  The patient will receive his influenza vaccine today.    Follow-up in 3 months with PFTs, sputum culture and Trikafta labs.      Reason for Visit  Philip Delacruz is a 40 year old year old male who is being seen for CF follow-up    Assessment and plan:   Shar Delacruz is a 39-year-old male with cystic fibrosis with mild lung disease and pancreatic insufficiency.    #Cystic Fibrosis  Minimal pulmonary symptoms.  Even prior to starting Trikafta, his primary CF complaint was diarrhea, which he says improved on Trikafta.  PFTs today are stable from previous, demonstrating normal lung function.  He does not use his albuterol inhaler or vest therapies, though we  recommend that he use these as he is able.    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 He continues to do well on Trikafta with markedly decreased cough and sputum production. He does endorse weight gain, but otherwise has minimal side effects. His CK was mildly elevated in August 2020, though this is trending down today. His cholesterol also remains elevated (discussed below), though it is unclear if this is a result of Trikafta.  LFTs remain in normal range. Will continue routine 3-month CK and hepatic panel monitoring.    #Pancreatic Insufficiency  #Diarrhea  Patient currently taking Creon.  GI symptoms have otherwise improved since starting Trikafta. He currently denies loose stools/diarrhea. He will continue his current enzymes and vitamin replacement    #Hyperlipidemia  His cholesterol has been elevated, trending upward at last lab check with total cholesterol 263, LDL of 182. Cannot use ASCVD risk calculator given patient's age, but a statin is not currently indicated. Will continue to monitor.    RTC in 3 months with labs and PFTs.  Patient seen and staffed with Dr. Sahni who agrees with the above assessment and plan.  Paresh Motley, PGY-1 Internal Medicine    CF HPI  Shar Coley is a 40-year-old male with a history of cystic fibrosis with mild lung disease and pancreatic insufficiency.  Shar reports no new pulmonary symptoms.  He retains good exercise tolerance (though he  is overall less active than he was previously due to  "COVID).  He does endorse weight gain since starting Trikafta.  He was previously 165 pounds.  He is currently 180-185lbs.  He continues to deny cough and sputum production.  He denies chest pain, fevers/chills, night sweats.  He has had no recent exacerbations requiring antibiotics.  He denies diarrhea, greasy stool, constipation.  He does have occasional gas.  He recently started a job in \"property acquisition\". His primary concern today is his elevated cholesterol levels.  The patient was seen and examined by True Sahni MD   A complete ROS was otherwise negative except as noted in the HPI.    Current Outpatient Medications   Medication     albuterol (PROAIR HFA/PROVENTIL HFA/VENTOLIN HFA) 108 (90 Base) MCG/ACT inhaler     amylase-lipase-protease (CREON) 40007-49832 units CPEP per EC capsule     elexacaftor-tezacaftor-ivacaftor & ivacaftor (TRIKAFTA) 100-50-75 & 150 MG tablet pack     escitalopram (LEXAPRO) 20 MG tablet     fluticasone (FLONASE) 50 MCG/ACT nasal spray     GARLIC PO     multivitamin CF formula (MVW COMPLETE FORMULATION) chewable tablet     Nutritional Supplements ( PO)     olopatadine (PATADAY) 0.2 % ophthalmic solution     Omega-3 Fatty Acids (FISH OIL) 1200 MG CPDR     order for DME     order for DME     Sodium Chloride-Sodium Bicarb (SINUFLO READYRINSE) KIT     Wheat Dextrin (BENEFIBER PO)     ZIRGAN 0.15 % GEL     No current facility-administered medications for this visit.      Allergies   Allergen Reactions     Liquid Adhesive Rash     Tagederm     Tegaderm Transparent Dressing (Informational Only) Rash     Past Medical History:   Diagnosis Date     Chronic sinusitis      Congenital absence of vas deferens, bilateral      Cystic fibrosis (H)     Delta F508 and [id number]      Nasal polyps      Sinusitis, chronic        Past Surgical History:   Procedure Laterality Date     HC TOOTH EXTRACTION W/FORCEP       NASAL/SINUS POLYPECTOMY       REPAIR PECTUS EXCAVATUM  1997     SINUS " "SURGERY  1992, 2002, 2004    Removed tubinates and polyps OSH     Sperm harvest         Social History     Socioeconomic History     Marital status: Single     Spouse name: Not on file     Number of children: Not on file     Years of education: Not on file     Highest education level: Not on file   Occupational History     Occupation: Teacher   Social Needs     Financial resource strain: Not on file     Food insecurity     Worry: Not on file     Inability: Not on file     Transportation needs     Medical: Not on file     Non-medical: Not on file   Tobacco Use     Smoking status: Never Smoker     Smokeless tobacco: Never Used   Substance and Sexual Activity     Alcohol use: Yes     Alcohol/week: 0.0 standard drinks     Comment: 1 drink 3X per week     Drug use: No     Sexual activity: Yes     Partners: Female     Birth control/protection: None   Lifestyle     Physical activity     Days per week: Not on file     Minutes per session: Not on file     Stress: Not on file   Relationships     Social connections     Talks on phone: Not on file     Gets together: Not on file     Attends Yarsanism service: Not on file     Active member of club or organization: Not on file     Attends meetings of clubs or organizations: Not on file     Relationship status: Not on file     Intimate partner violence     Fear of current or ex partner: Not on file     Emotionally abused: Not on file     Physically abused: Not on file     Forced sexual activity: Not on file   Other Topics Concern     Parent/sibling w/ CABG, MI or angioplasty before 65F 55M? Not Asked   Social History Narrative    Lives in [place] with wife, Aimee, and sons, Shay and Serafin. 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         /83   Pulse 72   Ht 1.84 m (6' 0.44\")   Wt 84.8 kg (187 lb)   SpO2 98%   BMI 25.05 kg/m    Body mass index is 25.05 kg/m .  Exam:   GENERAL " APPEARANCE: Well developed, well nourished, alert, and in no apparent distress.  EYES: PERRL, EOMI  HENT: Nasal mucosa with no edema and no hyperemia. No nasal polyps.  EARS: Canals clear, TMs normal  MOUTH: Oral mucosa is moist, without any lesions, no tonsillar enlargement, no oropharyngeal exudate.  NECK: supple, no masses, no thyromegaly.  LYMPHATICS: No significant axillary, cervical, or supraclavicular nodes.  RESP: normal percussion, good air flow throughout.  No crackles. No rhonchi. No wheezes.  CV: Normal S1, S2, regular rhythm, normal rate. No murmur.  No rub. No gallop. No LE edema.   ABDOMEN:  Bowel sounds normal, soft, nontender, no HSM or masses.   MS: extremities normal. No clubbing. No cyanosis.  SKIN: no rash on limited exam  NEURO: Mentation intact, speech normal, normal strength and tone, normal gait and stance  PSYCH: mentation appears normal. and affect normal/bright  Results:  Recent Results (from the past 168 hour(s))   General PFT Lab (Please always keep checked)    Collection Time: 10/15/20  3:30 PM   Result Value Ref Range    FVC-Pred 5.75 L    FVC-Pre 5.18 L    FVC-%Pred-Pre 90 %    FEV1-Pre 4.35 L    FEV1-%Pred-Pre 94 %    FEV1FVC-Pred 81 %    FEV1FVC-Pre 84 %    FEFMax-Pred 10.77 L/sec    FEFMax-Pre 10.09 L/sec    FEFMax-%Pred-Pre 93 %    FEF2575-Pred 4.45 L/sec    FEF2575-Pre 5.07 L/sec    VGY9500-%Pred-Pre 113 %    ExpTime-Pre 6.82 sec    FIFMax-Pre 7.82 L/sec    FEV1FEV6-Pred 82 %    FEV1FEV6-Pre 84 %   Cystic Fibrosis Culture Aerob Bacterial    Collection Time: 10/15/20  4:24 PM    Specimen: Throat   Result Value Ref Range    Specimen Description Throat     Culture Micro Heavy growth  Normal haile       Culture Micro (A)      Light growth  Pantoea agglomerans  Susceptibility testing in progress           Results as noted above.    PFT Interpretation:  Normal spirometry.  Unchanged from previous.   Similar to recent best.  Valid Maneuver    CF Exacerbation  Absent

## 2020-10-15 ENCOUNTER — OFFICE VISIT (OUTPATIENT)
Dept: PULMONOLOGY | Facility: CLINIC | Age: 40
End: 2020-10-15
Attending: INTERNAL MEDICINE
Payer: COMMERCIAL

## 2020-10-15 VITALS
SYSTOLIC BLOOD PRESSURE: 125 MMHG | BODY MASS INDEX: 25.33 KG/M2 | HEIGHT: 72 IN | DIASTOLIC BLOOD PRESSURE: 83 MMHG | WEIGHT: 187 LBS | OXYGEN SATURATION: 98 % | HEART RATE: 72 BPM

## 2020-10-15 DIAGNOSIS — J32.4 CHRONIC PANSINUSITIS: ICD-10-CM

## 2020-10-15 DIAGNOSIS — E84.9 CF (CYSTIC FIBROSIS) (H): Primary | ICD-10-CM

## 2020-10-15 DIAGNOSIS — K86.89 PANCREATIC INSUFFICIENCY: ICD-10-CM

## 2020-10-15 DIAGNOSIS — E84.0 CYSTIC FIBROSIS WITH PULMONARY MANIFESTATIONS (H): Primary | ICD-10-CM

## 2020-10-15 LAB
EXPTIME-PRE: 6.82 SEC
FEF2575-%PRED-PRE: 113 %
FEF2575-PRE: 5.07 L/SEC
FEF2575-PRED: 4.45 L/SEC
FEFMAX-%PRED-PRE: 93 %
FEFMAX-PRE: 10.09 L/SEC
FEFMAX-PRED: 10.77 L/SEC
FEV1-%PRED-PRE: 94 %
FEV1-PRE: 4.35 L
FEV1FEV6-PRE: 84 %
FEV1FEV6-PRED: 82 %
FEV1FVC-PRE: 84 %
FEV1FVC-PRED: 81 %
FIFMAX-PRE: 7.82 L/SEC
FVC-%PRED-PRE: 90 %
FVC-PRE: 5.18 L
FVC-PRED: 5.75 L

## 2020-10-15 PROCEDURE — 87186 SC STD MICRODIL/AGAR DIL: CPT | Performed by: INTERNAL MEDICINE

## 2020-10-15 PROCEDURE — 87070 CULTURE OTHR SPECIMN AEROBIC: CPT | Performed by: INTERNAL MEDICINE

## 2020-10-15 PROCEDURE — 87077 CULTURE AEROBIC IDENTIFY: CPT | Performed by: INTERNAL MEDICINE

## 2020-10-15 PROCEDURE — 99214 OFFICE O/P EST MOD 30 MIN: CPT | Mod: 25 | Performed by: INTERNAL MEDICINE

## 2020-10-15 PROCEDURE — 94375 RESPIRATORY FLOW VOLUME LOOP: CPT | Performed by: INTERNAL MEDICINE

## 2020-10-15 PROCEDURE — 250N000011 HC RX IP 250 OP 636: Performed by: INTERNAL MEDICINE

## 2020-10-15 PROCEDURE — G0463 HOSPITAL OUTPT CLINIC VISIT: HCPCS | Mod: 25

## 2020-10-15 PROCEDURE — 90686 IIV4 VACC NO PRSV 0.5 ML IM: CPT | Performed by: INTERNAL MEDICINE

## 2020-10-15 PROCEDURE — G0008 ADMIN INFLUENZA VIRUS VAC: HCPCS | Performed by: INTERNAL MEDICINE

## 2020-10-15 RX ADMIN — INFLUENZA A VIRUS A/CALIFORNIA/7/2009 X-179A (H1N1) ANTIGEN (FORMALDEHYDE INACTIVATED), INFLUENZA A VIRUS A/HONG KONG/4801/2014 X-263B (H3N2) ANTIGEN (FORMALDEHYDE INACTIVATED), INFLUENZA B VIRUS B/PHUKET/3073/2013 ANTIGEN (FORMALDEHYDE INACTIVATED), AND INFLUENZA B VIRUS B/BRISBANE/60/2008 ANTIGEN (FORMALDEHYDE INACTIVATED) 0.5 ML: 15; 15; 15; 15 INJECTION, SUSPENSION INTRAMUSCULAR at 17:37

## 2020-10-15 ASSESSMENT — MIFFLIN-ST. JEOR: SCORE: 1813.22

## 2020-10-15 NOTE — NURSING NOTE
Chief Complaint   Patient presents with     Follow Up     cf f/u     Vitals were taken and medications were reconciled.     JENNIFER Barry

## 2020-10-15 NOTE — PATIENT INSTRUCTIONS
Cystic Fibrosis Self-Care Plan    RECOMMENDATIONS:   Flu vaccine today  Continue current medication, nebs and vest therapy.           [place] [facility] [place] Nurse line:  Genesis Martins KJ and Justyna [phone]     [facility] [place] Fax Number:      [phone]         Cystic Fibrosis Respiratory Therapists:   Kristi Ndiaye              [phone]          Loida Trinidad   [phone]  Cystic Fibrosis Dietitians:              Nisa [place]              [phone]                            April Cardona                        [phone]   Cystic Fibrosis Diabetes Nurse:    Barbara Birmingham   [phone]    Cystic Fibrosis Social Workers:     Melissa Calle               [phone]                     Debora Joseph               [phone]  Cystic Fibrosis Pharmacists:           Amanda Gonzalez                               [phone]         Iris August   [phone]  Cystic Fibrosis Genetic Counselor:   Faby Flores    [phone]    [facility] website:  [email or web address]         MRN: [id number]   Clinic Date: October 15, 2020   Patient: Philip Delacruz     Annual Studies:   IGG   Date Value Ref Range Status   04/15/2020 1,236 610 - 1,616 mg/dL Final     Insulin   Date Value Ref Range Status   05/02/2019 9.0 3 - 25 mU/L Final     There are no preventive care reminders to display for this patient.    Pulmonary Function Tests  FEV1: amount of air you can blow out in 1 second  FVC: total amount of air you can take in and blow out    Your Goals:         PFT Latest Ref Rng & Units 10/15/2020   FVC L 5.18   FEV1 L 4.35   FVC% % 90   FEV1% % 94              Wt Readings from Last 3 Encounters:   10/15/20 84.8 kg (187 lb)   01/28/20 79.8 kg (176 lb)   12/26/19 80.5 kg (177 lb 7.5 oz)       Body mass index is 25.05 kg/m .         National CF Foundation Recommendations for BMI in CF Adults: Women: at least 22 Men: at least 23        Controlling Blood Sugars Helps  Prevent Lung Infections & Improves Nutrition  Test blood sugar:     In the morning before eating (goal is )     2 hours after a meal (goal is less than 150)     When pre-meal glucose is greater than 150 add correction     At bedtime (if less than 100 eat a snack with 15 grams of carbohydrates  Last A1C Results:   Hemoglobin A1C   Date Value Ref Range Status   04/15/2020 4.8 0 - 5.6 % Final     Comment:     Normal <5.7% Prediabetes 5.7-6.4%  Diabetes 6.5% or higher - adopted from ADA   consensus guidelines.           If diabetic, measure A1C every 6 months. Goal: Under 7%    Staying Healthy    Research:  If you are interested in learning about research opportunities or have questions, please contact the CF Research Team at [phone] or [email or web address].      CF Foundation:  Compass is a personalized resource service to help you with the insurance, financial, legal and other issues you are facing.  It's free, confidential and available to anyone with CF.  Ask your  for more information or contact Compass directly at 033-KQJAQOU ([phone]) or compass@cff.org, or learn more at [email or web address].[email or web address].

## 2020-10-15 NOTE — LETTER
10/15/2020         RE: Philip Delacruz  [address]  [facility] [address]        Dear Colleague,    Thank you for referring your patient, Philip Delacruz, to the [facility] FOR LUNG SCIENCE AND [facility] CLINIC [place]. Please see a copy of my visit note below.    Attending attestation: I, True Sahni M.D., have seen and examined the patient with Dr. Paresh Motley MD. I have reviewed labs and radiographs. We have discussed the patient and I agree with the findings and plan of care as discussed below.  Briefly, the patient has been generally well since his last visit.  Breathing is comfortable at rest and with all activity.  He does note occasional chest tightness.  Since starting Trikafta, he reports no cough and no sputum production.  He uses exercise as his main form of airway clearance.  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  Previous loose stool and gas have improved but not resolved with Trikafta.  Lungs are clear, heart sounds are normal, abdomen is soft and nontender.  PFTs are normal and similar to his recent baseline.  Philip Delacruz is a 40-year-old male with cystic fibrosis with mild lung disease and pancreatic insufficiency.  He has had an excellent response to Trikafta with decreased cough and sputum and decreased GI complaints.  LFTs in August were normal but CK was mildly elevated.  LFTs will be rechecked in the next couple of weeks and then quarterly.  He will continue his current pancreatic enzyme replacement and vitamins.  The patient will receive his influenza vaccine today.    Follow-up in 3 months with PFTs, sputum culture and Trikafta labs.      Reason for Visit  Philip Delacruz is a 40 year old year old male who is being seen for CF follow-up    Assessment and plan:   Shar Delacruz is a 39-year-old male with cystic fibrosis with mild lung disease and pancreatic  insufficiency.    #Cystic Fibrosis  Minimal pulmonary symptoms.  Even prior to starting Trikafta, his primary CF complaint was diarrhea, which he says improved on Trikafta.  PFTs today are stable from previous, demonstrating normal lung function.  He does not use his albuterol inhaler or vest therapies, though we recommend that he use these as he is able.    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 He continues to do well on Trikafta with markedly decreased cough and sputum production. He does endorse weight gain, but otherwise has minimal side effects. His CK was mildly elevated in August 2020, though this is trending down today. His cholesterol also remains elevated (discussed below), though it is unclear if this is a result of Trikafta.  LFTs remain in normal range. Will continue routine 3-month CK and hepatic panel monitoring.    #Pancreatic Insufficiency  #Diarrhea  Patient currently taking Creon.  GI symptoms have otherwise improved since starting Trikafta. He currently denies loose stools/diarrhea. He will continue his current enzymes and vitamin replacement    #Hyperlipidemia  His cholesterol has been elevated, trending upward at last lab check with total cholesterol 263, LDL of 182. Cannot use ASCVD risk calculator given patient's age, but a statin is not currently indicated. Will continue to monitor.    RTC in 3 months with labs and PFTs.  Patient seen and staffed with Dr. Sahni who agrees with the above assessment and plan.  Paresh  "Tolu, PGY-1 Internal Medicine    CF HPI  Shar Coley is a 40-year-old male with a history of cystic fibrosis with mild lung disease and pancreatic insufficiency.  Shar reports no new pulmonary symptoms.  He retains good exercise tolerance (though he  is overall less active than he was previously due to COVID).  He does endorse weight gain since starting Trikafta.  He was previously 165 pounds.  He is currently 180-185lbs.  He continues to deny cough and sputum production.  He denies chest pain, fevers/chills, night sweats.  He has had no recent exacerbations requiring antibiotics.  He denies diarrhea, greasy stool, constipation.  He does have occasional gas.  He recently started a job in \"property acquisition\". His primary concern today is his elevated cholesterol levels.  The patient was seen and examined by True Sahni MD   A complete ROS was otherwise negative except as noted in the HPI.    Current Outpatient Medications   Medication     albuterol (PROAIR HFA/PROVENTIL HFA/VENTOLIN HFA) 108 (90 Base) MCG/ACT inhaler     amylase-lipase-protease (CREON) 12576-73890 units CPEP per EC capsule     elexacaftor-tezacaftor-ivacaftor & ivacaftor (TRIKAFTA) 100-50-75 & 150 MG tablet pack     escitalopram (LEXAPRO) 20 MG tablet     fluticasone (FLONASE) 50 MCG/ACT nasal spray     GARLIC PO     multivitamin CF formula (MVW COMPLETE FORMULATION) chewable tablet     Nutritional Supplements ( PO)     olopatadine (PATADAY) 0.2 % ophthalmic solution     Omega-3 Fatty Acids (FISH OIL) 1200 MG CPDR     order for DME     order for DME     Sodium Chloride-Sodium Bicarb (SINUFLO READYRINSE) KIT     Wheat Dextrin (BENEFIBER PO)     ZIRGAN 0.15 % GEL     No current facility-administered medications for this visit.      Allergies   Allergen Reactions     Liquid Adhesive Rash     Tagederm     Tegaderm Transparent Dressing (Informational Only) Rash     Past Medical History:   Diagnosis Date     Chronic sinusitis  "     Congenital absence of vas deferens, bilateral      Cystic fibrosis (H)     Delta F508 and [id number]      Nasal polyps      Sinusitis, chronic        Past Surgical History:   Procedure Laterality Date     HC TOOTH EXTRACTION W/FORCEP       NASAL/SINUS POLYPECTOMY       REPAIR PECTUS EXCAVATUM  1997     SINUS SURGERY  1992, 2002, 2004    Removed tubinates and polyps OSH     Sperm harvest         Social History     Socioeconomic History     Marital status: Single     Spouse name: Not on file     Number of children: Not on file     Years of education: Not on file     Highest education level: Not on file   Occupational History     Occupation: Teacher   Social Needs     Financial resource strain: Not on file     Food insecurity     Worry: Not on file     Inability: Not on file     Transportation needs     Medical: Not on file     Non-medical: Not on file   Tobacco Use     Smoking status: Never Smoker     Smokeless tobacco: Never Used   Substance and Sexual Activity     Alcohol use: Yes     Alcohol/week: 0.0 standard drinks     Comment: 1 drink 3X per week     Drug use: No     Sexual activity: Yes     Partners: Female     Birth control/protection: None   Lifestyle     Physical activity     Days per week: Not on file     Minutes per session: Not on file     Stress: Not on file   Relationships     Social connections     Talks on phone: Not on file     Gets together: Not on file     Attends [employer] service: Not on file     Active member of club or organization: Not on file     Attends meetings of clubs or organizations: Not on file     Relationship status: Not on file     Intimate partner violence     Fear of current or ex partner: Not on file     Emotionally abused: Not on file     Physically abused: Not on file     Forced sexual activity: Not on file   Other Topics Concern     Parent/sibling w/ CABG, MI or angioplasty before 65F 55M? Not Asked   Social History Narrative    Lives in [place] with wife, Aimee, and sons,  "Arturo. 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         /83   Pulse 72   Ht 1.84 m (6' 0.44\")   Wt 84.8 kg (187 lb)   SpO2 98%   BMI 25.05 kg/m    Body mass index is 25.05 kg/m .  Exam:   GENERAL APPEARANCE: Well developed, well nourished, alert, and in no apparent distress.  EYES: PERRL, EOMI  HENT: Nasal mucosa with no edema and no hyperemia. No nasal polyps.  EARS: Canals clear, TMs normal  MOUTH: Oral mucosa is moist, without any lesions, no tonsillar enlargement, no oropharyngeal exudate.  NECK: supple, no masses, no thyromegaly.  LYMPHATICS: No significant axillary, cervical, or supraclavicular nodes.  RESP: normal percussion, good air flow throughout.  No crackles. No rhonchi. No wheezes.  CV: Normal S1, S2, regular rhythm, normal rate. No murmur.  No rub. No gallop. No LE edema.   ABDOMEN:  Bowel sounds normal, soft, nontender, no HSM or masses.   MS: extremities normal. No clubbing. No cyanosis.  SKIN: no rash on limited exam  NEURO: Mentation intact, speech normal, normal strength and tone, normal gait and stance  PSYCH: mentation appears normal. and affect normal/bright  Results:  Recent Results (from the past 168 hour(s))   General PFT Lab (Please always keep checked)    Collection Time: 10/15/20  3:30 PM   Result Value Ref Range    FVC-Pred 5.75 L    FVC-Pre 5.18 L    FVC-%Pred-Pre 90 %    FEV1-Pre 4.35 L    FEV1-%Pred-Pre 94 %    FEV1FVC-Pred 81 %    FEV1FVC-Pre 84 %    FEFMax-Pred 10.77 L/sec    FEFMax-Pre 10.09 L/sec    FEFMax-%Pred-Pre 93 %    FEF2575-Pred 4.45 L/sec    FEF2575-Pre 5.07 L/sec    DHS2944-%Pred-Pre 113 %    ExpTime-Pre 6.82 sec    FIFMax-Pre 7.82 L/sec    FEV1FEV6-Pred 82 %    FEV1FEV6-Pre 84 %   Cystic Fibrosis Culture Aerob Bacterial    Collection Time: 10/15/20  4:24 PM    Specimen: Throat   Result Value Ref Range    Specimen Description Throat     Culture Micro Heavy " growth  Normal haile       Culture Micro (A)      Light growth  Pantoea agglomerans  Susceptibility testing in progress           Results as noted above.    PFT Interpretation:  Normal spirometry.  Unchanged from previous.   Similar to recent best.  Valid Maneuver    CF Exacerbation  Absent      Again, thank you for allowing me to participate in the care of your patient.        Sincerely,        True Sahni MD

## 2020-10-19 ENCOUNTER — ALLIED HEALTH/NURSE VISIT (OUTPATIENT)
Dept: CARE COORDINATION | Facility: CLINIC | Age: 40
End: 2020-10-19
Payer: COMMERCIAL

## 2020-10-19 DIAGNOSIS — Z13.9 RISK AND FUNCTIONAL ASSESSMENT: Primary | ICD-10-CM

## 2020-10-19 ASSESSMENT — ANXIETY QUESTIONNAIRES
2. NOT BEING ABLE TO STOP OR CONTROL WORRYING: NOT AT ALL
GAD7 TOTAL SCORE: 2
5. BEING SO RESTLESS THAT IT IS HARD TO SIT STILL: NOT AT ALL
IF YOU CHECKED OFF ANY PROBLEMS ON THIS QUESTIONNAIRE, HOW DIFFICULT HAVE THESE PROBLEMS MADE IT FOR YOU TO DO YOUR WORK, TAKE CARE OF THINGS AT HOME, OR GET ALONG WITH OTHER PEOPLE: NOT DIFFICULT AT ALL
7. FEELING AFRAID AS IF SOMETHING AWFUL MIGHT HAPPEN: NOT AT ALL
1. FEELING NERVOUS, ANXIOUS, OR ON EDGE: NOT AT ALL
3. WORRYING TOO MUCH ABOUT DIFFERENT THINGS: NOT AT ALL
6. BECOMING EASILY ANNOYED OR IRRITABLE: SEVERAL DAYS

## 2020-10-19 ASSESSMENT — PATIENT HEALTH QUESTIONNAIRE - PHQ9
5. POOR APPETITE OR OVEREATING: SEVERAL DAYS
SUM OF ALL RESPONSES TO PHQ QUESTIONS 1-9: 2

## 2020-10-20 LAB
BACTERIA SPEC CULT: ABNORMAL
BACTERIA SPEC CULT: ABNORMAL
SPECIMEN SOURCE: ABNORMAL

## 2020-10-20 ASSESSMENT — ANXIETY QUESTIONNAIRES: GAD7 TOTAL SCORE: 2

## 2020-10-29 ENCOUNTER — VIRTUAL VISIT (OUTPATIENT)
Dept: PULMONOLOGY | Facility: CLINIC | Age: 40
End: 2020-10-29
Attending: INTERNAL MEDICINE
Payer: COMMERCIAL

## 2020-10-29 DIAGNOSIS — E84.9 CF (CYSTIC FIBROSIS) (H): Primary | ICD-10-CM

## 2020-10-29 DIAGNOSIS — K86.81 EXOCRINE PANCREATIC INSUFFICIENCY: ICD-10-CM

## 2020-10-29 DIAGNOSIS — K86.89 PANCREATIC INSUFFICIENCY: ICD-10-CM

## 2020-10-29 PROCEDURE — 97803 MED NUTRITION INDIV SUBSEQ: CPT | Mod: TEL | Performed by: DIETITIAN, REGISTERED

## 2020-10-29 NOTE — LETTER
"    10/29/2020         RE: Philip Delacruz  [address]  [facility] [address]        Dear Colleague,    Thank you for referring your patient, Philip Delacruz, to the [facility] FOR LUNG SCIENCE AND HEALTH CLINIC [place]. Please see a copy of my visit note below.    CF Annual Nutrition Assessment    Reason for Assessment  Assessed during Dr. Sahni's Clinic r/t increased nutrition risk with diagnosis of CF per protocol    Shar is a 40 year old male who is being evaluated via a billable telephone visit.    Patient has given verbal consent for billable Telephone visit?  Yes    Anthropometric Assessment  Height:   Ht Readings from Last 1 Encounters:   10/15/20 1.84 m (6' 0.44\")     IBW based on BMI 22 for females and 23 for males per CF Foundation recs: 78 kg / 172#  Today's Weight: 84.8 kg (actual weight)     %IBW: 109%    BMI: 25 kg/m2  Current weight is considered: Normal BMI and at CF goal  Weight History: Shar has unintentionally gained ~11# over the last year. He contributes this wt gain to Trikafta.   Wt Readings from Last 10 Encounters:   10/15/20 84.8 kg (187 lb)   20 79.8 kg (176 lb)   19 80.5 kg (177 lb 7.5 oz)   19 80.3 kg (177 lb)   09/10/19 78.9 kg (174 lb)   08/15/19 79 kg (174 lb 2.6 oz)   19 79.7 kg (175 lb 12.8 oz)   19 80.1 kg (176 lb 8 oz)   19 78.9 kg (174 lb)   05/15/19 78 kg (172 lb)     Pancreatic Enzymes  Brand:  Creon [id number]  Dosin-6 with meals, 2-3 with snacks  Are you taking enzymes as recommended:Yes     High dose providing 1694 units lipase/kg/meal which is within the recommended range per CF Foundation to inhibit fibrosing colonopathy    Signs of Malabsorption:  About once/week the pt has stool that contains mucus or is greasy. Pt usually stores his enzymes in the car.    Enzyme Program:  [facility]    Gastrointestinal/Malabsorption Assessment  GI Complaints: About once/week the pt has stool that contains " mucus or is greasy   Medications: Take soluble fiber (psyllium- Metamucil) - 1 tbsp in evening  Other Therapies: also taking Florajen probiotic BID.      Experiences greasy/oily leakage if he misses enzymes or if he is eating poorly (high-fat foods)    Diet History and Assessment  Diet Preferences/Allergies/Intolerances: Regular   Intake Recall/Comments:  Shar eats TID meals plus TID snacks.   B - oatmeal with yogurt and walnuts or eggs and english muffin, or cereal with 2% milk  AM snack - Rx bar and protein shake   L - deli meat and cheese sandwich or leftovers   PM snack - beef stick, cheese and crackers  D - meat and veggies, sweet potatoes tots, broccoli and cheese tots, sloppy Silvio's,   HS snack - apple and pb or pb on english muffin    Calcium: several servings of dairy daily (2% milk on cereal and by the glass, yogurt, cheese) (likely adequate)   Salt: pt eats several high salt foods like nuts, deli meat, beef sticks, cheese, crackers, tots (likely adequate)    Hydration:  20-40 fl oz water and G2 daily     Supplements:  Occasionally drinks Noemi [employer] and ENU    Laboratory Assessment    Vitamin A   Date Value Ref Range Status   04/15/2020 0.89 0.30 - 1.20 mg/L Final     Vitamin D Deficiency screening   Date Value Ref Range Status   04/15/2020 37 20 - 75 ug/L Final     Comment:     Season, race, dietary intake, and treatment affect the concentration of   25-hydroxy-Vitamin D. Values may decrease during winter months and increase   during summer months. Values 20-29 ug/L may indicate Vitamin D insufficiency   and values <20 ug/L may indicate Vitamin D deficiency.  Vitamin D determination is routinely performed by an immunoassay specific for   25 hydroxyvitamin D3.  If an individual is on vitamin D2 (ergocalciferol)   supplementation, please specify 25 OH vitamin D2 and D3 level determination by   LCMSMS test VITD23.       Vitamin E   Date Value Ref Range Status   04/15/2020 17.6 5.5 - 18.0 mg/L Final      Comment:     (Note)  Test developed and characteristics determined by [employer]. See Compliance Statement B: [email or web address]       Iron   Date Value Ref Range Status   04/15/2020 145 35 - 180 ug/dL Final     Cholesterol   Date Value Ref Range Status   08/27/2020 263 (H) <200 mg/dL Final     Comment:     Desirable:       <200 mg/dl     Triglycerides   Date Value Ref Range Status   08/27/2020 75 <150 mg/dL Final     Comment:     Non Fasting     HDL Cholesterol   Date Value Ref Range Status   08/27/2020 66 >39 mg/dL Final     LDL Cholesterol Calculated   Date Value Ref Range Status   08/27/2020 182 (H) <100 mg/dL Final     Comment:     Above desirable:  100-129 mg/dl  Borderline High:  130-159 mg/dL  High:             160-189 mg/dL  Very high:       >189 mg/dl       VLDL-Cholesterol   Date Value Ref Range Status   12/22/2014 32 (H) 0 - 30 mg/dL Final     Cholesterol/HDL Ratio   Date Value Ref Range Status   12/22/2014 3.3 0.0 - 5.0 Final       DEXA: lowest Z-score of -1.6, WNL on 5/2/19    Current Vitamin/Mineral Prescription: MVW Complete 1 softgels/day (enzyme program), fish oil (unknown dose)   Comments:  Taking daily. Obtains MVW from [facility]. Dose was decreased following annual studies due to elevated Vitamin E.    NUTRITION DIAGNOSIS  Impaired nutrient utilization r/t CF hypermetabolism and suspected pancreatic insufficiency as evidenced by symptomatic improvement in steatorrhea with pancreatic enzyme replacement therapy and pt requires high kcal/protein diet to maintain nutrition and health status.   INTERVENTIONS/RECOMMENDATIONS  PO Intake/Wt Trends: Discussed current nutritional status including reviewed adequacy of oral intakes and weight trends. Discussed that wt gain is not uncommon after starting Trikafta and that wt maintenance is desirable.   Annual Studies: Reviewed annuals from 8/27/20. Continue supplement regimen given fat soluble vitamin and iron labs are WNL. Reviewed elevated  lipids profile. Encouraged Shar to continue taking Fish oil supplements and think about the following dietary modifications: increased fiber/lean protein intake and decreased added sugar consumption. Helped pt brainstorm how he could implement these recommendation. Pt agreeable to plan.   Enzymes/GI: Encouraged pt to continue with current enzyme regimen and modify enzyme storage. Reviewed best storage practices for enzymes, including keeping them at room temperature whenever possible. Recommended the pt bring his enzymes into work with him instead of leaving them in a car. Also encouraged Shar to reviewed expirations dates on his enzymes to make sure his is not taking  enzymes. Continue BID probiotic and Metamucil. Will continue following.   Trikafta:  Reviewed intake guidelines for Trikafta doses including 10-12 grams of fat per dose taken with enzymes, also every 12 hour dosing.  This was not new information for Shar and he was able to list several foods he could eat/drink with the dose.     GOALS:  1) Takes enzymes/vitamins as recommended  2) Weight maintenance with BMI >23 kg/m2    FOLLOW-UP/MONITORING:  Visit patient within 12 month(s) for next annual, sooner if requested by pt or provider     Phone Call Duration: 15 min    Funmilayo Wong RD, ENRRIQUE (pager [phone])  Cystic Fibrosis/Lung Transplant Dietitian      -Available Mon-Friday 8 AM-4:30 PM. On weekends/holidays contact coverage dietitian at pager [phone] (inpatient use only).         Again, thank you for allowing me to participate in the care of your patient.        Sincerely,        Nisa Dumont RD

## 2020-11-05 NOTE — PROGRESS NOTES
"CF Annual Nutrition Assessment    Reason for Assessment  Assessed during [facility] r/t increased nutrition risk with diagnosis of CF per protocol    Shar is a 40 year old male who is being evaluated via a billable telephone visit.    Patient has given verbal consent for billable Telephone visit?  Yes    Anthropometric Assessment  Height:   Ht Readings from Last 1 Encounters:   10/15/20 1.84 m (6' 0.44\")     IBW based on BMI 22 for females and 23 for males per CF Foundation recs: 78 kg / 172#  Today's Weight: 84.8 kg (actual weight)     %IBW: 109%    BMI: 25 kg/m2  Current weight is considered: Normal BMI and at CF goal  Weight History: Shar has unintentionally gained ~11# over the last year. He contributes this wt gain to Trikafta.   Wt Readings from Last 10 Encounters:   10/15/20 84.8 kg (187 lb)   20 79.8 kg (176 lb)   19 80.5 kg (177 lb 7.5 oz)   19 80.3 kg (177 lb)   09/10/19 78.9 kg (174 lb)   08/15/19 79 kg (174 lb 2.6 oz)   19 79.7 kg (175 lb 12.8 oz)   19 80.1 kg (176 lb 8 oz)   19 78.9 kg (174 lb)   05/15/19 78 kg (172 lb)     Pancreatic Enzymes  Brand:  Creon [id number]  Dosin-6 with meals, 2-3 with snacks  Are you taking enzymes as recommended:Yes     High dose providing 1694 units lipase/kg/meal which is within the recommended range per CF Foundation to inhibit fibrosing colonopathy    Signs of Malabsorption:  About once/week the pt has stool that contains mucus or is greasy. Pt usually stores his enzymes in the car.    Enzyme Program:  [facility]    Gastrointestinal/Malabsorption Assessment  GI Complaints: About once/week the pt has stool that contains mucus or is greasy   Medications: Take soluble fiber (psyllium- Metamucil) - 1 tbsp in evening  Other Therapies: also taking Florajen probiotic BID.      Experiences greasy/oily leakage if he misses enzymes or if he is eating poorly (high-fat foods)    Diet History and Assessment  Diet " Preferences/Allergies/Intolerances: Regular   Intake Recall/Comments:  Shar eats TID meals plus TID snacks.   B - oatmeal with yogurt and walnuts or eggs and english muffin, or cereal with 2% milk  AM snack - Rx bar and protein shake   L - deli meat and cheese sandwich or leftovers   PM snack - beef stick, cheese and crackers  D - meat and veggies, sweet potatoes tots, broccoli and cheese tots, sloppy Silvio's,   HS snack - apple and pb or pb on english muffin    Calcium: several servings of dairy daily (2% milk on cereal and by the glass, yogurt, cheese) (likely adequate)   Salt: pt eats several high salt foods like nuts, deli meat, beef sticks, cheese, crackers, tots (likely adequate)    Hydration:  20-40 fl oz water and G2 daily     Supplements:  Occasionally drinks [employer] and [employer]    Laboratory Assessment    Vitamin A   Date Value Ref Range Status   04/15/2020 0.89 0.30 - 1.20 mg/L Final     Vitamin D Deficiency screening   Date Value Ref Range Status   04/15/2020 37 20 - 75 ug/L Final     Comment:     Season, race, dietary intake, and treatment affect the concentration of   25-hydroxy-Vitamin D. Values may decrease during winter months and increase   during summer months. Values 20-29 ug/L may indicate Vitamin D insufficiency   and values <20 ug/L may indicate Vitamin D deficiency.  Vitamin D determination is routinely performed by an immunoassay specific for   25 hydroxyvitamin D3.  If an individual is on vitamin D2 (ergocalciferol)   supplementation, please specify 25 OH vitamin D2 and D3 level determination by   LCMSMS test VITD23.       Vitamin E   Date Value Ref Range Status   04/15/2020 17.6 5.5 - 18.0 mg/L Final     Comment:     (Note)  Test developed and characteristics determined by [employer]. See Compliance Statement B: [email or web address]       Iron   Date Value Ref Range Status   04/15/2020 145 35 - 180 ug/dL Final     Cholesterol   Date Value Ref Range Status   08/27/2020 263 (H) <200 mg/dL  Final     Comment:     Desirable:       <200 mg/dl     Triglycerides   Date Value Ref Range Status   08/27/2020 75 <150 mg/dL Final     Comment:     Non Fasting     HDL Cholesterol   Date Value Ref Range Status   08/27/2020 66 >39 mg/dL Final     LDL Cholesterol Calculated   Date Value Ref Range Status   08/27/2020 182 (H) <100 mg/dL Final     Comment:     Above desirable:  100-129 mg/dl  Borderline High:  130-159 mg/dL  High:             160-189 mg/dL  Very high:       >189 mg/dl       VLDL-Cholesterol   Date Value Ref Range Status   12/22/2014 32 (H) 0 - 30 mg/dL Final     Cholesterol/HDL Ratio   Date Value Ref Range Status   12/22/2014 3.3 0.0 - 5.0 Final       DEXA: lowest Z-score of -1.6, WNL on 5/2/19    Current Vitamin/Mineral Prescription: MVW Complete 1 softgels/day (enzyme program), fish oil (unknown dose)   Comments:  Taking daily. Obtains MVW from [facility]. Dose was decreased following annual studies due to elevated Vitamin E.    NUTRITION DIAGNOSIS  Impaired nutrient utilization r/t CF hypermetabolism and suspected pancreatic insufficiency as evidenced by symptomatic improvement in steatorrhea with pancreatic enzyme replacement therapy and pt requires high kcal/protein diet to maintain nutrition and health status.   INTERVENTIONS/RECOMMENDATIONS  PO Intake/Wt Trends: Discussed current nutritional status including reviewed adequacy of oral intakes and weight trends. Discussed that wt gain is not uncommon after starting Trikafta and that wt maintenance is desirable.   Annual Studies: Reviewed annuals from 8/27/20. Continue supplement regimen given fat soluble vitamin and iron labs are WNL. Reviewed elevated lipids profile. Encouraged Shar to continue taking Fish oil supplements and think about the following dietary modifications: increased fiber/lean protein intake and decreased added sugar consumption. Helped pt brainstorm how he could implement these recommendation. Pt agreeable to plan.    Enzymes/GI: Encouraged pt to continue with current enzyme regimen and modify enzyme storage. Reviewed best storage practices for enzymes, including keeping them at room temperature whenever possible. Recommended the pt bring his enzymes into work with him instead of leaving them in a car. Also encouraged Shar to reviewed expirations dates on his enzymes to make sure his is not taking  enzymes. Continue BID probiotic and Metamucil. Will continue following.   Trikafta:  Reviewed intake guidelines for Trikafta doses including 10-12 grams of fat per dose taken with enzymes, also every 12 hour dosing.  This was not new information for Shar and he was able to list several foods he could eat/drink with the dose.     GOALS:  1) Takes enzymes/vitamins as recommended  2) Weight maintenance with BMI >23 kg/m2    FOLLOW-UP/MONITORING:  Visit patient within 12 month(s) for next annual, sooner if requested by pt or provider     Phone Call Duration: 15 min    Funmilayo Wong RD, ENRRIQUE (pager [phone])  Cystic Fibrosis/Lung Transplant Dietitian      -Available Mon-Friday 8 AM-4:30 PM. On weekends/holidays contact coverage dietitian at pager [phone] (inpatient use only).

## 2020-11-16 NOTE — PROGRESS NOTES
"Adult Cystic Fibrosis Program  Annual Psychosocial Assessment- Phone call    Presenting Information:  SHAR is a 40-year-old male with cystic fibrosis, presenting in CF clinic for a regular follow up with primary CF provider, Dr. True Sahni. Spoke with Shar over the phone for annual psychosocial assessment.     Living situation:  Sahr lives with his wife, Aimee, and their 4 year old son, Shay, and 1.5 year old son, Serafin, in [place], [place]. They own their house and have lived there since 2012.  They have 2 cats.  He denies any current concerns about his living situation.     Family Constellation:  Shar was raised by his biological parents, in [place], [place].  He has 2 sibling(s): 1 older brother and a younger sister. He has four nieces. His sister lives in [place] at his parents home and his brother lives in [place], although he is currently deployed in [facility]. Milena's family lives in [place] and they see them frequently. He is not aware of any other family members having CF.     Shar and Aimee have been  since 2012 and together since 2006.  Their sons were conceived via IVF. They enjoy being parents and are keeping busy with their sons.    Social Support:  Shar reports good social support.  He gets along well with his wife and other family members and draws additional support from friends as well as a mentor who he tries to meet with regularly. Shar had a colleague with a daughter who has CF, otherwise he has no other contact within the CF community.      Adjustment to Illness:  Shar  was diagnosed with CF at age 34, in the summer of 2014. 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 As a child, he notes he experienced allergy and sinus problems, including 3 surgeries, one at age 12, and 2 in college. He was bothered by congestion and headaches and thought of himself as \"the sick one\" in his family. He had " "pectus excavatum in adolescence.     Overall, Shar describes his current health status as \"pretty good overall\". He is experiencing some urinary issues and also some jaw pain. He notes starting trikafta earlier this year and has had improvement in PFT's and has been coughing less. He also notes his exercise tolerance has improved. Shar has not been advised to use vest therapy, and uses Aerobika and exercise for airway clearance. He notes that he works out often, doing weights, rowing, and going on his treadmill.      Shar is private about his CF diagnosis, although reports he is working on being more open.      Health Care Directive:  This SW reviewed [facility] education, including concept/purpose of health care directive, default health care agent (his wife) and how to complete a directive. Shar reported that he is comfortable with his default decision-maker (his wife) but would like to review a directive. SW will follow up via email.     Education:  Shar completed a Masters degree in Teaching at [employer] in Fall 2013. 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 He denies plans for further education at this time.     Employment:  Shar was previously employed full-time as a  at a [employer] school. He left this job this summer because of COVID and also wanting a change due to the stress of teaching. 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 He is enjoying his new role as he works mostly with friends and the schedule is more flexible. He knows that he can always return to teaching but feels this job is the right choice for him at this time. He does not have access to benefits through his work at this time.     Shar's wife Aimee is employed as a . She has been working from home since the start of COVID and will be indefinitely. She has " "health insurance and other benefits through her work.     Finances:  Shar and his wife receive income from wages. They have a  to help with planning for the future. Shar denies any financial concerns.       Insurance:  Shar is insured through a marketplace [employer]OCK[employer] plan. He recently signed up for this plan when he changed jobs as his new employer did not offer benefits. He notes a high premium with this plan but has not encountered any major issues with the plan so far.      Mental Health/Coping:  Shar denies any current or past symptoms indicative of mood, anxiety, eating, learning or other mental health disorder. He also denies family history of mental health concerns.      Shar describes his mood recently as \"good, all things considered\". He attributes this to his job change and lower stress levels in general. He was previously seeing Dr. Crystal and has been on lexapro. This provider recently retired so Shar is waiting on a referral for a new provider. In the meantime, the CF team will prescribe his lexapro. He no longer feels he needs this medication and hopes to get off of the lexapro completely as he feels he has less life stressors and his mood has improved significantly.     FELIX-7 score: 2, indicating minimal presence of anxiety symptoms (as described as \"not difficult\")  PHQ-9 score: 2 indicating minimal symptoms of depression (negative for SI, as described as \"not difficult\")    He agrees with the scores and feels his symptoms are minimal and improved over the past year. He has minimal stress. He kenan with stress by getting out of the house, going for a drive, or working out.       Shar does not currently identify tg/spirituality as important in his life.     Chemical Health:  Shar denies tobacco or illicit drug use.  He drinks alcohol on occasion, consuming 1 drink 3-4 times a week. He denies any current/past psychosocial impairment caused by alcohol use.       Leisure " Activities/Interests:   Shar enjoys hiking, going to the park with his sons, going to apple orchards, traveling, working out, biking, riding recreational vehicles, and spending time with friends.      Intervention:  -Introduction to SW  -Psychosocial Assessment  -Health Care Directive education  -Supportive counseling/psycho-education    Assessment:  Shar was in good spirits and appeared to be open in his responses. His stress levels have decreased considerably since changing jobs. He feels his mood has been stable and would like to stop taking his lexapro altogether as he feels he no longer needs it. He has also noticed improvements in his health and QOL since starting trikafta. He continues to have good social support. No insurance or financial concerns.     Shar seems to be psychosocially stable overall, with access to relevant resources and supports.  No concerns expressed/noted.    Plan:  Re-consult for any psychosocial needs that may arise.  Complete psychosocial assessment annually.  Continue to follow for regular clinic consult.    AGUSTÍN Erickson, [facility]  Adult Cystic Fibrosis   Ph: [phone], Pager: [phone]

## 2020-11-27 DIAGNOSIS — E84.0 CYSTIC FIBROSIS WITH PULMONARY MANIFESTATIONS (H): ICD-10-CM

## 2020-11-27 DIAGNOSIS — K86.81 EXOCRINE PANCREATIC INSUFFICIENCY: ICD-10-CM

## 2020-11-30 RX ORDER — ELEXACAFTOR, TEZACAFTOR, AND IVACAFTOR 100-50-75
KIT ORAL
Qty: 84 EACH | Refills: 2 | Status: SHIPPED | OUTPATIENT
Start: 2020-11-30 | End: 2021-01-11

## 2020-12-21 DIAGNOSIS — J32.4 CHRONIC PANSINUSITIS: Primary | ICD-10-CM

## 2020-12-21 PROCEDURE — 99207 PR NO CHARGE LOS: CPT | Performed by: OTOLARYNGOLOGY

## 2021-01-04 ENCOUNTER — DOCUMENTATION ONLY (OUTPATIENT)
Dept: OTOLARYNGOLOGY | Facility: CLINIC | Age: 41
End: 2021-01-04

## 2021-01-04 DIAGNOSIS — J32.4 CHRONIC PANSINUSITIS: Primary | ICD-10-CM

## 2021-01-04 NOTE — PROGRESS NOTES
Prescription for levofloxacin nasal irrigation was faxed to [facility] pharmacy at [phone]. 2 pgs faxed.

## 2021-01-07 ENCOUNTER — OFFICE VISIT (OUTPATIENT)
Dept: PULMONOLOGY | Facility: CLINIC | Age: 41
End: 2021-01-07
Attending: INTERNAL MEDICINE
Payer: COMMERCIAL

## 2021-01-07 VITALS
WEIGHT: 182 LBS | TEMPERATURE: 97.8 F | HEART RATE: 72 BPM | BODY MASS INDEX: 24.38 KG/M2 | OXYGEN SATURATION: 96 % | SYSTOLIC BLOOD PRESSURE: 130 MMHG | DIASTOLIC BLOOD PRESSURE: 86 MMHG | RESPIRATION RATE: 18 BRPM

## 2021-01-07 DIAGNOSIS — K86.89 PANCREATIC INSUFFICIENCY: ICD-10-CM

## 2021-01-07 DIAGNOSIS — E84.0 CYSTIC FIBROSIS WITH PULMONARY MANIFESTATIONS (H): Primary | ICD-10-CM

## 2021-01-07 DIAGNOSIS — E84.0 CYSTIC FIBROSIS WITH PULMONARY MANIFESTATIONS (H): ICD-10-CM

## 2021-01-07 LAB
ALBUMIN SERPL-MCNC: 4.1 G/DL (ref 3.4–5)
ALP SERPL-CCNC: 88 U/L (ref 40–150)
ALT SERPL W P-5'-P-CCNC: 41 U/L (ref 0–70)
AST SERPL W P-5'-P-CCNC: 24 U/L (ref 0–45)
BILIRUB DIRECT SERPL-MCNC: 0.2 MG/DL (ref 0–0.2)
BILIRUB SERPL-MCNC: 0.9 MG/DL (ref 0.2–1.3)
CK SERPL-CCNC: 236 U/L (ref 30–300)
EXPTIME-PRE: 6.27 SEC
FEF2575-%PRED-PRE: 105 %
FEF2575-PRE: 4.71 L/SEC
FEF2575-PRED: 4.45 L/SEC
FEFMAX-%PRED-PRE: 94 %
FEFMAX-PRE: 10.23 L/SEC
FEFMAX-PRED: 10.77 L/SEC
FEV1-%PRED-PRE: 92 %
FEV1-PRE: 4.29 L
FEV1FEV6-PRE: 83 %
FEV1FEV6-PRED: 82 %
FEV1FVC-PRE: 83 %
FEV1FVC-PRED: 81 %
FIFMAX-PRE: 7.9 L/SEC
FVC-%PRED-PRE: 89 %
FVC-PRE: 5.16 L
FVC-PRED: 5.75 L
PROT SERPL-MCNC: 7.5 G/DL (ref 6.8–8.8)

## 2021-01-07 PROCEDURE — G0463 HOSPITAL OUTPT CLINIC VISIT: HCPCS | Mod: 25

## 2021-01-07 PROCEDURE — 99214 OFFICE O/P EST MOD 30 MIN: CPT | Mod: 25 | Performed by: INTERNAL MEDICINE

## 2021-01-07 PROCEDURE — 36415 COLL VENOUS BLD VENIPUNCTURE: CPT | Performed by: PATHOLOGY

## 2021-01-07 PROCEDURE — 87070 CULTURE OTHR SPECIMN AEROBIC: CPT | Performed by: INTERNAL MEDICINE

## 2021-01-07 PROCEDURE — 94375 RESPIRATORY FLOW VOLUME LOOP: CPT | Performed by: INTERNAL MEDICINE

## 2021-01-07 PROCEDURE — 82550 ASSAY OF CK (CPK): CPT | Performed by: PATHOLOGY

## 2021-01-07 PROCEDURE — 80076 HEPATIC FUNCTION PANEL: CPT | Performed by: PATHOLOGY

## 2021-01-07 ASSESSMENT — PAIN SCALES - GENERAL: PAINLEVEL: NO PAIN (0)

## 2021-01-07 NOTE — PROGRESS NOTES
Reason for Visit  Philip Delacruz is a 40 year old year old male who is being seen for RECHECK (Cystic Fibrosis )      Assessment and plan:   Shar Coley is a 40-year-old male with a history of cystic fibrosis with mild lung disease and pancreatic insufficiency.    Pulmonary/CF: The patient reports excellent exercise tolerance.  No cough or sputum production.  Oxygenation is adequate.  He does not appear to be having a pulmonary exacerbation at this time.  PFTs are similar to baseline.  He is maintaining stable lung function without formal airway clearance.  He will continue to use exercise as his main form of airway clearance.  This will be reconsidered if there is any evidence of loss of lung function.      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  He has responded well to Trikafta with excellent exercise tolerance, resolution of cough and sputum production.  LFTs and CK remain within the normal range.    CF related sinus disease: No symptoms of active sinusitis at this time.  Continue Flonase and nasal washes.    GI: Etiology of ongoing gas and bloating is unclear.  He will reduce his caffeine intake.  He will also try to assess any relationship between his GI symptoms and his dairy intake to assess whether this could be lactose intolerance.  Previous fecal elastase was within normal limits but this was a number of years ago.  Could consider rechecking.    Healthcare maintenance: The patient has received his influenza vaccine for  "this flu season.  Annual studies will be due with his next visit.    Annual studies including basic metabolic panel, LFTs, lipid battery, hemoglobin A1c, INR, TSH, total protein, albumin, vitamin A level, vitamin D level, vitamin E level, IgG, IgE, CBC, urinalysis, testosterone level, CF sputum culture, nocardia culture, AFB culture, fungal culture, chest x-ray and DEXA scan were ordered with the next visit.    Follow-up in 3 months with annual studies.    True Sahni MD       CF HPI  The patient was seen and examined by True Sahni MD   Shar Coley is a 40-year-old male with a history of cystic fibrosis with mild lung disease and pancreatic insufficiency.  Patient has been well since last visit.  Breathing is comfortable at rest and with all activity.  No cough.  No sputum production.  Patient exercises 1-2 times per week but is very active at work.  No chest pain.  No fever, chills or night sweats.  He does not do any regular vest therapy.    Review of systems:  Appetite is very good.  No ear pain, sore throat, sinus pain or rhinorrhea  Eye pain earlier in the week.  The patient does note he has a history of herpetic eye infection  No nausea, vomiting or abdominal pain.  He does note some fecal leakage.  He has periodic \"bad gas\" possibly related to a probiotic.  He is using his pancreatic enzymes consistently.  He reports his anxiety is better.  A complete ROS was otherwise negative except as noted in the HPI.    Current Outpatient Medications   Medication     albuterol (PROAIR HFA/PROVENTIL HFA/VENTOLIN HFA) 108 (90 Base) MCG/ACT inhaler     amylase-lipase-protease (CREON) 97554-32035 units CPEP per EC capsule     escitalopram (LEXAPRO) 20 MG tablet     fluticasone (FLONASE) 50 MCG/ACT nasal spray     GARLIC PO     multivitamin CF formula (MVW COMPLETE FORMULATION) chewable tablet     Omega-3 Fatty Acids (FISH OIL) 1200 MG CPDR     order for DME     order for DME     Sodium Chloride-Sodium " Bicarb (SINUFLO READYRINSE) KIT     TRIKAFTA 100-50-75 & 150 MG tablet pack     VITAMIN D PO     Wheat Dextrin (BENEFIBER PO)     ZIRGAN 0.15 % GEL     Nutritional Supplements ( PO)     olopatadine (PATADAY) 0.2 % ophthalmic solution     No current facility-administered medications for this visit.      Allergies   Allergen Reactions     Liquid Adhesive Rash     Tagederm     Tegaderm Transparent Dressing (Informational Only) Rash     Past Medical History:   Diagnosis Date     Chronic sinusitis      Congenital absence of vas deferens, bilateral      Cystic fibrosis (H)     Delta F508 and S9386I      Nasal polyps      Sinusitis, chronic        Past Surgical History:   Procedure Laterality Date     HC TOOTH EXTRACTION W/FORCEP       NASAL/SINUS POLYPECTOMY       REPAIR PECTUS EXCAVATUM  1997     SINUS SURGERY  1992, 2002, 2004    Removed tubinates and polyps OSH     Sperm harvest         Social History     Socioeconomic History     Marital status: Single     Spouse name: Not on file     Number of children: Not on file     Years of education: Not on file     Highest education level: Not on file   Occupational History     Occupation: Teacher   Social Needs     Financial resource strain: Not on file     Food insecurity     Worry: Not on file     Inability: Not on file     Transportation needs     Medical: Not on file     Non-medical: Not on file   Tobacco Use     Smoking status: Never Smoker     Smokeless tobacco: Never Used   Substance and Sexual Activity     Alcohol use: Yes     Alcohol/week: 0.0 standard drinks     Comment: 1 drink 3X per week     Drug use: No     Sexual activity: Yes     Partners: Female     Birth control/protection: None   Lifestyle     Physical activity     Days per week: Not on file     Minutes per session: Not on file     Stress: Not on file   Relationships     Social connections     Talks on phone: Not on file     Gets together: Not on file     Attends [employer] service: Not on file      Active member of club or organization: Not on file     Attends meetings of clubs or organizations: Not on file     Relationship status: Not on file     Intimate partner violence     Fear of current or ex partner: Not on file     Emotionally abused: Not on file     Physically abused: Not on file     Forced sexual activity: Not on file   Other Topics Concern     Parent/sibling w/ CABG, MI or angioplasty before 65F 55M? Not Asked   Social History Narrative    Lives in [place] with wife, Aimee, and sons, Shay and Serafin. 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         /86   Pulse 72   Temp 97.8  F (36.6  C) (Oral)   Resp 18   Wt 82.6 kg (182 lb)   SpO2 96%   BMI 24.38 kg/m    Body mass index is 24.38 kg/m .  Exam:   GENERAL APPEARANCE: Well developed, well nourished, alert, and in no apparent distress.  EYES: PERRL, EOMI  HENT: Nasal mucosa with edema and hyperemia. No nasal polyps.  EARS: Canals clear, TMs normal  MOUTH: Oral mucosa is moist, without any lesions, no tonsillar enlargement, no oropharyngeal exudate.  NECK: supple, no masses, no thyromegaly.  LYMPHATICS: No significant axillary, cervical, or supraclavicular nodes.  RESP: normal percussion, good air flow throughout.  No crackles. No rhonchi. No wheezes.  CV: Normal S1, S2, regular rhythm, normal rate. No murmur.  No rub. No gallop. No LE edema.   ABDOMEN:  Bowel sounds normal, soft, nontender, no HSM or masses.   MS: extremities normal. No clubbing. No cyanosis.  SKIN: no rash on limited exam  NEURO: Mentation intact, speech normal, normal strength and tone, normal gait and stance  PSYCH: mentation appears normal. and affect normal/bright  Results:  Recent Results (from the past 168 hour(s))   CK total    Collection Time: 01/07/21  3:14 PM   Result Value Ref Range    CK Total 236 30 - 300 U/L   Hepatic panel    Collection Time: 01/07/21  3:14 PM   Result  Value Ref Range    Bilirubin Direct 0.2 0.0 - 0.2 mg/dL    Bilirubin Total 0.9 0.2 - 1.3 mg/dL    Albumin 4.1 3.4 - 5.0 g/dL    Protein Total 7.5 6.8 - 8.8 g/dL    Alkaline Phosphatase 88 40 - 150 U/L    ALT 41 0 - 70 U/L    AST 24 0 - 45 U/L   General PFT Lab (Please always keep checked)    Collection Time: 01/07/21  3:24 PM   Result Value Ref Range    FVC-Pred 5.75 L    FVC-Pre 5.16 L    FVC-%Pred-Pre 89 %    FEV1-Pre 4.29 L    FEV1-%Pred-Pre 92 %    FEV1FVC-Pred 81 %    FEV1FVC-Pre 83 %    FEFMax-Pred 10.77 L/sec    FEFMax-Pre 10.23 L/sec    FEFMax-%Pred-Pre 94 %    FEF2575-Pred 4.45 L/sec    FEF2575-Pre 4.71 L/sec    IHH0055-%Pred-Pre 105 %    ExpTime-Pre 6.27 sec    FIFMax-Pre 7.90 L/sec    FEV1FEV6-Pred 82 %    FEV1FEV6-Pre 83 %                  Results as noted above.    PFT Interpretation:  Normal spirometry.  Unchanged from previous.   Similar to recent baseline.  Valid Maneuver     CF Exacerbation  Absent

## 2021-01-07 NOTE — LETTER
1/7/2021         RE: Philip Delacruz  [address]  [facility] [address]        Dear Colleague,    Thank you for referring your patient, Philip Delacruz, to the [facility] [place]. Please see a copy of my visit note below.    Reason for Visit  Philip Delacruz is a 40 year old year old male who is being seen for RECHECK (Cystic Fibrosis )      Assessment and plan: ***      Maintenance   Modulator:   Mutation:   AW Clearance:   Bronchodilators:   Mucolytics:  Antibiotics Inh:   Antibiotics Oral:   Other:  Colonization Hx:  Annual Studies:  Contraindications to standard of care:      CF HPI  The patient was seen and examined by True Sahni MD     A complete ROS was otherwise negative except as noted in the HPI.    Current Outpatient Medications   Medication     albuterol (PROAIR HFA/PROVENTIL HFA/VENTOLIN HFA) 108 (90 Base) MCG/ACT inhaler     amylase-lipase-protease (CREON) 86049-00213 units CPEP per EC capsule     escitalopram (LEXAPRO) 20 MG tablet     fluticasone (FLONASE) 50 MCG/ACT nasal spray     GARLIC PO     multivitamin CF formula (MVW COMPLETE FORMULATION) chewable tablet     Omega-3 Fatty Acids (FISH OIL) 1200 MG CPDR     order for DME     order for DME     Sodium Chloride-Sodium Bicarb (SINUFLO READYRINSE) KIT     TRIKAFTA 100-50-75 & 150 MG tablet pack     VITAMIN D PO     Wheat Dextrin (BENEFIBER PO)     ZIRGAN 0.15 % GEL     Nutritional Supplements ( PO)     olopatadine (PATADAY) 0.2 % ophthalmic solution     No current facility-administered medications for this visit.      Allergies   Allergen Reactions     Liquid Adhesive Rash     Tagederm     Tegaderm Transparent Dressing (Informational Only) Rash     Past Medical History:   Diagnosis Date     Chronic sinusitis      Congenital absence of vas deferens, bilateral      Cystic fibrosis (H)     Delta F508 and [id number]      Nasal polyps      Sinusitis, chronic         Past Surgical History:   Procedure Laterality Date     HC TOOTH EXTRACTION W/FORCEP       NASAL/SINUS POLYPECTOMY       REPAIR PECTUS EXCAVATUM  1997     SINUS SURGERY  1992, 2002, 2004    Removed tubinates and polyps OSH     Sperm harvest         Social History     Socioeconomic History     Marital status: Single     Spouse name: Not on file     Number of children: Not on file     Years of education: Not on file     Highest education level: Not on file   Occupational History     Occupation: Teacher   Social Needs     Financial resource strain: Not on file     Food insecurity     Worry: Not on file     Inability: Not on file     Transportation needs     Medical: Not on file     Non-medical: Not on file   Tobacco Use     Smoking status: Never Smoker     Smokeless tobacco: Never Used   Substance and Sexual Activity     Alcohol use: Yes     Alcohol/week: 0.0 standard drinks     Comment: 1 drink 3X per week     Drug use: No     Sexual activity: Yes     Partners: Female     Birth control/protection: None   Lifestyle     Physical activity     Days per week: Not on file     Minutes per session: Not on file     Stress: Not on file   Relationships     Social connections     Talks on phone: Not on file     Gets together: Not on file     Attends Taoism service: Not on file     Active member of club or organization: Not on file     Attends meetings of clubs or organizations: Not on file     Relationship status: Not on file     Intimate partner violence     Fear of current or ex partner: Not on file     Emotionally abused: Not on file     Physically abused: Not on file     Forced sexual activity: Not on file   Other Topics Concern     Parent/sibling w/ CABG, MI or angioplasty before 65F 55M? Not Asked   Social History Narrative    Lives in [place] with wife, Aimee, and sons, Shay and Serafin. Previously , switched to Property management (summer 2020) to limit COVID exposure in school due to  CF.         /86   Pulse 72   Temp 97.8  F (36.6  C) (Oral)   Resp 18   Wt 82.6 kg (182 lb)   SpO2 96%   BMI 24.38 kg/m    Body mass index is 24.38 kg/m .  Exam:   GENERAL APPEARANCE: Well developed, well nourished, alert, and in no apparent distress.  EYES: PERRL, EOMI  HENT: Nasal mucosa with no edema and no hyperemia. No nasal polyps.  EARS: Canals clear, TMs normal  MOUTH: Oral mucosa is moist, without any lesions, no tonsillar enlargement, no oropharyngeal exudate.  NECK: supple, no masses, no thyromegaly.  LYMPHATICS: No significant axillary, cervical, or supraclavicular nodes.  RESP: normal percussion, good air flow throughout.  No crackles. No rhonchi. No wheezes.  CV: Normal S1, S2, regular rhythm, normal rate. No murmur.  No rub. No gallop. No LE edema.   ABDOMEN:  Bowel sounds normal, soft, nontender, no HSM or masses.   MS: extremities normal. No clubbing. No cyanosis.  SKIN: no rash on limited exam  NEURO: Mentation intact, speech normal, normal strength and tone, normal gait and stance  PSYCH: mentation appears normal. and affect normal/bright  Results:  Recent Results (from the past 168 hour(s))   CK total    Collection Time: 01/07/21  3:14 PM   Result Value Ref Range    CK Total 236 30 - 300 U/L   Hepatic panel    Collection Time: 01/07/21  3:14 PM   Result Value Ref Range    Bilirubin Direct 0.2 0.0 - 0.2 mg/dL    Bilirubin Total 0.9 0.2 - 1.3 mg/dL    Albumin 4.1 3.4 - 5.0 g/dL    Protein Total 7.5 6.8 - 8.8 g/dL    Alkaline Phosphatase 88 40 - 150 U/L    ALT 41 0 - 70 U/L    AST 24 0 - 45 U/L   General PFT Lab (Please always keep checked)    Collection Time: 01/07/21  3:24 PM   Result Value Ref Range    FVC-Pred 5.75 L    FVC-Pre 5.16 L    FVC-%Pred-Pre 89 %    FEV1-Pre 4.29 L    FEV1-%Pred-Pre 92 %    FEV1FVC-Pred 81 %    FEV1FVC-Pre 83 %    FEFMax-Pred 10.77 L/sec    FEFMax-Pre 10.23 L/sec    FEFMax-%Pred-Pre 94 %    FEF2575-Pred 4.45 L/sec    FEF2575-Pre 4.71 L/sec     SAW4062-%Pred-Pre 105 %    ExpTime-Pre 6.27 sec    FIFMax-Pre 7.90 L/sec    FEV1FEV6-Pred 82 %    FEV1FEV6-Pre 83 %                   Results as noted above.    PFT Interpretation:  {Kaitlyn PFT interpretation:001092}  {Increase/decrease:989120}  {JDPFT:450300}  Valid Maneuver             CF Exacerbation  {CF EXACERBATION STATUS:898513}          Respiratory Therapist Note:    Vest    Brand:    Cough Pause:   Vest Garment Size:    Last Fitting Date:    Frequency of therapy: 0 times per week   Concerns:     Exercise (purposeful and aerobic for >20 minutes each session): Yes - amount :    Does this qualify as additional airway clearance: Yes    Alternative Airway Clearance:       Nebulized Medications    Review Cleaning:     Education and Transition Information   Correct order of inhaled medications: Not Applicable   Mechanism of Action of inhaled medications: Not Applicable   Frequency of inhaled medications: Not Applicable   Dosage of inhaled medications: Not Applicable   Other:     Home Care:   Nebulizer Cups (Brand/Type):    Nebulizer Compressor    Year Purchased:    Nebulizer Supply Company:     Oxygen: N/A    Pulmonary Rehab   Site:    Date Completed:     Plan of Care and Goals for next visit: Patient does not do airway clearance therapies. Will order home spirometer for patient          Again, thank you for allowing me to participate in the care of your patient.        Sincerely,        True Sahni MD

## 2021-01-07 NOTE — LETTER
1/7/2021      RE: Philip Delacruz  [address]  [facility] [address]       Reason for Visit  Philip Delacruz is a 40 year old year old male who is being seen for RECHECK (Cystic Fibrosis )      Assessment and plan: ***      Maintenance   Modulator:   Mutation:   AW Clearance:   Bronchodilators:   Mucolytics:  Antibiotics Inh:   Antibiotics Oral:   Other:  Colonization Hx:  Annual Studies:  Contraindications to standard of care:      CF HPI  The patient was seen and examined by True Sahni MD     A complete ROS was otherwise negative except as noted in the HPI.    Current Outpatient Medications   Medication     albuterol (PROAIR HFA/PROVENTIL HFA/VENTOLIN HFA) 108 (90 Base) MCG/ACT inhaler     amylase-lipase-protease (CREON) 02366-29394 units CPEP per EC capsule     escitalopram (LEXAPRO) 20 MG tablet     fluticasone (FLONASE) 50 MCG/ACT nasal spray     GARLIC PO     multivitamin CF formula (MVW COMPLETE FORMULATION) chewable tablet     Omega-3 Fatty Acids (FISH OIL) 1200 MG CPDR     order for DME     order for DME     Sodium Chloride-Sodium Bicarb (SINUFLO READYRINSE) KIT     TRIKAFTA 100-50-75 & 150 MG tablet pack     VITAMIN D PO     Wheat Dextrin (BENEFIBER PO)     ZIRGAN 0.15 % GEL     Nutritional Supplements ( PO)     olopatadine (PATADAY) 0.2 % ophthalmic solution     No current facility-administered medications for this visit.      Allergies   Allergen Reactions     Liquid Adhesive Rash     Tagederm     Tegaderm Transparent Dressing (Informational Only) Rash     Past Medical History:   Diagnosis Date     Chronic sinusitis      Congenital absence of vas deferens, bilateral      Cystic fibrosis (H)     Delta F508 and [id number]      Nasal polyps      Sinusitis, chronic        Past Surgical History:   Procedure Laterality Date     HC TOOTH EXTRACTION W/FORCEP       NASAL/SINUS POLYPECTOMY       REPAIR PECTUS EXCAVATUM  1997     SINUS SURGERY  1992, 2002, 2004    Removed  tubinates and polyps OSH     Sperm harvest         Social History     Socioeconomic History     Marital status: Single     Spouse name: Not on file     Number of children: Not on file     Years of education: Not on file     Highest education level: Not on file   Occupational History     Occupation: Teacher   Social Needs     Financial resource strain: Not on file     Food insecurity     Worry: Not on file     Inability: Not on file     Transportation needs     Medical: Not on file     Non-medical: Not on file   Tobacco Use     Smoking status: Never Smoker     Smokeless tobacco: Never Used   Substance and Sexual Activity     Alcohol use: Yes     Alcohol/week: 0.0 standard drinks     Comment: 1 drink 3X per week     Drug use: No     Sexual activity: Yes     Partners: Female     Birth control/protection: None   Lifestyle     Physical activity     Days per week: Not on file     Minutes per session: Not on file     Stress: Not on file   Relationships     Social connections     Talks on phone: Not on file     Gets together: Not on file     Attends Confucianist service: Not on file     Active member of club or organization: Not on file     Attends meetings of clubs or organizations: Not on file     Relationship status: Not on file     Intimate partner violence     Fear of current or ex partner: Not on file     Emotionally abused: Not on file     Physically abused: Not on file     Forced sexual activity: Not on file   Other Topics Concern     Parent/sibling w/ CABG, MI or angioplasty before 65F 55M? Not Asked   Social History Narrative    Lives in [place] with wife, Aimee, and sons, Shay and Serafin. 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         /86   Pulse 72   Temp 97.8  F (36.6  C) (Oral)   Resp 18   Wt 82.6 kg (182 lb)   SpO2 96%   BMI 24.38 kg/m    Body mass index is 24.38 kg/m .  Exam:   GENERAL APPEARANCE: Well  developed, well nourished, alert, and in no apparent distress.  EYES: PERRL, EOMI  HENT: Nasal mucosa with no edema and no hyperemia. No nasal polyps.  EARS: Canals clear, TMs normal  MOUTH: Oral mucosa is moist, without any lesions, no tonsillar enlargement, no oropharyngeal exudate.  NECK: supple, no masses, no thyromegaly.  LYMPHATICS: No significant axillary, cervical, or supraclavicular nodes.  RESP: normal percussion, good air flow throughout.  No crackles. No rhonchi. No wheezes.  CV: Normal S1, S2, regular rhythm, normal rate. No murmur.  No rub. No gallop. No LE edema.   ABDOMEN:  Bowel sounds normal, soft, nontender, no HSM or masses.   MS: extremities normal. No clubbing. No cyanosis.  SKIN: no rash on limited exam  NEURO: Mentation intact, speech normal, normal strength and tone, normal gait and stance  PSYCH: mentation appears normal. and affect normal/bright  Results:  Recent Results (from the past 168 hour(s))   CK total    Collection Time: 01/07/21  3:14 PM   Result Value Ref Range    CK Total 236 30 - 300 U/L   Hepatic panel    Collection Time: 01/07/21  3:14 PM   Result Value Ref Range    Bilirubin Direct 0.2 0.0 - 0.2 mg/dL    Bilirubin Total 0.9 0.2 - 1.3 mg/dL    Albumin 4.1 3.4 - 5.0 g/dL    Protein Total 7.5 6.8 - 8.8 g/dL    Alkaline Phosphatase 88 40 - 150 U/L    ALT 41 0 - 70 U/L    AST 24 0 - 45 U/L   General PFT Lab (Please always keep checked)    Collection Time: 01/07/21  3:24 PM   Result Value Ref Range    FVC-Pred 5.75 L    FVC-Pre 5.16 L    FVC-%Pred-Pre 89 %    FEV1-Pre 4.29 L    FEV1-%Pred-Pre 92 %    FEV1FVC-Pred 81 %    FEV1FVC-Pre 83 %    FEFMax-Pred 10.77 L/sec    FEFMax-Pre 10.23 L/sec    FEFMax-%Pred-Pre 94 %    FEF2575-Pred 4.45 L/sec    FEF2575-Pre 4.71 L/sec    KOO2875-%Pred-Pre 105 %    ExpTime-Pre 6.27 sec    FIFMax-Pre 7.90 L/sec    FEV1FEV6-Pred 82 %    FEV1FEV6-Pre 83 %                   Results as noted above.    PFT Interpretation:  {Kaitlyn PFT  interpretation:060846}  {Increase/decrease:535588}  {JDPFT:997202}  Valid Maneuver             CF Exacerbation  {CF EXACERBATION STATUS:519883}          Respiratory Therapist Note:    Vest    Brand:    Cough Pause:   Vest Garment Size:    Last Fitting Date:    Frequency of therapy: 0 times per week   Concerns:     Exercise (purposeful and aerobic for >20 minutes each session): Yes - amount :    Does this qualify as additional airway clearance: Yes    Alternative Airway Clearance:       Nebulized Medications    Review Cleaning:     Education and Transition Information   Correct order of inhaled medications: Not Applicable   Mechanism of Action of inhaled medications: Not Applicable   Frequency of inhaled medications: Not Applicable   Dosage of inhaled medications: Not Applicable   Other:     Home Care:   Nebulizer Cups (Brand/Type):    Nebulizer Compressor    Year Purchased:    Nebulizer Supply Company:     Oxygen: N/A    Pulmonary Rehab   Site:    Date Completed:     Plan of Care and Goals for next visit: Patient does not do airway clearance therapies. Will order home spirometer for patient          True Sahni MD

## 2021-01-07 NOTE — LETTER
1/7/2021      RE: Philip Delacruz  [address]  [facility] [address]       Reason for Visit  Philip Delacruz is a 40 year old year old male who is being seen for RECHECK (Cystic Fibrosis )      Assessment and plan:   Shar Coley is a 40-year-old male with a history of cystic fibrosis with mild lung disease and pancreatic insufficiency.    Pulmonary/CF: The patient reports excellent exercise tolerance.  No cough or sputum production.  Oxygenation is adequate.  He does not appear to be having a pulmonary exacerbation at this time.  PFTs are similar to baseline.  He is maintaining stable lung function without formal airway clearance.  He will continue to use exercise as his main form of airway clearance.  This will be reconsidered if there is any evidence of loss of lung function.      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  He has responded well to Trikafta with excellent exercise tolerance, resolution of cough and sputum production.  LFTs and CK remain within the normal range.    CF related sinus disease: No symptoms of active sinusitis at this time.  Continue Flonase and nasal washes.    GI: Etiology of ongoing gas and bloating is unclear.  He will reduce his caffeine intake.  He will also try to assess any relationship between his GI symptoms and his dairy intake to assess whether this could be lactose intolerance.  Previous fecal elastase was within normal limits but this was a number of years ago.  Could consider  "rechecking.    Healthcare maintenance: The patient has received his influenza vaccine for this flu season.  Annual studies will be due with his next visit.    Annual studies including basic metabolic panel, LFTs, lipid battery, hemoglobin A1c, INR, TSH, total protein, albumin, vitamin A level, vitamin D level, vitamin E level, IgG, IgE, CBC, urinalysis, testosterone level, CF sputum culture, nocardia culture, AFB culture, fungal culture, chest x-ray and DEXA scan were ordered with the next visit.    Follow-up in 3 months with annual studies.    True Sahni MD       CF HPI  The patient was seen and examined by True Sahni MD   Shar Coley is a 40-year-old male with a history of cystic fibrosis with mild lung disease and pancreatic insufficiency.  Patient has been well since last visit.  Breathing is comfortable at rest and with all activity.  No cough.  No sputum production.  Patient exercises 1-2 times per week but is very active at work.  No chest pain.  No fever, chills or night sweats.  He does not do any regular vest therapy.    Review of systems:  Appetite is very good.  No ear pain, sore throat, sinus pain or rhinorrhea  Eye pain earlier in the week.  The patient does note he has a history of herpetic eye infection  No nausea, vomiting or abdominal pain.  He does note some fecal leakage.  He has periodic \"bad gas\" possibly related to a probiotic.  He is using his pancreatic enzymes consistently.  He reports his anxiety is better.  A complete ROS was otherwise negative except as noted in the HPI.    Current Outpatient Medications   Medication     albuterol (PROAIR HFA/PROVENTIL HFA/VENTOLIN HFA) 108 (90 Base) MCG/ACT inhaler     amylase-lipase-protease (CREON) 64253-00440 units CPEP per EC capsule     escitalopram (LEXAPRO) 20 MG tablet     fluticasone (FLONASE) 50 MCG/ACT nasal spray     GARLIC PO     multivitamin CF formula (MVW COMPLETE FORMULATION) chewable tablet     Omega-3 Fatty " Acids (FISH OIL) 1200 MG CPDR     order for DME     order for DME     Sodium Chloride-Sodium Bicarb (SINUFLO READYRINSE) KIT     TRIKAFTA 100-50-75 & 150 MG tablet pack     VITAMIN D PO     Wheat Dextrin (BENEFIBER PO)     ZIRGAN 0.15 % GEL     Nutritional Supplements ( PO)     olopatadine (PATADAY) 0.2 % ophthalmic solution     No current facility-administered medications for this visit.      Allergies   Allergen Reactions     Liquid Adhesive Rash     Tagederm     Tegaderm Transparent Dressing (Informational Only) Rash     Past Medical History:   Diagnosis Date     Chronic sinusitis      Congenital absence of vas deferens, bilateral      Cystic fibrosis (H)     Delta F508 and C5136S      Nasal polyps      Sinusitis, chronic        Past Surgical History:   Procedure Laterality Date     HC TOOTH EXTRACTION W/FORCEP       NASAL/SINUS POLYPECTOMY       REPAIR PECTUS EXCAVATUM  1997     SINUS SURGERY  1992, 2002, 2004    Removed tubinates and polyps OSH     Sperm harvest         Social History     Socioeconomic History     Marital status: Single     Spouse name: Not on file     Number of children: Not on file     Years of education: Not on file     Highest education level: Not on file   Occupational History     Occupation: Teacher   Social Needs     Financial resource strain: Not on file     Food insecurity     Worry: Not on file     Inability: Not on file     Transportation needs     Medical: Not on file     Non-medical: Not on file   Tobacco Use     Smoking status: Never Smoker     Smokeless tobacco: Never Used   Substance and Sexual Activity     Alcohol use: Yes     Alcohol/week: 0.0 standard drinks     Comment: 1 drink 3X per week     Drug use: No     Sexual activity: Yes     Partners: Female     Birth control/protection: None   Lifestyle     Physical activity     Days per week: Not on file     Minutes per session: Not on file     Stress: Not on file   Relationships     Social connections     Talks on phone:  Not on file     Gets together: Not on file     Attends Yarsani service: Not on file     Active member of club or organization: Not on file     Attends meetings of clubs or organizations: Not on file     Relationship status: Not on file     Intimate partner violence     Fear of current or ex partner: Not on file     Emotionally abused: Not on file     Physically abused: Not on file     Forced sexual activity: Not on file   Other Topics Concern     Parent/sibling w/ CABG, MI or angioplasty before 65F 55M? Not Asked   Social History Narrative    Lives in [place] with wife, Aimee, and sons, Shay and Serafin. 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         /86   Pulse 72   Temp 97.8  F (36.6  C) (Oral)   Resp 18   Wt 82.6 kg (182 lb)   SpO2 96%   BMI 24.38 kg/m    Body mass index is 24.38 kg/m .  Exam:   GENERAL APPEARANCE: Well developed, well nourished, alert, and in no apparent distress.  EYES: PERRL, EOMI  HENT: Nasal mucosa with edema and hyperemia. No nasal polyps.  EARS: Canals clear, TMs normal  MOUTH: Oral mucosa is moist, without any lesions, no tonsillar enlargement, no oropharyngeal exudate.  NECK: supple, no masses, no thyromegaly.  LYMPHATICS: No significant axillary, cervical, or supraclavicular nodes.  RESP: normal percussion, good air flow throughout.  No crackles. No rhonchi. No wheezes.  CV: Normal S1, S2, regular rhythm, normal rate. No murmur.  No rub. No gallop. No LE edema.   ABDOMEN:  Bowel sounds normal, soft, nontender, no HSM or masses.   MS: extremities normal. No clubbing. No cyanosis.  SKIN: no rash on limited exam  NEURO: Mentation intact, speech normal, normal strength and tone, normal gait and stance  PSYCH: mentation appears normal. and affect normal/bright  Results:  Recent Results (from the past 168 hour(s))   CK total    Collection Time: 01/07/21  3:14 PM   Result Value Ref Range     CK Total 236 30 - 300 U/L   Hepatic panel    Collection Time: 01/07/21  3:14 PM   Result Value Ref Range    Bilirubin Direct 0.2 0.0 - 0.2 mg/dL    Bilirubin Total 0.9 0.2 - 1.3 mg/dL    Albumin 4.1 3.4 - 5.0 g/dL    Protein Total 7.5 6.8 - 8.8 g/dL    Alkaline Phosphatase 88 40 - 150 U/L    ALT 41 0 - 70 U/L    AST 24 0 - 45 U/L   General PFT Lab (Please always keep checked)    Collection Time: 01/07/21  3:24 PM   Result Value Ref Range    FVC-Pred 5.75 L    FVC-Pre 5.16 L    FVC-%Pred-Pre 89 %    FEV1-Pre 4.29 L    FEV1-%Pred-Pre 92 %    FEV1FVC-Pred 81 %    FEV1FVC-Pre 83 %    FEFMax-Pred 10.77 L/sec    FEFMax-Pre 10.23 L/sec    FEFMax-%Pred-Pre 94 %    FEF2575-Pred 4.45 L/sec    FEF2575-Pre 4.71 L/sec    OXD5091-%Pred-Pre 105 %    ExpTime-Pre 6.27 sec    FIFMax-Pre 7.90 L/sec    FEV1FEV6-Pred 82 %    FEV1FEV6-Pre 83 %                  Results as noted above.    PFT Interpretation:  Normal spirometry.  Unchanged from previous.   Similar to recent baseline.  Valid Maneuver     CF Exacerbation  Absent          Respiratory Therapist Note:    Vest    Brand:    Cough Pause:   Vest Garment Size:    Last Fitting Date:    Frequency of therapy: 0 times per week   Concerns:     Exercise (purposeful and aerobic for >20 minutes each session): Yes - amount :    Does this qualify as additional airway clearance: Yes    Alternative Airway Clearance:       Nebulized Medications    Review Cleaning:     Education and Transition Information   Correct order of inhaled medications: Not Applicable   Mechanism of Action of inhaled medications: Not Applicable   Frequency of inhaled medications: Not Applicable   Dosage of inhaled medications: Not Applicable   Other:     Home Care:   Nebulizer Cups (Brand/Type):    Nebulizer Compressor    Year Purchased:    Nebulizer Supply Company:     Oxygen: N/A    Pulmonary Rehab   Site:    Date Completed:     Plan of Care and Goals for next visit: Patient does not do airway clearance therapies. Will  order home spirometer for patient          True Sahni MD

## 2021-01-07 NOTE — PATIENT INSTRUCTIONS
Cystic Fibrosis Self-Care Plan    RECOMMENDATIONS:   Continue current medication.  Reduce caffeine and monitor GI symptoms.  Monitor association between GI symptoms and dairy intake.  Annual studies with next visit.            [facility] [place] Nurse line:  Rock Hurtado Justyna [phone]     [facility] [place] Fax Number:      [phone]         Cystic Fibrosis Respiratory Therapists:   Kristi Ndiaye              [phone]          Loida Trinidad   [phone]  Cystic Fibrosis Dietitians:              Nisa Dumont              [phone]                            April Cardona                        [phone]   Cystic Fibrosis Diabetes Nurse:    Barbara Birmingham   [phone]    Cystic Fibrosis Social Workers:     Melissa Calle               [phone]                     Debora Joseph               [phone]  Cystic Fibrosis Pharmacists:           Amanda Gonzalez                               [phone]         Iris August   [phone]  Cystic Fibrosis Genetic Counselor:   Faby Flores    [phone]    [facility] [place] website:  [email or web address]    The following are the [facility] of Health and CDC Winter Holidays Recommendations with the [facility] fibrosis [place] modifications to the the risk activities.     https://www.cdc.gov/coronavirus/2019-ncov/daily-life-coping/holidays/winter.html    https://www.health.[facility].[place]/diseases/coronavirus/holidays.html    Celebrate the winter holidays safely this year and help stop the spread of COVID-19 by choosing lower-risk activities.    Lower-risk activities  A small dinner with the people who live with you. See CDC: Food and Coronavirus Disease 2019 (COVID-19).  A virtual dinner and sharing recipes with friends and family.  Shopping online, rather than in person.  Preparing traditional family recipes for family and neighbors, especially those at higher risk of severe illness from COVID-19,  and deliver them in a way that doesn't involve contact with others.    Avoid Medium-risk activities  A small outdoor dinner with family and friends who live in your community. See CDC: Food and Coronavirus Disease 2019 (COVID-19). Lower your risk by following CDC recommendations for hosting gatherings or cook-outs.  Small outdoor sports events, if you follow safety tips above.    Definitely avoid higher-risk activities  Avoid these activities to help stop the spread of COVID-19.    Shopping in crowded stores.  Attending or participating in crowded races or parades.  Attending large indoor gatherings with people who do not live with you.         MRN: [id number]   Clinic Date: January 7, 2021   Patient: Philip Delacruz     Annual Studies:   IGG   Date Value Ref Range Status   04/15/2020 1,236 610 - 1,616 mg/dL Final     Insulin   Date Value Ref Range Status   05/02/2019 9.0 3 - 25 mU/L Final     There are no preventive care reminders to display for this patient.    Pulmonary Function Tests  FEV1: amount of air you can blow out in 1 second  FVC: total amount of air you can take in and blow out    Your Goals:         PFT Latest Ref Rng & Units 1/7/2021   FVC L 5.16   FEV1 L 4.29   FVC% % 89   FEV1% % 92          Airway Clearance: The Most Important Way to Keep Your Lungs Healthy  Exercise for airway clearance    Good Nutrition Can Improve Lung Function and Overall Health     Take ALL of your vitamins with food     Take 1/2 of your enzymes before EVERY meal/snack and the other 1/2 mid-meal/snack    Wt Readings from Last 3 Encounters:   01/07/21 82.6 kg (182 lb)   10/15/20 84.8 kg (187 lb)   01/28/20 79.8 kg (176 lb)       Body mass index is 24.38 kg/m .         National CF Foundation Recommendations for BMI in CF Adults: Women: at least 22 Men: at least 23        Controlling Blood Sugars Helps Prevent Lung Infections & Improves Nutrition  Test blood sugar:     In the morning before eating (goal is )     2  hours after a meal (goal is less than 150)     When pre-meal glucose is greater than 150 add correction     At bedtime (if less than 100 eat a snack with 15 grams of carbohydrates  Last A1C Results:   Hemoglobin A1C   Date Value Ref Range Status   04/15/2020 4.8 0 - 5.6 % Final     Comment:     Normal <5.7% Prediabetes 5.7-6.4%  Diabetes 6.5% or higher - adopted from ADA   consensus guidelines.           If diabetic, measure A1C every 6 months. Goal: Under 7%    Staying Healthy    Research:  If you are interested in learning about research opportunities or have questions, please contact the CF Research Team at [phone] or [email or web address].      CF Foundation:  Compass is a personalized resource service to help you with the insurance, financial, legal and other issues you are facing.  It's free, confidential and available to anyone with CF.  Ask your  for more information or contact Compass directly at [phone] ([phone]) or [email or web address]@[email or web address], or learn more at [email or web address].

## 2021-01-11 DIAGNOSIS — K86.81 EXOCRINE PANCREATIC INSUFFICIENCY: ICD-10-CM

## 2021-01-11 DIAGNOSIS — E84.0 CYSTIC FIBROSIS WITH PULMONARY MANIFESTATIONS (H): ICD-10-CM

## 2021-01-11 RX ORDER — ELEXACAFTOR, TEZACAFTOR, AND IVACAFTOR 100-50-75
KIT ORAL
Qty: 84 EACH | Refills: 5 | Status: SHIPPED | OUTPATIENT
Start: 2021-01-11 | End: 2021-07-05

## 2021-01-12 LAB
BACTERIA SPEC CULT: NORMAL
Lab: NORMAL
SPECIMEN SOURCE: NORMAL

## 2021-01-15 ENCOUNTER — HEALTH MAINTENANCE LETTER (OUTPATIENT)
Age: 41
End: 2021-01-15

## 2021-01-28 NOTE — PROGRESS NOTES
Respiratory Therapist Note:    Vest    Brand:    Cough Pause:   Vest Garment Size:    Last Fitting Date:    Frequency of therapy: 0 times per week   Concerns:     Exercise (purposeful and aerobic for >20 minutes each session): Yes - amount :    Does this qualify as additional airway clearance: Yes    Alternative Airway Clearance:       Nebulized Medications    Review Cleaning:     Education and Transition Information   Correct order of inhaled medications: Not Applicable   Mechanism of Action of inhaled medications: Not Applicable   Frequency of inhaled medications: Not Applicable   Dosage of inhaled medications: Not Applicable   Other:     Home Care:   Nebulizer Cups (Brand/Type):    Nebulizer Compressor    Year Purchased:    Nebulizer Supply Company:     Oxygen: N/A    Pulmonary Rehab   Site:    Date Completed:     Plan of Care and Goals for next visit: Patient does not do airway clearance therapies. Will order home spirometer for patient

## 2021-02-17 DIAGNOSIS — K86.81 EXOCRINE PANCREATIC INSUFFICIENCY: ICD-10-CM

## 2021-02-17 DIAGNOSIS — E84.9 CF (CYSTIC FIBROSIS) (H): ICD-10-CM

## 2021-02-17 RX ORDER — PANCRELIPASE 24000; 76000; 120000 [USP'U]/1; [USP'U]/1; [USP'U]/1
CAPSULE, DELAYED RELEASE PELLETS ORAL
Qty: 1890 CAPSULE | Refills: 3 | Status: SHIPPED | OUTPATIENT
Start: 2021-02-17 | End: 2021-05-11

## 2021-02-19 DIAGNOSIS — F33.42 RECURRENT MAJOR DEPRESSIVE DISORDER, IN FULL REMISSION (H): ICD-10-CM

## 2021-02-19 RX ORDER — ESCITALOPRAM OXALATE 20 MG/1
20 TABLET ORAL DAILY
Qty: 90 TABLET | Refills: 1 | Status: SHIPPED | OUTPATIENT
Start: 2021-02-19 | End: 2021-04-08

## 2021-02-19 NOTE — TELEPHONE ENCOUNTER
Dr. Sahni approved refilling this medication as patient is on stable dose and we do not currently have a CF psychiatrist at our clinic.

## 2021-03-20 ENCOUNTER — IMMUNIZATION (OUTPATIENT)
Dept: NURSING | Facility: CLINIC | Age: 41
End: 2021-03-20
Payer: COMMERCIAL

## 2021-03-20 PROCEDURE — 91300 PR COVID VAC PFIZER DIL RECON 30 MCG/0.3 ML IM: CPT

## 2021-03-20 PROCEDURE — 0001A PR COVID VAC PFIZER DIL RECON 30 MCG/0.3 ML IM: CPT

## 2021-04-08 ENCOUNTER — ANCILLARY PROCEDURE (OUTPATIENT)
Dept: GENERAL RADIOLOGY | Facility: CLINIC | Age: 41
End: 2021-04-08
Attending: INTERNAL MEDICINE
Payer: COMMERCIAL

## 2021-04-08 ENCOUNTER — OFFICE VISIT (OUTPATIENT)
Dept: PULMONOLOGY | Facility: CLINIC | Age: 41
End: 2021-04-08
Attending: INTERNAL MEDICINE
Payer: COMMERCIAL

## 2021-04-08 ENCOUNTER — ANCILLARY PROCEDURE (OUTPATIENT)
Dept: BONE DENSITY | Facility: CLINIC | Age: 41
End: 2021-04-08
Attending: INTERNAL MEDICINE
Payer: COMMERCIAL

## 2021-04-08 VITALS
HEART RATE: 83 BPM | WEIGHT: 182.1 LBS | SYSTOLIC BLOOD PRESSURE: 129 MMHG | OXYGEN SATURATION: 96 % | BODY MASS INDEX: 24.66 KG/M2 | HEIGHT: 72 IN | TEMPERATURE: 98.1 F | DIASTOLIC BLOOD PRESSURE: 88 MMHG

## 2021-04-08 DIAGNOSIS — E78.00 HYPERCHOLESTEROLEMIA: ICD-10-CM

## 2021-04-08 DIAGNOSIS — E84.0 CYSTIC FIBROSIS WITH PULMONARY MANIFESTATIONS (H): Primary | ICD-10-CM

## 2021-04-08 DIAGNOSIS — K86.89 PANCREATIC INSUFFICIENCY: ICD-10-CM

## 2021-04-08 DIAGNOSIS — K86.81 EXOCRINE PANCREATIC INSUFFICIENCY: ICD-10-CM

## 2021-04-08 DIAGNOSIS — E84.0 CYSTIC FIBROSIS WITH PULMONARY MANIFESTATIONS (H): ICD-10-CM

## 2021-04-08 LAB
ALBUMIN SERPL-MCNC: 3.9 G/DL (ref 3.4–5)
ALBUMIN UR-MCNC: NEGATIVE MG/DL
ALP SERPL-CCNC: 76 U/L (ref 40–150)
ALT SERPL W P-5'-P-CCNC: 27 U/L (ref 0–70)
ANION GAP SERPL CALCULATED.3IONS-SCNC: 6 MMOL/L (ref 3–14)
APPEARANCE UR: CLEAR
AST SERPL W P-5'-P-CCNC: 21 U/L (ref 0–45)
BASOPHILS # BLD AUTO: 0 10E9/L (ref 0–0.2)
BASOPHILS NFR BLD AUTO: 0.3 %
BILIRUB DIRECT SERPL-MCNC: 0.2 MG/DL (ref 0–0.2)
BILIRUB SERPL-MCNC: 0.9 MG/DL (ref 0.2–1.3)
BILIRUB UR QL STRIP: NEGATIVE
BUN SERPL-MCNC: 22 MG/DL (ref 7–30)
CALCIUM SERPL-MCNC: 9.3 MG/DL (ref 8.5–10.1)
CHLORIDE SERPL-SCNC: 103 MMOL/L (ref 94–109)
CHOLEST SERPL-MCNC: 241 MG/DL
CK SERPL-CCNC: 157 U/L (ref 30–300)
CO2 SERPL-SCNC: 29 MMOL/L (ref 20–32)
COLOR UR AUTO: YELLOW
CREAT SERPL-MCNC: 0.96 MG/DL (ref 0.66–1.25)
CREAT UR-MCNC: 172 MG/DL
DEPRECATED CALCIDIOL+CALCIFEROL SERPL-MC: 60 UG/L (ref 20–75)
DIFFERENTIAL METHOD BLD: NORMAL
EOSINOPHIL # BLD AUTO: 0.1 10E9/L (ref 0–0.7)
EOSINOPHIL NFR BLD AUTO: 1.4 %
ERYTHROCYTE [DISTWIDTH] IN BLOOD BY AUTOMATED COUNT: 11.7 % (ref 10–15)
ERYTHROCYTE [SEDIMENTATION RATE] IN BLOOD BY WESTERGREN METHOD: 6 MM/H (ref 0–15)
EXPTIME-PRE: 6.78 SEC
FEF2575-%PRED-PRE: 113 %
FEF2575-PRE: 5.07 L/SEC
FEF2575-PRED: 4.45 L/SEC
FEFMAX-%PRED-PRE: 94 %
FEFMAX-PRE: 10.13 L/SEC
FEFMAX-PRED: 10.77 L/SEC
FEV1-%PRED-PRE: 97 %
FEV1-PRE: 4.5 L
FEV1FEV6-PRE: 84 %
FEV1FEV6-PRED: 82 %
FEV1FVC-PRE: 84 %
FEV1FVC-PRED: 81 %
FIFMAX-PRE: 7.25 L/SEC
FVC-%PRED-PRE: 92 %
FVC-PRE: 5.34 L
FVC-PRED: 5.75 L
GFR SERPL CREATININE-BSD FRML MDRD: >90 ML/MIN/{1.73_M2}
GGT SERPL-CCNC: 14 U/L (ref 0–75)
GLUCOSE SERPL-MCNC: 93 MG/DL (ref 70–99)
GLUCOSE UR STRIP-MCNC: NEGATIVE MG/DL
HBA1C MFR BLD: 4.8 % (ref 0–5.6)
HCT VFR BLD AUTO: 47.7 % (ref 40–53)
HDLC SERPL-MCNC: 53 MG/DL
HGB BLD-MCNC: 17.2 G/DL (ref 13.3–17.7)
HGB UR QL STRIP: NEGATIVE
IGE SERPL-ACNC: 10 KIU/L (ref 0–114)
IGG SERPL-MCNC: 1198 MG/DL (ref 610–1616)
IMM GRANULOCYTES # BLD: 0.1 10E9/L (ref 0–0.4)
IMM GRANULOCYTES NFR BLD: 1.1 %
INR PPP: 1.02 (ref 0.86–1.14)
IRON SERPL-MCNC: 127 UG/DL (ref 35–180)
KETONES UR STRIP-MCNC: NEGATIVE MG/DL
LDLC SERPL CALC-MCNC: 172 MG/DL
LEUKOCYTE ESTERASE UR QL STRIP: NEGATIVE
LYMPHOCYTES # BLD AUTO: 2.8 10E9/L (ref 0.8–5.3)
LYMPHOCYTES NFR BLD AUTO: 40.5 %
MAGNESIUM SERPL-MCNC: 2.3 MG/DL (ref 1.6–2.3)
MCH RBC QN AUTO: 32.3 PG (ref 26.5–33)
MCHC RBC AUTO-ENTMCNC: 36.1 G/DL (ref 31.5–36.5)
MCV RBC AUTO: 90 FL (ref 78–100)
MICROALBUMIN UR-MCNC: 10 MG/L
MICROALBUMIN/CREAT UR: 6.1 MG/G CR (ref 0–17)
MONOCYTES # BLD AUTO: 0.6 10E9/L (ref 0–1.3)
MONOCYTES NFR BLD AUTO: 8.4 %
MUCOUS THREADS #/AREA URNS LPF: PRESENT /LPF
NEUTROPHILS # BLD AUTO: 3.4 10E9/L (ref 1.6–8.3)
NEUTROPHILS NFR BLD AUTO: 48.3 %
NITRATE UR QL: NEGATIVE
NONHDLC SERPL-MCNC: 188 MG/DL
NRBC # BLD AUTO: 0 10*3/UL
NRBC BLD AUTO-RTO: 0 /100
PH UR STRIP: 6 PH (ref 5–7)
PHOSPHATE SERPL-MCNC: 3.6 MG/DL (ref 2.5–4.5)
PLATELET # BLD AUTO: 223 10E9/L (ref 150–450)
POTASSIUM SERPL-SCNC: 4 MMOL/L (ref 3.4–5.3)
PROT SERPL-MCNC: 7.8 G/DL (ref 6.8–8.8)
RBC # BLD AUTO: 5.32 10E12/L (ref 4.4–5.9)
RBC #/AREA URNS AUTO: 1 /HPF (ref 0–2)
SODIUM SERPL-SCNC: 138 MMOL/L (ref 133–144)
SOURCE: ABNORMAL
SP GR UR STRIP: 1.02 (ref 1–1.03)
TRIGL SERPL-MCNC: 80 MG/DL
TSH SERPL DL<=0.005 MIU/L-ACNC: 1.47 MU/L (ref 0.4–4)
UROBILINOGEN UR STRIP-MCNC: 0 MG/DL (ref 0–2)
WBC # BLD AUTO: 7 10E9/L (ref 4–11)
WBC #/AREA URNS AUTO: <1 /HPF (ref 0–5)

## 2021-04-08 PROCEDURE — 82043 UR ALBUMIN QUANTITATIVE: CPT | Performed by: PATHOLOGY

## 2021-04-08 PROCEDURE — 87070 CULTURE OTHR SPECIMN AEROBIC: CPT | Performed by: INTERNAL MEDICINE

## 2021-04-08 PROCEDURE — 83540 ASSAY OF IRON: CPT | Performed by: PATHOLOGY

## 2021-04-08 PROCEDURE — 99215 OFFICE O/P EST HI 40 MIN: CPT | Mod: 25 | Performed by: INTERNAL MEDICINE

## 2021-04-08 PROCEDURE — 81001 URINALYSIS AUTO W/SCOPE: CPT | Performed by: PATHOLOGY

## 2021-04-08 PROCEDURE — 82977 ASSAY OF GGT: CPT | Performed by: PATHOLOGY

## 2021-04-08 PROCEDURE — 84100 ASSAY OF PHOSPHORUS: CPT | Performed by: PATHOLOGY

## 2021-04-08 PROCEDURE — 82784 ASSAY IGA/IGD/IGG/IGM EACH: CPT | Mod: 90 | Performed by: PATHOLOGY

## 2021-04-08 PROCEDURE — G0463 HOSPITAL OUTPT CLINIC VISIT: HCPCS | Mod: 25

## 2021-04-08 PROCEDURE — 85610 PROTHROMBIN TIME: CPT | Performed by: PATHOLOGY

## 2021-04-08 PROCEDURE — 84403 ASSAY OF TOTAL TESTOSTERONE: CPT | Mod: 90 | Performed by: PATHOLOGY

## 2021-04-08 PROCEDURE — 83735 ASSAY OF MAGNESIUM: CPT | Performed by: PATHOLOGY

## 2021-04-08 PROCEDURE — 80076 HEPATIC FUNCTION PANEL: CPT | Performed by: PATHOLOGY

## 2021-04-08 PROCEDURE — 82785 ASSAY OF IGE: CPT | Mod: 90 | Performed by: PATHOLOGY

## 2021-04-08 PROCEDURE — 80061 LIPID PANEL: CPT | Performed by: PATHOLOGY

## 2021-04-08 PROCEDURE — 71046 X-RAY EXAM CHEST 2 VIEWS: CPT | Performed by: RADIOLOGY

## 2021-04-08 PROCEDURE — 80048 BASIC METABOLIC PNL TOTAL CA: CPT | Performed by: PATHOLOGY

## 2021-04-08 PROCEDURE — 85652 RBC SED RATE AUTOMATED: CPT | Performed by: PATHOLOGY

## 2021-04-08 PROCEDURE — 84443 ASSAY THYROID STIM HORMONE: CPT | Performed by: PATHOLOGY

## 2021-04-08 PROCEDURE — 82550 ASSAY OF CK (CPK): CPT | Performed by: PATHOLOGY

## 2021-04-08 PROCEDURE — 99000 SPECIMEN HANDLING OFFICE-LAB: CPT | Performed by: PATHOLOGY

## 2021-04-08 PROCEDURE — 84446 ASSAY OF VITAMIN E: CPT | Mod: 90 | Performed by: PATHOLOGY

## 2021-04-08 PROCEDURE — 82306 VITAMIN D 25 HYDROXY: CPT | Mod: 90 | Performed by: PATHOLOGY

## 2021-04-08 PROCEDURE — 77080 DXA BONE DENSITY AXIAL: CPT | Performed by: INTERNAL MEDICINE

## 2021-04-08 PROCEDURE — 84590 ASSAY OF VITAMIN A: CPT | Mod: 90 | Performed by: PATHOLOGY

## 2021-04-08 PROCEDURE — 85025 COMPLETE CBC W/AUTO DIFF WBC: CPT | Performed by: PATHOLOGY

## 2021-04-08 PROCEDURE — 94375 RESPIRATORY FLOW VOLUME LOOP: CPT | Performed by: INTERNAL MEDICINE

## 2021-04-08 PROCEDURE — 36415 COLL VENOUS BLD VENIPUNCTURE: CPT | Performed by: PATHOLOGY

## 2021-04-08 PROCEDURE — 83036 HEMOGLOBIN GLYCOSYLATED A1C: CPT | Performed by: PATHOLOGY

## 2021-04-08 RX ORDER — ATORVASTATIN CALCIUM 10 MG/1
10 TABLET, FILM COATED ORAL DAILY
Qty: 90 TABLET | Refills: 3 | Status: SHIPPED | OUTPATIENT
Start: 2021-04-08 | End: 2022-04-07

## 2021-04-08 ASSESSMENT — MIFFLIN-ST. JEOR: SCORE: 1791

## 2021-04-08 NOTE — PATIENT INSTRUCTIONS
Cystic Fibrosis Self-Care Plan    RECOMMENDATIONS:   Begin lipitor 10mg daily  Otherwise continue current medication, exercise, nebs and vest therapy.   Labs in 1 month.        [facility] [place] Nurse line:  MADELYN Hurtado anival Jansen [phone]     [facility] [place] Fax Number:      [phone]         Cystic Fibrosis Respiratory Therapists:   Kristi Ndiaye              [phone]          Loida Trinidad   [phone]  Cystic Fibrosis Dietitians:              Nisa Dumont              [phone]                            April Cardona                        [phone]   Cystic Fibrosis Diabetes Nurse:    Barbara Birmingham   [phone]    Cystic Fibrosis Social Workers:     Melissa Calle               [phone]                     Debora Joseph               [phone]  Cystic Fibrosis Pharmacists:           Amanda Gonzalez                               [phone]         Iris August   [phone]  Cystic Fibrosis Genetic Counselor:   Faby Flores    [phone]    [facility] [place] website:  [email or web address]    COVID VACCINES:    You are eligible for the COVID-19 vaccine. Sign up for your COVID vaccine via [employer]. Log in, select the menu bar, select schedule an appointment, and then select COVID-19 Vaccine 1st Dose. You may also schedule by calling this number [phone] however hold times can be long.       OR schedule through the [facility] of Health Vaccine Connector at https://vaccineconnector.[place].gov/ or by calling [phone].      The best vaccine is the one that s available to you first.  All COVID-19 vaccines currently available in the [place] (Eyal & Eyal, Pfizer and Moderna) have been shown to be highly effect at preventing COVID-19.       We re still learning how vaccines will affect the spread of COVID-19. After you ve been fully vaccinated against COVID-19, you should keep taking precautions in public places like wearing a  mask, staying 6 feet apart from others, and avoiding crowds and poorly ventilated spaces until we know more.    People are considered fully vaccinated:  2 weeks after their second dose in a 2-dose series, such as the Pfizer or Moderna vaccines, or  2 weeks after a single-dose vaccine, such as Eyal & Eyal s Brijesh vaccine    If you ve been fully vaccinated:  You can gather indoors with fully vaccinated people without wearing a mask.  You can gather indoors with unvaccinated people from one other household (for example, visiting with relatives who all live together) without masks, unless any of those people or anyone they live with has an increased risk for severe illness from COVID-19.  If you ve been around someone who has COVID-19, you do not need to stay away from others or get tested unless you have symptoms.  However, if you live in a group setting (like a correctional or FCI facility or group home) and are around someone who has COVID-19, you should still stay away from others for 14 days and get tested, even if you don t have symptoms.         MRN: [id number]   Clinic Date: April 8, 2021   Patient: Philip Delacruz     Annual Studies:   IGG   Date Value Ref Range Status   04/15/2020 1,236 610 - 1,616 mg/dL Final     Insulin   Date Value Ref Range Status   05/02/2019 9.0 3 - 25 mU/L Final     There are no preventive care reminders to display for this patient.    Pulmonary Function Tests  FEV1: amount of air you can blow out in 1 second  FVC: total amount of air you can take in and blow out    Your Goals:         PFT Latest Ref Rng & Units 4/8/2021   FVC L 5.34   FEV1 L 4.50   FVC% % 92   FEV1% % 97          Airway Clearance: The Most Important Way to Keep Your Lungs Healthy  Exercise for airway clearance    Good Nutrition Can Improve Lung Function and Overall Health     Take ALL of your vitamins with food     Take 1/2 of your enzymes before EVERY meal/snack and the other 1/2  mid-meal/snack    Wt Readings from Last 3 Encounters:   04/08/21 82.6 kg (182 lb 1.6 oz)   01/07/21 82.6 kg (182 lb)   10/15/20 84.8 kg (187 lb)       Body mass index is 24.4 kg/m .         National CF Foundation Recommendations for BMI in CF Adults: Women: at least 22 Men: at least 23        Controlling Blood Sugars Helps Prevent Lung Infections & Improves Nutrition  Test blood sugar:     In the morning before eating (goal is )     2 hours after a meal (goal is less than 150)     When pre-meal glucose is greater than 150 add correction     At bedtime (if less than 100 eat a snack with 15 grams of carbohydrates  Last A1C Results:   Hemoglobin A1C   Date Value Ref Range Status   04/15/2020 4.8 0 - 5.6 % Final     Comment:     Normal <5.7% Prediabetes 5.7-6.4%  Diabetes 6.5% or higher - adopted from ADA   consensus guidelines.           If diabetic, measure A1C every 6 months. Goal: Under 7%    Staying Healthy    Research:  If you are interested in learning about research opportunities or have questions, please contact the CF Research Team at [phone] or [email or web address].      CF Foundation:  Compass is a personalized resource service to help you with the insurance, financial, legal and other issues you are facing.  It's free, confidential and available to anyone with CF.  Ask your  for more information or contact Compass directly at [phone]-COMPASS ([phone]) or [email or web address], or learn more at [email or web address].

## 2021-04-08 NOTE — LETTER
4/8/2021         RE: Philip Delacruz  [address]  [facility] [id number]        Dear Colleague,    Thank you for referring your patient, Philip Delacruz, to the [facility] [place]. Please see a copy of my visit note below.    Reason for Visit  Philip Delacruz is a 40 year old year old male who is being seen for Follow Up (cf)      Assessment and plan:   Shar Coley is a 40-year-old male with a history of cystic fibrosis with mild lung disease and pancreatic insufficiency.    Maintenance   Modulator: Trikafta  Mutation:  [id number]/[id number], Poly T Variant:  [ 7T ]/[9T  ]  AW Clearance: Exercise  Bronchodilators: Albuterol MDI PRN  Mucolytics: none   Antibiotics Inh:  none   Antibiotics Oral: none   Other:   Colonization Hx: Rhizobium (agrobacterium) radiobacter, Aspergillus versicolor, Serratia marcescens, staphylococcus aureus, moraxella (branhamella) catarrhalis  Annual studies due:April 2021  Contraindications to standard of care :    Pulmonary/cystic fibrosis: The patient reports excellent exercise tolerance.  He is oxygenating well.  PFTs are the best they have been in the recent past.  I reviewed his chest x-ray performed with annual studies today, there is no acute infiltrate and no significant change from previous.  Despite no formal airway clearance, the patient is doing very well from a pulmonary standpoint.  He will continue consistent vigorous exercise.  No evidence of a pulmonary suspicion at this time.    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  He has had an excellent response to Trikafta with almost complete resolution of cough and sputum production, improved exercise tolerance and improved GI symptoms.  With initiation of Lipitor (see below) will monitor LFTs and CK more closely for the short-term.  Check LFTs and CK in 1 month and then with next visit.    Pancreatic insufficiency: Patient reports improvement  in his bloating and loose stools since reducing caffeine and dairy intake.  No symptoms of malabsorption at this time.  He will continue his current enzymes and supplemental vitamins.  Vitamin D level is in an excellent range.  Vitamin A and vitamin E levels are pending.    Depression/anxiety: The patient self discontinued his Lexapro about 2 months ago.  He reports he continues to feel well.  I have asked him to contact my office if he feels her 72 return of depression or anxiety.    Healthcare maintenance: The patient has received both doses of his Pfizer Covid vaccine.  He has had his influenza vaccine for this flu season.  Annual studies were completed today and reviewed with the patient.  Electrolytes and kidney function are within normal limits.  LFTs are within normal limits.  CK is normal.  Cholesterol and LDL are elevated.  This has been persistent in the recent past likely since starting Trikafta.  Lipitor 10 mg daily will be initiated.  LFTs and CK will be checked in 1 month and again with his next visit.  Cholesterol battery will be rechecked with his next visit.  TSH is within normal limits.  Hemoglobin A1c 4.8, no evidence of diabetes.  Glucose tolerance test will be checked next year.  No glucose tolerance testing this year to limit possible Covid exposure.  CBC is unremarkable.  Urinalysis is unremarkable.  IgG is within normal limits.  I reviewed the bone density scan.  The lumbar spine bone density is low and a marked reduction since his last study.  Is unclear why there would be such a dramatic change.  I will ask our CF endocrinologist to review the DEXA and see the patient if warranted.    Follow-up in 3 months with labs, PFTs and sputum culture.    42 minutes required on the day of the visit to review chart, interview and examine patient, review labs and imaging, formulate a plan and  submit orders.      CF HPI  The patient was seen and examined by MD Shar Barkley is a  40-year-old male with a history of cystic fibrosis with mild lung disease and pancreatic insufficiency.  Patient is comfortable at rest.  He reports dyspnea only with heavy exertion.  He works out about twice per week but does a lot of physical labor at work.  He had a recent cold with sore throat and nasal congestion.  He did have a cough with a cold small amount of yellow sputum otherwise generally no cough or sputum production.  No chest pain.  No fever, chills or night sweats.  He is doing nasal washes.  He does not do any formal airway clearance.  He depends mostly on exercise for airway clearance.    Review of systems:  Appetite is very good.  Tinnitus couple of weeks ago, possibly related to Covid vaccination now resolved.  Recent sinus pain and nasal drainage which was pulled which is now resolving without intervention.  No palpitations  Previous bloating and loose stools have improved since reducing caffeine and dairy intake.  Currently no nausea, vomiting, diarrhea or abdominal pain.  Chronic arthralgias mostly in the elbow and foot improving with physical therapy.  Depression/anxiety improved.  The patient self discontinued Lexapro couple of months ago.  Improved mental health is in part related to change in occupation.  A complete ROS was otherwise negative except as noted in the HPI.    Current Outpatient Medications   Medication     albuterol (PROAIR HFA/PROVENTIL HFA/VENTOLIN HFA) 108 (90 Base) MCG/ACT inhaler     atorvastatin (LIPITOR) 10 MG tablet     cholecalciferol (VITAMIN D3) 125 mcg (5000 units) capsule     CREON 19537-33574 units CPEP per EC capsule     elexacaftor-tezacaftor-ivacaftor & ivacaftor (TRIKAFTA) 100-50-75 & 150 MG tablet pack     fluticasone (FLONASE) 50 MCG/ACT nasal spray     GARLIC PO     multivitamin CF formula (MVW COMPLETE FORMULATION) chewable tablet     Omega-3 Fatty Acids (FISH OIL) 1200 MG CPDR     order for DME     order for DME     Sodium Chloride-Sodium Bicarb  (SINUFLO READYRINSE) KIT     Wheat Dextrin (BENEFIBER PO)     ZIRGAN 0.15 % GEL     No current facility-administered medications for this visit.      Allergies   Allergen Reactions     Liquid Adhesive Rash     Tagederm     Tegaderm Transparent Dressing (Informational Only) Rash     Past Medical History:   Diagnosis Date     Chronic sinusitis      Congenital absence of vas deferens, bilateral      Cystic fibrosis (H)     Delta F508 and M5365H      Nasal polyps      Sinusitis, chronic        Past Surgical History:   Procedure Laterality Date     HC TOOTH EXTRACTION W/FORCEP       NASAL/SINUS POLYPECTOMY       REPAIR PECTUS EXCAVATUM  1997     SINUS SURGERY  1992, 2002, 2004    Removed tubinates and polyps OSH     Sperm harvest         Social History     Socioeconomic History     Marital status:      Spouse name: Not on file     Number of children: Not on file     Years of education: Not on file     Highest education level: Not on file   Occupational History     Occupation: Teacher   Social Needs     Financial resource strain: Not on file     Food insecurity     Worry: Not on file     Inability: Not on file     Transportation needs     Medical: Not on file     Non-medical: Not on file   Tobacco Use     Smoking status: Never Smoker     Smokeless tobacco: Never Used   Substance and Sexual Activity     Alcohol use: Yes     Alcohol/week: 0.0 standard drinks     Comment: 1 drink 3X per week     Drug use: No     Sexual activity: Yes     Partners: Female     Birth control/protection: None   Lifestyle     Physical activity     Days per week: Not on file     Minutes per session: Not on file     Stress: Not on file   Relationships     Social connections     Talks on phone: Not on file     Gets together: Not on file     Attends Sikhism service: Not on file     Active member of club or organization: Not on file     Attends meetings of clubs or organizations: Not on file     Relationship status: Not on file     Intimate  "partner violence     Fear of current or ex partner: Not on file     Emotionally abused: Not on file     Physically abused: Not on file     Forced sexual activity: Not on file   Other Topics Concern     Parent/sibling w/ CABG, MI or angioplasty before 65F 55M? Not Asked   Social History Narrative    Lives in [place] with wife, Aimee, and sons, Shay and Serafin. 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         /88   Pulse 83   Temp 98.1  F (36.7  C) (Oral)   Ht 1.84 m (6' 0.44\")   Wt 82.6 kg (182 lb 1.6 oz)   SpO2 96%   BMI 24.40 kg/m    Body mass index is 24.4 kg/m .  Exam:   GENERAL APPEARANCE: Well developed, well nourished, alert, and in no apparent distress.  EYES: PERRL, EOMI  HENT: Nasal mucosa with edema and no hyperemia. No nasal polyps.  EARS: Left canal clear and tympanic membrane normal.  Right side secured by cerumen.    MOUTH: Oral mucosa is moist, without any lesions, no tonsillar enlargement, no oropharyngeal exudate.  NECK: supple, no masses, no thyromegaly.  LYMPHATICS: No significant axillary, cervical, or supraclavicular nodes.  RESP: normal percussion, good air flow throughout.  No crackles. No rhonchi. No wheezes.  CV: Normal S1, S2, regular rhythm, normal rate. No murmur.  No rub. No gallop. No LE edema.   ABDOMEN:  Bowel sounds normal, soft, nontender, no HSM or masses.   MS: extremities normal. No clubbing. No cyanosis.  SKIN: no rash on limited exam  NEURO: Mentation intact, speech normal, normal strength and tone, normal gait and stance  PSYCH: mentation appears normal. and affect normal/bright  Results:  Recent Results (from the past 168 hour(s))   CK total    Collection Time: 04/08/21  8:08 AM   Result Value Ref Range    CK Total 157 30 - 300 U/L   Hepatic panel    Collection Time: 04/08/21  8:08 AM   Result Value Ref Range    Bilirubin Direct 0.2 0.0 - 0.2 mg/dL    Bilirubin Total 0.9 0.2 " - 1.3 mg/dL    Albumin 3.9 3.4 - 5.0 g/dL    Protein Total 7.8 6.8 - 8.8 g/dL    Alkaline Phosphatase 76 40 - 150 U/L    ALT 27 0 - 70 U/L    AST 21 0 - 45 U/L   Erythrocyte sedimentation rate auto    Collection Time: 04/08/21  8:08 AM   Result Value Ref Range    Sed Rate 6 0 - 15 mm/h   CBC with platelets differential    Collection Time: 04/08/21  8:08 AM   Result Value Ref Range    WBC 7.0 4.0 - 11.0 10e9/L    RBC Count 5.32 4.4 - 5.9 10e12/L    Hemoglobin 17.2 13.3 - 17.7 g/dL    Hematocrit 47.7 40.0 - 53.0 %    MCV 90 78 - 100 fl    MCH 32.3 26.5 - 33.0 pg    MCHC 36.1 31.5 - 36.5 g/dL    RDW 11.7 10.0 - 15.0 %    Platelet Count 223 150 - 450 10e9/L    Diff Method Automated Method     % Neutrophils 48.3 %    % Lymphocytes 40.5 %    % Monocytes 8.4 %    % Eosinophils 1.4 %    % Basophils 0.3 %    % Immature Granulocytes 1.1 %    Nucleated RBCs 0 0 /100    Absolute Neutrophil 3.4 1.6 - 8.3 10e9/L    Absolute Lymphocytes 2.8 0.8 - 5.3 10e9/L    Absolute Monocytes 0.6 0.0 - 1.3 10e9/L    Absolute Eosinophils 0.1 0.0 - 0.7 10e9/L    Absolute Basophils 0.0 0.0 - 0.2 10e9/L    Abs Immature Granulocytes 0.1 0 - 0.4 10e9/L    Absolute Nucleated RBC 0.0    Vitamin D Deficiency    Collection Time: 04/08/21  8:08 AM   Result Value Ref Range    Vitamin D Deficiency screening 60 20 - 75 ug/L   TSH with free T4 reflex    Collection Time: 04/08/21  8:08 AM   Result Value Ref Range    TSH 1.47 0.40 - 4.00 mU/L   Phosphorus    Collection Time: 04/08/21  8:08 AM   Result Value Ref Range    Phosphorus 3.6 2.5 - 4.5 mg/dL   Magnesium    Collection Time: 04/08/21  8:08 AM   Result Value Ref Range    Magnesium 2.3 1.6 - 2.3 mg/dL   INR    Collection Time: 04/08/21  8:08 AM   Result Value Ref Range    INR 1.02 0.86 - 1.14   IgG    Collection Time: 04/08/21  8:08 AM   Result Value Ref Range    IGG 1,198 610 - 1,616 mg/dL   Hemoglobin A1c    Collection Time: 04/08/21  8:08 AM   Result Value Ref Range    Hemoglobin A1C 4.8 0 - 5.6 %   GGT     Collection Time: 04/08/21  8:08 AM   Result Value Ref Range    GGT 14 0 - 75 U/L   Iron    Collection Time: 04/08/21  8:08 AM   Result Value Ref Range    Iron 127 35 - 180 ug/dL   Lipid Profile    Collection Time: 04/08/21  8:08 AM   Result Value Ref Range    Cholesterol 241 (H) <200 mg/dL    Triglycerides 80 <150 mg/dL    HDL Cholesterol 53 >39 mg/dL    LDL Cholesterol Calculated 172 (H) <100 mg/dL    Non HDL Cholesterol 188 (H) <130 mg/dL   Basic metabolic panel    Collection Time: 04/08/21  8:08 AM   Result Value Ref Range    Sodium 138 133 - 144 mmol/L    Potassium 4.0 3.4 - 5.3 mmol/L    Chloride 103 94 - 109 mmol/L    Carbon Dioxide 29 20 - 32 mmol/L    Anion Gap 6 3 - 14 mmol/L    Glucose 93 70 - 99 mg/dL    Urea Nitrogen 22 7 - 30 mg/dL    Creatinine 0.96 0.66 - 1.25 mg/dL    GFR Estimate >90 >60 mL/min/[1.73_m2]    GFR Estimate If Black >90 >60 mL/min/[1.73_m2]    Calcium 9.3 8.5 - 10.1 mg/dL   Albumin Random Urine Quantitative with Creat Ratio    Collection Time: 04/08/21  8:15 AM   Result Value Ref Range    Creatinine Urine 172 mg/dL    Albumin Urine mg/L 10 mg/L    Albumin Urine mg/g Cr 6.10 0 - 17 mg/g Cr   Routine UA with microscopic    Collection Time: 04/08/21  8:15 AM   Result Value Ref Range    Color Urine Yellow     Appearance Urine Clear     Glucose Urine Negative NEG^Negative mg/dL    Bilirubin Urine Negative NEG^Negative    Ketones Urine Negative NEG^Negative mg/dL    Specific Gravity Urine 1.020 1.003 - 1.035    Blood Urine Negative NEG^Negative    pH Urine 6.0 5.0 - 7.0 pH    Protein Albumin Urine Negative NEG^Negative mg/dL    Urobilinogen mg/dL 0.0 0.0 - 2.0 mg/dL    Nitrite Urine Negative NEG^Negative    Leukocyte Esterase Urine Negative NEG^Negative    Source Midstream Urine     WBC Urine <1 0 - 5 /HPF    RBC Urine 1 0 - 2 /HPF    Mucous Urine Present (A) NEG^Negative /LPF   General PFT Lab (Please always keep checked)    Collection Time: 04/08/21  8:20 AM   Result Value Ref Range     FVC-Pred 5.75 L    FVC-Pre 5.34 L    FVC-%Pred-Pre 92 %    FEV1-Pre 4.50 L    FEV1-%Pred-Pre 97 %    FEV1FVC-Pred 81 %    FEV1FVC-Pre 84 %    FEFMax-Pred 10.77 L/sec    FEFMax-Pre 10.13 L/sec    FEFMax-%Pred-Pre 94 %    FEF2575-Pred 4.45 L/sec    FEF2575-Pre 5.07 L/sec    DJM3462-%Pred-Pre 113 %    ExpTime-Pre 6.78 sec    FIFMax-Pre 7.25 L/sec    FEV1FEV6-Pred 82 %    FEV1FEV6-Pre 84 %   Cystic Fibrosis Culture Aerob Bacterial    Collection Time: 04/08/21  9:10 AM    Specimen: Throat   Result Value Ref Range    Specimen Description Throat     Special Requests Specimen collected in eSwab transport (white cap)     Culture Micro PENDING                    Results as noted above.    PFT Interpretation:  Normal spirometry.  Increased from previous.  Best in recent past  Valid Maneuver             CF Exacerbation  Absent      PROMIS 10 reviewed with the patient by the provider.        Again, thank you for allowing me to participate in the care of your patient.        Sincerely,        True Sahni MD

## 2021-04-08 NOTE — PROGRESS NOTES
Reason for Visit  Philip Delacruz is a 40 year old year old male who is being seen for Follow Up (cf)      Assessment and plan:   Shar Coley is a 40-year-old male with a history of cystic fibrosis with mild lung disease and pancreatic insufficiency.    Maintenance   Modulator: Trikafta  Mutation:  [id number], Poly T Variant:  [ 7T ]/[9T  ]  AW Clearance: Exercise  Bronchodilators: Albuterol MDI PRN  Mucolytics: none   Antibiotics Inh:  none   Antibiotics Oral: none   Other:   Colonization Hx: Rhizobium (agrobacterium) radiobacter, Aspergillus versicolor, Serratia marcescens, staphylococcus aureus, moraxella (branhamella) catarrhalis  Annual studies due:April 2021  Contraindications to standard of care :    Pulmonary/cystic fibrosis: The patient reports excellent exercise tolerance.  He is oxygenating well.  PFTs are the best they have been in the recent past.  I reviewed his chest x-ray performed with annual studies today, there is no acute infiltrate and no significant change from previous.  Despite no formal airway clearance, the patient is doing very well from a pulmonary standpoint.  He will continue consistent vigorous exercise.  No evidence of a pulmonary suspicion at this time.    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  He has had an excellent response to Trikafta with almost complete resolution of cough and sputum production, improved exercise tolerance and improved GI symptoms.  With initiation of Lipitor (see below) will monitor LFTs and CK more closely for the short-term.  Check LFTs and CK in 1 month and then with next visit.    Pancreatic insufficiency: Patient reports improvement in his bloating and loose stools since reducing caffeine and dairy intake.  No symptoms of malabsorption at this time.  He will continue his current enzymes and supplemental vitamins.  Vitamin D level is in an excellent range.  Vitamin A and vitamin E levels are pending.    Depression/anxiety: The  patient self discontinued his Lexapro about 2 months ago.  He reports he continues to feel well.  I have asked him to contact my office if he feels her 72 return of depression or anxiety.    Healthcare maintenance: The patient has received both doses of his Pfizer Covid vaccine.  He has had his influenza vaccine for this flu season.  Annual studies were completed today and reviewed with the patient.  Electrolytes and kidney function are within normal limits.  LFTs are within normal limits.  CK is normal.  Cholesterol and LDL are elevated.  This has been persistent in the recent past likely since starting Trikafta.  Lipitor 10 mg daily will be initiated.  LFTs and CK will be checked in 1 month and again with his next visit.  Cholesterol battery will be rechecked with his next visit.  TSH is within normal limits.  Hemoglobin A1c 4.8, no evidence of diabetes.  Glucose tolerance test will be checked next year.  No glucose tolerance testing this year to limit possible Covid exposure.  CBC is unremarkable.  Urinalysis is unremarkable.  IgG is within normal limits.  I reviewed the bone density scan.  The lumbar spine bone density is low and a marked reduction since his last study.  Is unclear why there would be such a dramatic change.  I will ask our CF endocrinologist to review the DEXA and see the patient if warranted.    Follow-up in 3 months with labs, PFTs and sputum culture.    42 minutes required on the day of the visit to review chart, interview and examine patient, review labs and imaging, formulate a plan and  submit orders.      CF HPI  The patient was seen and examined by True Sahni MD   Shar Coley is a 40-year-old male with a history of cystic fibrosis with mild lung disease and pancreatic insufficiency.  Patient is comfortable at rest.  He reports dyspnea only with heavy exertion.  He works out about twice per week but does a lot of physical labor at work.  He had a recent cold with sore throat  and nasal congestion.  He did have a cough with a cold small amount of yellow sputum otherwise generally no cough or sputum production.  No chest pain.  No fever, chills or night sweats.  He is doing nasal washes.  He does not do any formal airway clearance.  He depends mostly on exercise for airway clearance.    Review of systems:  Appetite is very good.  Tinnitus couple of weeks ago, possibly related to Covid vaccination now resolved.  Recent sinus pain and nasal drainage which was pulled which is now resolving without intervention.  No palpitations  Previous bloating and loose stools have improved since reducing caffeine and dairy intake.  Currently no nausea, vomiting, diarrhea or abdominal pain.  Chronic arthralgias mostly in the elbow and foot improving with physical therapy.  Depression/anxiety improved.  The patient self discontinued Lexapro couple of months ago.  Improved mental health is in part related to change in occupation.  A complete ROS was otherwise negative except as noted in the HPI.    Current Outpatient Medications   Medication     albuterol (PROAIR HFA/PROVENTIL HFA/VENTOLIN HFA) 108 (90 Base) MCG/ACT inhaler     atorvastatin (LIPITOR) 10 MG tablet     cholecalciferol (VITAMIN D3) 125 mcg (5000 units) capsule     CREON 54417-17063 units CPEP per EC capsule     elexacaftor-tezacaftor-ivacaftor & ivacaftor (TRIKAFTA) 100-50-75 & 150 MG tablet pack     fluticasone (FLONASE) 50 MCG/ACT nasal spray     GARLIC PO     multivitamin CF formula (MVW COMPLETE FORMULATION) chewable tablet     Omega-3 Fatty Acids (FISH OIL) 1200 MG CPDR     order for DME     order for DME     Sodium Chloride-Sodium Bicarb (SINUFLO READYRINSE) KIT     Wheat Dextrin (BENEFIBER PO)     ZIRGAN 0.15 % GEL     No current facility-administered medications for this visit.      Allergies   Allergen Reactions     Liquid Adhesive Rash     Tagederm     Tegaderm Transparent Dressing (Informational Only) Rash     Past Medical History:    Diagnosis Date     Chronic sinusitis      Congenital absence of vas deferens, bilateral      Cystic fibrosis (H)     Delta F508 and [id number]      Nasal polyps      Sinusitis, chronic        Past Surgical History:   Procedure Laterality Date     HC TOOTH EXTRACTION W/FORCEP       NASAL/SINUS POLYPECTOMY       REPAIR PECTUS EXCAVATUM  1997     SINUS SURGERY  1992, 2002, 2004    Removed tubinates and polyps OSH     Sperm harvest         Social History     Socioeconomic History     Marital status:      Spouse name: Not on file     Number of children: Not on file     Years of education: Not on file     Highest education level: Not on file   Occupational History     Occupation: Teacher   Social Needs     Financial resource strain: Not on file     Food insecurity     Worry: Not on file     Inability: Not on file     Transportation needs     Medical: Not on file     Non-medical: Not on file   Tobacco Use     Smoking status: Never Smoker     Smokeless tobacco: Never Used   Substance and Sexual Activity     Alcohol use: Yes     Alcohol/week: 0.0 standard drinks     Comment: 1 drink 3X per week     Drug use: No     Sexual activity: Yes     Partners: Female     Birth control/protection: None   Lifestyle     Physical activity     Days per week: Not on file     Minutes per session: Not on file     Stress: Not on file   Relationships     Social connections     Talks on phone: Not on file     Gets together: Not on file     Attends Religion service: Not on file     Active member of club or organization: Not on file     Attends meetings of clubs or organizations: Not on file     Relationship status: Not on file     Intimate partner violence     Fear of current or ex partner: Not on file     Emotionally abused: Not on file     Physically abused: Not on file     Forced sexual activity: Not on file   Other Topics Concern     Parent/sibling w/ CABG, MI or angioplasty before 65F 55M? Not Asked   Social History Narrative    Lives  "in [place] with wife, Aimee, and sons, Shay and Serafin. 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         /88   Pulse 83   Temp 98.1  F (36.7  C) (Oral)   Ht 1.84 m (6' 0.44\")   Wt 82.6 kg (182 lb 1.6 oz)   SpO2 96%   BMI 24.40 kg/m    Body mass index is 24.4 kg/m .  Exam:   GENERAL APPEARANCE: Well developed, well nourished, alert, and in no apparent distress.  EYES: PERRL, EOMI  HENT: Nasal mucosa with edema and no hyperemia. No nasal polyps.  EARS: Left canal clear and tympanic membrane normal.  Right side secured by cerumen.    MOUTH: Oral mucosa is moist, without any lesions, no tonsillar enlargement, no oropharyngeal exudate.  NECK: supple, no masses, no thyromegaly.  LYMPHATICS: No significant axillary, cervical, or supraclavicular nodes.  RESP: normal percussion, good air flow throughout.  No crackles. No rhonchi. No wheezes.  CV: Normal S1, S2, regular rhythm, normal rate. No murmur.  No rub. No gallop. No LE edema.   ABDOMEN:  Bowel sounds normal, soft, nontender, no HSM or masses.   MS: extremities normal. No clubbing. No cyanosis.  SKIN: no rash on limited exam  NEURO: Mentation intact, speech normal, normal strength and tone, normal gait and stance  PSYCH: mentation appears normal. and affect normal/bright  Results:  Recent Results (from the past 168 hour(s))   CK total    Collection Time: 04/08/21  8:08 AM   Result Value Ref Range    CK Total 157 30 - 300 U/L   Hepatic panel    Collection Time: 04/08/21  8:08 AM   Result Value Ref Range    Bilirubin Direct 0.2 0.0 - 0.2 mg/dL    Bilirubin Total 0.9 0.2 - 1.3 mg/dL    Albumin 3.9 3.4 - 5.0 g/dL    Protein Total 7.8 6.8 - 8.8 g/dL    Alkaline Phosphatase 76 40 - 150 U/L    ALT 27 0 - 70 U/L    AST 21 0 - 45 U/L   Erythrocyte sedimentation rate auto    Collection Time: 04/08/21  8:08 AM   Result Value Ref Range    Sed Rate 6 0 - 15 mm/h   CBC with " platelets differential    Collection Time: 04/08/21  8:08 AM   Result Value Ref Range    WBC 7.0 4.0 - 11.0 10e9/L    RBC Count 5.32 4.4 - 5.9 10e12/L    Hemoglobin 17.2 13.3 - 17.7 g/dL    Hematocrit 47.7 40.0 - 53.0 %    MCV 90 78 - 100 fl    MCH 32.3 26.5 - 33.0 pg    MCHC 36.1 31.5 - 36.5 g/dL    RDW 11.7 10.0 - 15.0 %    Platelet Count 223 150 - 450 10e9/L    Diff Method Automated Method     % Neutrophils 48.3 %    % Lymphocytes 40.5 %    % Monocytes 8.4 %    % Eosinophils 1.4 %    % Basophils 0.3 %    % Immature Granulocytes 1.1 %    Nucleated RBCs 0 0 /100    Absolute Neutrophil 3.4 1.6 - 8.3 10e9/L    Absolute Lymphocytes 2.8 0.8 - 5.3 10e9/L    Absolute Monocytes 0.6 0.0 - 1.3 10e9/L    Absolute Eosinophils 0.1 0.0 - 0.7 10e9/L    Absolute Basophils 0.0 0.0 - 0.2 10e9/L    Abs Immature Granulocytes 0.1 0 - 0.4 10e9/L    Absolute Nucleated RBC 0.0    Vitamin D Deficiency    Collection Time: 04/08/21  8:08 AM   Result Value Ref Range    Vitamin D Deficiency screening 60 20 - 75 ug/L   TSH with free T4 reflex    Collection Time: 04/08/21  8:08 AM   Result Value Ref Range    TSH 1.47 0.40 - 4.00 mU/L   Phosphorus    Collection Time: 04/08/21  8:08 AM   Result Value Ref Range    Phosphorus 3.6 2.5 - 4.5 mg/dL   Magnesium    Collection Time: 04/08/21  8:08 AM   Result Value Ref Range    Magnesium 2.3 1.6 - 2.3 mg/dL   INR    Collection Time: 04/08/21  8:08 AM   Result Value Ref Range    INR 1.02 0.86 - 1.14   IgG    Collection Time: 04/08/21  8:08 AM   Result Value Ref Range    IGG 1,198 610 - 1,616 mg/dL   Hemoglobin A1c    Collection Time: 04/08/21  8:08 AM   Result Value Ref Range    Hemoglobin A1C 4.8 0 - 5.6 %   GGT    Collection Time: 04/08/21  8:08 AM   Result Value Ref Range    GGT 14 0 - 75 U/L   Iron    Collection Time: 04/08/21  8:08 AM   Result Value Ref Range    Iron 127 35 - 180 ug/dL   Lipid Profile    Collection Time: 04/08/21  8:08 AM   Result Value Ref Range    Cholesterol 241 (H) <200 mg/dL     Triglycerides 80 <150 mg/dL    HDL Cholesterol 53 >39 mg/dL    LDL Cholesterol Calculated 172 (H) <100 mg/dL    Non HDL Cholesterol 188 (H) <130 mg/dL   Basic metabolic panel    Collection Time: 04/08/21  8:08 AM   Result Value Ref Range    Sodium 138 133 - 144 mmol/L    Potassium 4.0 3.4 - 5.3 mmol/L    Chloride 103 94 - 109 mmol/L    Carbon Dioxide 29 20 - 32 mmol/L    Anion Gap 6 3 - 14 mmol/L    Glucose 93 70 - 99 mg/dL    Urea Nitrogen 22 7 - 30 mg/dL    Creatinine 0.96 0.66 - 1.25 mg/dL    GFR Estimate >90 >60 mL/min/[1.73_m2]    GFR Estimate If Black >90 >60 mL/min/[1.73_m2]    Calcium 9.3 8.5 - 10.1 mg/dL   Albumin Random Urine Quantitative with Creat Ratio    Collection Time: 04/08/21  8:15 AM   Result Value Ref Range    Creatinine Urine 172 mg/dL    Albumin Urine mg/L 10 mg/L    Albumin Urine mg/g Cr 6.10 0 - 17 mg/g Cr   Routine UA with microscopic    Collection Time: 04/08/21  8:15 AM   Result Value Ref Range    Color Urine Yellow     Appearance Urine Clear     Glucose Urine Negative NEG^Negative mg/dL    Bilirubin Urine Negative NEG^Negative    Ketones Urine Negative NEG^Negative mg/dL    Specific Gravity Urine 1.020 1.003 - 1.035    Blood Urine Negative NEG^Negative    pH Urine 6.0 5.0 - 7.0 pH    Protein Albumin Urine Negative NEG^Negative mg/dL    Urobilinogen mg/dL 0.0 0.0 - 2.0 mg/dL    Nitrite Urine Negative NEG^Negative    Leukocyte Esterase Urine Negative NEG^Negative    Source Midstream Urine     WBC Urine <1 0 - 5 /HPF    RBC Urine 1 0 - 2 /HPF    Mucous Urine Present (A) NEG^Negative /LPF   General PFT Lab (Please always keep checked)    Collection Time: 04/08/21  8:20 AM   Result Value Ref Range    FVC-Pred 5.75 L    FVC-Pre 5.34 L    FVC-%Pred-Pre 92 %    FEV1-Pre 4.50 L    FEV1-%Pred-Pre 97 %    FEV1FVC-Pred 81 %    FEV1FVC-Pre 84 %    FEFMax-Pred 10.77 L/sec    FEFMax-Pre 10.13 L/sec    FEFMax-%Pred-Pre 94 %    FEF2575-Pred 4.45 L/sec    FEF2575-Pre 5.07 L/sec    YBK5470-%Pred-Pre 113 %     ExpTime-Pre 6.78 sec    FIFMax-Pre 7.25 L/sec    FEV1FEV6-Pred 82 %    FEV1FEV6-Pre 84 %   Cystic Fibrosis Culture Aerob Bacterial    Collection Time: 04/08/21  9:10 AM    Specimen: Throat   Result Value Ref Range    Specimen Description Throat     Special Requests Specimen collected in eSwab transport (white cap)     Culture Micro PENDING                    Results as noted above.    PFT Interpretation:  Normal spirometry.  Increased from previous.  Best in recent past  Valid Maneuver             CF Exacerbation  Absent      PROMIS 10 reviewed with the patient by the provider.

## 2021-04-09 LAB — TESTOST SERPL-MCNC: 438 NG/DL (ref 240–950)

## 2021-04-10 ENCOUNTER — IMMUNIZATION (OUTPATIENT)
Dept: NURSING | Facility: CLINIC | Age: 41
End: 2021-04-10
Attending: INTERNAL MEDICINE
Payer: COMMERCIAL

## 2021-04-10 PROCEDURE — 0002A PR COVID VAC PFIZER DIL RECON 30 MCG/0.3 ML IM: CPT

## 2021-04-10 PROCEDURE — 91300 PR COVID VAC PFIZER DIL RECON 30 MCG/0.3 ML IM: CPT

## 2021-04-11 LAB
A-TOCOPHEROL VIT E SERPL-MCNC: 16.7 MG/L (ref 5.5–18)
ANNOTATION COMMENT IMP: NORMAL
BETA+GAMMA TOCOPHEROL SERPL-MCNC: 0.3 MG/L (ref 0–6)
RETINYL PALMITATE SERPL-MCNC: 0.02 MG/L (ref 0–0.1)
VIT A SERPL-MCNC: 0.78 MG/L (ref 0.3–1.2)

## 2021-04-13 LAB
BACTERIA SPEC CULT: NORMAL
Lab: NORMAL
SPECIMEN SOURCE: NORMAL

## 2021-05-07 ENCOUNTER — CLINICAL UPDATE (OUTPATIENT)
Dept: PHARMACY | Facility: CLINIC | Age: 41
End: 2021-05-07
Payer: COMMERCIAL

## 2021-05-07 DIAGNOSIS — E84.0 CYSTIC FIBROSIS WITH PULMONARY MANIFESTATIONS (H): Primary | ICD-10-CM

## 2021-05-07 DIAGNOSIS — E78.00 HYPERCHOLESTEROLEMIA: ICD-10-CM

## 2021-05-07 DIAGNOSIS — E84.0 CYSTIC FIBROSIS WITH PULMONARY MANIFESTATIONS (H): ICD-10-CM

## 2021-05-07 LAB
ALBUMIN SERPL-MCNC: 4.1 G/DL (ref 3.4–5)
ALP SERPL-CCNC: 88 U/L (ref 40–150)
ALT SERPL W P-5'-P-CCNC: 29 U/L (ref 0–70)
AST SERPL W P-5'-P-CCNC: 17 U/L (ref 0–45)
BILIRUB DIRECT SERPL-MCNC: 0.3 MG/DL (ref 0–0.2)
BILIRUB SERPL-MCNC: 1.2 MG/DL (ref 0.2–1.3)
CK SERPL-CCNC: 166 U/L (ref 30–300)
PROT SERPL-MCNC: 7.5 G/DL (ref 6.8–8.8)

## 2021-05-07 PROCEDURE — 99207 PR NO CHARGE LOS: CPT | Performed by: PHARMACIST

## 2021-05-07 PROCEDURE — 82550 ASSAY OF CK (CPK): CPT | Performed by: PATHOLOGY

## 2021-05-07 PROCEDURE — 36415 COLL VENOUS BLD VENIPUNCTURE: CPT | Performed by: PATHOLOGY

## 2021-05-07 PROCEDURE — 80076 HEPATIC FUNCTION PANEL: CPT | Performed by: PATHOLOGY

## 2021-05-07 NOTE — PROGRESS NOTES
Clinical Update:                                                    At the request of Dr. Sahni, a chart review was conducted for Philip Delacruz.    Reason for Chart Review: Trikafta Lab Monitoring (6 month follow up)    Discussion: Shar has been on Trikafta since 12/30/19. Shar was started on Lipitor 10mg at last office visit, and Dr. Sahni requested a lab recheck in 1 month.    Labs were reviewed from 5/7/21 at [facility]. All labs were WNL.    Lab Results   Component Value Date    ALT 29 05/07/2021    AST 17 05/07/2021    BILITOTAL 1.2 05/07/2021    DBIL 0.3 (H) 05/07/2021     05/07/2021     Given labs continue to be within range, will plan to recheck labs in 1 year.    Plan:  1. Continue Trikafta  2. Recheck hepatic panel and CK in 1 year      Lennie Lara, PharmD  Cystic Fibrosis MTM Pharmacist  [facility]  Voicemail: [phone]

## 2021-05-11 ENCOUNTER — TELEPHONE (OUTPATIENT)
Dept: PULMONOLOGY | Facility: CLINIC | Age: 41
End: 2021-05-11

## 2021-05-11 DIAGNOSIS — E84.0 CYSTIC FIBROSIS WITH PULMONARY MANIFESTATIONS (H): Primary | ICD-10-CM

## 2021-05-11 DIAGNOSIS — E84.9 CF (CYSTIC FIBROSIS) (H): ICD-10-CM

## 2021-05-11 DIAGNOSIS — K86.81 EXOCRINE PANCREATIC INSUFFICIENCY: ICD-10-CM

## 2021-05-11 NOTE — TELEPHONE ENCOUNTER
PA Initiation    Medication: ZENPEP PENDING  Insurance Company: [employer] - Phone [phone] Fax [phone]  Pharmacy Filling the Rx: [facility] - [place], [place] - [facility] [address]  Filling Pharmacy Phone:    Filling Pharmacy Fax:    Start Date: 5/11/2021

## 2021-05-11 NOTE — PROGRESS NOTES
Nutrition Note    Notified by pharmacy team that pt has concerns with cost of Creon. RD team has also discussed option to switch enzyme brand in the past due to ongoing malabsorptive symptoms. See RD note 10/29/20 that pt exhibiting greasy/mucous stools once per week with Creon.     Spoke with pt via phone call to discuss enzyme options.     Interventions:  Plan to trial switch to Zenpep 25,000 - 6 caps with meals and 3 caps with snacks = 1760 units lipase/kg/meal. Per pharmacy tech will require PA. Encouraged pt to enroll in [id number] enzyme program. Discussed switching enzymes and importance of adequate hydration; may need to use Miralax prn.     Encouraged pt to speak with SW regarding insurance plan selection during open enrollment given concerns for high deductible.     ADDENDUM:  Pt will have large co-insurance with Zenpep after PA approval. Encouraged pt to contact insurance as well as [id number] to find out if still eligible for copay program. Pt will plan to use Creon prescription for this month while trying to determine options for Zenpep.     Nisa Dumont RD, LD  Cystic Fibrosis/Lung Transplant Dietitian  Pager [phone]

## 2021-06-25 NOTE — PROGRESS NOTES
CF abnormal Dexa  Letitia Armstrong CMA    Shar is a 41 year old who is being evaluated via a billable video visit.      How would you like to obtain your AVS? MyChart  If the video visit is dropped, the invitation should be resent by: Text to cell phone: [phone]  Will anyone else be joining your video visit? No      CF Endocrinology  Consultation:  Low bone density  :   Patient: Philip Delacruz MRN# [id number]   YOB: 1980 Age: 41 year old   Date of Visit: 21           Problem list:     Patient Active Problem List    Diagnosis Date Noted     Small intestinal bacterial overgrowth 2018     Priority: Medium     Exocrine pancreatic insufficiency 04/10/2018     Priority: Medium     Pancreatic insufficiency 2018     Priority: Medium     Chronic sinusitis      Priority: Medium     Cystic fibrosis with pulmonary manifestations 2014     Priority: Medium     SWEAT TEST:  Date:  2014           Laboratory: [facility]  Sample #1   mg     30  mmol/L Cl  Sample #2   mg     27  mmol/L Cl     GENOTYPING:  Date:  2014        [facility]  Genotype:  [id number]  Poly T Variant:  [ 7T ]/[9T  ]              HPI:   Philip is a 41 year old male with Bone loss.    I have reviewed the available past laboratory evaluations, imaging studies, and medical records available to me at this visit.     History was obtained from the patient and the medical record.  I have reviewed the notes of the pulmonary care team entered into the medical record.    On last DEXA scan completed 2021 he was noted to have significant decline in bone density about 5.9% at the lumbar spine.  Lowest Z score was -2.0 at lumbar spine L1-L3 (outlier of -3.5 at L4).     He has been doing well since last being seen in clinic 2019 after last DEXA scan. At that time, he did not meet criteria for a bisphosphonate but was started on 600mg calcium supplement and continued on his CF multivitamin  plus 5000 IUs of vitamin D. He is still taking these supplements and has not had issues with kidney stones. Has recently cut dairy out of his diet over the past 1-2 months as it tends to worsen his GI symptoms - his main symptoms of CF tend to be related to GI/pancreatic insufficiency. He has not had any fractures since last being seen - as noted during last visit does report remote history of 2 hand bone fractures in the setting of trauma and an ankle avulsion about 10 years ago. No family history of osteoporosis or hip fracture. No history of significant steroid use; none since last being seen but does use a fluticasone nasal spray daily for sinusitis symptoms. He recently restarted [employer] 2-3 times per week about 3 weeks ago and is working a physical construction job. No tobacco use, drinks about 5-6 alcoholic beverages (wine, whiskey, beer) a week - never more than 3 in one sitting. Appetite has been strong and has gained about 10 pounds since being started on Trikafta in 2019. No recent illness/CF exacerbations or hospitalizations. Has never been on a bisphosphonate or other medication for low bone density.          Past Medical History:     Past Medical History:   Diagnosis Date     Chronic sinusitis      Congenital absence of vas deferens, bilateral      Cystic fibrosis (H)     Delta F508 and [id number]      Nasal polyps      Sinusitis, chronic             Past Surgical History:     Past Surgical History:   Procedure Laterality Date     HC TOOTH EXTRACTION W/FORCEP       NASAL/SINUS POLYPECTOMY       REPAIR PECTUS EXCAVATUM  1997     SINUS SURGERY  1992, 2002, 2004    Removed tubinates and polyps OSH     Sperm harvest                 Social History:     Social History     Social History Narrative    Lives in [place] with wife, Aimee, and sons, Shay and Serafin. 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              Family  History:     Family History   Problem Relation Age of Onset     C.A.D. Maternal Grandfather      Diabetes Maternal Grandfather      Heart Disease Maternal Grandfather      Aneurysm Paternal Grandfather         Aortic     Gastrointestinal Disease Father         Reflux     Cancer Paternal Grandmother         Lymphoma     Diabetes Maternal Grandmother      Thyroid Disease Mother       () Other    Both parents had history of renal stones         Allergies:     Allergies   Allergen Reactions     Liquid Adhesive Rash     Tagederm     Tegaderm Transparent Dressing (Informational Only) Rash             Medications:     Current Outpatient Rx   Medication Sig Dispense Refill     albuterol (PROAIR HFA/PROVENTIL HFA/VENTOLIN HFA) 108 (90 Base) MCG/ACT inhaler Inhale 2 puffs into the lungs every 6 hours as needed for shortness of breath / dyspnea or wheezing 1 Inhaler 11     amylase-lipase-protease (CREON) 52638-82082 units CPEP per EC capsule Take 4-5 capsules by mouth 3 times daily (with meals) Take 2-3 capsules with snacks (3 meals and 3 snacks daily) 1890 capsule 3     atorvastatin (LIPITOR) 10 MG tablet Take 1 tablet (10 mg) by mouth daily 90 tablet 3     cholecalciferol (VITAMIN D3) 125 mcg (5000 units) capsule Take 125 mcg by mouth daily       elexacaftor-tezacaftor-ivacaftor & ivacaftor (TRIKAFTA) 100-50-75 & 150 MG tablet pack TAKE TWO ORANGE TABLETS BY MOUTH IN THE MORNING AND ONE BLUE TABLET IN THE EVENING AS DIRECTED ON PACKAGE.  TAKE WITH FAT CONTAINING FOOD. 84 each 5     fluticasone (FLONASE) 50 MCG/ACT nasal spray SHAKE LIQUID AND USE 2 SPRAYS IN EACH NOSTRIL DAILY 16 mL 11     GARLIC PO Take 360 mg by mouth daily       levofloxacin (LEVAQUIN) 750 MG tablet Take 1 tablet (750 mg) by mouth daily 14 tablet 0     lipase-protease-amylase (ZENPEP) 71676-88628 units CPEP Take 6 capsules (150,000 Units) by mouth 3 times daily (with meals) Take 3 caps with snacks. Total 3 meals and 3 snacks per day. 810 capsule 11      "multivitamin CF formula (MVW COMPLETE FORMULATION) chewable tablet Take 1 tablet by mouth daily       Omega-3 Fatty Acids (FISH OIL) 1200 MG CPDR Take 3 capsules by mouth daily       order for DME Levofloxacin 1 mg/mL nasal irrigation  6 Liters  Rinse with 100 mL to each nostril every day 1 each 3     order for DME Use Wendy Vios nebulizer for nasal/sinus nebulizing as instructed 1 each 1     Sodium Chloride-Sodium Bicarb (SINUFLO READYRINSE) KIT Daily PRN       Wheat Dextrin (BENEFIBER PO) Take 2 teaspoonful by mouth daily       ZIRGAN 0.15 % GEL INT 1 GTT IN OU 6 TIMES every day as needed  0             Review of Systems:      ROS negative other than the symptoms noted above in the HPI.          Physical Exam:     Wt Readings from Last 10 Encounters:   04/08/21 82.6 kg (182 lb 1.6 oz)   01/07/21 82.6 kg (182 lb)   10/15/20 84.8 kg (187 lb)   01/28/20 79.8 kg (176 lb)   12/26/19 80.5 kg (177 lb 7.5 oz)   11/05/19 80.3 kg (177 lb)   09/10/19 78.9 kg (174 lb)   08/15/19 79 kg (174 lb 2.6 oz)   07/16/19 79.7 kg (175 lb 12.8 oz)   07/02/19 80.1 kg (176 lb 8 oz)     Ht Readings from Last 2 Encounters:   04/08/21 1.84 m (6' 0.44\")   10/15/20 1.84 m (6' 0.44\")     BMI based on height and weight from 04/08/21: 24.3        Laboratory and Imaging Results:     Last labs 04/08/21:  Calcium 9.3  Phosphorus 3.6  Magnesium 2.3  Albumin 10  Creatinine 0.96  Alk phos 88  Vitamin D 60  TSH 1.47  ALT 27  AST 21  A1c 4.8  Hemoglobin 17.2  WBC 7.0    DXA, 04/08/2021  Most negative Z score of -2.0 at level of L1-L3 (below normal range for men <50 years old)  Significantly decreased BMD at level of L1-L3 lumbar spine compared to previous exam (-5.9%)  Significantly decreased BMD at level of L1-L3 lumbar spine and right total hip compared to baseline exam on 12/22/2014 (-10.2% at lumbar spine, -2.5% at hip)    DXA, 05/02/2019  Most negative Z score of -1.6 at level lf lumbar spine  Significantly decreased BMD at lumbar spine, right and " left total hip compared to previous exam (-7.8%)    DXA, 04/07/2017  Most negative Z score of -1.1 at level of lumbar spine  Significantly decreased BMD at level of lumbar spine compared to previous exam (-3.4%)    DXA, 12/22/2014 - Baseline Exam  Most negative Z score of -1.3 at level of lumbar spine           Assessment and Plan:   Philip is a 41 year old male with CF and decline in bone density.    Low bone density  Recent DXA from 04/08/21 with decrease in BMD at level of L1-L3 lumbar spine of -5.9% compared to 05/2019 and Z score of -2.0 from L1-L3 (outlier score of -3.5 in L4).   Potential secondary causes of bone loss have been checked and recent TSH, testosterone, vitamin D level, A1c were ok.   no tobacco use, no excessive alcohol use, no family history of osteoporosis.   Will check a 24 hour urine calcium to look for malabsorption of calcium or hypercalciuria. Cushing's less likely as patient does not have classic features (no diabetes, no obesity, no proximal muscle weakness, no history of prolonged steroid use) but will look to rule this out with urine cortisol as well.   Patient understandably hesitant to add another medication to regimen, but does meet CF guidelines for treatment of low bone density with Z score of -2.0.   Will readdress risks/benefits of medication with patient after lab workup is complete.   - 24 hour urine calcium and cortisol ordered    - Patient instructed to stop fluticasone nasal spray a day before collection and can resume after collection completed   - Recheck calcium labs - serum calcium, albumin, phosphorus, PTH   - Continue calcium 600mg daily and vitamin D 5000 international unit(s) daily + daily CF multivitamin  - Continue weightbearing exercise as tolerated   - Follow up based on results of pending lab work - if no evidence of other modifiable risk factors for low bone density, will discuss more with patient about starting a bisphosphonate     Patient seen and  examined with attending, Dr. Knapp.    Teresa Birch MD  Internal Medicine PGY-1    Video-Visit Details     Type of service:  Video Visit  Total video visit time 22 minutes  Originating Location (pt. Location): parked Car  Distant Location (provider location):  [facility]. Resident - home  Platform used for Video Visit: Monika     I have seen and interviewed the patient during this virtual visit, reviewed and edited the resident's note, and agree with the plan of care.  WOLFGANG MckeonBS

## 2021-06-29 ENCOUNTER — VIRTUAL VISIT (OUTPATIENT)
Dept: ENDOCRINOLOGY | Facility: CLINIC | Age: 41
End: 2021-06-29
Attending: INTERNAL MEDICINE
Payer: COMMERCIAL

## 2021-06-29 DIAGNOSIS — M81.0 OSTEOPOROSIS WITHOUT CURRENT PATHOLOGICAL FRACTURE, UNSPECIFIED OSTEOPOROSIS TYPE: ICD-10-CM

## 2021-06-29 DIAGNOSIS — M85.80 BONE LOSS: Primary | ICD-10-CM

## 2021-06-29 PROCEDURE — 99214 OFFICE O/P EST MOD 30 MIN: CPT | Mod: 95 | Performed by: INTERNAL MEDICINE

## 2021-06-29 NOTE — LETTER
2021       RE: Philip Delacruz  [address]  [facility] [address]     Dear Colleague,    Thank you for referring your patient, Philip Delacruz, to the [facility] [place] [facility]. Please see a copy of my visit note below.    CF abnormal Dexa  Letitia Armstrong PARTH    Shar is a 41 year old who is being evaluated via a billable video visit.      How would you like to obtain your AVS? MyChart  If the video visit is dropped, the invitation should be resent by: Text to cell phone: [phone]  Will anyone else be joining your video visit? No      CF Endocrinology  Consultation:  Low bone density  :   Patient: Philip Delacruz MRN# [id number]   YOB: 1980 Age: 41 year old   Date of Visit: 21           Problem list:     Patient Active Problem List    Diagnosis Date Noted     Small intestinal bacterial overgrowth 2018     Priority: Medium     Exocrine pancreatic insufficiency 04/10/2018     Priority: Medium     Pancreatic insufficiency 2018     Priority: Medium     Chronic sinusitis      Priority: Medium     Cystic fibrosis with pulmonary manifestations 2014     Priority: Medium     SWEAT TEST:  Date:  2014           Laboratory: [facility]  Sample #1   mg     30  mmol/L Cl  Sample #2   mg     27  mmol/L Cl     GENOTYPING:  Date:  2014        [facility]  Genotype:  [id number]  Poly T Variant:  [ 7T ]/[9T  ]              HPI:   Philip is a 41 year old male with Bone loss.    I have reviewed the available past laboratory evaluations, imaging studies, and medical records available to me at this visit.     History was obtained from the patient and the medical record.  I have reviewed the notes of the pulmonary care team entered into the medical record.    On last DEXA scan completed 2021 he was noted to have significant decline in bone density about  5.9% at the lumbar spine.  Lowest Z score was -2.0 at lumbar spine L1-L3 (outlier of -3.5 at L4).     He has been doing well since last being seen in clinic 07/2019 after last DEXA scan. At that time, he did not meet criteria for a bisphosphonate but was started on 600mg calcium supplement and continued on his CF multivitamin plus 5000 IUs of vitamin D. He is still taking these supplements and has not had issues with kidney stones. Has recently cut dairy out of his diet over the past 1-2 months as it tends to worsen his GI symptoms - his main symptoms of CF tend to be related to GI/pancreatic insufficiency. He has not had any fractures since last being seen - as noted during last visit does report remote history of 2 hand bone fractures in the setting of trauma and an ankle avulsion about 10 years ago. No family history of osteoporosis or hip fracture. No history of significant steroid use; none since last being seen but does use a fluticasone nasal spray daily for sinusitis symptoms. He recently restarted [employer] 2-3 times per week about 3 weeks ago and is working a physical construction job. No tobacco use, drinks about 5-6 alcoholic beverages (wine, whiskey, beer) a week - never more than 3 in one sitting. Appetite has been strong and has gained about 10 pounds since being started on Trikafta in 2019. No recent illness/CF exacerbations or hospitalizations. Has never been on a bisphosphonate or other medication for low bone density.          Past Medical History:     Past Medical History:   Diagnosis Date     Chronic sinusitis      Congenital absence of vas deferens, bilateral      Cystic fibrosis (H)     Delta F508 and [id number]      Nasal polyps      Sinusitis, chronic             Past Surgical History:     Past Surgical History:   Procedure Laterality Date     HC TOOTH EXTRACTION W/FORCEP       NASAL/SINUS POLYPECTOMY       REPAIR PECTUS EXCAVATUM  1997     SINUS SURGERY  1992, 2002, 2004    Removed tubinates and  polyps OSH     Sperm harvest                 Social History:     Social History     Social History Narrative    Lives in [place] with wife, Aimee, and sons, Shay and Serafin. 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              Family History:     Family History   Problem Relation Age of Onset     C.A.D. Maternal Grandfather      Diabetes Maternal Grandfather      Heart Disease Maternal Grandfather      Aneurysm Paternal Grandfather         Aortic     Gastrointestinal Disease Father         Reflux     Cancer Paternal Grandmother         Lymphoma     Diabetes Maternal Grandmother      Thyroid Disease Mother       () Other    Both parents had history of renal stones         Allergies:     Allergies   Allergen Reactions     Liquid Adhesive Rash     Tagederm     Tegaderm Transparent Dressing (Informational Only) Rash             Medications:     Current Outpatient Rx   Medication Sig Dispense Refill     albuterol (PROAIR HFA/PROVENTIL HFA/VENTOLIN HFA) 108 (90 Base) MCG/ACT inhaler Inhale 2 puffs into the lungs every 6 hours as needed for shortness of breath / dyspnea or wheezing 1 Inhaler 11     amylase-lipase-protease (CREON) 80727-49550 units CPEP per EC capsule Take 4-5 capsules by mouth 3 times daily (with meals) Take 2-3 capsules with snacks (3 meals and 3 snacks daily) 1890 capsule 3     atorvastatin (LIPITOR) 10 MG tablet Take 1 tablet (10 mg) by mouth daily 90 tablet 3     cholecalciferol (VITAMIN D3) 125 mcg (5000 units) capsule Take 125 mcg by mouth daily       elexacaftor-tezacaftor-ivacaftor & ivacaftor (TRIKAFTA) 100-50-75 & 150 MG tablet pack TAKE TWO ORANGE TABLETS BY MOUTH IN THE MORNING AND ONE BLUE TABLET IN THE EVENING AS DIRECTED ON PACKAGE.  TAKE WITH FAT CONTAINING FOOD. 84 each 5     fluticasone (FLONASE) 50 MCG/ACT nasal spray SHAKE LIQUID AND USE 2 SPRAYS IN EACH NOSTRIL DAILY 16 mL 11     GARLIC PO Take  "360 mg by mouth daily       levofloxacin (LEVAQUIN) 750 MG tablet Take 1 tablet (750 mg) by mouth daily 14 tablet 0     lipase-protease-amylase (ZENPEP) 47904-55632 units CPEP Take 6 capsules (150,000 Units) by mouth 3 times daily (with meals) Take 3 caps with snacks. Total 3 meals and 3 snacks per day. 810 capsule 11     multivitamin CF formula (MVW COMPLETE FORMULATION) chewable tablet Take 1 tablet by mouth daily       Omega-3 Fatty Acids (FISH OIL) 1200 MG CPDR Take 3 capsules by mouth daily       order for DME Levofloxacin 1 mg/mL nasal irrigation  6 Liters  Rinse with 100 mL to each nostril every day 1 each 3     order for DME Use Modulus Vios nebulizer for nasal/sinus nebulizing as instructed 1 each 1     Sodium Chloride-Sodium Bicarb (SINUFLO READYRINSE) KIT Daily PRN       Wheat Dextrin (BENEFIBER PO) Take 2 teaspoonful by mouth daily       ZIRGAN 0.15 % GEL INT 1 GTT IN OU 6 TIMES every day as needed  0             Review of Systems:      ROS negative other than the symptoms noted above in the HPI.          Physical Exam:     Wt Readings from Last 10 Encounters:   04/08/21 82.6 kg (182 lb 1.6 oz)   01/07/21 82.6 kg (182 lb)   10/15/20 84.8 kg (187 lb)   01/28/20 79.8 kg (176 lb)   12/26/19 80.5 kg (177 lb 7.5 oz)   11/05/19 80.3 kg (177 lb)   09/10/19 78.9 kg (174 lb)   08/15/19 79 kg (174 lb 2.6 oz)   07/16/19 79.7 kg (175 lb 12.8 oz)   07/02/19 80.1 kg (176 lb 8 oz)     Ht Readings from Last 2 Encounters:   04/08/21 1.84 m (6' 0.44\")   10/15/20 1.84 m (6' 0.44\")     BMI based on height and weight from 04/08/21: 24.3        Laboratory and Imaging Results:     Last labs 04/08/21:  Calcium 9.3  Phosphorus 3.6  Magnesium 2.3  Albumin 10  Creatinine 0.96  Alk phos 88  Vitamin D 60  TSH 1.47  ALT 27  AST 21  A1c 4.8  Hemoglobin 17.2  WBC 7.0    DXA, 04/08/2021  Most negative Z score of -2.0 at level of L1-L3 (below normal range for men <50 years old)  Significantly decreased BMD at level of L1-L3 lumbar spine " compared to previous exam (-5.9%)  Significantly decreased BMD at level of L1-L3 lumbar spine and right total hip compared to baseline exam on 12/22/2014 (-10.2% at lumbar spine, -2.5% at hip)    DXA, 05/02/2019  Most negative Z score of -1.6 at level lf lumbar spine  Significantly decreased BMD at lumbar spine, right and left total hip compared to previous exam (-7.8%)    DXA, 04/07/2017  Most negative Z score of -1.1 at level of lumbar spine  Significantly decreased BMD at level of lumbar spine compared to previous exam (-3.4%)    DXA, 12/22/2014 - Baseline Exam  Most negative Z score of -1.3 at level of lumbar spine           Assessment and Plan:   Philip is a 41 year old male with CF and decline in bone density.    Low bone density  Recent DXA from 04/08/21 with decrease in BMD at level of L1-L3 lumbar spine of -5.9% compared to 05/2019 and Z score of -2.0 from L1-L3 (outlier score of -3.5 in L4).   Potential secondary causes of bone loss have been checked and recent TSH, testosterone, vitamin D level, A1c were ok.   no tobacco use, no excessive alcohol use, no family history of osteoporosis.   Will check a 24 hour urine calcium to look for malabsorption of calcium or hypercalciuria. Cushing's less likely as patient does not have classic features (no diabetes, no obesity, no proximal muscle weakness, no history of prolonged steroid use) but will look to rule this out with urine cortisol as well.   Patient understandably hesitant to add another medication to regimen, but does meet CF guidelines for treatment of low bone density with Z score of -2.0.   Will readdress risks/benefits of medication with patient after lab workup is complete.   - 24 hour urine calcium and cortisol ordered    - Patient instructed to stop fluticasone nasal spray a day before collection and can resume after collection completed   - Recheck calcium labs - serum calcium, albumin, phosphorus, PTH   - Continue calcium 600mg daily and  vitamin D 5000 international unit(s) daily + daily CF multivitamin  - Continue weightbearing exercise as tolerated   - Follow up based on results of pending lab work - if no evidence of other modifiable risk factors for low bone density, will discuss more with patient about starting a bisphosphonate     Patient seen and examined with attending, Dr. Knapp.    Teresa Birch MD  Internal Medicine PGY-1    Video-Visit Details     Type of service:  Video Visit  Total video visit time 22 minutes  Originating Location (pt. Location): parked Car  Distant Location (provider location):  [facility]. Resident - home  Platform used for Video Visit: Monika     I have seen and interviewed the patient during this virtual visit, reviewed and edited the resident's note, and agree with the plan of care.  AVIS Mckeon

## 2021-07-05 DIAGNOSIS — K86.81 EXOCRINE PANCREATIC INSUFFICIENCY: ICD-10-CM

## 2021-07-05 DIAGNOSIS — E84.0 CYSTIC FIBROSIS WITH PULMONARY MANIFESTATIONS (H): ICD-10-CM

## 2021-07-05 RX ORDER — ELEXACAFTOR, TEZACAFTOR, AND IVACAFTOR 100-50-75
KIT ORAL
Qty: 84 EACH | Refills: 9 | Status: SHIPPED | OUTPATIENT
Start: 2021-07-05 | End: 2022-04-07

## 2021-07-08 ENCOUNTER — OFFICE VISIT (OUTPATIENT)
Dept: PULMONOLOGY | Facility: CLINIC | Age: 41
End: 2021-07-08
Attending: INTERNAL MEDICINE
Payer: COMMERCIAL

## 2021-07-08 ENCOUNTER — OFFICE VISIT (OUTPATIENT)
Dept: PHARMACY | Facility: CLINIC | Age: 41
End: 2021-07-08
Payer: COMMERCIAL

## 2021-07-08 VITALS
DIASTOLIC BLOOD PRESSURE: 68 MMHG | TEMPERATURE: 98.5 F | HEART RATE: 66 BPM | OXYGEN SATURATION: 95 % | BODY MASS INDEX: 24.57 KG/M2 | WEIGHT: 183.42 LBS | SYSTOLIC BLOOD PRESSURE: 125 MMHG

## 2021-07-08 DIAGNOSIS — E84.0 CYSTIC FIBROSIS WITH PULMONARY MANIFESTATIONS (H): Primary | ICD-10-CM

## 2021-07-08 DIAGNOSIS — K86.81 EXOCRINE PANCREATIC INSUFFICIENCY: ICD-10-CM

## 2021-07-08 DIAGNOSIS — M81.0 OSTEOPOROSIS WITHOUT CURRENT PATHOLOGICAL FRACTURE, UNSPECIFIED OSTEOPOROSIS TYPE: ICD-10-CM

## 2021-07-08 DIAGNOSIS — E84.0 CYSTIC FIBROSIS WITH PULMONARY MANIFESTATIONS (H): ICD-10-CM

## 2021-07-08 DIAGNOSIS — K86.89 PANCREATIC INSUFFICIENCY: ICD-10-CM

## 2021-07-08 DIAGNOSIS — J32.4 CHRONIC PANSINUSITIS: ICD-10-CM

## 2021-07-08 DIAGNOSIS — M85.80 BONE LOSS: ICD-10-CM

## 2021-07-08 DIAGNOSIS — E78.00 HYPERCHOLESTEROLEMIA: ICD-10-CM

## 2021-07-08 LAB
ALBUMIN SERPL-MCNC: 3.9 G/DL (ref 3.4–5)
ALP SERPL-CCNC: 93 U/L (ref 40–150)
ALT SERPL W P-5'-P-CCNC: 32 U/L (ref 0–70)
AST SERPL W P-5'-P-CCNC: 22 U/L (ref 0–45)
BILIRUB DIRECT SERPL-MCNC: 0.2 MG/DL (ref 0–0.2)
BILIRUB SERPL-MCNC: 0.7 MG/DL (ref 0.2–1.3)
CALCIUM SERPL-MCNC: 9.1 MG/DL (ref 8.5–10.1)
CHOLEST SERPL-MCNC: 150 MG/DL
CK SERPL-CCNC: 196 U/L (ref 30–300)
EXPTIME-PRE: 8.13 SEC
FEF2575-%PRED-PRE: 104 %
FEF2575-PRE: 4.59 L/SEC
FEF2575-PRED: 4.4 L/SEC
FEFMAX-%PRED-PRE: 94 %
FEFMAX-PRE: 10.15 L/SEC
FEFMAX-PRED: 10.75 L/SEC
FEV1-%PRED-PRE: 95 %
FEV1-PRE: 4.38 L
FEV1FEV6-PRE: 83 %
FEV1FEV6-PRED: 82 %
FEV1FVC-PRE: 83 %
FEV1FVC-PRED: 81 %
FIFMAX-PRE: 7.92 L/SEC
FVC-%PRED-PRE: 92 %
FVC-PRE: 5.31 L
FVC-PRED: 5.73 L
HDLC SERPL-MCNC: 60 MG/DL
LDLC SERPL CALC-MCNC: 81 MG/DL
NONHDLC SERPL-MCNC: 90 MG/DL
PHOSPHATE SERPL-MCNC: 3.2 MG/DL (ref 2.5–4.5)
PROT SERPL-MCNC: 7.6 G/DL (ref 6.8–8.8)
PTH-INTACT SERPL-MCNC: 19 PG/ML (ref 18–80)
TRIGL SERPL-MCNC: 44 MG/DL

## 2021-07-08 PROCEDURE — 82310 ASSAY OF CALCIUM: CPT | Performed by: PATHOLOGY

## 2021-07-08 PROCEDURE — 87070 CULTURE OTHR SPECIMN AEROBIC: CPT | Performed by: INTERNAL MEDICINE

## 2021-07-08 PROCEDURE — 82550 ASSAY OF CK (CPK): CPT | Performed by: PATHOLOGY

## 2021-07-08 PROCEDURE — 84100 ASSAY OF PHOSPHORUS: CPT | Performed by: PATHOLOGY

## 2021-07-08 PROCEDURE — 36415 COLL VENOUS BLD VENIPUNCTURE: CPT | Performed by: PATHOLOGY

## 2021-07-08 PROCEDURE — 83970 ASSAY OF PARATHORMONE: CPT | Mod: 90 | Performed by: PATHOLOGY

## 2021-07-08 PROCEDURE — 94375 RESPIRATORY FLOW VOLUME LOOP: CPT | Performed by: INTERNAL MEDICINE

## 2021-07-08 PROCEDURE — 97803 MED NUTRITION INDIV SUBSEQ: CPT | Performed by: DIETITIAN, REGISTERED

## 2021-07-08 PROCEDURE — 99207 PR NO CHARGE LOS: CPT | Performed by: PHARMACIST

## 2021-07-08 PROCEDURE — 80061 LIPID PANEL: CPT | Performed by: PATHOLOGY

## 2021-07-08 PROCEDURE — 99214 OFFICE O/P EST MOD 30 MIN: CPT | Mod: 25 | Performed by: INTERNAL MEDICINE

## 2021-07-08 PROCEDURE — 80076 HEPATIC FUNCTION PANEL: CPT | Performed by: PATHOLOGY

## 2021-07-08 PROCEDURE — 99000 SPECIMEN HANDLING OFFICE-LAB: CPT | Performed by: PATHOLOGY

## 2021-07-08 PROCEDURE — G0463 HOSPITAL OUTPT CLINIC VISIT: HCPCS | Mod: 25

## 2021-07-08 NOTE — NURSING NOTE
Chief Complaint   Patient presents with     Cystic Fibrosis     12wk f/u     Vitals were taken and medications were reconciled.     JENNIFER Barry

## 2021-07-08 NOTE — PATIENT INSTRUCTIONS
Cystic Fibrosis Self-Care Plan    RECOMMENDATIONS:   Continue current medication and exercise.  Call us if sputum changes in volume or other characteristics.          [facility] Nurse line:  Nicole Hurtado [phone]     [facility] Fax Number:      [phone]         Cystic Fibrosis Respiratory Therapists:   Kristi Ndiaye              [phone]          Loida Trinidad   [phone]  Cystic Fibrosis Dietitians:              Nisa Dumont              [phone]                            April Cardona                        [phone]   Cystic Fibrosis Diabetes Nurse:    Barbara Birmingham   [phone]    Cystic Fibrosis Social Workers:     Melissa Calle               [phone]                     Debora Joseph               [phone]  Cystic Fibrosis Pharmacists:           Amanda Gonzalez                               [phone]         Iris August   [phone]  Cystic Fibrosis Genetic Counselor:   Faby Flores    [phone]    [facility] website:  [email or web address]    COVID VACCINES:    You are eligible for the COVID-19 vaccine. Sign up for your COVID vaccine via Criptext. Log in, select the menu bar, select schedule an appointment, and then select COVID-19 Vaccine 1st Dose. You may also schedule by calling this number [phone] however hold times can be long.       OR schedule through the [place] [facility] Vaccine Connector at https://vaccineconnector.[place].gov/ or by calling [phone].      The best vaccine is the one that s available to you first.  All COVID-19 vaccines currently available in the [place] (Eyal & Eyal, Pfizer and Moderna) have been shown to be highly effect at preventing COVID-19.       We re still learning how vaccines will affect the spread of COVID-19. After you ve been fully vaccinated against COVID-19, you should keep taking precautions in public places like wearing a mask,  staying 6 feet apart from others, and avoiding crowds and poorly ventilated spaces until we know more.    People are considered fully vaccinated:  2 weeks after their second dose in a 2-dose series, such as the Pfizer or Moderna vaccines, or  2 weeks after a single-dose vaccine, such as Eyal & Eyal s Brijesh vaccine    If you ve been fully vaccinated:  You can gather indoors with fully vaccinated people without wearing a mask.  You can gather indoors with unvaccinated people from one other household (for example, visiting with relatives who all live together) without masks, unless any of those people or anyone they live with has an increased risk for severe illness from COVID-19.  If you ve been around someone who has COVID-19, you do not need to stay away from others or get tested unless you have symptoms.  However, if you live in a group setting (like a correctional or penitentiary facility or group home) and are around someone who has COVID-19, you should still stay away from others for 14 days and get tested, even if you don t have symptoms.         MRN: [id number]   Clinic Date: July 8, 2021   Patient: Philip Delacruz     Annual Studies:   IGG   Date Value Ref Range Status   04/08/2021 1,198 610 - 1,616 mg/dL Final     Insulin   Date Value Ref Range Status   05/02/2019 9.0 3 - 25 mU/L Final     There are no preventive care reminders to display for this patient.    Pulmonary Function Tests  FEV1: amount of air you can blow out in 1 second  FVC: total amount of air you can take in and blow out    Your Goals:         PFT Latest Ref Rng & Units 7/8/2021   FVC L 5.31   FEV1 L 4.38   FVC% % 92   FEV1% % 95          Airway Clearance: The Most Important Way to Keep Your Lungs Healthy  Continue regular exercise for airway clearance.    Good Nutrition Can Improve Lung Function and Overall Health     Take ALL of your vitamins with food     Take 1/2 of your enzymes before EVERY meal/snack and the other 1/2  mid-meal/snack    Wt Readings from Last 3 Encounters:   07/08/21 83.2 kg (183 lb 6.8 oz)   04/08/21 82.6 kg (182 lb 1.6 oz)   01/07/21 82.6 kg (182 lb)       Body mass index is 24.57 kg/m .         National CF Foundation Recommendations for BMI in CF Adults: Women: at least 22 Men: at least 23        Controlling Blood Sugars Helps Prevent Lung Infections & Improves Nutrition  Test blood sugar:     In the morning before eating (goal is )     2 hours after a meal (goal is less than 150)     When pre-meal glucose is greater than 150 add correction     At bedtime (if less than 100 eat a snack with 15 grams of carbohydrates  Last A1C Results:   Hemoglobin A1C   Date Value Ref Range Status   04/08/2021 4.8 0 - 5.6 % Final     Comment:     Normal <5.7% Prediabetes 5.7-6.4%  Diabetes 6.5% or higher - adopted from ADA   consensus guidelines.           If diabetic, measure A1C every 6 months. Goal: Under 7%    Staying Healthy    Research:  If you are interested in learning about research opportunities or have questions, please contact the CF Research Team at [phone] or [email or web address].      CF Foundation:  Compass is a personalized resource service to help you with the insurance, financial, legal and other issues you are facing.  It's free, confidential and available to anyone with CF.  Ask your  for more information or contact Compass directly at 697-COMPASS ([phone]) or compass@cff.org, or learn more at cff.[email or web address].

## 2021-07-08 NOTE — PROGRESS NOTES
Reason for Visit  Philip Delacruz is a 41 year old year old male who is being seen for Cystic Fibrosis (12wk f/u)      Assessment and plan:   Shar Coley is a 41 year-old male with a history of cystic fibrosis with mild lung disease and pancreatic insufficiency.  Maintenance   Modulator: Trikafta  Mutation:  [id number], Poly T Variant:  [ 7T ]/[9T  ]  AW Clearance: Exercise  Bronchodilators: Albuterol MDI PRN  Mucolytics: none   Antibiotics Inh:  none   Antibiotics Oral: none   Other:   Colonization Hx: Rhizobium (agrobacterium) radiobacter, Aspergillus versicolor, Serratia marcescens, staphylococcus aureus, moraxella (branhamella) catarrhalis  Annual studies due:April 2022  Contraindications to standard of care :  Pulmonary/CF: The patient reports excellent exercise tolerance.  Oxygenation is adequate.  PFTs are similar to baseline and in an excellent range.  The patient does note a recent increase in cough and sputum production his 2 children also have respiratory infections.  This likely represents a viral respiratory infection although there may be some bacterial component as well.  We did discuss a trial of antibiotics but the patient prefers to hold off since symptoms are improving.  In view of his excellent exercise tolerance, baseline PFTs and symptoms resolving spontaneously, it is reasonable to hold off on antibiotics.  I have asked him to call the CF office if he has progression in cough or sputum.  He will continue exercise as his primary form of airway clearance.    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  He has had an excellent response to Trikafta with improved symptoms and stable PFTs.  LFTs and CK are consistently within normal limits and can be checked every 6 months.    Pancreatic insufficiency: Patient denies symptoms of malabsorption.  Weight has increased and is now in an adequate range with BMI of 24.4.  He will continue his current pancreatic enzyme replacement and  vitamins.    CF related sinus disease: The patient stopped his Flonase due to concern for impact on his bone density.  He does note increased sinus symptoms.  ENT referral will be submitted to explore other options to control of sinus disease.    Hypercholesterolemia: At last visit the patient was noted to have a cholesterol elevated at 241 with an LDL of 172.  Cholesterol and LDL have been persistently elevated since 2019.  He was started on Lipitor at his last visit with an excellent response.    Reduced bone density: Currently under evaluation in the endocrine clinic.  The patient was encouraged to continue his vigorous exercise and efforts to maintain/improve bone health.    Depression/anxiety: The patient discontinued Lexapro earlier this year.  He feels that his mood is stable without the Lexapro.    Healthcare maintenance: The patient has received the Covid vaccine.  He has received his influenza vaccine for this flu season.    Follow-up in 3 months with PFTs and sputum culture.  ENT referral for CF related sinus disease.    True Sahni MD         CF HPI  The patient was seen and examined by True Sahni MD   Shar Coley is a 41 year-old male with a history of cystic fibrosis with mild lung disease and pancreatic insufficiency.  Breathing is comfortable at rest and with all usual activity.  He has restarted his regular exercise now doing CrossFit twice weekly.  He has noted an increased nighttime cough and sputum production for the past couple of weeks.  Both of his sons have respiratory illnesses at this time.  He was having foul tasting yellow-white sputum but this has nearly resolved.  No hemoptysis.  No chest pain.  No fever, chills or night sweats.  The patient does not do vest therapy.    Review of systems:  Appetite is good  No ear pain.  He did have a sore throat with his respiratory illness but this has since resolved.  The patient stopped Flonase due to possible contribution to  his osteoporosis.  With this he has noted a slight increase in his congestion.  No nausea, vomiting or diarrhea.  Persistent joint aches.  Wrists are better but he developed pain in his neck.  Denies anxiety or depression but does note marked fatigue due to lack of sleep related to health problems in his son.  A complete ROS was otherwise negative except as noted in the HPI.    Current Outpatient Medications   Medication     albuterol (PROAIR HFA/PROVENTIL HFA/VENTOLIN HFA) 108 (90 Base) MCG/ACT inhaler     amylase-lipase-protease (CREON) 60456-56816 units CPEP per EC capsule     atorvastatin (LIPITOR) 10 MG tablet     cholecalciferol (VITAMIN D3) 125 mcg (5000 units) capsule     fluticasone (FLONASE) 50 MCG/ACT nasal spray     GARLIC PO     levofloxacin (LEVAQUIN) 750 MG tablet     lipase-protease-amylase (ZENPEP) 43793-23799 units CPEP     multivitamin CF formula (MVW COMPLETE FORMULATION) chewable tablet     Omega-3 Fatty Acids (FISH OIL) 1200 MG CPDR     order for DME     order for DME     Sodium Chloride-Sodium Bicarb (SINUFLO READYRINSE) KIT     TRIKAFTA 100-50-75 & 150 MG tablet pack     Wheat Dextrin (BENEFIBER PO)     ZIRGAN 0.15 % GEL     No current facility-administered medications for this visit.      Allergies   Allergen Reactions     Liquid Adhesive Rash     Tagederm     Tegaderm Transparent Dressing (Informational Only) Rash     Past Medical History:   Diagnosis Date     Chronic sinusitis      Congenital absence of vas deferens, bilateral      Cystic fibrosis (H)     Delta F508 and [id number]      Nasal polyps      Sinusitis, chronic        Past Surgical History:   Procedure Laterality Date     HC TOOTH EXTRACTION W/FORCEP       NASAL/SINUS POLYPECTOMY       REPAIR PECTUS EXCAVATUM  1997     SINUS SURGERY  1992, 2002, 2004    Removed tubinates and polyps OSH     Sperm harvest         Social History     Socioeconomic History     Marital status:      Spouse name: Not on file     Number of children:  Not on file     Years of education: Not on file     Highest education level: Not on file   Occupational History     Occupation: Teacher   Social Needs     Financial resource strain: Not on file     Food insecurity     Worry: Not on file     Inability: Not on file     Transportation needs     Medical: Not on file     Non-medical: Not on file   Tobacco Use     Smoking status: Never Smoker     Smokeless tobacco: Never Used   Substance and Sexual Activity     Alcohol use: Yes     Alcohol/week: 0.0 standard drinks     Comment: 1 drink 3X per week     Drug use: No     Sexual activity: Yes     Partners: Female     Birth control/protection: None   Lifestyle     Physical activity     Days per week: Not on file     Minutes per session: Not on file     Stress: Not on file   Relationships     Social connections     Talks on phone: Not on file     Gets together: Not on file     Attends Mosque service: Not on file     Active member of club or organization: Not on file     Attends meetings of clubs or organizations: Not on file     Relationship status: Not on file     Intimate partner violence     Fear of current or ex partner: Not on file     Emotionally abused: Not on file     Physically abused: Not on file     Forced sexual activity: Not on file   Other Topics Concern     Parent/sibling w/ CABG, MI or angioplasty before 65F 55M? Not Asked   Social History Narrative    Lives in [place] with wife, Aimee, and sons, Shay and Serafin. 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         /68   Pulse 66   Temp 98.5  F (36.9  C)   Wt 83.2 kg (183 lb 6.8 oz)   SpO2 95%   BMI 24.57 kg/m    Body mass index is 24.57 kg/m .  Exam:   GENERAL APPEARANCE: Well developed, well nourished, alert, and in no apparent distress.  EYES: PERRL, EOMI  HENT: Nasal mucosa with edema and hyperemia. No nasal polyps.  EARS: Left Canal clear, TM normal. Right  obscured by cerumen  MOUTH: Oral mucosa is moist, without any lesions, no tonsillar enlargement, no oropharyngeal exudate.  NECK: supple, no masses, no thyromegaly.  LYMPHATICS: No significant axillary, cervical, or supraclavicular nodes.  RESP: normal percussion, good air flow throughout.  No crackles. No rhonchi. No wheezes.  CV: Normal S1, S2, regular rhythm, normal rate. No murmur.  No rub. No gallop. No LE edema.   ABDOMEN:  Bowel sounds normal, soft, nontender, no HSM or masses.   MS: extremities normal. No clubbing. No cyanosis.  SKIN: circles on back c/w cupping (per patient)  NEURO: Mentation intact, speech normal, normal strength and tone, normal gait and stance  PSYCH: mentation appears normal. and affect normal/bright  Results:  Recent Results (from the past 168 hour(s))   Calcium    Collection Time: 07/08/21  8:23 AM   Result Value Ref Range    Calcium 9.1 8.5 - 10.1 mg/dL   Phosphorus    Collection Time: 07/08/21  8:23 AM   Result Value Ref Range    Phosphorus 3.2 2.5 - 4.5 mg/dL   Lipid Profile    Collection Time: 07/08/21  8:23 AM   Result Value Ref Range    Cholesterol 150 <200 mg/dL    Triglycerides 44 <150 mg/dL    HDL Cholesterol 60 >39 mg/dL    LDL Cholesterol Calculated 81 <100 mg/dL    Non HDL Cholesterol 90 <130 mg/dL   CK total    Collection Time: 07/08/21  8:23 AM   Result Value Ref Range    CK Total 196 30 - 300 U/L   Hepatic panel    Collection Time: 07/08/21  8:23 AM   Result Value Ref Range    Bilirubin Direct 0.2 0.0 - 0.2 mg/dL    Bilirubin Total 0.7 0.2 - 1.3 mg/dL    Albumin 3.9 3.4 - 5.0 g/dL    Protein Total 7.6 6.8 - 8.8 g/dL    Alkaline Phosphatase 93 40 - 150 U/L    ALT 32 0 - 70 U/L    AST 22 0 - 45 U/L   General PFT Lab (Please always keep checked)    Collection Time: 07/08/21  8:31 AM   Result Value Ref Range    FVC-Pred 5.73 L    FVC-Pre 5.31 L    FVC-%Pred-Pre 92 %    FEV1-Pre 4.38 L    FEV1-%Pred-Pre 95 %    FEV1FVC-Pred 81 %    FEV1FVC-Pre 83 %    FEFMax-Pred 10.75 L/sec     FEFMax-Pre 10.15 L/sec    FEFMax-%Pred-Pre 94 %    FEF2575-Pred 4.40 L/sec    FEF2575-Pre 4.59 L/sec    PVT6339-%Pred-Pre 104 %    ExpTime-Pre 8.13 sec    FIFMax-Pre 7.92 L/sec    FEV1FEV6-Pred 82 %    FEV1FEV6-Pre 83 %                   Results as noted above.    PFT Interpretation:  Normal spirometry.  Unchanged from previous.   Similar to recent best.  Valid Maneuver             CF Exacerbation  Absent

## 2021-07-08 NOTE — PROGRESS NOTES
Clinical Pharmacy Consult:                                                    Philip Delacruz is a 41 year old male seen for a clinical pharmacist consult.  He was referred to me from Dr. Sahni.     Reason for Consult: Concerns about cost of medications and calcium supplements    Discussion:   Met with Shar in clinic to discuss his concerns about the cost of his medications. He now has a $105 copay for Creon. He is enrolled in CF Care Forward, but his funds for the year have been depleted so he is no longer able to use the program to reduce his out of pocket cost for the medication. In the past, switching to Zenpep was considered however this would ultimately cost him more money. Shar does not currently qualify for Mashable because he does not meet the income qualifications.     Shar recently met with a nutritionist to discuss bone health and his cholesterol. Shar was advised to use Nutrikey Activated Calcium, which contains the following ingredients:         Plan:  Unfortunately at this point we do not have any other solutions to offer. We did discuss that going forward Shar would benefit from not selecting a high deductible insurance plan, due to his multiple specialty medications and an accumulator plan through his insurance that limits his access to  copay assistance programs.    Calcium supplement added to medication list.    Amanda Gonzalez PharmD  CF Medication Therapy Management Pharmacist  [facility]  [phone]

## 2021-07-08 NOTE — PROGRESS NOTES
CF Annual Nutrition Assessment     Reason for Assessment  Assessed during CF clinic visit with Dr. Sahni r/t increased nutrition risk with diagnosis of CF per protocol.      Anthropometric Assessment  Height: 184 cm  Weight: 83.2 kg (183 lbs)  Ideal body weight based on BMI 22 for females and 23 for males per CF Foundation recs: 78 kg (172 lbs)  BMI: 24.6 kg/m      Wt Readings from Last 5 Encounters:   21 83.2 kg (183 lb 6.8 oz)   21 82.6 kg (182 lb 1.6 oz)   21 82.6 kg (182 lb)   10/15/20 84.8 kg (187 lb)   20 79.8 kg (176 lb)     Comments: 10 lb weight gain with Trikafta initiation, now relatively stable x 6 months. Some improvement in muscle mass with physical activity.      Pancreatic Enzymes  Brand: Creon 24,000  Dosin-6 with meals, 2-3 with snacks = 1730 units lipase/kg/meal  Estimated Daily Intake: ~20 caps = 6000 units lipase/kg/day  *In May discussed option to trial Zenpep however some concerns with cost and pt prefers to stay on Creon at this time.      Signs of Malabsorption: Ongoing GI concerns with looser stools/leakage. Potentially with some GI improvements while following Whole30 and decreased caffeine/dairy.   Comments: takes metamucil 1 tbsp daily  Enzyme Program: CF Care Forward     Nutrition-Related Lab & Vitamin/Mineral Supplements  Annual studies completed 2021  Vitamin A- 0.78 wnl  Vitamin D- 60 wnl  Vitamin E- 16.7 wnl  Iron- 127 wnl  Lipid Panel- LDL elevated, started Lipitor      OGTT- , wnl  DEXA - , lowest z-score -2    - endo 2021 for low bone density; plan to obtain 24 hr calcium and consider bisphosphonate therapy     Current Vitamin/Mineral Supplements: MVW Complete 1 softgel daily (Care Forward), Fish Oil, calcium citrate malate 700 mg TID  Comments: currently taking MVW chewable as Care Forward out of softgel - plans to switch back once in stock     Diet History and Assessment  Diet Preferences/Allergies/Intolerances: Regular  Intake  Recall/Comments: Typically TID meals and TID snacks. Followed Whole30 diet plan with goal to cut out sugar and overall eat less to help with inflammation/overall health. Makes an effort to continue with fruits, vegetables, and nuts/seeds for fiber intake. Eats out ~1x/week.     Previous Diet Recall:  B- oatmeal with yogurt and walnuts or eggs and english muffin or cereal with 2% milk  AM Snack - Rx bar and protein shake  L- deli meat and cheese sandwich or leftovers  PM snack- beef stick, cheese and crackers  D- meat and veggies, sweet potato tots, broccoli and cheese tots, sloppy kahlil's  HS snack- apple and PB or PB on english muffin     Calcium: adequate - reduced dairy intake d/t GI concerns but added supplement TID  Salt: adequate per food choices  Hydration: improved - water, Melaleuca Sustain  Supplements: No - contributed to GI issues/looser stools     NUTRITION DIAGNOSIS  Impaired nutrient utilization related to CF pancreatic insufficiency as evidenced by requires pancreatic enzyme replacement therapy and vitamin/mineral supplementation in order to maintain ideal body weight and nutrition status.       INTERVENTIONS/RECOMMENDATIONS   1) Oral Intake/Weight:   BEE: ~1825  Calorie Goals- 8937-7205 kcals/day (150-175% BEE)   Protein Goals-  grams/day (1.2-1.5 g/kg)     Discussed current nutrition status. Reviewed weight trends and praised pt for increased physical activity. Goal for weight maintenance and ongoing healthy eating choices with increased fiber intake through fruits/vegetables/whole grains.     2) GI/Enzymes:  Plan per pt to continue with Creon. Could consider trial of Zenpep in future if desired. Discussed option to increase fiber to 1 tbsp BID given ongoing concerns with loose stool. Pt also felt GI improved with Whole30 diet plan. Discussed that pt could trial partial elimination diet with dairy/gluten x 2-3 weeks. If GI sx improved, could trial adding back in and monitoring symptoms. Offered  referral to GI clinic however pt declined today. Could also consider AXR to evaluate for stool burden vs overflow diarrhea.      3) Vitamin/Mineral Supplementation:  Reviewed results of annual study labs. Continue with current vitamin supplementation at this time. Pt has seen endocrine June 2021 regarding DEXA scan.       GOALS:  1) Maintain BMI >23 kg/m2  2) Take enzymes/vitamins as recommended      FOLLOW-UP/MONITORING:  Visit patient within 12 months for annual nutrition reassessment.     Time Spent In Face-to-Face Patient Interactions: 15 minutes    Nisa Dumont RD, LD  Cystic Fibrosis/Lung Transplant Dietitian  Pager [phone]

## 2021-07-08 NOTE — LETTER
7/8/2021         RE: Philip Delacruz  [address]  [facility] [address]        Dear Colleague,    Thank you for referring your patient, Philip Delacruz, to the [facility] [place]. Please see a copy of my visit note below.    Reason for Visit  Philip Delacruz is a 41 year old year old male who is being seen for Cystic Fibrosis (12wk f/u)  Assessment and plan:   Shar Coley is a 41 year-old male with a history of cystic fibrosis with mild lung disease and pancreatic insufficiency.  Maintenance   Modulator: Trikafta  Mutation:  [id number]/V5734H, Poly T Variant:  [ 7T ]/[9T  ]  AW Clearance: Exercise  Bronchodilators: Albuterol MDI PRN  Mucolytics: none   Antibiotics Inh:  none   Antibiotics Oral: none   Other:   Colonization Hx: Rhizobium (agrobacterium) radiobacter, Aspergillus versicolor, Serratia marcescens, staphylococcus aureus, moraxella (branhamella) catarrhalis  Annual studies due:April 2022  Contraindications to standard of care :  Pulmonary/CF: The patient reports excellent exercise tolerance.  Oxygenation is adequate.  PFTs are similar to baseline and in an excellent range.  The patient does note a recent increase in cough and sputum production his 2 children also have respiratory infections.  This likely represents a viral respiratory infection although there may be some bacterial component as well.  We did discuss a trial of antibiotics but the patient prefers to hold off since symptoms are improving.  In view of his excellent exercise tolerance, baseline PFTs and symptoms resolving spontaneously, it is reasonable to hold off on antibiotics.  I have asked him to call the CF office if he has progression in cough or sputum.  He will continue exercise as his primary form of airway clearance.    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  He has had an excellent response to Trikafta with improved symptoms and stable  PFTs.  LFTs and CK are consistently within normal limits and can be checked every 6 months.    Pancreatic insufficiency: Patient denies symptoms of malabsorption.  Weight has increased and is now in an adequate range with BMI of 24.4.  He will continue his current pancreatic enzyme replacement and vitamins.    CF related sinus disease: The patient stopped his Flonase due to concern for impact on his bone density.  He does note increased sinus symptoms.  ENT referral will be submitted to explore other options to control of sinus disease.    Hypercholesterolemia: At last visit the patient was noted to have a cholesterol elevated at 241 with an LDL of 172.  Cholesterol and LDL have been persistently elevated since 2019.  He was started on Lipitor at his last visit with an excellent response.    Reduced bone density: Currently under evaluation in the endocrine clinic.  The patient was encouraged to continue his vigorous exercise and efforts to maintain/improve bone health.    Depression/anxiety: The patient discontinued Lexapro earlier this year.  He feels that his mood is stable without the Lexapro.    Healthcare maintenance: The patient has received the Covid vaccine.  He has received his influenza vaccine for this flu season.    Follow-up in 3 months with PFTs and sputum culture.  ENT referral for CF related sinus disease.    True Sahni MD         CF HPI  The patient was seen and examined by True Sahni MD   Shar Coley is a 41 year-old male with a history of cystic fibrosis with mild lung disease and pancreatic insufficiency.  Breathing is comfortable at rest and with all usual activity.  He has restarted his regular exercise now doing CrossFit twice weekly.  He has noted an increased nighttime cough and sputum production for the past couple of weeks.  Both of his sons have respiratory illnesses at this time.  He was having foul tasting yellow-white sputum but this has nearly resolved.  No  hemoptysis.  No chest pain.  No fever, chills or night sweats.  The patient does not do vest therapy.    Review of systems:  Appetite is good  No ear pain.  He did have a sore throat with his respiratory illness but this has since resolved.  The patient stopped Flonase due to possible contribution to his osteoporosis.  With this he has noted a slight increase in his congestion.  No nausea, vomiting or diarrhea.  Persistent joint aches.  Wrists are better but he developed pain in his neck.  Denies anxiety or depression but does note marked fatigue due to lack of sleep related to health problems in his son.  A complete ROS was otherwise negative except as noted in the HPI.    Current Outpatient Medications   Medication     albuterol (PROAIR HFA/PROVENTIL HFA/VENTOLIN HFA) 108 (90 Base) MCG/ACT inhaler     amylase-lipase-protease (CREON) 77224-41983 units CPEP per EC capsule     atorvastatin (LIPITOR) 10 MG tablet     cholecalciferol (VITAMIN D3) 125 mcg (5000 units) capsule     fluticasone (FLONASE) 50 MCG/ACT nasal spray     GARLIC PO     levofloxacin (LEVAQUIN) 750 MG tablet     lipase-protease-amylase (ZENPEP) 12239-11906 units CPEP     multivitamin CF formula (MVW COMPLETE FORMULATION) chewable tablet     Omega-3 Fatty Acids (FISH OIL) 1200 MG CPDR     order for DME     order for DME     Sodium Chloride-Sodium Bicarb (SINUFLO READYRINSE) KIT     TRIKAFTA 100-50-75 & 150 MG tablet pack     Wheat Dextrin (BENEFIBER PO)     ZIRGAN 0.15 % GEL     No current facility-administered medications for this visit.      Allergies   Allergen Reactions     Liquid Adhesive Rash     Tagederm     Tegaderm Transparent Dressing (Informational Only) Rash     Past Medical History:   Diagnosis Date     Chronic sinusitis      Congenital absence of vas deferens, bilateral      Cystic fibrosis (H)     Delta F508 and E0058H      Nasal polyps      Sinusitis, chronic        Past Surgical History:   Procedure Laterality Date     HC TOOTH  EXTRACTION W/FORCEP       NASAL/SINUS POLYPECTOMY       REPAIR PECTUS EXCAVATUM  1997     SINUS SURGERY  1992, 2002, 2004    Removed tubinates and polyps OSH     Sperm harvest         Social History     Socioeconomic History     Marital status:      Spouse name: Not on file     Number of children: Not on file     Years of education: Not on file     Highest education level: Not on file   Occupational History     Occupation: Teacher   Social Needs     Financial resource strain: Not on file     Food insecurity     Worry: Not on file     Inability: Not on file     Transportation needs     Medical: Not on file     Non-medical: Not on file   Tobacco Use     Smoking status: Never Smoker     Smokeless tobacco: Never Used   Substance and Sexual Activity     Alcohol use: Yes     Alcohol/week: 0.0 standard drinks     Comment: 1 drink 3X per week     Drug use: No     Sexual activity: Yes     Partners: Female     Birth control/protection: None   Lifestyle     Physical activity     Days per week: Not on file     Minutes per session: Not on file     Stress: Not on file   Relationships     Social connections     Talks on phone: Not on file     Gets together: Not on file     Attends Restorationism service: Not on file     Active member of club or organization: Not on file     Attends meetings of clubs or organizations: Not on file     Relationship status: Not on file     Intimate partner violence     Fear of current or ex partner: Not on file     Emotionally abused: Not on file     Physically abused: Not on file     Forced sexual activity: Not on file   Other Topics Concern     Parent/sibling w/ CABG, MI or angioplasty before 65F 55M? Not Asked   Social History Narrative    Lives in [place] with wife, Aimee, and sons, Shay and Serafin. 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         /68   Pulse 66   Temp 98.5  F (36.9  C)   Wt 83.2  kg (183 lb 6.8 oz)   SpO2 95%   BMI 24.57 kg/m    Body mass index is 24.57 kg/m .  Exam:   GENERAL APPEARANCE: Well developed, well nourished, alert, and in no apparent distress.  EYES: PERRL, EOMI  HENT: Nasal mucosa with edema and hyperemia. No nasal polyps.  EARS: Left Canal clear, TM normal. Right obscured by cerumen  MOUTH: Oral mucosa is moist, without any lesions, no tonsillar enlargement, no oropharyngeal exudate.  NECK: supple, no masses, no thyromegaly.  LYMPHATICS: No significant axillary, cervical, or supraclavicular nodes.  RESP: normal percussion, good air flow throughout.  No crackles. No rhonchi. No wheezes.  CV: Normal S1, S2, regular rhythm, normal rate. No murmur.  No rub. No gallop. No LE edema.   ABDOMEN:  Bowel sounds normal, soft, nontender, no HSM or masses.   MS: extremities normal. No clubbing. No cyanosis.  SKIN: circles on back c/w cupping (per patient)  NEURO: Mentation intact, speech normal, normal strength and tone, normal gait and stance  PSYCH: mentation appears normal. and affect normal/bright  Results:  Recent Results (from the past 168 hour(s))   Calcium    Collection Time: 07/08/21  8:23 AM   Result Value Ref Range    Calcium 9.1 8.5 - 10.1 mg/dL   Phosphorus    Collection Time: 07/08/21  8:23 AM   Result Value Ref Range    Phosphorus 3.2 2.5 - 4.5 mg/dL   Lipid Profile    Collection Time: 07/08/21  8:23 AM   Result Value Ref Range    Cholesterol 150 <200 mg/dL    Triglycerides 44 <150 mg/dL    HDL Cholesterol 60 >39 mg/dL    LDL Cholesterol Calculated 81 <100 mg/dL    Non HDL Cholesterol 90 <130 mg/dL   CK total    Collection Time: 07/08/21  8:23 AM   Result Value Ref Range    CK Total 196 30 - 300 U/L   Hepatic panel    Collection Time: 07/08/21  8:23 AM   Result Value Ref Range    Bilirubin Direct 0.2 0.0 - 0.2 mg/dL    Bilirubin Total 0.7 0.2 - 1.3 mg/dL    Albumin 3.9 3.4 - 5.0 g/dL    Protein Total 7.6 6.8 - 8.8 g/dL    Alkaline Phosphatase 93 40 - 150 U/L    ALT 32 0 -  70 U/L    AST 22 0 - 45 U/L   General PFT Lab (Please always keep checked)    Collection Time: 21  8:31 AM   Result Value Ref Range    FVC-Pred 5.73 L    FVC-Pre 5.31 L    FVC-%Pred-Pre 92 %    FEV1-Pre 4.38 L    FEV1-%Pred-Pre 95 %    FEV1FVC-Pred 81 %    FEV1FVC-Pre 83 %    FEFMax-Pred 10.75 L/sec    FEFMax-Pre 10.15 L/sec    FEFMax-%Pred-Pre 94 %    FEF2575-Pred 4.40 L/sec    FEF2575-Pre 4.59 L/sec    NWZ4541-%Pred-Pre 104 %    ExpTime-Pre 8.13 sec    FIFMax-Pre 7.92 L/sec    FEV1FEV6-Pred 82 %    FEV1FEV6-Pre 83 %                   Results as noted above.    PFT Interpretation:  Normal spirometry.  Unchanged from previous.   Similar to recent best.  Valid Maneuver             CF Exacerbation  Absent          CF Annual Nutrition Assessment     Reason for Assessment  Assessed during CF clinic visit with Dr. Sahni r/t increased nutrition risk with diagnosis of CF per protocol.      Anthropometric Assessment  Height: 184 cm  Weight: 83.2 kg (183 lbs)  Ideal body weight based on BMI 22 for females and 23 for males per CF Foundation recs: 78 kg (172 lbs)  BMI: 24.6 kg/m      Wt Readings from Last 5 Encounters:   21 83.2 kg (183 lb 6.8 oz)   21 82.6 kg (182 lb 1.6 oz)   21 82.6 kg (182 lb)   10/15/20 84.8 kg (187 lb)   20 79.8 kg (176 lb)     Comments: 10 lb weight gain with Trikafta initiation, now relatively stable x 6 months. Some improvement in muscle mass with physical activity.      Pancreatic Enzymes  Brand: Creon 24,000  Dosin-6 with meals, 2-3 with snacks = 1730 units lipase/kg/meal  Estimated Daily Intake: ~20 caps = 6000 units lipase/kg/day  *In May discussed option to trial Zenpep however some concerns with cost and pt prefers to stay on Creon at this time.      Signs of Malabsorption: Ongoing GI concerns with looser stools/leakage. Potentially with some GI improvements while following Whole30 and decreased caffeine/dairy.   Comments: takes metamucil 1 tbsp daily  Enzyme  Program: CF Care Forward     Nutrition-Related Lab & Vitamin/Mineral Supplements  Annual studies completed April 2021  Vitamin A- 0.78 wnl  Vitamin D- 60 wnl  Vitamin E- 16.7 wnl  Iron- 127 wnl  Lipid Panel- LDL elevated, started Lipitor      OGTT- 2019, wnl  DEXA - 2021, lowest z-score -2    - endo June 2021 for low bone density; plan to obtain 24 hr calcium and consider bisphosphonate therapy     Current Vitamin/Mineral Supplements: MVW Complete 1 softgel daily (Care Forward), Fish Oil, calcium citrate malate 700 mg TID  Comments: currently taking MVW chewable as Care Forward out of softgel - plans to switch back once in stock     Diet History and Assessment  Diet Preferences/Allergies/Intolerances: Regular  Intake Recall/Comments: Typically TID meals and TID snacks. Followed Whole30 diet plan with goal to cut out sugar and overall eat less to help with inflammation/overall health. Makes an effort to continue with fruits, vegetables, and nuts/seeds for fiber intake. Eats out ~1x/week.     Previous Diet Recall:  B- oatmeal with yogurt and walnuts or eggs and english muffin or cereal with 2% milk  AM Snack - Rx bar and protein shake  L- deli meat and cheese sandwich or leftovers  PM snack- beef stick, cheese and crackers  D- meat and veggies, sweet potato tots, broccoli and cheese tots, sloppy kahlil's  HS snack- apple and PB or PB on english muffin     Calcium: adequate - reduced dairy intake d/t GI concerns but added supplement TID  Salt: adequate per food choices  Hydration: improved - water, Melaleuca Sustain  Supplements: No - contributed to GI issues/looser stools     NUTRITION DIAGNOSIS  Impaired nutrient utilization related to CF pancreatic insufficiency as evidenced by requires pancreatic enzyme replacement therapy and vitamin/mineral supplementation in order to maintain ideal body weight and nutrition status.       INTERVENTIONS/RECOMMENDATIONS   1) Oral Intake/Weight:   BEE: ~1825  Calorie Goals- 5040-9319  kcals/day (150-175% BEE)   Protein Goals-  grams/day (1.2-1.5 g/kg)     Discussed current nutrition status. Reviewed weight trends and praised pt for increased physical activity. Goal for weight maintenance and ongoing healthy eating choices with increased fiber intake through fruits/vegetables/whole grains.     2) GI/Enzymes:  Plan per pt to continue with Creon. Could consider trial of Zenpep in future if desired. Discussed option to increase fiber to 1 tbsp BID given ongoing concerns with loose stool. Pt also felt GI improved with Whole30 diet plan. Discussed that pt could trial partial elimination diet with dairy/gluten x 2-3 weeks. If GI sx improved, could trial adding back in and monitoring symptoms. Offered referral to GI clinic however pt declined today. Could also consider AXR to evaluate for stool burden vs overflow diarrhea.      3) Vitamin/Mineral Supplementation:  Reviewed results of annual study labs. Continue with current vitamin supplementation at this time. Pt has seen endocrine June 2021 regarding DEXA scan.       GOALS:  1) Maintain BMI >23 kg/m2  2) Take enzymes/vitamins as recommended      FOLLOW-UP/MONITORING:  Visit patient within 12 months for annual nutrition reassessment.     Time Spent In Face-to-Face Patient Interactions: 15 minutes    Nisa Dumont RD, LD  Cystic Fibrosis/Lung Transplant Dietitian  Pager [phone]        Again, thank you for allowing me to participate in the care of your patient.        Sincerely,        True Sahni MD

## 2021-07-13 ENCOUNTER — LAB (OUTPATIENT)
Dept: LAB | Facility: CLINIC | Age: 41
End: 2021-07-13
Payer: COMMERCIAL

## 2021-07-13 DIAGNOSIS — M81.0 OSTEOPOROSIS WITHOUT CURRENT PATHOLOGICAL FRACTURE, UNSPECIFIED OSTEOPOROSIS TYPE: ICD-10-CM

## 2021-07-13 DIAGNOSIS — M85.80 BONE LOSS: ICD-10-CM

## 2021-07-13 LAB
BACTERIA SPEC CULT: NORMAL
CALCIUM 24H UR-MRATE: 0.38 G/SPEC
CALCIUM UR-MCNC: 13.2 MG/DL
CALCIUM/CREAT UR: 0.19 G/G CR
COLLECT DURATION TIME UR: 24 H
CREAT 24H UR-MRATE: 1.96 G/SPEC (ref 1–2)
CREAT UR-MCNC: 69 MG/DL
Lab: NORMAL
SPECIMEN SOURCE: NORMAL
SPECIMEN VOL UR: 2844 ML

## 2021-07-13 PROCEDURE — 82340 ASSAY OF CALCIUM IN URINE: CPT | Performed by: PATHOLOGY

## 2021-07-13 PROCEDURE — 81050 URINALYSIS VOLUME MEASURE: CPT | Performed by: PATHOLOGY

## 2021-07-13 PROCEDURE — 82570 ASSAY OF URINE CREATININE: CPT | Performed by: PATHOLOGY

## 2021-07-13 PROCEDURE — 82530 CORTISOL FREE: CPT | Mod: 90 | Performed by: PATHOLOGY

## 2021-07-13 NOTE — TELEPHONE ENCOUNTER
FUTURE VISIT INFORMATION      FUTURE VISIT INFORMATION:    Date: 8/6/2021    Time: 9AM    [place] [facility] [place]  REFERRAL INFORMATION:    Referring provider:  True Sahni MD    Referring providers clinic:  [facility]     Reason for visit/diagnosis  CF Sinus disease - sched per pt - (has seen Dr. Pickering nothing available in time frame requested)    RECORDS REQUESTED FROM:       Clinic name Comments Records Status Imaging Status   [facility]  7/8/2021 note and referral from True Sahni MD [facility]    Imaging 2/20/2017 CT Maxillofacial  [facility] PACS   [facility] [place] 6/3/2019 note from Dr Pickering [facility]

## 2021-07-14 LAB
COLLECT DURATION TIME UR: 24 H
CREAT 24H UR-MRATE: 2.04 G/SPEC (ref 1–2)
CREAT UR-MCNC: 72 MG/DL
SPECIMEN VOL UR: 2840 ML

## 2021-08-06 ENCOUNTER — PRE VISIT (OUTPATIENT)
Dept: OTOLARYNGOLOGY | Facility: CLINIC | Age: 41
End: 2021-08-06

## 2021-08-30 NOTE — PROGRESS NOTES
CF  Letitia Armstrong, PARTH    Shar is a 41 year old who is being evaluated via a billable video visit.      How would you like to obtain your AVS? MyChart  If the video visit is dropped, the invitation should be resent by: Text to cell phone: [phone]  Will anyone else be joining your video visit? No

## 2021-08-30 NOTE — PATIENT INSTRUCTIONS
We appreciate your assistance in coordinating your healthcare.     Please upload your insulin pump, blood sugar meter and/or continuous glucose monitor at home 1-2 days before your next diabetes-related appointment.   This will allow your provider to review your  data before your scheduled virtual visit.    To ask a question to your Endocrine care team, please send them a Plink Search message, or reach them by phone at [phone]     To expedite your medication refill(s), please contact your pharmacy and have them   fax a refill request to: [phone].  *Please allow 3 business days for routine medication refills.  *Please allow 5 business days for controlled substance medication refills.    For after-hours urgent Endocrine issues, that do not require 161, please dial [phone], and ask to speak with the Endocrinologist On-Call

## 2021-08-31 ENCOUNTER — VIRTUAL VISIT (OUTPATIENT)
Dept: ENDOCRINOLOGY | Facility: CLINIC | Age: 41
End: 2021-08-31
Attending: INTERNAL MEDICINE
Payer: COMMERCIAL

## 2021-08-31 DIAGNOSIS — M85.9 LOW BONE DENSITY FOR AGE: Primary | ICD-10-CM

## 2021-08-31 PROCEDURE — 99214 OFFICE O/P EST MOD 30 MIN: CPT | Mod: 95 | Performed by: INTERNAL MEDICINE

## 2021-08-31 NOTE — LETTER
2021       RE: Philip Delacruz  [address]  [facility] [address]     Dear Colleague,    Thank you for referring your patient, Philip Delacruz, to the [facility] [place] at [facility]. Please see a copy of my visit note below.    CF  Letitia Armstrong CMA    Shar is a 41 year old who is being evaluated via a billable video visit.      How would you like to obtain your AVS? MyChart  If the video visit is dropped, the invitation should be resent by: Text to cell phone: [phone]  Will anyone else be joining your video visit? No          Endocrinology and Diabetes Clinic    Philip Delacruz is a 41 year old male who is being evaluated via a billable video visit.      Video Start Time: 8:11  Video End Time: 8:40    CF Endocrinology Return Consultation: Low bone density  :   Patient: Philip Delacruz MRN# [id number]   YOB: 1980 Age: 41 year old   Date of Visit: 2021        Problem list:     Patient Active Problem List    Diagnosis Date Noted     Small intestinal bacterial overgrowth 2018     Priority: Medium     Exocrine pancreatic insufficiency 04/10/2018     Priority: Medium     Pancreatic insufficiency 2018     Priority: Medium     Chronic sinusitis      Priority: Medium     Cystic fibrosis with pulmonary manifestations 2014     Priority: Medium     SWEAT TEST:  Date:  2014           [facility]  Sample #1   mg     30  mmol/L Cl  Sample #2   mg     27  mmol/L Cl     GENOTYPING:  Date:  2014        [facility]  Genotype:  [id number]  Poly T Variant:  [ 7T ]/[9T  ]              HPI:   Philip is a 41 year old male with CF and bone loss.      Last DEXA scan 2021 showed significant decline in bone density, about 5.9% at the lumbar spine. Lowest z score was -2.0 at lumbar spine L1-L3 with outlier of -3.5 at L4.     He has been doing  well since last visit 06/2021 with good appetite and stable weight. He gained about 10 pounds initially after starting Trikafta in 2019 but has remained stable since then with no recent changes. He continues to follow a diet low in dairy due to GI symptoms. Estimates he has 1 glass of milk and 1 serving of cheese daily. He currently takes a 700 mg calcium daily, 5000 IU vitamin D daily, and CF multivitamin daily. No history of kidney stones. Exercise includes CrossFit 3x/week and physically active job in construction. He has not had any injuries or fractures since last visit. As noted during previous visit, does report remote history of 2 hand bone fractures in the setting of trauma and an ankle avulsion about 10 years ago. He has mostly stopped using fluticasone, now using it only every once in a while, but notes he still has nasal congestion. No other significant steroid use. No tobacco use. No recent illness, CF exacerbations, or hospitalizations.    Recent 24-hour urine collection showed normal urinary cortisol and high urinary calcium    I have reviewed the available past laboratory evaluations, imaging studies, and medical records available to me at this visit. History was obtained from the patient and the medical record.  I have reviewed the notes of the pulmonary care team entered into the medical record since I last saw the patient.          Past Medical History:     Past Medical History:   Diagnosis Date     Chronic sinusitis      Congenital absence of vas deferens, bilateral      Cystic fibrosis (H)     Delta F508 and X3763L      Nasal polyps      Sinusitis, chronic             Past Surgical History:     Past Surgical History:   Procedure Laterality Date     HC TOOTH EXTRACTION W/FORCEP       NASAL/SINUS POLYPECTOMY       REPAIR PECTUS EXCAVATUM  1997     SINUS SURGERY  1992, 2002, 2004    Removed tubinates and polyps OSH     Sperm harvest                 Social History:     Social History     Social  History Narrative    Lives in [place] with wife, Aimee, and sons, Shay and Serafin. 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              Family History:     Family History   Problem Relation Age of Onset     C.A.D. Maternal Grandfather      Diabetes Maternal Grandfather      Heart Disease Maternal Grandfather      Aneurysm Paternal Grandfather         Aortic     Gastrointestinal Disease Father         Reflux     Cancer Paternal Grandmother         Lymphoma     Diabetes Maternal Grandmother      Thyroid Disease Mother       () Other             Allergies:     Allergies   Allergen Reactions     Liquid Adhesive Rash     Tagederm     Tegaderm Transparent Dressing (Informational Only) Rash             Medications:     Current Outpatient Rx   Medication Sig Dispense Refill     albuterol (PROAIR HFA/PROVENTIL HFA/VENTOLIN HFA) 108 (90 Base) MCG/ACT inhaler Inhale 2 puffs into the lungs every 6 hours as needed for shortness of breath / dyspnea or wheezing 1 Inhaler 11     amylase-lipase-protease (CREON) 77727-93582 units CPEP per EC capsule Take 4-5 capsules by mouth 3 times daily (with meals) Take 2-3 capsules with snacks (3 meals and 3 snacks daily) 1890 capsule 3     atorvastatin (LIPITOR) 10 MG tablet Take 1 tablet (10 mg) by mouth daily 90 tablet 3     Ca Cit Malate-Cholecalciferol (CALCIUM CITRATE MALATE-VIT D PO) Take 3 capsules by mouth daily Activated calcium by Nutrikey       cholecalciferol (VITAMIN D3) 125 mcg (5000 units) capsule Take 125 mcg by mouth daily       multivitamin CF formula (MVW COMPLETE FORMULATION) chewable tablet Take 1 tablet by mouth daily       Omega-3 Fatty Acids (FISH OIL) 1200 MG CPDR Take 3 capsules by mouth daily       order for DME Levofloxacin 1 mg/mL nasal irrigation  6 Liters  Rinse with 100 mL to each nostril every day 1 each 3     order for DME Use Wendy Vios nebulizer for nasal/sinus  nebulizing as instructed 1 each 1     Sodium Chloride-Sodium Bicarb (SINUFLO READYRINSE) KIT Daily PRN       TRIKAFTA 100-50-75 & 150 MG tablet pack TAKE TWO ORANGE TABLETS BY MOUTH IN THE MORNING AND ONE BLUE TABLET IN THE EVENING AS DIRECTED ON PACKAGE.  TAKE WITH FAT CONTAINING FOOD. 84 each 9     Wheat Dextrin (BENEFIBER PO) Take 2 teaspoonful by mouth daily       ZIRGAN 0.15 % GEL INT 1 GTT IN OU 6 TIMES every day as needed  0             Review of Systems:     Comprehensive ROS negative other than the symptoms noted above in the HPI.          Physical Exam:   There were no vitals taken for this visit.  Growth percentile SmartLinks can only be used for patients less than 20 years old.  Height: Data Unavailable, Facility age limit for growth percentiles is 20 years.  Weight: 0 lbs 0 oz, Facility age limit for growth percentiles is 20 years.  BMI: There is no height or weight on file to calculate BMI., No height and weight on file for this encounter.            Laboratory results:     TSH   Date Value Ref Range Status   04/08/2021 1.47 0.40 - 4.00 mU/L Final     Triiodothyronine (T3)   Date Value Ref Range Status   07/16/2019 95 60 - 181 ng/dL Final     Testosterone Total   Date Value Ref Range Status   04/08/2021 438 240 - 950 ng/dL Final     Comment:     This test was developed and its performance characteristics determined by the   [facility].   It has not been cleared or approved by the FDA. The laboratory is regulated   under CLIA as qualified to perform high-complexity testing. This test is used   for clinical purposes. It should not be regarded as investigational or for   research.       Cholesterol   Date Value Ref Range Status   07/08/2021 150 <200 mg/dL Final     Albumin Urine mg/L   Date Value Ref Range Status   04/08/2021 10 mg/L Final     Triglycerides   Date Value Ref Range Status   07/08/2021 44 <150 mg/dL Final     HDL Cholesterol    Date Value Ref Range Status   07/08/2021 60 >39 mg/dL Final     LDL Cholesterol Calculated   Date Value Ref Range Status   07/08/2021 81 <100 mg/dL Final     Comment:     Desirable:       <100 mg/dl     Cholesterol/HDL Ratio   Date Value Ref Range Status   12/22/2014 3.3 0.0 - 5.0 Final     Non HDL Cholesterol   Date Value Ref Range Status   07/08/2021 90 <130 mg/dL Final     Lab Results   Component Value Date    A1C 4.8 04/08/2021    A1C 4.8 04/15/2020    A1C 5.2 05/02/2019    A1C 4.6 04/06/2018    A1C 4.8 01/05/2018         DXA, 04/08/2021  Most negative Z score of -2.0 at level of L1-L3 (below normal range for men <50 years old)  Significantly decreased BMD at level of L1-L3 lumbar spine compared to previous exam (-5.9%)  Significantly decreased BMD at level of L1-L3 lumbar spine and right total hip compared to baseline exam on 12/22/2014 (-10.2% at lumbar spine, -2.5% at hip)     DXA, 05/02/2019  Most negative Z score of -1.6 at level lf lumbar spine  Significantly decreased BMD at lumbar spine, right and left total hip compared to previous exam (-7.8%)     DXA, 04/07/2017  Most negative Z score of -1.1 at level of lumbar spine  Significantly decreased BMD at level of lumbar spine compared to previous exam (-3.4%)     DXA, 12/22/2014 - Baseline Exam  Most negative Z score of -1.3 at level of lumbar spine           Assessment and Plan:   Philip is a 41 year old male with CF and decline in bone density.    Low bone density  Recent DXA from 04/08/21 with decrease in BMD at level of L1-L3 lumbar spine of -5.9% compared to 05/2019 and Z score of -2.0 from L1-L3 (outlier score of -3.5 in L4). No tobacco use, no excessive alcohol use, no family history of osteoporosis. Workup has been done to evaluate for potential secondary causes of bone loss. Recent TSH, testosterone, vitamin D level, and A1c were ok. Serum calcium, albumin, phosphorus, and PTH within normal limits.  Subclinical Cushing's was considered,  however 24-hour urine free cortisol is within normal limits    24-hour urine calcium was notable for hypercalciuria (380 mg).  Patient's diet does not appear to be high in calcium.  High urinary calcium could be related to high sodium diet/increase urinary sodium excretion   Of note, urine volume was somewhat high at 2.8 L     There does not appear to be evidence of other modifiable risk factors for low bone density at this point. Patient does meet CF guidelines for treatment of low bone density with Z score of -2.0. He is hesitant about adding another medication but willing to start if necessary. Risks/benefits of bisphosphonate therapy were discussed today.  Use of IV versus oral bisphosphonate also considered.  He would like to start with oral bisphosphonate.    - Repeat 24 hour urine calcium in 3 to 4 months follow up on hypercalciuria  - Will review diet/supplementation with dietitian due to hypercalciuria  - Start Fosamax 70 mg once weekly  - Continue current calcium and vitamin D supplements  - Continue weightbearing exercise as tolerated    Return to clinic in 4 months    DEANNA Wray, MS4 acted as scribe for Dr. Knapp     I have seen and interviewed the patient during this virtual visit, reviewed and edited the  note, and agree with the plan of care.  AVIS Mckeon        Again, thank you for allowing me to participate in the care of your patient.      Sincerely,    Khushboo Knapp MD

## 2021-08-31 NOTE — PROGRESS NOTES
Endocrinology and Diabetes Clinic    Philip Delacruz is a 41 year old male who is being evaluated via a billable video visit.      Video Start Time: 8:11  Video End Time: 8:40    CF Endocrinology Return Consultation: Low bone density  :   Patient: Philip Delacruz MRN# [id number]   YOB: 1980 Age: 41 year old   Date of Visit: 2021        Problem list:     Patient Active Problem List    Diagnosis Date Noted     Small intestinal bacterial overgrowth 2018     Priority: Medium     Exocrine pancreatic insufficiency 04/10/2018     Priority: Medium     Pancreatic insufficiency 2018     Priority: Medium     Chronic sinusitis      Priority: Medium     Cystic fibrosis with pulmonary manifestations 2014     Priority: Medium     SWEAT TEST:  Date:  2014           [facility]  Sample #1   mg     30  mmol/L Cl  Sample #2   mg     27  mmol/L Cl     GENOTYPING:  Date:  2014        [facility]  Genotype:  [id number]  Poly T Variant:  [ 7T ]/[9T  ]              HPI:   Philip is a 41 year old male with CF and bone loss.      Last DEXA scan 2021 showed significant decline in bone density, about 5.9% at the lumbar spine. Lowest z score was -2.0 at lumbar spine L1-L3 with outlier of -3.5 at L4.     He has been doing well since last visit 2021 with good appetite and stable weight. He gained about 10 pounds initially after starting Trikafta in 2019 but has remained stable since then with no recent changes. He continues to follow a diet low in dairy due to GI symptoms. Estimates he has 1 glass of milk and 1 serving of cheese daily. He currently takes a 700 mg calcium daily, 5000 IU vitamin D daily, and CF multivitamin daily. No history of kidney stones. Exercise includes CrossFit 3x/week and physically active job in construction. He has not had any injuries or fractures since last visit. As noted during previous visit, does report remote  history of 2 hand bone fractures in the setting of trauma and an ankle avulsion about 10 years ago. He has mostly stopped using fluticasone, now using it only every once in a while, but notes he still has nasal congestion. No other significant steroid use. No tobacco use. No recent illness, CF exacerbations, or hospitalizations.    Recent 24-hour urine collection showed normal urinary cortisol and high urinary calcium    I have reviewed the available past laboratory evaluations, imaging studies, and medical records available to me at this visit. History was obtained from the patient and the medical record.  I have reviewed the notes of the pulmonary care team entered into the medical record since I last saw the patient.          Past Medical History:     Past Medical History:   Diagnosis Date     Chronic sinusitis      Congenital absence of vas deferens, bilateral      Cystic fibrosis (H)     Delta F508 and N2000D      Nasal polyps      Sinusitis, chronic             Past Surgical History:     Past Surgical History:   Procedure Laterality Date     HC TOOTH EXTRACTION W/FORCEP       NASAL/SINUS POLYPECTOMY       REPAIR PECTUS EXCAVATUM  1997     SINUS SURGERY  1992, 2002, 2004    Removed tubinates and polyps OSH     Sperm harvest                 Social History:     Social History     Social History Narrative    Lives in [place] with wife, Aimee, and sons, Shay and Serafin. 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              Family History:     Family History   Problem Relation Age of Onset     C.A.D. Maternal Grandfather      Diabetes Maternal Grandfather      Heart Disease Maternal Grandfather      Aneurysm Paternal Grandfather         Aortic     Gastrointestinal Disease Father         Reflux     Cancer Paternal Grandmother         Lymphoma     Diabetes Maternal Grandmother      Thyroid Disease Mother       () Other              Allergies:     Allergies   Allergen Reactions     Liquid Adhesive Rash     Tagederm     Tegaderm Transparent Dressing (Informational Only) Rash             Medications:     Current Outpatient Rx   Medication Sig Dispense Refill     albuterol (PROAIR HFA/PROVENTIL HFA/VENTOLIN HFA) 108 (90 Base) MCG/ACT inhaler Inhale 2 puffs into the lungs every 6 hours as needed for shortness of breath / dyspnea or wheezing 1 Inhaler 11     amylase-lipase-protease (CREON) 71479-29673 units CPEP per EC capsule Take 4-5 capsules by mouth 3 times daily (with meals) Take 2-3 capsules with snacks (3 meals and 3 snacks daily) 1890 capsule 3     atorvastatin (LIPITOR) 10 MG tablet Take 1 tablet (10 mg) by mouth daily 90 tablet 3     Ca Cit Malate-Cholecalciferol (CALCIUM CITRATE MALATE-VIT D PO) Take 3 capsules by mouth daily Activated calcium by Nutrikey       cholecalciferol (VITAMIN D3) 125 mcg (5000 units) capsule Take 125 mcg by mouth daily       multivitamin CF formula (MVW COMPLETE FORMULATION) chewable tablet Take 1 tablet by mouth daily       Omega-3 Fatty Acids (FISH OIL) 1200 MG CPDR Take 3 capsules by mouth daily       order for DME Levofloxacin 1 mg/mL nasal irrigation  6 Liters  Rinse with 100 mL to each nostril every day 1 each 3     order for DME Use Wendy Vios nebulizer for nasal/sinus nebulizing as instructed 1 each 1     Sodium Chloride-Sodium Bicarb (SINUFLO READYRINSE) KIT Daily PRN       TRIKAFTA 100-50-75 & 150 MG tablet pack TAKE TWO ORANGE TABLETS BY MOUTH IN THE MORNING AND ONE BLUE TABLET IN THE EVENING AS DIRECTED ON PACKAGE.  TAKE WITH FAT CONTAINING FOOD. 84 each 9     Wheat Dextrin (BENEFIBER PO) Take 2 teaspoonful by mouth daily       ZIRGAN 0.15 % GEL INT 1 GTT IN OU 6 TIMES every day as needed  0             Review of Systems:     Comprehensive ROS negative other than the symptoms noted above in the HPI.          Physical Exam:   There were no vitals taken for this visit.  Growth percentile SmartLinks can  only be used for patients less than 20 years old.  Height: Data Unavailable, Facility age limit for growth percentiles is 20 years.  Weight: 0 lbs 0 oz, Facility age limit for growth percentiles is 20 years.  BMI: There is no height or weight on file to calculate BMI., No height and weight on file for this encounter.            Laboratory results:     TSH   Date Value Ref Range Status   04/08/2021 1.47 0.40 - 4.00 mU/L Final     Triiodothyronine (T3)   Date Value Ref Range Status   07/16/2019 95 60 - 181 ng/dL Final     Testosterone Total   Date Value Ref Range Status   04/08/2021 438 240 - 950 ng/dL Final     Comment:     This test was developed and its performance characteristics determined by the   [facility].   It has not been cleared or approved by the FDA. The laboratory is regulated   under CLIA as qualified to perform high-complexity testing. This test is used   for clinical purposes. It should not be regarded as investigational or for   research.       Cholesterol   Date Value Ref Range Status   07/08/2021 150 <200 mg/dL Final     Albumin Urine mg/L   Date Value Ref Range Status   04/08/2021 10 mg/L Final     Triglycerides   Date Value Ref Range Status   07/08/2021 44 <150 mg/dL Final     HDL Cholesterol   Date Value Ref Range Status   07/08/2021 60 >39 mg/dL Final     LDL Cholesterol Calculated   Date Value Ref Range Status   07/08/2021 81 <100 mg/dL Final     Comment:     Desirable:       <100 mg/dl     Cholesterol/HDL Ratio   Date Value Ref Range Status   12/22/2014 3.3 0.0 - 5.0 Final     Non HDL Cholesterol   Date Value Ref Range Status   07/08/2021 90 <130 mg/dL Final     Lab Results   Component Value Date    A1C 4.8 04/08/2021    A1C 4.8 04/15/2020    A1C 5.2 05/02/2019    A1C 4.6 04/06/2018    A1C 4.8 01/05/2018         DXA, 04/08/2021  Most negative Z score of -2.0 at level of L1-L3 (below normal range for men <50 years  old)  Significantly decreased BMD at level of L1-L3 lumbar spine compared to previous exam (-5.9%)  Significantly decreased BMD at level of L1-L3 lumbar spine and right total hip compared to baseline exam on 12/22/2014 (-10.2% at lumbar spine, -2.5% at hip)     DXA, 05/02/2019  Most negative Z score of -1.6 at level lf lumbar spine  Significantly decreased BMD at lumbar spine, right and left total hip compared to previous exam (-7.8%)     DXA, 04/07/2017  Most negative Z score of -1.1 at level of lumbar spine  Significantly decreased BMD at level of lumbar spine compared to previous exam (-3.4%)     DXA, 12/22/2014 - Baseline Exam  Most negative Z score of -1.3 at level of lumbar spine           Assessment and Plan:   Philip is a 41 year old male with CF and decline in bone density.    Low bone density  Recent DXA from 04/08/21 with decrease in BMD at level of L1-L3 lumbar spine of -5.9% compared to 05/2019 and Z score of -2.0 from L1-L3 (outlier score of -3.5 in L4). No tobacco use, no excessive alcohol use, no family history of osteoporosis. Workup has been done to evaluate for potential secondary causes of bone loss. Recent TSH, testosterone, vitamin D level, and A1c were ok. Serum calcium, albumin, phosphorus, and PTH within normal limits.  Subclinical Cushing's was considered, however 24-hour urine free cortisol is within normal limits    24-hour urine calcium was notable for hypercalciuria (380 mg).  Patient's diet does not appear to be high in calcium.  High urinary calcium could be related to high sodium diet/increase urinary sodium excretion   Of note, urine volume was somewhat high at 2.8 L     There does not appear to be evidence of other modifiable risk factors for low bone density at this point. Patient does meet CF guidelines for treatment of low bone density with Z score of -2.0. He is hesitant about adding another medication but willing to start if necessary. Risks/benefits of bisphosphonate  therapy were discussed today.  Use of IV versus oral bisphosphonate also considered.  He would like to start with oral bisphosphonate.    - Repeat 24 hour urine calcium in 3 to 4 months follow up on hypercalciuria  - Will review diet/supplementation with dietitian due to hypercalciuria  - Start Fosamax 70 mg once weekly  - Continue current calcium and vitamin D supplements  - Continue weightbearing exercise as tolerated    Return to clinic in 4 months    I Melissa Wray, MS4 acted as scribe for Dr. Knapp     I have seen and interviewed the patient during this virtual visit, reviewed and edited the  note, and agree with the plan of care.  AVIS Mckeon

## 2021-09-01 LAB
ANNOTATION COMMENT IMP: NORMAL
CORTIS F 24H UR HPLC-MCNC: 11 UG/L
CORTIS F 24H UR-MRATE: 31.3 UG/D
CORTIS F/CREAT 24H UR: 15.07 UG/G CRT
CREAT 24H UR-MRATE: 2076 MG/D
CREAT UR-MCNC: 73 MG/DL

## 2021-09-01 RX ORDER — ALENDRONATE SODIUM 70 MG/1
70 TABLET ORAL
Qty: 12 TABLET | Refills: 1 | Status: SHIPPED | OUTPATIENT
Start: 2021-09-01 | End: 2022-03-11

## 2021-09-05 ENCOUNTER — HEALTH MAINTENANCE LETTER (OUTPATIENT)
Age: 41
End: 2021-09-05

## 2021-09-09 ENCOUNTER — TELEPHONE (OUTPATIENT)
Dept: NUTRITION | Facility: CLINIC | Age: 41
End: 2021-09-09

## 2021-09-09 NOTE — PROGRESS NOTES
Nutrition Note-    Notified by endocrinologist regarding work-up for low bone density, including hypercalciuria. Some concerns that high sodium intake/high urinary sodium could lead to hypercalciuria.     Spoke with pt via phone call to discuss a moderate sodium diet (~3 gm/day). Reviewed sources of sodium in the diet included added salt and processed foods. Pt does not typically add a lot of salt to foods but does consume processed meats such as beef sticks and deli ham in salads. Discussed alternative options as well as reading nutrition facts label to identify higher sources of sodium in the diet.     Noted that pt also drinking Melaleuca Sustain Sport hydration and NutriKey Key Greens powder -- unable to find nutrition facts online but encouraged pt to look these up at home as these may contain high amounts of sodium.     Will send AND Low Sodium handout via [employer] for reference. Encouraged pt to contact RD if any additional questions or concerns.     Nisa Dumont RD, LD  Cystic Fibrosis/Lung Transplant Dietitian  Pager [phone]

## 2021-09-15 ENCOUNTER — HOSPITAL ENCOUNTER (OUTPATIENT)
Dept: RESEARCH | Facility: CLINIC | Age: 41
End: 2021-09-15
Attending: INTERNAL MEDICINE
Payer: COMMERCIAL

## 2021-09-15 VITALS
BODY MASS INDEX: 23.99 KG/M2 | DIASTOLIC BLOOD PRESSURE: 73 MMHG | RESPIRATION RATE: 14 BRPM | TEMPERATURE: 97.3 F | HEART RATE: 66 BPM | SYSTOLIC BLOOD PRESSURE: 127 MMHG | WEIGHT: 181 LBS | HEIGHT: 73 IN

## 2021-09-15 DIAGNOSIS — Z00.6 EXAMINATION OF PARTICIPANT OR CONTROL IN CLINICAL RESEARCH: Primary | ICD-10-CM

## 2021-09-15 PROCEDURE — 510N000016 HC RESEARCH MEALS, PER MEAL

## 2021-09-15 PROCEDURE — 300N000004 HC RESEARCH SPECIMEN PROCESSING, MODERATE

## 2021-09-15 PROCEDURE — 510N000017 HC CRU PATIENT CARE, PER 15 MIN

## 2021-09-15 PROCEDURE — 250N000009 HC RX 250: Performed by: INTERNAL MEDICINE

## 2021-09-15 PROCEDURE — 510N000009 HC RESEARCH FACILITY, PER 15 MIN

## 2021-09-15 PROCEDURE — 300N000003 HC RESEARCH SPECIMEN PROCESSING, SIMPLE

## 2021-09-15 PROCEDURE — 510N000018 HC RESEARCH OGTT, PER HOUR

## 2021-09-15 RX ADMIN — ALCOHOL 75 G: 65 GEL TOPICAL at 08:20

## 2021-09-15 ASSESSMENT — MIFFLIN-ST. JEOR: SCORE: 1787.26

## 2021-09-15 NOTE — ADDENDUM NOTE
Encounter addended by: Radha Ramos on: 9/15/2021 12:37 PM   Actions taken: Charge Capture section accepted

## 2021-09-15 NOTE — ADDENDUM NOTE
Encounter addended by: Yaya Aviles on: 9/15/2021 2:56 PM   Actions taken: Charge Capture section accepted

## 2021-10-06 NOTE — ADDENDUM NOTE
Encounter addended by: Damon Albarran RN on: 10/6/2021 6:56 AM   Actions taken: Charge Capture section accepted

## 2021-10-07 ENCOUNTER — OFFICE VISIT (OUTPATIENT)
Dept: PULMONOLOGY | Facility: CLINIC | Age: 41
End: 2021-10-07
Attending: INTERNAL MEDICINE
Payer: COMMERCIAL

## 2021-10-07 ENCOUNTER — ALLIED HEALTH/NURSE VISIT (OUTPATIENT)
Dept: CARE COORDINATION | Facility: CLINIC | Age: 41
End: 2021-10-07

## 2021-10-07 VITALS
BODY MASS INDEX: 23.67 KG/M2 | HEIGHT: 73 IN | DIASTOLIC BLOOD PRESSURE: 79 MMHG | SYSTOLIC BLOOD PRESSURE: 123 MMHG | OXYGEN SATURATION: 97 % | WEIGHT: 178.57 LBS | HEART RATE: 67 BPM

## 2021-10-07 DIAGNOSIS — K86.89 PANCREATIC INSUFFICIENCY: ICD-10-CM

## 2021-10-07 DIAGNOSIS — E84.0 CYSTIC FIBROSIS WITH PULMONARY MANIFESTATIONS (H): Primary | ICD-10-CM

## 2021-10-07 DIAGNOSIS — J32.4 CHRONIC PANSINUSITIS: ICD-10-CM

## 2021-10-07 DIAGNOSIS — E84.0 CYSTIC FIBROSIS WITH PULMONARY MANIFESTATIONS (H): ICD-10-CM

## 2021-10-07 DIAGNOSIS — M85.9 LOW BONE DENSITY FOR AGE: ICD-10-CM

## 2021-10-07 DIAGNOSIS — Z13.9 RISK AND FUNCTIONAL ASSESSMENT: Primary | ICD-10-CM

## 2021-10-07 LAB
EXPTIME-PRE: 6.4 SEC
FEF2575-%PRED-PRE: 117 %
FEF2575-PRE: 5.18 L/SEC
FEF2575-PRED: 4.4 L/SEC
FEFMAX-%PRED-PRE: 103 %
FEFMAX-PRE: 11.14 L/SEC
FEFMAX-PRED: 10.75 L/SEC
FEV1-%PRED-PRE: 98 %
FEV1-PRE: 4.53 L
FEV1FEV6-PRE: 85 %
FEV1FEV6-PRED: 82 %
FEV1FVC-PRE: 84 %
FEV1FVC-PRED: 81 %
FIFMAX-PRE: 8.1 L/SEC
FVC-%PRED-PRE: 93 %
FVC-PRE: 5.37 L
FVC-PRED: 5.73 L

## 2021-10-07 PROCEDURE — 87070 CULTURE OTHR SPECIMN AEROBIC: CPT | Performed by: INTERNAL MEDICINE

## 2021-10-07 PROCEDURE — 94375 RESPIRATORY FLOW VOLUME LOOP: CPT | Performed by: INTERNAL MEDICINE

## 2021-10-07 PROCEDURE — 250N000011 HC RX IP 250 OP 636: Performed by: INTERNAL MEDICINE

## 2021-10-07 PROCEDURE — 99214 OFFICE O/P EST MOD 30 MIN: CPT | Mod: 25 | Performed by: INTERNAL MEDICINE

## 2021-10-07 PROCEDURE — G0008 ADMIN INFLUENZA VIRUS VAC: HCPCS | Performed by: INTERNAL MEDICINE

## 2021-10-07 PROCEDURE — G0463 HOSPITAL OUTPT CLINIC VISIT: HCPCS | Mod: 25

## 2021-10-07 PROCEDURE — 90686 IIV4 VACC NO PRSV 0.5 ML IM: CPT | Performed by: INTERNAL MEDICINE

## 2021-10-07 RX ADMIN — INFLUENZA A VIRUS A/GUANGDONG-MAONAN/SWL1536/2019 CNIC-1909 (H1N1) ANTIGEN (FORMALDEHYDE INACTIVATED), INFLUENZA A VIRUS A/HONG KONG/2671/2019 (H3N2) ANTIGEN (FORMALDEHYDE INACTIVATED), INFLUENZA B VIRUS B/PHUKET/3073/2013 ANTIGEN (FORMALDEHYDE INACTIVATED), AND INFLUENZA B VIRUS B/WASHINGTON/02/2019 ANTIGEN (FORMALDEHYDE INACTIVATED) 0.5 ML: 15; 15; 15; 15 INJECTION, SUSPENSION INTRAMUSCULAR at 10:32

## 2021-10-07 ASSESSMENT — MIFFLIN-ST. JEOR: SCORE: 1778.88

## 2021-10-07 NOTE — PROGRESS NOTES
Reason for Visit  Philip Delacruz is a 41 year old year old male who is being seen for Follow Up (CF )      Assessment and plan:  Shar Coley is a 41 year-old male with a history of cystic fibrosis with mild lung disease and pancreatic insufficiency.  Maintenance   Modulator: Trikafta  Mutation:  [id number], Poly T Variant:  [ 7T ]/[9T  ]  AW Clearance: Exercise  Bronchodilators: Albuterol MDI PRN  Mucolytics: none   Antibiotics Inh:  none   Antibiotics Oral: none   Other:   Colonization Hx: Rhizobium (agrobacterium) radiobacter, Aspergillus versicolor, Serratia marcescens, staphylococcus aureus, moraxella (branhamella) catarrhalis  Annual studies due:April 2022  Contraindications to standard of care :    Pulmonary/cystic fibrosis: Patient reports excellent exercise tolerance.  Is oxygenating well.  PFTs are the best they have been in the recent past.  He does not appear to be having a pulmonary exacerbation at this time.  Exercise is his main form of airway clearance.  He has excellent exercise tolerance and rare exacerbations.  We will continue to depend on exercise tolerance for airway clearance.    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  He has responded well to Trikafta with decreased cough and sputum production, improved PFTs, decreased sinus complaints and decreased GI complaints.  He will continue Trikafta.  LFTs and CK will be checked annually.    Pancreatic insufficiency: The patient reports marked improvement in loose stool since starting Trikafta.  He will continue his current pancreatic enzymes and vitamin replacement.  Vitamin levels will be checked with annual studies.    CF related sinus disease: Marked symptomatic improvement since initiation of Trikafta.  No recent sinus infections.    Hypercholesterolemia: Continue Lipitor.  Recheck cholesterol battery with annual studies in the spring.    Reduced bone density: The patient was evaluated in the endocrinology clinic.   Fosamax was initiated.  Elevated urine calcium was noted but repeat was recommended.  This was ordered with today's visit.    Depression/anxiety: Patient reports his mood is currently stable without Lexapro.    Healthcare maintenance: The patient has received 2 doses of the Pfizer Covid vaccine.  He was advised to pursue a booster in the next few weeks.  Influenza vaccine was administered today.    Follow-up in 2 months with PFTs and sputum culture.    True Sahni MD         CF HPI  The patient was seen and examined by True Sahni MD   Shar Coley is a 41 year-old male with a history of cystic fibrosis with mild lung disease and pancreatic insufficiency.  Patient reports a sore throat since Saturday.  Mild, not inhibiting eating or drinking.  He did note a swollen lymph node in his left neck on Saturday which has resolved.  No fever, chills or night sweats.  No ear pain.  No rhinorrhea.  No sinus pain.  No cough.  His children were sick last week but Covid swab was negative.    Breathing is comfortable at rest and with all activity.  He is doing CrossFit 3 times per week.  He reports rare sputum production with exercise otherwise no sputum.  No chest pain.    Review of systems:  Appetite is good  ENT complaints as noted above  No nausea, vomiting or abdominal pain.  Occasional loose stool but much better since starting Trikafta.  Previous joint pains improved.  A complete ROS was otherwise negative except as noted in the HPI.    Current Outpatient Medications   Medication     albuterol (PROAIR HFA/PROVENTIL HFA/VENTOLIN HFA) 108 (90 Base) MCG/ACT inhaler     alendronate (FOSAMAX) 70 MG tablet     amylase-lipase-protease (CREON) 18341-78647 units CPEP per EC capsule     atorvastatin (LIPITOR) 10 MG tablet     Ca Cit Malate-Cholecalciferol (CALCIUM CITRATE MALATE-VIT D PO)     cholecalciferol (VITAMIN D3) 125 mcg (5000 units) capsule     multivitamin CF formula (MVW COMPLETE FORMULATION)  chewable tablet     Omega-3 Fatty Acids (FISH OIL) 1200 MG CPDR     order for DME     order for DME     Sodium Chloride-Sodium Bicarb (SINUFLO READYRINSE) KIT     TRIKAFTA 100-50-75 & 150 MG tablet pack     Wheat Dextrin (BENEFIBER PO)     ZIRGAN 0.15 % GEL     Current Facility-Administered Medications   Medication     influenza quadrivalent (PF) vacc (FLUZONE) injection 0.5 mL     Allergies   Allergen Reactions     Liquid Adhesive Rash     Tagederm     Tegaderm Transparent Dressing (Informational Only) Rash     Past Medical History:   Diagnosis Date     Chronic sinusitis      Congenital absence of vas deferens, bilateral      Cystic fibrosis (H)     Delta F508 and B5299M      Nasal polyps      Sinusitis, chronic        Past Surgical History:   Procedure Laterality Date     HC TOOTH EXTRACTION W/FORCEP       NASAL/SINUS POLYPECTOMY       REPAIR PECTUS EXCAVATUM  1997     SINUS SURGERY  1992, 2002, 2004    Removed tubinates and polyps OSH     Sperm harvest         Social History     Socioeconomic History     Marital status:      Spouse name: Not on file     Number of children: Not on file     Years of education: Not on file     Highest education level: Not on file   Occupational History     Occupation: Teacher   Tobacco Use     Smoking status: Never Smoker     Smokeless tobacco: Never Used   Substance and Sexual Activity     Alcohol use: Yes     Alcohol/week: 0.0 standard drinks     Comment: 1 drink 3X per week     Drug use: No     Sexual activity: Yes     Partners: Female     Birth control/protection: None   Other Topics Concern     Parent/sibling w/ CABG, MI or angioplasty before 65F 55M? Not Asked   Social History Narrative    Lives in [place] with wife, Aimee, and sons, Shay and Serafin. 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     Social Determinants of Health     Financial Resource Strain:      Difficulty of Paying  "Living Expenses:    Food Insecurity:      Worried About Running Out of Food in the Last Year:      Ran Out of Food in the Last Year:    Transportation Needs:      Lack of Transportation (Medical):      Lack of Transportation (Non-Medical):    Physical Activity:      Days of Exercise per Week:      Minutes of Exercise per Session:    Stress:      Feeling of Stress :    Social Connections:      Frequency of Communication with Friends and Family:      Frequency of Social Gatherings with Friends and Family:      Attends Hindu Services:      Active Member of Clubs or Organizations:      Attends Club or Organization Meetings:      Marital Status:    Intimate Partner Violence:      Fear of Current or Ex-Partner:      Emotionally Abused:      Physically Abused:      Sexually Abused:          /79   Pulse 67   Ht 1.854 m (6' 1\")   Wt 81 kg (178 lb 9.2 oz)   SpO2 97%   BMI 23.56 kg/m    Body mass index is 23.56 kg/m .  Exam:   GENERAL APPEARANCE: Well developed, well nourished, alert, and in no apparent distress.  EYES: PERRL, EOMI  HENT: Nasal mucosa with edema and no hyperemia. No nasal polyps.  EARS: Canals clear, TMs normal  MOUTH: Oral mucosa is moist, without any lesions, no tonsillar enlargement, no oropharyngeal exudate.  NECK: supple, no masses, no thyromegaly.  LYMPHATICS: No significant axillary, cervical, or supraclavicular nodes.  RESP: normal percussion, good air flow throughout.  No crackles. No rhonchi. No wheezes.  CV: Normal S1, S2, regular rhythm, normal rate. No murmur.  No rub. No gallop. No LE edema.   ABDOMEN:  Bowel sounds normal, soft, nontender, no HSM or masses.   MS: extremities normal. No clubbing. No cyanosis.  SKIN: no rash on limited exam  NEURO: Mentation intact, speech normal, normal strength and tone, normal gait and stance  PSYCH: mentation appears normal. and affect normal/bright  Results:  Recent Results (from the past 168 hour(s))   General PFT Lab (Please always keep " checked)    Collection Time: 10/07/21  9:11 AM   Result Value Ref Range    FVC-Pred 5.73 L    FVC-Pre 5.37 L    FVC-%Pred-Pre 93 %    FEV1-Pre 4.53 L    FEV1-%Pred-Pre 98 %    FEV1FVC-Pred 81 %    FEV1FVC-Pre 84 %    FEFMax-Pred 10.75 L/sec    FEFMax-Pre 11.14 L/sec    FEFMax-%Pred-Pre 103 %    FEF2575-Pred 4.40 L/sec    FEF2575-Pre 5.18 L/sec    MMD8066-%Pred-Pre 117 %    ExpTime-Pre 6.40 sec    FIFMax-Pre 8.10 L/sec    FEV1FEV6-Pred 82 %    FEV1FEV6-Pre 85 %                   Results as noted above.    PFT Interpretation:  Normal spirometry.  Increased from previous.  Best in recent past.  Valid Maneuver             CF Exacerbation  Absent      PROMIS 10 reviewed with the patient by the provider.

## 2021-10-07 NOTE — PATIENT INSTRUCTIONS
Cystic Fibrosis Self-Care Plan    RECOMMENDATIONS:   Continue current medication, nebs and vest therapy.  24 hour urine calcium          [facility] Nurse line:  Rock Hurtado Justyna  [phone]     [facility] Fax Number:      [phone]         Cystic Fibrosis Respiratory Therapists:   Kristi Ndiaye              [phone]          Loida Trinidad   [phone]  Cystic Fibrosis Dietitians:              Nisa Dumont              [phone]                            April Cardona                        [phone]   Cystic Fibrosis Diabetes Nurse:    Barbara Birmingham   [phone]    Cystic Fibrosis Social Workers:     Melissa Calle               [phone]                     Debora Joseph               [phone]  Cystic Fibrosis Pharmacists:           Amanda Gonzalez                               [phone]         Lennie Lara      [phone]   Cystic Fibrosis Genetic Counselor:   Faby Flores    [phone]    [facility] website:  [email or web address]    COVID VACCINES:    You are eligible for the COVID-19 vaccine. Sign up for your COVID vaccine via [email or web address]. Log in, select the menu bar, select schedule an appointment, and then select COVID-19 Vaccine 1st Dose. You may also schedule by calling this number [phone] however hold times can be long.       OR schedule through the [place] [facility] Vaccine Connector at https://vaccineconnector.[place].gov/ or by calling [phone].      The best vaccine is the one that s available to you first.  All COVID-19 vaccines currently available in the [place] (Eyal & Eyal, Pfizer and Moderna) have been shown to be highly effect at preventing COVID-19.       We re still learning how vaccines will affect the spread of COVID-19. After you ve been fully vaccinated against COVID-19, you should keep taking precautions in public places like wearing a mask, staying 6 feet apart from  others, and avoiding crowds and poorly ventilated spaces until we know more.       MRN: [id number]   Clinic Date: October 7, 2021   Patient: Philip Delacruz     Annual Studies:   IGG   Date Value Ref Range Status   04/08/2021 1,198 610 - 1,616 mg/dL Final     Insulin   Date Value Ref Range Status   05/02/2019 9.0 3 - 25 mU/L Final     There are no preventive care reminders to display for this patient.    Pulmonary Function Tests  FEV1: amount of air you can blow out in 1 second  FVC: total amount of air you can take in and blow out    Your Goals:         PFT Latest Ref Rng & Units 10/7/2021   FVC L 5.37   FEV1 L 4.53   FVC% % 93   FEV1% % 98          Airway Clearance: The Most Important Way to Keep Your Lungs Healthy  Exercise for airway clearance.    Good Nutrition Can Improve Lung Function and Overall Health     Take ALL of your vitamins with food     Take 1/2 of your enzymes before EVERY meal/snack and the other 1/2 mid-meal/snack    Wt Readings from Last 3 Encounters:   10/07/21 81 kg (178 lb 9.2 oz)   09/15/21 82.1 kg (181 lb)   07/08/21 83.2 kg (183 lb 6.8 oz)       Body mass index is 23.56 kg/m .         National CF Foundation Recommendations for BMI in CF Adults: Women: at least 22 Men: at least 23        Controlling Blood Sugars Helps Prevent Lung Infections & Improves Nutrition  Test blood sugar:     In the morning before eating (goal is )     2 hours after a meal (goal is less than 150)     When pre-meal glucose is greater than 150 add correction     At bedtime (if less than 100 eat a snack with 15 grams of carbohydrates  Last A1C Results:   Hemoglobin A1C   Date Value Ref Range Status   04/08/2021 4.8 0 - 5.6 % Final     Comment:     Normal <5.7% Prediabetes 5.7-6.4%  Diabetes 6.5% or higher - adopted from ADA   consensus guidelines.           If diabetic, measure A1C every 6 months. Goal: Under 7%    Staying Healthy    Research:  If you are interested in learning about research opportunities  or have questions, please contact the CF Research Team at [phone] or [email or web address].      CF Foundation:  Compass is a personalized resource service to help you with the insurance, financial, legal and other issues you are facing.  It's free, confidential and available to anyone with CF.  Ask your  for more information or contact Compass directly at [phone] ([phone]) or compass@cff.org, or learn more at cff.org/compass.

## 2021-10-07 NOTE — LETTER
10/7/2021         RE: Philip Delacruz  [address]  [facility] [address]        Dear Colleague,    Thank you for referring your patient, Philip Delacruz, to the [facility] [place]. Please see a copy of my visit note below.    Reason for Visit  Philip Delacruz is a 41 year old year old male who is being seen for Follow Up (CF )      Assessment and plan:  Shar Coley is a 41 year-old male with a history of cystic fibrosis with mild lung disease and pancreatic insufficiency.  Maintenance   Modulator: Trikafta  Mutation:  [id number], Poly T Variant:  [ 7T ]/[9T  ]  AW Clearance: Exercise  Bronchodilators: Albuterol MDI PRN  Mucolytics: none   Antibiotics Inh:  none   Antibiotics Oral: none   Other:   Colonization Hx: Rhizobium (agrobacterium) radiobacter, Aspergillus versicolor, Serratia marcescens, staphylococcus aureus, moraxella (branhamella) catarrhalis  Annual studies due:April 2022  Contraindications to standard of care :    Pulmonary/cystic fibrosis: Patient reports excellent exercise tolerance.  Is oxygenating well.  PFTs are the best they have been in the recent past.  He does not appear to be having a pulmonary exacerbation at this time.  Exercise is his main form of airway clearance.  He has excellent exercise tolerance and rare exacerbations.  We will continue to depend on exercise tolerance for airway clearance.    CFTR Modulation: The patient is an G407ury heterozygote.  He has responded well to Trikafta with decreased cough and sputum production, improved PFTs, decreased sinus complaints and decreased GI complaints.  He will continue Trikafta.  LFTs and CK will be checked annually.    Pancreatic insufficiency: The patient reports marked improvement in loose stool since starting Trikafta.  He will continue his current pancreatic enzymes and vitamin replacement.  Vitamin levels will be checked with annual studies.    CF  related sinus disease: Marked symptomatic improvement since initiation of Trikafta.  No recent sinus infections.    Hypercholesterolemia: Continue Lipitor.  Recheck cholesterol battery with annual studies in the spring.    Reduced bone density: The patient was evaluated in the endocrinology clinic.  Fosamax was initiated.  Elevated urine calcium was noted but repeat was recommended.  This was ordered with today's visit.    Depression/anxiety: Patient reports his mood is currently stable without Lexapro.    Healthcare maintenance: The patient has received 2 doses of the Pfizer Covid vaccine.  He was advised to pursue a booster in the next few weeks.  Influenza vaccine was administered today.    Follow-up in 2 months with PFTs and sputum culture.    True Sahni MD         CF HPI  The patient was seen and examined by True Sahni MD   Shar Coley is a 41 year-old male with a history of cystic fibrosis with mild lung disease and pancreatic insufficiency.  Patient reports a sore throat since Saturday.  Mild, not inhibiting eating or drinking.  He did note a swollen lymph node in his left neck on Saturday which has resolved.  No fever, chills or night sweats.  No ear pain.  No rhinorrhea.  No sinus pain.  No cough.  His children were sick last week but Covid swab was negative.    Breathing is comfortable at rest and with all activity.  He is doing CrossFit 3 times per week.  He reports rare sputum production with exercise otherwise no sputum.  No chest pain.    Review of systems:  Appetite is good  ENT complaints as noted above  No nausea, vomiting or abdominal pain.  Occasional loose stool but much better since starting Trikafta.  Previous joint pains improved.  A complete ROS was otherwise negative except as noted in the HPI.    Current Outpatient Medications   Medication     albuterol (PROAIR HFA/PROVENTIL HFA/VENTOLIN HFA) 108 (90 Base) MCG/ACT inhaler     alendronate (FOSAMAX) 70 MG tablet      amylase-lipase-protease (CREON) 91946-68705 units CPEP per EC capsule     atorvastatin (LIPITOR) 10 MG tablet     Ca Cit Malate-Cholecalciferol (CALCIUM CITRATE MALATE-VIT D PO)     cholecalciferol (VITAMIN D3) 125 mcg (5000 units) capsule     multivitamin CF formula (MVW COMPLETE FORMULATION) chewable tablet     Omega-3 Fatty Acids (FISH OIL) 1200 MG CPDR     order for DME     order for DME     Sodium Chloride-Sodium Bicarb (SINUFLO READYRINSE) KIT     TRIKAFTA 100-50-75 & 150 MG tablet pack     Wheat Dextrin (BENEFIBER PO)     ZIRGAN 0.15 % GEL     Current Facility-Administered Medications   Medication     influenza quadrivalent (PF) vacc (FLUZONE) injection 0.5 mL     Allergies   Allergen Reactions     Liquid Adhesive Rash     Tagederm     Tegaderm Transparent Dressing (Informational Only) Rash     Past Medical History:   Diagnosis Date     Chronic sinusitis      Congenital absence of vas deferens, bilateral      Cystic fibrosis (H)     Delta F508 and M1970M      Nasal polyps      Sinusitis, chronic        Past Surgical History:   Procedure Laterality Date     HC TOOTH EXTRACTION W/FORCEP       NASAL/SINUS POLYPECTOMY       REPAIR PECTUS EXCAVATUM  1997     SINUS SURGERY  1992, 2002, 2004    Removed tubinates and polyps OSH     Sperm harvest         Social History     Socioeconomic History     Marital status:      Spouse name: Not on file     Number of children: Not on file     Years of education: Not on file     Highest education level: Not on file   Occupational History     Occupation: Teacher   Tobacco Use     Smoking status: Never Smoker     Smokeless tobacco: Never Used   Substance and Sexual Activity     Alcohol use: Yes     Alcohol/week: 0.0 standard drinks     Comment: 1 drink 3X per week     Drug use: No     Sexual activity: Yes     Partners: Female     Birth control/protection: None   Other Topics Concern     Parent/sibling w/ CABG, MI or angioplasty before 65F 55M? Not Asked   Social History  "Chris    Lives in [place] with wife, Aimee, and sons, Shay and Serafin. 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     Social Determinants of Health     Financial Resource Strain:      Difficulty of Paying Living Expenses:    Food Insecurity:      Worried About Running Out of Food in the Last Year:      Ran Out of Food in the Last Year:    Transportation Needs:      Lack of Transportation (Medical):      Lack of Transportation (Non-Medical):    Physical Activity:      Days of Exercise per Week:      Minutes of Exercise per Session:    Stress:      Feeling of Stress :    Social Connections:      Frequency of Communication with Friends and Family:      Frequency of Social Gatherings with Friends and Family:      Attends Oriental orthodox Services:      Active Member of Clubs or Organizations:      Attends Club or Organization Meetings:      Marital Status:    Intimate Partner Violence:      Fear of Current or Ex-Partner:      Emotionally Abused:      Physically Abused:      Sexually Abused:          /79   Pulse 67   Ht 1.854 m (6' 1\")   Wt 81 kg (178 lb 9.2 oz)   SpO2 97%   BMI 23.56 kg/m    Body mass index is 23.56 kg/m .  Exam:   GENERAL APPEARANCE: Well developed, well nourished, alert, and in no apparent distress.  EYES: PERRL, EOMI  HENT: Nasal mucosa with edema and no hyperemia. No nasal polyps.  EARS: Canals clear, TMs normal  MOUTH: Oral mucosa is moist, without any lesions, no tonsillar enlargement, no oropharyngeal exudate.  NECK: supple, no masses, no thyromegaly.  LYMPHATICS: No significant axillary, cervical, or supraclavicular nodes.  RESP: normal percussion, good air flow throughout.  No crackles. No rhonchi. No wheezes.  CV: Normal S1, S2, regular rhythm, normal rate. No murmur.  No rub. No gallop. No LE edema.   ABDOMEN:  Bowel sounds normal, soft, nontender, no HSM or masses.   MS: extremities normal. No " clubbing. No cyanosis.  SKIN: no rash on limited exam  NEURO: Mentation intact, speech normal, normal strength and tone, normal gait and stance  PSYCH: mentation appears normal. and affect normal/bright  Results:  Recent Results (from the past 168 hour(s))   General PFT Lab (Please always keep checked)    Collection Time: 10/07/21  9:11 AM   Result Value Ref Range    FVC-Pred 5.73 L    FVC-Pre 5.37 L    FVC-%Pred-Pre 93 %    FEV1-Pre 4.53 L    FEV1-%Pred-Pre 98 %    FEV1FVC-Pred 81 %    FEV1FVC-Pre 84 %    FEFMax-Pred 10.75 L/sec    FEFMax-Pre 11.14 L/sec    FEFMax-%Pred-Pre 103 %    FEF2575-Pred 4.40 L/sec    FEF2575-Pre 5.18 L/sec    ZFK0560-%Pred-Pre 117 %    ExpTime-Pre 6.40 sec    FIFMax-Pre 8.10 L/sec    FEV1FEV6-Pred 82 %    FEV1FEV6-Pre 85 %                   Results as noted above.    PFT Interpretation:  Normal spirometry.  Increased from previous.  Best in recent past.  Valid Maneuver             CF Exacerbation  Absent      PROMIS 10 reviewed with the patient by the provider.        Again, thank you for allowing me to participate in the care of your patient.        Sincerely,        True Sahni MD

## 2021-10-07 NOTE — NURSING NOTE
Chief Complaint   Patient presents with     Follow Up     CF      Vitals were taken and medications were reconciled.     Mary Bowman RMA  9:46 AM

## 2021-10-12 LAB — BACTERIA SPEC CULT: NORMAL

## 2021-10-14 ENCOUNTER — MYC MEDICAL ADVICE (OUTPATIENT)
Dept: PULMONOLOGY | Facility: CLINIC | Age: 41
End: 2021-10-14

## 2021-10-18 NOTE — PROGRESS NOTES
"Adult Cystic Fibrosis Program  Annual Psychosocial Assessment    Presenting Information:  SHAR is a 41-year-old male with cystic fibrosis, presenting in CF clinic for a regular follow up with primary CF provider, Dr. True Sahni.      Living situation:  Shar lives with his wife, Aimee, their 5 year old son, Shay, and 2 year old son, Serafin, in [place], [place]. They own their house and have lived there since 2012. They have 2 cats. He denies any current concerns about his living situation.     Family Constellation:  Shar was raised by his biological parents, in [place], [place]. He has 2 sibling(s): 1 older brother and a younger sister. He has four nieces. His sister lives in [place] at his parents home and his brother lives in [place]. Milena's family lives in [place] and they see them frequently. He is not aware of any other family members having CF.     Shar and Aimee have been  since 2012 and together since 2006. Their sons were conceived via IVF. They enjoy being parents and are keeping busy with their sons. Their son, Shay, just started .    Social Support:  Shar reports good social support.  He gets along well with his wife and other family members and draws additional support from friends as well as a mentor who he tries to meet with regularly. Shar had a colleague with a daughter who has CF, otherwise he has no other contact within the CF community.      Adjustment to Illness:  Shar  was diagnosed with CF at age 34, in the summer of 2014. 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 As a child, he notes he experienced allergy and sinus problems, including 3 surgeries, one at age 12, and 2 in college. He was bothered by congestion and headaches and thought of himself as \"the sick one\" in his family. He had pectus excavatum in adolescence.     Overall, Shar describes his current health status as " "\"great\". He started trikafta almost two years ago and has seen significant improvements in his health. Today, he notes he no longer experiences joint pain which was previously a huge concern for him. He also used to get ulcers in his eyes which have completely gone away, which he thinks can be attributed to being in a less stressful job. Shar has not been advised to use vest therapy, and uses Aerobika and exercise for airway clearance. He notes that he works out 3x a week doing crossfit training.     Shar is private about his CF diagnosis, although reports he is working on being more open.      Health Care Directive:  This SW reviewed [facility] education, including concept/purpose of health care directive, default health care agent (his wife) and how to complete a directive. Shar reported that he is comfortable with his default decision-maker (his wife) but would like to review a directive. SW will follow up via email.     Education:  Shar completed a Masters degree in Teaching at [employer] in Fall 2013. 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 He denies plans for further education at this time.     Employment:  Shar was previously employed full-time as a . He left this job over a year ago due to COVID. 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 He notes a difference in communication style with his supervisor which has been challenging, therefore, he is looking for alternate work. 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 He has considered going back to teaching due to struggles with his current boss but remembers the significant stress this caused him so he has been hesitant to do so. He does not have access to benefits through his work at this time.     Shar's wife Aimee is employed as a . She has been working from home since the start of COVID and will be indefinitely. She " "has health insurance and other benefits through her work.     Finances:  Shar and his wife receive income from wages. Shar denies any financial concerns.       Insurance:  Shar is insured through a [employer] plan. He is on this plan since his employer does not provide benefits. He notes a high premium with this plan but has not encountered any major issues with the plan so far.      Mental Health/Coping:  Shar denies any current or past symptoms indicative of anxiety, eating, learning or other mental health disorder. He also denies family history of mental health concerns.      Shar describes his mood recently as \"good\". He was previously seeing Dr. Crystal and was prescribed lexapro. He notes that he is no longer taking this medication and feels like he no longer needs it. He acknowledges teaching was a big stressor for him and the main reason why he started taking medication in the first place.      SW did not administer FELIX-7 and PHQ-9 as Shar had to get going to another appointment.    Shar reports minimal stress. He kenan with stress by getting out of the house, going for a drive, or working out.       Shar does not currently identify tg/spirituality as important in his life.     Chemical Health:  Shar denies tobacco or illicit drug use.  He drinks alcohol on occasion, consuming 1 drink 3-4 times a week. He denies any current/past psychosocial impairment caused by alcohol use.       Leisure Activities/Interests:   Shar enjoys hiking, going to the park with his sons, going to [employer], traveling, working out, biking, riding recreational vehicles, and spending time with friends.      Intervention:  -Psychosocial Assessment  -Supportive counseling    Assessment:  Shar was in good spirits and appeared to be open in his responses. He has had significant improvements in his health which he attributes to starting trikafta and also to stopping teaching. He also feels that quitting teaching " has helped immensely with his mental health. Shar is in the process for looking for a new job but would like to stay in his current field. He remains well supported by his wife and family members. No financial/insurance concerns.     Shar seems to be psychosocially stable overall, with access to relevant resources and supports. No concerns expressed/noted.    Plan:  Re-consult for any psychosocial needs that may arise.    Complete psychosocial assessment annually.  Continue to follow for regular clinic consult.    AGUSTÍN Erickson, [facility]  Adult Cystic Fibrosis   Ph: [phone], Pager: [phone]

## 2021-10-19 NOTE — TELEPHONE ENCOUNTER
RN called patient to follow up if he's schedule a lab appointment for his urine calcium lab. Patient states he is planning to get his COVID booster and  the urine specimen cup at the end of the week or beginning of next week. He said he would like to make one trip to have this done.RN verbalized understanding and will watch for results. Once resulted, will send a message to Dr. Knapp to review results.    Justyna Hastings RN

## 2021-10-28 ENCOUNTER — LAB (OUTPATIENT)
Dept: LAB | Facility: CLINIC | Age: 41
End: 2021-10-28
Payer: COMMERCIAL

## 2021-10-28 DIAGNOSIS — M85.9 LOW BONE DENSITY: Primary | ICD-10-CM

## 2021-11-15 LAB
CALCIUM 24H UR-MRATE: 0.38 G/SPEC
CALCIUM UR-MCNC: 14.9 MG/DL
CALCIUM/CREAT UR: 0.18 G/G CR
COLLECT DURATION TIME UR: 24 H
CREAT 24H UR-MRATE: 2.19 G/SPEC (ref 1–2)
CREAT UR-MCNC: 85 MG/DL
SPECIMEN VOL UR: 2574 ML

## 2021-11-15 PROCEDURE — 82340 ASSAY OF CALCIUM IN URINE: CPT | Performed by: PATHOLOGY

## 2021-11-15 PROCEDURE — 81050 URINALYSIS VOLUME MEASURE: CPT | Performed by: PATHOLOGY

## 2022-01-20 ENCOUNTER — EXTERNAL ORDER RESULTS (OUTPATIENT)
Dept: PULMONOLOGY | Facility: CLINIC | Age: 42
End: 2022-01-20

## 2022-01-20 ENCOUNTER — VIRTUAL VISIT (OUTPATIENT)
Dept: PULMONOLOGY | Facility: CLINIC | Age: 42
End: 2022-01-20
Attending: INTERNAL MEDICINE
Payer: COMMERCIAL

## 2022-01-20 ENCOUNTER — TELEPHONE (OUTPATIENT)
Dept: PULMONOLOGY | Facility: CLINIC | Age: 42
End: 2022-01-20

## 2022-01-20 DIAGNOSIS — J32.4 CHRONIC PANSINUSITIS: ICD-10-CM

## 2022-01-20 DIAGNOSIS — E84.0 CYSTIC FIBROSIS WITH PULMONARY MANIFESTATIONS (H): Primary | ICD-10-CM

## 2022-01-20 DIAGNOSIS — K86.81 EXOCRINE PANCREATIC INSUFFICIENCY: ICD-10-CM

## 2022-01-20 LAB
FEV-1: 4.23
FEV1-%PRED-PRE (EXTERNAL): 91
FEV1/FVC: NORMAL
FVC-%PRED-PRE (EXTERNAL): 88
FVC: 5.14

## 2022-01-20 NOTE — PATIENT INSTRUCTIONS
Cystic Fibrosis Self-Care Plan    RECOMMENDATIONS:   Continue current medication and exercise.          [facility] Nurse line:  MADELYN Hurtado anival Jansen  [phone]     [facility] Fax Number:      [phone]         Cystic Fibrosis Respiratory Therapists:   Kristi Ndiaye              [phone]          Loida Trinidad   [phone]  Cystic Fibrosis Dietitians:              Nisa Dumont              [phone]                            April Cardona                        [phone]   Cystic Fibrosis Diabetes Nurse:    Barbara Birmingham   [phone]    Cystic Fibrosis Social Workers:     Melissa Calle               [phone]                     Debora Joseph               [phone]  Cystic Fibrosis Pharmacists:           Amanda Gonzalez                               [phone]         Lennie Lara      [phone]   Cystic Fibrosis Genetic Counselor:   Faby Flores    [phone]    [facility] website:  [email or web address]    COVID VACCINES:    You are eligible for the COVID-19 vaccine. Sign up for your COVID vaccine via [employer]. Log in, select the menu bar, select schedule an appointment, and then select COVID-19 Vaccine 1st Dose. You may also schedule by calling this number [phone] however hold times can be long.       OR schedule through the [place] Department of Health Vaccine Connector at https://vaccineconnector.[place].gov/ or by calling [phone].      The best vaccine is the one that s available to you first.  All COVID-19 vaccines currently available in the [place] (Eyal & Eyal, Pfizer and Moderna) have been shown to be highly effect at preventing COVID-19.       We re still learning how vaccines will affect the spread of COVID-19. After you ve been fully vaccinated against COVID-19, you should keep taking precautions in public places like wearing a mask, staying 6 feet apart from others, and avoiding crowds and  poorly ventilated spaces until we know more.       MRN: [id number]   Clinic Date: January 20, 2022   Patient: Philip Delacruz     Annual Studies:   IGG   Date Value Ref Range Status   04/08/2021 1,198 610 - 1,616 mg/dL Final     Insulin   Date Value Ref Range Status   05/02/2019 9.0 3 - 25 mU/L Final     There are no preventive care reminders to display for this patient.    Pulmonary Function Tests  FEV1: amount of air you can blow out in 1 second  FVC: total amount of air you can take in and blow out    Your Goals:         PFT Latest Ref Rng & Units 10/7/2021   FVC L 5.37   FEV1 L 4.53   FVC% % 93   FEV1% % 98          Airway Clearance: The Most Important Way to Keep Your Lungs Healthy  Continue regular vigorous exercise.    Wt Readings from Last 3 Encounters:   10/07/21 81 kg (178 lb 9.2 oz)   09/15/21 82.1 kg (181 lb)   07/08/21 83.2 kg (183 lb 6.8 oz)       There is no height or weight on file to calculate BMI.         National CF Foundation Recommendations for BMI in CF Adults: Women: at least 22 Men: at least 23        Controlling Blood Sugars Helps Prevent Lung Infections & Improves Nutrition  Test blood sugar:     In the morning before eating (goal is )     2 hours after a meal (goal is less than 150)     When pre-meal glucose is greater than 150 add correction     At bedtime (if less than 100 eat a snack with 15 grams of carbohydrates  Last A1C Results:   Hemoglobin A1C POCT   Date Value Ref Range Status   04/08/2021 4.8 0 - 5.6 % Final     Comment:     Normal <5.7% Prediabetes 5.7-6.4%  Diabetes 6.5% or higher - adopted from ADA   consensus guidelines.           If diabetic, measure A1C every 6 months. Goal: Under 7%    Staying Healthy    Research:  If you are interested in learning about research opportunities or have questions, please contact the CF Research Team at [phone] or [email or web address].      CF Foundation:  Compass is a personalized resource service to help you with the insurance,  financial, legal and other issues you are facing.  It's free, confidential and available to anyone with CF.  Ask your  for more information or contact Compass directly at [phone]-EJHWEZM ([phone]) or compass@cff.org, or learn more at cff.org/compass.

## 2022-01-20 NOTE — TELEPHONE ENCOUNTER
Spoke with pt regarding scheduling 12 week follow up with Dr. Sahni for mid to end of April with labs, CXR and PFT prior after having virtual visit earlier today. Appts scheduled and details confirmed with pt

## 2022-01-20 NOTE — PROGRESS NOTES
"Shar is a 41 year old who is being evaluated via a billable video visit.      How would you like to obtain your AVS? [email or web address]  If the video visit is dropped, the invitation should be resent by: Other e-mail: [employer]  Will anyone else be joining your video visit? No  {If patient encounters technical issues they should call [phone] :[id number]}    Video Start Time: {video visit start/end time for provider to select:152948}  Video-Visit Details    Type of service:  Video Visit    Video End Time:{video visit start/end time for provider to select:152948}    Originating Location (pt. Location): {video visit patient location:542422::\"Home\"}    Distant Location (provider location):  [facility]     Platform used for Video Visit: {Virtual Visit Platforms:399805::\"Wetradetogether\"}  Reason for Visit  Philip Delacruz is a 41 year old year old male who is being seen for No chief complaint on file.      Assessment and plan: ***      Maintenance   Modulator:   Mutation:   AW Clearance:   Bronchodilators:   Mucolytics:  Antibiotics Inh:   Antibiotics Oral:   Other:  Colonization Hx:  Annual Studies:  Contraindications to standard of care:      CF HPI  The patient was seen and examined by True Sahni MD     A complete ROS was otherwise negative except as noted in the HPI.    Current Outpatient Medications   Medication     albuterol (PROAIR HFA/PROVENTIL HFA/VENTOLIN HFA) 108 (90 Base) MCG/ACT inhaler     alendronate (FOSAMAX) 70 MG tablet     amylase-lipase-protease (CREON) 40140-91569 units CPEP per EC capsule     atorvastatin (LIPITOR) 10 MG tablet     Ca Cit Malate-Cholecalciferol (CALCIUM CITRATE MALATE-VIT D PO)     cholecalciferol (VITAMIN D3) 125 mcg (5000 units) capsule     multivitamin CF formula (MVW COMPLETE FORMULATION) chewable tablet     Omega-3 Fatty Acids (FISH OIL) 1200 MG CPDR     order for DME     order for DME     Sodium Chloride-Sodium Bicarb " (SINUFLO READYRINSE) KIT     TRIKAFTA 100-50-75 & 150 MG tablet pack     Wheat Dextrin (BENEFIBER PO)     ZIRGAN 0.15 % GEL     No current facility-administered medications for this visit.     Allergies   Allergen Reactions     Liquid Adhesive Rash     Tagederm     Tegaderm Transparent Dressing (Informational Only) Rash     Past Medical History:   Diagnosis Date     Chronic sinusitis      Congenital absence of vas deferens, bilateral      Cystic fibrosis (H)     Delta F508 and [id number]      Nasal polyps      Sinusitis, chronic        Past Surgical History:   Procedure Laterality Date     HC TOOTH EXTRACTION W/FORCEP       NASAL/SINUS POLYPECTOMY       REPAIR PECTUS EXCAVATUM  1997     SINUS SURGERY  1992, 2002, 2004    Removed tubinates and polyps OSH     Sperm harvest         Social History     Socioeconomic History     Marital status:      Spouse name: Not on file     Number of children: Not on file     Years of education: Not on file     Highest education level: Not on file   Occupational History     Occupation: Teacher   Tobacco Use     Smoking status: Never Smoker     Smokeless tobacco: Never Used   Substance and Sexual Activity     Alcohol use: Yes     Alcohol/week: 0.0 standard drinks     Comment: 1 drink 3X per week     Drug use: No     Sexual activity: Yes     Partners: Female     Birth control/protection: None   Other Topics Concern     Parent/sibling w/ CABG, MI or angioplasty before 65F 55M? Not Asked   Social History Narrative    Lives in [place] with wife, Aimee, and sons, Shay and Serafin. 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     Social Determinants of Health     Financial Resource Strain: Not on file   Food Insecurity: Not on file   Transportation Needs: Not on file   Physical Activity: Not on file   Stress: Not on file   Social Connections: Not on file   Intimate Partner Violence: Not on file   Housing  Stability: Not on file         There were no vitals taken for this visit.  There is no height or weight on file to calculate BMI.  Exam:   GENERAL APPEARANCE: Well developed, well nourished, alert, and in no apparent distress.  EYES: PERRL, EOMI  HENT: Nasal mucosa with no edema and no hyperemia. No nasal polyps.  EARS: Canals clear, TMs normal  MOUTH: Oral mucosa is moist, without any lesions, no tonsillar enlargement, no oropharyngeal exudate.  NECK: supple, no masses, no thyromegaly.  LYMPHATICS: No significant axillary, cervical, or supraclavicular nodes.  RESP: normal percussion, good air flow throughout.  No crackles. No rhonchi. No wheezes.  CV: Normal S1, S2, regular rhythm, normal rate. No murmur.  No rub. No gallop. No LE edema.   ABDOMEN:  Bowel sounds normal, soft, nontender, no HSM or masses.   MS: extremities normal. No clubbing. No cyanosis.  SKIN: no rash on limited exam  NEURO: Mentation intact, speech normal, normal strength and tone, normal gait and stance  PSYCH: mentation appears normal. and affect normal/bright  Results:  No results found for this or any previous visit (from the past 168 hour(s)).                Results as noted above.    PFT Interpretation:  {Kaitlyn PFT interpretation:952101}  {Increase/decrease:842746}  {JDPFT:421890}  Valid Maneuver             CF Exacerbation  {CF EXACERBATION STATUS:371120}

## 2022-01-21 ENCOUNTER — TELEPHONE (OUTPATIENT)
Dept: PULMONOLOGY | Facility: CLINIC | Age: 42
End: 2022-01-21
Payer: COMMERCIAL

## 2022-01-21 NOTE — TELEPHONE ENCOUNTER
Was patient tested for COVID: Yes  Reason for testing: Symptoms consistent with COVID-19  Testing date: 1/3/2022   Type of test: Nasopharyngeal swab (antigen)  Testing result: Positive   Symptomatic: Yes  Date of first symptom: 1/3/2022  S/sx: Cough, different from CF, Chills and Other Sore throat  Disposition: Self-Quarantine at home  If at Self-quarantine at home - any treatment needed: Symptom management at home and rest    Recovery date: 1/13/2022    Justyna Monroe RN

## 2022-02-20 ENCOUNTER — HEALTH MAINTENANCE LETTER (OUTPATIENT)
Age: 42
End: 2022-02-20

## 2022-03-09 DIAGNOSIS — M85.9 LOW BONE DENSITY FOR AGE: ICD-10-CM

## 2022-03-11 RX ORDER — ALENDRONATE SODIUM 70 MG/1
TABLET ORAL
Qty: 12 TABLET | Refills: 1 | Status: SHIPPED | OUTPATIENT
Start: 2022-03-11 | End: 2022-08-22

## 2022-04-07 DIAGNOSIS — E78.00 HYPERCHOLESTEROLEMIA: ICD-10-CM

## 2022-04-07 DIAGNOSIS — E84.0 CYSTIC FIBROSIS WITH PULMONARY MANIFESTATIONS (H): ICD-10-CM

## 2022-04-07 DIAGNOSIS — K86.81 EXOCRINE PANCREATIC INSUFFICIENCY: ICD-10-CM

## 2022-04-07 RX ORDER — ELEXACAFTOR, TEZACAFTOR, AND IVACAFTOR 100-50-75
KIT ORAL
Qty: 84 TABLET | Refills: 9 | Status: SHIPPED | OUTPATIENT
Start: 2022-04-07 | End: 2023-01-05

## 2022-04-07 RX ORDER — ATORVASTATIN CALCIUM 10 MG/1
TABLET, FILM COATED ORAL
Qty: 90 TABLET | Refills: 3 | Status: SHIPPED | OUTPATIENT
Start: 2022-04-07 | End: 2023-04-06

## 2022-04-21 ENCOUNTER — LAB (OUTPATIENT)
Dept: LAB | Facility: CLINIC | Age: 42
End: 2022-04-21
Attending: INTERNAL MEDICINE
Payer: COMMERCIAL

## 2022-04-21 ENCOUNTER — OFFICE VISIT (OUTPATIENT)
Dept: PULMONOLOGY | Facility: CLINIC | Age: 42
End: 2022-04-21
Attending: INTERNAL MEDICINE
Payer: COMMERCIAL

## 2022-04-21 ENCOUNTER — ANCILLARY PROCEDURE (OUTPATIENT)
Dept: GENERAL RADIOLOGY | Facility: CLINIC | Age: 42
End: 2022-04-21
Attending: INTERNAL MEDICINE
Payer: COMMERCIAL

## 2022-04-21 VITALS
BODY MASS INDEX: 23.74 KG/M2 | OXYGEN SATURATION: 97 % | HEART RATE: 61 BPM | HEIGHT: 72 IN | SYSTOLIC BLOOD PRESSURE: 134 MMHG | DIASTOLIC BLOOD PRESSURE: 85 MMHG | WEIGHT: 175.27 LBS

## 2022-04-21 DIAGNOSIS — E84.0 CYSTIC FIBROSIS WITH PULMONARY MANIFESTATIONS (H): Primary | ICD-10-CM

## 2022-04-21 DIAGNOSIS — K86.81 EXOCRINE PANCREATIC INSUFFICIENCY: ICD-10-CM

## 2022-04-21 DIAGNOSIS — J32.4 CHRONIC PANSINUSITIS: ICD-10-CM

## 2022-04-21 DIAGNOSIS — E84.0 CYSTIC FIBROSIS WITH PULMONARY MANIFESTATIONS (H): ICD-10-CM

## 2022-04-21 DIAGNOSIS — K86.89 PANCREATIC INSUFFICIENCY: ICD-10-CM

## 2022-04-21 LAB
ALBUMIN SERPL-MCNC: 3.8 G/DL (ref 3.4–5)
ALBUMIN UR-MCNC: NEGATIVE MG/DL
ALP SERPL-CCNC: 67 U/L (ref 40–150)
ALT SERPL W P-5'-P-CCNC: 36 U/L (ref 0–70)
AMORPH CRY #/AREA URNS HPF: ABNORMAL /HPF
ANION GAP SERPL CALCULATED.3IONS-SCNC: 7 MMOL/L (ref 3–14)
APPEARANCE UR: ABNORMAL
AST SERPL W P-5'-P-CCNC: 28 U/L (ref 0–45)
BASOPHILS # BLD AUTO: 0 10E3/UL (ref 0–0.2)
BASOPHILS NFR BLD AUTO: 0 %
BILIRUB UR QL STRIP: NEGATIVE
BUN SERPL-MCNC: 23 MG/DL (ref 7–30)
CALCIUM SERPL-MCNC: 8.9 MG/DL (ref 8.5–10.1)
CHLORIDE BLD-SCNC: 105 MMOL/L (ref 94–109)
CHOLEST SERPL-MCNC: 165 MG/DL
CO2 SERPL-SCNC: 27 MMOL/L (ref 20–32)
COLOR UR AUTO: YELLOW
CREAT SERPL-MCNC: 1.05 MG/DL (ref 0.66–1.25)
CREAT UR-MCNC: 113 MG/DL
EOSINOPHIL # BLD AUTO: 0.1 10E3/UL (ref 0–0.7)
EOSINOPHIL NFR BLD AUTO: 1 %
ERYTHROCYTE [DISTWIDTH] IN BLOOD BY AUTOMATED COUNT: 11.9 % (ref 10–15)
ERYTHROCYTE [SEDIMENTATION RATE] IN BLOOD BY WESTERGREN METHOD: 3 MM/HR (ref 0–15)
EXPTIME-PRE: 6.57 SEC
FASTING STATUS PATIENT QL REPORTED: NORMAL
FEF2575-%PRED-PRE: 113 %
FEF2575-PRE: 5.01 L/SEC
FEF2575-PRED: 4.4 L/SEC
FEFMAX-%PRED-PRE: 98 %
FEFMAX-PRE: 10.58 L/SEC
FEFMAX-PRED: 10.75 L/SEC
FEV1-%PRED-PRE: 96 %
FEV1-PRE: 4.44 L
FEV1FEV6-PRE: 85 %
FEV1FEV6-PRED: 82 %
FEV1FVC-PRE: 85 %
FEV1FVC-PRED: 81 %
FIFMAX-PRE: 7.98 L/SEC
FVC-%PRED-PRE: 91 %
FVC-PRE: 5.25 L
FVC-PRED: 5.73 L
GFR SERPL CREATININE-BSD FRML MDRD: >90 ML/MIN/1.73M2
GLUCOSE BLD-MCNC: 99 MG/DL (ref 70–99)
GLUCOSE UR STRIP-MCNC: NEGATIVE MG/DL
HBA1C MFR BLD: 4.9 % (ref 0–5.6)
HCT VFR BLD AUTO: 44.8 % (ref 40–53)
HDLC SERPL-MCNC: 59 MG/DL
HGB BLD-MCNC: 16 G/DL (ref 13.3–17.7)
HGB UR QL STRIP: NEGATIVE
IMM GRANULOCYTES # BLD: 0.1 10E3/UL
IMM GRANULOCYTES NFR BLD: 1 %
INR PPP: 1.01 (ref 0.85–1.15)
IRON SERPL-MCNC: 58 UG/DL (ref 35–180)
KETONES UR STRIP-MCNC: NEGATIVE MG/DL
LDLC SERPL CALC-MCNC: 83 MG/DL
LEUKOCYTE ESTERASE UR QL STRIP: NEGATIVE
LYMPHOCYTES # BLD AUTO: 2.8 10E3/UL (ref 0.8–5.3)
LYMPHOCYTES NFR BLD AUTO: 31 %
MAGNESIUM SERPL-MCNC: 2.1 MG/DL (ref 1.6–2.3)
MCH RBC QN AUTO: 32.4 PG (ref 26.5–33)
MCHC RBC AUTO-ENTMCNC: 35.7 G/DL (ref 31.5–36.5)
MCV RBC AUTO: 91 FL (ref 78–100)
MICROALBUMIN UR-MCNC: 5 MG/L
MICROALBUMIN/CREAT UR: 4.42 MG/G CR (ref 0–17)
MONOCYTES # BLD AUTO: 0.7 10E3/UL (ref 0–1.3)
MONOCYTES NFR BLD AUTO: 8 %
NEUTROPHILS # BLD AUTO: 5.3 10E3/UL (ref 1.6–8.3)
NEUTROPHILS NFR BLD AUTO: 59 %
NITRATE UR QL: NEGATIVE
NONHDLC SERPL-MCNC: 106 MG/DL
NRBC # BLD AUTO: 0 10E3/UL
NRBC BLD AUTO-RTO: 0 /100
PH UR STRIP: 7 [PH] (ref 5–7)
PHOSPHATE SERPL-MCNC: 3.4 MG/DL (ref 2.5–4.5)
PLATELET # BLD AUTO: 213 10E3/UL (ref 150–450)
POTASSIUM BLD-SCNC: 3.7 MMOL/L (ref 3.4–5.3)
PROT SERPL-MCNC: 6.9 G/DL (ref 6.8–8.8)
RBC # BLD AUTO: 4.94 10E6/UL (ref 4.4–5.9)
RBC URINE: <1 /HPF
SODIUM SERPL-SCNC: 139 MMOL/L (ref 133–144)
SP GR UR STRIP: 1.02 (ref 1–1.03)
TRIGL SERPL-MCNC: 114 MG/DL
TSH SERPL DL<=0.005 MIU/L-ACNC: 0.62 MU/L (ref 0.4–4)
UROBILINOGEN UR STRIP-MCNC: NORMAL MG/DL
WBC # BLD AUTO: 8.9 10E3/UL (ref 4–11)
WBC URINE: 1 /HPF

## 2022-04-21 PROCEDURE — 36415 COLL VENOUS BLD VENIPUNCTURE: CPT | Performed by: PATHOLOGY

## 2022-04-21 PROCEDURE — 84446 ASSAY OF VITAMIN E: CPT | Mod: 90 | Performed by: PATHOLOGY

## 2022-04-21 PROCEDURE — 84450 TRANSFERASE (AST) (SGOT): CPT | Performed by: PATHOLOGY

## 2022-04-21 PROCEDURE — 84590 ASSAY OF VITAMIN A: CPT | Mod: 90 | Performed by: PATHOLOGY

## 2022-04-21 PROCEDURE — 82550 ASSAY OF CK (CPK): CPT | Performed by: PATHOLOGY

## 2022-04-21 PROCEDURE — 82784 ASSAY IGA/IGD/IGG/IGM EACH: CPT | Mod: 90 | Performed by: PATHOLOGY

## 2022-04-21 PROCEDURE — 99214 OFFICE O/P EST MOD 30 MIN: CPT | Mod: 25 | Performed by: INTERNAL MEDICINE

## 2022-04-21 PROCEDURE — 87070 CULTURE OTHR SPECIMN AEROBIC: CPT | Performed by: INTERNAL MEDICINE

## 2022-04-21 PROCEDURE — 80069 RENAL FUNCTION PANEL: CPT | Performed by: PATHOLOGY

## 2022-04-21 PROCEDURE — 84443 ASSAY THYROID STIM HORMONE: CPT | Performed by: PATHOLOGY

## 2022-04-21 PROCEDURE — 82785 ASSAY OF IGE: CPT | Mod: 90 | Performed by: PATHOLOGY

## 2022-04-21 PROCEDURE — 82306 VITAMIN D 25 HYDROXY: CPT | Mod: 90 | Performed by: PATHOLOGY

## 2022-04-21 PROCEDURE — 83735 ASSAY OF MAGNESIUM: CPT | Performed by: PATHOLOGY

## 2022-04-21 PROCEDURE — 83036 HEMOGLOBIN GLYCOSYLATED A1C: CPT | Performed by: PATHOLOGY

## 2022-04-21 PROCEDURE — 85025 COMPLETE CBC W/AUTO DIFF WBC: CPT | Performed by: PATHOLOGY

## 2022-04-21 PROCEDURE — 82043 UR ALBUMIN QUANTITATIVE: CPT | Performed by: PATHOLOGY

## 2022-04-21 PROCEDURE — 84075 ASSAY ALKALINE PHOSPHATASE: CPT | Performed by: PATHOLOGY

## 2022-04-21 PROCEDURE — 80061 LIPID PANEL: CPT | Performed by: PATHOLOGY

## 2022-04-21 PROCEDURE — 85652 RBC SED RATE AUTOMATED: CPT | Performed by: PATHOLOGY

## 2022-04-21 PROCEDURE — 84403 ASSAY OF TOTAL TESTOSTERONE: CPT | Mod: 90 | Performed by: PATHOLOGY

## 2022-04-21 PROCEDURE — 84460 ALANINE AMINO (ALT) (SGPT): CPT | Performed by: PATHOLOGY

## 2022-04-21 PROCEDURE — 99000 SPECIMEN HANDLING OFFICE-LAB: CPT | Performed by: PATHOLOGY

## 2022-04-21 PROCEDURE — 81001 URINALYSIS AUTO W/SCOPE: CPT | Performed by: PATHOLOGY

## 2022-04-21 PROCEDURE — 71046 X-RAY EXAM CHEST 2 VIEWS: CPT | Mod: GC | Performed by: RADIOLOGY

## 2022-04-21 PROCEDURE — G0463 HOSPITAL OUTPT CLINIC VISIT: HCPCS | Mod: 25

## 2022-04-21 PROCEDURE — 84155 ASSAY OF PROTEIN SERUM: CPT | Performed by: PATHOLOGY

## 2022-04-21 PROCEDURE — 94375 RESPIRATORY FLOW VOLUME LOOP: CPT | Performed by: INTERNAL MEDICINE

## 2022-04-21 PROCEDURE — 83540 ASSAY OF IRON: CPT | Performed by: PATHOLOGY

## 2022-04-21 PROCEDURE — 85610 PROTHROMBIN TIME: CPT | Performed by: PATHOLOGY

## 2022-04-21 RX ORDER — OLOPATADINE HYDROCHLORIDE 2 MG/ML
1 SOLUTION/ DROPS OPHTHALMIC DAILY
COMMUNITY
End: 2022-08-04

## 2022-04-21 RX ORDER — ESCITALOPRAM OXALATE 10 MG/1
10 TABLET ORAL DAILY
COMMUNITY
End: 2022-05-31

## 2022-04-21 RX ORDER — ACYCLOVIR 400 MG/1
200 TABLET ORAL DAILY
COMMUNITY

## 2022-04-21 ASSESSMENT — PAIN SCALES - GENERAL: PAINLEVEL: NO PAIN (0)
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
## 2022-04-21 NOTE — PATIENT INSTRUCTIONS
Cystic Fibrosis Self-Care Plan    RECOMMENDATIONS:   Continue current medication and exercise.        [facility] Nurse line:  MADELYN Hurtado anival Jansen  [phone]     [facility] Fax Number:      [phone]         Cystic Fibrosis Respiratory Therapists:   Kristi Ndiaye              [phone]          Loida Trinidad   [phone]  Cystic Fibrosis Dietitians:              Nisa Dumont              [phone]                            April Cardona                        [phone]   Cystic Fibrosis Diabetes Nurse:    Barbara Birmingham   [phone]    Cystic Fibrosis Social Workers:     Melissa Calle               [phone]                     Debora Joseph               [phone]  Cystic Fibrosis Pharmacists:           Amanda Gonzalez                               [phone]         Lennie Lara      [phone]   Cystic Fibrosis Genetic Counselor:   Faby Flores    [phone]    [facility] website:  [email or web address]         MRN: [id number]   Clinic Date: April 21, 2022   Patient: Philip Delacruz     Annual Studies:   IGG   Date Value Ref Range Status   04/08/2021 1,198 610 - 1,616 mg/dL Final     Insulin   Date Value Ref Range Status   05/02/2019 9.0 3 - 25 mU/L Final     There are no preventive care reminders to display for this patient.    Pulmonary Function Tests  FEV1: amount of air you can blow out in 1 second  FVC: total amount of air you can take in and blow out    Your Goals:         PFT Latest Ref Rng & Units 4/21/2022   FVC L 5.25   FEV1 L 4.44   FVC% % 91   FEV1% % 96          Airway Clearance: The Most Important Way to Keep Your Lungs Healthy  Continue regular exercise.    Good Nutrition Can Improve Lung Function and Overall Health    Take ALL of your vitamins with food    Take 1/2 of your enzymes before EVERY meal/snack and the other 1/2 mid-meal/snack    Wt Readings from Last 3 Encounters:   04/21/22 79.5 kg (175 lb 4.3  oz)   10/07/21 81 kg (178 lb 9.2 oz)   09/15/21 82.1 kg (181 lb)       Body mass index is 23.48 kg/m .         National CF Foundation Recommendations for BMI in CF Adults: Women: at least 22 Men: at least 23            Staying Healthy  Research:  If you are interested in learning about research opportunities or have questions, please contact the CF Research Team at [phone] or [email or web address].    CF Foundation:  Compass is a personalized resource service to help you with the insurance, financial, legal and other issues you are facing.  It's free, confidential and available to anyone with CF.  Ask your  for more information or contact Compass directly at 486-TLBZAHM ([phone]) or compass@cff.org, or learn more at cff.org/compass.

## 2022-04-21 NOTE — NURSING NOTE
Philip Delacruz is a 41 year old year old who is being seen for Cystic Fibrosis (12 week follow up )      Medications reviewed and Vital signs taken.    Specimen Collection Type: Throat Swab    Order(s) placed: CF Aerobic Bacterial        No results found for: ACIDFAST      No results found for: AFBSMS          Vitals were taken and medications were reconciled.     Mary RODRIGUEZ  3:05 PM

## 2022-04-22 ENCOUNTER — TELEPHONE (OUTPATIENT)
Dept: PULMONOLOGY | Facility: CLINIC | Age: 42
End: 2022-04-22
Payer: COMMERCIAL

## 2022-04-22 LAB
DEPRECATED CALCIDIOL+CALCIFEROL SERPL-MC: 58 UG/L (ref 20–75)
IGG SERPL-MCNC: 987 MG/DL (ref 610–1616)

## 2022-04-23 LAB — TESTOST SERPL-MCNC: 363 NG/DL (ref 240–950)

## 2022-04-24 LAB
A-TOCOPHEROL VIT E SERPL-MCNC: 13.6 MG/L
ANNOTATION COMMENT IMP: NORMAL
BETA+GAMMA TOCOPHEROL SERPL-MCNC: 0.3 MG/L
RETINYL PALMITATE SERPL-MCNC: 0.03 MG/L
VIT A SERPL-MCNC: 0.75 MG/L
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
## 2022-04-25 LAB
CK SERPL-CCNC: 332 U/L (ref 30–300)
IGE SERPL-ACNC: 13 KU/L (ref 0–114)

## 2022-04-26 LAB — BACTERIA SPEC CULT: NORMAL

## 2022-05-09 ENCOUNTER — CLINICAL UPDATE (OUTPATIENT)
Dept: PHARMACY | Facility: CLINIC | Age: 42
End: 2022-05-09
Payer: COMMERCIAL

## 2022-05-09 DIAGNOSIS — E84.0 CYSTIC FIBROSIS WITH PULMONARY MANIFESTATIONS (H): Primary | ICD-10-CM

## 2022-05-09 PROCEDURE — 99207 PR NO CHARGE LOS: CPT | Performed by: PHARMACIST

## 2022-05-09 NOTE — PROGRESS NOTES
Clinical Update:                                                    At the request of Dr. Sahni, a chart review was conducted for Philip Delacruz.    Reason for Chart Review: Trikafta Lab Monitoring (6 month follow up)    Discussion: Shar has been on Trikafta since 12/30/19. Shar was started on Lipitor 10mg at last office visit, and Dr. Sahni requested a lab recheck in 1 month.    Labs were reviewed from 4/21/22 at [facility]. All labs were within normal limits. Bilirubin was not drawn at this visit.    Lab Results   Component Value Date    ALT 36 04/21/2022    AST 28 04/21/2022    BILITOTAL 0.7 07/08/2021    DBIL 0.2 07/08/2021     (H) 04/21/2022         Plan:  1. Continue Trikafta  2. Recheck hepatic panel and CK in 1 year      Lennie Lara, PharmD  Cystic Fibrosis MTM Pharmacist  [facility]  Voicemail: [phone]

## 2022-05-26 DIAGNOSIS — F41.9 ANXIETY: Primary | ICD-10-CM

## 2022-05-31 RX ORDER — ESCITALOPRAM OXALATE 10 MG/1
10 TABLET ORAL DAILY
Qty: 30 TABLET | Refills: 11 | Status: SHIPPED | OUTPATIENT
Start: 2022-05-31 | End: 2022-06-30

## 2022-06-20 ENCOUNTER — APPOINTMENT (OUTPATIENT)
Dept: URBAN - METROPOLITAN AREA CLINIC 254 | Age: 42
Setting detail: DERMATOLOGY
End: 2022-06-20

## 2022-06-20 VITALS — WEIGHT: 170 LBS | HEIGHT: 73 IN

## 2022-06-20 DIAGNOSIS — A63.0 ANOGENITAL (VENEREAL) WARTS: ICD-10-CM

## 2022-06-20 DIAGNOSIS — D22 MELANOCYTIC NEVI: ICD-10-CM

## 2022-06-20 DIAGNOSIS — L91.8 OTHER HYPERTROPHIC DISORDERS OF THE SKIN: ICD-10-CM

## 2022-06-20 DIAGNOSIS — B07.8 OTHER VIRAL WARTS: ICD-10-CM

## 2022-06-20 PROBLEM — D22.4 MELANOCYTIC NEVI OF SCALP AND NECK: Status: ACTIVE | Noted: 2022-06-20

## 2022-06-20 PROCEDURE — OTHER COUNSELING: OTHER

## 2022-06-20 PROCEDURE — OTHER LIQUID NITROGEN GENITALS: OTHER

## 2022-06-20 PROCEDURE — OTHER SKIN TAG REMOVAL (COSMETIC): OTHER

## 2022-06-20 PROCEDURE — 17110 DESTRUCT B9 LESION 1-14: CPT

## 2022-06-20 PROCEDURE — 54056 CRYOSURGERY PENIS LESION(S): CPT

## 2022-06-20 PROCEDURE — OTHER LIQUID NITROGEN: OTHER

## 2022-06-20 PROCEDURE — 99202 OFFICE O/P NEW SF 15 MIN: CPT | Mod: 25

## 2022-06-20 ASSESSMENT — LOCATION DETAILED DESCRIPTION DERM
LOCATION DETAILED: RIGHT AXILLARY VAULT
LOCATION DETAILED: RIGHT DORSAL SHAFT OF PENIS
LOCATION DETAILED: LEFT AXILLARY VAULT
LOCATION DETAILED: LEFT ANTERIOR AXILLA
LOCATION DETAILED: LEFT RADIAL PALM
LOCATION DETAILED: LEFT POSTERIOR AXILLA
LOCATION DETAILED: LEFT AXILLARY VAULT
LOCATION DETAILED: RIGHT AXILLARY VAULT
LOCATION DETAILED: RIGHT LATERAL TRAPEZIAL NECK

## 2022-06-20 ASSESSMENT — LOCATION SIMPLE DESCRIPTION DERM
LOCATION SIMPLE: LEFT AXILLARY VAULT
LOCATION SIMPLE: LEFT AXILLARY VAULT
LOCATION SIMPLE: PENIS
LOCATION SIMPLE: LEFT ANTERIOR AXILLA
LOCATION SIMPLE: LEFT POSTERIOR AXILLA
LOCATION SIMPLE: POSTERIOR NECK
LOCATION SIMPLE: RIGHT AXILLARY VAULT
LOCATION SIMPLE: RIGHT AXILLARY VAULT
LOCATION SIMPLE: LEFT HAND

## 2022-06-20 ASSESSMENT — LOCATION ZONE DERM
LOCATION ZONE: AXILLAE
LOCATION ZONE: NECK
LOCATION ZONE: HAND
LOCATION ZONE: AXILLAE
LOCATION ZONE: PENIS

## 2022-06-20 NOTE — PROCEDURE: LIQUID NITROGEN GENITALS
Detail Level (Bills Only For Genitals Or Anus, Choose Benign Destruction If You Are Treating A Different Area): Detailed
Consent: - Verbal and written consent was obtained, and risks were reviewed prior to procedure today. Risks discussed include but are not limited to pain, crusting, scabbing, blistering, scarring, temporary or permanent darker or lighter pigmentary change, recurrence, incomplete resolution, and infection.
Post-Care Instructions: - Avoid picking at any of the treated lesions.
Render Post-Care Instructions In Note?: yes

## 2022-06-20 NOTE — PROCEDURE: COUNSELING
Patient Specific Counseling (Will Not Stick From Patient To Patient): - Recommended treatment with liquid nitrogen cryosurgery.\\n- Advised that multiple treatments is often necessary.
Detail Level: Detailed
Detail Level: Simple
Patient Specific Counseling (Will Not Stick From Patient To Patient): - Discussed and recommended liquid nitrogen cryosurgery.

## 2022-06-20 NOTE — PROCEDURE: LIQUID NITROGEN
Medical Necessity Information: It is in your best interest to select a reason for this procedure from the list below. All of these items fulfill various CMS LCD requirements except the new and changing color options.
Render Note In Bullet Format When Appropriate: Yes
Consent: - Verbal and written consent was obtained, and risks were reviewed prior to procedure today. \\n- Risks discussed include but are not limited to pain, crusting, scabbing, blistering, scarring, temporary or permanent darker or lighter pigmentary change, recurrence, incomplete resolution, and infection.
Add 52 Modifier (Optional): no
Post-Care Instructions: - Avoid picking at any of the treated lesions.\\n- Blisters should not be popped. However should a blister rupture, cover it with Vaseline ointment or Aquaphor and a bandage until healed.
Medical Necessity Clause: This procedure was medically necessary because the lesions that were treated were:
Spray Paint Text: The liquid nitrogen was applied to the skin utilizing a spray paint frosting technique.
Detail Level: Detailed

## 2022-06-20 NOTE — PROCEDURE: COUNSELING
Patient Specific Counseling (Will Not Stick From Patient To Patient): - Advised that skin tags are benign growths. \\n- The most common methods for treatment are snip removal and cryosurgery.\\n- More will likely develop over time.\\n- Patient requested treatment today.\\n- Advised that treatment is available but not medically necessary.\\n- Recommended treatment with snip removal and discussed cost of treatment.\\n- Patient expressed understanding.\\n\\nCost of treatment today: $150
Detail Level: Simple

## 2022-06-24 ENCOUNTER — MYC MEDICAL ADVICE (OUTPATIENT)
Dept: PULMONOLOGY | Facility: CLINIC | Age: 42
End: 2022-06-24

## 2022-06-29 DIAGNOSIS — K86.81 EXOCRINE PANCREATIC INSUFFICIENCY: ICD-10-CM

## 2022-06-29 DIAGNOSIS — E84.9 CF (CYSTIC FIBROSIS) (H): ICD-10-CM

## 2022-06-29 RX ORDER — PANCRELIPASE 24000; 76000; 120000 [USP'U]/1; [USP'U]/1; [USP'U]/1
CAPSULE, DELAYED RELEASE PELLETS ORAL
Qty: 1890 CAPSULE | Refills: 3 | Status: SHIPPED | OUTPATIENT
Start: 2022-06-29 | End: 2023-06-14

## 2022-06-30 DIAGNOSIS — F41.9 ANXIETY: ICD-10-CM

## 2022-06-30 RX ORDER — ESCITALOPRAM OXALATE 10 MG/1
20 TABLET ORAL DAILY
Qty: 30 TABLET | Refills: 11 | Status: SHIPPED | OUTPATIENT
Start: 2022-06-30 | End: 2022-08-04

## 2022-06-30 NOTE — TELEPHONE ENCOUNTER
Discussed dose change from Lexapro 10mg to 20mg with Dr. Sahni. OK to update and send to pharmacy.    Justyna Monroe RN

## 2022-08-04 ENCOUNTER — OFFICE VISIT (OUTPATIENT)
Dept: PULMONOLOGY | Facility: CLINIC | Age: 42
End: 2022-08-04
Attending: INTERNAL MEDICINE
Payer: COMMERCIAL

## 2022-08-04 DIAGNOSIS — Z12.11 ENCOUNTER FOR SCREENING COLONOSCOPY: ICD-10-CM

## 2022-08-04 DIAGNOSIS — J30.9 ALLERGIC CONJUNCTIVITIS OF BOTH EYES AND RHINITIS: ICD-10-CM

## 2022-08-04 DIAGNOSIS — K86.81 EXOCRINE PANCREATIC INSUFFICIENCY: ICD-10-CM

## 2022-08-04 DIAGNOSIS — J32.4 CHRONIC PANSINUSITIS: ICD-10-CM

## 2022-08-04 DIAGNOSIS — E84.0 CYSTIC FIBROSIS WITH PULMONARY MANIFESTATIONS (H): ICD-10-CM

## 2022-08-04 DIAGNOSIS — E84.0 CYSTIC FIBROSIS WITH PULMONARY MANIFESTATIONS (H): Primary | ICD-10-CM

## 2022-08-04 DIAGNOSIS — F41.9 ANXIETY: ICD-10-CM

## 2022-08-04 DIAGNOSIS — K86.89 PANCREATIC INSUFFICIENCY: ICD-10-CM

## 2022-08-04 DIAGNOSIS — H10.13 ALLERGIC CONJUNCTIVITIS OF BOTH EYES AND RHINITIS: ICD-10-CM

## 2022-08-04 LAB
EXPTIME-PRE: 6.07 SEC
FEF2575-%PRED-PRE: 118 %
FEF2575-PRE: 5.17 L/SEC
FEF2575-PRED: 4.35 L/SEC
FEFMAX-%PRED-PRE: 96 %
FEFMAX-PRE: 10.35 L/SEC
FEFMAX-PRED: 10.73 L/SEC
FEV1-%PRED-PRE: 99 %
FEV1-PRE: 4.52 L
FEV1FEV6-PRE: 86 %
FEV1FEV6-PRED: 82 %
FEV1FVC-PRE: 86 %
FEV1FVC-PRED: 80 %
FIFMAX-PRE: 8.3 L/SEC
FVC-%PRED-PRE: 92 %
FVC-PRE: 5.28 L
FVC-PRED: 5.7 L

## 2022-08-04 PROCEDURE — 87070 CULTURE OTHR SPECIMN AEROBIC: CPT | Performed by: INTERNAL MEDICINE

## 2022-08-04 PROCEDURE — 99207 PR RESPIRATORY FLOW VOLUME LOOP: CPT

## 2022-08-04 RX ORDER — ESCITALOPRAM OXALATE 10 MG/1
20 TABLET ORAL DAILY
Qty: 60 TABLET | Refills: 11 | Status: SHIPPED | OUTPATIENT
Start: 2022-08-04 | End: 2022-10-27

## 2022-08-04 RX ORDER — OLOPATADINE HYDROCHLORIDE 2 MG/ML
1 SOLUTION/ DROPS OPHTHALMIC DAILY
Qty: 2.5 ML | Refills: 11 | Status: SHIPPED | OUTPATIENT
Start: 2022-08-04 | End: 2023-11-28

## 2022-08-04 NOTE — LETTER
8/4/2022         RE: Philip Delacruz  [address]  [facility] [address]        Dear Colleague,    Thank you for referring your patient, Philip Delacruz, to the [facility] [place]. Please see a copy of my visit note below.    Reason for Visit  Philip Delacruz is a 42 year old year old male who is being seen for Cystic Fibrosis (F/u)      Assessment and plan:   Shar Coley is a 42 year-old male with a history of cystic fibrosis with mild lung disease and pancreatic insufficiency.  Maintenance   Modulator: Trikafta  Mutation:  [id number], Poly T Variant:  [ 7T ]/[9T  ]  AW Clearance: Exercise  Bronchodilators: Albuterol MDI PRN  Mucolytics: none   Antibiotics Inh:  none   Antibiotics Oral: none   Other:   Colonization Hx: Rhizobium (agrobacterium) radiobacter, Aspergillus versicolor, Serratia marcescens, staphylococcus aureus, moraxella (branhamella) catarrhalis  Annual studies due:April 2022  Contraindications to standard of care :    COLONOSCOPY:          CF HPI  The patient was seen and examined by True Sahni MD     A complete ROS was otherwise negative except as noted in the HPI.    Current Outpatient Medications   Medication     acyclovir (ZOVIRAX) 400 MG tablet     albuterol (PROAIR HFA/PROVENTIL HFA/VENTOLIN HFA) 108 (90 Base) MCG/ACT inhaler     alendronate (FOSAMAX) 70 MG tablet     atorvastatin (LIPITOR) 10 MG tablet     Ca Cit Malate-Cholecalciferol (CALCIUM CITRATE MALATE-VIT D) 250-100 MG-UNIT TABS     cholecalciferol (VITAMIN D3) 125 mcg (5000 units) capsule     CREON 46840-38028 units CPEP per EC capsule     escitalopram (LEXAPRO) 10 MG tablet     multivitamin CF formula (MVW COMPLETE FORMULATION) chewable tablet     olopatadine (PATADAY) 0.2 % ophthalmic solution     Omega-3 Fatty Acids (FISH OIL) 1200 MG CPDR     order for DME     order for DME     Sodium Chloride-Sodium Bicarb (SINUFLO READYRINSE) KIT      TRIKAFTA 100-50-75 & 150 MG tablet pack     Wheat Dextrin (BENEFIBER PO)     ZIRGAN 0.15 % GEL     No current facility-administered medications for this visit.     Allergies   Allergen Reactions     Liquid Adhesive Rash     Tagederm     Tegaderm Transparent Dressing (Informational Only) Rash     Past Medical History:   Diagnosis Date     Chronic sinusitis      Congenital absence of vas deferens, bilateral      Cystic fibrosis (H)     Delta F508 and [id number]      History of COVID-19 01/2022    Back pain, chills and mild cough, resolved without intervention     Nasal polyps      Sinusitis, chronic        Past Surgical History:   Procedure Laterality Date     HC TOOTH EXTRACTION W/FORCEP       NASAL/SINUS POLYPECTOMY       REPAIR PECTUS EXCAVATUM  1997     SINUS SURGERY  1992, 2002, 2004    Removed tubinates and polyps OSH     Sperm harvest         Social History     Socioeconomic History     Marital status:      Spouse name: Not on file     Number of children: Not on file     Years of education: Not on file     Highest education level: Not on file   Occupational History     Occupation: Teacher   Tobacco Use     Smoking status: Never Smoker     Smokeless tobacco: Never Used   Substance and Sexual Activity     Alcohol use: Yes     Alcohol/week: 0.0 standard drinks     Comment: 1 drink 3X per week     Drug use: No     Sexual activity: Yes     Partners: Female     Birth control/protection: None   Other Topics Concern     Parent/sibling w/ CABG, MI or angioplasty before 65F 55M? Not Asked   Social History Narrative    Lives in [place] with wife, Aimee, and sons, Shay and Serafin. 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     Social Determinants of Health     Financial Resource Strain: Not on file   Food Insecurity: Not on file   Transportation Needs: Not on file   Physical Activity: Not on file   Stress: Not on file   Social  Connections: Not on file   Intimate Partner Violence: Not on file   Housing Stability: Not on file         /78   Pulse 66   Temp 97.8  F (36.6  C)   SpO2 97%   There is no height or weight on file to calculate BMI.  Exam:   GENERAL APPEARANCE: Well developed, well nourished, alert, and in no apparent distress.  EYES: PERRL, EOMI  HENT: Nasal mucosa with edema and no hyperemia. No nasal polyps.  EARS: Canals clear, TMs normal  MOUTH: Oral mucosa is moist, without any lesions, no tonsillar enlargement, no oropharyngeal exudate.  NECK: supple, no masses, no thyromegaly.  LYMPHATICS: No significant axillary, cervical, or supraclavicular nodes.  RESP: normal percussion, good air flow throughout.  No crackles. No rhonchi. No wheezes.  CV: Normal S1, S2, regular rhythm, normal rate. No murmur.  No rub. No gallop. No LE edema.   ABDOMEN:  Bowel sounds normal, soft, nontender, no HSM or masses.   MS: extremities normal. No clubbing. No cyanosis.  SKIN: no rash on limited exam  NEURO: Mentation intact, speech normal, normal strength and tone, normal gait and stance  PSYCH: mentation appears normal. and affect normal/bright  Results:  Recent Results (from the past 168 hour(s))   General PFT Lab (Please always keep checked)    Collection Time: 08/04/22  9:27 AM   Result Value Ref Range    FVC-Pred 5.70 L    FVC-Pre 5.28 L    FVC-%Pred-Pre 92 %    FEV1-Pre 4.52 L    FEV1-%Pred-Pre 99 %    FEV1FVC-Pred 80 %    FEV1FVC-Pre 86 %    FEFMax-Pred 10.73 L/sec    FEFMax-Pre 10.35 L/sec    FEFMax-%Pred-Pre 96 %    FEF2575-Pred 4.35 L/sec    FEF2575-Pre 5.17 L/sec    EYR0473-%Pred-Pre 118 %    ExpTime-Pre 6.07 sec    FIFMax-Pre 8.30 L/sec    FEV1FEV6-Pred 82 %    FEV1FEV6-Pre 86 %                   Results as noted above.    PFT Interpretation:  {Kaitlyn PFT interpretation:208683}  {Increase/decrease:705496}  {JDPFT:085210}  Valid Maneuver             CF Exacerbation  {CF EXACERBATION STATUS:948417}          CF Annual Nutrition  Assessment     Reason for Assessment  Assessed during CF clinic visit with Dr. Sahni r/t increased nutrition risk with diagnosis of CF per protocol.     Nutrition Significant PMH  Mild Lung Disease- taking Trikafta  Pancreatic Sufficient- fecal elastase wnl, however benefits from enzymes    Anthropometric Assessment  Height: 184 cm  Weight: 79.4 kg (175 lbs)  Ideal body weight based on BMI 22 for females and 23 for males per CF Foundation recs: 78 kg (172 lbs)  BMI: 23.5 kg/m      Wt Readings from Last 5 Encounters:   22 79.4 kg (175 lb)   22 79.5 kg (175 lb 4.3 oz)   10/07/21 81 kg (178 lb 9.2 oz)   09/15/21 82.1 kg (181 lb)   21 83.2 kg (183 lb 6.8 oz)     Comments: 10 lb weight gain with Trikafta initiation, now trending back down; pt reports very quick 6-7 lb weight loss related to E2M fitness program. Currently at CFF goal.      Pancreatic Enzymes  Brand: Creon 24,000  Dosin-6 with meals, 2-3 with snacks = 1730 units lipase/kg/meal  Estimated Daily Intake: ~20 caps = 6000 units lipase/kg/day  *Previously discussed option to trial Zenpep however some concerns with cost and pt prefers to stay on Creon at this time.      Signs of Malabsorption: Ongoing GI concerns with looser stools. Pt reports improved symptoms while following Whole30 and E2M diets.   Comments: takes metamucil 1 tbsp daily  Enzyme Program: CF Care Forward     Nutrition-Related Lab & Vitamin/Mineral Supplements  Annual studies completed 2022  Vitamin A- 0.75 wnl  Vitamin D- 58 wnl  Vitamin E- 13.6 wnl  Iron- 58 wnl  Lipid Panel- wnl, taking Lipitor for previously high LDL     OGTT- , wnl  DEXA - , lowest z-score -2    - endo Aug 2021 for low bone density; 24 hr urine calcium noted hypercalciuria and educated on lower sodium diet; started fosamax 70 mg weekly     Current Vitamin/Mineral Supplements: MVW Complete 1 softgel daily (Care Forward), Fish Oil, calcium citrate malate 700 mg BID     Diet History and  Assessment  Diet Preferences/Allergies/Intolerances: Regular  Intake Recall/Comments: Typically TID meals and TID snacks. Followed Whole30 diet plan last year. Most recently completed E2M challenge with his wife (includes diet/meal plans and fitness circuits). Reports strict eating pattern that allowed meat and vegetables, no fruit/dairy/wheat. Felt better from a GI standpoint on this diet but difficult to sustain; also with quick weight loss. Now adding in apple with peanut butter, for example.     Previous Diet Recall:  B- oatmeal with yogurt and walnuts or eggs and english muffin or cereal with 2% milk  AM Snack - Rx bar and protein shake  L- deli meat and cheese sandwich or leftovers  PM snack- beef stick, cheese and crackers  D- meat and veggies, sweet potato tots, broccoli and cheese tots, sloppy kahlil's  HS snack- apple and PB or PB on english muffin     Calcium: supplement BID  Salt: adequate per food choices  Hydration: improved - water, Melaleuca Sustain  Supplements: No - contributed to GI issues/looser stools     NUTRITION DIAGNOSIS  Impaired nutrient utilization related to CF pancreatic insufficiency as evidenced by requires pancreatic enzyme replacement therapy and vitamin/mineral supplementation in order to maintain ideal body weight and nutrition status.       INTERVENTIONS/RECOMMENDATIONS   1) Oral Intake/Weight:   BEE: ~1825  Calorie Goals- 4579-5107 kcals/day (150-175% BEE)   Protein Goals-  grams/day (1.2-1.5 g/kg)     Discussed current nutrition status and goals. Per pt, wife would like to do E2M program again, however contributed to weight loss. Reviewed today the correlation with weight/BMI and lung function and CF team rationale for maintaining his current weight. Noted significant improvement in his GI symptoms during this elimination diet as well as previous recs to trial partial elimination diet to rule out dairy/gluten symptoms. Discussed doing one at a time to better identify source  of GI symptoms and to then get by with the least restrictive diet. Pt verbalized understanding of this plan. Encouraged intake of overall healthy foods including lean proteins, healthy fats, fruits/vegetables, whole grains, etc.     2) GI/Enzymes:  Plan as noted above to identify food triggers to GI symptoms. Likely not related to enzymes/dosing as symptoms improved with elimination diet.     3) Vitamin/Mineral Supplementation:  Reviewed results of annual study labs. Continue with current vitamin supplementation at this time.       GOALS:  1) Maintain BMI >23 kg/m2  2) Take enzymes/vitamins as recommended      FOLLOW-UP/MONITORING:  Visit patient within 12 months for annual nutrition reassessment.     Time Spent In Face-to-Face Patient Interactions: 15 minutes    Nisa Dumont RD, LD  Cystic Fibrosis/Lung Transplant Dietitian  Pager [phone]             Respiratory Therapist Note:         Vest                Brand:                Cough Pause:                Vest Garment Size:                Last Fitting Date:                 Frequency of therapy: 0 times per week                Concerns: None, patient does not have vest or do vest therapy. Uses exercise as airway clearance.          Exercise (purposeful and aerobic for >20 minutes each session): Yes - amount :                 Does this qualify as additional airway clearance: Yes         Alternative Airway Clearance:          Nebulized Medications                Bronchodilators:                 Mucolytic:                 Antibiotics:                 Additional Inhaled Medications:                Spacer Use:          Review Cleaning:         Education and Transition Information                Correct order of inhaled medications: Not Applicable                Mechanism of Action of inhaled medications: Not Applicable                Frequency of inhaled medications: Not Applicable                Dosage of inhaled medications: Not Applicable                Other:           Home Care:                Nebulizer Cups (Brand/Type):                 Nebulizer Compressor                            Year Purchased:                 Nebulizer Supply Company:                                Oxygen: N/A         Pulmonary Rehab                Site:                 Date Completed:          Plan of Care and Goals for next visit: Keep up the great work of using exercise as airway clearance.      Again, thank you for allowing me to participate in the care of your patient.        Sincerely,        True Sahni MD

## 2022-08-04 NOTE — PATIENT INSTRUCTIONS
Cystic Fibrosis Self-Care Plan    RECOMMENDATIONS:   Help us provide the best possible care. If you receive a questionnaire from the CF Foundation about your clinic experience today please fill it out.  It should take less than 5 minutes. Let us know what we are doing well and how we can improve.    Continue current medication and exercise.  We will call you about a colonoscopy.  Schedule a dermatology visit.        [facility] Nurse line:  Nicole Hurtado  [phone]     [facility] Fax Number:      [phone]         Cystic Fibrosis Respiratory Therapists:   Kristi Ndiaye              [phone]          Loida Trinidad   [phone]  Cystic Fibrosis Dietitians:              Nisa Dumont              [phone]                            April Cardona                        [phone]   Cystic Fibrosis Diabetes Nurse:    Barbara Birmingham   [phone]    Cystic Fibrosis Social Workers:     Melissa Calle               [phone]                     Debora Joseph               [phone]  Cystic Fibrosis Pharmacists:           Amanda Gonzalez                               [phone]         Lennie Lara      [phone]   Cystic Fibrosis Genetic Counselor:   Faby Flores    [phone]    [facility] website:  [email or web address]    COVID VACCINES:    You are eligible for the COVID-19 vaccine. Sign up for your COVID vaccine via [employer]. Log in, select the menu bar, select schedule an appointment, and then select COVID-19 Vaccine 1st Dose. You may also schedule by calling this number [phone] however hold times can be long.       OR schedule through the [facility] Vaccine Connector at https://vaccineconnector.[place].gov/ or by calling [phone].      The best vaccine is the one that s available to you first.  All COVID-19 vaccines currently available in the [place] (Eyal & Eyal, Pfizer and Moderna) have  been shown to be highly effect at preventing COVID-19.       We re still learning how vaccines will affect the spread of COVID-19. After you ve been fully vaccinated against COVID-19, you should keep taking precautions in public places like wearing a mask, staying 6 feet apart from others, and avoiding crowds and poorly ventilated spaces until we know more.       MRN: [id number]   Clinic Date: August 4, 2022   Patient: Philip Delacruz     Annual Studies:   IGG   Date Value Ref Range Status   04/08/2021 1,198 610 - 1,616 mg/dL Final     Immunoglobulin G   Date Value Ref Range Status   04/21/2022 987 610 - 1,616 mg/dL Final     Insulin   Date Value Ref Range Status   05/02/2019 9.0 3 - 25 mU/L Final     There are no preventive care reminders to display for this patient.    Pulmonary Function Tests  FEV1: amount of air you can blow out in 1 second  FVC: total amount of air you can take in and blow out    Your Goals:         PFT Latest Ref Rng & Units 8/4/2022   FVC L 5.28   FEV1 L 4.52   FVC% % 92   FEV1% % 99          Airway Clearance: The Most Important Way to Keep Your Lungs Healthy  Continue regular exercise.        Good Nutrition Can Improve Lung Function and Overall Health    Take ALL of your vitamins with food    Take 1/2 of your enzymes before EVERY meal/snack and the other 1/2 mid-meal/snack    Wt Readings from Last 3 Encounters:   04/21/22 79.5 kg (175 lb 4.3 oz)   10/07/21 81 kg (178 lb 9.2 oz)   09/15/21 82.1 kg (181 lb)       There is no height or weight on file to calculate BMI.         National CF Foundation Recommendations for BMI in CF Adults: Women: at least 22 Men: at least 23        Controlling Blood Sugars Helps Prevent Lung Infections & Improves Nutrition  Test blood sugar:    In the morning before eating (goal is )    2 hours after a meal (goal is less than 150)    When pre-meal glucose is greater than 150 add correction    At bedtime (if less than 100 eat a snack with 15 grams of  carbohydrates  Last A1C Results:   Hemoglobin A1C   Date Value Ref Range Status   04/21/2022 4.9 0.0 - 5.6 % Final     Comment:     Normal <5.7%   Prediabetes 5.7-6.4%    Diabetes 6.5% or higher     Note: Adopted from ADA consensus guidelines.   04/08/2021 4.8 0 - 5.6 % Final     Comment:     Normal <5.7% Prediabetes 5.7-6.4%  Diabetes 6.5% or higher - adopted from ADA   consensus guidelines.           If diabetic, measure A1C every 6 months. Goal: Under 7%    Staying Healthy  Research:  If you are interested in learning about research opportunities or have questions, please contact the CF Research Team at [phone] or [email or web address].    CF Foundation:  Compass is a personalized resource service to help you with the insurance, financial, legal and other issues you are facing.  It's free, confidential and available to anyone with CF.  Ask your  for more information or contact Compass directly at [phone] ([phone]) or compass@cff.org, or learn more at cff.org/compass.

## 2022-08-04 NOTE — PROGRESS NOTES
Reason for Visit  Philip Delacruz is a 42 year old year old male who is being seen for Cystic Fibrosis (F/u)      Assessment and plan:   Shar Coley is a 42 year-old male with a history of cystic fibrosis with mild lung disease and pancreatic insufficiency.  Maintenance   Modulator: Trikafta  Mutation:  [id number]/[id number], Poly T Variant:  [ 7T ]/[9T  ]  AW Clearance: Exercise  Bronchodilators: Albuterol MDI PRN  Mucolytics: none   Antibiotics Inh:  none   Antibiotics Oral: none   Other:   Colonization Hx: Rhizobium (agrobacterium) radiobacter, Aspergillus versicolor, Serratia marcescens, staphylococcus aureus, moraxella (branhamella) catarrhalis  Annual studies due:April 2022  Contraindications to standard of care :    COLONOSCOPY:          CF HPI  The patient was seen and examined by True Sahni MD     A complete ROS was otherwise negative except as noted in the HPI.    Current Outpatient Medications   Medication     acyclovir (ZOVIRAX) 400 MG tablet     albuterol (PROAIR HFA/PROVENTIL HFA/VENTOLIN HFA) 108 (90 Base) MCG/ACT inhaler     alendronate (FOSAMAX) 70 MG tablet     atorvastatin (LIPITOR) 10 MG tablet     Ca Cit Malate-Cholecalciferol (CALCIUM CITRATE MALATE-VIT D) 250-100 MG-UNIT TABS     cholecalciferol (VITAMIN D3) 125 mcg (5000 units) capsule     CREON 08541-83472 units CPEP per EC capsule     escitalopram (LEXAPRO) 10 MG tablet     multivitamin CF formula (MVW COMPLETE FORMULATION) chewable tablet     olopatadine (PATADAY) 0.2 % ophthalmic solution     Omega-3 Fatty Acids (FISH OIL) 1200 MG CPDR     order for DME     order for DME     Sodium Chloride-Sodium Bicarb (SINUFLO READYRINSE) KIT     TRIKAFTA 100-50-75 & 150 MG tablet pack     Wheat Dextrin (BENEFIBER PO)     ZIRGAN 0.15 % GEL     No current facility-administered medications for this visit.     Allergies   Allergen Reactions     Liquid Adhesive Rash     Tagederm     Tegaderm Transparent Dressing (Informational Only) Rash      Past Medical History:   Diagnosis Date     Chronic sinusitis      Congenital absence of vas deferens, bilateral      Cystic fibrosis (H)     Delta F508 and [id number]      History of COVID-19 01/2022    Back pain, chills and mild cough, resolved without intervention     Nasal polyps      Sinusitis, chronic        Past Surgical History:   Procedure Laterality Date     HC TOOTH EXTRACTION W/FORCEP       NASAL/SINUS POLYPECTOMY       REPAIR PECTUS EXCAVATUM  1997     SINUS SURGERY  1992, 2002, 2004    Removed tubinates and polyps OSH     Sperm harvest         Social History     Socioeconomic History     Marital status:      Spouse name: Not on file     Number of children: Not on file     Years of education: Not on file     Highest education level: Not on file   Occupational History     Occupation: Teacher   Tobacco Use     Smoking status: Never Smoker     Smokeless tobacco: Never Used   Substance and Sexual Activity     Alcohol use: Yes     Alcohol/week: 0.0 standard drinks     Comment: 1 drink 3X per week     Drug use: No     Sexual activity: Yes     Partners: Female     Birth control/protection: None   Other Topics Concern     Parent/sibling w/ CABG, MI or angioplasty before 65F 55M? Not Asked   Social History Narrative    Lives in [place] with wife, Aimee, and sons, Shay and Serafin. 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     Social Determinants of Health     Financial Resource Strain: Not on file   Food Insecurity: Not on file   Transportation Needs: Not on file   Physical Activity: Not on file   Stress: Not on file   Social Connections: Not on file   Intimate Partner Violence: Not on file   Housing Stability: Not on file         /78   Pulse 66   Temp 97.8  F (36.6  C)   SpO2 97%   There is no height or weight on file to calculate BMI.  Exam:   GENERAL APPEARANCE: Well developed, well nourished, alert, and in  no apparent distress.  EYES: PERRL, EOMI  HENT: Nasal mucosa with edema and no hyperemia. No nasal polyps.  EARS: Canals clear, TMs normal  MOUTH: Oral mucosa is moist, without any lesions, no tonsillar enlargement, no oropharyngeal exudate.  NECK: supple, no masses, no thyromegaly.  LYMPHATICS: No significant axillary, cervical, or supraclavicular nodes.  RESP: normal percussion, good air flow throughout.  No crackles. No rhonchi. No wheezes.  CV: Normal S1, S2, regular rhythm, normal rate. No murmur.  No rub. No gallop. No LE edema.   ABDOMEN:  Bowel sounds normal, soft, nontender, no HSM or masses.   MS: extremities normal. No clubbing. No cyanosis.  SKIN: no rash on limited exam  NEURO: Mentation intact, speech normal, normal strength and tone, normal gait and stance  PSYCH: mentation appears normal. and affect normal/bright  Results:  Recent Results (from the past 168 hour(s))   General PFT Lab (Please always keep checked)    Collection Time: 08/04/22  9:27 AM   Result Value Ref Range    FVC-Pred 5.70 L    FVC-Pre 5.28 L    FVC-%Pred-Pre 92 %    FEV1-Pre 4.52 L    FEV1-%Pred-Pre 99 %    FEV1FVC-Pred 80 %    FEV1FVC-Pre 86 %    FEFMax-Pred 10.73 L/sec    FEFMax-Pre 10.35 L/sec    FEFMax-%Pred-Pre 96 %    FEF2575-Pred 4.35 L/sec    FEF2575-Pre 5.17 L/sec    XMQ1623-%Pred-Pre 118 %    ExpTime-Pre 6.07 sec    FIFMax-Pre 8.30 L/sec    FEV1FEV6-Pred 82 %    FEV1FEV6-Pre 86 %                   Results as noted above.    PFT Interpretation:  {Kaitlyn PFT interpretation:771491}  {Increase/decrease:444833}  {JDPFT:263760}  Valid Maneuver             CF Exacerbation  {CF EXACERBATION STATUS:082947}

## 2022-08-04 NOTE — NURSING NOTE
"Philip Delacruz is a 42 year old year old who is being seen for Cystic Fibrosis (F/u)      Medications reviewed and Vital signs taken.    Specimen Collection Type: Throat Swab    Order(s) placed: CF Aerobic Bacterial    *IF AFB order placed - please enter \"PRIORITIZE AFB\" to order comments.       No results found for: ACIDFAST      No results found for: AFBSMS          Vitals were taken and medications were reconciled.     JENNIFER Barry      "

## 2022-08-04 NOTE — LETTER
8/4/2022       RE: Philip Delacruz  [address]  [facility] [address]     Dear Colleague,    Thank you for referring your patient, Philip Delacruz, to the [facility] [place] at [facility]. Please see a copy of my visit note below.    Reason for Visit  Philip Delacruz is a 42 year old year old male who is being seen for Cystic Fibrosis (F/u)      Assessment and plan:   Shar Coley is a 42 year-old male with a history of cystic fibrosis with mild lung disease and pancreatic insufficiency.  Maintenance   Modulator: Trikafta  Mutation:  [id number], Poly T Variant:  [ 7T ]/[9T  ]  AW Clearance: Exercise  Bronchodilators: Albuterol MDI PRN  Mucolytics: none   Antibiotics Inh:  none   Antibiotics Oral: none   Other:   Colonization Hx: Rhizobium (agrobacterium) radiobacter, Aspergillus versicolor, Serratia marcescens, staphylococcus aureus, moraxella (branhamella) catarrhalis  Annual studies due:April 2022  Contraindications to standard of care :    COLONOSCOPY:          CF HPI  The patient was seen and examined by True Sahni MD     A complete ROS was otherwise negative except as noted in the HPI.    Current Outpatient Medications   Medication     acyclovir (ZOVIRAX) 400 MG tablet     albuterol (PROAIR HFA/PROVENTIL HFA/VENTOLIN HFA) 108 (90 Base) MCG/ACT inhaler     alendronate (FOSAMAX) 70 MG tablet     atorvastatin (LIPITOR) 10 MG tablet     Ca Cit Malate-Cholecalciferol (CALCIUM CITRATE MALATE-VIT D) 250-100 MG-UNIT TABS     cholecalciferol (VITAMIN D3) 125 mcg (5000 units) capsule     CREON 58716-02810 units CPEP per EC capsule     escitalopram (LEXAPRO) 10 MG tablet     multivitamin CF formula (MVW COMPLETE FORMULATION) chewable tablet     olopatadine (PATADAY) 0.2 % ophthalmic solution     Omega-3 Fatty Acids (FISH OIL) 1200 MG CPDR     order for DME     order for DME     Sodium  Chloride-Sodium Bicarb (SINUFLO READYRINSE) KIT     TRIKAFTA 100-50-75 & 150 MG tablet pack     Wheat Dextrin (BENEFIBER PO)     ZIRGAN 0.15 % GEL     No current facility-administered medications for this visit.     Allergies   Allergen Reactions     Liquid Adhesive Rash     Tagederm     Tegaderm Transparent Dressing (Informational Only) Rash     Past Medical History:   Diagnosis Date     Chronic sinusitis      Congenital absence of vas deferens, bilateral      Cystic fibrosis (H)     Delta F508 and [id number]      History of COVID-19 01/2022    Back pain, chills and mild cough, resolved without intervention     Nasal polyps      Sinusitis, chronic        Past Surgical History:   Procedure Laterality Date     HC TOOTH EXTRACTION W/FORCEP       NASAL/SINUS POLYPECTOMY       REPAIR PECTUS EXCAVATUM  1997     SINUS SURGERY  1992, 2002, 2004    Removed tubinates and polyps OSH     Sperm harvest         Social History     Socioeconomic History     Marital status:      Spouse name: Not on file     Number of children: Not on file     Years of education: Not on file     Highest education level: Not on file   Occupational History     Occupation: Teacher   Tobacco Use     Smoking status: Never Smoker     Smokeless tobacco: Never Used   Substance and Sexual Activity     Alcohol use: Yes     Alcohol/week: 0.0 standard drinks     Comment: 1 drink 3X per week     Drug use: No     Sexual activity: Yes     Partners: Female     Birth control/protection: None   Other Topics Concern     Parent/sibling w/ CABG, MI or angioplasty before 65F 55M? Not Asked   Social History Narrative    Lives in [place] with wife, Aimee, and sons, Shay and Serafin. 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     Social Determinants of Health     Financial Resource Strain: Not on file   Food Insecurity: Not on file   Transportation Needs: Not on file   Physical  Activity: Not on file   Stress: Not on file   Social Connections: Not on file   Intimate Partner Violence: Not on file   Housing Stability: Not on file         /78   Pulse 66   Temp 97.8  F (36.6  C)   SpO2 97%   There is no height or weight on file to calculate BMI.  Exam:   GENERAL APPEARANCE: Well developed, well nourished, alert, and in no apparent distress.  EYES: PERRL, EOMI  HENT: Nasal mucosa with edema and no hyperemia. No nasal polyps.  EARS: Canals clear, TMs normal  MOUTH: Oral mucosa is moist, without any lesions, no tonsillar enlargement, no oropharyngeal exudate.  NECK: supple, no masses, no thyromegaly.  LYMPHATICS: No significant axillary, cervical, or supraclavicular nodes.  RESP: normal percussion, good air flow throughout.  No crackles. No rhonchi. No wheezes.  CV: Normal S1, S2, regular rhythm, normal rate. No murmur.  No rub. No gallop. No LE edema.   ABDOMEN:  Bowel sounds normal, soft, nontender, no HSM or masses.   MS: extremities normal. No clubbing. No cyanosis.  SKIN: no rash on limited exam  NEURO: Mentation intact, speech normal, normal strength and tone, normal gait and stance  PSYCH: mentation appears normal. and affect normal/bright  Results:  Recent Results (from the past 168 hour(s))   General PFT Lab (Please always keep checked)    Collection Time: 08/04/22  9:27 AM   Result Value Ref Range    FVC-Pred 5.70 L    FVC-Pre 5.28 L    FVC-%Pred-Pre 92 %    FEV1-Pre 4.52 L    FEV1-%Pred-Pre 99 %    FEV1FVC-Pred 80 %    FEV1FVC-Pre 86 %    FEFMax-Pred 10.73 L/sec    FEFMax-Pre 10.35 L/sec    FEFMax-%Pred-Pre 96 %    FEF2575-Pred 4.35 L/sec    FEF2575-Pre 5.17 L/sec    SKL6227-%Pred-Pre 118 %    ExpTime-Pre 6.07 sec    FIFMax-Pre 8.30 L/sec    FEV1FEV6-Pred 82 %    FEV1FEV6-Pre 86 %                   Results as noted above.    PFT Interpretation:  {Kaitlyn PFT interpretation:637384}  {Increase/decrease:609893}  {JDPFT:810086}  Valid Maneuver             CF Exacerbation  {CF  EXACERBATION STATUS:357220}          CF Annual Nutrition Assessment     Reason for Assessment  Assessed during CF clinic visit with Dr. Sahin r/t increased nutrition risk with diagnosis of CF per protocol.     Nutrition Significant PMH  Mild Lung Disease- taking Trikafta  Pancreatic Sufficient- fecal elastase wnl, however benefits from enzymes    Anthropometric Assessment  Height: 184 cm  Weight: 79.4 kg (175 lbs)  Ideal body weight based on BMI 22 for females and 23 for males per CF Foundation recs: 78 kg (172 lbs)  BMI: 23.5 kg/m      Wt Readings from Last 5 Encounters:   22 79.4 kg (175 lb)   22 79.5 kg (175 lb 4.3 oz)   10/07/21 81 kg (178 lb 9.2 oz)   09/15/21 82.1 kg (181 lb)   21 83.2 kg (183 lb 6.8 oz)     Comments: 10 lb weight gain with Trikafta initiation, now trending back down; pt reports very quick 6-7 lb weight loss related to E2M fitness program. Currently at CF goal.      Pancreatic Enzymes  Brand: Creon 24,000  Dosin-6 with meals, 2-3 with snacks = 1730 units lipase/kg/meal  Estimated Daily Intake: ~20 caps = 6000 units lipase/kg/day  *Previously discussed option to trial Zenpep however some concerns with cost and pt prefers to stay on Creon at this time.      Signs of Malabsorption: Ongoing GI concerns with looser stools. Pt reports improved symptoms while following Whole30 and E2M diets.   Comments: takes metamucil 1 tbsp daily  Enzyme Program: CF Care Forward     Nutrition-Related Lab & Vitamin/Mineral Supplements  Annual studies completed 2022  Vitamin A- 0.75 wnl  Vitamin D- 58 wnl  Vitamin E- 13.6 wnl  Iron- 58 wnl  Lipid Panel- wnl, taking Lipitor for previously high LDL     OGTT- , wnl  DEXA - , lowest z-score -2    - endo Aug 2021 for low bone density; 24 hr urine calcium noted hypercalciuria and educated on lower sodium diet; started fosamax 70 mg weekly     Current Vitamin/Mineral Supplements: MVW Complete 1 softgel daily (Care Forward), Fish Oil,  calcium citrate malate 700 mg BID     Diet History and Assessment  Diet Preferences/Allergies/Intolerances: Regular  Intake Recall/Comments: Typically TID meals and TID snacks. Followed Whole30 diet plan last year. Most recently completed E2M challenge with his wife (includes diet/meal plans and fitness circuits). Reports strict eating pattern that allowed meat and vegetables, no fruit/dairy/wheat. Felt better from a GI standpoint on this diet but difficult to sustain; also with quick weight loss. Now adding in apple with peanut butter, for example.     Previous Diet Recall:  B- oatmeal with yogurt and walnuts or eggs and english muffin or cereal with 2% milk  AM Snack - Rx bar and protein shake  L- deli meat and cheese sandwich or leftovers  PM snack- beef stick, cheese and crackers  D- meat and veggies, sweet potato tots, broccoli and cheese tots, sloppy kahlil's  HS snack- apple and PB or PB on english muffin     Calcium: supplement BID  Salt: adequate per food choices  Hydration: improved - water, Melaleuca Sustain  Supplements: No - contributed to GI issues/looser stools     NUTRITION DIAGNOSIS  Impaired nutrient utilization related to CF pancreatic insufficiency as evidenced by requires pancreatic enzyme replacement therapy and vitamin/mineral supplementation in order to maintain ideal body weight and nutrition status.       INTERVENTIONS/RECOMMENDATIONS   1) Oral Intake/Weight:   BEE: ~1825  Calorie Goals- 6784-7409 kcals/day (150-175% BEE)   Protein Goals-  grams/day (1.2-1.5 g/kg)     Discussed current nutrition status and goals. Per pt, wife would like to do E2M program again, however contributed to weight loss. Reviewed today the correlation with weight/BMI and lung function and CF team rationale for maintaining his current weight. Noted significant improvement in his GI symptoms during this elimination diet as well as previous recs to trial partial elimination diet to rule out dairy/gluten symptoms.  Discussed doing one at a time to better identify source of GI symptoms and to then get by with the least restrictive diet. Pt verbalized understanding of this plan. Encouraged intake of overall healthy foods including lean proteins, healthy fats, fruits/vegetables, whole grains, etc.     2) GI/Enzymes:  Plan as noted above to identify food triggers to GI symptoms. Likely not related to enzymes/dosing as symptoms improved with elimination diet.     3) Vitamin/Mineral Supplementation:  Reviewed results of annual study labs. Continue with current vitamin supplementation at this time.       GOALS:  1) Maintain BMI >23 kg/m2  2) Take enzymes/vitamins as recommended      FOLLOW-UP/MONITORING:  Visit patient within 12 months for annual nutrition reassessment.     Time Spent In Face-to-Face Patient Interactions: 15 minutes    Nisa Dumont RD, LD  Cystic Fibrosis/Lung Transplant Dietitian  Pager [phone]             Respiratory Therapist Note:         Vest                Brand:                Cough Pause:                Vest Garment Size:                Last Fitting Date:                 Frequency of therapy: 0 times per week                Concerns: None, patient does not have vest or do vest therapy. Uses exercise as airway clearance.          Exercise (purposeful and aerobic for >20 minutes each session): Yes - amount :                 Does this qualify as additional airway clearance: Yes         Alternative Airway Clearance:          Nebulized Medications                Bronchodilators:                 Mucolytic:                 Antibiotics:                 Additional Inhaled Medications:                Spacer Use:          Review Cleaning:         Education and Transition Information                Correct order of inhaled medications: Not Applicable                Mechanism of Action of inhaled medications: Not Applicable                Frequency of inhaled medications: Not Applicable                Dosage of inhaled  medications: Not Applicable                Other:          Home Care:                Nebulizer Cups (Brand/Type):                 Nebulizer Compressor                            Year Purchased:                 Nebulizer Supply Company:                                Oxygen: N/A         Pulmonary Rehab                Site:                 Date Completed:          Plan of Care and Goals for next visit: Keep up the great work of using exercise as airway clearance.      Again, thank you for allowing me to participate in the care of your patient.      Sincerely,    True Sahni MD

## 2022-08-05 VITALS
TEMPERATURE: 97.8 F | BODY MASS INDEX: 23.45 KG/M2 | SYSTOLIC BLOOD PRESSURE: 121 MMHG | OXYGEN SATURATION: 97 % | WEIGHT: 175 LBS | DIASTOLIC BLOOD PRESSURE: 78 MMHG | HEART RATE: 66 BPM

## 2022-08-05 NOTE — PROGRESS NOTES
CF Annual Nutrition Assessment     Reason for Assessment  Assessed during CF clinic visit with Dr. Sahni r/t increased nutrition risk with diagnosis of CF per protocol.     Nutrition Significant PMH  Mild Lung Disease- taking Trikafta  Pancreatic Sufficient- fecal elastase wnl, however benefits from enzymes    Anthropometric Assessment  Height: 184 cm  Weight: 79.4 kg (175 lbs)  Ideal body weight based on BMI 22 for females and 23 for males per CF Foundation recs: 78 kg (172 lbs)  BMI: 23.5 kg/m      Wt Readings from Last 5 Encounters:   22 79.4 kg (175 lb)   22 79.5 kg (175 lb 4.3 oz)   10/07/21 81 kg (178 lb 9.2 oz)   09/15/21 82.1 kg (181 lb)   21 83.2 kg (183 lb 6.8 oz)     Comments: 10 lb weight gain with Trikafta initiation, now trending back down; pt reports very quick 6-7 lb weight loss related to E2M fitness program. Currently at [facility] goal.      Pancreatic Enzymes  Brand: Creon 24,000  Dosin-6 with meals, 2-3 with snacks = 1730 units lipase/kg/meal  Estimated Daily Intake: ~20 caps = 6000 units lipase/kg/day  *Previously discussed option to trial Zenpep however some concerns with cost and pt prefers to stay on Creon at this time.      Signs of Malabsorption: Ongoing GI concerns with looser stools. Pt reports improved symptoms while following Whole30 and E2M diets.   Comments: takes metamucil 1 tbsp daily  Enzyme Program: CF Care Forward     Nutrition-Related Lab & Vitamin/Mineral Supplements  Annual studies completed 2022  Vitamin A- 0.75 wnl  Vitamin D- 58 wnl  Vitamin E- 13.6 wnl  Iron- 58 wnl  Lipid Panel- wnl, taking Lipitor for previously high LDL     OGTT- , wnl  DEXA - , lowest z-score -2    - endo Aug 2021 for low bone density; 24 hr urine calcium noted hypercalciuria and educated on lower sodium diet; started fosamax 70 mg weekly     Current Vitamin/Mineral Supplements: MVW Complete 1 softgel daily (Care Forward), Fish Oil, calcium citrate malate 700 mg BID      Diet History and Assessment  Diet Preferences/Allergies/Intolerances: Regular  Intake Recall/Comments: Typically TID meals and TID snacks. Followed Whole30 diet plan last year. Most recently completed E2M challenge with his wife (includes diet/meal plans and fitness circuits). Reports strict eating pattern that allowed meat and vegetables, no fruit/dairy/wheat. Felt better from a GI standpoint on this diet but difficult to sustain; also with quick weight loss. Now adding in apple with peanut butter, for example.     Previous Diet Recall:  B- oatmeal with yogurt and walnuts or eggs and english muffin or cereal with 2% milk  AM Snack - Rx bar and protein shake  L- deli meat and cheese sandwich or leftovers  PM snack- beef stick, cheese and crackers  D- meat and veggies, sweet potato tots, broccoli and cheese tots, sloppy kahlil's  HS snack- apple and PB or PB on english muffin     Calcium: supplement BID  Salt: adequate per food choices  Hydration: improved - water, Melaleuca Sustain  Supplements: No - contributed to GI issues/looser stools     NUTRITION DIAGNOSIS  Impaired nutrient utilization related to CF pancreatic insufficiency as evidenced by requires pancreatic enzyme replacement therapy and vitamin/mineral supplementation in order to maintain ideal body weight and nutrition status.       INTERVENTIONS/RECOMMENDATIONS   1) Oral Intake/Weight:   BEE: ~1825  Calorie Goals- 9167-2429 kcals/day (150-175% BEE)   Protein Goals-  grams/day (1.2-1.5 g/kg)     Discussed current nutrition status and goals. Per pt, wife would like to do E2M program again, however contributed to weight loss. Reviewed today the correlation with weight/BMI and lung function and CF team rationale for maintaining his current weight. Noted significant improvement in his GI symptoms during this elimination diet as well as previous recs to trial partial elimination diet to rule out dairy/gluten symptoms. Discussed doing one at a time to  better identify source of GI symptoms and to then get by with the least restrictive diet. Pt verbalized understanding of this plan. Encouraged intake of overall healthy foods including lean proteins, healthy fats, fruits/vegetables, whole grains, etc.     2) GI/Enzymes:  Plan as noted above to identify food triggers to GI symptoms. Likely not related to enzymes/dosing as symptoms improved with elimination diet.     3) Vitamin/Mineral Supplementation:  Reviewed results of annual study labs. Continue with current vitamin supplementation at this time.       GOALS:  1) Maintain BMI >23 kg/m2  2) Take enzymes/vitamins as recommended      FOLLOW-UP/MONITORING:  Visit patient within 12 months for annual nutrition reassessment.     Time Spent In Face-to-Face Patient Interactions: 15 minutes    Nisa Dumont RD, LD  Cystic Fibrosis/Lung Transplant Dietitian  Pager [phone]

## 2022-08-09 LAB — BACTERIA SPEC CULT: NORMAL

## 2022-08-09 NOTE — PROGRESS NOTES
Respiratory Therapist Note:         Vest                Brand:                Cough Pause:                Vest Garment Size:                Last Fitting Date:                 Frequency of therapy: 0 times per week                Concerns: None, patient does not have vest or do vest therapy. Uses exercise as airway clearance.          Exercise (purposeful and aerobic for >20 minutes each session): Yes - amount :                 Does this qualify as additional airway clearance: Yes         Alternative Airway Clearance:          Nebulized Medications                Bronchodilators:                 Mucolytic:                 Antibiotics:                 Additional Inhaled Medications:                Spacer Use:          Review Cleaning:         Education and Transition Information                Correct order of inhaled medications: Not Applicable                Mechanism of Action of inhaled medications: Not Applicable                Frequency of inhaled medications: Not Applicable                Dosage of inhaled medications: Not Applicable                Other:          Home Care:                Nebulizer Cups (Brand/Type):                 Nebulizer Compressor                            Year Purchased:                 Nebulizer Supply Company:                                Oxygen: N/A         Pulmonary Rehab                Site:                 Date Completed:          Plan of Care and Goals for next visit: Keep up the great work of using exercise as airway clearance.

## 2022-08-10 ENCOUNTER — TELEPHONE (OUTPATIENT)
Dept: GASTROENTEROLOGY | Facility: CLINIC | Age: 42
End: 2022-08-10

## 2022-08-10 NOTE — TELEPHONE ENCOUNTER
Screening Questions    BlueKIND OF PREP RedLOCATION [review exclusion criteria] GreenSEDATION TYPE      1. Are you active on mychart? y    2. What insurance is in the chart? [employer]     3.   Ordering/Referring Provider: Kaitlyn    4. BMI   (If greater than 40 review exclusion criteria [PAC APPT IF [MAC] @ [facility])  23.4  [If yes, BMI OVER 40-EXTENDED PREP]      **(Sedation review/consideration needed)**  Do you have a legal guardian or Medical Power of    and/or are you able to give consent for your medical care?     y    5. Have you had a positive covid test in the last 90 days?   n -     6.  Are you currently on dialysis?   n [ If yes, G-PREP & HOSPITAL setting ONLY]     7.  Do you have chronic kidney disease?  n [ If yes, G-PREP ]    8.   Do you have a diagnosis of diabetes?   n   [ If yes, G-PREP ]    9.  On a regular basis do you go 3-5 days between bowel movements?   n   [ If yes, EXTENDED PREP]    10.  Are you taking any prescription pain medications on a routine schedule?    n -  [ If yes, EXTENDED PREP] [If yes, MAC]      11.   Do you have any chemical dependencies such as alcohol, street drugs, or methadone?    n [If yes, MAC]    12.   Do you have any history of post-traumatic stress syndrome, severe anxiety or history of psychosis?    n  [If yes, MAC]    13.  [FEMALES] Are you currently pregnant?     If yes, how many weeks?       Respiratory/Heart Screening:  [If yes to any of the following HOSPITAL setting only]     14. Do you have Pulmonary Hypertension [Lungs]?   n       15. Do you have UNCONTROLLED asthma?   n     16.  Do you use daily home oxygen?  n      17. Do you have mod to severe Obstructive Sleep Apnea?         (OKAY @ [facility]  [facility]  SH  PH  RI  MG - if pt is not on OXYGEN)  n      18.   Have you had a heart or lung transplant?   n      19.   Have you had a stroke or Transient ischemic attack (TIA - aka  mini stroke ) within 6 months?  (If yes, please review exclusion  criteria)  n     20.   In the past 6 months, have you had any heart related issues including cardiomyopathy or heart attack?   n           If yes, did it require cardiac stenting or other implantable device?   n      21.   Do you have any implantable devices in your body (pacemaker, defib, LVAD)? (If yes, please review exclusion criteria)  n   22.  Do you take the medication Phentermine?  NO        23. Do you take nitroglycerin?   n           If yes, how often? n  (if yes, HOSPITAL setting ONLY)    24.  Are you currently taking any blood thinners?    [If yes, INFORM patient to [facility] w/ ORDERING PROVIDER FOR BRIDGING INSTRUCTIO    25.   Do you transfer independently?                (If NO, please HOSPITAL setting ONLY)  y    26.   Preferred LOCAL Pharmacy for Pre Prescription:         [id number] [place] [facility] [place] [place] - [address] AT [facility] OF  [address]  [facility] [place] - [address] [address]  [facility] - [place], [place] - [address] [place] [address]  [facility]  [place] - [address] [facility] - [place], [place] - [address] [place] [address]/PHARMACY #[id number]  [facility] [place] - [address] [facility]    Scheduling Details  (Please ask for phone number if not scheduled by patient)      Caller : Philip Delacruz    Date of Procedure: 9/26  Surgeon: Tana  Location: [facility]        Sedation Type: CS   Conscious Sedation- Needs  for 6 hours after the procedure  MAC/General-Needs  for 24 hours after procedure    n :[Pre-op Required] at [facility]  SH  MG and OR for MAC sedation   (advise patient they will need a pre-op WITH IN 30 DAYS of procedure date)     Type of Procedure Scheduled:   Lower Endoscopy [Colonoscopy]    Which Colonoscopy Prep was Sent?:   mprep    TANA CF PATIENTS & GROEN'S PATIENTS NEEDS EXTENDED PREP       Informed patient they will need an adult  y  Cannot take any type of public or  medical transportation alone    Pre-Procedure Covid test to be completed at [facility] or Externally: y  **INFORMED OF HOME TESTING & LAB OPTION**        Confirmed Nurse will call to complete assessment y    Additional comments:

## 2022-08-18 DIAGNOSIS — M85.9 LOW BONE DENSITY FOR AGE: ICD-10-CM

## 2022-08-22 DIAGNOSIS — M85.9 LOW BONE DENSITY FOR AGE: ICD-10-CM

## 2022-08-22 RX ORDER — ALENDRONATE SODIUM 70 MG/1
70 TABLET ORAL
Qty: 12 TABLET | Refills: 0 | Status: SHIPPED | OUTPATIENT
Start: 2022-08-22 | End: 2022-11-01

## 2022-08-22 NOTE — TELEPHONE ENCOUNTER
Last Clinic Visit: 8/31/2021  [facility] [place]    Appointment due: waltre refill 90 days and staff message sent to Endocrine clinic scheduling.    Last order of DX HIP/PELVIS/SPINE was found on 4/8/2021 from Ancillary Procedure on 4/8/2021

## 2022-08-23 ENCOUNTER — TELEPHONE (OUTPATIENT)
Dept: ENDOCRINOLOGY | Facility: CLINIC | Age: 42
End: 2022-08-23

## 2022-08-23 NOTE — TELEPHONE ENCOUNTER
----- Message from Sharon Rock RN sent at 8/22/2022  5:24 PM CDT -----  Regarding: appointment  Please call patient to schedule follow up appointment.  Plan last visit 8/31/2021 [facility] 4 months.    Thank you, Sharon ARANGO RN Med Refill Team    I do not need any follow up on the scheduling of this appointment unless the patient will no longer be receiving care in our clinic.

## 2022-08-24 RX ORDER — ALENDRONATE SODIUM 70 MG/1
TABLET ORAL
Qty: 12 TABLET | Refills: 1 | OUTPATIENT
Start: 2022-08-24

## 2022-09-06 ENCOUNTER — OFFICE VISIT (OUTPATIENT)
Dept: ENDOCRINOLOGY | Facility: CLINIC | Age: 42
End: 2022-09-06
Attending: INTERNAL MEDICINE
Payer: COMMERCIAL

## 2022-09-06 VITALS — OXYGEN SATURATION: 95 % | HEART RATE: 68 BPM | DIASTOLIC BLOOD PRESSURE: 81 MMHG | SYSTOLIC BLOOD PRESSURE: 128 MMHG

## 2022-09-06 DIAGNOSIS — M85.9 LOW BONE DENSITY FOR AGE: Primary | ICD-10-CM

## 2022-09-06 LAB — HBA1C MFR BLD: 4.8 % (ref 4.3–?)

## 2022-09-06 PROCEDURE — 99214 OFFICE O/P EST MOD 30 MIN: CPT | Performed by: INTERNAL MEDICINE

## 2022-09-06 PROCEDURE — 83036 HEMOGLOBIN GLYCOSYLATED A1C: CPT | Performed by: INTERNAL MEDICINE

## 2022-09-06 PROCEDURE — G0463 HOSPITAL OUTPT CLINIC VISIT: HCPCS

## 2022-09-06 ASSESSMENT — PAIN SCALES - GENERAL: PAINLEVEL: NO PAIN (0)

## 2022-09-06 NOTE — LETTER
2022       RE: Philip Delacruz  [address]  [facility] [address]     Dear Colleague,    Thank you for referring your patient, Philip Delacruz, to the [facility] [place] at [facility]. Please see a copy of my visit note below.    Endocrinology and Diabetes Clinic    CF Endocrinology Return Consultation: Low bone density  :   Patient: Philip Delacruz MRN# [id number]   YOB: 1980 Age: 42 year old   Date of Visit:2022    Last seen  2021        Problem list:     Patient Active Problem List    Diagnosis Date Noted     Small intestinal bacterial overgrowth 2018     Priority: Medium     Exocrine pancreatic insufficiency 04/10/2018     Priority: Medium     Pancreatic insufficiency 2018     Priority: Medium     Chronic sinusitis      Priority: Medium     Cystic fibrosis with pulmonary manifestations 2014     Priority: Medium     SWEAT TEST:  Date:  2014           Laboratory: [facility]  Sample #1   mg     30  mmol/L Cl  Sample #2   mg     27  mmol/L Cl     GENOTYPING:  Date:  2014        [facility]  Genotype:  [id number]  Poly T Variant:  [ 7T ]/[9T  ]              HPI:   Philip is a 42 year old male with CF related bone disease     Summary of prior history copied from previous note: DEXA scan 2021 showed significant decline in bone density, about 5.9% at the lumbar spine. Lowest z score was -2.0 at lumbar spine L1-L3 with outlier of -3.5 at L4.   Recent DXA from 21 with decrease in BMD at level of L1-L3 lumbar spine of -5.9% compared to 2019 and Z score of -2.0 from L1-L3 (outlier score of -3.5 in L4). No tobacco use, no excessive alcohol use, no family history of osteoporosis. Workup has been done to evaluate for potential secondary causes of bone loss. Recent TSH, testosterone, vitamin D level, and A1c were ok. Serum calcium,  albumin, phosphorus, and PTH within normal limits.  Subclinical Cushing's was considered, however 24-hour urine free cortisol is within normal limits  24-hour urine calcium was notable for hypercalciuria (380 mg).  Patient's diet does not appear to be high in calcium.  High urinary calcium could be related to high sodium diet/increase urinary sodium excretion. Of note, urine volume was somewhat high at 2.8     Interval history:  Patient started Fosamax 70 mg weekly in September 2021.  He denies any side effects and reports being compliant with it.  11/15/2021 repeat 24-hour calcium 0.38 g/24-hour specimen  Overall feels well and denies any acute issues  His weight has been stable over the last year.  On Trikafta  Takes MVW multivitamin, 2 tablets of calcium citrate 500 mg total, vitamin D 5000 IU daily.  No history of renal stones  Continues to exercise including CrossFit and notes that his job is very physical  No history of falls or osteoporotic fracture.  His usual diet contained around 8 ounces of milk and 1 serving of cheese daily.  He has recently cut out dairy to see if that will help with his GI symptoms.      I have reviewed the available past laboratory evaluations, imaging studies, and medical records available to me at this visit. History was obtained from the patient and the medical record.  I have reviewed the notes of the pulmonary care team entered into the medical record since I last saw the patient.          Past Medical History:     Past Medical History:   Diagnosis Date     Chronic sinusitis      Congenital absence of vas deferens, bilateral      Cystic fibrosis (H)     Delta F508 and [id number]      History of COVID-19 01/2022    Back pain, chills and mild cough, resolved without intervention     Nasal polyps      Sinusitis, chronic             Past Surgical History:     Past Surgical History:   Procedure Laterality Date     HC TOOTH EXTRACTION W/FORCEP       NASAL/SINUS POLYPECTOMY       REPAIR  PECTUS EXCAVATUM  1997     SINUS SURGERY  1992, 2002, 2004    Removed tubinates and polyps OSH     Sperm harvest                 Social History:     Social History     Social History Narrative    Lives in [place] with wife, Aimee, and sons, Shay and Serafin. 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              Family History:     Family History   Problem Relation Age of Onset     C.A.D. Maternal Grandfather      Diabetes Maternal Grandfather      Heart Disease Maternal Grandfather      Aneurysm Paternal Grandfather         Aortic     Gastrointestinal Disease Father         Reflux     Cancer Paternal Grandmother         Lymphoma     Diabetes Maternal Grandmother      Thyroid Disease Mother       () Other             Allergies:     Allergies   Allergen Reactions     Liquid Adhesive Rash     Tagederm     Tegaderm Transparent Dressing (Informational Only) Rash             Medications:     Current Outpatient Rx   Medication Sig Dispense Refill     acyclovir (ZOVIRAX) 400 MG tablet Take 400 mg by mouth daily       albuterol (PROAIR HFA/PROVENTIL HFA/VENTOLIN HFA) 108 (90 Base) MCG/ACT inhaler Inhale 2 puffs into the lungs every 6 hours as needed for shortness of breath / dyspnea or wheezing 1 Inhaler 11     alendronate (FOSAMAX) 70 MG tablet Take 1 tablet (70 mg) by mouth every 7 days *take 60 minutes before morning meal with 8 oz of water and remain upright for 30 minutes. 12 tablet 0     atorvastatin (LIPITOR) 10 MG tablet TAKE ONE TABLET BY MOUTH ONCE DAILY 90 tablet 3     Ca Cit Malate-Cholecalciferol (CALCIUM CITRATE MALATE-VIT D) 250-100 MG-UNIT TABS Take 2 tablets by mouth daily Activated calcium by Nutrikey       cholecalciferol (VITAMIN D3) 125 mcg (5000 units) capsule Take 125 mcg by mouth daily       CREON 23435-29041 units CPEP per EC capsule TAKE 4-5 CAPSULES BY MOUTH THREE TIMES A DAY WITH MEALS. TAKE 2-3 CAPSULES WITH  SNACKS (TOTAL 3 MEALS AND 3 SNACKS DAILY) 1890 capsule 3     escitalopram (LEXAPRO) 10 MG tablet Take 2 tablets (20 mg) by mouth daily 60 tablet 11     multivitamin CF formula (MVW COMPLETE FORMULATION) chewable tablet Take 1 tablet by mouth daily       olopatadine (PATADAY) 0.2 % ophthalmic solution Place 1 drop into both eyes daily 2.5 mL 11     Omega-3 Fatty Acids (FISH OIL) 1200 MG CPDR Take 2 capsules by mouth daily       order for DME Levofloxacin 1 mg/mL nasal irrigation  6 Liters  Rinse with 100 mL to each nostril every day 1 each 3     order for DME Use [employer] Vios nebulizer for nasal/sinus nebulizing as instructed 1 each 1     Sodium Chloride-Sodium Bicarb (SINUFLO READYRINSE) KIT Daily PRN       TRIKAFTA 100-50-75 & 150 MG tablet pack TAKE TWO ORANGE TABLETS BY MOUTH IN THE MORNING AND ONE BLUE TABLET IN THE EVENING AS DIRECTED ON PACKAGE.  TAKE WITH FAT CONTAINING FOOD. 84 tablet 9     Wheat Dextrin (BENEFIBER PO) Take 2 teaspoonful by mouth daily       ZIRGAN 0.15 % GEL INT 1 GTT IN OU 6 TIMES every day as needed  0     Social History     Socioeconomic History     Marital status:      Spouse name: Not on file     Number of children: Not on file     Years of education: Not on file     Highest education level: Not on file   Occupational History     Occupation: Teacher   Tobacco Use     Smoking status: Never Smoker     Smokeless tobacco: Never Used   Substance and Sexual Activity     Alcohol use: Yes     Alcohol/week: 0.0 standard drinks     Comment: 1 drink 3X per week     Drug use: No     Sexual activity: Yes     Partners: Female     Birth control/protection: None   Other Topics Concern     Parent/sibling w/ CABG, MI or angioplasty before 65F 55M? Not Asked   Social History Narrative    Lives in [place] with wife, Aimee, and sons, Shay and Serafin. 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     Social Determinants  of Health     Financial Resource Strain: Not on file   Food Insecurity: Not on file   Transportation Needs: Not on file   Physical Activity: Not on file   Stress: Not on file   Social Connections: Not on file   Intimate Partner Violence: Not on file   Housing Stability: Not on file             Review of Systems:     Comprehensive ROS negative other than the symptoms noted above in the HPI.          Physical Exam:   /81   Pulse 68   SpO2 95%   HEAD: Normocephalic, without obvious abnormality.  EYES: sclera clear, conjunctiva normal.  NECK: Supple, symmetrical, trachea midline.  THYROID: symmetric, not enlarged and no tenderness.  HEMATOLOGIC/LYMPHATIC: No cervical lymphadenopathy.  LUNGS: No increased work of breathing, clear to auscultation  with good air entry  CARDIOVASCULAR: Regular rate and rhythm, no murmurs.  ABDOMEN: Soft, non-distended  NEUROLOGIC:No focal deficits noted.   PSYCHIATRIC: Cooperative, no agitation.  SKIN: No history of lower abdomen          Laboratory results:     TSH   Date Value Ref Range Status   04/21/2022 0.62 0.40 - 4.00 mU/L Final   04/08/2021 1.47 0.40 - 4.00 mU/L Final     Triiodothyronine (T3)   Date Value Ref Range Status   07/16/2019 95 60 - 181 ng/dL Final     Testosterone Total   Date Value Ref Range Status   04/21/2022 363 240 - 950 ng/dL Final   04/08/2021 438 240 - 950 ng/dL Final     Comment:     This test was developed and its performance characteristics determined by the   [facility].   It has not been cleared or approved by the FDA. The laboratory is regulated   under CLIA as qualified to perform high-complexity testing. This test is used   for clinical purposes. It should not be regarded as investigational or for   research.       Cholesterol   Date Value Ref Range Status   04/21/2022 165 <200 mg/dL Final   07/08/2021 150 <200 mg/dL Final     Albumin Urine mg/L   Date Value Ref Range Status    04/21/2022 5 mg/L Final   04/08/2021 10 mg/L Final     Triglycerides   Date Value Ref Range Status   04/21/2022 114 <150 mg/dL Final   07/08/2021 44 <150 mg/dL Final     HDL Cholesterol   Date Value Ref Range Status   07/08/2021 60 >39 mg/dL Final     Direct Measure HDL   Date Value Ref Range Status   04/21/2022 59 >=40 mg/dL Final     LDL Cholesterol Calculated   Date Value Ref Range Status   04/21/2022 83 <=100 mg/dL Final   07/08/2021 81 <100 mg/dL Final     Comment:     Desirable:       <100 mg/dl     Cholesterol/HDL Ratio   Date Value Ref Range Status   12/22/2014 3.3 0.0 - 5.0 Final     Non HDL Cholesterol   Date Value Ref Range Status   04/21/2022 106 <130 mg/dL Final   07/08/2021 90 <130 mg/dL Final        DXA, 04/08/2021  Most negative Z score of -2.0 at level of L1-L3 (below normal range for men <50 years old)  Significantly decreased BMD at level of L1-L3 lumbar spine compared to previous exam (-5.9%)  Significantly decreased BMD at level of L1-L3 lumbar spine and right total hip compared to baseline exam on 12/22/2014 (-10.2% at lumbar spine, -2.5% at hip)     DXA, 05/02/2019  Most negative Z score of -1.6 at level lf lumbar spine  Significantly decreased BMD at lumbar spine, right and left total hip compared to previous exam (-7.8%)     DXA, 04/07/2017  Most negative Z score of -1.1 at level of lumbar spine  Significantly decreased BMD at level of lumbar spine compared to previous exam (-3.4%)     DXA, 12/22/2014 - Baseline Exam  Most negative Z score of -1.3 at level of lumbar spine           Assessment and Plan:   Philip is a 42 year old male with CF related bone disease    Low bone density  Started on Fosamax 70 mg/week 1 year ago.  Tolerating without any side effects  TSH, testosterone and and vitamin D were within normal range on annual labs earlier this year.  Return to clinic around May 2023 with DEXA result   Continue current calcium and vitamin D supplements   Continue weightbearing  exercise as tolerated  2-year follow-up DEXA in spring 2023 with annual labs.    Return in about 8 months (around 5/6/2023).     WOLFGANG MckeonBS

## 2022-09-06 NOTE — PROGRESS NOTES
Endocrinology and Diabetes Clinic    CF Endocrinology Return Consultation: Low bone density  :   Patient: Philip Delacruz MRN# [id number]   YOB: 1980 Age: 42 year old   Date of Visit:2022    Last seen  2021        Problem list:     Patient Active Problem List    Diagnosis Date Noted     Small intestinal bacterial overgrowth 2018     Priority: Medium     Exocrine pancreatic insufficiency 04/10/2018     Priority: Medium     Pancreatic insufficiency 2018     Priority: Medium     Chronic sinusitis      Priority: Medium     Cystic fibrosis with pulmonary manifestations 2014     Priority: Medium     SWEAT TEST:  Date:  2014           Laboratory: [facility]  Sample #1   mg     30  mmol/L Cl  Sample #2   mg     27  mmol/L Cl     GENOTYPING:  Date:  2014        [facility]  Genotype:  [id number]  Poly T Variant:  [ 7T ]/[9T  ]              HPI:   Philip is a 42 year old male with CF related bone disease     Summary of prior history copied from previous note: DEXA scan 2021 showed significant decline in bone density, about 5.9% at the lumbar spine. Lowest z score was -2.0 at lumbar spine L1-L3 with outlier of -3.5 at L4.   Recent DXA from 21 with decrease in BMD at level of L1-L3 lumbar spine of -5.9% compared to 2019 and Z score of -2.0 from L1-L3 (outlier score of -3.5 in L4). No tobacco use, no excessive alcohol use, no family history of osteoporosis. Workup has been done to evaluate for potential secondary causes of bone loss. Recent TSH, testosterone, vitamin D level, and A1c were ok. Serum calcium, albumin, phosphorus, and PTH within normal limits.  Subclinical Cushing's was considered, however 24-hour urine free cortisol is within normal limits  24-hour urine calcium was notable for hypercalciuria (380 mg).  Patient's diet does not appear to be high in calcium.  High urinary calcium could be related to high sodium  diet/increase urinary sodium excretion. Of note, urine volume was somewhat high at 2.8     Interval history:  Patient started Fosamax 70 mg weekly in September 2021.  He denies any side effects and reports being compliant with it.  11/15/2021 repeat 24-hour calcium 0.38 g/24-hour specimen  Overall feels well and denies any acute issues  His weight has been stable over the last year.  On Trikafta  Takes MVW multivitamin, 2 tablets of calcium citrate 500 mg total, vitamin D 5000 IU daily.  No history of renal stones  Continues to exercise including [employer] and notes that his job is very physical  No history of falls or osteoporotic fracture.  His usual diet contained around 8 ounces of milk and 1 serving of cheese daily.  He has recently cut out dairy to see if that will help with his GI symptoms.      I have reviewed the available past laboratory evaluations, imaging studies, and medical records available to me at this visit. History was obtained from the patient and the medical record.  I have reviewed the notes of the pulmonary care team entered into the medical record since I last saw the patient.          Past Medical History:     Past Medical History:   Diagnosis Date     Chronic sinusitis      Congenital absence of vas deferens, bilateral      Cystic fibrosis (H)     Delta F508 and [id number]      History of COVID-19 01/2022    Back pain, chills and mild cough, resolved without intervention     Nasal polyps      Sinusitis, chronic             Past Surgical History:     Past Surgical History:   Procedure Laterality Date     HC TOOTH EXTRACTION W/FORCEP       NASAL/SINUS POLYPECTOMY       REPAIR PECTUS EXCAVATUM  1997     SINUS SURGERY  1992, 2002, 2004    Removed tubinates and polyps OSH     Sperm harvest                 Social History:     Social History     Social History Narrative    Lives in [place] with wife, Aimee, and sons, Shay and Serafin. Previously , switched to Property  management (summer 2020) to limit COVID exposure in school due to CF.              Family History:     Family History   Problem Relation Age of Onset     C.A.D. Maternal Grandfather      Diabetes Maternal Grandfather      Heart Disease Maternal Grandfather      Aneurysm Paternal Grandfather         Aortic     Gastrointestinal Disease Father         Reflux     Cancer Paternal Grandmother         Lymphoma     Diabetes Maternal Grandmother      Thyroid Disease Mother       () Other             Allergies:     Allergies   Allergen Reactions     Liquid Adhesive Rash     Tagederm     Tegaderm Transparent Dressing (Informational Only) Rash             Medications:     Current Outpatient Rx   Medication Sig Dispense Refill     acyclovir (ZOVIRAX) 400 MG tablet Take 400 mg by mouth daily       albuterol (PROAIR HFA/PROVENTIL HFA/VENTOLIN HFA) 108 (90 Base) MCG/ACT inhaler Inhale 2 puffs into the lungs every 6 hours as needed for shortness of breath / dyspnea or wheezing 1 Inhaler 11     alendronate (FOSAMAX) 70 MG tablet Take 1 tablet (70 mg) by mouth every 7 days *take 60 minutes before morning meal with 8 oz of water and remain upright for 30 minutes. 12 tablet 0     atorvastatin (LIPITOR) 10 MG tablet TAKE ONE TABLET BY MOUTH ONCE DAILY 90 tablet 3     Ca Cit Malate-Cholecalciferol (CALCIUM CITRATE MALATE-VIT D) 250-100 MG-UNIT TABS Take 2 tablets by mouth daily Activated calcium by [employer]       cholecalciferol (VITAMIN D3) 125 mcg (5000 units) capsule Take 125 mcg by mouth daily       CREON 15766-67754 units CPEP per EC capsule TAKE 4-5 CAPSULES BY MOUTH THREE TIMES A DAY WITH MEALS. TAKE 2-3 CAPSULES WITH SNACKS (TOTAL 3 MEALS AND 3 SNACKS DAILY) 1890 capsule 3     escitalopram (LEXAPRO) 10 MG tablet Take 2 tablets (20 mg) by mouth daily 60 tablet 11     multivitamin CF formula (MVW COMPLETE FORMULATION) chewable tablet Take 1 tablet by mouth daily       olopatadine (PATADAY) 0.2 % ophthalmic solution Place 1 drop into  both eyes daily 2.5 mL 11     Omega-3 Fatty Acids (FISH OIL) 1200 MG CPDR Take 2 capsules by mouth daily       order for DME Levofloxacin 1 mg/mL nasal irrigation  6 Liters  Rinse with 100 mL to each nostril every day 1 each 3     order for DME Use [employer] Vios nebulizer for nasal/sinus nebulizing as instructed 1 each 1     Sodium Chloride-Sodium Bicarb (SINUFLO READYRINSE) KIT Daily PRN       TRIKAFTA 100-50-75 & 150 MG tablet pack TAKE TWO ORANGE TABLETS BY MOUTH IN THE MORNING AND ONE BLUE TABLET IN THE EVENING AS DIRECTED ON PACKAGE.  TAKE WITH FAT CONTAINING FOOD. 84 tablet 9     Wheat Dextrin (BENEFIBER PO) Take 2 teaspoonful by mouth daily       ZIRGAN 0.15 % GEL INT 1 GTT IN OU 6 TIMES every day as needed  0     Social History     Socioeconomic History     Marital status:      Spouse name: Not on file     Number of children: Not on file     Years of education: Not on file     Highest education level: Not on file   Occupational History     Occupation: Teacher   Tobacco Use     Smoking status: Never Smoker     Smokeless tobacco: Never Used   Substance and Sexual Activity     Alcohol use: Yes     Alcohol/week: 0.0 standard drinks     Comment: 1 drink 3X per week     Drug use: No     Sexual activity: Yes     Partners: Female     Birth control/protection: None   Other Topics Concern     Parent/sibling w/ CABG, MI or angioplasty before 65F 55M? Not Asked   Social History Narrative    Lives in [place] with wife, Aimee, and sons, Shay and Serafin. 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     Social Determinants of Health     Financial Resource Strain: Not on file   Food Insecurity: Not on file   Transportation Needs: Not on file   Physical Activity: Not on file   Stress: Not on file   Social Connections: Not on file   Intimate Partner Violence: Not on file   Housing Stability: Not on file             Review of Systems:      Comprehensive ROS negative other than the symptoms noted above in the HPI.          Physical Exam:   /81   Pulse 68   SpO2 95%   HEAD: Normocephalic, without obvious abnormality.  EYES: sclera clear, conjunctiva normal.  NECK: Supple, symmetrical, trachea midline.  THYROID: symmetric, not enlarged and no tenderness.  HEMATOLOGIC/LYMPHATIC: No cervical lymphadenopathy.  LUNGS: No increased work of breathing, clear to auscultation  with good air entry  CARDIOVASCULAR: Regular rate and rhythm, no murmurs.  ABDOMEN: Soft, non-distended  NEUROLOGIC:No focal deficits noted.   PSYCHIATRIC: Cooperative, no agitation.  SKIN: No history of lower abdomen          Laboratory results:     TSH   Date Value Ref Range Status   04/21/2022 0.62 0.40 - 4.00 mU/L Final   04/08/2021 1.47 0.40 - 4.00 mU/L Final     Triiodothyronine (T3)   Date Value Ref Range Status   07/16/2019 95 60 - 181 ng/dL Final     Testosterone Total   Date Value Ref Range Status   04/21/2022 363 240 - 950 ng/dL Final   04/08/2021 438 240 - 950 ng/dL Final     Comment:     This test was developed and its performance characteristics determined by the   [facility].   It has not been cleared or approved by the FDA. The laboratory is regulated   under CLIA as qualified to perform high-complexity testing. This test is used   for clinical purposes. It should not be regarded as investigational or for   research.       Cholesterol   Date Value Ref Range Status   04/21/2022 165 <200 mg/dL Final   07/08/2021 150 <200 mg/dL Final     Albumin Urine mg/L   Date Value Ref Range Status   04/21/2022 5 mg/L Final   04/08/2021 10 mg/L Final     Triglycerides   Date Value Ref Range Status   04/21/2022 114 <150 mg/dL Final   07/08/2021 44 <150 mg/dL Final     HDL Cholesterol   Date Value Ref Range Status   07/08/2021 60 >39 mg/dL Final     Direct Measure HDL   Date Value Ref Range Status   04/21/2022 59  >=40 mg/dL Final     LDL Cholesterol Calculated   Date Value Ref Range Status   04/21/2022 83 <=100 mg/dL Final   07/08/2021 81 <100 mg/dL Final     Comment:     Desirable:       <100 mg/dl     Cholesterol/HDL Ratio   Date Value Ref Range Status   12/22/2014 3.3 0.0 - 5.0 Final     Non HDL Cholesterol   Date Value Ref Range Status   04/21/2022 106 <130 mg/dL Final   07/08/2021 90 <130 mg/dL Final        DXA, 04/08/2021  Most negative Z score of -2.0 at level of L1-L3 (below normal range for men <50 years old)  Significantly decreased BMD at level of L1-L3 lumbar spine compared to previous exam (-5.9%)  Significantly decreased BMD at level of L1-L3 lumbar spine and right total hip compared to baseline exam on 12/22/2014 (-10.2% at lumbar spine, -2.5% at hip)     DXA, 05/02/2019  Most negative Z score of -1.6 at level lf lumbar spine  Significantly decreased BMD at lumbar spine, right and left total hip compared to previous exam (-7.8%)     DXA, 04/07/2017  Most negative Z score of -1.1 at level of lumbar spine  Significantly decreased BMD at level of lumbar spine compared to previous exam (-3.4%)     DXA, 12/22/2014 - Baseline Exam  Most negative Z score of -1.3 at level of lumbar spine           Assessment and Plan:   Philip is a 42 year old male with CF related bone disease    Low bone density  Started on Fosamax 70 mg/week 1 year ago.  Tolerating without any side effects  TSH, testosterone and and vitamin D were within normal range on annual labs earlier this year.  Return to clinic around May 2023 with DEXA result   Continue current calcium and vitamin D supplements   Continue weightbearing exercise as tolerated  2-year follow-up DEXA in spring 2023 with annual labs.    Return in about 8 months (around 5/6/2023).     AVIS Mckeon

## 2022-09-19 ENCOUNTER — TELEPHONE (OUTPATIENT)
Dept: GASTROENTEROLOGY | Facility: CLINIC | Age: 42
End: 2022-09-19

## 2022-09-19 DIAGNOSIS — E84.0 CYSTIC FIBROSIS WITH PULMONARY MANIFESTATIONS (H): Primary | ICD-10-CM

## 2022-09-19 RX ORDER — BISACODYL 5 MG/1
TABLET, DELAYED RELEASE ORAL
Qty: 4 TABLET | Refills: 0 | Status: SHIPPED | OUTPATIENT
Start: 2022-09-19 | End: 2022-10-27

## 2022-09-19 NOTE — TELEPHONE ENCOUNTER
Pre assessment questions completed for upcoming colonoscopy procedure scheduled on 9/26/22    COVID policy reviewed. Patient to complete rapid antigen test one to two days before their scheduled procedure. Patient to bring photo of the results when they come in for their procedure.    Reviewed procedural arrival time 1245 and facility location [facility].    Designated  policy reviewed. Instructed to have someone stay 6 hours post procedure.     Procedure indication: screening     Bowel prep recommendation: Extended prep d/t CF    Extended prep script [facility] to [id number] [place] [facility] [address]. Prep instructions sent via [employer].    Reviewed Extended prep instructions. No fiber/iron supplements or foods that contain nuts/seeds 7 days prior to procedure.     Anticoagulation/blood thinners? no    Patient verbalized understanding and had no questions or concerns at this time.    Garima Morgan RN

## 2022-09-22 ENCOUNTER — APPOINTMENT (OUTPATIENT)
Dept: URBAN - METROPOLITAN AREA CLINIC 252 | Age: 42
Setting detail: DERMATOLOGY
End: 2022-09-22

## 2022-09-22 VITALS — HEIGHT: 73 IN | WEIGHT: 175 LBS

## 2022-09-22 DIAGNOSIS — L20.89 OTHER ATOPIC DERMATITIS: ICD-10-CM

## 2022-09-22 PROCEDURE — OTHER MIPS QUALITY: OTHER

## 2022-09-22 PROCEDURE — OTHER PRESCRIPTION: OTHER

## 2022-09-22 PROCEDURE — OTHER COUNSELING: OTHER

## 2022-09-22 PROCEDURE — 99214 OFFICE O/P EST MOD 30 MIN: CPT

## 2022-09-22 RX ORDER — TACROLIMUS 1 MG/G
0.1% OINTMENT TOPICAL BID
Qty: 30 | Refills: 5 | Status: ERX | COMMUNITY
Start: 2022-09-22

## 2022-09-22 ASSESSMENT — LOCATION DETAILED DESCRIPTION DERM
LOCATION DETAILED: LEFT CENTRAL EYEBROW
LOCATION DETAILED: RIGHT CENTRAL EYEBROW
LOCATION DETAILED: LEFT SUPERIOR CENTRAL MALAR CHEEK
LOCATION DETAILED: RIGHT SUPERIOR MEDIAL MALAR CHEEK

## 2022-09-22 ASSESSMENT — LOCATION SIMPLE DESCRIPTION DERM
LOCATION SIMPLE: LEFT EYEBROW
LOCATION SIMPLE: RIGHT EYEBROW
LOCATION SIMPLE: LEFT CHEEK
LOCATION SIMPLE: RIGHT CHEEK

## 2022-09-22 ASSESSMENT — LOCATION ZONE DERM: LOCATION ZONE: FACE

## 2022-09-22 NOTE — HPI: RASH
How Severe Is Your Rash?: moderate
Is This A New Presentation, Or A Follow-Up?: Rash
Additional History: Hydrocortisone 4 times per day has not helped.  Does not use moisturizer other than okeefs in winter.

## 2022-09-22 NOTE — PROCEDURE: COUNSELING
Patient Specific Counseling (Will Not Stick From Patient To Patient): - Would send Opzelura through [facility] if no improvement or worsening.\\n- When not flaring, recommend nightly use of Aquaphor; sample given. Patient Specific Counseling (Will Not Stick From Patient To Patient): - Would send Opzelura through [facility] if no improvement or worsening.\\n- When not flaring, recommend nightly use of Aquaphor; sample given.

## 2022-09-26 ENCOUNTER — HOSPITAL ENCOUNTER (OUTPATIENT)
Facility: CLINIC | Age: 42
Discharge: HOME | End: 2022-09-26
Attending: INTERNAL MEDICINE | Admitting: INTERNAL MEDICINE
Payer: COMMERCIAL

## 2022-09-26 VITALS
OXYGEN SATURATION: 96 % | DIASTOLIC BLOOD PRESSURE: 70 MMHG | HEIGHT: 73 IN | RESPIRATION RATE: 18 BRPM | SYSTOLIC BLOOD PRESSURE: 114 MMHG | WEIGHT: 170 LBS | BODY MASS INDEX: 22.53 KG/M2 | HEART RATE: 53 BPM | TEMPERATURE: 98.1 F

## 2022-09-26 LAB — COLONOSCOPY: NORMAL

## 2022-09-26 PROCEDURE — G0500 MOD SEDAT ENDO SERVICE >5YRS: HCPCS | Performed by: INTERNAL MEDICINE

## 2022-09-26 PROCEDURE — 45380 COLONOSCOPY AND BIOPSY: CPT | Performed by: INTERNAL MEDICINE

## 2022-09-26 PROCEDURE — 250N000011 HC RX IP 250 OP 636: Performed by: INTERNAL MEDICINE

## 2022-09-26 PROCEDURE — 88305 TISSUE EXAM BY PATHOLOGIST: CPT | Mod: TC | Performed by: INTERNAL MEDICINE

## 2022-09-26 PROCEDURE — 88305 TISSUE EXAM BY PATHOLOGIST: CPT | Mod: 26 | Performed by: PATHOLOGY

## 2022-09-26 RX ORDER — NALOXONE HYDROCHLORIDE 0.4 MG/ML
0.2 INJECTION, SOLUTION INTRAMUSCULAR; INTRAVENOUS; SUBCUTANEOUS
Status: DISCONTINUED | OUTPATIENT
Start: 2022-09-26 | End: 2022-09-26 | Stop reason: HOSPADM

## 2022-09-26 RX ORDER — NALOXONE HYDROCHLORIDE 0.4 MG/ML
0.4 INJECTION, SOLUTION INTRAMUSCULAR; INTRAVENOUS; SUBCUTANEOUS
Status: DISCONTINUED | OUTPATIENT
Start: 2022-09-26 | End: 2022-09-26 | Stop reason: HOSPADM

## 2022-09-26 RX ORDER — FENTANYL CITRATE 50 UG/ML
INJECTION, SOLUTION INTRAMUSCULAR; INTRAVENOUS PRN
Status: DISCONTINUED | OUTPATIENT
Start: 2022-09-26 | End: 2022-09-26 | Stop reason: HOSPADM

## 2022-09-26 RX ORDER — PROCHLORPERAZINE MALEATE 10 MG
10 TABLET ORAL EVERY 6 HOURS PRN
Status: DISCONTINUED | OUTPATIENT
Start: 2022-09-26 | End: 2022-09-26 | Stop reason: HOSPADM

## 2022-09-26 RX ORDER — ONDANSETRON 2 MG/ML
4 INJECTION INTRAMUSCULAR; INTRAVENOUS
Status: DISCONTINUED | OUTPATIENT
Start: 2022-09-26 | End: 2022-09-26 | Stop reason: HOSPADM

## 2022-09-26 RX ORDER — FLUMAZENIL 0.1 MG/ML
0.2 INJECTION, SOLUTION INTRAVENOUS
Status: DISCONTINUED | OUTPATIENT
Start: 2022-09-26 | End: 2022-09-26 | Stop reason: HOSPADM

## 2022-09-26 RX ORDER — LIDOCAINE 40 MG/G
CREAM TOPICAL
Status: DISCONTINUED | OUTPATIENT
Start: 2022-09-26 | End: 2022-09-26 | Stop reason: HOSPADM

## 2022-09-26 RX ORDER — ONDANSETRON 4 MG/1
4 TABLET, ORALLY DISINTEGRATING ORAL EVERY 6 HOURS PRN
Status: DISCONTINUED | OUTPATIENT
Start: 2022-09-26 | End: 2022-09-26 | Stop reason: HOSPADM

## 2022-09-26 RX ORDER — ONDANSETRON 2 MG/ML
4 INJECTION INTRAMUSCULAR; INTRAVENOUS EVERY 6 HOURS PRN
Status: DISCONTINUED | OUTPATIENT
Start: 2022-09-26 | End: 2022-09-26 | Stop reason: HOSPADM

## 2022-09-26 RX ADMIN — MIDAZOLAM 2 MG: 1 INJECTION INTRAMUSCULAR; INTRAVENOUS at 13:58

## 2022-09-26 RX ADMIN — FENTANYL CITRATE 50 MCG: 50 INJECTION, SOLUTION INTRAMUSCULAR; INTRAVENOUS at 13:58

## 2022-09-26 RX ADMIN — MIDAZOLAM 1 MG: 1 INJECTION INTRAMUSCULAR; INTRAVENOUS at 14:00

## 2022-09-26 RX ADMIN — FENTANYL CITRATE 25 MCG: 50 INJECTION, SOLUTION INTRAMUSCULAR; INTRAVENOUS at 14:01

## 2022-09-26 ASSESSMENT — ACTIVITIES OF DAILY LIVING (ADL): ADLS_ACUITY_SCORE: 35

## 2022-09-26 NOTE — OR NURSING
"Procedures - Endoscopy   Procedure: Colonoscopy   Indications: Cystic Fibrosis Screening  Therapies/Interventions biopsies surveillence   Specimens: Collected yes  Sedation: Fentanyl 75 mcg; Midazolam 3 mg  Sedation Time: 15 \"  Airway management: nasal cannula 2 liters  Response to procedure:  Tolerated well   SBAR Post procedure to: Corey Carrera RN   Patient escorted to and from  via litter by Endoscopy RN    Kate Sweet RN, BSN, CGRN  Endoscopy staff #[id number] ascom        "

## 2022-09-26 NOTE — H&P
[facility] Anesthesia Pre-op History and Physical    Philip Delacruz MRN# [id number]   Age: 42 year old YOB: 1980      Date of Surgery: 2022 Location [facility]      Date of Exam 2022 Facility (In hospital)       Home clinic: [facility]  [facility] provider[facility]         Chief Complaint and/or Reason for Procedure:   No chief complaint on file.           Active problem list:     Patient Active Problem List    Diagnosis Date Noted     Small intestinal bacterial overgrowth 2018     Priority: Medium     Exocrine pancreatic insufficiency 04/10/2018     Priority: Medium     Pancreatic insufficiency 2018     Priority: Medium     Chronic sinusitis      Priority: Medium     Cystic fibrosis with pulmonary manifestations 2014     Priority: Medium     SWEAT TEST:  Date:  2014           Laboratory: [facility]  Sample #1   mg     30  mmol/L Cl  Sample #2   mg     27  mmol/L Cl     GENOTYPING:  Date:  2014        Laboratory:  [place]  [facility]  Genotype:  [id number]  Poly T Variant:  [ 7T ]/[9T  ]              Medications (include herbals and vitamins):   Any Plavix use in the last 7 days? No     No current facility-administered medications for this encounter.             Allergies:      Allergies   Allergen Reactions     Liquid Adhesive Rash     Tagederm     Tegaderm Transparent Dressing (Informational Only) Rash     Allergy to Latex? No  Allergy to tape?   No  Intolerances: None            Physical Exam:   All vitals have been reviewed            Lab / Radiology Results:            Anesthetic risk and/or ASA classification:   ASA 3    Bennett Fajardo MD

## 2022-09-27 LAB
PATH REPORT.COMMENTS IMP SPEC: NORMAL
PATH REPORT.COMMENTS IMP SPEC: NORMAL
PATH REPORT.FINAL DX SPEC: NORMAL
PATH REPORT.GROSS SPEC: NORMAL
PATH REPORT.MICROSCOPIC SPEC OTHER STN: NORMAL
PATH REPORT.RELEVANT HX SPEC: NORMAL
PHOTO IMAGE: NORMAL

## 2022-10-23 ENCOUNTER — HEALTH MAINTENANCE LETTER (OUTPATIENT)
Age: 42
End: 2022-10-23

## 2022-10-27 ENCOUNTER — ALLIED HEALTH/NURSE VISIT (OUTPATIENT)
Dept: CARE COORDINATION | Facility: CLINIC | Age: 42
End: 2022-10-27

## 2022-10-27 ENCOUNTER — OFFICE VISIT (OUTPATIENT)
Dept: PULMONOLOGY | Facility: CLINIC | Age: 42
End: 2022-10-27
Attending: INTERNAL MEDICINE
Payer: COMMERCIAL

## 2022-10-27 ENCOUNTER — OFFICE VISIT (OUTPATIENT)
Dept: PHARMACY | Facility: CLINIC | Age: 42
End: 2022-10-27
Payer: COMMERCIAL

## 2022-10-27 VITALS
HEART RATE: 67 BPM | OXYGEN SATURATION: 97 % | BODY MASS INDEX: 23.19 KG/M2 | DIASTOLIC BLOOD PRESSURE: 81 MMHG | HEIGHT: 73 IN | SYSTOLIC BLOOD PRESSURE: 121 MMHG | WEIGHT: 175 LBS

## 2022-10-27 DIAGNOSIS — E84.0 CYSTIC FIBROSIS WITH PULMONARY MANIFESTATIONS (H): ICD-10-CM

## 2022-10-27 DIAGNOSIS — E84.0 CYSTIC FIBROSIS WITH PULMONARY MANIFESTATIONS (H): Primary | ICD-10-CM

## 2022-10-27 DIAGNOSIS — Z23 NEED FOR VACCINATION: Primary | ICD-10-CM

## 2022-10-27 DIAGNOSIS — K86.89 PANCREATIC INSUFFICIENCY: ICD-10-CM

## 2022-10-27 DIAGNOSIS — Z13.9 RISK AND FUNCTIONAL ASSESSMENT: Primary | ICD-10-CM

## 2022-10-27 DIAGNOSIS — J32.4 CHRONIC PANSINUSITIS: ICD-10-CM

## 2022-10-27 DIAGNOSIS — J01.01 ACUTE RECURRENT MAXILLARY SINUSITIS: ICD-10-CM

## 2022-10-27 DIAGNOSIS — K86.81 EXOCRINE PANCREATIC INSUFFICIENCY: ICD-10-CM

## 2022-10-27 DIAGNOSIS — M85.9 LOW BONE DENSITY FOR AGE: ICD-10-CM

## 2022-10-27 LAB
EXPTIME-PRE: 6.3 SEC
FEF2575-%PRED-PRE: 126 %
FEF2575-PRE: 5.51 L/SEC
FEF2575-PRED: 4.35 L/SEC
FEFMAX-%PRED-PRE: 98 %
FEFMAX-PRE: 10.52 L/SEC
FEFMAX-PRED: 10.73 L/SEC
FEV1-%PRED-PRE: 97 %
FEV1-PRE: 4.46 L
FEV1FEV6-PRE: 85 %
FEV1FEV6-PRED: 82 %
FEV1FVC-PRE: 85 %
FEV1FVC-PRED: 80 %
FIFMAX-PRE: 7.4 L/SEC
FVC-%PRED-PRE: 92 %
FVC-PRE: 5.27 L
FVC-PRED: 5.7 L

## 2022-10-27 PROCEDURE — 90686 IIV4 VACC NO PRSV 0.5 ML IM: CPT | Performed by: INTERNAL MEDICINE

## 2022-10-27 PROCEDURE — 250N000011 HC RX IP 250 OP 636: Performed by: INTERNAL MEDICINE

## 2022-10-27 PROCEDURE — 94375 RESPIRATORY FLOW VOLUME LOOP: CPT | Performed by: INTERNAL MEDICINE

## 2022-10-27 PROCEDURE — G0008 ADMIN INFLUENZA VIRUS VAC: HCPCS | Performed by: INTERNAL MEDICINE

## 2022-10-27 PROCEDURE — 99214 OFFICE O/P EST MOD 30 MIN: CPT | Mod: 25 | Performed by: INTERNAL MEDICINE

## 2022-10-27 PROCEDURE — 87077 CULTURE AEROBIC IDENTIFY: CPT | Performed by: INTERNAL MEDICINE

## 2022-10-27 PROCEDURE — 99207 PR NO CHARGE LOS: CPT | Performed by: PHARMACIST

## 2022-10-27 RX ORDER — TACROLIMUS 1 MG/G
OINTMENT TOPICAL
COMMUNITY
Start: 2022-09-22 | End: 2024-01-18

## 2022-10-27 RX ORDER — PEDIATRIC MULTIVIT 61/D3/VIT K 1500-800
1 CAPSULE ORAL DAILY
COMMUNITY

## 2022-10-27 RX ORDER — WHEAT DEXTRIN 3 G/3.8 G
1 POWDER (GRAM) ORAL DAILY
COMMUNITY
Start: 2022-10-27 | End: 2024-01-18

## 2022-10-27 RX ORDER — ESCITALOPRAM OXALATE 20 MG/1
1 TABLET ORAL DAILY
COMMUNITY
Start: 2022-10-06 | End: 2023-08-16

## 2022-10-27 RX ADMIN — INFLUENZA A VIRUS A/VICTORIA/2570/2019 IVR-215 (H1N1) ANTIGEN (FORMALDEHYDE INACTIVATED), INFLUENZA A VIRUS A/DARWIN/9/2021 SAN-010 (H3N2) ANTIGEN (FORMALDEHYDE INACTIVATED), INFLUENZA B VIRUS B/PHUKET/3073/2013 ANTIGEN (FORMALDEHYDE INACTIVATED), AND INFLUENZA B VIRUS B/MICHIGAN/01/2021 ANTIGEN (FORMALDEHYDE INACTIVATED) 0.5 ML: 15; 15; 15; 15 INJECTION, SUSPENSION INTRAMUSCULAR at 12:00

## 2022-10-27 ASSESSMENT — PAIN SCALES - GENERAL: PAINLEVEL: NO PAIN (0)

## 2022-10-27 NOTE — LETTER
10/27/2022         RE: Philip Delacruz  [address]  [facility] [place] [facility] [address]        Dear Colleague,    Thank you for referring your patient, Philip Delacruz, to the [facility] [place]. Please see a copy of my visit note below.    Reason for Visit  Philip Delacruz is a 42 year old year old male who is being seen for Cystic Fibrosis (CF Follow up )      Assessment and plan:   Shar Coley is a 42 year-old male with a history of cystic fibrosis with mild lung disease and pancreatic insufficiency.  Maintenance   Modulator: Trikafta  Mutation:  [id number], Poly T Variant:  [ 7T ]/[9T  ]  AW Clearance: Exercise  Bronchodilators: Albuterol MDI PRN  Mucolytics: none   Antibiotics Inh:  none   Antibiotics Oral: none   Other:   Colonization Hx: Rhizobium (agrobacterium) radiobacter, Aspergillus versicolor, Serratia marcescens, staphylococcus aureus, moraxella (branhamella) catarrhalis  Annual studies due:April 2023  Contraindications to standard of care :  COLONOSCOPY: 9/26/2022, no polyps.    Pulmonary/cystic fibrosis: Patient reports excellent exercise tolerance.  He is oxygenating well.  PFTs in the high normal range and similar to baseline.  The patient does not appear to be having a pulmonary exacerbation at this time.  He will continue with exercise as his primary form of airway clearance.    Sinusitis: The patient has symptoms suggestive of acute maxillary sinusitis.  He reports nasal swab through ENT with Staph aureus.  Sensitivities reviewed through care everywhere.  The patient will be treated with a 2-week course of Augmentin.  If he does not have complete resolution, sinus CT will be pursued.    Pancreatic insufficiency: The patient's weight is adequate and stable.  No symptoms of malabsorption at this time.  Continue current pancreatic enzyme replacement and supplemental vitamins.  Vitamin levels will be checked with annual studies  "in April of next year.    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  He has responded well to Trikafta with decreased respiratory, sinus and GI symptoms.  Continue annual LFT and CK monitoring.    Depression/anxiety: Adequately controlled with escitalopram.    Bone health: Continue calcium and alendronate.    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  He is currently observing in special ed classrooms to help make that determination.    Healthcare maintenance: Colonoscopy completed in September with no polyps.  Follow-up in 3 years.  Annual studies will be due in April.  The patient did receive an influenza vaccine today.    Follow-up in 3 months with PFTs and sputum culture.    True Sahni MD     CF HPI  The patient was seen and examined by True Sahni MD   Shar Coley is a 42 year-old male with a history of cystic fibrosis with mild lung disease and pancreatic insufficiency.  For past month, patient has had left maxillary sinus pressure.  He was evaluated by his ENT and swab revealed Staph aureus.  He was treated with a 10-day course of Bactrim.  Around the same time, his family developed \"head cold\".  He has had persistent symptoms with postnasal drip, yellow drainage with \"scabs\".  No ear pain.  No sore throat.  No fever, chills or night sweats.  He has had occipital headache and fatigue.    Breathing is comfortable at rest and with all activity.  He reports a cough which he attributes to postnasal drip.  No chest pain.  Vigorous exercise 3-4 times per week.  No vest therapy.    Review of systems:  Appetite is \"strong\"  No nausea, vomiting or abdominal pain.  Previous loose stools have resolved.  Eczema around the eyes, evaluated by dermatologist.  Resolved with topical medication.  A complete ROS was otherwise negative except as noted in the HPI.    Current Outpatient Medications "   Medication     amoxicillin-clavulanate (AUGMENTIN) 875-125 MG tablet     Wheat Dextrin (BENEFIBER) POWD     acyclovir (ZOVIRAX) 400 MG tablet     alendronate (FOSAMAX) 70 MG tablet     atorvastatin (LIPITOR) 10 MG tablet     Ca Cit Malate-Cholecalciferol (CALCIUM CITRATE MALATE-VIT D) 250-100 MG-UNIT TABS     cholecalciferol (VITAMIN D3) 125 mcg (5000 units) capsule     CREON 89171-72513 units CPEP per EC capsule     escitalopram (LEXAPRO) 20 MG tablet     mvw complete formulation (SOFTGELS) capsule     olopatadine (PATADAY) 0.2 % ophthalmic solution     Omega-3 Fatty Acids (FISH OIL) 1200 MG CPDR     Sodium Chloride-Sodium Bicarb (SINUFLO READYRINSE) KIT     tacrolimus (PROTOPIC) 0.1 % external ointment     TRIKAFTA 100-50-75 & 150 MG tablet pack     No current facility-administered medications for this visit.     No Known Allergies  Past Medical History:   Diagnosis Date     Chronic sinusitis      Congenital absence of vas deferens, bilateral      Cystic fibrosis (H)     Delta F508 and [id number]      History of COVID-19 01/2022    Back pain, chills and mild cough, resolved without intervention     Nasal polyps      Sinusitis, chronic        Past Surgical History:   Procedure Laterality Date     COLONOSCOPY N/A 9/26/2022    Procedure: COLONOSCOPY, WITH POLYPECTOMY AND BIOPSY;  Surgeon: Bennett Fajardo MD;  Location: [facility] TOOTH EXTRACTION W/FORCEP       NASAL/SINUS POLYPECTOMY       REPAIR PECTUS EXCAVATUM  1997     SINUS SURGERY  1992, 2002, 2004    Removed tubinates and polyps OSH     Sperm harvest         Social History     Socioeconomic History     Marital status:      Spouse name: Not on file     Number of children: Not on file     Years of education: Not on file     Highest education level: Not on file   Occupational History     Occupation: Teacher   Tobacco Use     Smoking status: Never     Smokeless tobacco: Never   Substance and Sexual Activity     Alcohol use: Yes     Alcohol/week: 0.0  "standard drinks     Comment: 1 drink 3X per week     Drug use: No     Sexual activity: Yes     Partners: Female     Birth control/protection: None   Other Topics Concern     Parent/sibling w/ CABG, MI or angioplasty before 65F 55M? Not Asked   Social History Narrative    Lives in [place] with wife, Aimee, and sons, Shay and Serafin. 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     Social Determinants of Health     Financial Resource Strain: Not on file   Food Insecurity: Not on file   Transportation Needs: Not on file   Physical Activity: Not on file   Stress: Not on file   Social Connections: Not on file   Intimate Partner Violence: Not on file   Housing Stability: Not on file         /81   Pulse 67   Ht 1.854 m (6' 1\")   Wt 79.4 kg (175 lb)   SpO2 97%   BMI 23.09 kg/m    Body mass index is 23.09 kg/m .  Exam:   GENERAL APPEARANCE: Well developed, well nourished, alert, and in no apparent distress.  EYES: PERRL, EOMI  HENT: Nasal mucosa with edema and hyperemia, L>R. No nasal polyps.  EARS: Canals clear, TMs normal  MOUTH: Oral mucosa is moist, without any lesions, no tonsillar enlargement, no oropharyngeal exudate.  NECK: supple, no masses, no thyromegaly.  LYMPHATICS: No significant axillary, cervical, or supraclavicular nodes.  RESP: normal percussion, good air flow throughout.  No crackles. No rhonchi. No wheezes.  CV: Normal S1, S2, regular rhythm, normal rate. No murmur.  No rub. No gallop. No LE edema.   ABDOMEN:  Bowel sounds normal, soft, nontender, no HSM or masses.   MS: extremities normal. No clubbing. No cyanosis.  SKIN: no rash on limited exam  NEURO: Mentation intact, speech normal, normal strength and tone, normal gait and stance  PSYCH: mentation appears normal. and affect normal/bright  Results:  Recent Results (from the past 168 hour(s))   General PFT Lab (Please always keep checked)    Collection " Time: 10/27/22 11:20 AM   Result Value Ref Range    FVC-Pred 5.70 L    FVC-Pre 5.27 L    FVC-%Pred-Pre 92 %    FEV1-Pre 4.46 L    FEV1-%Pred-Pre 97 %    FEV1FVC-Pred 80 %    FEV1FVC-Pre 85 %    FEFMax-Pred 10.73 L/sec    FEFMax-Pre 10.52 L/sec    FEFMax-%Pred-Pre 98 %    FEF2575-Pred 4.35 L/sec    FEF2575-Pre 5.51 L/sec    EGQ8925-%Pred-Pre 126 %    ExpTime-Pre 6.30 sec    FIFMax-Pre 7.40 L/sec    FEV1FEV6-Pred 82 %    FEV1FEV6-Pre 85 %   Cystic Fibrosis Culture Aerobic Bacterial    Collection Time: 10/27/22 12:20 PM    Specimen: Throat; Swab   Result Value Ref Range    Culture Culture in progress     Culture 3+ Normal haile     Culture 4+ Haemophilus influenzae (A)     Culture 1+ Staphylococcus aureus (A)                    Results as noted above.    PFT Interpretation:  Normal spirometry.  Unchanged from previous.   Similar to recent best.  Valid Maneuver    CF Exacerbation  Absent        Again, thank you for allowing me to participate in the care of your patient.        Sincerely,        True Sahni MD

## 2022-10-27 NOTE — PATIENT INSTRUCTIONS
Cystic Fibrosis Self-Care Plan    RECOMMENDATIONS:   Help us provide the best possible care. If you receive a questionnaire from the CF Foundation about your clinic experience today please fill it out.  It should take less than 5 minutes. Let us know what we are doing well and how we can improve.    Augmentin twice daily for 2 weeks.  If symptoms are not resolved by day 10-12, call the office.  Otherwise continue current medications.         [facility] Nurse line:  Nicole Hurtado  [phone]     [facility] Fax Number:      [phone]         Cystic Fibrosis Respiratory Therapists:   Janeth Ryan                          [phone]          Loida Trinidad   [phone]  Cystic Fibrosis Dietitians:              Nisa Dumont              [phone]                            April Cardona                        [phone]   Cystic Fibrosis Diabetes Nurse:    Barbara Birmingham               [phone]    Cystic Fibrosis Social Workers:     Melissa Calle               [phone]                     Debora Joseph               [phone]  Cystic Fibrosis Pharmacists:           Ileana Kilgore                              [phone] (pager)         Lennie Lara      [phone]   Cystic Fibrosis Genetic Counselor:   Faby Flores    [phone]    [facility] website:  [email or web address]    COVID VACCINES:    You are eligible for the COVID-19 vaccine. Sign up for your COVID vaccine via [employer]. Log in, select the menu bar, select schedule an appointment, and then select COVID-19 Vaccine 1st Dose. You may also schedule by calling this number [phone] however hold times can be long.       OR schedule through the [facility] Vaccine Connector at https://vaccineconnector.[place].gov/ or by calling [phone].      The best vaccine is the one that s available to you first.  All COVID-19 vaccines currently available in the  [place] (Eyal & Eyal, Pfizer and Moderna) have been shown to be highly effect at preventing COVID-19.       We re still learning how vaccines will affect the spread of COVID-19. After you ve been fully vaccinated against COVID-19, you should keep taking precautions in public places like wearing a mask, staying 6 feet apart from others, and avoiding crowds and poorly ventilated spaces until we know more.       MRN: [id number]   Clinic Date: October 27, 2022   Patient: Philip Delacruz     Annual Studies:   IGG   Date Value Ref Range Status   04/08/2021 1,198 610 - 1,616 mg/dL Final     Immunoglobulin G   Date Value Ref Range Status   04/21/2022 987 610 - 1,616 mg/dL Final     Insulin   Date Value Ref Range Status   05/02/2019 9.0 3 - 25 mU/L Final     There are no preventive care reminders to display for this patient.    Pulmonary Function Tests  FEV1: amount of air you can blow out in 1 second  FVC: total amount of air you can take in and blow out    Your Goals:         PFT Latest Ref Rng & Units 10/27/2022   FVC L 5.27   FEV1 L 4.46   FVC% % 92   FEV1% % 97          Airway Clearance: The Most Important Way to Keep Your Lungs Healthy      Continue regular exercise    Good Nutrition Can Improve Lung Function and Overall Health    Take ALL of your vitamins with food    Take 1/2 of your enzymes before EVERY meal/snack and the other 1/2 mid-meal/snack    Wt Readings from Last 3 Encounters:   10/27/22 79.4 kg (175 lb)   09/26/22 77.1 kg (170 lb)   08/05/22 79.4 kg (175 lb)       Body mass index is 23.09 kg/m .         National CF Foundation Recommendations for BMI in CF Adults: Women: at least 22 Men: at least 23        Controlling Blood Sugars Helps Prevent Lung Infections & Improves Nutrition  Test blood sugar:    In the morning before eating (goal is )    2 hours after a meal (goal is less than 150)    When pre-meal glucose is greater than 150 add correction    At bedtime (if less than 100 eat a  snack with 15 grams of carbohydrates  Last A1C Results:   Hemoglobin A1C   Date Value Ref Range Status   04/21/2022 4.9 0.0 - 5.6 % Final     Comment:     Normal <5.7%   Prediabetes 5.7-6.4%    Diabetes 6.5% or higher     Note: Adopted from ADA consensus guidelines.   04/08/2021 4.8 0 - 5.6 % Final     Comment:     Normal <5.7% Prediabetes 5.7-6.4%  Diabetes 6.5% or higher - adopted from ADA   consensus guidelines.           If diabetic, measure A1C every 6 months. Goal: Under 7%    Staying Healthy  Research:  If you are interested in learning about research opportunities or have questions, please contact the CF Research Team at [phone] or [email or web address].    CF Foundation:  Compass is a personalized resource service to help you with the insurance, financial, legal and other issues you are facing.  It's free, confidential and available to anyone with CF.  Ask your  for more information or contact Compass directly at 913-MCQCMQF ([phone]) or compass@cff.org, or learn more at cff.org/compass.

## 2022-10-27 NOTE — PROGRESS NOTES
Clinical Pharmacy Consult:                                                    Philip Delacruz is a 42 year old male seen for a clinical pharmacist consult.  He was referred to me from Dr. Sahni.     Reason for Consult: Annual Medication Review    Discussion: Updates are as follows:    1. Shar reported his medications at prescribed doses. He utilizes [facility] and local [facility] if needed.    2. Due for COVID vaccine, interested in getting this downstairs at the pharmacy. Asked if he could be sent a link for scheduling.    Plan:  1. Keep up the good work on medications!  2. Consider COVID vaccine in the future (send WeiPhone[employer]comt link)        Lennie Lara, PharmD  Cystic Fibrosis MTM Pharmacist  [facility]  Voicemail: [phone]

## 2022-10-27 NOTE — PATIENT INSTRUCTIONS
See provider AVS for a summary of recommendations from today's visit.    Lennie Lara, PharmD  Cystic Fibrosis MTM Pharmacist  [facility]  Voicemail: [phone]

## 2022-10-27 NOTE — PROGRESS NOTES
"Adult Cystic Fibrosis Program  Annual Psychosocial Assessment    Presenting Information:  SHAR is a 42-year-old male with cystic fibrosis, presenting in CF clinic for a regular follow up with primary CF provider, Dr. True Sahni.      Living situation:  Shar lives with his wife, Aimee, their 6 year old son, Shay, and 3 year old son, Serafin, in [place], [place]. They own their house and have lived there since 2012. They have 2 cats. He denies any current concerns about his living situation.     Family Constellation:  Shar was raised by his biological parents, in [place], [place]. He has 2 sibling(s): 1 older brother and a younger sister. He has four nieces. His sister lives in [place] at his parents home and his brother lives in [place]. Milena's family lives in [place] and they see them frequently. He is not aware of any other family members having CF.     Shar and Aimee have been  since 2012 and together since 2006. Their sons were conceived via IVF. They enjoy being parents and are keeping busy with their sons. Shay is in first grade and Serafin is in pre-school.    Social Support:  Shar reports good social support.  He gets along well with his wife and other family members and draws additional support from friends as well as a mentor who he tries to meet with regularly. Shar had a colleague with a daughter who has CF, otherwise he has no other contact within the CF community.      Adjustment to Illness:  Shar  was diagnosed with CF at age 34, in the summer of 2014. 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 As a child, he notes he experienced allergy and sinus problems, including 3 surgeries, one at age 12, and 2 in college. He was bothered by congestion and headaches and thought of himself as \"the sick one\" in his family. He had pectus excavatum in adolescence.     Overall, Shar describes his current health " "status as \"good\". He did have a bad cold and sinus infection a few months ago but feels he has mostly recovered. He feels trikafta has greatly helped with his sinuses and has rarely had a sinus infection since starting trikafta. He has also had a positive weight gain on trikafta. Shar has not been advised to use vest therapy, and uses Aerobika and exercise for airway clearance. He notes that he works out 3-4x a week doing crossfit training and since he has been able to gain weight he feels stronger than ever.     Shar is private about his CF diagnosis, although reports he is working on being more open.     Health Care Directive:  This SW reviewed [facility] education, including concept/purpose of health care directive, default health care agent (his wife) and how to complete a directive. Shar reported that he is comfortable with his default decision-maker (his wife) but would like to review a directive. SW will follow up via email.     Education:  Shar completed a Masters degree in Teaching at [employer] in Fall 2013. 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 He denies plans for further education at this time.     Employment:  Shar was previously employed full-time as a  but left the profession a few years ago related to stress of the job and COVID. 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 He does not particularly like this work and working for his friend has been challenging. 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 He is also potentially interested in special education. He is trying to be very purposeful about his return to teaching and if he having second thoughts or feeling stressed then he plans to stop altogether.     Shar's wife Aimee is employed as a . She has been working from home since the start of COVID and will be " "indefinitely. She has health insurance and other benefits through her work.     Finances:  Shar and his wife receive income from wages. Shar denies any financial concerns.       Insurance:  Shar is insured through his wife's employer group plan. He denies any insurance concerns.      Mental Health/Coping:  Shar denies any current or past symptoms indicative of anxiety, eating, learning or other mental health disorder. He also denies family history of mental health concerns.      Shar describes his mood recently as \"good\". He was previously seeing  psychiatrist Dr. Crystal and was prescribed lexapro. He went off this medication for a year or so, but then started taking it again as he realized what a difference it made. He mainly notices less irritability when on the medication. He does not see a therapist but has a mentor that is very supportive to him.    SW did not administer FELIX-7 and PHQ-9 as Shar had to get going to another appointment.    Shar reports minimal stress. He kenan with stress by getting out of the house, going for a drive, or working out.       Shar does not currently identify tg/spirituality as important in his life.     Chemical Health:  Shar denies tobacco or illicit drug use.  He drinks alcohol on occasion, consuming 1 drink 3-4 times a week. He denies any current/past psychosocial impairment caused by alcohol use.       Leisure Activities/Interests:   Shar enjoys hiking, going to the park with his sons, going to apple orchards, traveling, working out, biking, riding recreational vehicles, and spending time with friends.     Intervention:  -Psychosocial Assessment  -Supportive counseling    Assessment:  Shar was in good spirits and receptive to SW. He appeared to be open in his responses. He reports overall stable mental and physical health, and adequate social support. 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 He is able to " recognize the stress teaching caused him in the past and plans to find alternate work if teaching begins to cause him stress again. Stable insurance/finances.    Shar seems to be psychosocially stable overall, with access to relevant resources and supports.  No concerns expressed/noted.    Plan:  Re-consult for any psychosocial needs that may arise.    Complete psychosocial assessment annually.  Continue to follow for regular clinic consult.    Debora Joseph, [facility]  Adult Cystic Fibrosis   Ph: [phone], Pager: [phone]

## 2022-10-30 NOTE — PROGRESS NOTES
Reason for Visit  Philip Delacruz is a 42 year old year old male who is being seen for Cystic Fibrosis (CF Follow up )      Assessment and plan:   Shar Coley is a 42 year-old male with a history of cystic fibrosis with mild lung disease and pancreatic insufficiency.  Maintenance   Modulator: Trikafta  Mutation:  [id number]/[id number], Poly T Variant:  [ 7T ]/[9T  ]  AW Clearance: Exercise  Bronchodilators: Albuterol MDI PRN  Mucolytics: none   Antibiotics Inh:  none   Antibiotics Oral: none   Other:   Colonization Hx: Rhizobium (agrobacterium) radiobacter, Aspergillus versicolor, Serratia marcescens, staphylococcus aureus, moraxella (branhamella) catarrhalis  Annual studies due:April 2023  Contraindications to standard of care :  COLONOSCOPY: 9/26/2022, no polyps.    Pulmonary/cystic fibrosis: Patient reports excellent exercise tolerance.  He is oxygenating well.  PFTs in the high normal range and similar to baseline.  The patient does not appear to be having a pulmonary exacerbation at this time.  He will continue with exercise as his primary form of airway clearance.    Sinusitis: The patient has symptoms suggestive of acute maxillary sinusitis.  He reports nasal swab through ENT with Staph aureus.  Sensitivities reviewed through care everywhere.  The patient will be treated with a 2-week course of Augmentin.  If he does not have complete resolution, sinus CT will be pursued.    Pancreatic insufficiency: The patient's weight is adequate and stable.  No symptoms of malabsorption at this time.  Continue current pancreatic enzyme replacement and supplemental vitamins.  Vitamin levels will be checked with annual studies in April of next year.    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  He has responded well to Trikafta with decreased respiratory, sinus and GI symptoms.  Continue annual LFT and CK monitoring.    Depression/anxiety: Adequately controlled with escitalopram.    Bone health: Continue calcium  "and alendronate.    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  He is currently observing in special ed classrooms to help make that determination.    Healthcare maintenance: Colonoscopy completed in September with no polyps.  Follow-up in 3 years.  Annual studies will be due in April.  The patient did receive an influenza vaccine today.    Follow-up in 3 months with PFTs and sputum culture.    True Sahni MD      HPI  The patient was seen and examined by True Sahni MD   Shar Coley is a 42 year-old male with a history of cystic fibrosis with mild lung disease and pancreatic insufficiency.  For past month, patient has had left maxillary sinus pressure.  He was evaluated by his ENT and swab revealed Staph aureus.  He was treated with a 10-day course of Bactrim.  Around the same time, his family developed \"head cold\".  He has had persistent symptoms with postnasal drip, yellow drainage with \"scabs\".  No ear pain.  No sore throat.  No fever, chills or night sweats.  He has had occipital headache and fatigue.    Breathing is comfortable at rest and with all activity.  He reports a cough which he attributes to postnasal drip.  No chest pain.  Vigorous exercise 3-4 times per week.  No vest therapy.    Review of systems:  Appetite is \"strong\"  No nausea, vomiting or abdominal pain.  Previous loose stools have resolved.  Eczema around the eyes, evaluated by dermatologist.  Resolved with topical medication.  A complete ROS was otherwise negative except as noted in the HPI.    Current Outpatient Medications   Medication     amoxicillin-clavulanate (AUGMENTIN) 875-125 MG tablet     Wheat Dextrin (BENEFIBER) POWD     acyclovir (ZOVIRAX) 400 MG tablet     alendronate (FOSAMAX) 70 MG tablet     atorvastatin (LIPITOR) 10 MG tablet     Ca Cit Malate-Cholecalciferol (CALCIUM CITRATE MALATE-VIT D) 250-100 MG-UNIT TABS     " cholecalciferol (VITAMIN D3) 125 mcg (5000 units) capsule     CREON 28553-20708 units CPEP per EC capsule     escitalopram (LEXAPRO) 20 MG tablet     mvw complete formulation (SOFTGELS) capsule     olopatadine (PATADAY) 0.2 % ophthalmic solution     Omega-3 Fatty Acids (FISH OIL) 1200 MG CPDR     Sodium Chloride-Sodium Bicarb (SINUFLO READYRINSE) KIT     tacrolimus (PROTOPIC) 0.1 % external ointment     TRIKAFTA 100-50-75 & 150 MG tablet pack     No current facility-administered medications for this visit.     No Known Allergies  Past Medical History:   Diagnosis Date     Chronic sinusitis      Congenital absence of vas deferens, bilateral      Cystic fibrosis (H)     Delta F508 and Q9334S      History of COVID-19 01/2022    Back pain, chills and mild cough, resolved without intervention     Nasal polyps      Sinusitis, chronic        Past Surgical History:   Procedure Laterality Date     COLONOSCOPY N/A 9/26/2022    Procedure: COLONOSCOPY, WITH POLYPECTOMY AND BIOPSY;  Surgeon: Bennett Fajardo MD;  Location: [facility]     HC TOOTH EXTRACTION W/FORCEP       NASAL/SINUS POLYPECTOMY       REPAIR PECTUS EXCAVATUM  1997     SINUS SURGERY  1992, 2002, 2004    Removed tubinates and polyps OSH     Sperm harvest         Social History     Socioeconomic History     Marital status:      Spouse name: Not on file     Number of children: Not on file     Years of education: Not on file     Highest education level: Not on file   Occupational History     Occupation: Teacher   Tobacco Use     Smoking status: Never     Smokeless tobacco: Never   Substance and Sexual Activity     Alcohol use: Yes     Alcohol/week: 0.0 standard drinks     Comment: 1 drink 3X per week     Drug use: No     Sexual activity: Yes     Partners: Female     Birth control/protection: None   Other Topics Concern     Parent/sibling w/ CABG, MI or angioplasty before 65F 55M? Not Asked   Social History Narrative    Lives in [place] with wife, Aimee, and  "chano, Shay and Serafin. 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     Social Determinants of Health     Financial Resource Strain: Not on file   Food Insecurity: Not on file   Transportation Needs: Not on file   Physical Activity: Not on file   Stress: Not on file   Social Connections: Not on file   Intimate Partner Violence: Not on file   Housing Stability: Not on file         /81   Pulse 67   Ht 1.854 m (6' 1\")   Wt 79.4 kg (175 lb)   SpO2 97%   BMI 23.09 kg/m    Body mass index is 23.09 kg/m .  Exam:   GENERAL APPEARANCE: Well developed, well nourished, alert, and in no apparent distress.  EYES: PERRL, EOMI  HENT: Nasal mucosa with edema and hyperemia, L>R. No nasal polyps.  EARS: Canals clear, TMs normal  MOUTH: Oral mucosa is moist, without any lesions, no tonsillar enlargement, no oropharyngeal exudate.  NECK: supple, no masses, no thyromegaly.  LYMPHATICS: No significant axillary, cervical, or supraclavicular nodes.  RESP: normal percussion, good air flow throughout.  No crackles. No rhonchi. No wheezes.  CV: Normal S1, S2, regular rhythm, normal rate. No murmur.  No rub. No gallop. No LE edema.   ABDOMEN:  Bowel sounds normal, soft, nontender, no HSM or masses.   MS: extremities normal. No clubbing. No cyanosis.  SKIN: no rash on limited exam  NEURO: Mentation intact, speech normal, normal strength and tone, normal gait and stance  PSYCH: mentation appears normal. and affect normal/bright  Results:  Recent Results (from the past 168 hour(s))   General PFT Lab (Please always keep checked)    Collection Time: 10/27/22 11:20 AM   Result Value Ref Range    FVC-Pred 5.70 L    FVC-Pre 5.27 L    FVC-%Pred-Pre 92 %    FEV1-Pre 4.46 L    FEV1-%Pred-Pre 97 %    FEV1FVC-Pred 80 %    FEV1FVC-Pre 85 %    FEFMax-Pred 10.73 L/sec    FEFMax-Pre 10.52 L/sec    FEFMax-%Pred-Pre 98 %    FEF2575-Pred 4.35 L/sec    FEF2575-Pre 5.51 " L/sec    CGK5633-%Pred-Pre 126 %    ExpTime-Pre 6.30 sec    FIFMax-Pre 7.40 L/sec    FEV1FEV6-Pred 82 %    FEV1FEV6-Pre 85 %   Cystic Fibrosis Culture Aerobic Bacterial    Collection Time: 10/27/22 12:20 PM    Specimen: Throat; Swab   Result Value Ref Range    Culture Culture in progress     Culture 3+ Normal haile     Culture 4+ Haemophilus influenzae (A)     Culture 1+ Staphylococcus aureus (A)                    Results as noted above.    PFT Interpretation:  Normal spirometry.  Unchanged from previous.   Similar to recent best.  Valid Maneuver             CF Exacerbation  Absent

## 2022-11-01 LAB
BACTERIA SPEC CULT: ABNORMAL

## 2022-11-01 RX ORDER — ALENDRONATE SODIUM 70 MG/1
70 TABLET ORAL
Qty: 12 TABLET | Refills: 3 | Status: SHIPPED | OUTPATIENT
Start: 2022-11-01 | End: 2023-08-08

## 2022-12-19 ENCOUNTER — TELEPHONE (OUTPATIENT)
Dept: PULMONOLOGY | Facility: CLINIC | Age: 42
End: 2022-12-19

## 2022-12-19 NOTE — TELEPHONE ENCOUNTER
PA Initiation    Medication: Trikafta PA renewal pending  Insurance Company: [employer] - Phone [phone] Fax [phone]  Pharmacy Filling the Rx:    Filling Pharmacy Phone:    Filling Pharmacy Fax:    Start Date: 12/19/2022    Faxed [employer] PA form, chart notes, genotype to [phone]

## 2022-12-21 NOTE — TELEPHONE ENCOUNTER
Prior Authorization Approval    Authorization Effective Date: 12/19/2022  Authorization Expiration Date: 12/19/2023  Medication: Trikafta PA renewal approved   Approved Dose/Quantity: 100-50-75 & 150mg / 84 for 28 ds   Reference #:     Insurance Company: [employer] - Phone [phone] Fax [phone]  Expected CoPay:       CoPay Card Available:      Foundation Assistance Needed:    Which Pharmacy is filling the prescription (Not needed for infusion/clinic administered): [facility] - [place], [place] - [address]  Pharmacy Notified:    Patient Notified:

## 2023-01-05 DIAGNOSIS — E84.0 CYSTIC FIBROSIS WITH PULMONARY MANIFESTATIONS (H): ICD-10-CM

## 2023-01-05 DIAGNOSIS — K86.81 EXOCRINE PANCREATIC INSUFFICIENCY: ICD-10-CM

## 2023-01-05 RX ORDER — ELEXACAFTOR, TEZACAFTOR, AND IVACAFTOR 100-50-75
KIT ORAL
Qty: 84 TABLET | Refills: 5 | Status: SHIPPED | OUTPATIENT
Start: 2023-01-05 | End: 2023-07-12

## 2023-01-19 ENCOUNTER — VIRTUAL VISIT (OUTPATIENT)
Dept: PULMONOLOGY | Facility: CLINIC | Age: 43
End: 2023-01-19
Attending: INTERNAL MEDICINE
Payer: COMMERCIAL

## 2023-01-19 DIAGNOSIS — K86.81 EXOCRINE PANCREATIC INSUFFICIENCY: ICD-10-CM

## 2023-01-19 DIAGNOSIS — J32.4 CHRONIC PANSINUSITIS: ICD-10-CM

## 2023-01-19 DIAGNOSIS — E84.0 CYSTIC FIBROSIS WITH PULMONARY MANIFESTATIONS (H): Primary | ICD-10-CM

## 2023-01-19 NOTE — PATIENT INSTRUCTIONS
Cystic Fibrosis Self-Care Plan    RECOMMENDATIONS:   Help us provide the best possible care. If you receive a questionnaire from the CF Foundation about your clinic experience today please fill it out.  It should take less than 5 minutes. Let us know what we are doing well and how we can improve.    Continue current medication and exercise.  Call the CF office if your respiratory symptoms get worse.        Cystic Fibrosis :    Bebe Vaughn  [phone]  [facility] Nurse line:  Nicole  [phone]     [facility] Fax Number:      [phone]         Cystic Fibrosis Respiratory Therapists:   Janeth Ryan                          [phone]          Loida Trinidad   [phone]  Cystic Fibrosis Dietitians:              Nisa Dumont              [phone]                            April Cardona                        [phone]   Cystic Fibrosis Diabetes Nurse:    Barbara Birmingham               [phone]    Cystic Fibrosis Social Workers:     Melissa Calle               [phone]                     Debora Joseph               [phone]  Cystic Fibrosis Pharmacists:           Ileana Kilgore                              [phone] (pager)         Lennie Lara      [phone]   Cystic Fibrosis Genetic Counselor:   Faby Flores    [phone]    [facility] website:  [email or web address]    COVID VACCINES:    You are eligible for the COVID-19 vaccine. Sign up for your COVID vaccine via [employer]. Log in, select the menu bar, select schedule an appointment, and then select COVID-19 Vaccine 1st Dose. You may also schedule by calling this number [phone] however hold times can be long.       OR schedule through the [facility] Vaccine Connector at https://vaccineconnector.[email or web address] or by calling [phone].      The best vaccine is the one that s available to you first.  All COVID-19 vaccines currently  available in the [place] (Eyal & Eyal, Pfizer and Moderna) have been shown to be highly effect at preventing COVID-19.       We re still learning how vaccines will affect the spread of COVID-19. After you ve been fully vaccinated against COVID-19, you should keep taking precautions in public places like wearing a mask, staying 6 feet apart from others, and avoiding crowds and poorly ventilated spaces until we know more.       MRN: [id number]   Clinic Date: January 19, 2023   Patient: Philip Delacruz     Annual Studies:   IGG   Date Value Ref Range Status   04/08/2021 1,198 610 - 1,616 mg/dL Final     Immunoglobulin G   Date Value Ref Range Status   04/21/2022 987 610 - 1,616 mg/dL Final     Insulin   Date Value Ref Range Status   05/02/2019 9.0 3 - 25 mU/L Final     There are no preventive care reminders to display for this patient.    Pulmonary Function Tests  FEV1: amount of air you can blow out in 1 second  FVC: total amount of air you can take in and blow out    Your Goals:         PFT Latest Ref Rng & Units 10/27/2022   FVC L 5.27   FEV1 L 4.46   FVC% % 92   FEV1% % 97          Airway Clearance: The Most Important Way to Keep Your Lungs Healthy    Exercise regularly for airway clearance.    Good Nutrition Can Improve Lung Function and Overall Health    Take ALL of your vitamins with food    Take 1/2 of your enzymes before EVERY meal/snack and the other 1/2 mid-meal/snack    Wt Readings from Last 3 Encounters:   10/27/22 79.4 kg (175 lb)   09/26/22 77.1 kg (170 lb)   08/05/22 79.4 kg (175 lb)       There is no height or weight on file to calculate BMI.         National CF Foundation Recommendations for BMI in CF Adults: Women: at least 22 Men: at least 23        Controlling Blood Sugars Helps Prevent Lung Infections & Improves Nutrition  Test blood sugar:    In the morning before eating (goal is )    2 hours after a meal (goal is less than 150)    When pre-meal glucose is greater than 150  add correction    At bedtime (if less than 100 eat a snack with 15 grams of carbohydrates  Last A1C Results:   Hemoglobin A1C   Date Value Ref Range Status   04/21/2022 4.9 0.0 - 5.6 % Final     Comment:     Normal <5.7%   Prediabetes 5.7-6.4%    Diabetes 6.5% or higher     Note: Adopted from ADA consensus guidelines.   04/08/2021 4.8 0 - 5.6 % Final     Comment:     Normal <5.7% Prediabetes 5.7-6.4%  Diabetes 6.5% or higher - adopted from ADA   consensus guidelines.           If diabetic, measure A1C every 6 months. Goal: Under 7%    Staying Healthy  Research:  If you are interested in learning about research opportunities or have questions, please contact the CF Research Team at [phone] or [email or web address].    CF Foundation:  Compass is a personalized resource service to help you with the insurance, financial, legal and other issues you are facing.  It's free, confidential and available to anyone with CF.  Ask your  for more information or contact Compass directly at 081-COMPASS ([phone]) or compass@cff.org, or learn more at cff.org/compass.

## 2023-01-19 NOTE — PROGRESS NOTES
Shar is a 42 year old who is being evaluated via a billable video visit.      How would you like to obtain your AVS? MyChart  If the video visit is dropped, the invitation should be resent by: Text to cell phone: [phone]  Will anyone else be joining your video visit? No  {If patient encounters technical issues they should call [phone] :[id number]}      Video-Visit Details    Type of service:  Video Visit     Originating Location (pt. Location): Home  {PROVIDER LOCATION On-site should be selected for visits conducted from your clinic location or adjoining [facility] hospital, academic office, or other nearby [facility] building. Off-site should be selected for all other provider locations, including home:[id number]}  Distant Location (provider location):  On-site  Platform used for Video Visit: Other:  Telephone visit.  14 minutes     Reason for Visit  Philip Delacruz is a 42 year old year old male who is being seen for Video Visit (Covid Positive follow up )      Assessment and plan: ***    Shar Coley is a 42 year-old male with a history of cystic fibrosis with mild lung disease and pancreatic insufficiency.  Maintenance   Modulator: Trikafta  Mutation:  [id number], Poly T Variant:  [ 7T ]/[9T  ]  AW Clearance: Exercise  Bronchodilators: Albuterol MDI PRN  Mucolytics: none   Antibiotics Inh:  none   Antibiotics Oral: none   Other:   Colonization Hx: Rhizobium (agrobacterium) radiobacter, Aspergillus versicolor, Serratia marcescens, staphylococcus aureus, moraxella (branhamella) catarrhalis  Annual studies due:April 2023  Contraindications to standard of care :  COLONOSCOPY: 9/26/2022, no polyps.     Pulmonary/cystic fibrosis: Patient reports excellent exercise tolerance.  He is oxygenating well.  PFTs in the high normal range and similar to baseline.  The patient does not appear to be having a pulmonary exacerbation at this time.  He will continue with exercise as his primary form of airway  clearance.     Sinusitis: The patient has symptoms suggestive of acute maxillary sinusitis.  He reports nasal swab through ENT with Staph aureus.  Sensitivities reviewed through care everywhere.  The patient will be treated with a 2-week course of Augmentin.  If he does not have complete resolution, sinus CT will be pursued.     Pancreatic insufficiency: The patient's weight is adequate and stable.  No symptoms of malabsorption at this time.  Continue current pancreatic enzyme replacement and supplemental vitamins.  Vitamin levels will be checked with annual studies in April of next year.     CFTR Modulation: The patient is [id number] heterozygote.  He has responded well to Trikafta with decreased respiratory, sinus and GI symptoms.  Continue annual LFT and CK monitoring.     Depression/anxiety: Adequately controlled with escitalopram.     Bone health: Continue calcium and alendronate.     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  He is currently observing in special ed classrooms to help make that determination.     Healthcare maintenance: Colonoscopy completed in September with no polyps.  Follow-up in 3 years.  Annual studies will be due in April.  The patient did receive an influenza vaccine today.     Follow-up in 3 months with PFTs and sputum culture.     True Sahni MD     CF HPI  The patient was seen and examined by True Sahni MD     A complete ROS was otherwise negative except as noted in the HPI.    Current Outpatient Medications   Medication     acyclovir (ZOVIRAX) 400 MG tablet     alendronate (FOSAMAX) 70 MG tablet     atorvastatin (LIPITOR) 10 MG tablet     Ca Cit Malate-Cholecalciferol (CALCIUM CITRATE MALATE-VIT D) 250-100 MG-UNIT TABS     cholecalciferol (VITAMIN D3) 125 mcg (5000 units) capsule     CREON 40037-99926 units CPEP per EC capsule     escitalopram (LEXAPRO) 20 MG tablet     mvw  complete formulation (SOFTGELS) capsule     olopatadine (PATADAY) 0.2 % ophthalmic solution     Omega-3 Fatty Acids (FISH OIL) 1200 MG CPDR     Sodium Chloride-Sodium Bicarb (SINUFLO READYRINSE) KIT     tacrolimus (PROTOPIC) 0.1 % external ointment     TRIKAFTA 100-50-75 & 150 MG tablet pack     Wheat Dextrin (BENEFIBER) POWD     No current facility-administered medications for this visit.     No Known Allergies  Past Medical History:   Diagnosis Date     Chronic sinusitis      Congenital absence of vas deferens, bilateral      Cystic fibrosis (H)     Delta F508 and [id number]      History of COVID-19 01/2022    Back pain, chills and mild cough, resolved without intervention     Nasal polyps      Sinusitis, chronic        Past Surgical History:   Procedure Laterality Date     COLONOSCOPY N/A 9/26/2022    Procedure: COLONOSCOPY, WITH POLYPECTOMY AND BIOPSY;  Surgeon: Bennett Fajardo MD;  Location: [facility] TOOTH EXTRACTION W/FORCEP       NASAL/SINUS POLYPECTOMY       REPAIR PECTUS EXCAVATUM  1997     SINUS SURGERY  1992, 2002, 2004    Removed tubinates and polyps OSH     Sperm harvest         Social History     Socioeconomic History     Marital status:      Spouse name: Not on file     Number of children: Not on file     Years of education: Not on file     Highest education level: Not on file   Occupational History     Occupation: Teacher   Tobacco Use     Smoking status: Never     Smokeless tobacco: Never   Substance and Sexual Activity     Alcohol use: Yes     Alcohol/week: 0.0 standard drinks     Comment: 1 drink 3X per week     Drug use: No     Sexual activity: Yes     Partners: Female     Birth control/protection: None   Other Topics Concern     Parent/sibling w/ CABG, MI or angioplasty before 65F 55M? Not Asked   Social History Narrative    Lives in [place] with wife, Aimee, and sons, Shay and Serafin. Previously , switched to Property management (summer 2020) to limit COVID  exposure in school due to CF.     Social Determinants of Health     Financial Resource Strain: Not on file   Food Insecurity: Not on file   Transportation Needs: Not on file   Physical Activity: Not on file   Stress: Not on file   Social Connections: Not on file   Intimate Partner Violence: Not on file   Housing Stability: Not on file           Exam:   Constitutional - looks well, in no apparent distress  Eyes - no redness or discharge  Respiratory -breathing appears comfortable.  No cough. Speaking in full sentences.  Skin - No appreciable discoloration or lesions (very limited exam)  Neurological - No apparent tremors. Speech fluent and articlate  Psychiatric - no signs of delirium or anxiety     Exam limited to that easily identified on a virtual visit. The rest of a comprehensive physical examination is deferred due to PHE (public health emergency) video visit restrictions.    Results:  No results found for this or any previous visit (from the past 168 hour(s)).                No PFTs due to safety concerns during the COVID-19 outbreak.    CF Exacerbation  {CF EXACERBATION STATUS:[id number]}

## 2023-04-02 ENCOUNTER — HEALTH MAINTENANCE LETTER (OUTPATIENT)
Age: 43
End: 2023-04-02

## 2023-04-06 DIAGNOSIS — E78.00 HYPERCHOLESTEROLEMIA: ICD-10-CM

## 2023-04-06 RX ORDER — ATORVASTATIN CALCIUM 10 MG/1
TABLET, FILM COATED ORAL
Qty: 90 TABLET | Refills: 2 | Status: SHIPPED | OUTPATIENT
Start: 2023-04-06 | End: 2023-12-27

## 2023-04-17 ENCOUNTER — PHARMACY VISIT (OUTPATIENT)
Dept: ADMINISTRATIVE | Facility: CLINIC | Age: 43
End: 2023-04-17
Payer: COMMERCIAL

## 2023-04-17 NOTE — PROGRESS NOTES
"   Cystic Fibrosis Clinical Follow Up Assessment   Completed on  18:41:31 [facility], by Stephany Hogan        Patient  Patient Name: DAVE RINCON  :   EHR ID: [id number]       Activity Date      Activity Medications    TRIKAFTA        Care Details    What are the patient's goals for this therapy?   ? 10/6/22: No new health goals      Did you identify any patient barriers to access and successful treatment?   ? No barriers to access identified      Is it appropriate to collect a PDC at this time? [QA Metric] (An MPR or PDC would not be appropriate for cycled medications or if the patient is on therapy   ? Yes      Document the date of the last refill of PDC   ? 2023-03-10      Document PDC   ? 0.99      Has the patient missed doses inappropriately?   ? No      Please select CURRENT side effect(s) for monitoring:   ? None       Summary Notes   Spoke to Saleem via [employer] for biannual CF assessment. Order set with TM Liaison to deliver .     Trikafta - denies any side effects or missed doses. \"No issues with meds.\" PDC = 0.99     CF - denies any recent exacerbations or illnesses. No changes to medications or allergies.     Next follow-up with Dr. Sahni on 23. No major updates or concerns in Epic.     TM clinicians will continue biannual follow-up.    Stephany Hogan, PharmD  Therapy Management Pharmacist  [facility]  [email or web address]  Specialty: [phone]            "

## 2023-04-28 ENCOUNTER — HOSPITAL ENCOUNTER (OUTPATIENT)
Dept: RESEARCH | Facility: CLINIC | Age: 43
Discharge: HOME | End: 2023-04-28
Attending: INTERNAL MEDICINE
Payer: COMMERCIAL

## 2023-04-28 DIAGNOSIS — Z00.6 EXAMINATION OF PARTICIPANT OR CONTROL IN CLINICAL RESEARCH: Primary | ICD-10-CM

## 2023-04-28 PROCEDURE — 510N000009 HC RESEARCH FACILITY, PER 15 MIN

## 2023-04-28 PROCEDURE — 510N000016 HC RESEARCH MEALS, PER MEAL

## 2023-04-28 PROCEDURE — 300N000004 HC RESEARCH SPECIMEN PROCESSING, MODERATE

## 2023-04-28 PROCEDURE — 510N000017 HC CRU PATIENT CARE, PER 15 MIN

## 2023-04-28 PROCEDURE — 510N000018 HC RESEARCH OGTT, PER HOUR

## 2023-04-28 NOTE — ADDENDUM NOTE
Encounter addended by: Damon Albarran RN on: 4/28/2023 2:36 PM   Actions taken: Charge Capture section accepted

## 2023-05-03 DIAGNOSIS — E84.0 CYSTIC FIBROSIS WITH PULMONARY MANIFESTATIONS (H): Primary | ICD-10-CM

## 2023-05-04 ENCOUNTER — VIRTUAL VISIT (OUTPATIENT)
Dept: PULMONOLOGY | Facility: CLINIC | Age: 43
End: 2023-05-04
Attending: INTERNAL MEDICINE
Payer: COMMERCIAL

## 2023-05-04 ENCOUNTER — ANCILLARY PROCEDURE (OUTPATIENT)
Dept: BONE DENSITY | Facility: CLINIC | Age: 43
End: 2023-05-04
Attending: INTERNAL MEDICINE
Payer: COMMERCIAL

## 2023-05-04 ENCOUNTER — ANCILLARY PROCEDURE (OUTPATIENT)
Dept: GENERAL RADIOLOGY | Facility: CLINIC | Age: 43
End: 2023-05-04
Attending: INTERNAL MEDICINE
Payer: COMMERCIAL

## 2023-05-04 ENCOUNTER — LAB (OUTPATIENT)
Dept: LAB | Facility: CLINIC | Age: 43
End: 2023-05-04
Attending: INTERNAL MEDICINE
Payer: COMMERCIAL

## 2023-05-04 VITALS
WEIGHT: 187.39 LBS | RESPIRATION RATE: 17 BRPM | SYSTOLIC BLOOD PRESSURE: 133 MMHG | HEIGHT: 73 IN | OXYGEN SATURATION: 97 % | DIASTOLIC BLOOD PRESSURE: 85 MMHG | BODY MASS INDEX: 24.84 KG/M2 | HEART RATE: 59 BPM

## 2023-05-04 DIAGNOSIS — K86.81 EXOCRINE PANCREATIC INSUFFICIENCY: ICD-10-CM

## 2023-05-04 DIAGNOSIS — E84.0 CYSTIC FIBROSIS WITH PULMONARY MANIFESTATIONS (H): ICD-10-CM

## 2023-05-04 DIAGNOSIS — J32.4 CHRONIC PANSINUSITIS: ICD-10-CM

## 2023-05-04 DIAGNOSIS — E84.0 CYSTIC FIBROSIS WITH PULMONARY MANIFESTATIONS (H): Primary | ICD-10-CM

## 2023-05-04 DIAGNOSIS — K86.89 PANCREATIC INSUFFICIENCY: ICD-10-CM

## 2023-05-04 LAB
ALBUMIN SERPL BCG-MCNC: 4.5 G/DL (ref 3.5–5.2)
ALBUMIN UR-MCNC: NEGATIVE MG/DL
ALP SERPL-CCNC: 69 U/L (ref 40–129)
ALT SERPL W P-5'-P-CCNC: 24 U/L (ref 10–50)
ANION GAP SERPL CALCULATED.3IONS-SCNC: 9 MMOL/L (ref 7–15)
APPEARANCE UR: CLEAR
AST SERPL W P-5'-P-CCNC: 33 U/L (ref 10–50)
BASOPHILS # BLD AUTO: 0 10E3/UL (ref 0–0.2)
BASOPHILS NFR BLD AUTO: 0 %
BILIRUB DIRECT SERPL-MCNC: 0.23 MG/DL (ref 0–0.3)
BILIRUB SERPL-MCNC: 0.8 MG/DL
BILIRUB UR QL STRIP: NEGATIVE
BUN SERPL-MCNC: 28.4 MG/DL (ref 6–20)
CALCIUM SERPL-MCNC: 9.5 MG/DL (ref 8.6–10)
CHLORIDE SERPL-SCNC: 103 MMOL/L (ref 98–107)
CHOLEST SERPL-MCNC: 180 MG/DL
CK SERPL-CCNC: 302 U/L (ref 39–308)
COLOR UR AUTO: YELLOW
CREAT SERPL-MCNC: 0.89 MG/DL (ref 0.67–1.17)
CREAT UR-MCNC: 75.9 MG/DL
DEPRECATED CALCIDIOL+CALCIFEROL SERPL-MC: 53 UG/L (ref 20–75)
DEPRECATED HCO3 PLAS-SCNC: 26 MMOL/L (ref 22–29)
EOSINOPHIL # BLD AUTO: 0.1 10E3/UL (ref 0–0.7)
EOSINOPHIL NFR BLD AUTO: 1 %
ERYTHROCYTE [DISTWIDTH] IN BLOOD BY AUTOMATED COUNT: 11.9 % (ref 10–15)
ERYTHROCYTE [SEDIMENTATION RATE] IN BLOOD BY WESTERGREN METHOD: 10 MM/HR (ref 0–15)
EXPTIME-PRE: 6.31 SEC
FEF2575-%PRED-PRE: 114 %
FEF2575-PRE: 4.99 L/SEC
FEF2575-PRED: 4.35 L/SEC
FEFMAX-%PRED-PRE: 100 %
FEFMAX-PRE: 10.74 L/SEC
FEFMAX-PRED: 10.73 L/SEC
FEV1-%PRED-PRE: 96 %
FEV1-PRE: 4.41 L
FEV1FEV6-PRE: 85 %
FEV1FEV6-PRED: 82 %
FEV1FVC-PRE: 85 %
FEV1FVC-PRED: 80 %
FIFMAX-PRE: 7.6 L/SEC
FVC-%PRED-PRE: 91 %
FVC-PRE: 5.21 L
FVC-PRED: 5.7 L
GFR SERPL CREATININE-BSD FRML MDRD: >90 ML/MIN/1.73M2
GGT SERPL-CCNC: 16 U/L (ref 8–61)
GLUCOSE SERPL-MCNC: 109 MG/DL (ref 70–99)
GLUCOSE UR STRIP-MCNC: NEGATIVE MG/DL
HBA1C MFR BLD: 5 %
HCT VFR BLD AUTO: 45.7 % (ref 40–53)
HDLC SERPL-MCNC: 66 MG/DL
HGB BLD-MCNC: 16.5 G/DL (ref 13.3–17.7)
HGB UR QL STRIP: NEGATIVE
IMM GRANULOCYTES # BLD: 0 10E3/UL
IMM GRANULOCYTES NFR BLD: 0 %
INR PPP: 0.97 (ref 0.85–1.15)
IRON SERPL-MCNC: 107 UG/DL (ref 61–157)
KETONES UR STRIP-MCNC: NEGATIVE MG/DL
LDLC SERPL CALC-MCNC: 93 MG/DL
LEUKOCYTE ESTERASE UR QL STRIP: NEGATIVE
LYMPHOCYTES # BLD AUTO: 2.3 10E3/UL (ref 0.8–5.3)
LYMPHOCYTES NFR BLD AUTO: 33 %
MAGNESIUM SERPL-MCNC: 2.1 MG/DL (ref 1.7–2.3)
MCH RBC QN AUTO: 32.5 PG (ref 26.5–33)
MCHC RBC AUTO-ENTMCNC: 36.1 G/DL (ref 31.5–36.5)
MCV RBC AUTO: 90 FL (ref 78–100)
MICROALBUMIN UR-MCNC: <12 MG/L
MICROALBUMIN/CREAT UR: NORMAL MG/G{CREAT}
MONOCYTES # BLD AUTO: 0.7 10E3/UL (ref 0–1.3)
MONOCYTES NFR BLD AUTO: 10 %
NEUTROPHILS # BLD AUTO: 3.8 10E3/UL (ref 1.6–8.3)
NEUTROPHILS NFR BLD AUTO: 56 %
NITRATE UR QL: NEGATIVE
NONHDLC SERPL-MCNC: 114 MG/DL
NRBC # BLD AUTO: 0 10E3/UL
NRBC BLD AUTO-RTO: 0 /100
PH UR STRIP: 7 [PH] (ref 5–7)
PHOSPHATE SERPL-MCNC: 2.8 MG/DL (ref 2.5–4.5)
PLATELET # BLD AUTO: 190 10E3/UL (ref 150–450)
POTASSIUM SERPL-SCNC: 4.6 MMOL/L (ref 3.4–5.3)
PROT SERPL-MCNC: 7.2 G/DL (ref 6.4–8.3)
RBC # BLD AUTO: 5.08 10E6/UL (ref 4.4–5.9)
RBC URINE: 1 /HPF
SODIUM SERPL-SCNC: 138 MMOL/L (ref 136–145)
SP GR UR STRIP: 1.02 (ref 1–1.03)
TRANSITIONAL EPI: <1 /HPF
TRIGL SERPL-MCNC: 106 MG/DL
TSH SERPL DL<=0.005 MIU/L-ACNC: 1.28 UIU/ML (ref 0.3–4.2)
UROBILINOGEN UR STRIP-MCNC: 2 MG/DL
WBC # BLD AUTO: 7 10E3/UL (ref 4–11)
WBC URINE: 1 /HPF

## 2023-05-04 PROCEDURE — 82785 ASSAY OF IGE: CPT | Mod: 90 | Performed by: PATHOLOGY

## 2023-05-04 PROCEDURE — 84403 ASSAY OF TOTAL TESTOSTERONE: CPT | Mod: 90 | Performed by: PATHOLOGY

## 2023-05-04 PROCEDURE — 83735 ASSAY OF MAGNESIUM: CPT | Performed by: PATHOLOGY

## 2023-05-04 PROCEDURE — 84590 ASSAY OF VITAMIN A: CPT | Mod: 90 | Performed by: PATHOLOGY

## 2023-05-04 PROCEDURE — 83036 HEMOGLOBIN GLYCOSYLATED A1C: CPT | Mod: 90 | Performed by: PATHOLOGY

## 2023-05-04 PROCEDURE — 85610 PROTHROMBIN TIME: CPT | Performed by: PATHOLOGY

## 2023-05-04 PROCEDURE — 81001 URINALYSIS AUTO W/SCOPE: CPT | Performed by: PATHOLOGY

## 2023-05-04 PROCEDURE — 36415 COLL VENOUS BLD VENIPUNCTURE: CPT | Performed by: PATHOLOGY

## 2023-05-04 PROCEDURE — 82306 VITAMIN D 25 HYDROXY: CPT | Mod: 90 | Performed by: PATHOLOGY

## 2023-05-04 PROCEDURE — 82570 ASSAY OF URINE CREATININE: CPT | Mod: 90 | Performed by: PATHOLOGY

## 2023-05-04 PROCEDURE — 82977 ASSAY OF GGT: CPT | Performed by: PATHOLOGY

## 2023-05-04 PROCEDURE — 80050 GENERAL HEALTH PANEL: CPT | Performed by: PATHOLOGY

## 2023-05-04 PROCEDURE — 84100 ASSAY OF PHOSPHORUS: CPT | Performed by: PATHOLOGY

## 2023-05-04 PROCEDURE — 87070 CULTURE OTHR SPECIMN AEROBIC: CPT | Mod: TEL | Performed by: INTERNAL MEDICINE

## 2023-05-04 PROCEDURE — 84446 ASSAY OF VITAMIN E: CPT | Mod: 90 | Performed by: PATHOLOGY

## 2023-05-04 PROCEDURE — 94375 RESPIRATORY FLOW VOLUME LOOP: CPT | Performed by: INTERNAL MEDICINE

## 2023-05-04 PROCEDURE — 82248 BILIRUBIN DIRECT: CPT | Performed by: PATHOLOGY

## 2023-05-04 PROCEDURE — 83540 ASSAY OF IRON: CPT | Performed by: PATHOLOGY

## 2023-05-04 PROCEDURE — 71046 X-RAY EXAM CHEST 2 VIEWS: CPT | Performed by: RADIOLOGY

## 2023-05-04 PROCEDURE — 77080 DXA BONE DENSITY AXIAL: CPT | Performed by: INTERNAL MEDICINE

## 2023-05-04 PROCEDURE — 99000 SPECIMEN HANDLING OFFICE-LAB: CPT | Performed by: PATHOLOGY

## 2023-05-04 PROCEDURE — 85652 RBC SED RATE AUTOMATED: CPT | Performed by: PATHOLOGY

## 2023-05-04 PROCEDURE — 99214 OFFICE O/P EST MOD 30 MIN: CPT | Mod: 25 | Performed by: INTERNAL MEDICINE

## 2023-05-04 PROCEDURE — 82043 UR ALBUMIN QUANTITATIVE: CPT | Mod: 90 | Performed by: PATHOLOGY

## 2023-05-04 PROCEDURE — 82784 ASSAY IGA/IGD/IGG/IGM EACH: CPT | Mod: 90 | Performed by: PATHOLOGY

## 2023-05-04 PROCEDURE — 82550 ASSAY OF CK (CPK): CPT | Performed by: PATHOLOGY

## 2023-05-04 PROCEDURE — 80061 LIPID PANEL: CPT | Performed by: PATHOLOGY

## 2023-05-04 ASSESSMENT — PAIN SCALES - GENERAL: PAINLEVEL: NO PAIN (0)

## 2023-05-04 NOTE — NURSING NOTE
"Philip Delacruz is a 42 year old year old who is being seen for RECHECK (Return Cystic Fibrosis )      Medications reviewed and Vital signs taken.    Specimen Collection Type: Throat Swab    Order(s) placed: CF Aerobic Bacterial    *IF AFB order placed - please enter \"PRIORITIZE AFB\" to order comments.       No results found for: ACIDFAST      No results found for: AFBSMS        Medications reviewed and vital signs taken.   Darrian Velázquez CMA    "

## 2023-05-04 NOTE — LETTER
5/4/2023         RE: Philip Delacruz  [address]  [facility] [address]        Dear Colleague,    Thank you for referring your patient, Philip Delacruz, to the [facility] [place]. Please see a copy of my visit note below.    Reason for Visit  Philip Delacruz is a 42 year old year old male who is being seen for RECHECK (Return Cystic Fibrosis )    Clinic running late.  The patient completed annual studies and PFTs today.  Patient had prior obligations so unable to stay for clinic visit.  The visit was completed by telephone.    19 minutes for telephone visit.    Assessment and plan:   Shar Coley is a 42 year-old male with a history of cystic fibrosis with mild lung disease and pancreatic insufficiency.    Maintenance   Modulator: Trikafta  Mutation:  [id number], Poly T Variant:  [ 7T ]/[9T  ]  AW Clearance: Exercise  Bronchodilators: Albuterol MDI PRN  Mucolytics: none   Antibiotics Inh:  none   Antibiotics Oral: none   Other:   Colonization Hx: Rhizobium (agrobacterium) radiobacter, Aspergillus versicolor, Serratia marcescens, staphylococcus aureus, moraxella (branhamella) catarrhalis  Annual studies due:April 2023  Contraindications to standard of care :  COLONOSCOPY: 9/26/2022, no polyps.  Pulmonary/cystic fibrosis: The patient reports excellent exercise tolerance.  He is oxygenating well at 97%.  PFTs are in an excellent range and similar to baseline.  Chest x-ray, reviewed by me with minimal CF changes, unchanged from previous.  He does not appear to be having an exacerbation at this time.  He will continue exercise as his primary form of airway clearance.  Sinusitis: Increased sinus symptoms recently.  [facility]  If symptoms persist, referral to the CF ENT at the [facility] could be considered.    Snoring: We discussed the possibility of a formal sleep study.  The patient will consider this  "and discuss further at the next visit.    Pancreatic insufficiency: The patient reports loose stool with peanut butter smoothies.  This seems to be improving with reduction in smoothie intake.  I have suggested he increase his enzymes if he is going to consume this relief.  For now, continue current pancreatic enzyme replacement and supplemental vitamins.     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  He has responded well to Trikafta with decreased respiratory, sinus and GI symptoms.  LFTs and CK within normal limits with annual studies.  Continue twice yearly monitoring.    Depression/anxiety: Adequately controlled with escitalopram.    Bone health: Bone density scan reviewed, hips are stable, back improved.  Continue calcium and alendronate.    Healthcare maintenance: Colonoscopy completed in September 2022 with no polyps.  Follow-up in 3 years.  Annual studies were completed.Electrolytes, kidney function, LFTs, hemoglobin A1c, iron, thyroid function within normal limits.  fat-soluble vitamin levels are pending.  CBC is unremarkable.    Follow-up in 3 months with PFTs and sputum culture.    True Sahni MD   CF HPI  Patient noted loose stools, attributed to \"peanut butter smoothies\" improved with change in diet.  Breathing is comfortable at rest and with all activity.  Minimal sputum production at baseline.  No hemoptysis.  No chest pain.  The patient is doing CrossFit 4-5 times per week.  No other formal airway clearance therapy.  Multiple sinus infections over the winter.  He is evaluated by an outside ENT that he has seen in the past.  Of note, most of his symptoms are on the right, previously on the left.  Reports green heart mucus from the the nose.  He does nasal washes intermittently but feels that they sometimes lead to respiratory involvement.  Patient's wife notes increased snoring.    Current Outpatient Medications   Medication    acyclovir (ZOVIRAX) 400 MG tablet    alendronate " (FOSAMAX) 70 MG tablet    atorvastatin (LIPITOR) 10 MG tablet    Ca Cit Malate-Cholecalciferol (CALCIUM CITRATE MALATE-VIT D) 250-100 MG-UNIT TABS    cholecalciferol (VITAMIN D3) 125 mcg (5000 units) capsule    CREON 00677-83311 units CPEP per EC capsule    escitalopram (LEXAPRO) 20 MG tablet    mvw complete formulation (SOFTGELS) capsule    olopatadine (PATADAY) 0.2 % ophthalmic solution    Omega-3 Fatty Acids (FISH OIL) 1200 MG CPDR    Sodium Chloride-Sodium Bicarb (SINUFLO READYRINSE) KIT    tacrolimus (PROTOPIC) 0.1 % external ointment    TRIKAFTA 100-50-75 & 150 MG tablet pack    Wheat Dextrin (BENEFIBER) POWD     No current facility-administered medications for this visit.     No Known Allergies  Past Medical History:   Diagnosis Date    Chronic sinusitis     Congenital absence of vas deferens, bilateral     Cystic fibrosis (H)     Delta F508 and [id number]     History of COVID-19 01/2022    Back pain, chills and mild cough, resolved without intervention    Nasal polyps     Sinusitis, chronic        Past Surgical History:   Procedure Laterality Date    COLONOSCOPY N/A 9/26/2022    Procedure: COLONOSCOPY, WITH POLYPECTOMY AND BIOPSY;  Surgeon: Bennett Fajardo MD;  [facility] TOOTH EXTRACTION W/FORCEP      NASAL/SINUS POLYPECTOMY      REPAIR PECTUS EXCAVATUM  1997    SINUS SURGERY  1992, 2002, 2004    Removed tubinates and polyps OSH    Sperm harvest         Social History     Socioeconomic History    Marital status:      Spouse name: Not on file    Number of children: Not on file    Years of education: Not on file    Highest education level: Not on file   Occupational History    Occupation: Teacher   Tobacco Use    Smoking status: Never    Smokeless tobacco: Never   Vaping Use    Vaping status: Not on file   Substance and Sexual Activity    Alcohol use: Yes     Alcohol/week: 0.0 standard drinks of alcohol     Comment: 1 drink 3X per week    Drug use: No    Sexual activity: Yes     Partners:  "Female     Birth control/protection: None   Other Topics Concern    Parent/sibling w/ CABG, MI or angioplasty before 65F 55M? Not Asked   Social History Narrative    Lives in [place] with wife, Aimee, and sons, Shay and Serafin. 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     Social Determinants of Health     Financial Resource Strain: Not on file   Food Insecurity: Not on file   Transportation Needs: Not on file   Physical Activity: Not on file   Stress: Not on file   Social Connections: Not on file   Intimate Partner Violence: Not on file   Housing Stability: Not on file         /85   Pulse 59   Resp 17   Ht 1.854 m (6' 1\")   Wt 85 kg (187 lb 6.3 oz)   SpO2 97%   BMI 24.72 kg/m    Body mass index is 24.72 kg/m .    Results:  Recent Results (from the past 168 hour(s))   Hepatic function panel    Collection Time: 05/04/23 10:26 AM   Result Value Ref Range    Protein Total 7.2 6.4 - 8.3 g/dL    Albumin 4.5 3.5 - 5.2 g/dL    Bilirubin Total 0.8 <=1.2 mg/dL    Alkaline Phosphatase 69 40 - 129 U/L    AST 33 10 - 50 U/L    ALT 24 10 - 50 U/L    Bilirubin Direct 0.23 0.00 - 0.30 mg/dL   CK total    Collection Time: 05/04/23 10:26 AM   Result Value Ref Range     39 - 308 U/L   Erythrocyte sedimentation rate auto    Collection Time: 05/04/23 10:26 AM   Result Value Ref Range    Erythrocyte Sedimentation Rate 10 0 - 15 mm/hr   Vitamin D Deficiency    Collection Time: 05/04/23 10:26 AM   Result Value Ref Range    Vitamin D, Total (25-Hydroxy) 53 20 - 75 ug/L   TSH with free T4 reflex    Collection Time: 05/04/23 10:26 AM   Result Value Ref Range    TSH 1.28 0.30 - 4.20 uIU/mL   Phosphorus    Collection Time: 05/04/23 10:26 AM   Result Value Ref Range    Phosphorus 2.8 2.5 - 4.5 mg/dL   Magnesium    Collection Time: 05/04/23 10:26 AM   Result Value Ref Range    Magnesium 2.1 1.7 - 2.3 mg/dL   INR    Collection Time: 05/04/23 " 10:26 AM   Result Value Ref Range    INR 0.97 0.85 - 1.15   IgG    Collection Time: 05/04/23 10:26 AM   Result Value Ref Range    Immunoglobulin G 1,111 610 - 1,616 mg/dL   Hemoglobin A1c    Collection Time: 05/04/23 10:26 AM   Result Value Ref Range    Hemoglobin A1C 5.0 <5.7 %   GGT    Collection Time: 05/04/23 10:26 AM   Result Value Ref Range    GGT 16 8 - 61 U/L   Iron    Collection Time: 05/04/23 10:26 AM   Result Value Ref Range    Iron 107 61 - 157 ug/dL   Lipid Profile    Collection Time: 05/04/23 10:26 AM   Result Value Ref Range    Cholesterol 180 <200 mg/dL    Triglycerides 106 <150 mg/dL    Direct Measure HDL 66 >=40 mg/dL    LDL Cholesterol Calculated 93 <=100 mg/dL    Non HDL Cholesterol 114 <130 mg/dL   Basic metabolic panel    Collection Time: 05/04/23 10:26 AM   Result Value Ref Range    Sodium 138 136 - 145 mmol/L    Potassium 4.6 3.4 - 5.3 mmol/L    Chloride 103 98 - 107 mmol/L    Carbon Dioxide (CO2) 26 22 - 29 mmol/L    Anion Gap 9 7 - 15 mmol/L    Urea Nitrogen 28.4 (H) 6.0 - 20.0 mg/dL    Creatinine 0.89 0.67 - 1.17 mg/dL    Calcium 9.5 8.6 - 10.0 mg/dL    Glucose 109 (H) 70 - 99 mg/dL    GFR Estimate >90 >60 mL/min/1.73m2   CBC with platelets and differential    Collection Time: 05/04/23 10:26 AM   Result Value Ref Range    WBC Count 7.0 4.0 - 11.0 10e3/uL    RBC Count 5.08 4.40 - 5.90 10e6/uL    Hemoglobin 16.5 13.3 - 17.7 g/dL    Hematocrit 45.7 40.0 - 53.0 %    MCV 90 78 - 100 fL    MCH 32.5 26.5 - 33.0 pg    MCHC 36.1 31.5 - 36.5 g/dL    RDW 11.9 10.0 - 15.0 %    Platelet Count 190 150 - 450 10e3/uL    % Neutrophils 56 %    % Lymphocytes 33 %    % Monocytes 10 %    % Eosinophils 1 %    % Basophils 0 %    % Immature Granulocytes 0 %    NRBCs per 100 WBC 0 <1 /100    Absolute Neutrophils 3.8 1.6 - 8.3 10e3/uL    Absolute Lymphocytes 2.3 0.8 - 5.3 10e3/uL    Absolute Monocytes 0.7 0.0 - 1.3 10e3/uL    Absolute Eosinophils 0.1 0.0 - 0.7 10e3/uL    Absolute Basophils 0.0 0.0 - 0.2 10e3/uL     Absolute Immature Granulocytes 0.0 <=0.4 10e3/uL    Absolute NRBCs 0.0 10e3/uL   Albumin Random Urine Quantitative with Creat Ratio    Collection Time: 05/04/23 10:29 AM   Result Value Ref Range    Creatinine Urine mg/dL 75.9 mg/dL    Albumin Urine mg/L <12.0 mg/L    Albumin Urine mg/g Cr     Routine UA with microscopic    Collection Time: 05/04/23 10:29 AM   Result Value Ref Range    Color Urine Yellow Colorless, Straw, Light Yellow, Yellow    Appearance Urine Clear Clear    Glucose Urine Negative Negative mg/dL    Bilirubin Urine Negative Negative    Ketones Urine Negative Negative mg/dL    Specific Gravity Urine 1.023 1.003 - 1.035    Blood Urine Negative Negative    pH Urine 7.0 5.0 - 7.0    Protein Albumin Urine Negative Negative mg/dL    Urobilinogen Urine 2.0 Normal, 2.0 mg/dL    Nitrite Urine Negative Negative    Leukocyte Esterase Urine Negative Negative    RBC Urine 1 <=2 /HPF    WBC Urine 1 <=5 /HPF    Transitional Epithelials Urine <1 <=1 /HPF   General PFT Lab (Please always keep checked)    Collection Time: 05/04/23 11:35 AM   Result Value Ref Range    FVC-Pred 5.70 L    FVC-Pre 5.21 L    FVC-%Pred-Pre 91 %    FEV1-Pre 4.41 L    FEV1-%Pred-Pre 96 %    FEV1FVC-Pred 80 %    FEV1FVC-Pre 85 %    FEFMax-Pred 10.73 L/sec    FEFMax-Pre 10.74 L/sec    FEFMax-%Pred-Pre 100 %    FEF2575-Pred 4.35 L/sec    FEF2575-Pre 4.99 L/sec    ZER0908-%Pred-Pre 114 %    ExpTime-Pre 6.31 sec    FIFMax-Pre 7.60 L/sec    FEV1FEV6-Pred 82 %    FEV1FEV6-Pre 85 %   Cystic Fibrosis Culture Aerobic Bacterial    Collection Time: 05/04/23 11:58 AM    Specimen: Throat; Swab   Result Value Ref Range    Culture Culture in progress     Culture 2+ Normal haile to date                    Results as noted above.    PFT Interpretation:  Normal spirometry.  Unchanged from previous.   Similar to recent best.  Valid Maneuver             CF Exacerbation  Absent            Again, thank you for allowing me to participate in the care of your  patient.        Sincerely,        True Sahni MD

## 2023-05-04 NOTE — PROGRESS NOTES
Reason for Visit  Philip Delacruz is a 42 year old year old male who is being seen for RECHECK (Return Cystic Fibrosis )    Clinic running late.  The patient completed annual studies and PFTs today.  Patient had prior obligations so unable to stay for clinic visit.  The visit was completed by telephone.    19 minutes for telephone visit.    Assessment and plan:   Shar Coley is a 42 year-old male with a history of cystic fibrosis with mild lung disease and pancreatic insufficiency.    Maintenance   Modulator: Trikafta  Mutation:  [id number], Poly T Variant:  [ 7T ]/[9T  ]  AW Clearance: Exercise  Bronchodilators: Albuterol MDI PRN  Mucolytics: none   Antibiotics Inh:  none   Antibiotics Oral: none   Other:   Colonization Hx: Rhizobium (agrobacterium) radiobacter, Aspergillus versicolor, Serratia marcescens, staphylococcus aureus, moraxella (branhamella) catarrhalis  Annual studies due:April 2023  Contraindications to standard of care :  COLONOSCOPY: 9/26/2022, no polyps.  Pulmonary/cystic fibrosis: The patient reports excellent exercise tolerance.  He is oxygenating well at 97%.  PFTs are in an excellent range and similar to baseline.  Chest x-ray, reviewed by me with minimal CF changes, unchanged from previous.  He does not appear to be having an exacerbation at this time.  He will continue exercise as his primary form of airway clearance.  Sinusitis: Increased sinus symptoms recently.  [facility]  If symptoms persist, referral to the CF ENT at the [facility] could be considered.    Snoring: We discussed the possibility of a formal sleep study.  The patient will consider this and discuss further at the next visit.    Pancreatic insufficiency: The patient reports loose stool with peanut butter smoothies.  This seems to be improving with reduction in smoothie intake.  I have suggested he increase his enzymes if he is going to consume this relief.  For now, continue current  "pancreatic enzyme replacement and supplemental vitamins.     CFTR Modulation: The patient is [id number] heterozygote.  He has responded well to Trikafta with decreased respiratory, sinus and GI symptoms.  LFTs and CK within normal limits with annual studies.  Continue twice yearly monitoring.    Depression/anxiety: Adequately controlled with escitalopram.    Bone health: Bone density scan reviewed, hips are stable, back improved.  Continue calcium and alendronate.    Healthcare maintenance: Colonoscopy completed in September 2022 with no polyps.  Follow-up in 3 years.  Annual studies were completed.Electrolytes, kidney function, LFTs, hemoglobin A1c, iron, thyroid function within normal limits.  fat-soluble vitamin levels are pending.  CBC is unremarkable.    Follow-up in 3 months with PFTs and sputum culture.    True Sahni MD   CF HPI  Patient noted loose stools, attributed to \"peanut butter smoothies\" improved with change in diet.  Breathing is comfortable at rest and with all activity.  Minimal sputum production at baseline.  No hemoptysis.  No chest pain.  The patient is doing CrossFit 4-5 times per week.  No other formal airway clearance therapy.  Multiple sinus infections over the winter.  He is evaluated by an outside ENT that he has seen in the past.  Of note, most of his symptoms are on the right, previously on the left.  Reports green heart mucus from the the nose.  He does nasal washes intermittently but feels that they sometimes lead to respiratory involvement.  Patient's wife notes increased snoring.    Current Outpatient Medications   Medication     acyclovir (ZOVIRAX) 400 MG tablet     alendronate (FOSAMAX) 70 MG tablet     atorvastatin (LIPITOR) 10 MG tablet     Ca Cit Malate-Cholecalciferol (CALCIUM CITRATE MALATE-VIT D) 250-100 MG-UNIT TABS     cholecalciferol (VITAMIN D3) 125 mcg (5000 units) capsule     CREON 76583-92236 units CPEP per EC capsule     escitalopram (LEXAPRO) 20 MG tablet "     mvw complete formulation (SOFTGELS) capsule     olopatadine (PATADAY) 0.2 % ophthalmic solution     Omega-3 Fatty Acids (FISH OIL) 1200 MG CPDR     Sodium Chloride-Sodium Bicarb (SINUFLO READYRINSE) KIT     tacrolimus (PROTOPIC) 0.1 % external ointment     TRIKAFTA 100-50-75 & 150 MG tablet pack     Wheat Dextrin (BENEFIBER) POWD     No current facility-administered medications for this visit.     No Known Allergies  Past Medical History:   Diagnosis Date     Chronic sinusitis      Congenital absence of vas deferens, bilateral      Cystic fibrosis (H)     Delta F508 and [id number]      History of COVID-19 01/2022    Back pain, chills and mild cough, resolved without intervention     Nasal polyps      Sinusitis, chronic        Past Surgical History:   Procedure Laterality Date     COLONOSCOPY N/A 9/26/2022    Procedure: COLONOSCOPY, WITH POLYPECTOMY AND BIOPSY;  Surgeon: Bennett Fajardo MD;  Location:  GI      TOOTH EXTRACTION W/FORCEP       NASAL/SINUS POLYPECTOMY       REPAIR PECTUS EXCAVATUM  1997     SINUS SURGERY  1992, 2002, 2004    Removed tubinates and polyps OSH     Sperm harvest         Social History     Socioeconomic History     Marital status:      Spouse name: Not on file     Number of children: Not on file     Years of education: Not on file     Highest education level: Not on file   Occupational History     Occupation: Teacher   Tobacco Use     Smoking status: Never     Smokeless tobacco: Never   Vaping Use     Vaping status: Not on file   Substance and Sexual Activity     Alcohol use: Yes     Alcohol/week: 0.0 standard drinks of alcohol     Comment: 1 drink 3X per week     Drug use: No     Sexual activity: Yes     Partners: Female     Birth control/protection: None   Other Topics Concern     Parent/sibling w/ CABG, MI or angioplasty before 65F 55M? Not Asked   Social History Narrative    Lives in [place] with wife, Aimee, and sons, Shay and Serafin. 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     Social Determinants of Health     Financial Resource Strain: Not on file   Food Insecurity: Not on file   Transportation Needs: Not on file   Physical Activity: Not on file   Stress: Not on file   Social Connections: Not on file   Intimate Partner Violence: Not on file   Housing Stability: Not on file         /85   Pulse 59   Resp 17   Ht 1.854 m (6' 1\")   Wt 85 kg (187 lb 6.3 oz)   SpO2 97%   BMI 24.72 kg/m    Body mass index is 24.72 kg/m .    Results:  Recent Results (from the past 168 hour(s))   Hepatic function panel    Collection Time: 05/04/23 10:26 AM   Result Value Ref Range    Protein Total 7.2 6.4 - 8.3 g/dL    Albumin 4.5 3.5 - 5.2 g/dL    Bilirubin Total 0.8 <=1.2 mg/dL    Alkaline Phosphatase 69 40 - 129 U/L    AST 33 10 - 50 U/L    ALT 24 10 - 50 U/L    Bilirubin Direct 0.23 0.00 - 0.30 mg/dL   CK total    Collection Time: 05/04/23 10:26 AM   Result Value Ref Range     39 - 308 U/L   Erythrocyte sedimentation rate auto    Collection Time: 05/04/23 10:26 AM   Result Value Ref Range    Erythrocyte Sedimentation Rate 10 0 - 15 mm/hr   Vitamin D Deficiency    Collection Time: 05/04/23 10:26 AM   Result Value Ref Range    Vitamin D, Total (25-Hydroxy) 53 20 - 75 ug/L   TSH with free T4 reflex    Collection Time: 05/04/23 10:26 AM   Result Value Ref Range    TSH 1.28 0.30 - 4.20 uIU/mL   Phosphorus    Collection Time: 05/04/23 10:26 AM   Result Value Ref Range    Phosphorus 2.8 2.5 - 4.5 mg/dL   Magnesium    Collection Time: 05/04/23 10:26 AM   Result Value Ref Range    Magnesium 2.1 1.7 - 2.3 mg/dL   INR    Collection Time: 05/04/23 10:26 AM   Result Value Ref Range    INR 0.97 0.85 - 1.15   IgG    Collection Time: 05/04/23 10:26 AM   Result Value Ref Range    Immunoglobulin G 1,111 610 - 1,616 mg/dL   Hemoglobin A1c    Collection Time: 05/04/23 10:26 AM   Result Value Ref Range    " Hemoglobin A1C 5.0 <5.7 %   GGT    Collection Time: 05/04/23 10:26 AM   Result Value Ref Range    GGT 16 8 - 61 U/L   Iron    Collection Time: 05/04/23 10:26 AM   Result Value Ref Range    Iron 107 61 - 157 ug/dL   Lipid Profile    Collection Time: 05/04/23 10:26 AM   Result Value Ref Range    Cholesterol 180 <200 mg/dL    Triglycerides 106 <150 mg/dL    Direct Measure HDL 66 >=40 mg/dL    LDL Cholesterol Calculated 93 <=100 mg/dL    Non HDL Cholesterol 114 <130 mg/dL   Basic metabolic panel    Collection Time: 05/04/23 10:26 AM   Result Value Ref Range    Sodium 138 136 - 145 mmol/L    Potassium 4.6 3.4 - 5.3 mmol/L    Chloride 103 98 - 107 mmol/L    Carbon Dioxide (CO2) 26 22 - 29 mmol/L    Anion Gap 9 7 - 15 mmol/L    Urea Nitrogen 28.4 (H) 6.0 - 20.0 mg/dL    Creatinine 0.89 0.67 - 1.17 mg/dL    Calcium 9.5 8.6 - 10.0 mg/dL    Glucose 109 (H) 70 - 99 mg/dL    GFR Estimate >90 >60 mL/min/1.73m2   CBC with platelets and differential    Collection Time: 05/04/23 10:26 AM   Result Value Ref Range    WBC Count 7.0 4.0 - 11.0 10e3/uL    RBC Count 5.08 4.40 - 5.90 10e6/uL    Hemoglobin 16.5 13.3 - 17.7 g/dL    Hematocrit 45.7 40.0 - 53.0 %    MCV 90 78 - 100 fL    MCH 32.5 26.5 - 33.0 pg    MCHC 36.1 31.5 - 36.5 g/dL    RDW 11.9 10.0 - 15.0 %    Platelet Count 190 150 - 450 10e3/uL    % Neutrophils 56 %    % Lymphocytes 33 %    % Monocytes 10 %    % Eosinophils 1 %    % Basophils 0 %    % Immature Granulocytes 0 %    NRBCs per 100 WBC 0 <1 /100    Absolute Neutrophils 3.8 1.6 - 8.3 10e3/uL    Absolute Lymphocytes 2.3 0.8 - 5.3 10e3/uL    Absolute Monocytes 0.7 0.0 - 1.3 10e3/uL    Absolute Eosinophils 0.1 0.0 - 0.7 10e3/uL    Absolute Basophils 0.0 0.0 - 0.2 10e3/uL    Absolute Immature Granulocytes 0.0 <=0.4 10e3/uL    Absolute NRBCs 0.0 10e3/uL   Albumin Random Urine Quantitative with Creat Ratio    Collection Time: 05/04/23 10:29 AM   Result Value Ref Range    Creatinine Urine mg/dL 75.9 mg/dL    Albumin Urine mg/L  <12.0 mg/L    Albumin Urine mg/g Cr     Routine UA with microscopic    Collection Time: 05/04/23 10:29 AM   Result Value Ref Range    Color Urine Yellow Colorless, Straw, Light Yellow, Yellow    Appearance Urine Clear Clear    Glucose Urine Negative Negative mg/dL    Bilirubin Urine Negative Negative    Ketones Urine Negative Negative mg/dL    Specific Gravity Urine 1.023 1.003 - 1.035    Blood Urine Negative Negative    pH Urine 7.0 5.0 - 7.0    Protein Albumin Urine Negative Negative mg/dL    Urobilinogen Urine 2.0 Normal, 2.0 mg/dL    Nitrite Urine Negative Negative    Leukocyte Esterase Urine Negative Negative    RBC Urine 1 <=2 /HPF    WBC Urine 1 <=5 /HPF    Transitional Epithelials Urine <1 <=1 /HPF   General PFT Lab (Please always keep checked)    Collection Time: 05/04/23 11:35 AM   Result Value Ref Range    FVC-Pred 5.70 L    FVC-Pre 5.21 L    FVC-%Pred-Pre 91 %    FEV1-Pre 4.41 L    FEV1-%Pred-Pre 96 %    FEV1FVC-Pred 80 %    FEV1FVC-Pre 85 %    FEFMax-Pred 10.73 L/sec    FEFMax-Pre 10.74 L/sec    FEFMax-%Pred-Pre 100 %    FEF2575-Pred 4.35 L/sec    FEF2575-Pre 4.99 L/sec    YLZ2283-%Pred-Pre 114 %    ExpTime-Pre 6.31 sec    FIFMax-Pre 7.60 L/sec    FEV1FEV6-Pred 82 %    FEV1FEV6-Pre 85 %   Cystic Fibrosis Culture Aerobic Bacterial    Collection Time: 05/04/23 11:58 AM    Specimen: Throat; Swab   Result Value Ref Range    Culture Culture in progress     Culture 2+ Normal haile to date                    Results as noted above.    PFT Interpretation:  Normal spirometry.  Unchanged from previous.   Similar to recent best.  Valid Maneuver             CF Exacerbation  Absent

## 2023-05-05 LAB — IGG SERPL-MCNC: 1111 MG/DL (ref 610–1616)

## 2023-05-05 NOTE — ADDENDUM NOTE
Encounter addended by: Krystal Beckett RN on: 5/5/2023 10:46 AM   Actions taken: Charge Capture section accepted

## 2023-05-07 LAB — TESTOST SERPL-MCNC: 208 NG/DL (ref 240–950)

## 2023-05-08 LAB
A-TOCOPHEROL VIT E SERPL-MCNC: 11.6 MG/L
ANNOTATION COMMENT IMP: NORMAL
BETA+GAMMA TOCOPHEROL SERPL-MCNC: 0.8 MG/L
RETINYL PALMITATE SERPL-MCNC: 0.05 MG/L
VIT A SERPL-MCNC: 0.65 MG/L

## 2023-05-09 LAB
BACTERIA SPEC CULT: NORMAL
IGE SERPL-ACNC: 12 KU/L (ref 0–114)

## 2023-05-10 ENCOUNTER — CLINICAL UPDATE (OUTPATIENT)
Dept: PHARMACY | Facility: CLINIC | Age: 43
End: 2023-05-10
Payer: COMMERCIAL

## 2023-05-10 DIAGNOSIS — E84.0 CYSTIC FIBROSIS WITH PULMONARY MANIFESTATIONS (H): Primary | ICD-10-CM

## 2023-05-10 PROCEDURE — 99207 PR NO CHARGE LOS: CPT | Performed by: PHARMACIST

## 2023-05-10 NOTE — PROGRESS NOTES
Clinical Update:                                                    At the request of Dr. Sahni, a chart review was conducted for Saleemhollyavinash Delacruz.    Reason for Chart Review: Trikafta Lab Monitoring     Discussion: Shar has been on Trikafta since 12/30/19. Per chart review Shar continues full dose Trikafta.    Labs were reviewed from 5/4/23 at [facility]. All labs were within normal limits. Bilirubin was not drawn at this visit.    Lab Results   Component Value Date    ALT 24 05/04/2023    AST 33 05/04/2023    BILITOTAL 0.8 05/04/2023    DBIL 0.23 05/04/2023     05/04/2023       Plan:  1. Continue Trikafta  2. Recheck hepatic panel and CK in 1 year      Lennie Lara, PharmD  Cystic Fibrosis MTM Pharmacist  [facility]  Voicemail: [phone]

## 2023-06-14 DIAGNOSIS — E84.9 CF (CYSTIC FIBROSIS) (H): ICD-10-CM

## 2023-06-14 DIAGNOSIS — K86.81 EXOCRINE PANCREATIC INSUFFICIENCY: ICD-10-CM

## 2023-06-14 RX ORDER — PANCRELIPASE 24000; 76000; 120000 [USP'U]/1; [USP'U]/1; [USP'U]/1
CAPSULE, DELAYED RELEASE PELLETS ORAL
Qty: 1890 CAPSULE | Refills: 3 | Status: SHIPPED | OUTPATIENT
Start: 2023-06-14 | End: 2024-06-27

## 2023-07-12 DIAGNOSIS — K86.81 EXOCRINE PANCREATIC INSUFFICIENCY: ICD-10-CM

## 2023-07-12 DIAGNOSIS — E84.0 CYSTIC FIBROSIS WITH PULMONARY MANIFESTATIONS (H): ICD-10-CM

## 2023-07-12 RX ORDER — ELEXACAFTOR, TEZACAFTOR, AND IVACAFTOR 100-50-75
KIT ORAL
Qty: 84 TABLET | Refills: 5 | Status: SHIPPED | OUTPATIENT
Start: 2023-07-12 | End: 2023-12-27

## 2023-08-08 ENCOUNTER — VIRTUAL VISIT (OUTPATIENT)
Dept: ENDOCRINOLOGY | Facility: CLINIC | Age: 43
End: 2023-08-08
Attending: INTERNAL MEDICINE
Payer: COMMERCIAL

## 2023-08-08 DIAGNOSIS — M85.9 LOW BONE DENSITY FOR AGE: ICD-10-CM

## 2023-08-08 DIAGNOSIS — R79.89 LOW TESTOSTERONE IN MALE: Primary | ICD-10-CM

## 2023-08-08 PROCEDURE — 99215 OFFICE O/P EST HI 40 MIN: CPT | Mod: VID | Performed by: INTERNAL MEDICINE

## 2023-08-08 RX ORDER — ALENDRONATE SODIUM 70 MG/1
70 TABLET ORAL
Qty: 12 TABLET | Refills: 3 | Status: SHIPPED | OUTPATIENT
Start: 2023-08-08 | End: 2024-06-27

## 2023-08-08 NOTE — NURSING NOTE
Is the patient currently in the state of [place]? YES    Visit mode:VIDEO    If the visit is dropped, the patient can be reconnected by: VIDEO VISIT: Text to cell phone: [phone]    Will anyone else be joining the visit? NO      How would you like to obtain your AVS? MyChart    Are changes needed to the allergy or medication list? NO    Reason for visit: RECHECK (Patient would like to talk about labs from annual visit. Patient also wants to talk about GI absorption. )

## 2023-08-08 NOTE — PROGRESS NOTES
CF Endocrinology Return Consultation: Low bone density  :   Patient: Philip Delacruz MRN# [id number]   YOB: 1980 Age: 43 year old   Date of Visit:2023    Last seen  Sep 2022        Problem list:     Patient Active Problem List    Diagnosis Date Noted     Small intestinal bacterial overgrowth 2018     Priority: Medium     Exocrine pancreatic insufficiency 04/10/2018     Priority: Medium     Pancreatic insufficiency 2018     Priority: Medium     Chronic sinusitis      Priority: Medium     Cystic fibrosis with pulmonary manifestations 2014     Priority: Medium     SWEAT TEST:  Date:  2014           Laboratory: [facility]  Sample #1   mg     30  mmol/L Cl  Sample #2   mg     27  mmol/L Cl     GENOTYPING:  Date:  2014        [facility]  Genotype:  [id number]  Poly T Variant:  [ 7T ]/[9T  ]              HPI:   Philip is a 43 year old male with CF related bone disease     Summary of prior history copied from previous note: DEXA scan 2021 showed significant decline in bone density, about 5.9% at the lumbar spine. Lowest z score was -2.0 at lumbar spine L1-L3 with outlier of -3.5 at L4.    DXA from 21 with decrease in BMD at level of L1-L3 lumbar spine of -5.9% compared to 2019 and Z score of -2.0 from L1-L3 (outlier score of -3.5 in L4). No tobacco use, no excessive alcohol use, no family history of osteoporosis. Workup has been done to evaluate for potential secondary causes of bone loss. Recent TSH, testosterone, vitamin D level, and A1c were ok. Serum calcium, albumin, phosphorus, and PTH within normal limits.  Subclinical Cushing's was considered, however 24-hour urine free cortisol is within normal limits  24-hour urine calcium was notable for hypercalciuria (380 mg).  Patient's diet does not appear to be high in calcium.  High urinary calcium could be related to high sodium diet/increase urinary sodium excretion. Of note,  urine volume was somewhat high at 2.8     Patient started Fosamax 70 mg weekly in September 2021.    11/15/2021 repeat 24-hour calcium 0.38 g/24-hour specimen    Interval history:    I have reviewed the available past laboratory evaluations, imaging studies, and medical records available to me at this visit. History was obtained from the patient and the medical record.  I have reviewed the notes of the pulmonary care team entered into the medical record since I last saw the patient.    Patient started Fosamax 70 mg weekly in September 2021.  He denies any side effects and reports being compliant with it.  Has gained weight over the last year. Feels wt gain is mostly muscle mass.  On Trikafta  Takes MVW multivitamin, 2 tablets of calcium citrate 500 mg total, vitamin D 5000 IU daily.  No history of renal stones  Continues to exercise including CrossFit   No history of falls or osteoporotic fracture.  His usual diet contained around 8 ounces of milk and serving of geek yogurt.    Patient is concerned about low testosterone levels on recent labs.  Testosterone was 208  Also with worsening GI symptoms related to malabsorption increased diarrhea after certain foods  Recent vitamin D level was normal  Complains of somewhat low sex rate, energy level is good, no erectile dysfunction  Most recent DEXA showed improvement in bone density at lumbar spine            Past Medical History:     Past Medical History:   Diagnosis Date     Chronic sinusitis      Congenital absence of vas deferens, bilateral      Cystic fibrosis (H)     Delta F508 and [id number]      History of COVID-19 01/2022    Back pain, chills and mild cough, resolved without intervention     Nasal polyps      Sinusitis, chronic             Past Surgical History:     Past Surgical History:   Procedure Laterality Date     COLONOSCOPY N/A 9/26/2022    Procedure: COLONOSCOPY, WITH POLYPECTOMY AND BIOPSY;  Surgeon: Bennett Fajardo MD;  Location: [facility]  EXTRACTION W/FORCEP       NASAL/SINUS POLYPECTOMY       REPAIR PECTUS EXCAVATUM  1997     SINUS SURGERY  1992, 2002, 2004    Removed tubinates and polyps OSH     Sperm harvest                 Social History:     Social History     Social History Narrative    Lives in [place] with wife, Aimee, and sons, Shay and Serafin. 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              Family History:     Family History   Problem Relation Age of Onset     C.A.D. Maternal Grandfather      Diabetes Maternal Grandfather      Heart Disease Maternal Grandfather      Aneurysm Paternal Grandfather         Aortic     Gastrointestinal Disease Father         Reflux     Cancer Paternal Grandmother         Lymphoma     Diabetes Maternal Grandmother      Thyroid Disease Mother       () Other             Allergies:     No Known Allergies          Medications:     Current Outpatient Rx   Medication Sig Dispense Refill     acyclovir (ZOVIRAX) 400 MG tablet Take 400 mg by mouth daily       alendronate (FOSAMAX) 70 MG tablet Take 1 tablet (70 mg) by mouth every 7 days *take 60 minutes before morning meal with 8 oz of water and remain upright for 30 minutes. 12 tablet 3     atorvastatin (LIPITOR) 10 MG tablet TAKE ONE TABLET BY MOUTH ONCE DAILY 90 tablet 2     Ca Cit Malate-Cholecalciferol (CALCIUM CITRATE MALATE-VIT D) 250-100 MG-UNIT TABS Take 2 tablets by mouth daily Activated calcium by Nutrikey       cholecalciferol (VITAMIN D3) 125 mcg (5000 units) capsule Take 125 mcg by mouth daily       CREON 19439-32525 units CPEP per EC capsule TAKE 4-5 CAPSULES BY MOUTH THREE TIMES A DAY WITH MEALS. TAKE 2-3 CAPSULES WITH SNACKS (TOTAL 3 MEALS AND 3 SNACKS DAILY) 1890 capsule 3     escitalopram (LEXAPRO) 20 MG tablet Take 1 tablet by mouth daily       mvw complete formulation (SOFTGELS) capsule Take 1 capsule by mouth daily       olopatadine (PATADAY) 0.2 %  ophthalmic solution Place 1 drop into both eyes daily 2.5 mL 11     Omega-3 Fatty Acids (FISH OIL) 1200 MG CPDR Take 2 capsules by mouth daily       Sodium Chloride-Sodium Bicarb (SINUFLO READYRINSE) KIT Daily PRN       tacrolimus (PROTOPIC) 0.1 % external ointment APPLY TO AFFECTED AREAS ON FACE TWICE PER DAY UNTIL RESOLVED. USE AS NEEDED WITH FLARES.       TRIKAFTA 100-50-75 & 150 MG tablet pack TAKE TWO ORANGE TABLETS BY MOUTH IN THE MORNING AND ONE BLUE TABLET IN THE EVENING AS DIRECTED ON PACKAGE.  TAKE WITH FAT CONTAINING FOOD. 84 tablet 5     Wheat Dextrin (BENEFIBER) POWD Take 3.5 g by mouth daily       Social History     Socioeconomic History     Marital status:      Spouse name: Not on file     Number of children: Not on file     Years of education: Not on file     Highest education level: Not on file   Occupational History     Occupation: Teacher   Tobacco Use     Smoking status: Never     Smokeless tobacco: Never   Substance and Sexual Activity     Alcohol use: Yes     Alcohol/week: 0.0 standard drinks of alcohol     Comment: 1 drink 3X per week     Drug use: No     Sexual activity: Yes     Partners: Female     Birth control/protection: None   Other Topics Concern     Parent/sibling w/ CABG, MI or angioplasty before 65F 55M? Not Asked   Social History Narrative    Lives in [place] with wife, Aimee, and sons, Shay and Serafin. 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     Social Determinants of Health     Financial Resource Strain: Not on file   Food Insecurity: Not on file   Transportation Needs: Not on file   Physical Activity: Not on file   Stress: Not on file   Social Connections: Not on file   Intimate Partner Violence: Not on file   Housing Stability: Not on file             Review of Systems:     Comprehensive ROS negative other than the symptoms noted above in the HPI.          Physical Exam:   There  were no vitals taken for this visit.  GENERAL: Healthy, alert and no distress  EYES: Eyes grossly normal to inspection.  No discharge or erythema, or obvious scleral/conjunctival abnormalities.  RESP: No audible wheeze, cough, or visible cyanosis.  No visible retractions or increased work of breathing.    SKIN: Visible skin clear. No significant rash, abnormal pigmentation or lesions.  NEURO: Cranial nerves grossly intact.  Mentation and speech appropriate for age.  PSYCH: Mentation appears normal, affect normal/bright, judgement and insight intact, normal speech and appearance well-groomed.          Laboratory results:     TSH   Date Value Ref Range Status   05/04/2023 1.28 0.30 - 4.20 uIU/mL Final   04/21/2022 0.62 0.40 - 4.00 mU/L Final   04/08/2021 1.47 0.40 - 4.00 mU/L Final     Triiodothyronine (T3)   Date Value Ref Range Status   07/16/2019 95 60 - 181 ng/dL Final     Testosterone Total   Date Value Ref Range Status   05/04/2023 208 (L) 240 - 950 ng/dL Final   04/08/2021 438 240 - 950 ng/dL Final     Comment:     This test was developed and its performance characteristics determined by the   [facility].   It has not been cleared or approved by the FDA. The laboratory is regulated   under CLIA as qualified to perform high-complexity testing. This test is used   for clinical purposes. It should not be regarded as investigational or for   research.       Cholesterol   Date Value Ref Range Status   05/04/2023 180 <200 mg/dL Final   07/08/2021 150 <200 mg/dL Final     Albumin Urine mg/L   Date Value Ref Range Status   05/04/2023 <12.0 mg/L Final     Comment:     The reference ranges have not been established in urine albumin. The results should be integrated into the clinical context for interpretation.   04/21/2022 5 mg/L Final   04/08/2021 10 mg/L Final     Triglycerides   Date Value Ref Range Status   05/04/2023 106 <150 mg/dL Final   07/08/2021  44 <150 mg/dL Final     HDL Cholesterol   Date Value Ref Range Status   07/08/2021 60 >39 mg/dL Final     Direct Measure HDL   Date Value Ref Range Status   05/04/2023 66 >=40 mg/dL Final     LDL Cholesterol Calculated   Date Value Ref Range Status   05/04/2023 93 <=100 mg/dL Final   07/08/2021 81 <100 mg/dL Final     Comment:     Desirable:       <100 mg/dl     Cholesterol/HDL Ratio   Date Value Ref Range Status   12/22/2014 3.3 0.0 - 5.0 Final     Non HDL Cholesterol   Date Value Ref Range Status   05/04/2023 114 <130 mg/dL Final   07/08/2021 90 <130 mg/dL Final     DEXA 5/4/2023: Most negative T score of -1.2 left femoral neck    DXA, 04/08/2021  Most negative Z score of -2.0 at level of L1-L3 (below normal range for men <50 years old)  Significantly decreased BMD at level of L1-L3 lumbar spine compared to previous exam (-5.9%)  Significantly decreased BMD at level of L1-L3 lumbar spine and right total hip compared to baseline exam on 12/22/2014 (-10.2% at lumbar spine, -2.5% at hip)     DXA, 05/02/2019  Most negative Z score of -1.6 at level lf lumbar spine  Significantly decreased BMD at lumbar spine, right and left total hip compared to previous exam (-7.8%)     DXA, 04/07/2017  Most negative Z score of -1.1 at level of lumbar spine  Significantly decreased BMD at level of lumbar spine compared to previous exam (-3.4%)     DXA, 12/22/2014 - Baseline Exam  Most negative Z score of -1.3 at level of lumbar spine           Assessment and Plan:   Philip is a 43 year old male with CF related bone disease    Low bone density  Continue Fosamax 70 mg/week.  Overall improvement in density  TSH and vitamin D were within normal range on annual labs earlier this year.   Continue current calcium and vitamin D supplements   Continue weightbearing exercise as tolerated    Low testosterone  Repeat morning total testosterone with free testosterone, SHBG, prolactin and LH    Return to clinic in 1 year or earlier if  testosterone labs come back abnormal    Orders Placed This Encounter   Procedures     Prolactin     Sex Hormone Binding Globulin     Luteinizing Hormone     Testosterone Free and Total     Return in about 1 year (around 8/8/2024).   AVIS Mckeon     Video visit done using Wrap[employer]  Video visit start time 8:09 AM  Video end time 8:37 AM  Patient location: Home  Provider patient: Off-site

## 2023-08-08 NOTE — LETTER
2023       RE: Philip Delacruz  [address]  [facility] [address]     Dear Colleague,    Thank you for referring your patient, Philip Delacruz, to the [facility] [place] at [facility]. Please see a copy of my visit note below.      CF Endocrinology Return Consultation: Low bone density  :   Patient: Philip Delacruz MRN# [id number]   YOB: 1980 Age: 43 year old   Date of Visit:2023    Last seen  Sep 2022        Problem list:     Patient Active Problem List    Diagnosis Date Noted    Small intestinal bacterial overgrowth 2018     Priority: Medium    Exocrine pancreatic insufficiency 04/10/2018     Priority: Medium    Pancreatic insufficiency 2018     Priority: Medium    Chronic sinusitis      Priority: Medium    Cystic fibrosis with pulmonary manifestations 2014     Priority: Medium     SWEAT TEST:  Date:  2014           [facility]  Sample #1   mg     30  mmol/L Cl  Sample #2   mg     27  mmol/L Cl     GENOTYPING:  Date:  2014        [facility]  Genotype:  [id number]  Poly T Variant:  [ 7T ]/[9T  ]              HPI:   Philip is a 43 year old male with CF related bone disease     Summary of prior history copied from previous note: DEXA scan 2021 showed significant decline in bone density, about 5.9% at the lumbar spine. Lowest z score was -2.0 at lumbar spine L1-L3 with outlier of -3.5 at L4.    DXA from 21 with decrease in BMD at level of L1-L3 lumbar spine of -5.9% compared to 2019 and Z score of -2.0 from L1-L3 (outlier score of -3.5 in L4). No tobacco use, no excessive alcohol use, no family history of osteoporosis. Workup has been done to evaluate for potential secondary causes of bone loss. Recent TSH, testosterone, vitamin D level, and A1c were ok. Serum calcium, albumin, phosphorus, and PTH within normal limits.   Subclinical Cushing's was considered, however 24-hour urine free cortisol is within normal limits  24-hour urine calcium was notable for hypercalciuria (380 mg).  Patient's diet does not appear to be high in calcium.  High urinary calcium could be related to high sodium diet/increase urinary sodium excretion. Of note, urine volume was somewhat high at 2.8     Patient started Fosamax 70 mg weekly in September 2021.    11/15/2021 repeat 24-hour calcium 0.38 g/24-hour specimen    Interval history:    I have reviewed the available past laboratory evaluations, imaging studies, and medical records available to me at this visit. History was obtained from the patient and the medical record.  I have reviewed the notes of the pulmonary care team entered into the medical record since I last saw the patient.    Patient started Fosamax 70 mg weekly in September 2021.  He denies any side effects and reports being compliant with it.  Has gained weight over the last year. Feels wt gain is mostly muscle mass.  On Trikafta  Takes MVW multivitamin, 2 tablets of calcium citrate 500 mg total, vitamin D 5000 IU daily.  No history of renal stones  Continues to exercise including [employer]   No history of falls or osteoporotic fracture.  His usual diet contained around 8 ounces of milk and serving of geek yogurt.    Patient is concerned about low testosterone levels on recent labs.  Testosterone was 208  Also with worsening GI symptoms related to malabsorption increased diarrhea after certain foods  Recent vitamin D level was normal  Complains of somewhat low sex rate, energy level is good, no erectile dysfunction  Most recent DEXA showed improvement in bone density at lumbar spine            Past Medical History:     Past Medical History:   Diagnosis Date    Chronic sinusitis     Congenital absence of vas deferens, bilateral     Cystic fibrosis (H)     Delta F508 and [id number]     History of COVID-19 01/2022    Back pain, chills and mild  cough, resolved without intervention    Nasal polyps     Sinusitis, chronic             Past Surgical History:     Past Surgical History:   Procedure Laterality Date    COLONOSCOPY N/A 9/26/2022    Procedure: COLONOSCOPY, WITH POLYPECTOMY AND BIOPSY;  Surgeon: Bennett Fajardo MD;  Location: [facility]    HC TOOTH EXTRACTION W/FORCEP      NASAL/SINUS POLYPECTOMY      REPAIR PECTUS EXCAVATUM  1997    SINUS SURGERY  1992, 2002, 2004    Removed tubinates and polyps OSH    Sperm harvest                 Social History:     Social History     Social History Narrative    Lives in [place] with wife, Aimee, and sons, Shay and Serafin. 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              Family History:     Family History   Problem Relation Age of Onset    C.A.D. Maternal Grandfather     Diabetes Maternal Grandfather     Heart Disease Maternal Grandfather     Aneurysm Paternal Grandfather         Aortic    Gastrointestinal Disease Father         Reflux    Cancer Paternal Grandmother         Lymphoma    Diabetes Maternal Grandmother     Thyroid Disease Mother      () Other             Allergies:     No Known Allergies          Medications:     Current Outpatient Rx   Medication Sig Dispense Refill    acyclovir (ZOVIRAX) 400 MG tablet Take 400 mg by mouth daily      alendronate (FOSAMAX) 70 MG tablet Take 1 tablet (70 mg) by mouth every 7 days *take 60 minutes before morning meal with 8 oz of water and remain upright for 30 minutes. 12 tablet 3    atorvastatin (LIPITOR) 10 MG tablet TAKE ONE TABLET BY MOUTH ONCE DAILY 90 tablet 2    Ca Cit Malate-Cholecalciferol (CALCIUM CITRATE MALATE-VIT D) 250-100 MG-UNIT TABS Take 2 tablets by mouth daily Activated calcium by Nutrikey      cholecalciferol (VITAMIN D3) 125 mcg (5000 units) capsule Take 125 mcg by mouth daily      CREON 12610-62568 units CPEP per EC capsule TAKE 4-5 CAPSULES BY MOUTH THREE  TIMES A DAY WITH MEALS. TAKE 2-3 CAPSULES WITH SNACKS (TOTAL 3 MEALS AND 3 SNACKS DAILY) 1890 capsule 3    escitalopram (LEXAPRO) 20 MG tablet Take 1 tablet by mouth daily      mvw complete formulation (SOFTGELS) capsule Take 1 capsule by mouth daily      olopatadine (PATADAY) 0.2 % ophthalmic solution Place 1 drop into both eyes daily 2.5 mL 11    Omega-3 Fatty Acids (FISH OIL) 1200 MG CPDR Take 2 capsules by mouth daily      Sodium Chloride-Sodium Bicarb (SINUFLO READYRINSE) KIT Daily PRN      tacrolimus (PROTOPIC) 0.1 % external ointment APPLY TO AFFECTED AREAS ON FACE TWICE PER DAY UNTIL RESOLVED. USE AS NEEDED WITH FLARES.      TRIKAFTA 100-50-75 & 150 MG tablet pack TAKE TWO ORANGE TABLETS BY MOUTH IN THE MORNING AND ONE BLUE TABLET IN THE EVENING AS DIRECTED ON PACKAGE.  TAKE WITH FAT CONTAINING FOOD. 84 tablet 5    Wheat Dextrin (BENEFIBER) POWD Take 3.5 g by mouth daily       Social History     Socioeconomic History    Marital status:      Spouse name: Not on file    Number of children: Not on file    Years of education: Not on file    Highest education level: Not on file   Occupational History    Occupation: Teacher   Tobacco Use    Smoking status: Never    Smokeless tobacco: Never   Substance and Sexual Activity    Alcohol use: Yes     Alcohol/week: 0.0 standard drinks of alcohol     Comment: 1 drink 3X per week    Drug use: No    Sexual activity: Yes     Partners: Female     Birth control/protection: None   Other Topics Concern    Parent/sibling w/ CABG, MI or angioplasty before 65F 55M? Not Asked   Social History Narrative    Lives in [place] with wife, Aimee, and sons, Shay and Serafin. 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     Social Determinants of Health     Financial Resource Strain: Not on file   Food Insecurity: Not on file   Transportation Needs: Not on file   Physical Activity: Not on file    Stress: Not on file   Social Connections: Not on file   Intimate Partner Violence: Not on file   Housing Stability: Not on file             Review of Systems:     Comprehensive ROS negative other than the symptoms noted above in the HPI.          Physical Exam:   There were no vitals taken for this visit.  GENERAL: Healthy, alert and no distress  EYES: Eyes grossly normal to inspection.  No discharge or erythema, or obvious scleral/conjunctival abnormalities.  RESP: No audible wheeze, cough, or visible cyanosis.  No visible retractions or increased work of breathing.    SKIN: Visible skin clear. No significant rash, abnormal pigmentation or lesions.  NEURO: Cranial nerves grossly intact.  Mentation and speech appropriate for age.  PSYCH: Mentation appears normal, affect normal/bright, judgement and insight intact, normal speech and appearance well-groomed.          Laboratory results:     TSH   Date Value Ref Range Status   05/04/2023 1.28 0.30 - 4.20 uIU/mL Final   04/21/2022 0.62 0.40 - 4.00 mU/L Final   04/08/2021 1.47 0.40 - 4.00 mU/L Final     Triiodothyronine (T3)   Date Value Ref Range Status   07/16/2019 95 60 - 181 ng/dL Final     Testosterone Total   Date Value Ref Range Status   05/04/2023 208 (L) 240 - 950 ng/dL Final   04/08/2021 438 240 - 950 ng/dL Final     Comment:     This test was developed and its performance characteristics determined by the   [facility].   It has not been cleared or approved by the FDA. The laboratory is regulated   under CLIA as qualified to perform high-complexity testing. This test is used   for clinical purposes. It should not be regarded as investigational or for   research.       Cholesterol   Date Value Ref Range Status   05/04/2023 180 <200 mg/dL Final   07/08/2021 150 <200 mg/dL Final     Albumin Urine mg/L   Date Value Ref Range Status   05/04/2023 <12.0 mg/L Final     Comment:     The reference  ranges have not been established in urine albumin. The results should be integrated into the clinical context for interpretation.   04/21/2022 5 mg/L Final   04/08/2021 10 mg/L Final     Triglycerides   Date Value Ref Range Status   05/04/2023 106 <150 mg/dL Final   07/08/2021 44 <150 mg/dL Final     HDL Cholesterol   Date Value Ref Range Status   07/08/2021 60 >39 mg/dL Final     Direct Measure HDL   Date Value Ref Range Status   05/04/2023 66 >=40 mg/dL Final     LDL Cholesterol Calculated   Date Value Ref Range Status   05/04/2023 93 <=100 mg/dL Final   07/08/2021 81 <100 mg/dL Final     Comment:     Desirable:       <100 mg/dl     Cholesterol/HDL Ratio   Date Value Ref Range Status   12/22/2014 3.3 0.0 - 5.0 Final     Non HDL Cholesterol   Date Value Ref Range Status   05/04/2023 114 <130 mg/dL Final   07/08/2021 90 <130 mg/dL Final     DEXA 5/4/2023: Most negative T score of -1.2 left femoral neck    DXA, 04/08/2021  Most negative Z score of -2.0 at level of L1-L3 (below normal range for men <50 years old)  Significantly decreased BMD at level of L1-L3 lumbar spine compared to previous exam (-5.9%)  Significantly decreased BMD at level of L1-L3 lumbar spine and right total hip compared to baseline exam on 12/22/2014 (-10.2% at lumbar spine, -2.5% at hip)     DXA, 05/02/2019  Most negative Z score of -1.6 at level lf lumbar spine  Significantly decreased BMD at lumbar spine, right and left total hip compared to previous exam (-7.8%)     DXA, 04/07/2017  Most negative Z score of -1.1 at level of lumbar spine  Significantly decreased BMD at level of lumbar spine compared to previous exam (-3.4%)     DXA, 12/22/2014 - Baseline Exam  Most negative Z score of -1.3 at level of lumbar spine           Assessment and Plan:   Philip is a 43 year old male with CF related bone disease    Low bone density  Continue Fosamax 70 mg/week.  Overall improvement in density  TSH and vitamin D were within normal range on annual  labs earlier this year.   Continue current calcium and vitamin D supplements   Continue weightbearing exercise as tolerated    Low testosterone  Repeat morning total testosterone with free testosterone, SHBG, prolactin and LH    Return to clinic in 1 year or earlier if testosterone labs come back abnormal    Orders Placed This Encounter   Procedures    Prolactin    Sex Hormone Binding Globulin    Luteinizing Hormone    Testosterone Free and Total     Return in about 1 year (around 8/8/2024).   AVIS Mckeon

## 2023-08-15 RX ORDER — ESCITALOPRAM OXALATE 20 MG/1
20 TABLET ORAL DAILY
Qty: 30 TABLET | Refills: 11 | OUTPATIENT
Start: 2023-08-15

## 2023-09-06 NOTE — PROGRESS NOTES
Reason for Visit  Philip Delacruz is a 43 year old year old male who is being seen for Cystic Fibrosis (F/u)      Assessment and plan:   Shar Coley is a 43 year-old male with a history of cystic fibrosis with mild lung disease and pancreatic insufficiency.     Maintenance   Modulator: Trikafta  Mutation:  [id number], Poly T Variant:  [ 7T ]/[9T  ]  AW Clearance: Exercise  Bronchodilators: Albuterol MDI PRN  Mucolytics: none   Antibiotics Inh:  none   Antibiotics Oral: none   Other:   Colonization Hx: Rhizobium (agrobacterium) radiobacter, Aspergillus versicolor, Serratia marcescens, staphylococcus aureus, moraxella (branhamella) catarrhalis  Annual studies due:2023  Contraindications to standard of care :  COLONOSCOPY: 2022, no polyps.    Pulmonary/cystic fibrosis: The patient reports excellent exercise tolerance.  Oxygenation adequate at 94%.  PFTs are in the high normal range and similar to his recent past.  He does not appear to be having a pulm exacerbation at this time.  He will continue using exercise as his primary form of airway clearance.    Sinusitis: The patient reports persistent left maxillary sinus pressure and green drainage with nasal washes for the past 2 weeks.  Previous sputum cultures have grown staph.  Patient will be treated with Augmentin for 2 weeks.  If symptoms persist, ENT referral will be pursued.       Snoring: Not addressed with today's visit.       Pancreatic insufficiency: The patient reports loose stool through the summer, possibly related to  enzymes or insufficient enzyme intake with meals.  He does note some improvement recently.  He will meet with the CF dietitian today to discuss appropriate enzyme dosing.  If symptoms persist, further evaluation will be pursued. Fat-soluble vitamin levels were normal with annual studies in May.  Continue current pancreatic enzyme replacement and supplemental vitamins.       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  He has had excellent response to Trikafta with decreased respiratory symptoms and improved PFTs.  CK and hepatic panel will be obtained with the next visit.       Depression/anxiety: Adequately controlled with current Lexapro.       Bone health: Continue alendronate, calcium and vitamin D.       Healthcare maintenance: The patient was encouraged to receive his COVID booster and influenza vaccine in late September or early October.  Annual studies will be due in May.      Follow-up in 3 months with labs, PFTs and sputum culture.    In addition to my visit, the patient will meet with the CF dietitian and pharmacist today.    True Sahni MD        CF HPI  The patient was seen and examined by Ture Sahni MD   Shar Coley is a 43 year-old male with a history of cystic fibrosis with mild lung disease and pancreatic insufficiency.  In July, patient's wife and son developed hand-foot-and-mouth disease.  Shar developed a sore throat but no other manifestations.  Reports frequent \"colds\" in the family stemming from his young children.  The patient reports persistent pressure in the area of the left maxillary sinus.  This is persisted for about 2 weeks.  No nasal drainage but he does note green output when doing nasal washes.  No recent fever, chills or night sweats.  No ear pain.  No sore throat.    Breathing is comfortable at rest and with all activities.  He does CrossFit 4 times per week.  No cough.  No sputum.  No chest pain.    Review of systems:  Appetite is very good  No nausea or vomiting.  He did have 1 episode of left lateral abdominal pain which resolved without intervention.  Frequent loose stools over the summer, improving.  A complete ROS was otherwise negative except as noted in the HPI.    Current Outpatient Medications   Medication    acyclovir (ZOVIRAX) 400 MG tablet    alendronate (FOSAMAX) 70 MG tablet    amoxicillin-clavulanate (AUGMENTIN) 875-125 MG tablet    " atorvastatin (LIPITOR) 10 MG tablet    Ca Cit Malate-Cholecalciferol (CALCIUM CITRATE MALATE-VIT D) 250-100 MG-UNIT TABS    cholecalciferol (VITAMIN D3) 125 mcg (5000 units) capsule    CREON 60836-28952 units CPEP per EC capsule    escitalopram (LEXAPRO) 20 MG tablet    fluticasone (FLONASE) 50 MCG/ACT nasal spray    lactobacillus rhamnosus, GG, (CULTURELL) capsule    mvw complete formulation (SOFTGELS) capsule    olopatadine (PATADAY) 0.2 % ophthalmic solution    Omega-3 Fatty Acids (FISH OIL) 1200 MG CPDR    Sodium Chloride-Sodium Bicarb (SINUFLO READYRINSE) KIT    TRIKAFTA 100-50-75 & 150 MG tablet pack    Wheat Dextrin (BENEFIBER) POWD    tacrolimus (PROTOPIC) 0.1 % external ointment     No current facility-administered medications for this visit.     No Known Allergies  Past Medical History:   Diagnosis Date    Chronic sinusitis     Congenital absence of vas deferens, bilateral     Cystic fibrosis (H)     Delta F508 and [id number]     History of COVID-19 01/2022    Back pain, chills and mild cough, resolved without intervention    Nasal polyps     Sinusitis, chronic        Past Surgical History:   Procedure Laterality Date    COLONOSCOPY N/A 9/26/2022    Procedure: COLONOSCOPY, WITH POLYPECTOMY AND BIOPSY;  Surgeon: Bennett Fajardo MD;  Location: [facility]    HC TOOTH EXTRACTION W/FORCEP      NASAL/SINUS POLYPECTOMY      REPAIR PECTUS EXCAVATUM  1997    SINUS SURGERY  1992, 2002, 2004    Removed tubinates and polyps OSH    Sperm harvest         Social History     Socioeconomic History    Marital status:      Spouse name: Not on file    Number of children: Not on file    Years of education: Not on file    Highest education level: Not on file   Occupational History    Occupation: Teacher   Tobacco Use    Smoking status: Never    Smokeless tobacco: Never   Substance and Sexual Activity    Alcohol use: Yes     Comment: 1 drink/week    Drug use: No     Comment: tried marijuana gummy    Sexual activity: Yes      "Partners: Female     Birth control/protection: None   Other Topics Concern    Parent/sibling w/ CABG, MI or angioplasty before 65F 55M? Not Asked   Social History Narrative    Lives in [place] with wife, Aimee, and sons, Shay and Serafin. Part time teaching special ed.     Social Determinants of Health     Financial Resource Strain: Not on file   Food Insecurity: Not on file   Transportation Needs: Not on file   Physical Activity: Not on file   Stress: Not on file   Social Connections: Not on file   Intimate Partner Violence: Not on file   Housing Stability: Not on file         /81   Pulse 58   Temp 98.1  F (36.7  C)   Ht 1.837 m (6' 0.32\")   Wt 83.1 kg (183 lb 3.2 oz)   SpO2 94%   BMI 24.63 kg/m    Body mass index is 24.63 kg/m .  Exam:   GENERAL APPEARANCE: Well developed, well nourished, alert, and in no apparent distress.  EYES: PERRL, EOMI  HENT: Nasal mucosa with edema and no hyperemia. No nasal polyps.  EARS: TM's and canals obscured by cerumen   MOUTH: Oral mucosa is moist, without any lesions, no tonsillar enlargement, no oropharyngeal exudate.  NECK: supple, no masses, no thyromegaly.  LYMPHATICS: No significant axillary, cervical, or supraclavicular nodes.  RESP: normal percussion, good air flow throughout.  No crackles. No rhonchi. No wheezes.  CV: Normal S1, S2, regular rhythm, normal rate. No murmur.  No rub. No gallop. No LE edema.   ABDOMEN:  Bowel sounds normal, soft, nontender, no HSM or masses.   MS: extremities normal. No clubbing. No cyanosis.  SKIN: no rash on limited exam  NEURO: Mentation intact, speech normal, normal strength and tone, normal gait and stance  PSYCH: mentation appears normal. and affect normal/bright  Results:  Recent Results (from the past 168 hour(s))   General PFT Lab (Please always keep checked)    Collection Time: 09/07/23  3:20 PM   Result Value Ref Range    FVC-Pred 5.21 L    FVC-Pre 5.23 L    FVC-%Pred-Pre 100 %    FEV1-Pre 4.44 L    FEV1-%Pred-Pre 105 % "    FEV1FVC-Pred 81 %    FEV1FVC-Pre 85 %    FEFMax-Pred 10.68 L/sec    FEFMax-Pre 10.72 L/sec    FEFMax-%Pred-Pre 100 %    FEF2575-Pred 4.07 L/sec    FEF2575-Pre 5.10 L/sec    NRK5314-%Pred-Pre 125 %    ExpTime-Pre 5.65 sec    FIFMax-Pre 7.35 L/sec    FEV1FEV6-Pred 82 %    FEV1FEV6-Pre 85 %   Cystic Fibrosis Culture Aerobic Bacterial    Collection Time: 09/07/23  4:19 PM    Specimen: Throat; Swab   Result Value Ref Range    Culture 2+ Normal haile to date                    Results as noted above.    PFT Interpretation:  Normal spirometry.  Unchanged from previous.   Similar to recent best.  Valid Maneuver             CF Exacerbation  Absent

## 2023-09-07 ENCOUNTER — OFFICE VISIT (OUTPATIENT)
Dept: PULMONOLOGY | Facility: CLINIC | Age: 43
End: 2023-09-07
Attending: INTERNAL MEDICINE
Payer: COMMERCIAL

## 2023-09-07 ENCOUNTER — OFFICE VISIT (OUTPATIENT)
Dept: PHARMACY | Facility: CLINIC | Age: 43
End: 2023-09-07
Payer: COMMERCIAL

## 2023-09-07 VITALS
BODY MASS INDEX: 24.81 KG/M2 | WEIGHT: 183.2 LBS | HEART RATE: 58 BPM | SYSTOLIC BLOOD PRESSURE: 128 MMHG | TEMPERATURE: 98.1 F | OXYGEN SATURATION: 94 % | HEIGHT: 72 IN | DIASTOLIC BLOOD PRESSURE: 81 MMHG

## 2023-09-07 DIAGNOSIS — K86.81 EXOCRINE PANCREATIC INSUFFICIENCY: ICD-10-CM

## 2023-09-07 DIAGNOSIS — E84.0 CYSTIC FIBROSIS WITH PULMONARY MANIFESTATIONS (H): Primary | ICD-10-CM

## 2023-09-07 DIAGNOSIS — J32.4 CHRONIC PANSINUSITIS: ICD-10-CM

## 2023-09-07 DIAGNOSIS — E84.0 CYSTIC FIBROSIS WITH PULMONARY MANIFESTATIONS (H): ICD-10-CM

## 2023-09-07 DIAGNOSIS — K86.89 PANCREATIC INSUFFICIENCY: ICD-10-CM

## 2023-09-07 DIAGNOSIS — J01.01 ACUTE RECURRENT MAXILLARY SINUSITIS: ICD-10-CM

## 2023-09-07 LAB
EXPTIME-PRE: 5.65 SEC
FEF2575-%PRED-PRE: 125 %
FEF2575-PRE: 5.1 L/SEC
FEF2575-PRED: 4.07 L/SEC
FEFMAX-%PRED-PRE: 100 %
FEFMAX-PRE: 10.72 L/SEC
FEFMAX-PRED: 10.68 L/SEC
FEV1-%PRED-PRE: 105 %
FEV1-PRE: 4.44 L
FEV1FEV6-PRE: 85 %
FEV1FEV6-PRED: 82 %
FEV1FVC-PRE: 85 %
FEV1FVC-PRED: 81 %
FIFMAX-PRE: 7.35 L/SEC
FVC-%PRED-PRE: 100 %
FVC-PRE: 5.23 L
FVC-PRED: 5.21 L

## 2023-09-07 PROCEDURE — 87070 CULTURE OTHR SPECIMN AEROBIC: CPT | Performed by: INTERNAL MEDICINE

## 2023-09-07 PROCEDURE — 99214 OFFICE O/P EST MOD 30 MIN: CPT | Mod: 25 | Performed by: INTERNAL MEDICINE

## 2023-09-07 PROCEDURE — 97803 MED NUTRITION INDIV SUBSEQ: CPT | Performed by: DIETITIAN, REGISTERED

## 2023-09-07 PROCEDURE — 94375 RESPIRATORY FLOW VOLUME LOOP: CPT | Performed by: INTERNAL MEDICINE

## 2023-09-07 PROCEDURE — G0463 HOSPITAL OUTPT CLINIC VISIT: HCPCS | Performed by: INTERNAL MEDICINE

## 2023-09-07 PROCEDURE — 99207 PR NO CHARGE LOS: CPT | Performed by: PHARMACIST

## 2023-09-07 RX ORDER — FLUTICASONE PROPIONATE 50 MCG
1 SPRAY, SUSPENSION (ML) NASAL DAILY PRN
COMMUNITY

## 2023-09-07 RX ORDER — LACTOBACILLUS RHAMNOSUS GG 10B CELL
1 CAPSULE ORAL DAILY PRN
COMMUNITY

## 2023-09-07 NOTE — NURSING NOTE
"Philip Delacruz is a 43 year old year old who is being seen for Cystic Fibrosis (F/u)      Medications reviewed and Vital signs taken.    Specimen Collection Type: Throat Swab    Order(s) placed: CF Aerobic Bacterial    *IF AFB order placed - please enter \"PRIORITIZE AFB\" to order comments.       No results found for: ACIDFAST      No results found for: AFBSMS        "

## 2023-09-07 NOTE — LETTER
2023         RE: Philip Delacruz  [address]  [facility] [address]        Dear Colleague,    Thank you for referring your patient, Philip Delacruz, to the [facility] [place]. Please see a copy of my visit note below.    Reason for Visit  Philip Delacruz is a 43 year old year old male who is being seen for Cystic Fibrosis (F/u)      Assessment and plan:   Shar Coley is a 43 year-old male with a history of cystic fibrosis with mild lung disease and pancreatic insufficiency.     Maintenance   Modulator: Trikafta  Mutation:  [id number], Poly T Variant:  [ 7T ]/[9T  ]  AW Clearance: Exercise  Bronchodilators: Albuterol MDI PRN  Mucolytics: none   Antibiotics Inh:  none   Antibiotics Oral: none   Other:   Colonization Hx: Rhizobium (agrobacterium) radiobacter, Aspergillus versicolor, Serratia marcescens, staphylococcus aureus, moraxella (branhamella) catarrhalis  Annual studies due:2023  Contraindications to standard of care :  COLONOSCOPY: 2022, no polyps.    Pulmonary/cystic fibrosis: The patient reports excellent exercise tolerance.  Oxygenation adequate at 94%.  PFTs are in the high normal range and similar to his recent past.  He does not appear to be having a pulm exacerbation at this time.  He will continue using exercise as his primary form of airway clearance.    Sinusitis: The patient reports persistent left maxillary sinus pressure and green drainage with nasal washes for the past 2 weeks.  Previous sputum cultures have grown staph.  Patient will be treated with Augmentin for 2 weeks.  If symptoms persist, ENT referral will be pursued.       Snoring: Not addressed with today's visit.       Pancreatic insufficiency: The patient reports loose stool through the summer, possibly related to  enzymes or insufficient enzyme intake with meals.  He does note some improvement recently.  He will meet with the CF  "dietitian today to discuss appropriate enzyme dosing.  If symptoms persist, further evaluation will be pursued. Fat-soluble vitamin levels were normal with annual studies in May.  Continue current pancreatic enzyme replacement and supplemental vitamins.       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  He has had excellent response to Trikafta with decreased respiratory symptoms and improved PFTs.  CK and hepatic panel will be obtained with the next visit.       Depression/anxiety: Adequately controlled with current Lexapro.       Bone health: Continue alendronate, calcium and vitamin D.       Healthcare maintenance: The patient was encouraged to receive his COVID booster and influenza vaccine in late September or early October.  Annual studies will be due in May.      Follow-up in 3 months with labs, PFTs and sputum culture.    In addition to my visit, the patient will meet with the CF dietitian and pharmacist today.    True Sahni MD        CF HPI  The patient was seen and examined by True Sahni MD   Shar Coley is a 43 year-old male with a history of cystic fibrosis with mild lung disease and pancreatic insufficiency.  In July, patient's wife and son developed hand-foot-and-mouth disease.  Shar developed a sore throat but no other manifestations.  Reports frequent \"colds\" in the family stemming from his young children.  The patient reports persistent pressure in the area of the left maxillary sinus.  This is persisted for about 2 weeks.  No nasal drainage but he does note green output when doing nasal washes.  No recent fever, chills or night sweats.  No ear pain.  No sore throat.    Breathing is comfortable at rest and with all activities.  He does CrossFit 4 times per week.  No cough.  No sputum.  No chest pain.    Review of systems:  Appetite is very good  No nausea or vomiting.  He did have 1 episode of left lateral abdominal pain which resolved without intervention.  " Frequent loose stools over the summer, improving.  A complete ROS was otherwise negative except as noted in the HPI.    Current Outpatient Medications   Medication    acyclovir (ZOVIRAX) 400 MG tablet    alendronate (FOSAMAX) 70 MG tablet    amoxicillin-clavulanate (AUGMENTIN) 875-125 MG tablet    atorvastatin (LIPITOR) 10 MG tablet    Ca Cit Malate-Cholecalciferol (CALCIUM CITRATE MALATE-VIT D) 250-100 MG-UNIT TABS    cholecalciferol (VITAMIN D3) 125 mcg (5000 units) capsule    CREON 57218-62637 units CPEP per EC capsule    escitalopram (LEXAPRO) 20 MG tablet    fluticasone (FLONASE) 50 MCG/ACT nasal spray    lactobacillus rhamnosus, GG, (CULTURELL) capsule    mvw complete formulation (SOFTGELS) capsule    olopatadine (PATADAY) 0.2 % ophthalmic solution    Omega-3 Fatty Acids (FISH OIL) 1200 MG CPDR    Sodium Chloride-Sodium Bicarb (SINUFLO READYRINSE) KIT    TRIKAFTA 100-50-75 & 150 MG tablet pack    Wheat Dextrin (BENEFIBER) POWD    tacrolimus (PROTOPIC) 0.1 % external ointment     No current facility-administered medications for this visit.     No Known Allergies  Past Medical History:   Diagnosis Date    Chronic sinusitis     Congenital absence of vas deferens, bilateral     Cystic fibrosis (H)     Delta F508 and [id number]     History of COVID-19 01/2022    Back pain, chills and mild cough, resolved without intervention    Nasal polyps     Sinusitis, chronic        Past Surgical History:   Procedure Laterality Date    COLONOSCOPY N/A 9/26/2022    Procedure: COLONOSCOPY, WITH POLYPECTOMY AND BIOPSY;  Surgeon: Bennett Fajardo MD;  Location:  GI    HC TOOTH EXTRACTION W/FORCEP      NASAL/SINUS POLYPECTOMY      REPAIR PECTUS EXCAVATUM  1997    SINUS SURGERY  1992, 2002, 2004    Removed tubinates and polyps OSH    Sperm harvest         Social History     Socioeconomic History    Marital status:      Spouse name: Not on file    Number of children: Not on file    Years of education: Not on file    Highest  "education level: Not on file   Occupational History    Occupation: Teacher   Tobacco Use    Smoking status: Never    Smokeless tobacco: Never   Substance and Sexual Activity    Alcohol use: Yes     Comment: 1 drink/week    Drug use: No     Comment: tried marijuana gummy    Sexual activity: Yes     Partners: Female     Birth control/protection: None   Other Topics Concern    Parent/sibling w/ CABG, MI or angioplasty before 65F 55M? Not Asked   Social History Narrative    Lives in [place] with wife, Aimee, and sons, Shay and Serafin. Part time teaching special ed.     Social Determinants of Health     Financial Resource Strain: Not on file   Food Insecurity: Not on file   Transportation Needs: Not on file   Physical Activity: Not on file   Stress: Not on file   Social Connections: Not on file   Intimate Partner Violence: Not on file   Housing Stability: Not on file         /81   Pulse 58   Temp 98.1  F (36.7  C)   Ht 1.837 m (6' 0.32\")   Wt 83.1 kg (183 lb 3.2 oz)   SpO2 94%   BMI 24.63 kg/m    Body mass index is 24.63 kg/m .  Exam:   GENERAL APPEARANCE: Well developed, well nourished, alert, and in no apparent distress.  EYES: PERRL, EOMI  HENT: Nasal mucosa with edema and no hyperemia. No nasal polyps.  EARS: TM's and canals obscured by cerumen   MOUTH: Oral mucosa is moist, without any lesions, no tonsillar enlargement, no oropharyngeal exudate.  NECK: supple, no masses, no thyromegaly.  LYMPHATICS: No significant axillary, cervical, or supraclavicular nodes.  RESP: normal percussion, good air flow throughout.  No crackles. No rhonchi. No wheezes.  CV: Normal S1, S2, regular rhythm, normal rate. No murmur.  No rub. No gallop. No LE edema.   ABDOMEN:  Bowel sounds normal, soft, nontender, no HSM or masses.   MS: extremities normal. No clubbing. No cyanosis.  SKIN: no rash on limited exam  NEURO: Mentation intact, speech normal, normal strength and tone, normal gait and stance  PSYCH: mentation appears " normal. and affect normal/bright  Results:  Recent Results (from the past 168 hour(s))   General PFT Lab (Please always keep checked)    Collection Time: 23  3:20 PM   Result Value Ref Range    FVC-Pred 5.21 L    FVC-Pre 5.23 L    FVC-%Pred-Pre 100 %    FEV1-Pre 4.44 L    FEV1-%Pred-Pre 105 %    FEV1FVC-Pred 81 %    FEV1FVC-Pre 85 %    FEFMax-Pred 10.68 L/sec    FEFMax-Pre 10.72 L/sec    FEFMax-%Pred-Pre 100 %    FEF2575-Pred 4.07 L/sec    FEF2575-Pre 5.10 L/sec    [id number]-%Pred-Pre 125 %    ExpTime-Pre 5.65 sec    FIFMax-Pre 7.35 L/sec    FEV1FEV6-Pred 82 %    FEV1FEV6-Pre 85 %   Cystic Fibrosis Culture Aerobic Bacterial    Collection Time: 23  4:19 PM    Specimen: Throat; Swab   Result Value Ref Range    Culture 2+ Normal haile to date                    Results as noted above.    PFT Interpretation:  Normal spirometry.  Unchanged from previous.   Similar to recent best.  Valid Maneuver      CF Exacerbation  Absent      CF Annual Nutrition Assessment     Reason for Assessment  Assessed during CF clinic visit with Dr. Sahni r/t increased nutrition risk with diagnosis of CF per protocol.     Nutrition Significant PMH  Mild Lung Disease- taking Trikafta  Pancreatic Sufficient- fecal elastase wnl, however benefits from enzymes    Anthropometric Assessment  Height: 184 cm  Weight: 83.1 kg (183 lbs)  Ideal body weight based on BMI 22 for females and 23 for males per [facility] recs: 78 kg (172 lbs)  BMI: 24.6 kg/m      Wt Readings from Last 5 Encounters:   23 83.1 kg (183 lb 3.2 oz)   23 85 kg (187 lb 6.3 oz)   10/27/22 79.4 kg (175 lb)   22 77.1 kg (170 lb)   22 79.4 kg (175 lb)     Comments: weight mostly stable x 1 year with some fluctuations up.     Pancreatic Enzymes  Brand: Creon 24,000  Dosin-6 with meals, 2-3 with snacks = 1732 units lipase/kg/meal  Estimated Daily Intake: ~20 caps = 6000 units lipase/kg/day  *Previously discussed option to trial Zenpep however some  concerns with cost and pt prefers to stay on Creon at this time.      Signs of Malabsorption: Ongoing GI concerns with looser stools. Started in June and carried through until July. Also may have with high fat meals/snacks.  Comments: takes metamucil 1 tbsp a few times a week  Enzyme Program: CF [facility]     Nutrition-Related Lab & Vitamin/Mineral Supplements  Annual studies completed May 2023  Vitamin A- 0.65 wnl  Vitamin D- 53 wnl  Vitamin E- 11.6 wnl  Iron- 107 wnl  Lipid Panel- wnl, taking Lipitor for previously high LDL     OGTT- 2019, wnl  DEXA - 2023, lowest z-score -1.2 WNL   - Hx: endo Aug 2021 for low bone density; 24 hr urine calcium noted hypercalciuria and educated on lower sodium diet; started fosamax 70 mg weekly     Current Vitamin/Mineral Supplements: MVW Complete 1 softgel daily (Care Forward), Fish Oil, calcium citrate malate 700 mg BID, probiotic, 5000 international unit(s) of Vit D     Diet History and Assessment  Diet Preferences/Allergies/Intolerances: Regular  Intake Recall/Comments: Typically TID meals and TID snacks. Reports good appetite with hunger throughout the day. He was drinking a smoothie (banana, 2 tbsp peanut butter, whole fat greek yogurt) that was about a liter in volume that was giving him malabsorptive symptoms although was taking a snack enzyme dose. We discussed either cutting back on volume of smoothie or going up to a meal time dose to see if improvement.    Diet Recall:  B- banana with peanut butter, bagel or toast with cream cheese and walnuts or eggs  AM Snack - Rx bar and protein powder/greens powder  L- deli meat and cheese sandwich or leftovers  PM snack- beef stick, cheese and crackers  D- meat and veggies, sweet potato tots, broccoli and cheese tots, sloppy kahlil's, olives  HS snack- apple and PB or PB on english muffin     Calcium: supplement BID  Salt: adequate per food choices  Hydration: water, Melaleuca Sustain, smoothie  Supplements: No - contributed to GI  issues/looser stools     NUTRITION DIAGNOSIS  Impaired nutrient utilization related to CF pancreatic insufficiency as evidenced by requires pancreatic enzyme replacement therapy and vitamin/mineral supplementation in order to maintain ideal body weight and nutrition status.       INTERVENTIONS/RECOMMENDATIONS   1) Oral Intake/Weight:   BEE: ~1850  Calorie Goals- 1525-0081 kcals/day (150-175% BEE)   Protein Goals- 100-120 grams/day (1.2-1.5 g/kg)     Discussed current nutrition status and goals. Reviewed nutrition and enzyme needs and use of meal vs snack time dose and trial of 6 capsules when eating out to see if improvement in symptoms. If ongoing issues, recommended reaching out to team to get in for GI follow up. Encouraged intake of overall healthy foods including lean proteins, healthy fats, fruits/vegetables, whole grains, etc.     2) GI/Enzymes:  Plan as noted above to identify food triggers to GI symptoms and appropriate enzyme dosing.    3) Vitamin/Mineral Supplementation:  Reviewed results of annual study labs. Continue with current vitamin supplementation at this time.       GOALS:  1) Maintain BMI >23 kg/m2  2) Take enzymes/vitamins as recommended      FOLLOW-UP/MONITORING:  Visit patient within 12 months for annual nutrition reassessment.     Time Spent In Face-to-Face Patient Interactions: 15 minutes    Pinky Kay, MS, RDN, LDN  Cystic Fibrosis/Lung Transplant Dietitian  Pager [phone]

## 2023-09-07 NOTE — PROGRESS NOTES
CF Annual Nutrition Assessment     Reason for Assessment  Assessed during CF clinic visit with Dr. Sahni r/t increased nutrition risk with diagnosis of CF per protocol.     Nutrition Significant PMH  Mild Lung Disease- taking Trikafta  Pancreatic Sufficient- fecal elastase wnl, however benefits from enzymes    Anthropometric Assessment  Height: 184 cm  Weight: 83.1 kg (183 lbs)  Ideal body weight based on BMI 22 for females and 23 for males per CF Foundation recs: 78 kg (172 lbs)  BMI: 24.6 kg/m      Wt Readings from Last 5 Encounters:   23 83.1 kg (183 lb 3.2 oz)   23 85 kg (187 lb 6.3 oz)   10/27/22 79.4 kg (175 lb)   22 77.1 kg (170 lb)   22 79.4 kg (175 lb)     Comments: weight mostly stable x 1 year with some fluctuations up.     Pancreatic Enzymes  Brand: Creon 24,000  Dosin-6 with meals, 2-3 with snacks = 1732 units lipase/kg/meal  Estimated Daily Intake: ~20 caps = 6000 units lipase/kg/day  *Previously discussed option to trial Zenpep however some concerns with cost and pt prefers to stay on Creon at this time.      Signs of Malabsorption: Ongoing GI concerns with looser stools. Started in  and carried through until July. Also may have with high fat meals/snacks.  Comments: takes metamucil 1 tbsp a few times a week  Enzyme Program: CF Care Forward     Nutrition-Related Lab & Vitamin/Mineral Supplements  Annual studies completed May 2023  Vitamin A- 0.65 wnl  Vitamin D- 53 wnl  Vitamin E- 11.6 wnl  Iron- 107 wnl  Lipid Panel- wnl, taking Lipitor for previously high LDL     OGTT- , wnl  DEXA - , lowest z-score -1.2 WNL   - Hx: endo Aug 2021 for low bone density; 24 hr urine calcium noted hypercalciuria and educated on lower sodium diet; started fosamax 70 mg weekly     Current Vitamin/Mineral Supplements: MVW Complete 1 softgel daily (Care Forward), Fish Oil, calcium citrate malate 700 mg BID, probiotic, 5000 international unit(s) of Vit D     Diet History and  Assessment  Diet Preferences/Allergies/Intolerances: Regular  Intake Recall/Comments: Typically TID meals and TID snacks. Reports good appetite with hunger throughout the day. He was drinking a smoothie (banana, 2 tbsp peanut butter, whole fat greek yogurt) that was about a liter in volume that was giving him malabsorptive symptoms although was taking a snack enzyme dose. We discussed either cutting back on volume of smoothie or going up to a meal time dose to see if improvement.    Diet Recall:  B- banana with peanut butter, bagel or toast with cream cheese and walnuts or eggs  AM Snack - Rx bar and protein powder/greens powder  L- deli meat and cheese sandwich or leftovers  PM snack- beef stick, cheese and crackers  D- meat and veggies, sweet potato tots, broccoli and cheese tots, sloppy kahlil's, olives  HS snack- apple and PB or PB on english muffin     Calcium: supplement BID  Salt: adequate per food choices  Hydration: water, Melaleuca Sustain, smoothie  Supplements: No - contributed to GI issues/looser stools     NUTRITION DIAGNOSIS  Impaired nutrient utilization related to CF pancreatic insufficiency as evidenced by requires pancreatic enzyme replacement therapy and vitamin/mineral supplementation in order to maintain ideal body weight and nutrition status.       INTERVENTIONS/RECOMMENDATIONS   1) Oral Intake/Weight:   BEE: ~1850  Calorie Goals- 6946-0924 kcals/day (150-175% BEE)   Protein Goals- 100-120 grams/day (1.2-1.5 g/kg)     Discussed current nutrition status and goals. Reviewed nutrition and enzyme needs and use of meal vs snack time dose and trial of 6 capsules when eating out to see if improvement in symptoms. If ongoing issues, recommended reaching out to team to get in for GI follow up. Encouraged intake of overall healthy foods including lean proteins, healthy fats, fruits/vegetables, whole grains, etc.     2) GI/Enzymes:  Plan as noted above to identify food triggers to GI symptoms and  appropriate enzyme dosing.    3) Vitamin/Mineral Supplementation:  Reviewed results of annual study labs. Continue with current vitamin supplementation at this time.       GOALS:  1) Maintain BMI >23 kg/m2  2) Take enzymes/vitamins as recommended      FOLLOW-UP/MONITORING:  Visit patient within 12 months for annual nutrition reassessment.     Time Spent In Face-to-Face Patient Interactions: 15 minutes    Pinky Kay MS, RDN, LDN  Cystic Fibrosis/Lung Transplant Dietitian  Pager [phone]

## 2023-09-07 NOTE — PROGRESS NOTES
Disease State Management Encounter:                          Shar Delacruz is a 43 year old male seen for  a covisit with Dr. Sahni.     Reason for visit: Annual Medication Review     Medication Adherence/Access:    Medication: Shar manages his own medications at home  Pharmacy: [facility]     CF:    Inhaled medications:   Other: Flonase 1 spray each nostril daily, and Sinuflo rinse as needed (daily currently)  Oral medications:   CFTR modulator: Trikafta (full dose)   Antibiotics: Augmentin 875-125mg twice daily x 14 days started today by Dr. Sahni    Genotype: [id number]/[id number]  Cultures (last growth): throat cultures grow MSSA (10/27/22) and H flu (10/27/22)  Lab Results   Component Value Date    ALT 24 05/04/2023    AST 33 05/04/2023    BILITOTAL 0.8 05/04/2023    DBIL 0.23 05/04/2023     05/04/2023       Pancreatic Insufficiency/Nutrition:   Creon 10206 taking 5 capsules with meals and 2 to 3 capsules with snacks  Bowel regimen: probiotic daily and fiber 3.5g daily  Vitamins/Supplements include: MVW complete daily, vitamin D 5000 units daily, calcium-vitamin D 250-100mg-unit 2 tabs daily, and fish oil 2400mg daily    Patient is not experiencing sign/symptoms of malabsorption.      PFTs:        Assessment/Plan:    Keep up the good work on medications!  Start Augmentin per Dr. Sahni today  Trikafta labs at next visit      Follow-up: 3 months for clinic visit and labs    I spent 10 minutes with this patient today. I offer these suggestions for consideration by Dr. Sahni during covisit.     A summary of these recommendations was given to the patient (see AVS from today's appointment with Dr. Sahni).    Lennie Lara, PharmD  Cystic Fibrosis MTM Pharmacist  [facility]  Voicemail: [phone]           Medication Therapy Recommendations  No medication therapy recommendations to display

## 2023-09-07 NOTE — PATIENT INSTRUCTIONS
See provider AVS for a summary of recommendations from today's visit.    Lennie Lara, PharmD  Cystic Fibrosis MTM Pharmacist  [facility]  Voicemail: [phone]

## 2023-09-11 ENCOUNTER — APPOINTMENT (OUTPATIENT)
Dept: URBAN - METROPOLITAN AREA CLINIC 252 | Age: 43
Setting detail: DERMATOLOGY
End: 2023-09-11

## 2023-09-11 VITALS — HEIGHT: 72 IN | WEIGHT: 185 LBS

## 2023-09-11 DIAGNOSIS — B07.8 OTHER VIRAL WARTS: ICD-10-CM

## 2023-09-11 DIAGNOSIS — A63.0 ANOGENITAL (VENEREAL) WARTS: ICD-10-CM

## 2023-09-11 DIAGNOSIS — D18.0 HEMANGIOMA: ICD-10-CM

## 2023-09-11 PROBLEM — D18.01 HEMANGIOMA OF SKIN AND SUBCUTANEOUS TISSUE: Status: ACTIVE | Noted: 2023-09-11

## 2023-09-11 PROCEDURE — OTHER LIQUID NITROGEN: OTHER

## 2023-09-11 PROCEDURE — OTHER COUNSELING: OTHER

## 2023-09-11 PROCEDURE — 17110 DESTRUCT B9 LESION 1-14: CPT

## 2023-09-11 PROCEDURE — 54056 CRYOSURGERY PENIS LESION(S): CPT

## 2023-09-11 PROCEDURE — OTHER ELECTRODESICCATION (COSMETIC): OTHER

## 2023-09-11 PROCEDURE — OTHER BENIGN DESTRUCTION (GENITALS): OTHER

## 2023-09-11 PROCEDURE — 99212 OFFICE O/P EST SF 10 MIN: CPT | Mod: 25

## 2023-09-11 ASSESSMENT — LOCATION SIMPLE DESCRIPTION DERM
LOCATION SIMPLE: LEFT FOREHEAD
LOCATION SIMPLE: PENIS
LOCATION SIMPLE: RIGHT INDEX FINGER
LOCATION SIMPLE: ABDOMEN
LOCATION SIMPLE: LEFT CHEEK

## 2023-09-11 ASSESSMENT — LOCATION ZONE DERM
LOCATION ZONE: PENIS
LOCATION ZONE: FACE
LOCATION ZONE: FINGER
LOCATION ZONE: TRUNK

## 2023-09-11 ASSESSMENT — LOCATION DETAILED DESCRIPTION DERM
LOCATION DETAILED: RIGHT INDEX PROXIMAL INTERPHALANGEAL JOINT
LOCATION DETAILED: LEFT CENTRAL MALAR CHEEK
LOCATION DETAILED: RIGHT DORSAL SHAFT OF PENIS
LOCATION DETAILED: EPIGASTRIC SKIN
LOCATION DETAILED: LEFT FOREHEAD

## 2023-09-11 NOTE — PROCEDURE: ELECTRODESICCATION (COSMETIC)
Price (Use Numbers Only, No Special Characters Or $): 100
Consent: - Informed consent was obtained, and risks were reviewed prior to procedure today.\\n- Risks discussed include but are not limited to pain, crusting, scabbing, scarring, temporary or permanent darker or lighter pigmentary change, recurrence, incomplete resolution, and infection.
Post-Care Instructions: - Discussed the importance of refraining from picking. May apply Aquaphor daily until healed.
Detail Level: Detailed
Anesthesia Type: 1% lidocaine with epinephrine
Cooper: 1

## 2023-09-11 NOTE — PROCEDURE: COUNSELING
Detail Level: Detailed
Patient Specific Counseling (Will Not Stick From Patient To Patient): - Recommended treatment with liquid nitrogen cryosurgery.\\n- Advised that multiple treatments is often necessary.
Patient Specific Counseling (Will Not Stick From Patient To Patient): - Patient requested treatment today.\\n- Advised that treatment is available but not medically necessary.\\n- Recommended treatment with electrodessication and discussed cost of treatment.\\n- Advised it is not uncommon for these to grow back or for red color to persist after healing.\\n- Also, it is very likely that more of these will develop in the future.\\n- Patient expressed understanding.
Detail Level: Zone
Patient Specific Counseling (Will Not Stick From Patient To Patient): - Discussed and recommended liquid nitrogen cryosurgery.

## 2023-09-11 NOTE — PROCEDURE: LIQUID NITROGEN
Spray Paint Technique: No
Show Spray Paint Technique Variable?: Yes
Spray Paint Text: The liquid nitrogen was applied to the skin utilizing a spray paint frosting technique.
Medical Necessity Information: It is in your best interest to select a reason for this procedure from the list below. All of these items fulfill various CMS LCD requirements except the new and changing color options.
Consent: - Consent was obtained and risks were reviewed prior to procedure today. All questions were answered prior to procedure today.\\n- Risks discussed include but are not limited to pain, crusting, scabbing, blistering, scarring, temporary or permanent darker or lighter pigmentary change, recurrence, incomplete resolution, and infection.
Post-Care Instructions: - Avoid picking at any of the treated lesions.\\n- Blisters should not be popped. However should a blister rupture, cover it with Vaseline ointment or Aquaphor and a bandage until healed.
Medical Necessity Clause: This procedure was medically necessary because the lesions that were treated were:
Application Tool (Optional): Liquid Nitrogen Sprayer
Detail Level: Detailed

## 2023-09-11 NOTE — PROCEDURE: BENIGN DESTRUCTION (GENITALS)
Consent: -  Informed consent was obtained, and risks were reviewed prior to procedure today. Risks discussed include but are not limited to pain, crusting, scabbing, blistering, scarring, temporary or permanent darker or lighter pigmentary change, recurrence, incomplete resolution, and infection.
Medical Necessity Information: It is in your best interest to select a reason for this procedure from the list below. All of these items fulfill various CMS LCD requirements except the new and changing color options.
Post-Care Instructions: - Avoid picking at any of the treated lesions.
Render Post-Care Instructions In Note?: yes
Anesthesia Volume In Cc: 0.5
Medical Necessity Clause: This procedure was medically necessary because the lesions that were treated were:
Warning: This plan will only bill for destructions on the Penis and Vulva. If you select the Scrotum it will not bill.
Include Z78.9 (Other Specified Conditions Influencing Health Status) As An Associated Diagnosis?: No
Method: liquid nitrogen
Detail Level: Detailed

## 2023-09-11 NOTE — HPI: SKIN LESION
Is This A New Presentation, Or A Follow-Up?: Skin Lesion
Additional History: History of genital warts.

## 2023-09-12 LAB — BACTERIA SPEC CULT: NORMAL

## 2023-09-28 NOTE — TELEPHONE ENCOUNTER
A prior authorization is needed for the following medication prescribed.  Please complete a prior authorization with the information included below.    Medication: FV-Levofloxacin 1mg/ml Nasal Irrig  Ingredients: Levofloxacin 25mg/ml Soln NDC: [address]-07  Sodium Chloride 0.9% Soln NDC: [address]-91  RX #: [id number]  Reason for Rejection: Not covered    Pharmacy Insurance plan: katena  BIN #: [id number]  ID #: [id number]   N #: [id number]  Phone #: [phone]      Pharmacy NPI:[id number]      Please advise the pharmacy when the prior authorization is approved or denied.     Thank you for your time.     [facility]  [phone]          
clear

## 2023-10-06 ENCOUNTER — PHARMACY VISIT (OUTPATIENT)
Dept: ADMINISTRATIVE | Facility: CLINIC | Age: 43
End: 2023-10-06
Payer: COMMERCIAL

## 2023-10-06 NOTE — PROGRESS NOTES
" Cystic Fibrosis Clinical Follow Up Assessment      Activity Date 2023/10/06     Activity Medications    TRIKAFTA   Summary Notes  Spoke to Shar via [facility] for biannual CF assessment.     Trikafta - Denies any side effects or missed doses. \"Everything has been going well with Trikafta.\"     CF - denies changes to medications, allergies, or health. No questions or concerns today.     Goals - \"No, all is good.\" No new health goals.     TM clinicians will continue biannual calls.     Care Details    What are the patient's goals for this therapy?  10/6/23: No new health goals      PDC (Adherence metric. Goal >0.8)   ? 0.91      Has the patient missed doses inappropriately?   ? No      CURRENT side effect(s) for monitoring:   ? None     Stephany Hogan, PharmD  Therapy Management Pharmacist  [facility]  [email or web address]  [email or web address]  Specialty: [phone]               "

## 2023-11-24 ENCOUNTER — TELEPHONE (OUTPATIENT)
Dept: PULMONOLOGY | Facility: CLINIC | Age: 43
End: 2023-11-24

## 2023-11-24 DIAGNOSIS — J30.9 ALLERGIC CONJUNCTIVITIS OF BOTH EYES AND RHINITIS: ICD-10-CM

## 2023-11-24 DIAGNOSIS — H10.13 ALLERGIC CONJUNCTIVITIS OF BOTH EYES AND RHINITIS: ICD-10-CM

## 2023-11-24 DIAGNOSIS — F41.9 ANXIETY: ICD-10-CM

## 2023-11-24 NOTE — TELEPHONE ENCOUNTER
PA Initiation    Medication: TRIKAFTA 100-50-75 & 150 MG PO TBPK  Insurance Company: [employer] - Phone [phone] Fax [phone]  Pharmacy Filling the Rx:    Filling Pharmacy Phone:    Filling Pharmacy Fax:    Start Date: 11/24/2023    Faxed [employer] PA form and notes to [phone]

## 2023-11-27 NOTE — TELEPHONE ENCOUNTER
Prior Authorization Approval    Medication: TRIKAFTA 100-50-75 & 150 MG PO TBPK  Authorization Effective Date: 11/24/2023  Authorization Expiration Date: 11/24/2024  Approved Dose/Quantity: 84 for 28 ds   Reference #:     Insurance Company: [employer] - Phone [phone] Fax [phone]  Expected CoPay: $    CoPay Card Available: Yes    Financial Assistance Needed:   Which Pharmacy is filling the prescription: [facility] - [place] [address]   Pharmacy Notified:   Patient Notified:     COPAY CARD OBTAINED    Medication: TRIKAFTA 100-50-75 & 150 MG PO TBPK  Sponsor: [employer]  Copay card Monthly Max Amount: $ 3,500  Copay card Annual Amount: $ 20,000

## 2023-11-28 RX ORDER — OLOPATADINE HYDROCHLORIDE 2 MG/ML
1 SOLUTION/ DROPS OPHTHALMIC DAILY
Qty: 2.5 ML | Refills: 11 | Status: SHIPPED | OUTPATIENT
Start: 2023-11-28 | End: 2024-06-27

## 2023-11-28 RX ORDER — ESCITALOPRAM OXALATE 20 MG/1
20 TABLET ORAL DAILY
Qty: 30 TABLET | Refills: 3 | Status: SHIPPED | OUTPATIENT
Start: 2023-11-28 | End: 2024-03-26

## 2023-12-11 NOTE — PROGRESS NOTES
Last appointment: 11/7/2023  Next appointment: 2/14/2024     Eprescribed. I have personally reviewed the OARRS report.  This report is scanned into the electronic medical record.  I have considered the risks of abuse, dependence, addiction and diversion.  I believe that it is clinically appropriate to prescribe this medication.     Service Date: 2019      Patient was last seen on 09-10-19.  Patient reports here today denying depression.  Patient denies anxiety or panic attacks.  Patient denies suicidal ideations or plans.  Patient reports normal sleep, normal appetite, and normal energy with current weight of 177 lbs.  Patient denies irritability, racing thoughts, giovanni, or hypomania.  Patient denies hallucinations or delusions.  Patient continues to drink 3-4 cups of coffee per day and has an occasional alcoholic drink.  Patient feels that he is doing well at home and at work.      DIAGNOSIS:  Generalized anxiety disorder.      TEST SCORES:   1. PHQ-9 = 2.   2. FELIX-7 = 1   3. Goel Depression Inventory = 1.      PLAN:   1. Continue escitalopram 20 mg po qam.  Patient received multiple refills.   2. Return to see me in 3 months.      TOTAL TIME SPENT:  I spent a total of 20 minutes face-to-face with patient during today's office visit.  Over 50% of this time was spent counseling the patient about continuing his medication and following up with me in clinic in 3 months.      BHAKTI Crystal MD, PhD               [facility]         RADHA CRYSTAL MD             D: 2019   T: 2019   MT: MONICA      Name:     DAVE RINCON   MRN:      [id number]        Account:      [id number]   :      1980           Service Date: 2019      Document: [id number]

## 2023-12-27 DIAGNOSIS — E78.00 HYPERCHOLESTEROLEMIA: ICD-10-CM

## 2023-12-27 DIAGNOSIS — K86.81 EXOCRINE PANCREATIC INSUFFICIENCY: ICD-10-CM

## 2023-12-27 DIAGNOSIS — E84.0 CYSTIC FIBROSIS WITH PULMONARY MANIFESTATIONS (H): ICD-10-CM

## 2023-12-27 RX ORDER — ELEXACAFTOR, TEZACAFTOR, AND IVACAFTOR 100-50-75
KIT ORAL
Qty: 84 TABLET | Refills: 4 | Status: SHIPPED | OUTPATIENT
Start: 2023-12-27 | End: 2024-05-22

## 2023-12-27 RX ORDER — ATORVASTATIN CALCIUM 10 MG/1
10 TABLET, FILM COATED ORAL DAILY
Qty: 90 TABLET | Refills: 2 | Status: SHIPPED | OUTPATIENT
Start: 2023-12-27 | End: 2024-10-03

## 2024-01-17 DIAGNOSIS — K86.81 EXOCRINE PANCREATIC INSUFFICIENCY: Primary | ICD-10-CM

## 2024-01-17 DIAGNOSIS — E84.9 CF (CYSTIC FIBROSIS) (H): ICD-10-CM

## 2024-01-18 ENCOUNTER — CLINICAL UPDATE (OUTPATIENT)
Dept: PHARMACY | Facility: CLINIC | Age: 44
End: 2024-01-18
Payer: COMMERCIAL

## 2024-01-18 ENCOUNTER — LAB (OUTPATIENT)
Dept: LAB | Facility: CLINIC | Age: 44
End: 2024-01-18
Attending: INTERNAL MEDICINE
Payer: COMMERCIAL

## 2024-01-18 ENCOUNTER — OFFICE VISIT (OUTPATIENT)
Dept: PULMONOLOGY | Facility: CLINIC | Age: 44
End: 2024-01-18
Attending: INTERNAL MEDICINE
Payer: COMMERCIAL

## 2024-01-18 VITALS
HEIGHT: 72 IN | SYSTOLIC BLOOD PRESSURE: 121 MMHG | HEART RATE: 67 BPM | DIASTOLIC BLOOD PRESSURE: 83 MMHG | BODY MASS INDEX: 26.41 KG/M2 | OXYGEN SATURATION: 94 % | WEIGHT: 195 LBS

## 2024-01-18 DIAGNOSIS — E84.0 CYSTIC FIBROSIS WITH PULMONARY MANIFESTATIONS (H): Primary | ICD-10-CM

## 2024-01-18 DIAGNOSIS — J01.01 ACUTE RECURRENT MAXILLARY SINUSITIS: ICD-10-CM

## 2024-01-18 DIAGNOSIS — Z23 NEED FOR INFLUENZA VACCINATION: ICD-10-CM

## 2024-01-18 DIAGNOSIS — E84.0 CYSTIC FIBROSIS WITH PULMONARY MANIFESTATIONS (H): ICD-10-CM

## 2024-01-18 DIAGNOSIS — K86.89 PANCREATIC INSUFFICIENCY: ICD-10-CM

## 2024-01-18 DIAGNOSIS — R79.89 LOW TESTOSTERONE IN MALE: ICD-10-CM

## 2024-01-18 LAB
ALBUMIN SERPL BCG-MCNC: 4.5 G/DL (ref 3.5–5.2)
ALP SERPL-CCNC: 62 U/L (ref 40–150)
ALT SERPL W P-5'-P-CCNC: 21 U/L (ref 0–70)
AST SERPL W P-5'-P-CCNC: 28 U/L (ref 0–45)
BILIRUB DIRECT SERPL-MCNC: 0.21 MG/DL (ref 0–0.3)
BILIRUB SERPL-MCNC: 0.9 MG/DL
CK SERPL-CCNC: 201 U/L (ref 39–308)
EXPTIME-PRE: 6.44 SEC
FEF2575-%PRED-PRE: 124 %
FEF2575-PRE: 5.03 L/SEC
FEF2575-PRED: 4.04 L/SEC
FEFMAX-%PRED-PRE: 89 %
FEFMAX-PRE: 9.54 L/SEC
FEFMAX-PRED: 10.66 L/SEC
FEV1-%PRED-PRE: 104 %
FEV1-PRE: 4.36 L
FEV1FEV6-PRE: 85 %
FEV1FEV6-PRED: 82 %
FEV1FVC-PRE: 86 %
FEV1FVC-PRED: 81 %
FIFMAX-PRE: 7.5 L/SEC
FVC-%PRED-PRE: 97 %
FVC-PRE: 5.07 L
FVC-PRED: 5.19 L
PROT SERPL-MCNC: 7.4 G/DL (ref 6.4–8.3)

## 2024-01-18 PROCEDURE — 83002 ASSAY OF GONADOTROPIN (LH): CPT | Performed by: INTERNAL MEDICINE

## 2024-01-18 PROCEDURE — 99000 SPECIMEN HANDLING OFFICE-LAB: CPT | Performed by: PATHOLOGY

## 2024-01-18 PROCEDURE — 82550 ASSAY OF CK (CPK): CPT | Performed by: PATHOLOGY

## 2024-01-18 PROCEDURE — 84403 ASSAY OF TOTAL TESTOSTERONE: CPT | Performed by: INTERNAL MEDICINE

## 2024-01-18 PROCEDURE — 36415 COLL VENOUS BLD VENIPUNCTURE: CPT | Performed by: PATHOLOGY

## 2024-01-18 PROCEDURE — 84270 ASSAY OF SEX HORMONE GLOBUL: CPT | Performed by: INTERNAL MEDICINE

## 2024-01-18 PROCEDURE — 99207 PR NO CHARGE LOS: CPT | Performed by: PHARMACIST

## 2024-01-18 PROCEDURE — 94375 RESPIRATORY FLOW VOLUME LOOP: CPT | Performed by: INTERNAL MEDICINE

## 2024-01-18 PROCEDURE — 84146 ASSAY OF PROLACTIN: CPT | Performed by: INTERNAL MEDICINE

## 2024-01-18 PROCEDURE — 99214 OFFICE O/P EST MOD 30 MIN: CPT | Mod: 25 | Performed by: INTERNAL MEDICINE

## 2024-01-18 PROCEDURE — 87070 CULTURE OTHR SPECIMN AEROBIC: CPT | Performed by: INTERNAL MEDICINE

## 2024-01-18 PROCEDURE — 99213 OFFICE O/P EST LOW 20 MIN: CPT | Performed by: INTERNAL MEDICINE

## 2024-01-18 PROCEDURE — 80076 HEPATIC FUNCTION PANEL: CPT | Performed by: PATHOLOGY

## 2024-01-18 ASSESSMENT — PAIN SCALES - GENERAL: PAINLEVEL: NO PAIN (0)

## 2024-01-18 NOTE — PROGRESS NOTES
Reason for Visit  Philip Delacruz is a 43 year old year old male who is being seen for Cystic Fibrosis (CF Follow up )      Assessment and plan:   Shar Coley is a 43 year-old male with a history of cystic fibrosis with mild lung disease and pancreatic insufficiency.     Maintenance   Modulator: Trikafta  Mutation:  [id number], Poly T Variant:  [ 7T ]/[9T  ]  AW Clearance: Exercise  Bronchodilators: Albuterol MDI PRN  Mucolytics: none   Antibiotics Inh:  none   Antibiotics Oral: none   Other:   Colonization Hx: Rhizobium (agrobacterium) radiobacter, Aspergillus versicolor, Serratia marcescens, staphylococcus aureus, moraxella (branhamella) catarrhalis  Annual studies due:April 2023  Contraindications to standard of care :  COLONOSCOPY: 9/26/2022, no polyps.    Pulmonary/cystic fibrosis: The patient reports excellent exercise tolerance.  Oxygenation is adequate at 94%.  PFTs are in the high normal range, similar to his recent baseline.  He does not appear to be having pulmonary exacerbation at this time.  He will continue exercise as his primary form of airway clearance.    Sinusitis: No symptoms of acute sinusitis at this time.  Continue Flonase.    Pancreatic insufficiency: The patient denies symptoms of malabsorption.  He will continue his current pancreatic enzyme replacement.  Vitamin levels will be checked with annual studies with his next visit.    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  He has responded well to Trikafta with decreased respiratory and sinus symptoms.  Trikafta labs were within normal limits.  They will be rechecked with annual studies with his next visit.    Depression/anxiety: Adequately controlled with current Lexapro.    Psychosocial: The patient is returned to teaching, now in special ed.  Now that he is used to being back to the classroom, work is going well.    Bone health: Continue Fosamax, calcium and vitamin D.    Healthcare maintenance: Annual studies will  be completed with his next visit.    Follow-up in 3 months with annual studies, including glucose tolerance testing.  The patient was encouraged to receive his influenza vaccine.    True Sahni MD         CF HPI  The patient was seen and examined by True Sahni MD   Shar Colye is a 43 year-old male with a history of cystic fibrosis with mild lung disease and pancreatic insufficiency.    The patient completed a course of Augmentin at his last clinic visit for sinusitis.  He had a mild pulm exacerbation with chest tightness in early December treated with Augmentin and prednisone.  He has otherwise been well since his last visit.  Wife currently has COVID with associated fatigue.  The patient's nasal swabs have been negative.  He is asymptomatic.    Breathing is comfortable at rest and with all activity.  He has restarted CrossFit which he had stopped for a while with his last respiratory illness.  He reports an occasional cough with small amount of clear sputum.  Cough and sputum production are at baseline.  He did have some chest pain with his last exacerbation which is resolving.    Review of systems:  Appetite is good  No ear pain, sore throat, sinus pain or rhinorrhea  No nausea, vomiting or abdominal pain.  He did have loose stool with antibiotics with his last illness but this has resolved.  Chronic shoulder pain, unchanged  A complete ROS was otherwise negative except as noted in the HPI.    Current Outpatient Medications   Medication    acyclovir (ZOVIRAX) 400 MG tablet    alendronate (FOSAMAX) 70 MG tablet    atorvastatin (LIPITOR) 10 MG tablet    Ca Cit Malate-Cholecalciferol (CALCIUM CITRATE MALATE-VIT D) 250-100 MG-UNIT TABS    cholecalciferol (VITAMIN D3) 125 mcg (5000 units) capsule    CREON 12450-01854 units CPEP per EC capsule    escitalopram (LEXAPRO) 20 MG tablet    fluticasone (FLONASE) 50 MCG/ACT nasal spray    lactobacillus rhamnosus, GG, (CULTURELL) capsule    mvw complete  formulation (SOFTGELS) capsule    olopatadine (PATADAY) 0.2 % ophthalmic solution    Omega-3 Fatty Acids (FISH OIL) 1200 MG CPDR    Sodium Chloride-Sodium Bicarb (SINUFLO READYRINSE) KIT    TRIKAFTA 100-50-75 & 150 MG tablet pack     No current facility-administered medications for this visit.     No Known Allergies  Past Medical History:   Diagnosis Date    Chronic sinusitis     Congenital absence of vas deferens, bilateral     Cystic fibrosis (H)     Delta F508 and A8274K     History of COVID-19 01/2022    Back pain, chills and mild cough, resolved without intervention    Nasal polyps     Sinusitis, chronic        Past Surgical History:   Procedure Laterality Date    COLONOSCOPY N/A 9/26/2022    Procedure: COLONOSCOPY, WITH POLYPECTOMY AND BIOPSY;  Surgeon: Bennett Fajardo MD;  Location: [facility] TOOTH EXTRACTION W/FORCEP      NASAL/SINUS POLYPECTOMY      REPAIR PECTUS EXCAVATUM  1997    SINUS SURGERY  1992, 2002, 2004    Removed tubinates and polyps OSH    Sperm harvest         Social History     Socioeconomic History    Marital status:      Spouse name: Not on file    Number of children: Not on file    Years of education: Not on file    Highest education level: Not on file   Occupational History    Occupation: Teacher   Tobacco Use    Smoking status: Never    Smokeless tobacco: Never   Substance and Sexual Activity    Alcohol use: Yes     Comment: 1 drink/week    Drug use: No     Comment: tried marijuana gummy    Sexual activity: Yes     Partners: Female     Birth control/protection: None   Other Topics Concern    Parent/sibling w/ CABG, MI or angioplasty before 65F 55M? Not Asked   Social History Narrative    Lives in [place] with wife, Aimee, and sons, Shay and Serafin. Part time teaching special ed.     Social Determinants of Health     Financial Resource Strain: Not on file   Food Insecurity: Not on file   Transportation Needs: Not on file   Physical Activity: Not on file   Stress: Not on file    Social Connections: Not on file   Interpersonal Safety: Not on file   Housing Stability: Not on file         /83   Pulse 67   Ht 1.829 m (6')   Wt 88.5 kg (195 lb)   SpO2 94%   BMI 26.45 kg/m    Body mass index is 26.45 kg/m .  Exam:   GENERAL APPEARANCE: Well developed, well nourished, alert, and in no apparent distress.  EYES: PERRL, EOMI  HENT: Nasal mucosa with edema and no hyperemia. No nasal polyps.  EARS:  Right TM and canal obscured by cerumen. Left Canals clear, TM normal  MOUTH: Oral mucosa is moist, without any lesions, no tonsillar enlargement, no oropharyngeal exudate.  NECK: supple, no masses, no thyromegaly.  LYMPHATICS: No significant axillary, cervical, or supraclavicular nodes.  RESP: normal percussion, good air flow throughout.  No crackles. No rhonchi. No wheezes.  CV: Normal S1, S2, regular rhythm, normal rate. No murmur.  No rub. No gallop. No LE edema.   ABDOMEN:  Bowel sounds normal, soft, nontender, no HSM or masses.   MS: extremities normal. No clubbing. No cyanosis.  SKIN: no rash on limited exam  NEURO: Mentation intact, speech normal, normal strength and tone, normal gait and stance  PSYCH: mentation appears normal. and affect normal/bright  Results:  Recent Results (from the past 168 hour(s))   Hepatic function panel    Collection Time: 01/18/24 10:10 AM   Result Value Ref Range    Protein Total 7.4 6.4 - 8.3 g/dL    Albumin 4.5 3.5 - 5.2 g/dL    Bilirubin Total 0.9 <=1.2 mg/dL    Alkaline Phosphatase 62 40 - 150 U/L    AST 28 0 - 45 U/L    ALT 21 0 - 70 U/L    Bilirubin Direct 0.21 0.00 - 0.30 mg/dL   CK total    Collection Time: 01/18/24 10:10 AM   Result Value Ref Range     39 - 308 U/L   General PFT Lab (Please always keep checked)    Collection Time: 01/18/24 10:14 AM   Result Value Ref Range    FVC-Pred 5.19 L    FVC-Pre 5.07 L    FVC-%Pred-Pre 97 %    FEV1-Pre 4.36 L    FEV1-%Pred-Pre 104 %    FEV1FVC-Pred 81 %    FEV1FVC-Pre 86 %    FEFMax-Pred 10.66 L/sec     FEFMax-Pre 9.54 L/sec    FEFMax-%Pred-Pre 89 %    FEF2575-Pred 4.04 L/sec    FEF2575-Pre 5.03 L/sec    PPC0937-%Pred-Pre 124 %    ExpTime-Pre 6.44 sec    FIFMax-Pre 7.50 L/sec    FEV1FEV6-Pred 82 %    FEV1FEV6-Pre 85 %                   Results as noted above.    PFT Interpretation:  Normal spirometry.  Unchanged from previous.   Similar to recent best.  Valid Maneuver             CF Exacerbation  Absent

## 2024-01-18 NOTE — PROGRESS NOTES
Respiratory Therapist Note:         Vest: N/A         Exercise (purposeful and aerobic for >20 minutes each session): Yes - primary airway clearance therapy             Alternative Airway Clearance: AerobiKa as needed with illness. A new Aerobika was given in clinic today along with instructions for use.          Plan of Care and Goals for next visit: It was great seeing you in clinic today, please reach out with any airway clearance needs.

## 2024-01-18 NOTE — PATIENT INSTRUCTIONS
Cystic Fibrosis Self-Care Plan    RECOMMENDATIONS:   Help us provide the best possible care. If you receive a questionnaire from the CF Foundation about your clinic experience today please fill it out.  It should take less than 5 minutes. Let us know what we are doing well and how we can improve.    Continue current medication and exercise.          Cystic Fibrosis :    Bebe Vaughn  [phone]  [facility] Nurse line:  MADELYNMarsa   [phone]     [facility] Fax Number:      [phone]         Cystic Fibrosis Respiratory Therapists:   Janeth Ryan                          [phone]          Loida Trinidad   [phone]  Cystic Fibrosis Dietitians:              Nisa Dumont              [phone]                            April Cardona                        [phone]   Cystic Fibrosis Diabetes Nurse:    Barbara Birmingham               [phone]    Cystic Fibrosis Social Workers:     Amber Davis              [phone]                     Debora Joseph               [phone]  Cystic Fibrosis Pharmacists:           Ileana Kilgore                              [phone] (pager)         Lennie Lara      [phone]   Cystic Fibrosis Genetic Counselor:   Faby Flores    [phone]    [facility] website:  [email or web address]    We have recently learned about an albuterol neb solution shortage due to a manufacturing delay. There is still a small supply coming in but not enough to meet the current demand. We do not yet have an estimate for when this will become widely available again.    We our asking our CF community to do the following:    Please take time to check your supply of albuterol neb solution at home. We recommend keeping at least a 2-week supply of albuterol nebs at home in case of illness.    2.  If you have an albuterol inhaler at home, you can use 4 puffs of the inhaler with a spacer in place of the  nebulized albuterol at the start of your treatments. It is important to use a spacer for the best technique. If you do not have a spacer at home or have questions on technique, we will be happy to review and send one to your home address. Please also take a moment to check that your albuterol HFA inhaler is available and not .  inhalers may be less effective as the medication loses its potency or power. In some instances your team may suggest another alternative instead of an albuterol inhaler.    3.  Please take care in requesting refills. Albuterol neb solution is a life-saving medication for patients having severe asthma attacks and other emergency respiratory conditions. Let s work together to make sure that albuterol neb solution is available to those who need it urgently.    Reach out to your care team with questions and to confirm your planned alternative for albuterol nebs. MyChart will be the fastest way to connect. If possible, please reserve the nursing line for more urgent concerns as we are short on nursing staff.    Here are some reputable sites with more information:    https://www.cidrap.[employer].edu/resilient-drug-supply/us-liquid-albuterol-cwzvhnef-heguacmh-iavvzc-after-[email or web address]-shuts-down    https://[email or web address]//health/albuterol-shortage    https://www.[facility].org/drug-shortages/current-shortages/drug-shortage-detail.aspx?im=694&loginreturnUrl=SSOCheckOnly       MRN: [id number]   Clinic Date: 2024   Patient: Philip Delacruz     Annual Studies:   IGG   Date Value Ref Range Status   2021 1,198 610 - 1,616 mg/dL Final     Immunoglobulin G   Date Value Ref Range Status   2023 1,111 610 - 1,616 mg/dL Final     Insulin   Date Value Ref Range Status   2019 9.0 3 - 25 mU/L Final     There are no preventive care reminders to display for this patient.    Pulmonary Function Tests  FEV1: amount of air you can blow out in 1 second  FVC: total amount of  air you can take in and blow out    Your Goals:             Latest Ref Rng & Units 1/18/2024    10:14 AM   PFT   FVC L 5.07  P   FEV1 L 4.36  P   FVC% % 97  P   FEV1% % 104  P      P Preliminary result          Airway Clearance: The Most Important Way to Keep Your Lungs Healthy  Daily exercise for airway clearance.    Good Nutrition Can Improve Lung Function and Overall Health    Take ALL of your vitamins with food    Take 1/2 of your enzymes before EVERY meal/snack and the other 1/2 mid-meal/snack    Wt Readings from Last 3 Encounters:   01/18/24 88.5 kg (195 lb)   09/07/23 83.1 kg (183 lb 3.2 oz)   05/04/23 85 kg (187 lb 6.3 oz)       Body mass index is 26.45 kg/m .         National CF Foundation Recommendations for BMI in CF Adults: Women: at least 22 Men: at least 23        Controlling Blood Sugars Helps Prevent Lung Infections & Improves Nutrition  Test blood sugar:    In the morning before eating (goal is )    2 hours after a meal (goal is less than 150)    When pre-meal glucose is greater than 150 add correction    At bedtime (if less than 100 eat a snack with 15 grams of carbohydrates  Last A1C Results:   Hemoglobin A1C   Date Value Ref Range Status   05/04/2023 5.0 <5.7 % Final     Comment:     Normal <5.7%   Prediabetes 5.7-6.4%    Diabetes 6.5% or higher     Note: Adopted from ADA consensus guidelines.   04/08/2021 4.8 0 - 5.6 % Final     Comment:     Normal <5.7% Prediabetes 5.7-6.4%  Diabetes 6.5% or higher - adopted from ADA   consensus guidelines.           If diabetic, measure A1C every 6 months. Goal: Under 7%    Staying Healthy  Research:  If you are interested in learning about research opportunities or have questions, please contact the CF Research Team at [phone] or [email or web address].    CF Foundation:  Compass is a personalized resource service to help you with the insurance, financial, legal and other issues you are facing.  It's free, confidential and available to anyone with CF.   Ask your  for more information or contact Compass directly at [phone]-[phone] ([phone]) or compass@cff.org, or learn more at cff.org/compass.

## 2024-01-18 NOTE — LETTER
1/18/2024         RE: Philip Delacruz  [address]  [facility] [address]        Dear Colleague,    Thank you for referring your patient, Philip Delacruz, to the [facility] FOR LUNG SCIENCE AND [facility] [place]. Please see a copy of my visit note below.    Reason for Visit  Philip Delacruz is a 43 year old year old male who is being seen for Cystic Fibrosis (CF Follow up )      Assessment and plan:   Shar Coley is a 43 year-old male with a history of cystic fibrosis with mild lung disease and pancreatic insufficiency.     Maintenance   Modulator: Trikafta  Mutation:  L390uvm/R8880M, Poly T Variant:  [ 7T ]/[9T  ]  AW Clearance: Exercise  Bronchodilators: Albuterol MDI PRN  Mucolytics: none   Antibiotics Inh:  none   Antibiotics Oral: none   Other:   Colonization Hx: Rhizobium (agrobacterium) radiobacter, Aspergillus versicolor, Serratia marcescens, staphylococcus aureus, moraxella (branhamella) catarrhalis  Annual studies due:April 2023  Contraindications to standard of care :  COLONOSCOPY: 9/26/2022, no polyps.    Pulmonary/cystic fibrosis: The patient reports excellent exercise tolerance.  Oxygenation is adequate at 94%.  PFTs are in the high normal range, similar to his recent baseline.  He does not appear to be having pulmonary exacerbation at this time.  He will continue exercise as his primary form of airway clearance.    Sinusitis: No symptoms of acute sinusitis at this time.  Continue Flonase.    Pancreatic insufficiency: The patient denies symptoms of malabsorption.  He will continue his current pancreatic enzyme replacement.  Vitamin levels will be checked with annual studies with his next visit.    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  He has responded well to Trikafta with decreased respiratory and sinus symptoms.  Trikafta labs were within normal limits.  They will be rechecked with annual studies with his next  visit.    Depression/anxiety: Adequately controlled with current Lexapro.    Psychosocial: The patient is returned to teaching, now in special ed.  Now that he is used to being back to the classroom, work is going well.    Bone health: Continue Fosamax, calcium and vitamin D.    Healthcare maintenance: Annual studies will be completed with his next visit.    Follow-up in 3 months with annual studies, including glucose tolerance testing.  The patient was encouraged to receive his influenza vaccine.    True Sahni MD          HPI  The patient was seen and examined by True Sahni MD   Shar Coley is a 43 year-old male with a history of cystic fibrosis with mild lung disease and pancreatic insufficiency.    The patient completed a course of Augmentin at his last clinic visit for sinusitis.  He had a mild pulm exacerbation with chest tightness in early December treated with Augmentin and prednisone.  He has otherwise been well since his last visit.  Wife currently has COVID with associated fatigue.  The patient's nasal swabs have been negative.  He is asymptomatic.    Breathing is comfortable at rest and with all activity.  He has restarted CrossFit which he had stopped for a while with his last respiratory illness.  He reports an occasional cough with small amount of clear sputum.  Cough and sputum production are at baseline.  He did have some chest pain with his last exacerbation which is resolving.    Review of systems:  Appetite is good  No ear pain, sore throat, sinus pain or rhinorrhea  No nausea, vomiting or abdominal pain.  He did have loose stool with antibiotics with his last illness but this has resolved.  Chronic shoulder pain, unchanged  A complete ROS was otherwise negative except as noted in the HPI.    Current Outpatient Medications   Medication    acyclovir (ZOVIRAX) 400 MG tablet    alendronate (FOSAMAX) 70 MG tablet    atorvastatin (LIPITOR) 10 MG tablet    Ca Cit  Malate-Cholecalciferol (CALCIUM CITRATE MALATE-VIT D) 250-100 MG-UNIT TABS    cholecalciferol (VITAMIN D3) 125 mcg (5000 units) capsule    CREON 53603-27193 units CPEP per EC capsule    escitalopram (LEXAPRO) 20 MG tablet    fluticasone (FLONASE) 50 MCG/ACT nasal spray    lactobacillus rhamnosus, GG, (CULTURELL) capsule    mvw complete formulation (SOFTGELS) capsule    olopatadine (PATADAY) 0.2 % ophthalmic solution    Omega-3 Fatty Acids (FISH OIL) 1200 MG CPDR    Sodium Chloride-Sodium Bicarb (SINUFLO READYRINSE) KIT    TRIKAFTA 100-50-75 & 150 MG tablet pack     No current facility-administered medications for this visit.     No Known Allergies  Past Medical History:   Diagnosis Date    Chronic sinusitis     Congenital absence of vas deferens, bilateral     Cystic fibrosis (H)     Delta F508 and X5191Y     History of COVID-19 01/2022    Back pain, chills and mild cough, resolved without intervention    Nasal polyps     Sinusitis, chronic        Past Surgical History:   Procedure Laterality Date    COLONOSCOPY N/A 9/26/2022    Procedure: COLONOSCOPY, WITH POLYPECTOMY AND BIOPSY;  Surgeon: Bennett Fajardo MD;  Location: [facility]    HC TOOTH EXTRACTION W/FORCEP      NASAL/SINUS POLYPECTOMY      REPAIR PECTUS EXCAVATUM  1997    SINUS SURGERY  1992, 2002, 2004    Removed tubinates and polyps OSH    Sperm harvest         Social History     Socioeconomic History    Marital status:      Spouse name: Not on file    Number of children: Not on file    Years of education: Not on file    Highest education level: Not on file   Occupational History    Occupation: Teacher   Tobacco Use    Smoking status: Never    Smokeless tobacco: Never   Substance and Sexual Activity    Alcohol use: Yes     Comment: 1 drink/week    Drug use: No     Comment: tried marijuana gummy    Sexual activity: Yes     Partners: Female     Birth control/protection: None   Other Topics Concern    Parent/sibling w/ CABG, MI or angioplasty before 65F  55M? Not Asked   Social History Narrative    Lives in [place] with wife, Aimee, and sons, Shay and Serafin. Part time teaching special ed.     Social Determinants of Health     Financial Resource Strain: Not on file   Food Insecurity: Not on file   Transportation Needs: Not on file   Physical Activity: Not on file   Stress: Not on file   Social Connections: Not on file   Interpersonal Safety: Not on file   Housing Stability: Not on file         /83   Pulse 67   Ht 1.829 m (6')   Wt 88.5 kg (195 lb)   SpO2 94%   BMI 26.45 kg/m    Body mass index is 26.45 kg/m .  Exam:   GENERAL APPEARANCE: Well developed, well nourished, alert, and in no apparent distress.  EYES: PERRL, EOMI  HENT: Nasal mucosa with edema and no hyperemia. No nasal polyps.  EARS:  Right TM and canal obscured by cerumen. Left Canals clear, TM normal  MOUTH: Oral mucosa is moist, without any lesions, no tonsillar enlargement, no oropharyngeal exudate.  NECK: supple, no masses, no thyromegaly.  LYMPHATICS: No significant axillary, cervical, or supraclavicular nodes.  RESP: normal percussion, good air flow throughout.  No crackles. No rhonchi. No wheezes.  CV: Normal S1, S2, regular rhythm, normal rate. No murmur.  No rub. No gallop. No LE edema.   ABDOMEN:  Bowel sounds normal, soft, nontender, no HSM or masses.   MS: extremities normal. No clubbing. No cyanosis.  SKIN: no rash on limited exam  NEURO: Mentation intact, speech normal, normal strength and tone, normal gait and stance  PSYCH: mentation appears normal. and affect normal/bright  Results:  Recent Results (from the past 168 hour(s))   Hepatic function panel    Collection Time: 01/18/24 10:10 AM   Result Value Ref Range    Protein Total 7.4 6.4 - 8.3 g/dL    Albumin 4.5 3.5 - 5.2 g/dL    Bilirubin Total 0.9 <=1.2 mg/dL    Alkaline Phosphatase 62 40 - 150 U/L    AST 28 0 - 45 U/L    ALT 21 0 - 70 U/L    Bilirubin Direct 0.21 0.00 - 0.30 mg/dL   CK total    Collection Time: 01/18/24  10:10 AM   Result Value Ref Range     39 - 308 U/L   General PFT Lab (Please always keep checked)    Collection Time: 01/18/24 10:14 AM   Result Value Ref Range    FVC-Pred 5.19 L    FVC-Pre 5.07 L    FVC-%Pred-Pre 97 %    FEV1-Pre 4.36 L    FEV1-%Pred-Pre 104 %    FEV1FVC-Pred 81 %    FEV1FVC-Pre 86 %    FEFMax-Pred 10.66 L/sec    FEFMax-Pre 9.54 L/sec    FEFMax-%Pred-Pre 89 %    FEF2575-Pred 4.04 L/sec    FEF2575-Pre 5.03 L/sec    KRJ7368-%Pred-Pre 124 %    ExpTime-Pre 6.44 sec    FIFMax-Pre 7.50 L/sec    FEV1FEV6-Pred 82 %    FEV1FEV6-Pre 85 %                   Results as noted above.    PFT Interpretation:  Normal spirometry.  Unchanged from previous.   Similar to recent best.  Valid Maneuver             CF Exacerbation  Absent      Respiratory Therapist Note:     Vest: N/A     Exercise (purposeful and aerobic for >20 minutes each session): Yes - primary airway clearance therapy           Alternative Airway Clearance: AerobiKa as needed with illness. A new Aerobika was given in clinic today along with instructions for use.        Plan of Care and Goals for next visit: It was great seeing you in clinic today, please reach out with any airway clearance needs.   Sincerely,        True Sahni MD

## 2024-01-18 NOTE — PROGRESS NOTES
Clinical Update:                                                    At the request of Dr. Sahni, a chart review was conducted for Owenavinash Delacruz.    Reason for Chart Review: Trikafta Lab Monitoring     Discussion: Shar has been on Trikafta since 12/30/19. Per chart review Shar continues full dose Trikafta.    Labs were reviewed from 1/18/24 at [facility]. All labs were within normal limits.     Lab Results   Component Value Date    ALT 21 01/18/2024    AST 28 01/18/2024    BILITOTAL 0.9 01/18/2024    DBIL 0.21 01/18/2024     01/18/2024       Plan:  1. Continue Trikafta  2. Recheck hepatic panel and CK in 6 months      Freddy BruceD  Cystic Fibrosis MTM Pharmacist  [facility]  Voicemail: [phone]     No lesions; no rash

## 2024-01-18 NOTE — NURSING NOTE
"Philip Delacruz is a 43 year old year old who is being seen for Cystic Fibrosis (CF Follow up )      Medications reviewed and Vital signs taken.    Specimen Collection Type: Throat Swab    Order(s) placed: CF Aerobic Bacterial    *IF AFB order placed - please enter \"PRIORITIZE AFB\" to order comments.       No results found for: \"ACIDFAST\"      No results found for: \"AFBSMS\"      Vitals were taken and medications were reconciled.    Mary Bowman RMA  10:34 AM    "

## 2024-01-19 LAB
LH SERPL-ACNC: 6.8 MIU/ML (ref 1.7–8.6)
PROLACTIN SERPL 3RD IS-MCNC: 8 NG/ML (ref 4–15)
SHBG SERPL-SCNC: 31 NMOL/L (ref 11–80)
SHBG SERPL-SCNC: 32 NMOL/L (ref 11–80)

## 2024-01-20 LAB
TESTOST FREE SERPL-MCNC: 8.38 NG/DL
TESTOST SERPL-MCNC: 404 NG/DL (ref 240–950)

## 2024-01-22 ENCOUNTER — TELEPHONE (OUTPATIENT)
Dept: PULMONOLOGY | Facility: CLINIC | Age: 44
End: 2024-01-22
Payer: COMMERCIAL

## 2024-01-22 NOTE — TELEPHONE ENCOUNTER
Left Voicemail (1st Attempt) for the patient to call back and schedule the following:    Appointment type: RCF  Provider: CAROL  Return date: 06/2024  Specialty phone number: [phone]  Additional appointment(s) needed: CF LOOP, LABS  Additonal Notes:  N/A

## 2024-01-23 LAB — BACTERIA SPEC CULT: NORMAL

## 2024-01-24 ENCOUNTER — TELEPHONE (OUTPATIENT)
Dept: PULMONOLOGY | Facility: CLINIC | Age: 44
End: 2024-01-24
Payer: COMMERCIAL

## 2024-01-24 NOTE — TELEPHONE ENCOUNTER
Left Voicemail (2nd Attempt) for the patient to call back and schedule the following:    Appointment type: F  Provider: CAROL  Return date: 04/2024  Specialty phone number: [phone]  Additional appointment(s) needed: CF LOOP, LABS  Additonal Notes: NA

## 2024-03-26 DIAGNOSIS — F41.9 ANXIETY: ICD-10-CM

## 2024-03-26 RX ORDER — ESCITALOPRAM OXALATE 20 MG/1
20 TABLET ORAL DAILY
Qty: 30 TABLET | Refills: 3 | Status: SHIPPED | OUTPATIENT
Start: 2024-03-26 | End: 2024-07-29

## 2024-04-01 ENCOUNTER — PHARMACY VISIT (OUTPATIENT)
Dept: ADMINISTRATIVE | Facility: CLINIC | Age: 44
End: 2024-04-01
Payer: COMMERCIAL

## 2024-04-01 NOTE — PROGRESS NOTES
"   Cystic Fibrosis Clinical Follow Up Assessment  Summary Notes     I spoke to Shar via [employer] for CF Therapy Management assessment. Order set with TM Liaison to deliver 4/4     CFTR - Trikafta 100-50-75 & 150 mg (full dose). Denies side effects or missed doses. PDC = 0.96.     CF - Denies exacerbations or illnesses. No changes to medications. No questions or concerns.     Chart review: OV 1/18/24 Dr. Sahni - \"The patient reports excellent exercise tolerance. Oxygenation is adequate at 94%. PFTs are in the high normal range, similar to his recent baseline. He does not appear to be having pulmonary exacerbation at this time. He will continue exercise as his primary form of airway clearance.\"     Follow-up - biannual        Care Details    What are the patient's goals for this therapy?   ? 10/6/23: No new health goals      Did you identify any patient barriers to access and successful treatment?   ? No barriers to access identified      Is it appropriate to collect a PDC at this time? (An MPR or PDC would not be appropriate for cycled medications or if the patient is on therapy less than 90 days)    ? Yes      Document PDC   ? 0.96      Has the patient missed doses inappropriately?   ? No      CURRENT side effect(s) for monitoring:   ? None       Stephany Hogan, PharmD  Therapy Management Pharmacist  [facility]  [email or web address]  [email or web address]  Specialty: [phone]          "

## 2024-04-05 ENCOUNTER — INFUSION THERAPY VISIT (OUTPATIENT)
Dept: INFUSION THERAPY | Facility: CLINIC | Age: 44
End: 2024-04-05
Attending: INTERNAL MEDICINE
Payer: COMMERCIAL

## 2024-04-05 ENCOUNTER — LAB (OUTPATIENT)
Dept: LAB | Facility: CLINIC | Age: 44
End: 2024-04-05
Attending: INTERNAL MEDICINE
Payer: COMMERCIAL

## 2024-04-05 VITALS
SYSTOLIC BLOOD PRESSURE: 129 MMHG | OXYGEN SATURATION: 96 % | HEART RATE: 66 BPM | DIASTOLIC BLOOD PRESSURE: 84 MMHG | TEMPERATURE: 97.5 F | RESPIRATION RATE: 16 BRPM | WEIGHT: 197.3 LBS | BODY MASS INDEX: 26.76 KG/M2

## 2024-04-05 DIAGNOSIS — E84.9 CF (CYSTIC FIBROSIS) (H): ICD-10-CM

## 2024-04-05 DIAGNOSIS — E84.0 CYSTIC FIBROSIS WITH PULMONARY MANIFESTATIONS (H): Primary | ICD-10-CM

## 2024-04-05 DIAGNOSIS — K86.81 EXOCRINE PANCREATIC INSUFFICIENCY: ICD-10-CM

## 2024-04-05 LAB
ALBUMIN SERPL BCG-MCNC: 4.5 G/DL (ref 3.5–5.2)
ALBUMIN UR-MCNC: NEGATIVE MG/DL
ALP SERPL-CCNC: 66 U/L (ref 40–150)
ALT SERPL W P-5'-P-CCNC: 32 U/L (ref 0–70)
ANION GAP SERPL CALCULATED.3IONS-SCNC: 10 MMOL/L (ref 7–15)
APPEARANCE UR: CLEAR
AST SERPL W P-5'-P-CCNC: 40 U/L (ref 0–45)
BACTERIA #/AREA URNS HPF: ABNORMAL /HPF
BASOPHILS # BLD AUTO: 0 10E3/UL (ref 0–0.2)
BASOPHILS NFR BLD AUTO: 0 %
BILIRUB DIRECT SERPL-MCNC: 0.29 MG/DL (ref 0–0.3)
BILIRUB SERPL-MCNC: 1.2 MG/DL
BILIRUB UR QL STRIP: NEGATIVE
BUN SERPL-MCNC: 19.7 MG/DL (ref 6–20)
CALCIUM SERPL-MCNC: 9 MG/DL (ref 8.6–10)
CHLORIDE SERPL-SCNC: 102 MMOL/L (ref 98–107)
CHOLEST SERPL-MCNC: 175 MG/DL
CK SERPL-CCNC: 428 U/L (ref 39–308)
COLOR UR AUTO: ABNORMAL
CREAT SERPL-MCNC: 0.91 MG/DL (ref 0.67–1.17)
CREAT UR-MCNC: 124 MG/DL
DEPRECATED HCO3 PLAS-SCNC: 26 MMOL/L (ref 22–29)
EGFRCR SERPLBLD CKD-EPI 2021: >90 ML/MIN/1.73M2
EOSINOPHIL # BLD AUTO: 0.1 10E3/UL (ref 0–0.7)
EOSINOPHIL NFR BLD AUTO: 2 %
ERYTHROCYTE [DISTWIDTH] IN BLOOD BY AUTOMATED COUNT: 12.6 % (ref 10–15)
ERYTHROCYTE [SEDIMENTATION RATE] IN BLOOD BY WESTERGREN METHOD: 2 MM/HR (ref 0–15)
FASTING STATUS PATIENT QL REPORTED: NORMAL
GGT SERPL-CCNC: 16 U/L (ref 8–61)
GLUCOSE BLDC GLUCOMTR-MCNC: 115 MG/DL (ref 70–99)
GLUCOSE BLDC GLUCOMTR-MCNC: 66 MG/DL (ref 70–99)
GLUCOSE BLDC GLUCOMTR-MCNC: 69 MG/DL (ref 70–99)
GLUCOSE BLDC GLUCOMTR-MCNC: 70 MG/DL (ref 70–99)
GLUCOSE SERPL-MCNC: 106 MG/DL (ref 70–99)
GLUCOSE SERPL-MCNC: 144 MG/DL (ref 70–99)
GLUCOSE SERPL-MCNC: 150 MG/DL (ref 70–99)
GLUCOSE SERPL-MCNC: 64 MG/DL (ref 70–99)
GLUCOSE SERPL-MCNC: 94 MG/DL (ref 70–99)
GLUCOSE SERPL-MCNC: 94 MG/DL (ref 70–99)
GLUCOSE UR STRIP-MCNC: NEGATIVE MG/DL
HBA1C MFR BLD: 5.2 %
HCT VFR BLD AUTO: 46.5 % (ref 40–53)
HDLC SERPL-MCNC: 57 MG/DL
HGB BLD-MCNC: 16.6 G/DL (ref 13.3–17.7)
HGB UR QL STRIP: NEGATIVE
IGG SERPL-MCNC: 1198 MG/DL (ref 610–1616)
IMM GRANULOCYTES # BLD: 0.1 10E3/UL
IMM GRANULOCYTES NFR BLD: 1 %
INR PPP: 0.97 (ref 0.85–1.15)
INSULIN SERPL-ACNC: 31.6 UU/ML (ref 2.6–24.9)
INSULIN SERPL-ACNC: 5.7 UU/ML (ref 2.6–24.9)
INSULIN SERPL-ACNC: 58.9 UU/ML (ref 2.6–24.9)
INSULIN SERPL-ACNC: 6.1 UU/ML (ref 2.6–24.9)
INSULIN SERPL-ACNC: 67.8 UU/ML (ref 2.6–24.9)
IRON SERPL-MCNC: 112 UG/DL (ref 61–157)
KETONES UR STRIP-MCNC: NEGATIVE MG/DL
LDLC SERPL CALC-MCNC: 98 MG/DL
LEUKOCYTE ESTERASE UR QL STRIP: NEGATIVE
LYMPHOCYTES # BLD AUTO: 2.2 10E3/UL (ref 0.8–5.3)
LYMPHOCYTES NFR BLD AUTO: 31 %
MAGNESIUM SERPL-MCNC: 2.2 MG/DL (ref 1.7–2.3)
MCH RBC QN AUTO: 32 PG (ref 26.5–33)
MCHC RBC AUTO-ENTMCNC: 35.7 G/DL (ref 31.5–36.5)
MCV RBC AUTO: 90 FL (ref 78–100)
MICROALBUMIN UR-MCNC: <12 MG/L
MICROALBUMIN/CREAT UR: NORMAL MG/G{CREAT}
MONOCYTES # BLD AUTO: 0.7 10E3/UL (ref 0–1.3)
MONOCYTES NFR BLD AUTO: 10 %
MUCOUS THREADS #/AREA URNS LPF: PRESENT /LPF
NEUTROPHILS # BLD AUTO: 4 10E3/UL (ref 1.6–8.3)
NEUTROPHILS NFR BLD AUTO: 56 %
NITRATE UR QL: NEGATIVE
NONHDLC SERPL-MCNC: 118 MG/DL
NRBC # BLD AUTO: 0 10E3/UL
NRBC BLD AUTO-RTO: 0 /100
PH UR STRIP: 6.5 [PH] (ref 5–7)
PHOSPHATE SERPL-MCNC: 3.5 MG/DL (ref 2.5–4.5)
PLATELET # BLD AUTO: 177 10E3/UL (ref 150–450)
POTASSIUM SERPL-SCNC: 4 MMOL/L (ref 3.4–5.3)
PROT SERPL-MCNC: 7.7 G/DL (ref 6.4–8.3)
RBC # BLD AUTO: 5.19 10E6/UL (ref 4.4–5.9)
RBC URINE: 2 /HPF
SODIUM SERPL-SCNC: 138 MMOL/L (ref 135–145)
SP GR UR STRIP: 1.02 (ref 1–1.03)
TRIGL SERPL-MCNC: 102 MG/DL
TSH SERPL DL<=0.005 MIU/L-ACNC: 1.84 UIU/ML (ref 0.3–4.2)
UROBILINOGEN UR STRIP-MCNC: NORMAL MG/DL
VIT D+METAB SERPL-MCNC: 66 NG/ML (ref 20–50)
WBC # BLD AUTO: 7.1 10E3/UL (ref 4–11)
WBC URINE: <1 /HPF

## 2024-04-05 PROCEDURE — 250N000009 HC RX 250: Performed by: INTERNAL MEDICINE

## 2024-04-05 PROCEDURE — 83735 ASSAY OF MAGNESIUM: CPT

## 2024-04-05 PROCEDURE — 85652 RBC SED RATE AUTOMATED: CPT

## 2024-04-05 PROCEDURE — 82947 ASSAY GLUCOSE BLOOD QUANT: CPT | Performed by: INTERNAL MEDICINE

## 2024-04-05 PROCEDURE — 36415 COLL VENOUS BLD VENIPUNCTURE: CPT

## 2024-04-05 PROCEDURE — 82248 BILIRUBIN DIRECT: CPT

## 2024-04-05 PROCEDURE — 84403 ASSAY OF TOTAL TESTOSTERONE: CPT

## 2024-04-05 PROCEDURE — 82043 UR ALBUMIN QUANTITATIVE: CPT

## 2024-04-05 PROCEDURE — 84100 ASSAY OF PHOSPHORUS: CPT

## 2024-04-05 PROCEDURE — 82947 ASSAY GLUCOSE BLOOD QUANT: CPT | Mod: XU | Performed by: INTERNAL MEDICINE

## 2024-04-05 PROCEDURE — 82977 ASSAY OF GGT: CPT

## 2024-04-05 PROCEDURE — 81001 URINALYSIS AUTO W/SCOPE: CPT

## 2024-04-05 PROCEDURE — 82962 GLUCOSE BLOOD TEST: CPT

## 2024-04-05 PROCEDURE — 82785 ASSAY OF IGE: CPT

## 2024-04-05 PROCEDURE — 83525 ASSAY OF INSULIN: CPT | Performed by: INTERNAL MEDICINE

## 2024-04-05 PROCEDURE — 85025 COMPLETE CBC W/AUTO DIFF WBC: CPT

## 2024-04-05 PROCEDURE — 84446 ASSAY OF VITAMIN E: CPT

## 2024-04-05 PROCEDURE — 82784 ASSAY IGA/IGD/IGG/IGM EACH: CPT

## 2024-04-05 PROCEDURE — 83036 HEMOGLOBIN GLYCOSYLATED A1C: CPT

## 2024-04-05 PROCEDURE — 83540 ASSAY OF IRON: CPT

## 2024-04-05 PROCEDURE — 84443 ASSAY THYROID STIM HORMONE: CPT

## 2024-04-05 PROCEDURE — 94375 RESPIRATORY FLOW VOLUME LOOP: CPT | Performed by: INTERNAL MEDICINE

## 2024-04-05 PROCEDURE — 80053 COMPREHEN METABOLIC PANEL: CPT

## 2024-04-05 PROCEDURE — 85610 PROTHROMBIN TIME: CPT

## 2024-04-05 PROCEDURE — 36415 COLL VENOUS BLD VENIPUNCTURE: CPT | Performed by: INTERNAL MEDICINE

## 2024-04-05 PROCEDURE — 82550 ASSAY OF CK (CPK): CPT

## 2024-04-05 PROCEDURE — 82306 VITAMIN D 25 HYDROXY: CPT

## 2024-04-05 PROCEDURE — 84590 ASSAY OF VITAMIN A: CPT

## 2024-04-05 PROCEDURE — 80061 LIPID PANEL: CPT

## 2024-04-05 RX ADMIN — ALCOHOL 75 G: 65 GEL TOPICAL at 07:17

## 2024-04-05 ASSESSMENT — PAIN SCALES - GENERAL: PAINLEVEL: NO PAIN (0)

## 2024-04-05 NOTE — PATIENT INSTRUCTIONS
Dear Philip Delacruz    Thank you for choosing [facility] ([facility]) for your glucose tolerance test.  The following information is a summary of our appointment as well as important reminders.        We look forward in seeing you on your next appointment here at [facility] ([facility]).  Please don t hesitate to call us at [phone] to reschedule any of your appointments or to speak with one of the [facility] registered nurses.  It was a pleasure taking care of you today.    Sincerely,    [facility]  [facility]  [address] [place] [address]  [address]  Phone:  [phone]

## 2024-04-05 NOTE — NURSING NOTE
Chief Complaint   Patient presents with    Blood Draw     Labs drawn via PIV by RN in lab. VS taken.      Labs drawn via peripheral IV. Vital signs taken. Checked into next appointment.   Jia Gomez RN

## 2024-04-05 NOTE — PROGRESS NOTES
Philip Delacruz [facility] for a glucose tolerance test.  During today's [facility] appointment orders from Dr. Sahni were completed.    Patient NPO: YES  CF annual labs completed YES  Patient drank 75 grams glucola at 0717 within 10 minutes.    Administrations This Visit       glucose tolerence test (GLUCOLA) 25% oral liquid 75 g       Admin Date  04/05/2024 Action  $Given Dose  75 g Route  Oral Documented By  Darline Briones, RN                    Labs drawn at baseline, +30, +60, +90 and +120 minutes post glucola.    Pt tolerated glucose tolerance test well, aside from lower glucose.    Post test blood sugar 69,.     Patient given snack YES, retested with second snack and glucose was 115 at time of discharge.    Patient discharged at 0950 with self to PFT's.    Vital signs:  Temp: 97.5  F (36.4  C) Temp src: Oral BP: 129/84 Pulse: 66   Resp: 16 SpO2: 96 %       Weight: 89.5 kg (197 lb 4.8 oz)  Estimated body mass index is 26.76 kg/m  as calculated from the following:    Height as of 1/18/24: 1.829 m (6').    Weight as of this encounter: 89.5 kg (197 lb 4.8 oz).

## 2024-04-07 LAB — TESTOST SERPL-MCNC: 386 NG/DL (ref 240–950)

## 2024-04-08 LAB
A-TOCOPHEROL VIT E SERPL-MCNC: 17.1 MG/L
ANNOTATION COMMENT IMP: NORMAL
BETA+GAMMA TOCOPHEROL SERPL-MCNC: 0.3 MG/L
EXPTIME-PRE: 6.74 SEC
FEF2575-%PRED-PRE: 124 %
FEF2575-PRE: 5.03 L/SEC
FEF2575-PRED: 4.02 L/SEC
FEFMAX-%PRED-PRE: 97 %
FEFMAX-PRE: 10.4 L/SEC
FEFMAX-PRED: 10.66 L/SEC
FEV1-%PRED-PRE: 101 %
FEV1-PRE: 4.24 L
FEV1FEV6-PRE: 85 %
FEV1FEV6-PRED: 82 %
FEV1FVC-PRE: 85 %
FEV1FVC-PRED: 81 %
FIFMAX-PRE: 7.48 L/SEC
FVC-%PRED-PRE: 96 %
FVC-PRE: 5 L
FVC-PRED: 5.19 L
RETINYL PALMITATE SERPL-MCNC: <0.02 MG/L
VIT A SERPL-MCNC: 0.76 MG/L

## 2024-04-09 LAB — IGE SERPL-ACNC: 13 KU/L (ref 0–114)

## 2024-04-25 ENCOUNTER — CLINICAL UPDATE (OUTPATIENT)
Dept: PHARMACY | Facility: CLINIC | Age: 44
End: 2024-04-25
Payer: COMMERCIAL

## 2024-04-25 DIAGNOSIS — E84.9 CYSTIC FIBROSIS (H): Primary | ICD-10-CM

## 2024-04-25 PROCEDURE — 99207 PR NO CHARGE LOS: CPT | Performed by: PHARMACIST

## 2024-04-25 NOTE — PROGRESS NOTES
Clinical Update:                                                    A chart review was conducted for Philip Delacruz.    Reason for Chart Review: Trikafta Lab Monitoring     Discussion: Philip has been on Trikafta since 12/30/19. Per chart review Shar continues full dose Trikafta.      Labs were reviewed from 4/25/2024 at [facility] . CK is elevated at 428 U/L, above acceptable range of <300 U/L.    Lab Results   Component Value Date    ALT 32 04/05/2024    AST 40 04/05/2024    BILITOTAL 1.2 04/05/2024    DBIL 0.29 04/05/2024     (H) 04/05/2024     Plan:  1. Continue Trikafta   2. Recheck CK in 3 months per Dr. Sahni, will remind patient to hold vigorous exercise 72 hours prior to labs.     Nicole Storm, PharmD  Medication Therapy Management Pharmacist

## 2024-05-21 ENCOUNTER — VIRTUAL VISIT (OUTPATIENT)
Dept: ENDOCRINOLOGY | Facility: CLINIC | Age: 44
End: 2024-05-21
Attending: INTERNAL MEDICINE
Payer: COMMERCIAL

## 2024-05-21 DIAGNOSIS — K86.81 EXOCRINE PANCREATIC INSUFFICIENCY: ICD-10-CM

## 2024-05-21 DIAGNOSIS — E84.0 CYSTIC FIBROSIS WITH PULMONARY MANIFESTATIONS (H): ICD-10-CM

## 2024-05-21 DIAGNOSIS — M85.9 LOW BONE DENSITY FOR AGE: Primary | ICD-10-CM

## 2024-05-21 PROCEDURE — 99214 OFFICE O/P EST MOD 30 MIN: CPT | Mod: 95 | Performed by: INTERNAL MEDICINE

## 2024-05-21 NOTE — PROGRESS NOTES
CF Endocrinology Return Consultation: Low bone density    Patient: Philip Delacruz MRN# [id number]   YOB: 1980 Age: 43 year old   Date of Visit:2024          Problem list:     Patient Active Problem List    Diagnosis Date Noted    Small intestinal bacterial overgrowth 2018     Priority: Medium    Exocrine pancreatic insufficiency 04/10/2018     Priority: Medium    Pancreatic insufficiency 2018     Priority: Medium    Chronic sinusitis      Priority: Medium    Cystic fibrosis with pulmonary manifestations 2014     Priority: Medium     SWEAT TEST:  Date:  2014           Laboratory: [facility]  Sample #1   mg     30  mmol/L Cl  Sample #2   mg     27  mmol/L Cl     GENOTYPING:  Date:  2014        [facility]  Genotype:  [id number]  Poly T Variant:  [ 7T ]/[9T  ]              HPI:   Philip is a 43 year old male with CF related bone disease     Summary of prior history copied from previous note: DEXA scan 2021 showed significant decline in bone density, about 5.9% at the lumbar spine. Lowest z score was -2.0 at lumbar spine L1-L3 with outlier of -3.5 at L4.    DXA from 21 with decrease in BMD at level of L1-L3 lumbar spine of -5.9% compared to 2019 and Z score of -2.0 from L1-L3 (outlier score of -3.5 in L4). No tobacco use, no excessive alcohol use, no family history of osteoporosis. Workup has been done to evaluate for potential secondary causes of bone loss. Recent TSH, testosterone, vitamin D level, and A1c were ok. Serum calcium, albumin, phosphorus, and PTH within normal limits.  Subclinical Cushing's was considered, however 24-hour urine free cortisol is within normal limits  24-hour urine calcium was notable for hypercalciuria (380 mg).  Patient's diet does not appear to be high in calcium.  High urinary calcium could be related to high sodium diet/increase urinary sodium excretion. Of note, urine volume was somewhat high at  2.8     Patient started Fosamax 70 mg weekly in September 2021.    11/15/2021 repeat 24-hour calcium 0.38 g/24-hour specimen    Interval history:    I have reviewed the available past laboratory evaluations, imaging studies, and medical records available to me at this visit. History was obtained from the patient and the medical record.  I have reviewed the notes of the pulmonary care team entered into the medical record since I last saw the patient.    Patient started Fosamax 70 mg weekly in September 2021.  He denies any side effects and reports being compliant with it.  Takes MVW multivitamin, 1 tablet of calcium citrate and vitamin D 5000 IU daily.  No history of renal stones  Continues to exercise including CrossFit   No history of falls or osteoporotic fracture.  His usual diet contains 1-2 serving of dairy     Most recent DEXA showed improvement in bone density at lumbar spine  5/4/23 lowest Z score -1.2     Recent OGTT reviewed with patient. Denies symptoms of low glucose at home.           Past Medical History:     Past Medical History:   Diagnosis Date    Chronic sinusitis     Congenital absence of vas deferens, bilateral     Cystic fibrosis (H)     Delta F508 and [id number]     History of COVID-19 01/2022    Back pain, chills and mild cough, resolved without intervention    Nasal polyps     Sinusitis, chronic             Past Surgical History:     Past Surgical History:   Procedure Laterality Date    COLONOSCOPY N/A 9/26/2022    Procedure: COLONOSCOPY, WITH POLYPECTOMY AND BIOPSY;  Surgeon: Bennett Fajardo MD;  Location: [facility] TOOTH EXTRACTION W/FORCEP      NASAL/SINUS POLYPECTOMY      REPAIR PECTUS EXCAVATUM  1997    SINUS SURGERY  1992, 2002, 2004    Removed tubinates and polyps OSH    Sperm harvest                 Social History:     Social History     Social History Narrative    Lives in [place] with wife, Aimee, and sons, Shay and Serafin. Part time teaching special ed.              Family  History:     Family History   Problem Relation Age of Onset    C.A.D. Maternal Grandfather     Diabetes Maternal Grandfather     Heart Disease Maternal Grandfather     Aneurysm Paternal Grandfather         Aortic    Gastrointestinal Disease Father         Reflux    Cancer Paternal Grandmother         Lymphoma    Diabetes Maternal Grandmother     Thyroid Disease Mother      () Other             Allergies:     No Known Allergies          Medications:     Current Outpatient Rx   Medication Sig Dispense Refill    acyclovir (ZOVIRAX) 400 MG tablet Take 400 mg by mouth daily      alendronate (FOSAMAX) 70 MG tablet Take 1 tablet (70 mg) by mouth every 7 days *take 60 minutes before morning meal with 8 oz of water and remain upright for 30 minutes. 12 tablet 3    atorvastatin (LIPITOR) 10 MG tablet TAKE 1 TABLET BY MOUTH ONCE DAILY 90 tablet 2    Ca Cit Malate-Cholecalciferol (CALCIUM CITRATE MALATE-VIT D) 250-100 MG-UNIT TABS Take 1 tablet by mouth daily Activated calcium by Nutrikey      cholecalciferol (VITAMIN D3) 125 mcg (5000 units) capsule Take 125 mcg by mouth daily      CREON 42485-69929 units CPEP per EC capsule TAKE 4-5 CAPSULES BY MOUTH THREE TIMES A DAY WITH MEALS. TAKE 2-3 CAPSULES WITH SNACKS (TOTAL 3 MEALS AND 3 SNACKS DAILY) 1890 capsule 3    escitalopram (LEXAPRO) 20 MG tablet TAKE ONE TABLET BY MOUTH ONCE DAILY 30 tablet 3    fluticasone (FLONASE) 50 MCG/ACT nasal spray Spray 1 spray into both nostrils daily as needed for rhinitis or allergies      lactobacillus rhamnosus, GG, (CULTURELL) capsule Take 1 capsule by mouth daily as needed Bifido      mvw complete formulation (SOFTGELS) capsule Take 1 capsule by mouth daily      olopatadine (PATADAY) 0.2 % ophthalmic solution PLACE 1 DROP INTO BOTH EYES DAILY 2.5 mL 11    Omega-3 Fatty Acids (FISH OIL) 1200 MG CPDR Take 2 capsules by mouth daily      Sodium Chloride-Sodium Bicarb (SINUFLO READYRINSE) KIT Daily PRN      TRIKAFTA 100-50-75 & 150 MG tablet pack  TAKE TWO ORANGE TABLETS BY MOUTH IN THE MORNING AND ONE BLUE TABLET IN THE EVENING AS DIRECTED ON PACKAGE.  TAKE WITH FAT CONTAINING FOOD. 84 tablet 4     Social History     Socioeconomic History    Marital status:      Spouse name: Not on file    Number of children: Not on file    Years of education: Not on file    Highest education level: Not on file   Occupational History    Occupation: Teacher   Tobacco Use    Smoking status: Never    Smokeless tobacco: Never   Substance and Sexual Activity    Alcohol use: Yes     Comment: 1 drink/week    Drug use: No     Comment: tried marijuana gummy    Sexual activity: Yes     Partners: Female     Birth control/protection: None   Other Topics Concern    Parent/sibling w/ CABG, MI or angioplasty before 65F 55M? Not Asked   Social History Narrative    Lives in [place] with wife, Aimee, and sons, Shay and Serafin. Part time teaching special ed.     Social Determinants of Health     Financial Resource Strain: Not on file   Food Insecurity: Not on file   Transportation Needs: Not on file   Physical Activity: Not on file   Stress: Not on file   Social Connections: Not on file   Interpersonal Safety: Not on file   Housing Stability: Not on file             Review of Systems:     Comprehensive ROS negative other than the symptoms noted above in the HPI.          Physical Exam:   There were no vitals taken for this visit.  GENERAL: Healthy, alert and no distress  EYES: Eyes grossly normal to inspection.  No discharge or erythema, or obvious scleral/conjunctival abnormalities.  RESP: No audible wheeze, cough, or visible cyanosis.  No visible retractions or increased work of breathing.    SKIN: Visible skin clear. No significant rash, abnormal pigmentation or lesions.  NEURO: Cranial nerves grossly intact.  Mentation and speech appropriate for age.  PSYCH: Mentation appears normal, affect normal/bright, judgement and insight intact, normal speech and appearance  well-groomed.          Laboratory results:     TSH   Date Value Ref Range Status   04/05/2024 1.84 0.30 - 4.20 uIU/mL Final   04/21/2022 0.62 0.40 - 4.00 mU/L Final   04/08/2021 1.47 0.40 - 4.00 mU/L Final     Triiodothyronine (T3)   Date Value Ref Range Status   07/16/2019 95 60 - 181 ng/dL Final     Testosterone Total   Date Value Ref Range Status   04/05/2024 386 240 - 950 ng/dL Final   04/08/2021 438 240 - 950 ng/dL Final     Comment:     This test was developed and its performance characteristics determined by the   [facility].   It has not been cleared or approved by the FDA. The laboratory is regulated   under CLIA as qualified to perform high-complexity testing. This test is used   for clinical purposes. It should not be regarded as investigational or for   research.       Cholesterol   Date Value Ref Range Status   04/05/2024 175 <200 mg/dL Final   07/08/2021 150 <200 mg/dL Final     Albumin Urine mg/L   Date Value Ref Range Status   04/05/2024 <12.0 mg/L Final     Comment:     The reference ranges have not been established in urine albumin. The results should be integrated into the clinical context for interpretation.   04/21/2022 5 mg/L Final   04/08/2021 10 mg/L Final     Triglycerides   Date Value Ref Range Status   04/05/2024 102 <150 mg/dL Final   07/08/2021 44 <150 mg/dL Final     HDL Cholesterol   Date Value Ref Range Status   07/08/2021 60 >39 mg/dL Final     Direct Measure HDL   Date Value Ref Range Status   04/05/2024 57 >=40 mg/dL Final     LDL Cholesterol Calculated   Date Value Ref Range Status   04/05/2024 98 <=100 mg/dL Final   07/08/2021 81 <100 mg/dL Final     Comment:     Desirable:       <100 mg/dl     Cholesterol/HDL Ratio   Date Value Ref Range Status   12/22/2014 3.3 0.0 - 5.0 Final     Non HDL Cholesterol   Date Value Ref Range Status   04/05/2024 118 <130 mg/dL Final   07/08/2021 90 <130 mg/dL Final     DEXA  5/4/2023: Most negative T score of -1.2 left femoral neck    DXA, 04/08/2021  Most negative Z score of -2.0 at level of L1-L3 (below normal range for men <50 years old)  Significantly decreased BMD at level of L1-L3 lumbar spine compared to previous exam (-5.9%)  Significantly decreased BMD at level of L1-L3 lumbar spine and right total hip compared to baseline exam on 12/22/2014 (-10.2% at lumbar spine, -2.5% at hip)     DXA, 05/02/2019  Most negative Z score of -1.6 at level lf lumbar spine  Significantly decreased BMD at lumbar spine, right and left total hip compared to previous exam (-7.8%)     DXA, 04/07/2017  Most negative Z score of -1.1 at level of lumbar spine  Significantly decreased BMD at level of lumbar spine compared to previous exam (-3.4%)     DXA, 12/22/2014 - Baseline Exam  Most negative Z score of -1.3 at level of lumbar spine           Assessment and Plan:   Philip is a 43 year old male with CF related bone disease    Low bone density  Significant improvement in bone density at lumbar spine on DXA in 2023  Overall bone density is in the normal range now   Plan to continue Fosamax till fall 2024 to complete 3 years of treatment   Discussed drug holiday from Fosamax.    Plan to discontinue Fosamax in fall 2024   continue Fosamax 70 mg/week.    Testosterone and TSH in normal range  Vitamin D in high normal range   will change vitamin D supplements to 2000 units daily and continue current MV W  Continue calcium supplement   Continue weightbearing exercise as tolerated  No hyperglycemia on recent OGTT.  Blood glucose was in the 60s but no symptoms of hypoglycemia and free living condition    Return to clinic in 1 year     AVIS Mckeon     Note: Chart documentation done in part with Dragon Voice Recognition software. Although reviewed after completion, some word and grammatical errors may remain.  Please consider this when interpreting information in this chart   Video visit done using  Monika  Video visit start time 8:47 AM  Video end time 9:04 AM  Patient location: Home  Provider patient: On-site

## 2024-05-21 NOTE — LETTER
2024       RE: Philip Delacruz  [address]  [facility] [address]     Dear Colleague,    Thank you for referring your patient, Philip Delacruz, to the [facility] [place] [facility]. Please see a copy of my visit note below.    CF Endocrinology Return Consultation: Low bone density    Patient: Philip Delacruz MRN# [id number]   YOB: 1980 Age: 43 year old   Date of Visit:2024          Problem list:     Patient Active Problem List    Diagnosis Date Noted     Small intestinal bacterial overgrowth 2018     Priority: Medium     Exocrine pancreatic insufficiency 04/10/2018     Priority: Medium     Pancreatic insufficiency 2018     Priority: Medium     Chronic sinusitis      Priority: Medium     Cystic fibrosis with pulmonary manifestations 2014     Priority: Medium     SWEAT TEST:  Date:  2014           Laboratory: [facility]  Sample #1   mg     30  mmol/L Cl  Sample #2   mg     27  mmol/L Cl     GENOTYPING:  Date:  2014        [facility]  Genotype:  [id number]  Poly T Variant:  [ 7T ]/[9T  ]              HPI:   Philip is a 43 year old male with CF related bone disease     Summary of prior history copied from previous note: DEXA scan 2021 showed significant decline in bone density, about 5.9% at the lumbar spine. Lowest z score was -2.0 at lumbar spine L1-L3 with outlier of -3.5 at L4.    DXA from 21 with decrease in BMD at level of L1-L3 lumbar spine of -5.9% compared to 2019 and Z score of -2.0 from L1-L3 (outlier score of -3.5 in L4). No tobacco use, no excessive alcohol use, no family history of osteoporosis. Workup has been done to evaluate for potential secondary causes of bone loss. Recent TSH, testosterone, vitamin D level, and A1c were ok. Serum calcium, albumin, phosphorus, and PTH within normal limits.  Subclinical  Cushing's was considered, however 24-hour urine free cortisol is within normal limits  24-hour urine calcium was notable for hypercalciuria (380 mg).  Patient's diet does not appear to be high in calcium.  High urinary calcium could be related to high sodium diet/increase urinary sodium excretion. Of note, urine volume was somewhat high at 2.8     Patient started Fosamax 70 mg weekly in September 2021.    11/15/2021 repeat 24-hour calcium 0.38 g/24-hour specimen    Interval history:    I have reviewed the available past laboratory evaluations, imaging studies, and medical records available to me at this visit. History was obtained from the patient and the medical record.  I have reviewed the notes of the pulmonary care team entered into the medical record since I last saw the patient.    Patient started Fosamax 70 mg weekly in September 2021.  He denies any side effects and reports being compliant with it.  Takes MVW multivitamin, 1 tablet of calcium citrate and vitamin D 5000 IU daily.  No history of renal stones  Continues to exercise including CrossFit   No history of falls or osteoporotic fracture.  His usual diet contains 1-2 serving of dairy     Most recent DEXA showed improvement in bone density at lumbar spine  5/4/23 lowest Z score -1.2     Recent OGTT reviewed with patient. Denies symptoms of low glucose at home.           Past Medical History:     Past Medical History:   Diagnosis Date     Chronic sinusitis      Congenital absence of vas deferens, bilateral      Cystic fibrosis (H)     Delta F508 and [id number]      History of COVID-19 01/2022    Back pain, chills and mild cough, resolved without intervention     Nasal polyps      Sinusitis, chronic             Past Surgical History:     Past Surgical History:   Procedure Laterality Date     COLONOSCOPY N/A 9/26/2022    Procedure: COLONOSCOPY, WITH POLYPECTOMY AND BIOPSY;  Surgeon: Bennett Fajardo MD;  Location:  GI      TOOTH EXTRACTION W/FORCEP        NASAL/SINUS POLYPECTOMY       REPAIR PECTUS EXCAVATUM  1997     SINUS SURGERY  1992, 2002, 2004    Removed tubinates and polyps OSH     Sperm harvest                 Social History:     Social History     Social History Narrative    Lives in [place] with wife, Aimee, and sons, Shay and Serafin. Part time teaching special ed.              Family History:     Family History   Problem Relation Age of Onset     C.A.D. Maternal Grandfather      Diabetes Maternal Grandfather      Heart Disease Maternal Grandfather      Aneurysm Paternal Grandfather         Aortic     Gastrointestinal Disease Father         Reflux     Cancer Paternal Grandmother         Lymphoma     Diabetes Maternal Grandmother      Thyroid Disease Mother       () Other             Allergies:     No Known Allergies          Medications:     Current Outpatient Rx   Medication Sig Dispense Refill     acyclovir (ZOVIRAX) 400 MG tablet Take 400 mg by mouth daily       alendronate (FOSAMAX) 70 MG tablet Take 1 tablet (70 mg) by mouth every 7 days *take 60 minutes before morning meal with 8 oz of water and remain upright for 30 minutes. 12 tablet 3     atorvastatin (LIPITOR) 10 MG tablet TAKE 1 TABLET BY MOUTH ONCE DAILY 90 tablet 2     Ca Cit Malate-Cholecalciferol (CALCIUM CITRATE MALATE-VIT D) 250-100 MG-UNIT TABS Take 1 tablet by mouth daily Activated calcium by Nutrikey       cholecalciferol (VITAMIN D3) 125 mcg (5000 units) capsule Take 125 mcg by mouth daily       CREON 53096-96785 units CPEP per EC capsule TAKE 4-5 CAPSULES BY MOUTH THREE TIMES A DAY WITH MEALS. TAKE 2-3 CAPSULES WITH SNACKS (TOTAL 3 MEALS AND 3 SNACKS DAILY) 1890 capsule 3     escitalopram (LEXAPRO) 20 MG tablet TAKE ONE TABLET BY MOUTH ONCE DAILY 30 tablet 3     fluticasone (FLONASE) 50 MCG/ACT nasal spray Spray 1 spray into both nostrils daily as needed for rhinitis or allergies       lactobacillus rhamnosus, GG, (CULTURELL) capsule Take 1 capsule by mouth daily as needed Bifido        mvw complete formulation (SOFTGELS) capsule Take 1 capsule by mouth daily       olopatadine (PATADAY) 0.2 % ophthalmic solution PLACE 1 DROP INTO BOTH EYES DAILY 2.5 mL 11     Omega-3 Fatty Acids (FISH OIL) 1200 MG CPDR Take 2 capsules by mouth daily       Sodium Chloride-Sodium Bicarb (SINUFLO READYRINSE) KIT Daily PRN       TRIKAFTA 100-50-75 & 150 MG tablet pack TAKE TWO ORANGE TABLETS BY MOUTH IN THE MORNING AND ONE BLUE TABLET IN THE EVENING AS DIRECTED ON PACKAGE.  TAKE WITH FAT CONTAINING FOOD. 84 tablet 4     Social History     Socioeconomic History     Marital status:      Spouse name: Not on file     Number of children: Not on file     Years of education: Not on file     Highest education level: Not on file   Occupational History     Occupation: Teacher   Tobacco Use     Smoking status: Never     Smokeless tobacco: Never   Substance and Sexual Activity     Alcohol use: Yes     Comment: 1 drink/week     Drug use: No     Comment: tried marijuana gummy     Sexual activity: Yes     Partners: Female     Birth control/protection: None   Other Topics Concern     Parent/sibling w/ CABG, MI or angioplasty before 65F 55M? Not Asked   Social History Narrative    Lives in [place] with wife, Aimee, and sons, Shay and Serafin. Part time teaching special ed.     Social Determinants of Health     Financial Resource Strain: Not on file   Food Insecurity: Not on file   Transportation Needs: Not on file   Physical Activity: Not on file   Stress: Not on file   Social Connections: Not on file   Interpersonal Safety: Not on file   Housing Stability: Not on file             Review of Systems:     Comprehensive ROS negative other than the symptoms noted above in the HPI.          Physical Exam:   There were no vitals taken for this visit.  GENERAL: Healthy, alert and no distress  EYES: Eyes grossly normal to inspection.  No discharge or erythema, or obvious scleral/conjunctival abnormalities.  RESP: No audible  wheeze, cough, or visible cyanosis.  No visible retractions or increased work of breathing.    SKIN: Visible skin clear. No significant rash, abnormal pigmentation or lesions.  NEURO: Cranial nerves grossly intact.  Mentation and speech appropriate for age.  PSYCH: Mentation appears normal, affect normal/bright, judgement and insight intact, normal speech and appearance well-groomed.          Laboratory results:     TSH   Date Value Ref Range Status   04/05/2024 1.84 0.30 - 4.20 uIU/mL Final   04/21/2022 0.62 0.40 - 4.00 mU/L Final   04/08/2021 1.47 0.40 - 4.00 mU/L Final     Triiodothyronine (T3)   Date Value Ref Range Status   07/16/2019 95 60 - 181 ng/dL Final     Testosterone Total   Date Value Ref Range Status   04/05/2024 386 240 - 950 ng/dL Final   04/08/2021 438 240 - 950 ng/dL Final     Comment:     This test was developed and its performance characteristics determined by the   [facility].   It has not been cleared or approved by the FDA. The laboratory is regulated   under CLIA as qualified to perform high-complexity testing. This test is used   for clinical purposes. It should not be regarded as investigational or for   research.       Cholesterol   Date Value Ref Range Status   04/05/2024 175 <200 mg/dL Final   07/08/2021 150 <200 mg/dL Final     Albumin Urine mg/L   Date Value Ref Range Status   04/05/2024 <12.0 mg/L Final     Comment:     The reference ranges have not been established in urine albumin. The results should be integrated into the clinical context for interpretation.   04/21/2022 5 mg/L Final   04/08/2021 10 mg/L Final     Triglycerides   Date Value Ref Range Status   04/05/2024 102 <150 mg/dL Final   07/08/2021 44 <150 mg/dL Final     HDL Cholesterol   Date Value Ref Range Status   07/08/2021 60 >39 mg/dL Final     Direct Measure HDL   Date Value Ref Range Status   04/05/2024 57 >=40 mg/dL Final     LDL Cholesterol  Calculated   Date Value Ref Range Status   04/05/2024 98 <=100 mg/dL Final   07/08/2021 81 <100 mg/dL Final     Comment:     Desirable:       <100 mg/dl     Cholesterol/HDL Ratio   Date Value Ref Range Status   12/22/2014 3.3 0.0 - 5.0 Final     Non HDL Cholesterol   Date Value Ref Range Status   04/05/2024 118 <130 mg/dL Final   07/08/2021 90 <130 mg/dL Final     DEXA 5/4/2023: Most negative T score of -1.2 left femoral neck    DXA, 04/08/2021  Most negative Z score of -2.0 at level of L1-L3 (below normal range for men <50 years old)  Significantly decreased BMD at level of L1-L3 lumbar spine compared to previous exam (-5.9%)  Significantly decreased BMD at level of L1-L3 lumbar spine and right total hip compared to baseline exam on 12/22/2014 (-10.2% at lumbar spine, -2.5% at hip)     DXA, 05/02/2019  Most negative Z score of -1.6 at level lf lumbar spine  Significantly decreased BMD at lumbar spine, right and left total hip compared to previous exam (-7.8%)     DXA, 04/07/2017  Most negative Z score of -1.1 at level of lumbar spine  Significantly decreased BMD at level of lumbar spine compared to previous exam (-3.4%)     DXA, 12/22/2014 - Baseline Exam  Most negative Z score of -1.3 at level of lumbar spine           Assessment and Plan:   Philip is a 43 year old male with CF related bone disease    Low bone density  Significant improvement in bone density at lumbar spine on DXA in 2023  Overall bone density is in the normal range now   Plan to continue Fosamax till fall 2024 to complete 3 years of treatment   Discussed drug holiday from Fosamax.    Plan to discontinue Fosamax in fall 2024   continue Fosamax 70 mg/week.    Testosterone and TSH in normal range  Vitamin D in high normal range   will change vitamin D supplements to 2000 units daily and continue current MV W  Continue calcium supplement   Continue weightbearing exercise as tolerated  No hyperglycemia on recent OGTT.  Blood glucose was in the  60s but no symptoms of hypoglycemia and free living condition    Return to clinic in 1 year     AVIS Mckeon     Note: Chart documentation done in part with Dragon Voice Recognition software. Although reviewed after completion, some word and grammatical errors may remain.  Please consider this when interpreting information in this chart   Video visit done using [employer]  Video visit start time 8:47 AM  Video end time 9:04 AM  Patient location: Home  Provider patient: On-site      Again, thank you for allowing me to participate in the care of your patient.      Sincerely,    Khushboo Knapp MD

## 2024-05-21 NOTE — NURSING NOTE
Is the patient currently in the state of [place]? YES    Visit mode:VIDEO    If the visit is dropped, the patient can be reconnected by: VIDEO VISIT: Text to cell phone:   Telephone Information:   Mobile [phone]       Will anyone else be joining the visit? NO  (If patient encounters technical issues they should call [phone])    How would you like to obtain your AVS? MyChart    Are changes needed to the allergy or medication list? No and Pt stated no med changes, so VF did not review this information again with patient due to this.     Are refills needed on medications prescribed by this physician? NO but will need future refills    Reason for visit: RECHECK (Follow up)    Iris GAN

## 2024-05-22 RX ORDER — ELEXACAFTOR, TEZACAFTOR, AND IVACAFTOR 100-50-75
KIT ORAL
Qty: 84 TABLET | Refills: 4 | Status: SHIPPED | OUTPATIENT
Start: 2024-05-22 | End: 2024-10-03

## 2024-05-24 ENCOUNTER — TELEPHONE (OUTPATIENT)
Dept: PULMONOLOGY | Facility: CLINIC | Age: 44
End: 2024-05-24
Payer: COMMERCIAL

## 2024-05-24 NOTE — TELEPHONE ENCOUNTER
Patient called to apologize for missing appointment yesterday. Thought the appointment was going to be virtual and was waiting for a call, didn't realize it was scheduled for in person. Patient would like to reschedule. End of school year for patient is 6/11 however he is leaving that day for [place] and won't be back until 6/18. Held appt for Thursday 6/27 1:30 pfts, and 2:10 appt with Dr. Sahni. CF  will schedule appointment when she is back in the office next week.    Eva Che RN

## 2024-06-02 ENCOUNTER — HEALTH MAINTENANCE LETTER (OUTPATIENT)
Age: 44
End: 2024-06-02

## 2024-06-13 ENCOUNTER — TELEPHONE (OUTPATIENT)
Dept: PULMONOLOGY | Facility: CLINIC | Age: 44
End: 2024-06-13

## 2024-06-13 DIAGNOSIS — E84.9 CF (CYSTIC FIBROSIS) (H): Primary | ICD-10-CM

## 2024-06-13 NOTE — TELEPHONE ENCOUNTER
----- Message from True Sahni MD sent at 6/11/2024  6:12 PM CDT -----  Regarding: LaBS  CK was pretty high last time it was checked. Can we get Trikafta labs with his next visit?  Thanks,  PJ

## 2024-06-13 NOTE — TELEPHONE ENCOUNTER
RN placed order for Trikafta labs.    Routed to [place] to add lab order with upcoming 6/27 visit.    ANTON Jansen

## 2024-06-27 ENCOUNTER — LAB (OUTPATIENT)
Dept: LAB | Facility: CLINIC | Age: 44
End: 2024-06-27
Payer: COMMERCIAL

## 2024-06-27 ENCOUNTER — OFFICE VISIT (OUTPATIENT)
Dept: PULMONOLOGY | Facility: CLINIC | Age: 44
End: 2024-06-27
Attending: INTERNAL MEDICINE
Payer: COMMERCIAL

## 2024-06-27 ENCOUNTER — LAB (OUTPATIENT)
Dept: LAB | Facility: CLINIC | Age: 44
End: 2024-06-27
Attending: INTERNAL MEDICINE
Payer: COMMERCIAL

## 2024-06-27 ENCOUNTER — ANCILLARY PROCEDURE (OUTPATIENT)
Dept: GENERAL RADIOLOGY | Facility: CLINIC | Age: 44
End: 2024-06-27
Attending: INTERNAL MEDICINE
Payer: COMMERCIAL

## 2024-06-27 ENCOUNTER — CLINICAL UPDATE (OUTPATIENT)
Dept: PHARMACY | Facility: CLINIC | Age: 44
End: 2024-06-27

## 2024-06-27 VITALS
BODY MASS INDEX: 26.58 KG/M2 | HEIGHT: 72 IN | DIASTOLIC BLOOD PRESSURE: 84 MMHG | OXYGEN SATURATION: 96 % | HEART RATE: 77 BPM | SYSTOLIC BLOOD PRESSURE: 126 MMHG | WEIGHT: 196.21 LBS

## 2024-06-27 DIAGNOSIS — J32.4 CHRONIC PANSINUSITIS: ICD-10-CM

## 2024-06-27 DIAGNOSIS — E84.9 CF (CYSTIC FIBROSIS) (H): Primary | ICD-10-CM

## 2024-06-27 DIAGNOSIS — K86.81 EXOCRINE PANCREATIC INSUFFICIENCY: ICD-10-CM

## 2024-06-27 DIAGNOSIS — R19.8 ALTERNATING CONSTIPATION AND DIARRHEA: ICD-10-CM

## 2024-06-27 DIAGNOSIS — E84.9 CF (CYSTIC FIBROSIS) (H): ICD-10-CM

## 2024-06-27 DIAGNOSIS — K86.89 PANCREATIC INSUFFICIENCY: ICD-10-CM

## 2024-06-27 DIAGNOSIS — E84.9 CYSTIC FIBROSIS (H): Primary | ICD-10-CM

## 2024-06-27 LAB
ALBUMIN SERPL BCG-MCNC: 4.5 G/DL (ref 3.5–5.2)
ALP SERPL-CCNC: 71 U/L (ref 40–150)
ALT SERPL W P-5'-P-CCNC: 33 U/L (ref 0–70)
AST SERPL W P-5'-P-CCNC: 39 U/L (ref 0–45)
BILIRUB DIRECT SERPL-MCNC: 0.26 MG/DL (ref 0–0.3)
BILIRUB SERPL-MCNC: 1 MG/DL
CK SERPL-CCNC: 531 U/L (ref 39–308)
EXPTIME-PRE: 5.63 SEC
FEF2575-%PRED-PRE: 113 %
FEF2575-PRE: 4.57 L/SEC
FEF2575-PRED: 4.01 L/SEC
FEFMAX-%PRED-PRE: 95 %
FEFMAX-PRE: 10.2 L/SEC
FEFMAX-PRED: 10.65 L/SEC
FEV1-%PRED-PRE: 98 %
FEV1-PRE: 4.12 L
FEV1FEV6-PRE: 83 %
FEV1FEV6-PRED: 82 %
FEV1FVC-PRE: 83 %
FEV1FVC-PRED: 81 %
FIFMAX-PRE: 8.2 L/SEC
FVC-%PRED-PRE: 95 %
FVC-PRE: 4.95 L
FVC-PRED: 5.18 L
PROT SERPL-MCNC: 7.1 G/DL (ref 6.4–8.3)

## 2024-06-27 PROCEDURE — 99207 PR NO CHARGE LOS: CPT | Performed by: PHARMACIST

## 2024-06-27 PROCEDURE — 80076 HEPATIC FUNCTION PANEL: CPT | Performed by: PATHOLOGY

## 2024-06-27 PROCEDURE — 94375 RESPIRATORY FLOW VOLUME LOOP: CPT | Performed by: INTERNAL MEDICINE

## 2024-06-27 PROCEDURE — 99214 OFFICE O/P EST MOD 30 MIN: CPT | Mod: 25 | Performed by: INTERNAL MEDICINE

## 2024-06-27 PROCEDURE — 97803 MED NUTRITION INDIV SUBSEQ: CPT | Performed by: DIETITIAN, REGISTERED

## 2024-06-27 PROCEDURE — 36415 COLL VENOUS BLD VENIPUNCTURE: CPT | Performed by: PATHOLOGY

## 2024-06-27 PROCEDURE — 74019 RADEX ABDOMEN 2 VIEWS: CPT | Performed by: RADIOLOGY

## 2024-06-27 PROCEDURE — 87070 CULTURE OTHR SPECIMN AEROBIC: CPT | Performed by: INTERNAL MEDICINE

## 2024-06-27 PROCEDURE — 99213 OFFICE O/P EST LOW 20 MIN: CPT | Performed by: INTERNAL MEDICINE

## 2024-06-27 PROCEDURE — 82550 ASSAY OF CK (CPK): CPT | Performed by: PATHOLOGY

## 2024-06-27 RX ORDER — VITAMIN B COMPLEX
1 TABLET ORAL DAILY
COMMUNITY

## 2024-06-27 ASSESSMENT — PAIN SCALES - GENERAL: PAINLEVEL: NO PAIN (0)

## 2024-06-27 NOTE — LETTER
6/27/2024      Philip Delacruz  [address]  [facility] [address]      Dear Colleague,    Thank you for referring your patient, Philip Delacruz, to the [facility] [place]. Please see a copy of my visit note below.    Reason for Visit  Philip Delacruz is a 44 year old year old male who is being seen for RECHECK (Return Cystic Fibrosis )      Assessment and plan:   Shar Coley is a 43 year-old male with a history of cystic fibrosis with mild lung disease and pancreatic insufficiency.     Maintenance   Modulator: Trikafta  Mutation:  [id number], Poly T Variant:  [ 7T ]/[9T  ]  AW Clearance: Exercise  Bronchodilators: Albuterol MDI PRN  Mucolytics: none   Antibiotics Inh:  none   Antibiotics Oral: none   Other:   Colonization Hx: Rhizobium (agrobacterium) radiobacter, Aspergillus versicolor, Serratia marcescens, staphylococcus aureus, moraxella (branhamella) catarrhalis  Annual studies due:April 2023  Contraindications to standard of care :  COLONOSCOPY: 9/26/2022, no polyps.    Pulmonary/cystic fibrosis: Patient reports excellent exercise tolerance.  Oxygenation adequate at 96%.  PFTs are in the normal range although there has been a little bit of a downward trend over the last few visits.  Patient does not appear to be having a cystic fibrosis exacerbation at this time.  He will continue using  exercise as his main form of airway clearance.  If there is progressive decline in PFTs, addition of more formal airway clearance will be pursued.    CFTR Modulation: CFTR genotype is [id number], Poly T Variant:  [ 7T ]/[9T  ].  He has generally had an excellent response to Trikafta with decreased respiratory symptoms and rare exacerbations.  LFTs are within normal limits but CK is elevated.  He did do vigorous exercise the day before the clinic visit.  Trikafta labs will be rechecked in the next 1 to 2 weeks.  The patient was instructed not  to exercise for 3-4 days prior to lab testing.    GI: Patient reports frequent loose stool with some incontinence and bloating.  Etiology is unclear.  Abdominal x-ray and stool studies will be checked and GI referral to the GI CF clinic will be submitted.    Pancreatic insufficiency: The patient reports loose stool, possibly related to malabsorption although the symptoms are not typical.  Evaluation as noted above.  If evaluation is unrevealing and symptoms persist, need to consider outdated/inactive pancreatic enzyme supply.  For now, continue current pancreatic enzyme replacement and supplemental vitamins.    CF-related sinus disease: Recent increase in sinus symptoms.  The patient is [facility].  If symptoms persist, the patient will follow-up with his ENT.    Depression/anxiety: Continue Lexapro.    Bone health: Continue calcium and vitamin D.  Alendronate was discontinued.  Endocrinology had recommended stopping the alendronate this fall but the patient stopped it more recently.    Healthcare maintenance: Annual studies were performed between visits.  Results were reviewed with the patient.  Other than the elevated CK, results were unremarkable.  No evidence of diabetes on glucose tolerance testing.    Follow-up in 3 months with PFTs and sputum culture.  Trikafta labs in the next 1-2 weeks.  Abdominal x-ray today.  Stool studies in the next 1-2 weeks the patient's convenience.    The longitudinal plan of care for the diagnosis(es)/condition(s) as documented were addressed during this visit. Due to the added complexity in care, I will continue to support Shar in the subsequent management and with ongoing continuity of care.  True Sahni MD                   CF HPI  The patient was seen and examined by True Sahni MD   The patient reports loose/watery stool with multiple trips to the bathroom without fecal incontinence mucus in the stool for the past  "few weeks.  For stooling is in the morning.  He also has occasional gas and bloating.  No abdominal pain.  Tends to be worse with \"sugar alcohols\" found in artificial sweeteners.  No nausea or vomiting.  The patient reduced his acyclovir and escitalopram to see if this would help with the GI symptoms with no relief.    He is comfortable at rest and with all activity.  He does CrossFit 3-4 times per week.  No cough.  No sputum.  No chest pain.  Exercise as his primary form of airway clearance.  Review of systems:  Patient reports that mucus in his nasal cavity with large nasal plugs removed 1-2 times per week.  Appetite is good  Intermittent ear pain.  No sore throat.  A complete ROS was otherwise negative except as noted in the HPI.    Current Outpatient Medications   Medication Sig Dispense Refill     acyclovir (ZOVIRAX) 400 MG tablet Take 200 mg by mouth daily       alendronate (FOSAMAX) 70 MG tablet Take 1 tablet (70 mg) by mouth every 7 days *take 60 minutes before morning meal with 8 oz of water and remain upright for 30 minutes. 12 tablet 3     atorvastatin (LIPITOR) 10 MG tablet TAKE 1 TABLET BY MOUTH ONCE DAILY 90 tablet 2     Ca Cit Malate-Cholecalciferol (CALCIUM CITRATE MALATE-VIT D) 250-100 MG-UNIT TABS Take 1 tablet by mouth daily Activated calcium by Nutrikey       cholecalciferol (VITAMIN D3) 125 mcg (5000 units) capsule Take 125 mcg by mouth daily       CREON 51946-33360 units CPEP per EC capsule TAKE 4-5 CAPSULES BY MOUTH THREE TIMES A DAY WITH MEALS. TAKE 2-3 CAPSULES WITH SNACKS (TOTAL 3 MEALS AND 3 SNACKS DAILY) 1890 capsule 3     escitalopram (LEXAPRO) 20 MG tablet TAKE ONE TABLET BY MOUTH ONCE DAILY 30 tablet 3     fluticasone (FLONASE) 50 MCG/ACT nasal spray Spray 1 spray into both nostrils daily as needed for rhinitis or allergies       lactobacillus rhamnosus, GG, (CULTURELL) capsule Take 1 capsule by mouth daily as needed Bifido       mvw complete formulation (SOFTGELS) capsule Take 1 " capsule by mouth daily       olopatadine (PATADAY) 0.2 % ophthalmic solution PLACE 1 DROP INTO BOTH EYES DAILY 2.5 mL 11     Omega-3 Fatty Acids (FISH OIL) 1200 MG CPDR Take 2 capsules by mouth daily       Sodium Chloride-Sodium Bicarb (SINUFLO READYRINSE) KIT Daily PRN       TRIKAFTA 100-50-75 & 150 MG tablet pack TAKE TWO ORANGE TABLETS BY MOUTH IN THE MORNING AND ONE BLUE TABLET IN THE EVENING AS DIRECTED ON PACKAGE.  TAKE WITH FAT CONTAINING FOOD. 84 tablet 4     No current facility-administered medications for this visit.     No Known Allergies  Past Medical History:   Diagnosis Date     Chronic sinusitis      Congenital absence of vas deferens, bilateral      Cystic fibrosis (H)     Delta F508 and [id number]      History of COVID-19 01/2022    Back pain, chills and mild cough, resolved without intervention     Nasal polyps      Sinusitis, chronic        Past Surgical History:   Procedure Laterality Date     COLONOSCOPY N/A 9/26/2022    Procedure: COLONOSCOPY, WITH POLYPECTOMY AND BIOPSY;  Surgeon: Bennett Fajardo MD;  Location: [facility] TOOTH EXTRACTION W/FORCEP       NASAL/SINUS POLYPECTOMY       REPAIR PECTUS EXCAVATUM  1997     SINUS SURGERY  1992, 2002, 2004    Removed tubinates and polyps OSH     Sperm harvest         Social History     Socioeconomic History     Marital status:      Spouse name: Not on file     Number of children: Not on file     Years of education: Not on file     Highest education level: Not on file   Occupational History     Occupation: Teacher   Tobacco Use     Smoking status: Never     Smokeless tobacco: Never   Substance and Sexual Activity     Alcohol use: Yes     Comment: 1 drink/week     Drug use: No     Comment: tried marijuana gummy     Sexual activity: Yes     Partners: Female     Birth control/protection: None   Other Topics Concern     Parent/sibling w/ CABG, MI or angioplasty before 65F 55M? Not Asked   Social History Narrative    Lives in [place] with wife,  Aimee, and chano, Shay and Serafin. Part time teaching special ed.     Social Determinants of Health     Financial Resource Strain: Not on file   Food Insecurity: Not on file   Transportation Needs: Not on file   Physical Activity: Not on file   Stress: Not on file   Social Connections: Not on file   Interpersonal Safety: Not on file   Housing Stability: Not on file         /84   Pulse 77   Ht 1.829 m (6')   Wt 89 kg (196 lb 3.4 oz)   SpO2 96%   BMI 26.61 kg/m    Body mass index is 26.61 kg/m .  Exam:   GENERAL APPEARANCE: Well developed, well nourished, alert, and in no apparent distress.  EYES: PERRL, EOMI  HENT: Nasal mucosa with edema and no hyperemia. No nasal polyps.  EARS: Right auditory canal obscured by cerumen.  Left canal and tympanic membrane are normal.  MOUTH: Oral mucosa is moist, without any lesions, no tonsillar enlargement, no oropharyngeal exudate.  NECK: supple, no masses, no thyromegaly.  LYMPHATICS: No significant axillary, cervical, or supraclavicular nodes.  RESP: normal percussion, good air flow throughout.  No crackles. No rhonchi. No wheezes.  CV: Normal S1, S2, regular rhythm, normal rate. No murmur.  No rub. No gallop. No LE edema.   ABDOMEN:  Bowel sounds normal, soft, nontender, no HSM or masses.   MS: extremities normal. No clubbing. No cyanosis.  SKIN: no rash on limited exam  NEURO: Mentation intact, speech normal, normal strength and tone, normal gait and stance  PSYCH: mentation appears normal. and affect normal/bright  Results:  Recent Results (from the past 168 hour(s))   Hepatic function panel    Collection Time: 06/27/24  1:09 PM   Result Value Ref Range    Protein Total 7.1 6.4 - 8.3 g/dL    Albumin 4.5 3.5 - 5.2 g/dL    Bilirubin Total 1.0 <=1.2 mg/dL    Alkaline Phosphatase 71 40 - 150 U/L    AST 39 0 - 45 U/L    ALT 33 0 - 70 U/L    Bilirubin Direct 0.26 0.00 - 0.30 mg/dL   CK total    Collection Time: 06/27/24  1:09 PM   Result Value Ref Range     (H) 39  "- 308 U/L   General PFT Lab (Please always keep checked)    Collection Time: 24  1:16 PM   Result Value Ref Range    FVC-Pred 5.18 L    FVC-Pre 4.95 L    FVC-%Pred-Pre 95 %    FEV1-Pre 4.12 L    FEV1-%Pred-Pre 98 %    FEV1FVC-Pred 81 %    FEV1FVC-Pre 83 %    FEFMax-Pred 10.65 L/sec    FEFMax-Pre 10.20 L/sec    FEFMax-%Pred-Pre 95 %    FEF2575-Pred 4.01 L/sec    FEF2575-Pre 4.57 L/sec    IRI2972-%Pred-Pre 113 %    ExpTime-Pre 5.63 sec    FIFMax-Pre 8.20 L/sec    FEV1FEV6-Pred 82 %    FEV1FEV6-Pre 83 %                   Results as noted above.    PFT Interpretation:  Normal spirometry.  Decreased from previous.  Below recent best.   Valid Maneuver             CF Exacerbation  Absent          CF Annual Nutrition Assessment     Reason for Assessment  Assessed during CF clinic visit with Dr. Sahni r/t increased nutrition risk with diagnosis of CF per protocol.     Nutrition Significant PMH  Mild Lung Disease- taking Trikafta  Pancreatic Sufficient- fecal elastase wnl, however benefits from enzymes  Low bone density    Anthropometric Assessment  Height: 184 cm  Weight: 89 kg (196 lbs)  Ideal body weight based on BMI 22 for females and 23 for males per [facility] recs: 78 kg (172 lbs)  BMI: 26.61 kg/m      Wt Readings from Last 5 Encounters:   24 89 kg (196 lb 3.4 oz)   24 89.5 kg (197 lb 4.8 oz)   24 88.5 kg (195 lb)   23 83.1 kg (183 lb 3.2 oz)   23 85 kg (187 lb 6.3 oz)     Comments: Weight continues to trend upward; pt feels some weight is \"healthy\" with increased LBM/cross-fit, but some \"is not.\"      Pancreatic Enzymes  Brand: Creon 24,000  Dosin-6 with meals, 2-3 with snacks = 1732 units lipase/kg/meal  Estimated Daily Intake: ~20 caps = 6000 units lipase/kg/day  *Previously discussed option to trial Zenpep however some concerns with cost and pt prefers to stay on Creon at this time.      GI Concerns: Ongoing GI concerns including days with very frequent stools 5+ per day; " often begins formed and then becomes loose throughout the day. Felt GI improvements while following Whole 30/E2M diet plans. Feels certain triggers include stress, sugar alcohols. Has seen Dr. Watts in the past (July 2018); historically noticed greatest improvement in GI symptoms after taking antibiotics (potentially with SIBO). Reports increased greasy/oily leakage if he misses enzymes or eats poorly with high-fat foods.   *Note- AXR today with mod stool burden.     Comments: takes metamucil 1 tbsp a few times a week; also has tried probiotics    Enzyme Program: CF [facility]     Nutrition-Related Lab & Vitamin/Mineral Supplements  Annual studies completed 4/5/2024  Vitamin A- 0.76 wnl  Vitamin D- 66 wnl --> addressed by endocrine; decreased Vit D supplement  Vitamin E- 17.1 wnl  Iron- 112 wnl  Lipid Panel- wnl, taking Lipitor for previously high LDL     OGTT- 2024, wnl; 94 fasting, 144 at 1-hr, 64 at 2-hr    DEXA - 2023, lowest z-score -1.2 WNL   - Hx: endo Aug 2021 for low bone density; 24 hr urine calcium noted hypercalciuria and educated on lower sodium diet; started fosamax 70 mg weekly. Recently seen by endo 5/21/24; plan for fosamax x 3 years given improvement, then plan for drug holiday.      Current Vitamin/Mineral Supplements: MVW Complete 1 softgel daily (Care Forward), Fish Oil, calcium citrate malate 700 mg, Vitamin D 1000 units     Diet History and Assessment  Diet Preferences/Allergies/Intolerances: Regular  Intake Recall/Comments: Typically TID meals and TID snacks. Reports good appetite.     Diet Recall:  B- banana with peanut butter, bagel or toast with cream cheese and walnuts or eggs  AM Snack - Rx bar and protein powder/greens powder  L- deli meat and cheese sandwich or leftovers  PM snack- beef stick, cheese and crackers  D- meat and veggies, sweet potato tots, broccoli and cheese tots, sloppy kahlil's, olives  HS snack- apple and PB or PB on english muffin     Calcium: supplement   Salt:  adequate per food choices  Hydration: water, [place], occasional milk  Supplements: No - contributed to GI issues/looser stools     NUTRITION DIAGNOSIS  Impaired nutrient utilization related to CF pancreatic insufficiency as evidenced by requires pancreatic enzyme replacement therapy and vitamin/mineral supplementation in order to maintain ideal body weight and nutrition status.       INTERVENTIONS/RECOMMENDATIONS   1) Oral Intake/Weight:   BEE: ~1850  Calorie Goals- 3955-9447 kcals/day (150-175% BEE)   Protein Goals- 100-120 grams/day (1.2-1.5 g/kg)     Discussed current nutrition status and goals. Emphasized importance of adequate fluid intake during summer months, particularly if increased physical activity and outdoors. Continue intake of healthy foods including lean proteins, healthy fats, fruits/vegetables, whole grains, etc.     2) GI/Enzymes:  Continued discussion regarding ongoing GI concerns. Last evaluated by CF GI in 2018. Unclear if foods are primary triggers are pt reports periods of feeling improvement on restrictive diets/trip to [place]; plan for AXR to evaluate if stool burden could be playing a role. Also strongly encouraged pt to see CF GI.     3) Vitamin/Mineral Supplementation:  Reviewed results of annual study labs. Continue with current vitamin supplementation at this time.       GOALS:  1) Maintain BMI >23 kg/m2  2) Take enzymes/vitamins as recommended      FOLLOW-UP/MONITORING:  Visit patient within 12 months for annual nutrition reassessment.     Time Spent In Face-to-Face Patient Interactions: 15 minutes    Nisa Dumont RD, ENRRIQUE, CACFD  Cystic Fibrosis/Lung Transplant Dietitian  Available via Fridge           Again, thank you for allowing me to participate in the care of your patient.        Sincerely,        True Sahni MD

## 2024-06-27 NOTE — PROGRESS NOTES
Reason for Visit  Philip Delacruz is a 44 year old year old male who is being seen for RECHECK (Return Cystic Fibrosis )      Assessment and plan:   Shar Coley is a 43 year-old male with a history of cystic fibrosis with mild lung disease and pancreatic insufficiency.     Maintenance   Modulator: Trikafta  Mutation:  [id number], Poly T Variant:  [ 7T ]/[9T  ]  AW Clearance: Exercise  Bronchodilators: Albuterol MDI PRN  Mucolytics: none   Antibiotics Inh:  none   Antibiotics Oral: none   Other:   Colonization Hx: Rhizobium (agrobacterium) radiobacter, Aspergillus versicolor, Serratia marcescens, staphylococcus aureus, moraxella (branhamella) catarrhalis  Annual studies due:April 2023  Contraindications to standard of care :  COLONOSCOPY: 9/26/2022, no polyps.    Pulmonary/cystic fibrosis: Patient reports excellent exercise tolerance.  Oxygenation adequate at 96%.  PFTs are in the normal range although there has been a little bit of a downward trend over the last few visits.  Patient does not appear to be having a cystic fibrosis exacerbation at this time.  He will continue using  exercise as his main form of airway clearance.  If there is progressive decline in PFTs, addition of more formal airway clearance will be pursued.    CFTR Modulation: CFTR genotype is [id number], Poly T Variant:  [ 7T ]/[9T  ].  He has generally had an excellent response to Trikafta with decreased respiratory symptoms and rare exacerbations.  LFTs are within normal limits but CK is elevated.  He did do vigorous exercise the day before the clinic visit.  Trikafta labs will be rechecked in the next 1 to 2 weeks.  The patient was instructed not to exercise for 3-4 days prior to lab testing.    GI: Patient reports frequent loose stool with some incontinence and bloating.  Etiology is unclear.  Abdominal x-ray and stool studies will be checked and GI referral to the GI CF clinic will be submitted.    Pancreatic insufficiency: The  "patient reports loose stool, possibly related to malabsorption although the symptoms are not typical.  Evaluation as noted above.  If evaluation is unrevealing and symptoms persist, need to consider outdated/inactive pancreatic enzyme supply.  For now, continue current pancreatic enzyme replacement and supplemental vitamins.    CF-related sinus disease: Recent increase in sinus symptoms.  The [facility].  If symptoms persist, the patient will follow-up with his ENT.    Depression/anxiety: Continue Lexapro.    Bone health: Continue calcium and vitamin D.  Alendronate was discontinued.  Endocrinology had recommended stopping the alendronate this fall but the patient stopped it more recently.    Healthcare maintenance: Annual studies were performed between visits.  Results were reviewed with the patient.  Other than the elevated CK, results were unremarkable.  No evidence of diabetes on glucose tolerance testing.    Follow-up in 3 months with PFTs and sputum culture.  Trikafta labs in the next 1-2 weeks.  Abdominal x-ray today.  Stool studies in the next 1-2 weeks the patient's convenience.    The longitudinal plan of care for the diagnosis(es)/condition(s) as documented were addressed during this visit. Due to the added complexity in care, I will continue to support Shar in the subsequent management and with ongoing continuity of care.  True Sahni MD                   CF HPI  The patient was seen and examined by True Sahni MD   The patient reports loose/watery stool with multiple trips to the bathroom without fecal incontinence mucus in the stool for the past few weeks.  For stooling is in the morning.  He also has occasional gas and bloating.  No abdominal pain.  Tends to be worse with \"sugar alcohols\" found in artificial sweeteners.  No nausea or vomiting.  The patient reduced his acyclovir and escitalopram to see if this would help with " the GI symptoms with no relief.    He is comfortable at rest and with all activity.  He does CrossFit 3-4 times per week.  No cough.  No sputum.  No chest pain.  Exercise as his primary form of airway clearance.  Review of systems:  Patient reports that mucus in his nasal cavity with large nasal plugs removed 1-2 times per week.  Appetite is good  Intermittent ear pain.  No sore throat.  A complete ROS was otherwise negative except as noted in the HPI.    Current Outpatient Medications   Medication Sig Dispense Refill    acyclovir (ZOVIRAX) 400 MG tablet Take 200 mg by mouth daily      alendronate (FOSAMAX) 70 MG tablet Take 1 tablet (70 mg) by mouth every 7 days *take 60 minutes before morning meal with 8 oz of water and remain upright for 30 minutes. 12 tablet 3    atorvastatin (LIPITOR) 10 MG tablet TAKE 1 TABLET BY MOUTH ONCE DAILY 90 tablet 2    Ca Cit Malate-Cholecalciferol (CALCIUM CITRATE MALATE-VIT D) 250-100 MG-UNIT TABS Take 1 tablet by mouth daily Activated calcium by Nutrikey      cholecalciferol (VITAMIN D3) 125 mcg (5000 units) capsule Take 125 mcg by mouth daily      CREON 59640-87837 units CPEP per EC capsule TAKE 4-5 CAPSULES BY MOUTH THREE TIMES A DAY WITH MEALS. TAKE 2-3 CAPSULES WITH SNACKS (TOTAL 3 MEALS AND 3 SNACKS DAILY) 1890 capsule 3    escitalopram (LEXAPRO) 20 MG tablet TAKE ONE TABLET BY MOUTH ONCE DAILY 30 tablet 3    fluticasone (FLONASE) 50 MCG/ACT nasal spray Spray 1 spray into both nostrils daily as needed for rhinitis or allergies      lactobacillus rhamnosus, GG, (CULTURELL) capsule Take 1 capsule by mouth daily as needed Bifido      mvw complete formulation (SOFTGELS) capsule Take 1 capsule by mouth daily      olopatadine (PATADAY) 0.2 % ophthalmic solution PLACE 1 DROP INTO BOTH EYES DAILY 2.5 mL 11    Omega-3 Fatty Acids (FISH OIL) 1200 MG CPDR Take 2 capsules by mouth daily      Sodium Chloride-Sodium Bicarb (SINUFLO READYRINSE) KIT Daily PRN      TRIKAFTA 100-50-75 & 150 MG  tablet pack TAKE TWO ORANGE TABLETS BY MOUTH IN THE MORNING AND ONE BLUE TABLET IN THE EVENING AS DIRECTED ON PACKAGE.  TAKE WITH FAT CONTAINING FOOD. 84 tablet 4     No current facility-administered medications for this visit.     No Known Allergies  Past Medical History:   Diagnosis Date    Chronic sinusitis     Congenital absence of vas deferens, bilateral     Cystic fibrosis (H)     Delta F508 and Z7856Y     History of COVID-19 01/2022    Back pain, chills and mild cough, resolved without intervention    Nasal polyps     Sinusitis, chronic        Past Surgical History:   Procedure Laterality Date    COLONOSCOPY N/A 9/26/2022    Procedure: COLONOSCOPY, WITH POLYPECTOMY AND BIOPSY;  Surgeon: Bennett Fajardo MD;  [facility]: [facility] TOOTH EXTRACTION W/FORCEP      NASAL/SINUS POLYPECTOMY      REPAIR PECTUS EXCAVATUM  1997    SINUS SURGERY  1992, 2002, 2004    Removed tubinates and polyps OSH    Sperm harvest         Social History     Socioeconomic History    Marital status:      Spouse name: Not on file    Number of children: Not on file    Years of education: Not on file    Highest education level: Not on file   Occupational History    Occupation: Teacher   Tobacco Use    Smoking status: Never    Smokeless tobacco: Never   Substance and Sexual Activity    Alcohol use: Yes     Comment: 1 drink/week    Drug use: No     Comment: tried marijuana gummy    Sexual activity: Yes     Partners: Female     Birth control/protection: None   Other Topics Concern    Parent/sibling w/ CABG, MI or angioplasty before 65F 55M? Not Asked   Social History Narrative    Lives in [place] with wife, Aimee, and sons, Shay and Serafin. Part time teaching special ed.     Social Determinants of Health     Financial Resource Strain: Not on file   Food Insecurity: Not on file   Transportation Needs: Not on file   Physical Activity: Not on file   Stress: Not on file   Social Connections: Not on file   Interpersonal Safety: Not on  file   Housing Stability: Not on file         /84   Pulse 77   Ht 1.829 m (6')   Wt 89 kg (196 lb 3.4 oz)   SpO2 96%   BMI 26.61 kg/m    Body mass index is 26.61 kg/m .  Exam:   GENERAL APPEARANCE: Well developed, well nourished, alert, and in no apparent distress.  EYES: PERRL, EOMI  HENT: Nasal mucosa with edema and no hyperemia. No nasal polyps.  EARS: Right auditory canal obscured by cerumen.  Left canal and tympanic membrane are normal.  MOUTH: Oral mucosa is moist, without any lesions, no tonsillar enlargement, no oropharyngeal exudate.  NECK: supple, no masses, no thyromegaly.  LYMPHATICS: No significant axillary, cervical, or supraclavicular nodes.  RESP: normal percussion, good air flow throughout.  No crackles. No rhonchi. No wheezes.  CV: Normal S1, S2, regular rhythm, normal rate. No murmur.  No rub. No gallop. No LE edema.   ABDOMEN:  Bowel sounds normal, soft, nontender, no HSM or masses.   MS: extremities normal. No clubbing. No cyanosis.  SKIN: no rash on limited exam  NEURO: Mentation intact, speech normal, normal strength and tone, normal gait and stance  PSYCH: mentation appears normal. and affect normal/bright  Results:  Recent Results (from the past 168 hour(s))   Hepatic function panel    Collection Time: 06/27/24  1:09 PM   Result Value Ref Range    Protein Total 7.1 6.4 - 8.3 g/dL    Albumin 4.5 3.5 - 5.2 g/dL    Bilirubin Total 1.0 <=1.2 mg/dL    Alkaline Phosphatase 71 40 - 150 U/L    AST 39 0 - 45 U/L    ALT 33 0 - 70 U/L    Bilirubin Direct 0.26 0.00 - 0.30 mg/dL   CK total    Collection Time: 06/27/24  1:09 PM   Result Value Ref Range     (H) 39 - 308 U/L   General PFT Lab (Please always keep checked)    Collection Time: 06/27/24  1:16 PM   Result Value Ref Range    FVC-Pred 5.18 L    FVC-Pre 4.95 L    FVC-%Pred-Pre 95 %    FEV1-Pre 4.12 L    FEV1-%Pred-Pre 98 %    FEV1FVC-Pred 81 %    FEV1FVC-Pre 83 %    FEFMax-Pred 10.65 L/sec    FEFMax-Pre 10.20 L/sec     FEFMax-%Pred-Pre 95 %    FEF2575-Pred 4.01 L/sec    FEF2575-Pre 4.57 L/sec    JEG0872-%Pred-Pre 113 %    ExpTime-Pre 5.63 sec    FIFMax-Pre 8.20 L/sec    FEV1FEV6-Pred 82 %    FEV1FEV6-Pre 83 %                   Results as noted above.    PFT Interpretation:  Normal spirometry.  Decreased from previous.  Below recent best.   Valid Maneuver             CF Exacerbation  Absent

## 2024-06-27 NOTE — PATIENT INSTRUCTIONS
Cystic Fibrosis Self-Care Plan    RECOMMENDATIONS:   Help us provide the best possible care. If you receive a questionnaire from the CF Foundation about your clinic experience today please fill it out.  It should take less than 5 minutes. Let us know what we are doing well and how we can improve.    Abdominal xray today  Stool studies  GI Clinic visit with Homa Ibrahim  Consider ENT follow up for sinus symptoms.  Continue current medication and exercise  Recheck labs in 2-4 weeks, no exercise for at least 4 days before labs and stay well hydrated.          Cystic Fibrosis :    Bebe Vaughn  [phone]  [facility] [place] Nurse line:  Nicole    [phone]     [facility] Fax Number:      [phone]         Cystic Fibrosis Respiratory Therapists:   Janeth Ryan                          [phone]          Loida Trinidad   [phone]  Cystic Fibrosis Dietitians:              Nisa Dumont              [phone]                            April Cardona                        [phone]   Cystic Fibrosis Diabetes Nurse:    Barbara Birmingham               [phone]    Cystic Fibrosis Social Workers:     Amber Davis              [phone]                     Debora Joseph               [phone]  Cystic Fibrosis Pharmacists:           Ileana Kilgore                              [phone] (pager)         Lennie Lara      [phone]   Cystic Fibrosis Genetic Counselor:   Faby Flores    [phone]    [facility] website:  [email or web address]    We have recently learned about an albuterol neb solution shortage due to a manufacturing delay. There is still a small supply coming in but not enough to meet the current demand. We do not yet have an estimate for when this will become widely available again.    We our asking our CF community to do the following:    Please take time to check your supply of albuterol neb solution at home.  We recommend keeping at least a 2-week supply of albuterol nebs at home in case of illness.    2.  If you have an albuterol inhaler at home, you can use 4 puffs of the inhaler with a spacer in place of the nebulized albuterol at the start of your treatments. It is important to use a spacer for the best technique. If you do not have a spacer at home or have questions on technique, we will be happy to review and send one to your home address. Please also take a moment to check that your albuterol HFA inhaler is available and not .  inhalers may be less effective as the medication loses its potency or power. In some instances your team may suggest another alternative instead of an albuterol inhaler.    3.  Please take care in requesting refills. Albuterol neb solution is a life-saving medication for patients having severe asthma attacks and other emergency respiratory conditions. Let s work together to make sure that albuterol neb solution is available to those who need it urgently.    Reach out to your care team with questions and to confirm your planned alternative for albuterol nebs. [employer] will be the fastest way to connect. If possible, please reserve the nursing line for more urgent concerns as we are short on nursing staff.    Here are some reputable sites with more information:    https://www.cidrap.[facility].edu/resilient-drug-supply/us-liquid-albuterol-rgdpxocz-snghrzin-evpdvc-after-[email or web address]-shuts-down    https://www.[employer]//health/albuterol-shortage    https://www.ashp.org/drug-shortages/current-shortages/drug-shortage-detail.aspx?ac=080&loginreturnUrl=SSOCheckOnly       MRN: [id number]   Clinic Date: 2024   Patient: Philip Delacruz     Annual Studies:   IGG   Date Value Ref Range Status   2021 1,198 610 - 1,616 mg/dL Final     Immunoglobulin G   Date Value Ref Range Status   2024 1,198 610 - 1,616 mg/dL Final     Insulin   Date Value Ref Range Status    04/05/2024 6.1 2.6 - 24.9 uU/mL Final   05/02/2019 9.0 3 - 25 mU/L Final     There are no preventive care reminders to display for this patient.    Pulmonary Function Tests  FEV1: amount of air you can blow out in 1 second  FVC: total amount of air you can take in and blow out    Your Goals:             Latest Ref Rng & Units 6/27/2024     1:16 PM   PFT   FVC L 4.95  P   FEV1 L 4.12  P   FVC% % 95  P   FEV1% % 98  P      P Preliminary result          Airway Clearance: The Most Important Way to Keep Your Lungs Healthy  Exercise for airway clearance.    Good Nutrition Can Improve Lung Function and Overall Health    Take ALL of your vitamins with food    Take 1/2 of your enzymes before EVERY meal/snack and the other 1/2 mid-meal/snack    Wt Readings from Last 3 Encounters:   06/27/24 89 kg (196 lb 3.4 oz)   04/05/24 89.5 kg (197 lb 4.8 oz)   01/18/24 88.5 kg (195 lb)       Body mass index is 26.61 kg/m .         National CF Foundation Recommendations for BMI in CF Adults: Women: at least 22 Men: at least 23        Controlling Blood Sugars Helps Prevent Lung Infections & Improves Nutrition  Test blood sugar:    In the morning before eating (goal is )    2 hours after a meal (goal is less than 150)    When pre-meal glucose is greater than 150 add correction    At bedtime (if less than 100 eat a snack with 15 grams of carbohydrates  Last A1C Results:   Hemoglobin A1C   Date Value Ref Range Status   04/05/2024 5.2 <5.7 % Final     Comment:     Normal <5.7%   Prediabetes 5.7-6.4%    Diabetes 6.5% or higher     Note: Adopted from ADA consensus guidelines.   04/08/2021 4.8 0 - 5.6 % Final     Comment:     Normal <5.7% Prediabetes 5.7-6.4%  Diabetes 6.5% or higher - adopted from ADA   consensus guidelines.           If diabetic, measure A1C every 6 months. Goal: Under 7%    Staying Healthy  Research:  If you are interested in learning about research opportunities or have questions, please contact the CF Research Team  at [phone] or [email or web address].    CF Foundation:  Compass is a personalized resource service to help you with the insurance, financial, legal and other issues you are facing.  It's free, confidential and available to anyone with CF.  Ask your  for more information or contact Compass directly at 226-TLNUSLY ([phone]) or compass@cff.org, or learn more at cff.org/compass.

## 2024-06-27 NOTE — PROGRESS NOTES
Clinical Update:                                                    A chart review was conducted for Philip Delacruz.    Reason for Chart Review: Trikafta Lab Monitoring     Discussion: Philip has been on Trikafta since 12/30/19. Per chart review Shar continues full dose Trikafta.      Labs were reviewed from 6/27/2024 at [facility] . CK is elevated at 531 U/L, above acceptable range of <300 U/L.    Lab Results   Component Value Date    ALT 33 06/27/2024    AST 39 06/27/2024    BILITOTAL 1.0 06/27/2024    DBIL 0.26 06/27/2024     (H) 06/27/2024       Plan:  1. Continue Trikafta   2. Recheck CK in 2 to 4 weeks per Dr. Kaitlyn Lara, PharmD  Cystic Fibrosis MTM Pharmacist  [facility]  Voicemail: [phone]

## 2024-06-27 NOTE — NURSING NOTE
Chief Complaint   Patient presents with    RECHECK     Return Cystic Fibrosis     Medications reviewed and vital signs taken.   Darrian Velázquez, CMA

## 2024-07-01 ENCOUNTER — TELEPHONE (OUTPATIENT)
Dept: PULMONOLOGY | Facility: CLINIC | Age: 44
End: 2024-07-01
Payer: COMMERCIAL

## 2024-07-01 NOTE — TELEPHONE ENCOUNTER
Patient Contacted for the patient to call back and schedule the following:    Appointment type: Return CF  Provider: Kaitlyn  Return date: 9/27/2024  Specialty phone number: [phone]  Additional appointment(s) needed: cf loop, lab  Additonal Notes: na

## 2024-07-02 ENCOUNTER — TELEPHONE (OUTPATIENT)
Dept: PULMONOLOGY | Facility: CLINIC | Age: 44
End: 2024-07-02

## 2024-07-02 LAB — BACTERIA SPEC CULT: NORMAL

## 2024-07-08 NOTE — TELEPHONE ENCOUNTER
PA Initiation    Medication: TRIKAFTA 100-50-75 & 150 MG PO TBPK  Insurance Company: [employer] - Phone [phone] Fax [phone]  Pharmacy Filling the Rx: [facility] - [place], [place] - [address]  Filling Pharmacy Phone:    Filling Pharmacy Fax:    Start Date: 7/8/2024    Key: [id number]

## 2024-07-08 NOTE — TELEPHONE ENCOUNTER
Prior Authorization Approval    Medication: TRIKAFTA 100-50-75 & 150 MG PO TBPK  Authorization Effective Date: 6/8/2024  Authorization Expiration Date: 1/8/2025  Approved Dose/Quantity: 84 for 28 ds   Reference #: Key: [id number]   Insurance Company: [employer] [place] - Phone [phone] Fax [phone]  Expected CoPay: $    CoPay Card Available: Yes    Financial Assistance Needed:   Which Pharmacy is filling the prescription: [facility] - [place], [place] - [address] [facility] [address]   Pharmacy Notified:   Patient Notified:     COPAY CARD OBTAINED    Medication: TRIKAFTA 100-50-75 & 150 MG PO TBPK  Sponsor: [employer]  Copay card Monthly Max Amount: $ 3,500  Copay card Annual Amount: $ 20,000    Updated [employer] with PA information  Mychart sent to patient and message to [facility] with approval information and he needs ASAP, as took last doses 7/8.

## 2024-07-11 ENCOUNTER — LAB (OUTPATIENT)
Dept: LAB | Facility: CLINIC | Age: 44
End: 2024-07-11
Payer: COMMERCIAL

## 2024-07-11 DIAGNOSIS — E84.9 CF (CYSTIC FIBROSIS) (H): ICD-10-CM

## 2024-07-11 LAB — LACTOFERRIN STL QL IA: NEGATIVE

## 2024-07-11 PROCEDURE — 82550 ASSAY OF CK (CPK): CPT

## 2024-07-11 PROCEDURE — 80076 HEPATIC FUNCTION PANEL: CPT

## 2024-07-11 PROCEDURE — 83630 LACTOFERRIN FECAL (QUAL): CPT | Performed by: INTERNAL MEDICINE

## 2024-07-11 PROCEDURE — 36415 COLL VENOUS BLD VENIPUNCTURE: CPT

## 2024-07-11 PROCEDURE — 99000 SPECIMEN HANDLING OFFICE-LAB: CPT | Performed by: PATHOLOGY

## 2024-07-12 LAB
ALBUMIN SERPL BCG-MCNC: 4.6 G/DL (ref 3.5–5.2)
ALP SERPL-CCNC: 68 U/L (ref 40–150)
ALT SERPL W P-5'-P-CCNC: 33 U/L (ref 0–70)
AST SERPL W P-5'-P-CCNC: 34 U/L (ref 0–45)
BILIRUB DIRECT SERPL-MCNC: <0.2 MG/DL (ref 0–0.3)
BILIRUB SERPL-MCNC: 0.8 MG/DL
CK SERPL-CCNC: 145 U/L (ref 39–308)
PROT SERPL-MCNC: 7.4 G/DL (ref 6.4–8.3)

## 2024-07-29 DIAGNOSIS — F41.9 ANXIETY: ICD-10-CM

## 2024-07-29 RX ORDER — ESCITALOPRAM OXALATE 20 MG/1
20 TABLET ORAL DAILY
Qty: 30 TABLET | Refills: 3 | Status: SHIPPED | OUTPATIENT
Start: 2024-07-29

## 2024-07-30 ENCOUNTER — CLINICAL UPDATE (OUTPATIENT)
Dept: PHARMACY | Facility: CLINIC | Age: 44
End: 2024-07-30
Payer: COMMERCIAL

## 2024-07-30 DIAGNOSIS — E84.9 CYSTIC FIBROSIS (H): Primary | ICD-10-CM

## 2024-07-30 PROCEDURE — 99207 PR NO CHARGE LOS: CPT | Performed by: PHARMACIST

## 2024-07-30 NOTE — PROGRESS NOTES
Clinical Update:                                                    A chart review was conducted for Philip Delacruz.    Reason for Chart Review: Trikafta Lab Monitoring     Discussion: Philip has been on Trikafta since 12/30/19. Shar had a recent CK elevation which required closer monitoring. Per chart review Shar continues full dose Trikafta.      Labs were reviewed from 7/11/24 at [facility] . All labs are within normal limits.    Lab Results   Component Value Date    ALT 33 07/11/2024    AST 34 07/11/2024    BILITOTAL 0.8 07/11/2024    DBIL <0.20 07/11/2024     07/11/2024       Plan:  1. Continue Trikafta   2. Recheck hepatic panel and CK in 6 months      Lennie Lara, PharmD  Cystic Fibrosis MTM Pharmacist  [facility]  Voicemail: [phone]

## 2024-09-12 ENCOUNTER — OFFICE VISIT (OUTPATIENT)
Dept: PULMONOLOGY | Facility: CLINIC | Age: 44
End: 2024-09-12
Attending: DIETITIAN, REGISTERED
Payer: COMMERCIAL

## 2024-09-12 VITALS — DIASTOLIC BLOOD PRESSURE: 82 MMHG | OXYGEN SATURATION: 96 % | SYSTOLIC BLOOD PRESSURE: 119 MMHG | HEART RATE: 88 BPM

## 2024-09-12 DIAGNOSIS — F41.9 ANXIETY: ICD-10-CM

## 2024-09-12 DIAGNOSIS — K86.81 EXOCRINE PANCREATIC INSUFFICIENCY: ICD-10-CM

## 2024-09-12 DIAGNOSIS — E84.0 CYSTIC FIBROSIS WITH PULMONARY MANIFESTATIONS (H): Primary | ICD-10-CM

## 2024-09-12 DIAGNOSIS — R19.8 ALTERNATING CONSTIPATION AND DIARRHEA: ICD-10-CM

## 2024-09-12 PROCEDURE — 99213 OFFICE O/P EST LOW 20 MIN: CPT | Performed by: DIETITIAN, REGISTERED

## 2024-09-12 PROCEDURE — 99215 OFFICE O/P EST HI 40 MIN: CPT | Performed by: DIETITIAN, REGISTERED

## 2024-09-12 PROCEDURE — G2211 COMPLEX E/M VISIT ADD ON: HCPCS | Performed by: DIETITIAN, REGISTERED

## 2024-09-12 PROCEDURE — 99417 PROLNG OP E/M EACH 15 MIN: CPT | Performed by: DIETITIAN, REGISTERED

## 2024-09-12 ASSESSMENT — PAIN SCALES - GENERAL: PAINLEVEL: NO PAIN (0)

## 2024-09-12 NOTE — PROGRESS NOTES
CYSTIC FIBROSIS GI CLINIC VISIT - NEW PATIENT    CC/REFERRING PROVIDER: Kaitlyn  REASON FOR CONSULTATION: loose stools    HPI: 44 year old male with history of cystic fibrosis (on Trikafta) and pancreatic insufficiency here for evaluation of irregular bowel pattern with loose stools.    Previously evaluated by Dr. Watts, last in 2018. At that time was having abdominal discomfort, variable stool pattern, fecal leakage and bloating that responded well to adjustment in enzymes and antibiotic exposure.  At that time symptoms suspected to be driven by pancreatic insufficiency and SIBO.    He reports ongoing GI concerns including frequent bowel movements, loose stools, fecal leakage, and ongoing bloating though this improved with switch to Trikafta.  Notes specific food triggers for bloating including sugar alcohols, beer, pizza.  Hot/cold foods tend to trigger bowel movements.  Felt the best well on eager to move diet program though lost a lot of weight.  He has responded to antibiotics in the past, improved bloating but no change in stool pattern.    He had abdominal x-ray in June that showed moderate stool burden.  Following this he started MiraLAX 1 capful daily, took this for 1 to 2 weeks and then stopped.  Notes that when he was taking stools actually were more formed rather than loose.    Prior to recent short course of MiraLAX was having 3-5 loose, urgent stools per day.  Now (off MiraLAX) having 2-3 stools in the morning with 1 additional at night.  [place] 4-6.  No straining.  Typically feels fully evacuated.  No blood in stool or melena.  Reports leakage 2-3 times a week.  Does not sense when leakage happens.  Typically mucus  Mixed with stool.    Does report that stools are occasionally greasy/oily.  Taking Creon as prescribed.  Recently switched out to ensure fresh, unexpired brash.    He denies abdominal pain.  No nausea or vomiting.    Occasional heartburn, couple times a month.  Takes Tums, does not help  much.  No daily acid suppression.    Weight stable.    GI Related Medications:  Bowel meds:  -- Miralax PRN  -- Culturelle 1 capsule daily  Enzymes:  -- Creon 24,000 - 4-5 caps with meals, 2-3 with snacks  Acid suppression:  -- TUMS PRN    ROS: 10pt ROS performed and otherwise negative.    PERTINENT PAST MEDICAL HISTORY:  As noted above.  Past Medical History:   Diagnosis Date    Chronic sinusitis     Congenital absence of vas deferens, bilateral     Cystic fibrosis (H)     Delta F508 and E2950M     History of COVID-19 01/2022    Back pain, chills and mild cough, resolved without intervention    Nasal polyps     Sinusitis, chronic         PREVIOUS ABDOMINAL/GYNECOLOGIC SURGERIES:  As noted above.  Past Surgical History:   Procedure Laterality Date    COLONOSCOPY N/A 9/26/2022    Procedure: COLONOSCOPY, WITH POLYPECTOMY AND BIOPSY;  Surgeon: Bennett Fajardo MD;  Location:  GI    HC TOOTH EXTRACTION W/FORCEP      NASAL/SINUS POLYPECTOMY      REPAIR PECTUS EXCAVATUM  1997    SINUS SURGERY  1992, 2002, 2004    Removed tubinates and polyps OSH    Sperm harvest         PERTINENT MEDICATIONS:  Current Outpatient Medications   Medication Sig Dispense Refill    acyclovir (ZOVIRAX) 400 MG tablet Take 200 mg by mouth daily      atorvastatin (LIPITOR) 10 MG tablet TAKE 1 TABLET BY MOUTH ONCE DAILY 90 tablet 2    Ca Cit Malate-Cholecalciferol (CALCIUM CITRATE MALATE-VIT D) 250-100 MG-UNIT TABS Take 1 tablet by mouth daily Activated calcium by Nutrikey      escitalopram (LEXAPRO) 20 MG tablet TAKE ONE TABLET BY MOUTH ONCE DAILY 30 tablet 3    fluticasone (FLONASE) 50 MCG/ACT nasal spray Spray 1 spray into both nostrils daily as needed for rhinitis or allergies      lactobacillus rhamnosus, GG, (CULTURELL) capsule Take 1 capsule by mouth daily as needed Bifido      lipase-protease-amylase (CREON) 17488-18133-136783 units CPEP per EC capsule TAKE 4-5 CAPSULES BY MOUTH THREE TIMES A DAY WITH MEALS. TAKE 2-3 CAPSULES WITH  SNACKS (TOTAL 3 MEALS AND 3 SNACKS DAILY) 1900 capsule 3    mvw complete formulation (SOFTGELS) capsule Take 1 capsule by mouth daily      Omega-3 Fatty Acids (FISH OIL) 1200 MG CPDR Take 2 capsules by mouth daily      Sodium Chloride-Sodium Bicarb (SINUFLO READYRINSE) KIT Daily PRN      TRIKAFTA 100-50-75 & 150 MG tablet pack TAKE TWO ORANGE TABLETS BY MOUTH IN THE MORNING AND ONE BLUE TABLET IN THE EVENING AS DIRECTED ON PACKAGE.  TAKE WITH FAT CONTAINING FOOD. 84 tablet 4    Vitamin D3 (CHOLECALCIFEROL) 25 mcg (1000 units) tablet Take 1 tablet by mouth daily       No current facility-administered medications for this visit.      - Anticoagulation/Antiplatelet Agents: none  Medications reviewed with patient today, see Medication List/Assessment for details.  No other NSAID/anticoagulation reported by patient.  No other OTC/herbal/supplements reported by patient.    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 No IBD/celiac, no other AI/liver/thyroid disease. No known FH bleeding/clotting disorders.  Family History   Problem Relation Age of Onset    C.A.D. Maternal Grandfather     Diabetes Maternal Grandfather     Heart Disease Maternal Grandfather     Aneurysm Paternal Grandfather         Aortic    Gastrointestinal Disease Father         Reflux    Cancer Paternal Grandmother         Lymphoma    Diabetes Maternal Grandmother     Thyroid Disease Mother      () Other         PHYSICAL EXAMINATION:  Eyes: Sclera anicteric/injected  Ears/nose/mouth/throat: Normal oropharynx without ulcers or exudate, mucus membranes moist, hearing intact  Neck: supple, thyroid normal size  CV: No edema  Respiratory: Unlabored breathing  Lymph: No axillary, submandibular, supraclavicular or inguinal  lymphadenopathy  Abd: Nondistended, +bs, no hepatosplenomegaly, nontender, no peritoneal signs   Skin: warm, perfused, no jaundice  Psych: Normal affect  MSK: Normal gait    PREVIOUS ENDOSCOPY:  Colonoscopy 9/26/22: normal ileum and colon    PERTINENT STUDIES:  Imaging  AXR 6/27/24 - moderate stool burden (ascending)    Labs  LFTs wnl 7/2024  TSH wnl 1.84 4/2024  CBC wnl 4/2024    ASSESSMENT/PLAN:    # Irregular bowel pattern  # Loose stools  # Fecal leakage  # Bloating  Patient with long standing symptoms of bloating, irregular bowel movements, more consistently loose and urgent this summer. KUB showed moderate stool burden. He had some improvement with starting Mirlax 1 capful daily (stools more formed, less frequent). Given improvement with laxative suspicious for constipation with overflow. Other considerations include SIBO, celiac, pancreatic insufficiency, pelvic floor dysfunction, IBS/functional diarrhea, lactose intolerance/other carbohydrate malabsorption.     We will proceed with the following:    -- Start Miralax 1 capful daily  -- Abdominal X-Ray to assess stool burden in 1 month (cannot get done today)  -- Blood work for celiac disease  --------- If ongoing stool burden increase Miralax, could consider secretagogue, such as lubiprostone 8 mg daily  -------- If KUB shows no stool burden and celiac serologies negative, imperially treat SIBO with Rifaxamin  -- Track diet/enzymes/other factors to see if you notice any other specific triggers  -- avoid artifical sweeteners including sorbital, truvia, maltitol, mannitol, xylitol, glycerol, lactilol  -- Additional future considerations include: pelvic floor referral, low FODMAP diet, PERT adjustments (see below)    #Pancreatic Insuffiencey  Patient with pancreatic insuffiencey related to cystic fibrosis. Currently taking Creon as prescribed (Creon 24,000 - 4-5 caps with meals, 2-3 with snacks which provides 1350 units lipase/kg/meal). Occasionally oily stools,  more commonly loose stools without overt malabsorption. See above. Weight stable. Continue PERT and vitamin/mineral monitoring and replacement per CF dietitian. Could consider adjustment in dosing/alternate enzyme trial/trial of PPI pending ongoing symptoms as EPI could possibly be playing a role.      #CRC Screening  Colonoscopy 9/2022 normal, repeat in 5 years (2027)      RTC 4 months, sooner if symptomatic.     Thank you for this consultation. It was a pleasure to participate in the care of this patient; please contact us with any further questions.    65 Minutes was spent on the date of the encounter during chart review, history and exam, documentation, and further activities as noted     Homa Ibrahim PA-C  Division of Gastroenterology, Hepatology & Nutrition  [facility]      No orders of the defined types were placed in this encounter.

## 2024-09-12 NOTE — PATIENT INSTRUCTIONS
I've included a brief summary of our discussion and care plan from today's visit below.  Please review this information with your primary care provider.  _______________________________________________________________________      Shar , It was wonderful getting a chance to meet with you to discuss your symptoms.     Here is the plan we discussed:  -- Start Miralax 1 capful daily  -- Abdominal X-Ray to assess stool burden in 1 month  -- Blood work for celiac disease  -- Track diet/enzymes/other factors to see if you notice any other specific triggers  -- avoid artifical sweeteners including sorbital, truvia, maltitol, mannitol, xylitol, glycerol, lactilol      If you have any questions, please do not hesitate to contact our nurse, Cayla Collins, at [phone]. Fax # [phone]. (Attn: Homa Ibrahim/Soheila Collins)      Here are some numbers you may find useful now or in the future:    Imaging Appointments: [phone]    Schedule Follow Up Clinic Appts: [phone]   Clinic Fax Number: [phone]    Procedure/Endoscopy [phone]      - Contact us via MyChart or phone should these symptoms occur, particularly as we may advise further evaluation accordingly.     Return to GI Clinic in 4 months  - If you are unable to schedule this follow-up appointment today, please contact our  at [phone] within the next week to help set up this necessary appointment.    Sincerely,  Homa Ibrahim PA-C  Division of Gastroenterology, Hepatology & Nutrition  [facility]

## 2024-09-12 NOTE — LETTER
9/12/2024      Philip Delacruz  [address]  [place] [facility] [address]      Dear Colleague,    Thank you for referring your patient, Philip Delacruz, to the [facility] [place]. Please see a copy of my visit note below.    CYSTIC FIBROSIS GI CLINIC VISIT - NEW PATIENT    CC/REFERRING PROVIDER: Kaitlyn  REASON FOR CONSULTATION: loose stools    HPI: 44 year old male with history of cystic fibrosis (on Trikafta) and pancreatic insufficiency here for evaluation of irregular bowel pattern with loose stools.    Previously evaluated by Dr. Watts, last in 2018. At that time was having abdominal discomfort, variable stool pattern, fecal leakage and bloating that responded well to adjustment in enzymes and antibiotic exposure.  At that time symptoms suspected to be driven by pancreatic insufficiency and SIBO.    He reports ongoing GI concerns including frequent bowel movements, loose stools, fecal leakage, and ongoing bloating though this improved with switch to Trikafta.  Notes specific food triggers for bloating including sugar alcohols, beer, pizza.  Hot/cold foods tend to trigger bowel movements.  Felt the best well on eager to move diet program though lost a lot of weight.  He has responded to antibiotics in the past, improved bloating but no change in stool pattern.    He had abdominal x-ray in June that showed moderate stool burden.  Following this he started MiraLAX 1 capful daily, took this for 1 to 2 weeks and then stopped.  Notes that when he was taking stools actually were more formed rather than loose.    Prior to recent short course of MiraLAX was having 3-5 loose, urgent stools per day.  Now (off MiraLAX) having 2-3 stools in the morning with 1 additional at night.  [place] 4-6.  No straining.  Typically feels fully evacuated.  No blood in stool or melena.  Reports leakage 2-3 times a week.  Does not sense when leakage happens.  Typically mucus   Mixed with stool.    Does report that stools are occasionally greasy/oily.  Taking Creon as prescribed.  Recently switched out to ensure fresh, unexpired brash.    He denies abdominal pain.  No nausea or vomiting.    Occasional heartburn, couple times a month.  Takes Tums, does not help much.  No daily acid suppression.    Weight stable.    GI Related Medications:  Bowel meds:  -- Miralax PRN  -- Culturelle 1 capsule daily  Enzymes:  -- Creon 24,000 - 4-5 caps with meals, 2-3 with snacks  Acid suppression:  -- TUMS PRN    ROS: 10pt ROS performed and otherwise negative.    PERTINENT PAST MEDICAL HISTORY:  As noted above.  Past Medical History:   Diagnosis Date     Chronic sinusitis      Congenital absence of vas deferens, bilateral      Cystic fibrosis (H)     Delta F508 and X4807A      History of COVID-19 01/2022    Back pain, chills and mild cough, resolved without intervention     Nasal polyps      Sinusitis, chronic         PREVIOUS ABDOMINAL/GYNECOLOGIC SURGERIES:  As noted above.  Past Surgical History:   Procedure Laterality Date     COLONOSCOPY N/A 9/26/2022    Procedure: COLONOSCOPY, WITH POLYPECTOMY AND BIOPSY;  Surgeon: Bennett Fajardo MD;  Location: [facility]     HC TOOTH EXTRACTION W/FORCEP       NASAL/SINUS POLYPECTOMY       REPAIR PECTUS EXCAVATUM  1997     SINUS SURGERY  1992, 2002, 2004    Removed tubinates and polyps OSH     Sperm harvest         PERTINENT MEDICATIONS:  Current Outpatient Medications   Medication Sig Dispense Refill     acyclovir (ZOVIRAX) 400 MG tablet Take 200 mg by mouth daily       atorvastatin (LIPITOR) 10 MG tablet TAKE 1 TABLET BY MOUTH ONCE DAILY 90 tablet 2     Ca Cit Malate-Cholecalciferol (CALCIUM CITRATE MALATE-VIT D) 250-100 MG-UNIT TABS Take 1 tablet by mouth daily Activated calcium by Nutrikey       escitalopram (LEXAPRO) 20 MG tablet TAKE ONE TABLET BY MOUTH ONCE DAILY 30 tablet 3     fluticasone (FLONASE) 50 MCG/ACT nasal spray Spray 1 spray into both nostrils  daily as needed for rhinitis or allergies       lactobacillus rhamnosus, GG, (CULTURELL) capsule Take 1 capsule by mouth daily as needed Bifido       lipase-protease-amylase (CREON) 58648-33824-853002 units CPEP per EC capsule TAKE 4-5 CAPSULES BY MOUTH THREE TIMES A DAY WITH MEALS. TAKE 2-3 CAPSULES WITH SNACKS (TOTAL 3 MEALS AND 3 SNACKS DAILY) 1900 capsule 3     mvw complete formulation (SOFTGELS) capsule Take 1 capsule by mouth daily       Omega-3 Fatty Acids (FISH OIL) 1200 MG CPDR Take 2 capsules by mouth daily       Sodium Chloride-Sodium Bicarb (SINUFLO READYRINSE) KIT Daily PRN       TRIKAFTA 100-50-75 & 150 MG tablet pack TAKE TWO ORANGE TABLETS BY MOUTH IN THE MORNING AND ONE BLUE TABLET IN THE EVENING AS DIRECTED ON PACKAGE.  TAKE WITH FAT CONTAINING FOOD. 84 tablet 4     Vitamin D3 (CHOLECALCIFEROL) 25 mcg (1000 units) tablet Take 1 tablet by mouth daily       No current facility-administered medications for this visit.      - Anticoagulation/Antiplatelet Agents: none  Medications reviewed with patient today, see Medication List/Assessment for details.  No other NSAID/anticoagulation reported by patient.  No other OTC/herbal/supplements reported by patient.    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 No IBD/celiac, no other AI/liver/thyroid disease. No known FH bleeding/clotting disorders.  Family History   Problem Relation Age of Onset     C.A.D. Maternal Grandfather      Diabetes Maternal Grandfather      Heart Disease Maternal Grandfather      Aneurysm Paternal Grandfather         Aortic     Gastrointestinal Disease Father         Reflux     Cancer Paternal Grandmother         Lymphoma     Diabetes Maternal Grandmother      Thyroid Disease Mother       ()  Other         PHYSICAL EXAMINATION:  Eyes: Sclera anicteric/injected  Ears/nose/mouth/throat: Normal oropharynx without ulcers or exudate, mucus membranes moist, hearing intact  Neck: supple, thyroid normal size  CV: No edema  Respiratory: Unlabored breathing  Lymph: No axillary, submandibular, supraclavicular or inguinal lymphadenopathy  Abd: Nondistended, +bs, no hepatosplenomegaly, nontender, no peritoneal signs   Skin: warm, perfused, no jaundice  Psych: Normal affect  MSK: Normal gait    PREVIOUS ENDOSCOPY:  Colonoscopy 9/26/22: normal ileum and colon    PERTINENT STUDIES:  Imaging  AXR 6/27/24 - moderate stool burden (ascending)    Labs  LFTs wnl 7/2024  TSH wnl 1.84 4/2024  CBC wnl 4/2024    ASSESSMENT/PLAN:    # Irregular bowel pattern  # Loose stools  # Fecal leakage  # Bloating  Patient with long standing symptoms of bloating, irregular bowel movements, more consistently loose and urgent this summer. KUB showed moderate stool burden. He had some improvement with starting Mirlax 1 capful daily (stools more formed, less frequent). Given improvement with laxative suspicious for constipation with overflow. Other considerations include SIBO, celiac, pancreatic insufficiency, pelvic floor dysfunction, IBS/functional diarrhea, lactose intolerance/other carbohydrate malabsorption.     We will proceed with the following:    -- Start Miralax 1 capful daily  -- Abdominal X-Ray to assess stool burden in 1 month (cannot get done today)  -- Blood work for celiac disease  --------- If ongoing stool burden increase Miralax, could consider secretagogue, such as lubiprostone 8 mg daily  -------- If KUB shows no stool burden and celiac serologies negative, imperially treat SIBO with Rifaxamin  -- Track diet/enzymes/other factors to see if you notice any other specific triggers  -- avoid artifical sweeteners including sorbital, truvia, maltitol, mannitol, xylitol, glycerol, lactilol  -- Additional future considerations  include: pelvic floor referral, low FODMAP diet, PERT adjustments (see below)    #Pancreatic Insuffiencey  Patient with pancreatic insuffiencey related to cystic fibrosis. Currently taking Creon as prescribed (Creon 24,000 - 4-5 caps with meals, 2-3 with snacks which provides 1350 units lipase/kg/meal). Occasionally oily stools, more commonly loose stools without overt malabsorption. See above. Weight stable. Continue PERT and vitamin/mineral monitoring and replacement per CF dietitian. Could consider adjustment in dosing/alternate enzyme trial/trial of PPI pending ongoing symptoms as EPI could possibly be playing a role.      #CRC Screening  Colonoscopy 9/2022 normal, repeat in 5 years (2027)      RTC 4 months, sooner if symptomatic.     Thank you for this consultation. It was a pleasure to participate in the care of this patient; please contact us with any further questions.    65 Minutes was spent on the date of the encounter during chart review, history and exam, documentation, and further activities as noted     Homa Ibrahim PA-C  Division of Gastroenterology, Hepatology & Nutrition  [facility]      No orders of the defined types were placed in this encounter.           Again, thank you for allowing me to participate in the care of your patient.        Sincerely,        Homa Ibrahim PA-C

## 2024-09-12 NOTE — NURSING NOTE
Chief Complaint   Patient presents with    Cystic Fibrosis     CF follow up      Vitals were taken and medications were reconciled.    Mary RODRIGUEZ  8:06 AM

## 2024-09-16 ENCOUNTER — TELEPHONE (OUTPATIENT)
Dept: PULMONOLOGY | Facility: CLINIC | Age: 44
End: 2024-09-16
Payer: COMMERCIAL

## 2024-09-16 NOTE — TELEPHONE ENCOUNTER
Left Voicemail (1st Attempt) for the patient to call back and schedule the following:    Appointment type: RCF  Provider: SHIRLEY JHA  Return date: 01/12/2025  Specialty phone number: [phone]  Additional appointment(s) needed: LABS, X-RAY KUB  Additonal Notes: N/A

## 2024-09-18 NOTE — TELEPHONE ENCOUNTER
Patient Contacted for the patient to call back and schedule the following:    Appointment type: F  Provider: SHIRLEY JHA  Return date: 01/12/2025  Specialty phone number: [phone]  Additional appointment(s) needed: LABS, X-RAY KUB  Additonal Notes: in person or video

## 2024-10-03 ENCOUNTER — LAB (OUTPATIENT)
Dept: LAB | Facility: CLINIC | Age: 44
End: 2024-10-03
Attending: INTERNAL MEDICINE
Payer: COMMERCIAL

## 2024-10-03 ENCOUNTER — OFFICE VISIT (OUTPATIENT)
Dept: PULMONOLOGY | Facility: CLINIC | Age: 44
End: 2024-10-03
Attending: INTERNAL MEDICINE
Payer: COMMERCIAL

## 2024-10-03 ENCOUNTER — OFFICE VISIT (OUTPATIENT)
Dept: PHARMACY | Facility: CLINIC | Age: 44
End: 2024-10-03
Payer: COMMERCIAL

## 2024-10-03 ENCOUNTER — ALLIED HEALTH/NURSE VISIT (OUTPATIENT)
Dept: CARE COORDINATION | Facility: CLINIC | Age: 44
End: 2024-10-03

## 2024-10-03 VITALS
WEIGHT: 198.85 LBS | DIASTOLIC BLOOD PRESSURE: 92 MMHG | OXYGEN SATURATION: 98 % | HEIGHT: 72 IN | HEART RATE: 71 BPM | BODY MASS INDEX: 26.93 KG/M2 | SYSTOLIC BLOOD PRESSURE: 141 MMHG

## 2024-10-03 DIAGNOSIS — K86.89 PANCREATIC INSUFFICIENCY: ICD-10-CM

## 2024-10-03 DIAGNOSIS — E84.9 CYSTIC FIBROSIS (H): ICD-10-CM

## 2024-10-03 DIAGNOSIS — E84.9 CF (CYSTIC FIBROSIS) (H): ICD-10-CM

## 2024-10-03 DIAGNOSIS — R19.8 ALTERNATING CONSTIPATION AND DIARRHEA: ICD-10-CM

## 2024-10-03 DIAGNOSIS — K86.81 EXOCRINE PANCREATIC INSUFFICIENCY: ICD-10-CM

## 2024-10-03 DIAGNOSIS — E84.9 CYSTIC FIBROSIS (H): Primary | ICD-10-CM

## 2024-10-03 DIAGNOSIS — Z13.9 RISK AND FUNCTIONAL ASSESSMENT: Primary | ICD-10-CM

## 2024-10-03 DIAGNOSIS — E84.0 CYSTIC FIBROSIS WITH PULMONARY MANIFESTATIONS (H): Primary | ICD-10-CM

## 2024-10-03 DIAGNOSIS — E84.0 CYSTIC FIBROSIS WITH PULMONARY MANIFESTATIONS (H): ICD-10-CM

## 2024-10-03 DIAGNOSIS — E78.00 HYPERCHOLESTEROLEMIA: ICD-10-CM

## 2024-10-03 LAB
ALBUMIN SERPL BCG-MCNC: 4.5 G/DL (ref 3.5–5.2)
ALP SERPL-CCNC: 76 U/L (ref 40–150)
ALT SERPL W P-5'-P-CCNC: 19 U/L (ref 0–70)
AST SERPL W P-5'-P-CCNC: 27 U/L (ref 0–45)
BILIRUB DIRECT SERPL-MCNC: <0.2 MG/DL (ref 0–0.3)
BILIRUB SERPL-MCNC: 0.6 MG/DL
CK SERPL-CCNC: 207 U/L (ref 39–308)
PROT SERPL-MCNC: 7.5 G/DL (ref 6.4–8.3)

## 2024-10-03 PROCEDURE — 87070 CULTURE OTHR SPECIMN AEROBIC: CPT | Performed by: INTERNAL MEDICINE

## 2024-10-03 PROCEDURE — 99207 PR NO CHARGE LOS: CPT | Performed by: PHARMACIST

## 2024-10-03 PROCEDURE — 86258 DGP ANTIBODY EACH IG CLASS: CPT | Performed by: DIETITIAN, REGISTERED

## 2024-10-03 PROCEDURE — 82784 ASSAY IGA/IGD/IGG/IGM EACH: CPT | Performed by: DIETITIAN, REGISTERED

## 2024-10-03 PROCEDURE — 82550 ASSAY OF CK (CPK): CPT | Performed by: PATHOLOGY

## 2024-10-03 PROCEDURE — 86364 TISS TRNSGLTMNASE EA IG CLAS: CPT | Performed by: DIETITIAN, REGISTERED

## 2024-10-03 PROCEDURE — 36415 COLL VENOUS BLD VENIPUNCTURE: CPT | Performed by: PATHOLOGY

## 2024-10-03 PROCEDURE — 94375 RESPIRATORY FLOW VOLUME LOOP: CPT | Performed by: INTERNAL MEDICINE

## 2024-10-03 PROCEDURE — 80076 HEPATIC FUNCTION PANEL: CPT | Performed by: PATHOLOGY

## 2024-10-03 PROCEDURE — 99000 SPECIMEN HANDLING OFFICE-LAB: CPT | Performed by: PATHOLOGY

## 2024-10-03 RX ORDER — ALBUTEROL SULFATE 90 UG/1
2 INHALANT RESPIRATORY (INHALATION) EVERY 6 HOURS PRN
Qty: 18 G | Refills: 11 | Status: SHIPPED | OUTPATIENT
Start: 2024-10-03

## 2024-10-03 RX ORDER — ATORVASTATIN CALCIUM 10 MG/1
10 TABLET, FILM COATED ORAL DAILY
Qty: 90 TABLET | Refills: 3 | Status: SHIPPED | OUTPATIENT
Start: 2024-10-03

## 2024-10-03 RX ORDER — ELEXACAFTOR, TEZACAFTOR, AND IVACAFTOR 100-50-75
KIT ORAL
Qty: 84 TABLET | Refills: 4 | Status: SHIPPED | OUTPATIENT
Start: 2024-10-03

## 2024-10-03 ASSESSMENT — PAIN SCALES - GENERAL: PAINLEVEL: NO PAIN (0)

## 2024-10-03 NOTE — PROGRESS NOTES
Disease State Management Encounter:                          Shar Delacruz is a 44 year old male seen for  a covisit with Dr. Sahni and team.    Reason for visit: Annual Medication Review    Medication Adherence/Access:    Medication: Shar manages his own medications at home  Pharmacy: [facility]     CF:    Inhaled medications:   Other: Flonase 1 spray each nostril daily, and Sinuflo rinse as needed (daily currently)  Oral medications:   CFTR modulator: Trikafta (full dose)    Genotype: [id number]  Cultures (last growth): throat cultures grow MSSA (10/27/22) and H flu (10/27/22)  Lab Results   Component Value Date    ALT 19 10/03/2024    AST 27 10/03/2024    BILITOTAL 0.6 10/03/2024    DBIL <0.20 10/03/2024    ALKPHOS 76 10/03/2024     10/03/2024       Pancreatic Insufficiency/Nutrition:   Creon 59921 taking 5 capsules with meals and 2 to 3 capsules with snacks  Bowel regimen: probiotic daily as needed  Vitamins/Supplements: MVW complete daily, vitamin D 5000 units daily, calcium-vitamin D 250-100mg-unit 2 tabs daily, and fish oil 2400mg daily    Patient is not experiencing sign/symptoms of malabsorption.      PFTs:        Assessment/Plan:    Keep up the great work on medications!  Trikafta labs are within normal limits. Continue Trikafta, recheck hepatic panel and CK in 6 months with annuals (refilled today)      Follow-up: per Dr. Sahni    I spent 10 minutes with this patient today. I offer these suggestions for consideration by Dr. Sahni during covisit today.     A summary of these recommendations was given to the patient (see AVS from today's appointment with Dr. Sahni).    Lennie Lara, PharmD  Cystic Fibrosis MTM Pharmacist  [facility]  Voicemail: [phone]           Medication Therapy Recommendations  No medication therapy recommendations to display

## 2024-10-03 NOTE — PATIENT INSTRUCTIONS
Cystic Fibrosis Self-Care Plan    RECOMMENDATIONS:       Continue current medication and exercise.  Let us know if your cough or sputum increases.  Abdominal xray in the next 2-3 weeks at any [place].  PFTs and labs with next visit.  Albuterol 2 puffs before exercise.          Cystic Fibrosis :    Bebe Vaughn   [phone]  [facility] Nurse line:  Mike   [phone]     [facility] Fax Number:      [phone]         Cystic Fibrosis Respiratory Therapists:   Janeth Ryan                          [phone]          Loida Trinidad   [phone]  Cystic Fibrosis Dietitians:              iNsa Dumont              [phone]                            April Cardona                        [phone]   Cystic Fibrosis Diabetes Nurse:    Barbara Birmingham               [phone]    Cystic Fibrosis Social Workers:     Amber Davis              [phone]                     Debora Joseph               [phone]  Cystic Fibrosis Pharmacists:           Ileana Kilgore                              [phone] (pager)         Lennie Lara      [phone]   Cystic Fibrosis Genetic Counselor:   Faby Flores    [phone]    [facility] website:  [email or web address]    We have recently learned about an albuterol neb solution shortage due to a manufacturing delay. There is still a small supply coming in but not enough to meet the current demand. We do not yet have an estimate for when this will become widely available again.    We our asking our CF community to do the following:    Please take time to check your supply of albuterol neb solution at home. We recommend keeping at least a 2-week supply of albuterol nebs at home in case of illness.    2.  If you have an albuterol inhaler at home, you can use 4 puffs of the inhaler with a spacer in place of the nebulized albuterol at the start of your treatments. It is important to  use a spacer for the best technique. If you do not have a spacer at home or have questions on technique, we will be happy to review and send one to your home address. Please also take a moment to check that your albuterol HFA inhaler is available and not .  inhalers may be less effective as the medication loses its potency or power. In some instances your team may suggest another alternative instead of an albuterol inhaler.    3.  Please take care in requesting refills. Albuterol neb solution is a life-saving medication for patients having severe asthma attacks and other emergency respiratory conditions. Let s work together to make sure that albuterol neb solution is available to those who need it urgently.    Reach out to your care team with questions and to confirm your planned alternative for albuterol nebs. [email or web address] will be the fastest way to connect. If possible, please reserve the nursing line for more urgent concerns as we are short on nursing staff.    Here are some reputable sites with more information:    https://www.cidrap.[facility]/resilient-drug-supply/us-liquid-albuterol-gnarwpmx-eewqmlvq-wfeada-after-major-supplier-shuts-down    https://www.[employer]//health/albuterol-shortage    https://www.ashp.org/drug-shortages/current-shortages/drug-shortage-detail.aspx?nj=772&loginreturnUrl=SSOCheckOnly       MRN: [id number]   Clinic Date: October 3, 2024   Patient: Philip Delacruz     Annual Studies:   IGG   Date Value Ref Range Status   2021 1,198 610 - 1,616 mg/dL Final     Immunoglobulin G   Date Value Ref Range Status   2024 1,198 610 - 1,616 mg/dL Final     Insulin   Date Value Ref Range Status   2024 6.1 2.6 - 24.9 uU/mL Final   2019 9.0 3 - 25 mU/L Final     There are no preventive care reminders to display for this patient.    Pulmonary Function Tests  FEV1: amount of air you can blow out in 1 second  FVC: total amount of air you can take in and blow  out    Your Goals:             Latest Ref Rng & Units 10/3/2024     2:58 PM   PFT   FVC L 4.80  P   FEV1 L 3.93  P   FVC% % 92  P   FEV1% % 94  P      P Preliminary result          Airway Clearance: The Most Important Way to Keep Your Lungs Healthy  Vest Settings:   Hill-Rom Frequencies: 8, 9, 10 Pressure 10 Then, Frequencies 18, 19, 20 Pressure 6     RespirTech: Quick Start with Pressure of     Do each frequency for 5 minutes; Deflate vest after each frequency & cough 3 times before beginning the next setting.    Exercise for airway clearance.    Good Nutrition Can Improve Lung Function and Overall Health    Take ALL of your vitamins with food    Take 1/2 of your enzymes before EVERY meal/snack and the other 1/2 mid-meal/snack    Wt Readings from Last 3 Encounters:   10/03/24 90.2 kg (198 lb 13.7 oz)   06/27/24 89 kg (196 lb 3.4 oz)   04/05/24 89.5 kg (197 lb 4.8 oz)       Body mass index is 26.97 kg/m .         National CF Foundation Recommendations for BMI in CF Adults: Women: at least 22 Men: at least 23        Controlling Blood Sugars Helps Prevent Lung Infections & Improves Nutrition  Test blood sugar:    In the morning before eating (goal is )    2 hours after a meal (goal is less than 150)    When pre-meal glucose is greater than 150 add correction    At bedtime (if less than 100 eat a snack with 15 grams of carbohydrates  Last A1C Results:   Hemoglobin A1C   Date Value Ref Range Status   04/05/2024 5.2 <5.7 % Final     Comment:     Normal <5.7%   Prediabetes 5.7-6.4%    Diabetes 6.5% or higher     Note: Adopted from ADA consensus guidelines.   04/08/2021 4.8 0 - 5.6 % Final     Comment:     Normal <5.7% Prediabetes 5.7-6.4%  Diabetes 6.5% or higher - adopted from ADA   consensus guidelines.           If diabetic, measure A1C every 6 months. Goal: Under 7%    Staying Healthy  Research:  If you are interested in learning about research opportunities or have questions, please contact the CF  Research Team at [phone] or [email or web address].    CF Foundation:  Compass is a personalized resource service to help you with the insurance, financial, legal and other issues you are facing.  It's free, confidential and available to anyone with CF.  Ask your  for more information or contact Compass directly at 974-ZCWLQBD ([phone]) or compass@cff.org, or learn more at cff.org/compass.

## 2024-10-03 NOTE — PROGRESS NOTES
Adult Cystic Fibrosis Program  Annual Psychosocial Assessment    Presenting Information:  SHAR is a 44-year-old male with cystic fibrosis, presenting in CF clinic for a regular follow up with primary CF provider, Dr. True Sahni.      Living situation:  Shar lives with his wife, Aimee, their 7 year old son, Shay, and 4 year old son, Serafin, in [place], [place]. They own their house and have lived there since 2012. They have 1 cat. He denies any current concerns about his living situation.     Family Constellation:  Shar was raised by his biological parents, in [place], [place]. He has 2 sibling(s): 1 older brother and a younger sister. He has four nieces. His sister lives in [place] at his parents home and his brother lives in [place]. Milena's family lives in [place] and they see them frequently. He is not aware of any other family members having CF.     Shar and Aimee have been  since 2012 and together since 2006. Their sons were conceived via IVF. They enjoy being parents and are keeping busy with their sons. Shay is in second grade and Serafin is in pre-K at the same school. Shar reports that the transition to pre-K has been difficult for Serafin and he is having difficulties with drop off and also toileting. This is causing some stress for Shar and his wife along with their son Shay who worries about Serafin.    Social Support:  Shar reports good social support.  He gets along well with his wife and other family members and draws additional support from friends as well as a mentor who he tries to meet with regularly. Shar had a colleague with a daughter who has CF, otherwise he has no other contact within the CF community.      Adjustment to Illness:  Shar  was diagnosed with CF at age 34, in the summer of 2014. 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 As a child, he notes he experienced allergy and sinus  "problems, including 3 surgeries, one at age 12, and 2 in college. He was bothered by congestion and headaches and thought of himself as \"the sick one\" in his family. He had pectus excavatum in adolescence.     Overall, Shar describes his current health status as \"good\". He feels trikafta has greatly helped with his sinuses and has rarely had a sinus infection since starting trikafta. He has also had a positive weight gain on trikafta. Shar has not been advised to use vest therapy, and uses Aerobika and exercise for airway clearance. We did not discuss exercise today but Shar has participated in cross fit in the past.     Shar is private about his CF diagnosis, although reports he is working on being more open.     Health Care Directive:  This SW reviewed [facility] education, including concept/purpose of health care directive, default health care agent (his wife) and how to complete a directive. Shar reported that he is comfortable with his default decision-maker (his wife) but would like to review a directive. SW will follow up via email.     Education:  Shar completed a Masters degree in Teaching at [employer] in Fall 2013. 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 He denies plans for further education at this time.     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 He is benefits eligible. He denies any current employment concerns.     Shar's wife Aimee is employed as a  and works full time from home. She has health insurance and other benefits through her work.     Finances:  Shar and his wife receive income from wages. Shar denies any financial concerns.     " "  Insurance:  Shar is insured through by [employer] through his work. He is on a program through his employer which offers $1600/year towards his OOP max. He denies any insurance concerns.      Mental Health/Coping:  Shar denies any current or past symptoms indicative of anxiety, eating, learning or other mental health disorder. He also denies family history of mental health concerns.      Shar describes his mood recently as \"good\" but he has been tired with the beginning of the school year. He continues to take escitalopram which is prescribed by Dr. Sahni. He has been thinking about going off of it but would like to see how the next few months go and then decide. He mainly notices less irritability when on the medication. He does not see a therapist but has a mentor that is very supportive to him.     did not administer FELIX-7 and PHQ-9 as Shar had to get going to another appointment.    Shar reports minimal stress. He kenan with stress by getting out of the house, going for a drive, or working out.       Shar does not currently identify tg/spirituality as important in his life.     Chemical Health:  Shar denies tobacco or illicit drug use.  He drinks alcohol on occasion, consuming 1 drink 3-4 times a week. He denies any current/past psychosocial impairment caused by alcohol use.       Leisure Activities/Interests:   Shar enjoys hiking, going to the park with his sons, going to apple orchards, traveling, working out, biking, riding recreational vehicles, and spending time with friends.     Intervention:  -Psychosocial Assessment  -Supportive counseling    Assessment:  Shar was friendly and receptive to  visit. He appeared to be open in his responses. He recently returned to teaching which has been going really well for him, and his stress levels are much less than when he was a teacher before. His mental health and mood have been stable and he has been considering going off of his medication, though will " continue to assess this over the next few months. He denies any insurance or financial concerns. He appears to have adequate social support. He and his wife have been dealing with some stress with their younger son and his transition to pre-k, but he appears to be managing this well. His health is stable and he denies any concerns.    Shar seems to be psychosocially stable overall, with access to relevant resources and supports.  No concerns expressed/noted.    Plan:  Re-consult for any psychosocial needs that may arise.    Complete psychosocial assessment annually.  Continue to follow for regular clinic consult.    Debora Joseph, [facility]  Adult Cystic Fibrosis   Ph: [phone]    Message me securely with Keila

## 2024-10-03 NOTE — PATIENT INSTRUCTIONS
See provider AVS for a summary of recommendations from today's visit.    Lennie Lara, PharmD  Cystic Fibrosis MTM Pharmacist  [facility]  Voicemail: [phone]

## 2024-10-03 NOTE — PROGRESS NOTES
Reason for Visit  Philip Delacruz is a 44 year old year old male who is being seen for RECHECK (Return Cystic Fibrosis )      Assessment and plan: ***      Maintenance   Modulator:   Mutation:   AW Clearance:   Bronchodilators:   Mucolytics:  Antibiotics Inh:   Antibiotics Oral:   Other:  Colonization Hx:  Annual Studies:  Pneumococcal Vaccine:  Contraindications to standard of care:  COLONOSCOPY:     The longitudinal plan of care for the diagnosis(es)/condition(s) as documented were addressed during this visit. Due to the added complexity in care, I will continue to support Shar in the subsequent management and with ongoing continuity of care.      True Sahni MD  Contact via [employer]    Note: Chart documentation done in part with Dragon Voice Recognition software. Although reviewed after completion, some word and grammatical errors may remain. Please consider this when interpreting information in this chart.       CF HPI  The patient was seen and examined by True Sahni MD     A complete ROS was otherwise negative except as noted in the HPI.    Current Outpatient Medications   Medication Sig Dispense Refill    acyclovir (ZOVIRAX) 400 MG tablet Take 200 mg by mouth daily      atorvastatin (LIPITOR) 10 MG tablet TAKE 1 TABLET BY MOUTH ONCE DAILY 90 tablet 2    Ca Cit Malate-Cholecalciferol (CALCIUM CITRATE MALATE-VIT D) 250-100 MG-UNIT TABS Take 1 tablet by mouth daily Activated calcium by Nutrikey      escitalopram (LEXAPRO) 20 MG tablet TAKE ONE TABLET BY MOUTH ONCE DAILY 30 tablet 3    fluticasone (FLONASE) 50 MCG/ACT nasal spray Spray 1 spray into both nostrils daily as needed for rhinitis or allergies (Patient not taking: Reported on 10/3/2024)      lactobacillus rhamnosus, GG, (CULTURELL) capsule Take 1 capsule by mouth daily as needed Bifido (Patient not taking: Reported on 10/3/2024)      lipase-protease-amylase (CREON) 43472-05135-822063 units CPEP per EC capsule TAKE 4-5 CAPSULES BY MOUTH  THREE TIMES A DAY WITH MEALS. TAKE 2-3 CAPSULES WITH SNACKS (TOTAL 3 MEALS AND 3 SNACKS DAILY) 1900 capsule 3    mvw complete formulation (SOFTGELS) capsule Take 1 capsule by mouth daily      Omega-3 Fatty Acids (FISH OIL) 1200 MG CPDR Take 2 capsules by mouth daily      Sodium Chloride-Sodium Bicarb (SINUFLO READYRINSE) KIT Daily PRN (Patient not taking: Reported on 10/3/2024)      TRIKAFTA 100-50-75 & 150 MG tablet pack TAKE TWO ORANGE TABLETS BY MOUTH IN THE MORNING AND ONE BLUE TABLET IN THE EVENING AS DIRECTED ON PACKAGE.  TAKE WITH FAT CONTAINING FOOD. 84 tablet 4    Vitamin D3 (CHOLECALCIFEROL) 25 mcg (1000 units) tablet Take 1 tablet by mouth daily       No current facility-administered medications for this visit.     No Known Allergies  Past Medical History:   Diagnosis Date    Chronic sinusitis     Congenital absence of vas deferens, bilateral     Cystic fibrosis (H)     Delta F508 and [id number]     History of COVID-19 01/2022    Back pain, chills and mild cough, resolved without intervention    Nasal polyps     Sinusitis, chronic        Past Surgical History:   Procedure Laterality Date    COLONOSCOPY N/A 9/26/2022    Procedure: COLONOSCOPY, WITH POLYPECTOMY AND BIOPSY;  Surgeon: Bennett Fajardo MD;  Location:  GI    HC TOOTH EXTRACTION W/FORCEP      NASAL/SINUS POLYPECTOMY      REPAIR PECTUS EXCAVATUM  1997    SINUS SURGERY  1992, 2002, 2004    Removed tubinates and polyps OSH    Sperm harvest         Social History     Socioeconomic History    Marital status:      Spouse name: Not on file    Number of children: Not on file    Years of education: Not on file    Highest education level: Not on file   Occupational History    Occupation: Teacher   Tobacco Use    Smoking status: Never     Passive exposure: Never    Smokeless tobacco: Never   Vaping Use    Vaping status: Never Used   Substance and Sexual Activity    Alcohol use: Yes     Comment: 1-2 drinks/month    Drug use: No     Comment: tried  marijuana gummy, THC drink    Sexual activity: Yes     Partners: Female     Birth control/protection: None   Other Topics Concern    Parent/sibling w/ CABG, MI or angioplasty before 65F 55M? Not Asked   Social History Narrative    Lives in [place] with wife, Aimee, and sons, Shay and Serafin. Part time teaching special ed.     Social Determinants of Health     Financial Resource Strain: Not on file   Food Insecurity: Not on file   Transportation Needs: Not on file   Physical Activity: Not on file   Stress: Not on file   Social Connections: Not on file   Interpersonal Safety: Not on file   Housing Stability: Not on file         BP (!) 141/92   Pulse 71   Ht 1.829 m (6')   Wt 90.2 kg (198 lb 13.7 oz)   SpO2 98%   BMI 26.97 kg/m    Body mass index is 26.97 kg/m .  Exam:   GENERAL APPEARANCE: Well developed, well nourished, alert, and in no apparent distress.  EYES: PERRL, EOMI  HENT: Nasal mucosa with no edema and no hyperemia. No nasal polyps.  EARS: Canals clear, TMs normal  MOUTH: Oral mucosa is moist, without any lesions, no tonsillar enlargement, no oropharyngeal exudate.  NECK: supple, no masses, no thyromegaly.  LYMPHATICS: No significant axillary, cervical, or supraclavicular nodes.  RESP: normal percussion, good air flow throughout.  No crackles. No rhonchi. No wheezes.  CV: Normal S1, S2, regular rhythm, normal rate. No murmur.  No rub. No gallop. No LE edema.   ABDOMEN:  Bowel sounds normal, soft, nontender, no HSM or masses.   MS: extremities normal. No clubbing. No cyanosis.  SKIN: no rash on limited exam  NEURO: Mentation intact, speech normal, normal strength and tone, normal gait and stance  PSYCH: mentation appears normal. and affect normal/bright  Results:  Recent Results (from the past 168 hour(s))   CK total    Collection Time: 10/03/24  2:52 PM   Result Value Ref Range     39 - 308 U/L   Hepatic function panel    Collection Time: 10/03/24  2:52 PM   Result Value Ref Range    Protein  Total 7.5 6.4 - 8.3 g/dL    Albumin 4.5 3.5 - 5.2 g/dL    Bilirubin Total 0.6 <=1.2 mg/dL    Alkaline Phosphatase 76 40 - 150 U/L    AST 27 0 - 45 U/L    ALT 19 0 - 70 U/L    Bilirubin Direct <0.20 0.00 - 0.30 mg/dL   General PFT Lab (Please always keep checked)    Collection Time: 10/03/24  2:58 PM   Result Value Ref Range    FVC-Pred 5.18 L    FVC-Pre 4.80 L    FVC-%Pred-Pre 92 %    FEV1-Pre 3.93 L    FEV1-%Pred-Pre 94 %    FEV1FVC-Pred 81 %    FEV1FVC-Pre 82 %    FEFMax-Pred 10.65 L/sec    FEFMax-Pre 10.10 L/sec    FEFMax-%Pred-Pre 94 %    FEF2575-Pred 4.00 L/sec    FEF2575-Pre 4.09 L/sec    DHS6359-%Pred-Pre 102 %    ExpTime-Pre 5.82 sec    FIFMax-Pre 8.28 L/sec    FEV1FEV6-Pred 81 %    FEV1FEV6-Pre 82 %                   Results as noted above.    PFT Interpretation:  {Kaitlyn PFT interpretation:102076}  {:559760}  {JDPFT:224120}  Valid Maneuver             CF Exacerbation  {CF EXACERBATION STATUS:777158}

## 2024-10-04 LAB — IGA SERPL-MCNC: 198 MG/DL (ref 84–499)

## 2024-10-07 LAB
GLIADIN IGA SER-ACNC: 0.5 U/ML
GLIADIN IGG SER-ACNC: <0.6 U/ML
TTG IGA SER-ACNC: 0.9 U/ML
TTG IGG SER-ACNC: <0.6 U/ML

## 2024-10-08 LAB — BACTERIA SPEC CULT: NORMAL

## 2024-10-16 LAB
EXPTIME-PRE: 5.82 SEC
FEF2575-%PRED-PRE: 102 %
FEF2575-PRE: 4.09 L/SEC
FEF2575-PRED: 4 L/SEC
FEFMAX-%PRED-PRE: 94 %
FEFMAX-PRE: 10.1 L/SEC
FEFMAX-PRED: 10.65 L/SEC
FEV1-%PRED-PRE: 94 %
FEV1-PRE: 3.93 L
FEV1FEV6-PRE: 82 %
FEV1FEV6-PRED: 81 %
FEV1FVC-PRE: 82 %
FEV1FVC-PRED: 81 %
FIFMAX-PRE: 8.28 L/SEC
FVC-%PRED-PRE: 92 %
FVC-PRE: 4.8 L
FVC-PRED: 5.18 L

## 2024-10-31 ENCOUNTER — TELEPHONE (OUTPATIENT)
Dept: PULMONOLOGY | Facility: CLINIC | Age: 44
End: 2024-10-31
Payer: COMMERCIAL

## 2024-10-31 NOTE — TELEPHONE ENCOUNTER
Left Voicemail (1st Attempt) for the patient to call back and schedule the following:    Appointment type: F  Provider: CAROL  Return date: 12/03/2024  Specialty phone number: [phone]  Additional appointment(s) needed: CF LOOP, LABS  Additonal Notes: N/A

## 2024-12-30 ENCOUNTER — TELEPHONE (OUTPATIENT)
Dept: PULMONOLOGY | Facility: CLINIC | Age: 44
End: 2024-12-30
Payer: COMMERCIAL

## 2024-12-30 NOTE — TELEPHONE ENCOUNTER
PA Initiation    Medication: TRIKAFTA 100-50-75 & 150 MG PO TBPK  Insurance Company: [facility] - Phone [phone] Fax [phone]  Pharmacy Filling the Rx: [facility] - [place], [place] - [address]  Filling Pharmacy Phone:    Filling Pharmacy Fax:    Start Date: 12/30/2024    [id number]

## 2024-12-31 NOTE — TELEPHONE ENCOUNTER
Prior Authorization Approval    Medication: TRIKAFTA 100-50-75 & 150 MG PO TBPK  Authorization Effective Date: 11/30/2024  Authorization Expiration Date: 12/30/2025  Approved Dose/Quantity: 3 tabs per day  Reference #: Key: [id number]   Insurance Company: [employer] [place] - Phone [phone] Fax [phone]  Expected CoPay: $ 0  CoPay Card Available: No    Financial Assistance Needed: Already has  Which Pharmacy is filling the prescription: [facility] - [place], [place] -  [address]   Pharmacy Notified: No, renewal  Patient Notified: No, renewal    Updated [facility]

## 2025-01-03 ENCOUNTER — LAB (OUTPATIENT)
Dept: LAB | Facility: CLINIC | Age: 45
End: 2025-01-03
Attending: INTERNAL MEDICINE
Payer: COMMERCIAL

## 2025-01-03 ENCOUNTER — ANCILLARY PROCEDURE (OUTPATIENT)
Dept: GENERAL RADIOLOGY | Facility: CLINIC | Age: 45
End: 2025-01-03
Attending: DIETITIAN, REGISTERED
Payer: COMMERCIAL

## 2025-01-03 DIAGNOSIS — R19.8 ALTERNATING CONSTIPATION AND DIARRHEA: ICD-10-CM

## 2025-01-03 DIAGNOSIS — E84.0 CYSTIC FIBROSIS WITH PULMONARY MANIFESTATIONS (H): ICD-10-CM

## 2025-01-03 DIAGNOSIS — K86.81 EXOCRINE PANCREATIC INSUFFICIENCY: ICD-10-CM

## 2025-01-03 PROBLEM — E78.00 PURE HYPERCHOLESTEROLEMIA: Status: ACTIVE | Noted: 2025-01-03

## 2025-01-03 LAB
ALBUMIN SERPL BCG-MCNC: 4.4 G/DL (ref 3.5–5.2)
ALP SERPL-CCNC: 74 U/L (ref 40–150)
ALT SERPL W P-5'-P-CCNC: 53 U/L (ref 0–70)
AST SERPL W P-5'-P-CCNC: 37 U/L (ref 0–45)
BILIRUB DIRECT SERPL-MCNC: 0.25 MG/DL (ref 0–0.3)
BILIRUB SERPL-MCNC: 1 MG/DL
CK SERPL-CCNC: 197 U/L (ref 39–308)
PROT SERPL-MCNC: 7.1 G/DL (ref 6.4–8.3)

## 2025-01-03 PROCEDURE — 74018 RADEX ABDOMEN 1 VIEW: CPT | Performed by: RADIOLOGY

## 2025-01-03 PROCEDURE — 36415 COLL VENOUS BLD VENIPUNCTURE: CPT | Performed by: PATHOLOGY

## 2025-01-03 PROCEDURE — 82550 ASSAY OF CK (CPK): CPT | Performed by: PATHOLOGY

## 2025-01-03 PROCEDURE — 80076 HEPATIC FUNCTION PANEL: CPT | Performed by: PATHOLOGY

## 2025-01-16 ENCOUNTER — TELEPHONE (OUTPATIENT)
Dept: PULMONOLOGY | Facility: CLINIC | Age: 45
End: 2025-01-16
Payer: COMMERCIAL

## 2025-01-16 NOTE — TELEPHONE ENCOUNTER
Left Voicemail (1st Attempt) and Sent Mychart (1st Attempt) for the patient to call back and schedule the following:    Appointment type: Return CF  Provider: Sarbjit  Return date: 4/3/2025  Specialty phone number: [phone]  Additional appointment(s) needed: cf loop, dexa, labs, cxr  Additonal Notes: na

## 2025-01-23 ENCOUNTER — VIRTUAL VISIT (OUTPATIENT)
Dept: PULMONOLOGY | Facility: CLINIC | Age: 45
End: 2025-01-23
Attending: DIETITIAN, REGISTERED
Payer: COMMERCIAL

## 2025-01-23 VITALS — WEIGHT: 200 LBS | HEIGHT: 73 IN | BODY MASS INDEX: 26.51 KG/M2

## 2025-01-23 DIAGNOSIS — E84.9 CF (CYSTIC FIBROSIS) (H): Primary | ICD-10-CM

## 2025-01-23 DIAGNOSIS — K86.81 EXOCRINE PANCREATIC INSUFFICIENCY: ICD-10-CM

## 2025-01-23 DIAGNOSIS — R19.8 ALTERNATING CONSTIPATION AND DIARRHEA: ICD-10-CM

## 2025-01-23 NOTE — NURSING NOTE
Current patient location: [address]  [facility] [address]    Is the patient currently in the state of [place]? YES    Visit mode:VIDEO    If the visit is dropped, the patient can be reconnected by: VIDEO VISIT: Text to cell phone:   Telephone Information:   Mobile [phone]       Will anyone else be joining the visit? NO  (If patient encounters technical issues they should call [phone])    How would you like to obtain your AVS? MyChart    Are changes needed to the allergy or medication list? Pt stated no changes to allergies and Pt stated no med changes    Are refills needed on medications prescribed by this physician? NO    Rooming Documentation:  Questionnaire(s) completed    Reason for visit: RECHECK     Yarely GAN

## 2025-01-23 NOTE — PROGRESS NOTES
Virtual Visit Details    Type of service:  Video Visit     Originating Location (pt. Location): Home    Distant Location (provider location):  Off-site  Platform used for Video Visit: Marcelo    CYSTIC FIBROSIS GI CLINIC VISIT - NEW PATIENT    CC/REFERRING PROVIDER: Kaitlyn  REASON FOR CONSULTATION: loose stools    HPI: 44 year old male with history of cystic fibrosis (on Trikafta) and pancreatic insufficiency here for evaluation of irregular bowel pattern with loose stools.    Previously evaluated by Dr. Watts, last in 2018. At that time was having abdominal discomfort, variable stool pattern, fecal leakage and bloating that responded well to adjustment in enzymes and antibiotic exposure.  At that time symptoms suspected to be driven by pancreatic insufficiency and SIBO.    He reports ongoing GI concerns including frequent bowel movements, loose stools, fecal leakage, and ongoing bloating though this improved with switch to Trikafta.  Notes specific food triggers for bloating including sugar alcohols, beer, pizza.  Hot/cold foods tend to trigger bowel movements.  Felt the best well on eager to move diet program though lost a lot of weight.  He has responded to antibiotics in the past, improved bloating but no change in stool pattern.    He had abdominal x-ray in June that showed moderate stool burden.  Following this he started MiraLAX 1 capful daily, took this for 1 to 2 weeks and then stopped.  Notes that when he was taking stools actually were more formed rather than loose.    After last visit checked celiac serologies which were negative.  Recommended daily MiraLAX at 1 capful.  Doing this on and off.  Had KUB done 1/30/2025 which showed mild stool scattered throughout the colon.    Again notes that if he takes MiraLAX he feels overall improved, stools more formed.  Currently having about 5 stools per day, 3 clustered in the morning and 2 in the afternoon.  About once per week or so will have urgent loose  stools with occasional fecal leakage.  Does not sense leakage when it happens, typically mucus mixed with stool.  Reports these instances tend to be associated with wheat or sugar. Occasional oily/greasy stools.  Taking Creon as prescribed.    Overall bloating improved.  No abdominal pain or cramping.  No nausea or vomiting.    Occasional heartburn, couple times a month.  Takes Tums, does not help much.  No daily acid suppression.    Weight stable.        GI Related Medications:  Bowel meds:  -- Miralax 1 capful per day  -- Culturelle 1 capsule daily  Enzymes:  -- Creon 24,000 - 4-5 caps with meals, 2-3 with snacks  Acid suppression:  -- TUMS PRN    ROS: 10pt ROS performed and otherwise negative.    PERTINENT PAST MEDICAL HISTORY:  As noted above.  Past Medical History:   Diagnosis Date    Chronic sinusitis     Congenital absence of vas deferens, bilateral     Cystic fibrosis (H)     Delta F508 and Q5393M     History of COVID-19 01/2022    Back pain, chills and mild cough, resolved without intervention    Nasal polyps     Sinusitis, chronic         PREVIOUS ABDOMINAL/GYNECOLOGIC SURGERIES:  As noted above.  Past Surgical History:   Procedure Laterality Date    COLONOSCOPY N/A 9/26/2022    Procedure: COLONOSCOPY, WITH POLYPECTOMY AND BIOPSY;  Surgeon: Bennett Fajardo MD;  Location: [facility]    HC TOOTH EXTRACTION W/FORCEP      NASAL/SINUS POLYPECTOMY      REPAIR PECTUS EXCAVATUM  1997    SINUS SURGERY  1992, 2002, 2004    Removed tubinates and polyps OSH    Sperm harvest         PERTINENT MEDICATIONS:  Current Outpatient Medications   Medication Sig Dispense Refill    albuterol (PROAIR HFA/PROVENTIL HFA/VENTOLIN HFA) 108 (90 Base) MCG/ACT inhaler Inhale 2 puffs into the lungs every 6 hours as needed for shortness of breath, wheezing, cough or other (Before exercise). 18 g 11    atorvastatin (LIPITOR) 10 MG tablet Take 1 tablet (10 mg) by mouth daily. 90 tablet 3    Ca Cit Malate-Cholecalciferol (CALCIUM CITRATE  MALATE-VIT D) 250-100 MG-UNIT TABS Take 1 tablet by mouth daily Activated calcium by Nutrikey      elexacaftor-tezacaftor-ivacaftor & ivacaftor (TRIKAFTA) 100-50-75 & 150 MG tablet pack TAKE TWO ORANGE TABLETS BY MOUTH IN THE MORNING AND ONE BLUE TABLET IN THE EVENING AS DIRECTED ON PACKAGE.  TAKE WITH FAT CONTAINING FOOD. 84 tablet 4    escitalopram (LEXAPRO) 20 MG tablet TAKE ONE TABLET BY MOUTH ONCE DAILY 30 tablet 11    fluticasone (FLONASE) 50 MCG/ACT nasal spray Spray 1 spray into both nostrils daily as needed for rhinitis or allergies.      lactobacillus rhamnosus, GG, (CULTURELL) capsule Take 1 capsule by mouth daily as needed. Bifido      lipase-protease-amylase (CREON) 39589-33710-757040 units CPEP per EC capsule TAKE 4-5 CAPSULES BY MOUTH THREE TIMES A DAY WITH MEALS. TAKE 2-3 CAPSULES WITH SNACKS (TOTAL 3 MEALS AND 3 SNACKS DAILY) 1900 capsule 3    mvw complete formulation (SOFTGELS) capsule Take 1 capsule by mouth daily      Nasal Moisturizer Combination (ALKALOL SALINE) SOLN as needed.      Omega-3 Fatty Acids (FISH OIL) 1200 MG CPDR Take 1 capsule by mouth daily.      Vitamin D3 (CHOLECALCIFEROL) 25 mcg (1000 units) tablet Take 1 tablet by mouth daily.       No current facility-administered medications for this visit.      - Anticoagulation/Antiplatelet Agents: none  Medications reviewed with patient today, see Medication List/Assessment for details.  No other NSAID/anticoagulation reported by patient.  No other OTC/herbal/supplements reported by patient.    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 No IBD/celiac, no other AI/liver/thyroid disease. No known FH bleeding/clotting disorders.  Family History   Problem Relation Age of Onset    C.A.D. Maternal  Grandfather     Diabetes Maternal Grandfather     Heart Disease Maternal Grandfather     Aneurysm Paternal Grandfather         Aortic    Gastrointestinal Disease Father         Reflux    Cancer Paternal Grandmother         Lymphoma    Diabetes Maternal Grandmother     Thyroid Disease Mother     Other Problems  () Other         PHYSICAL EXAMINATION:  Eyes: Sclera anicteric/injected  Ears/nose/mouth/throat: Normal oropharynx without ulcers or exudate, mucus membranes moist, hearing intact  Neck: supple, thyroid normal size  CV: No edema  Respiratory: Unlabored breathing  Lymph: No axillary, submandibular, supraclavicular or inguinal lymphadenopathy  Abd: Nondistended, +bs, no hepatosplenomegaly, nontender, no peritoneal signs   Skin: warm, perfused, no jaundice  Psych: Normal affect  MSK: Normal gait    PREVIOUS ENDOSCOPY:  Colonoscopy 9/26/22: normal ileum and colon    PERTINENT STUDIES:  Imaging  AXR 6/27/24 - moderate stool burden (ascending)  KUB 1/3/25- mild stool scattered throughout colon    Labs  LFTs wnl 7/2024  TSH wnl 1.84 4/2024  CBC wnl 4/2024  Celiac serologies negative 10/3/24.    ASSESSMENT/PLAN:    # Irregular bowel pattern  # Loose stools  # Fecal leakage  # Bloating  Patient with long standing symptoms of bloating, irregular bowel movements, and intermittent loose stools and fecal leakage.  KUB in June showed moderate stool burden.  Started MiraLAX which has made stools more formed and overall less frequent, overall taking inconsistently, typically about 3 times per week.  Follow-up x-ray this month showed mild formed stool scattered throughout the colon.  Given improvement with laxative suspicious for some component of constipation with overflow. Other considerations include SIBO especially with association with sugars, IBS-D/functional diarrhea, lactose intolerance/other carbohydrate intolerance, pelvic floor dysfunction, fructose malabsorption, pancreatic insufficiency.  Reviewed options today  including trying MiraLAX more consistently, adding fiber, pelvic floor evaluation/therapy, trial of rifaximin, low FODMAP diet.  For now he would like to stick with MiraLAX and try fiber.  If worsening symptoms will proceed with rifaximin trial followed by pelvic floor evaluation.  He may also be interested in low FODMAP diet in the future.      We will proceed with the following:    -- Start Miralax 1 capful on a daily basis, if this is too much decrease to half capful, if not enough increase to 2-3 capfuls per day  --Start soluble fiber supplementation with sun fiber or Citrucel.  Start with half tablespoon, after 1 to 2 weeks increase to 1 tablespoon.  Slowly further increase up to 2 tablespoons/day or until desired stool consistency achieved.  -- Abdominal X-Ray to assess stool burden at next visit  --Consider low FODMAP diet, rifaximin trial, pelvic floor evaluation in the future      #Pancreatic Insuffiencey  Patient with pancreatic insuffiencey related to cystic fibrosis. Currently taking Creon as prescribed (Creon 24,000 - 4-5 caps with meals, 2-3 with snacks which provides 1350 units lipase/kg/meal). Occasionally oily stools, more commonly loose stools without overt malabsorption. See above. Weight stable. Continue PERT and vitamin/mineral monitoring and replacement per CF dietitian. Could consider adjustment in dosing/alternate enzyme trial/trial of PPI pending ongoing symptoms as EPI could possibly be playing a role.      #CRC Screening  Colonoscopy 9/2022 normal, repeat in 5 years (2027)      RTC 5 months, sooner if symptomatic.     Thank you for this consultation. It was a pleasure to participate in the care of this patient; please contact us with any further questions.    40 Minutes was spent on the date of the encounter during chart review, history and exam, documentation, and further activities as noted     Homa Ibrahim PA-C  Division of Gastroenterology, Hepatology & Nutrition  [facility]  [place]      No orders of the defined types were placed in this encounter.

## 2025-01-23 NOTE — LETTER
1/23/2025      Philip Delacruz  [address] [place] [address]      Dear Colleague,    Thank you for referring your patient, Philip Delacruz, to the [facility]. Please see a copy of my visit note below.    Virtual Visit Details    Type of service:  Video Visit     Originating Location (pt. Location): Home    Distant Location (provider location):  Off-site  Platform used for Video Visit: Well    CYSTIC FIBROSIS GI CLINIC VISIT - NEW PATIENT    CC/REFERRING PROVIDER: Kaitlyn  REASON FOR CONSULTATION: loose stools    HPI: 44 year old male with history of cystic fibrosis (on Trikafta) and pancreatic insufficiency here for evaluation of irregular bowel pattern with loose stools.    Previously evaluated by Dr. Watts, last in 2018. At that time was having abdominal discomfort, variable stool pattern, fecal leakage and bloating that responded well to adjustment in enzymes and antibiotic exposure.  At that time symptoms suspected to be driven by pancreatic insufficiency and SIBO.    He reports ongoing GI concerns including frequent bowel movements, loose stools, fecal leakage, and ongoing bloating though this improved with switch to Trikafta.  Notes specific food triggers for bloating including sugar alcohols, beer, pizza.  Hot/cold foods tend to trigger bowel movements.  Felt the best well on eager to move diet program though lost a lot of weight.  He has responded to antibiotics in the past, improved bloating but no change in stool pattern.    He had abdominal x-ray in June that showed moderate stool burden.  Following this he started MiraLAX 1 capful daily, took this for 1 to 2 weeks and then stopped.  Notes that when he was taking stools actually were more formed rather than loose.    After last visit checked celiac serologies which were negative.  Recommended daily MiraLAX at 1 capful.  Doing this on and off.  Had KUB done 1/30/2025 which  showed mild stool scattered throughout the colon.    Again notes that if he takes MiraLAX he feels overall improved, stools more formed.  Currently having about 5 stools per day, 3 clustered in the morning and 2 in the afternoon.  About once per week or so will have urgent loose stools with occasional fecal leakage.  Does not sense leakage when it happens, typically mucus mixed with stool.  Reports these instances tend to be associated with wheat or sugar. Occasional oily/greasy stools.  Taking Creon as prescribed.    Overall bloating improved.  No abdominal pain or cramping.  No nausea or vomiting.    Occasional heartburn, couple times a month.  Takes Tums, does not help much.  No daily acid suppression.    Weight stable.        GI Related Medications:  Bowel meds:  -- Miralax 1 capful per day  -- Culturelle 1 capsule daily  Enzymes:  -- Creon 24,000 - 4-5 caps with meals, 2-3 with snacks  Acid suppression:  -- TUMS PRN    ROS: 10pt ROS performed and otherwise negative.    PERTINENT PAST MEDICAL HISTORY:  As noted above.  Past Medical History:   Diagnosis Date     Chronic sinusitis      Congenital absence of vas deferens, bilateral      Cystic fibrosis (H)     Delta F508 and [id number]      History of COVID-19 01/2022    Back pain, chills and mild cough, resolved without intervention     Nasal polyps      Sinusitis, chronic         PREVIOUS ABDOMINAL/GYNECOLOGIC SURGERIES:  As noted above.  Past Surgical History:   Procedure Laterality Date     COLONOSCOPY N/A 9/26/2022    Procedure: COLONOSCOPY, WITH POLYPECTOMY AND BIOPSY;  Surgeon: Bennett Fajardo MD;  Location: [facility]     HC TOOTH EXTRACTION W/FORCEP       NASAL/SINUS POLYPECTOMY       REPAIR PECTUS EXCAVATUM  1997     SINUS SURGERY  1992, 2002, 2004    Removed tubinates and polyps OSH     Sperm harvest         PERTINENT MEDICATIONS:  Current Outpatient Medications   Medication Sig Dispense Refill     albuterol (PROAIR HFA/PROVENTIL HFA/VENTOLIN HFA) 108 (90  Base) MCG/ACT inhaler Inhale 2 puffs into the lungs every 6 hours as needed for shortness of breath, wheezing, cough or other (Before exercise). 18 g 11     atorvastatin (LIPITOR) 10 MG tablet Take 1 tablet (10 mg) by mouth daily. 90 tablet 3     Ca Cit Malate-Cholecalciferol (CALCIUM CITRATE MALATE-VIT D) 250-100 MG-UNIT TABS Take 1 tablet by mouth daily Activated calcium by Nutrikey       elexacaftor-tezacaftor-ivacaftor & ivacaftor (TRIKAFTA) 100-50-75 & 150 MG tablet pack TAKE TWO ORANGE TABLETS BY MOUTH IN THE MORNING AND ONE BLUE TABLET IN THE EVENING AS DIRECTED ON PACKAGE.  TAKE WITH FAT CONTAINING FOOD. 84 tablet 4     escitalopram (LEXAPRO) 20 MG tablet TAKE ONE TABLET BY MOUTH ONCE DAILY 30 tablet 11     fluticasone (FLONASE) 50 MCG/ACT nasal spray Spray 1 spray into both nostrils daily as needed for rhinitis or allergies.       lactobacillus rhamnosus, GG, (CULTURELL) capsule Take 1 capsule by mouth daily as needed. Bifido       lipase-protease-amylase (CREON) 13728-51163-102931 units CPEP per EC capsule TAKE 4-5 CAPSULES BY MOUTH THREE TIMES A DAY WITH MEALS. TAKE 2-3 CAPSULES WITH SNACKS (TOTAL 3 MEALS AND 3 SNACKS DAILY) 1900 capsule 3     mvw complete formulation (SOFTGELS) capsule Take 1 capsule by mouth daily       Nasal Moisturizer Combination (ALKALOL SALINE) SOLN as needed.       Omega-3 Fatty Acids (FISH OIL) 1200 MG CPDR Take 1 capsule by mouth daily.       Vitamin D3 (CHOLECALCIFEROL) 25 mcg (1000 units) tablet Take 1 tablet by mouth daily.       No current facility-administered medications for this visit.      - Anticoagulation/Antiplatelet Agents: none  Medications reviewed with patient today, see Medication List/Assessment for details.  No other NSAID/anticoagulation reported by patient.  No other OTC/herbal/supplements reported by patient.    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 No IBD/celiac, no other AI/liver/thyroid disease. No known FH bleeding/clotting disorders.  Family History   Problem Relation Age of Onset     C.A.D. Maternal Grandfather      Diabetes Maternal Grandfather      Heart Disease Maternal Grandfather      Aneurysm Paternal Grandfather         Aortic     Gastrointestinal Disease Father         Reflux     Cancer Paternal Grandmother         Lymphoma     Diabetes Maternal Grandmother      Thyroid Disease Mother      Other Problems  () Other         PHYSICAL EXAMINATION:  Eyes: Sclera anicteric/injected  Ears/nose/mouth/throat: Normal oropharynx without ulcers or exudate, mucus membranes moist, hearing intact  Neck: supple, thyroid normal size  CV: No edema  Respiratory: Unlabored breathing  Lymph: No axillary, submandibular, supraclavicular or inguinal lymphadenopathy  Abd: Nondistended, +bs, no hepatosplenomegaly, nontender, no peritoneal signs   Skin: warm, perfused, no jaundice  Psych: Normal affect  MSK: Normal gait    PREVIOUS ENDOSCOPY:  Colonoscopy 9/26/22: normal ileum and colon    PERTINENT STUDIES:  Imaging  AXR 6/27/24 - moderate stool burden (ascending)  KUB 1/3/25- mild stool scattered throughout colon    Labs  LFTs wnl 7/2024  TSH wnl 1.84 4/2024  CBC wnl 4/2024  Celiac serologies negative 10/3/24.    ASSESSMENT/PLAN:    # Irregular bowel pattern  # Loose stools  # Fecal leakage  # Bloating  Patient with long standing symptoms of bloating, irregular bowel movements, and intermittent loose stools and fecal leakage.  KUB in June showed moderate stool burden.  Started MiraLAX which has made stools more formed and overall less frequent, overall taking inconsistently, typically about 3 times per week.  Follow-up x-ray this month showed mild formed stool scattered throughout the colon.  Given improvement with laxative  suspicious for some component of constipation with overflow. Other considerations include SIBO especially with association with sugars, IBS-D/functional diarrhea, lactose intolerance/other carbohydrate intolerance, pelvic floor dysfunction, fructose malabsorption, pancreatic insufficiency.  Reviewed options today including trying MiraLAX more consistently, adding fiber, pelvic floor evaluation/therapy, trial of rifaximin, low FODMAP diet.  For now he would like to stick with MiraLAX and try fiber.  If worsening symptoms will proceed with rifaximin trial followed by pelvic floor evaluation.  He may also be interested in low FODMAP diet in the future.      We will proceed with the following:    -- Start Miralax 1 capful on a daily basis, if this is too much decrease to half capful, if not enough increase to 2-3 capfuls per day  --Start soluble fiber supplementation with sun fiber or Citrucel.  Start with half tablespoon, after 1 to 2 weeks increase to 1 tablespoon.  Slowly further increase up to 2 tablespoons/day or until desired stool consistency achieved.  -- Abdominal X-Ray to assess stool burden at next visit  --Consider low FODMAP diet, rifaximin trial, pelvic floor evaluation in the future      #Pancreatic Insuffiencey  Patient with pancreatic insuffiencey related to cystic fibrosis. Currently taking Creon as prescribed (Creon 24,000 - 4-5 caps with meals, 2-3 with snacks which provides 1350 units lipase/kg/meal). Occasionally oily stools, more commonly loose stools without overt malabsorption. See above. Weight stable. Continue PERT and vitamin/mineral monitoring and replacement per CF dietitian. Could consider adjustment in dosing/alternate enzyme trial/trial of PPI pending ongoing symptoms as EPI could possibly be playing a role.      #CRC Screening  Colonoscopy 9/2022 normal, repeat in 5 years (2027)      RTC 5 months, sooner if symptomatic.     Thank you for this consultation. It was a pleasure to  participate in the care of this patient; please contact us with any further questions.    40 Minutes was spent on the date of the encounter during chart review, history and exam, documentation, and further activities as noted     Homa Ibrahim PA-C  Division of Gastroenterology, Hepatology & Nutrition  [facility]      No orders of the defined types were placed in this encounter.           Again, thank you for allowing me to participate in the care of your patient.        Sincerely,        Homa Ibrahim PA-C    Electronically signed

## 2025-02-05 ENCOUNTER — TELEPHONE (OUTPATIENT)
Dept: PULMONOLOGY | Facility: CLINIC | Age: 45
End: 2025-02-05
Payer: COMMERCIAL

## 2025-02-05 NOTE — TELEPHONE ENCOUNTER
Left Voicemail (1st Attempt) for the patient to call back and schedule the following:    Appointment type: RCF  Provider: SHIRLEY JHA  Return date: 06/16/2025  Specialty phone number: [phone]  Additional appointment(s) needed: N/A  Additonal Notes: N/A

## 2025-04-11 ENCOUNTER — ANCILLARY PROCEDURE (OUTPATIENT)
Dept: GENERAL RADIOLOGY | Facility: CLINIC | Age: 45
End: 2025-04-11
Attending: INTERNAL MEDICINE
Payer: COMMERCIAL

## 2025-04-11 ENCOUNTER — ANCILLARY PROCEDURE (OUTPATIENT)
Dept: BONE DENSITY | Facility: CLINIC | Age: 45
End: 2025-04-11
Attending: INTERNAL MEDICINE
Payer: COMMERCIAL

## 2025-04-11 ENCOUNTER — LAB (OUTPATIENT)
Dept: LAB | Facility: CLINIC | Age: 45
End: 2025-04-11
Attending: INTERNAL MEDICINE
Payer: COMMERCIAL

## 2025-04-11 DIAGNOSIS — E84.9 CF (CYSTIC FIBROSIS) (H): ICD-10-CM

## 2025-04-11 DIAGNOSIS — E84.0 CYSTIC FIBROSIS OF THE LUNG (H): ICD-10-CM

## 2025-04-11 LAB — GLUCOSE BLDC GLUCOMTR-MCNC: 83 MG/DL (ref 70–99)

## 2025-04-11 PROCEDURE — 77080 DXA BONE DENSITY AXIAL: CPT

## 2025-04-11 PROCEDURE — 71046 X-RAY EXAM CHEST 2 VIEWS: CPT | Performed by: RADIOLOGY

## 2025-04-11 PROCEDURE — 82962 GLUCOSE BLOOD TEST: CPT

## 2025-04-14 ENCOUNTER — TELEPHONE (OUTPATIENT)
Dept: PULMONOLOGY | Facility: CLINIC | Age: 45
End: 2025-04-14
Payer: COMMERCIAL

## 2025-04-14 DIAGNOSIS — K86.81 EXOCRINE PANCREATIC INSUFFICIENCY: Primary | ICD-10-CM

## 2025-04-14 DIAGNOSIS — E84.0 CYSTIC FIBROSIS WITH PULMONARY MANIFESTATIONS (H): ICD-10-CM

## 2025-04-14 NOTE — TELEPHONE ENCOUNTER
Patient Contacted for the patient to call back and schedule the following:    Appointment type: Return CF  Provider: Sarbjit  Return date: Around 7/11/2025  Specialty phone number: [phone]  Additional appointment(s) needed: cf loop  Additonal Notes: Pt also due for visit with Dr Knapp around 5/21.

## 2025-04-16 ENCOUNTER — TELEPHONE (OUTPATIENT)
Dept: PHARMACY | Facility: CLINIC | Age: 45
End: 2025-04-16
Payer: COMMERCIAL

## 2025-04-16 NOTE — TELEPHONE ENCOUNTER
Per visit with Dr. Lennie Schaffer MD, Shar to start Nanyk. Shar requested I contact him today to further discuss the medication and plan moving forward. Unable to reach, will send Page365 message to find better time to discuss.    Ileana Kilgore, PharmD   Cystic Fibrosis [place] Pharmacist  [place] [facility] [place]

## 2025-04-23 ENCOUNTER — VIRTUAL VISIT (OUTPATIENT)
Dept: PHARMACY | Facility: CLINIC | Age: 45
End: 2025-04-23
Payer: COMMERCIAL

## 2025-04-23 ENCOUNTER — TELEPHONE (OUTPATIENT)
Dept: PULMONOLOGY | Facility: CLINIC | Age: 45
End: 2025-04-23
Payer: COMMERCIAL

## 2025-04-23 DIAGNOSIS — E84.0 CYSTIC FIBROSIS WITH PULMONARY MANIFESTATIONS (H): Primary | ICD-10-CM

## 2025-04-23 PROCEDURE — 99207 PR NO CHARGE LOS: CPT | Mod: 93 | Performed by: PHARMACIST

## 2025-04-23 NOTE — PROGRESS NOTES
Medication Therapy Management (MTM) Encounter    ASSESSMENT:                            Medication Adherence/Access: No issues identified.    CF/CFTR  Reviewed patient eligibility for CFTR modulator therapy, education provided today:  Most appropriate choice for patient of currently available CFTR modulators is: vanzacaftor-tezacaftor-deutivacaftor based on age, CF genotype, past medical history, current medications, and patient preference. Patient was previously taking elexacaftor-tezacaftor-ivacaftor.  Recommended dose Vanzacaftor age 6 to 11 at least 40kg or age 12+: two vanzacaftor 10 mg-tezacaftor 50 mg-deutivacaftor 125 mg once daily  Drug interactions with CFTR modulator:  Atorvastatin - will need to consider close monitoring of LFTs and muscle pain (may require closer monitoring of CK if muscle pain occurs). Patient reports CK elevations in past which were associated with exercise  Dose should be given with age-appropriate fat containing food (~10g or up to 20g if experiencing GI upset). Provided appropriate examples of fat-containing foods to patient (e.g. Whole milk, cheese, avocado, peanut butter).  Baseline LFTs were checked and are within normal limits. Recommend continuing to monitor LFTs monthly for 6 months, then quarterly for 12 months, then every 6 months thereafter.   Educated patient on potential side effects, including headache, GI disturbances, rash, increased mucus production/respiratory symptoms, weight gain, acne, and mental health changes.  Education provided on how to administer medication, what to do if a dose is missed, monitoring prior to and while on therapy, medications/foods to avoid (e.g. grapefruit).  Written patient information from [employer] was provided to patient.  Discussed with patient how to obtain CFTR modulator from specialty pharmacy and informed patient they will need to bring home supply if hospitalized. Patient was engaged in teaching and verbalized  understanding.  Patient is already enrolled in [email or web address] .       PLAN:                            1. Shar to start Alyftrek (full dose) and discontinue Trikafta, warm hand-off to [employer], pharmacy liaison, to start prior authorization.    Follow-up:  monthly Alyftrek lab monitoring    SUBJECTIVE/OBJECTIVE:                          Shar Delacruz is a 44 year old male seen for a follow-up visit.       Reason for visit: Alyftrek.    Allergies/ADRs: Reviewed in chart  Past Medical History: Reviewed in chart  Tobacco: He reports that he has never smoked. He has never been exposed to tobacco smoke. He has never used smokeless tobacco.  Alcohol: 1-2 beverages/month  Other Substance Use: THC drink rarely    Medication Adherence/Access: see below    CF/CFTR modulator  Trikafta (full dose) with fat-containing food.   Shar sometimes misses evening blue pill of Trikafta due to his schedule. He reports ongoing diarrhea/GI concerns that may be attributed to Trikafta. Shar is interested in switching to Alyftrek.    Lab Results   Component Value Date    ALT 31 04/11/2025    AST 33 04/11/2025    BILITOTAL 0.9 04/11/2025    DBIL 0.35 (H) 04/11/2025     04/11/2025     ----------------  I spent 15 minutes with this patient today. All changes were made via verbal approval with Dr. Lennie Schaffer MD.     A summary of these recommendations was sent to patient via [employer]    Ileana Kilgore, PharmD   Cystic Fibrosis MTM Pharmacist  [facility]    Telemedicine Visit Details  The patient's medications can be safely assessed via a telemedicine encounter.  Type of service:  Telephone visit  Originating Location (pt. Location): Home    Distant Location (provider location):  On-site  Start Time:  11:30 AM  End Time:  11:45 AM     Medication Therapy Recommendations  Cystic fibrosis with pulmonary manifestations (H)   1 Current Medication: TRIKAFTA 100-50-75 & 150 MG tablet pack   Current Medication Sig: TAKE TWO ORANGE  TABLETS BY MOUTH IN THE MORNING AND ONE BLUE TABLET IN THE EVENING AS DIRECTED ON PACKAGE.  TAKE WITH FAT CONTAINING FOOD.   Rationale: Undesirable effect - Adverse medication event - Safety   Recommendation: Change Medication - vanzacaftor-tezacaftor-deutivacaftor 10- MG tablet   Status: Accepted per Provider   Identified Date: 4/23/2025 Completed Date: 4/24/2025

## 2025-04-24 NOTE — PATIENT INSTRUCTIONS
"Recommendations from today's MTM visit:                                                         1. start Alyftrek (full dose) and discontinue Trikafta, once approved by insurance    Follow-up:  monthly Alyftrek lab monitoring    It was great speaking with you today.  I value your experience and would be very thankful for your time in providing feedback in our clinic survey. In the next few days, you may receive an email or text message from [employer] with a link to a survey related to your  clinical pharmacist.\"     To schedule another MTM appointment, please call the clinic directly or you may call the MTM scheduling line at [phone] or toll-free at [phone].     My Clinical Pharmacist's contact information:                                                      Please feel free to contact me with any questions or concerns you have.      Ileana Kilgore, PharmD   Cystic Fibrosis MTM Pharmacist  [facility]     "

## 2025-04-25 NOTE — TELEPHONE ENCOUNTER
PA Initiation    Medication: ALYFTREK 10- MG PO TABS  Insurance Company: [employer] [place] - Phone [phone] Fax [phone]  Pharmacy Filling the Rx:    Filling Pharmacy Phone:    Filling Pharmacy Fax:    Start Date: 4/25/2025    Key: [id number]

## 2025-04-29 NOTE — TELEPHONE ENCOUNTER
Prior Authorization Approval    Medication: ALYFTREK 10- MG PO TABS  Authorization Effective Date: 3/29/2025  Authorization Expiration Date: 4/28/2026  Approved Dose/Quantity: #56 per 28 days  Reference #: Key: [id number]   Insurance Company: [employer] [place] - Phone [phone] Fax [phone]  Expected CoPay: $ 0  CoPay Card Available: Yes    Financial Assistance Needed: Has  [facility] - [place], [place] - [address]  Pharmacy Notified: Yes  Patient Notified: Pharmacy will reach out

## 2025-05-09 ENCOUNTER — MYC MEDICAL ADVICE (OUTPATIENT)
Dept: PULMONOLOGY | Facility: CLINIC | Age: 45
End: 2025-05-09
Payer: COMMERCIAL

## 2025-05-12 ENCOUNTER — MYC MEDICAL ADVICE (OUTPATIENT)
Dept: PHARMACY | Facility: CLINIC | Age: 45
End: 2025-05-12
Payer: COMMERCIAL

## 2025-05-12 ENCOUNTER — VIRTUAL VISIT (OUTPATIENT)
Dept: PHARMACY | Facility: CLINIC | Age: 45
End: 2025-05-12
Payer: COMMERCIAL

## 2025-05-12 DIAGNOSIS — E84.9 CYSTIC FIBROSIS (H): Primary | ICD-10-CM

## 2025-05-12 DIAGNOSIS — E84.0 CYSTIC FIBROSIS WITH PULMONARY MANIFESTATIONS (H): Primary | ICD-10-CM

## 2025-05-12 PROCEDURE — 99207 PR NO CHARGE LOS: CPT | Mod: 93 | Performed by: PHARMACIST

## 2025-05-12 NOTE — PROGRESS NOTES
Nutrition Note    RD check-in with pt via [employer] regarding recent enzyme change from Creon to Zenpep. Pt reports he is tolerating the change well and feels GI symptoms are improving.     Recommend continue with current enzyme brand/dose.     Nisa Dumont RD, LD, CACFD  Cystic Fibrosis/Lung Transplant Dietitian  Available via [employer]

## 2025-05-12 NOTE — TELEPHONE ENCOUNTER
See MTM note from 5/12/25 for additional details.      Ileana Kilgore, PharmD   Cystic Fibrosis MTM Pharmacist  [facility]

## 2025-05-12 NOTE — PROGRESS NOTES
Medication Therapy Management (MTM) Encounter    ASSESSMENT:                            Medication Adherence/Access: No issues identified.    CF/CFTR  Per discussion with Dr. Lennie Schaffer MD, Shar to obtain hepatic panel and CK. If labs stable and Shar feels symptoms are manageable, will continue with Alyftrek and monitor for improvement/worsening of muscle pain/GI concerns. Of note, the combination of atorvastatin and Alyftrek could decrease BCRP-mediated transport of BCRP substrates, possibly resulting in increased concentrations and effects of these substrates. If symptoms do not improve, appropriate to switch back to Trikafta.    PLAN:                            1. Shar to get hepatic panel and CK due to recent symptoms. Upon lab results and symptom tolerance, may consider continuing Alyftrek or switching back to Trikafta.    Follow-up: after lab results tomorrow    SUBJECTIVE/OBJECTIVE:                          Shar Delacruz is a 44 year old male seen for a follow-up visit.       Reason for visit: Medication Question     Allergies/ADRs: Reviewed in chart  Past Medical History: Reviewed in chart  Tobacco: He reports that he has never smoked. He has never been exposed to tobacco smoke. He has never used smokeless tobacco.  Alcohol: 1-2 beverages/month  Other Substance Use: THC drink rarely    CF/CFTR modulator  Shar started Alyftrek (full dose) one week ago. He is taking with fat-containing food (peanut butter and greek yogurt or eggs and sausage)  Since starting Alyftrek, he reports muscle weakness/soreness affecting his workouts and daily tasks. This is not typical for him as he regularly works out 3-4x/week. He has also had increased loose stools and lightning of color.   ----------------  I spent 10 minutes with this patient today. All changes were made via verbal approval with Dr. Lennie Schaffer MD.     A summary of these recommendations was sent via [employer].    Ileana Kilgore, PharmD   Cystic Fibrosis MTM  Pharmacist  [facility]    Telemedicine Visit Details  The patient's medications can be safely assessed via a telemedicine encounter.  Type of service:  Telephone visit  Originating Location (pt. Location): Home    Distant Location (provider location):  On-site  Start Time: 12:30 PM  End Time: 12:40 PM     Medication Therapy Recommendations  Cystic fibrosis (H)   1 Current Medication: vanzacaftor-tezacaftor-deutivacaftor (ALYFTREK) 10- MG tablet   Current Medication Sig: Take 2 tablets by mouth daily. Take with fat-containing food.   Rationale: Medication requires monitoring - Needs additional monitoring - Safety   Recommendation: Order Lab   Status: Accepted per Provider   Identified Date: 5/12/2025 Completed Date: 5/12/2025

## 2025-05-13 ENCOUNTER — LAB (OUTPATIENT)
Dept: LAB | Facility: CLINIC | Age: 45
End: 2025-05-13
Payer: COMMERCIAL

## 2025-05-13 ENCOUNTER — VIRTUAL VISIT (OUTPATIENT)
Dept: ENDOCRINOLOGY | Facility: CLINIC | Age: 45
End: 2025-05-13
Attending: INTERNAL MEDICINE
Payer: COMMERCIAL

## 2025-05-13 DIAGNOSIS — M85.9 LOW BONE DENSITY FOR AGE: Primary | ICD-10-CM

## 2025-05-13 DIAGNOSIS — E84.0 CYSTIC FIBROSIS WITH PULMONARY MANIFESTATIONS (H): ICD-10-CM

## 2025-05-13 PROCEDURE — G2211 COMPLEX E/M VISIT ADD ON: HCPCS | Performed by: INTERNAL MEDICINE

## 2025-05-13 PROCEDURE — 36415 COLL VENOUS BLD VENIPUNCTURE: CPT

## 2025-05-13 PROCEDURE — 1126F AMNT PAIN NOTED NONE PRSNT: CPT | Mod: 95 | Performed by: INTERNAL MEDICINE

## 2025-05-13 PROCEDURE — 80076 HEPATIC FUNCTION PANEL: CPT

## 2025-05-13 PROCEDURE — 98006 SYNCH AUDIO-VIDEO EST MOD 30: CPT | Performed by: INTERNAL MEDICINE

## 2025-05-13 PROCEDURE — 82550 ASSAY OF CK (CPK): CPT

## 2025-05-13 ASSESSMENT — PAIN SCALES - GENERAL: PAINLEVEL_OUTOF10: NO PAIN (0)

## 2025-05-13 NOTE — PROGRESS NOTES
CF Endocrinology Return Consultation: Low bone density    Patient: Philip Delacruz MRN# [id number]   YOB: 1980 Age: 44 year old   Date of Visit:2025          Problem list:     Patient Active Problem List    Diagnosis Date Noted    Pure hypercholesterolemia 2025     Priority: Medium    Small intestinal bacterial overgrowth 2018     Priority: Medium    Exocrine pancreatic insufficiency 04/10/2018     Priority: Medium    Pancreatic insufficiency 2018     Priority: Medium    Chronic sinusitis      Priority: Medium    Cystic fibrosis with pulmonary manifestations 2014     Priority: Medium     SWEAT TEST:  Date:  2014           Laboratory: [facility]  Sample #1   mg     30  mmol/L Cl  Sample #2   mg     27  mmol/L Cl     GENOTYPING:  Date:  2014        Laboratory:  [facility]  Genotype:  [id number]  Poly T Variant:  [ 7T ]/[9T  ]              HPI:   Philip is a 44 year old male with CF related bone disease     Summary of prior history copied from previous note: DEXA scan 2021 showed significant decline in bone density, about 5.9% at the lumbar spine. Lowest z score was -2.0 at lumbar spine L1-L3 with outlier of -3.5 at L4.    DXA from 21 with decrease in BMD at level of L1-L3 lumbar spine of -5.9% compared to 2019 and Z score of -2.0 from L1-L3 (outlier score of -3.5 in L4). No tobacco use, no excessive alcohol use, no family history of osteoporosis. Workup has been done to evaluate for potential secondary causes of bone loss. Recent TSH, testosterone, vitamin D level, and A1c were ok. Serum calcium, albumin, phosphorus, and PTH within normal limits.  Subclinical Cushing's was considered, however 24-hour urine free cortisol is within normal limits  24-hour urine calcium was notable for hypercalciuria (380 mg).  Patient's diet does not appear to be high in calcium.  High urinary calcium could be related to high sodium diet/increase  urinary sodium excretion. Of note, urine volume was somewhat high at 2.8     Patient started Fosamax 70 mg weekly in September 2021.    11/15/2021 repeat 24-hour calcium 0.38 g/24-hour specimen    Interval history:    I have reviewed the available past laboratory evaluations, imaging studies, and medical records available to me at this visit. History was obtained from the patient and the medical record.  I have reviewed the notes of the pulmonary care team entered into the medical record since I last saw the patient.    Reports side effects from switching to the new triple modulator therapy and is in contact with the pulmonary team    Patient started Fosamax 70 mg weekly in September 2021 and started drug holiday from Fosamax summer 2024  Takes MVW multivitamin, 1 tablet of calcium citrate and vitamin D 2000 IU daily.  No history of renal stones  Continues to exercise including CrossFit   No history of osteoporotic fracture. Fell last week while roller skating, no injuries   His usual diet contains 1-2 serving of dairy     Recent DEXA  April 2025, lumbar spine Z-score -1.8, left femoral neck T-score -1, 2.2% increase at right hip compared to DEXA in 2023    Recent OGTT reviewed with patient.           Past Medical History:     Past Medical History:   Diagnosis Date    Chronic sinusitis     Congenital absence of vas deferens, bilateral     Cystic fibrosis (H)     Delta F508 and [id number]     History of COVID-19 01/2022    Back pain, chills and mild cough, resolved without intervention    Nasal polyps     Sinusitis, chronic             Past Surgical History:     Past Surgical History:   Procedure Laterality Date    COLONOSCOPY N/A 9/26/2022    Procedure: COLONOSCOPY, WITH POLYPECTOMY AND BIOPSY;  Surgeon: Bennett Fajardo MD;  [facility]: [facility]    HC TOOTH EXTRACTION W/FORCEP      NASAL/SINUS POLYPECTOMY      REPAIR PECTUS EXCAVATUM  1997    SINUS SURGERY  1992, 2002, 2004    Removed tubinates and polyps OSH    Sperm  harvest                 Social History:     Social History     Social History Narrative    Lives in [place] with wife, Aimee, and sons, Shay and Serafin. Part time teaching special ed.              Family History:     Family History   Problem Relation Age of Onset    C.A.D. Maternal Grandfather     Diabetes Maternal Grandfather     Heart Disease Maternal Grandfather     Aneurysm Paternal Grandfather         Aortic    Gastrointestinal Disease Father         Reflux    Cancer Paternal Grandmother         Lymphoma    Diabetes Maternal Grandmother     Thyroid Disease Mother     Other Problems  () Other             Allergies:     No Known Allergies          Medications:     Current Outpatient Rx   Medication Sig Dispense Refill    acyclovir (ZOVIRAX) 400 MG tablet Take 1 tablet by mouth daily at 2 pm.      albuterol (PROAIR HFA/PROVENTIL HFA/VENTOLIN HFA) 108 (90 Base) MCG/ACT inhaler Inhale 2 puffs into the lungs every 6 hours as needed for shortness of breath, wheezing, cough or other (Before exercise). 18 g 11    atorvastatin (LIPITOR) 10 MG tablet Take 1 tablet (10 mg) by mouth daily. 90 tablet 3    Ca Cit Malate-Cholecalciferol (CALCIUM CITRATE MALATE-VIT D) 250-100 MG-UNIT TABS Take 1 tablet by mouth daily Activated calcium by [employer]      escitalopram (LEXAPRO) 20 MG tablet TAKE ONE TABLET BY MOUTH ONCE DAILY 30 tablet 11    fluticasone (FLONASE) 50 MCG/ACT nasal spray Spray 1 spray into both nostrils daily as needed for rhinitis or allergies.      ganciclovir (ZIRGAN) 0.15 % ophthalmic gel 1 drop.      lactobacillus rhamnosus, GG, (CULTURELL) capsule Take 1 capsule by mouth daily as needed. Bifido      lipase-protease-amylase (ZENPEP) 51785-28860-420366 units CPEP Take 5 capsules (125,000 Units) by mouth 3 times daily (with meals). Take 4-6 capsules with meals and 2-3 with snacks. Allow for 3 meals and 3 snacks per day. 900 capsule 11    mvw complete formulation (SOFTGELS) capsule Take 1 capsule by mouth daily       Nasal Moisturizer Combination (ALKALOL SALINE) SOLN as needed.      Omega-3 Fatty Acids (FISH OIL) 1200 MG CPDR Take 1 capsule by mouth daily.      TRIKAFTA 100-50-75 & 150 MG tablet pack TAKE TWO ORANGE TABLETS BY MOUTH IN THE MORNING AND ONE BLUE TABLET IN THE EVENING AS DIRECTED ON PACKAGE.  TAKE WITH FAT CONTAINING FOOD. 84 tablet 4    vanzacaftor-tezacaftor-deutivacaftor (ALYFTREK) 10- MG tablet Take 2 tablets by mouth daily. Take with fat-containing food. 56 tablet 1    Vitamin D3 (CHOLECALCIFEROL) 25 mcg (1000 units) tablet Take 1 tablet by mouth daily.       Social History     Socioeconomic History    Marital status:      Spouse name: Not on file    Number of children: Not on file    Years of education: Not on file    Highest education level: Not on file   Occupational History    Occupation: Teacher   Tobacco Use    Smoking status: Never     Passive exposure: Never    Smokeless tobacco: Never   Vaping Use    Vaping status: Never Used   Substance and Sexual Activity    Alcohol use: Yes     Comment: 1-2 drinks/month    Drug use: No     Comment: THC drink rarely    Sexual activity: Yes     Partners: Female     Birth control/protection: None   Other Topics Concern    Parent/sibling w/ CABG, MI or angioplasty before 65F 55M? Not Asked   Social History Narrative    Lives in [place] with wife, Aimee, and sons, Shay and Serafin. Part time teaching special ed.     Social Drivers of Health     Financial Resource Strain: Not on file   Food Insecurity: Not on file   Transportation Needs: Not on file   Physical Activity: Not on file   Stress: Not on file   Social Connections: Not on file   Interpersonal Safety: Not on file   Housing Stability: Not on file             Review of Systems:             Physical Exam:   There were no vitals taken for this visit.  GENERAL: Healthy, alert and no distress  EYES: Eyes grossly normal to inspection.  No discharge or erythema, or obvious scleral/conjunctival  abnormalities.  RESP: No audible wheeze, cough, or visible cyanosis.  No visible retractions or increased work of breathing.    SKIN: Visible skin clear. No significant rash, abnormal pigmentation or lesions.  NEURO: Cranial nerves grossly intact.  Mentation and speech appropriate for age.  PSYCH: Mentation appears normal, affect normal/bright, judgement and insight intact, normal speech and appearance well-groomed.          Laboratory results:     TSH   Date Value Ref Range Status   04/11/2025 2.27 0.30 - 4.20 uIU/mL Final   04/21/2022 0.62 0.40 - 4.00 mU/L Final   04/08/2021 1.47 0.40 - 4.00 mU/L Final     Triiodothyronine (T3)   Date Value Ref Range Status   07/16/2019 95 60 - 181 ng/dL Final     Tissue Transglutaminase Antibody IgA   Date Value Ref Range Status   10/03/2024 0.9 <7.0 U/mL Final     Comment:     Negative- The tTG-IgA assay has limited utility for patients with decreased levels of IgA. Screening for celiac disease should include IgA testing to rule out selective IgA deficiency and to guide selection and interpretation of serological testing. tTG-IgG testing may be positive in celiac disease patients with IgA deficiency.     Tissue Transglutaminase Antibody IgG   Date Value Ref Range Status   10/03/2024 <0.6 <7.0 U/mL Final     Comment:     Negative     Testosterone Total   Date Value Ref Range Status   04/11/2025 338 240 - 950 ng/dL Final   04/08/2021 438 240 - 950 ng/dL Final     Comment:     This test was developed and its performance characteristics determined by the   [facility].   It has not been cleared or approved by the FDA. The laboratory is regulated   under CLIA as qualified to perform high-complexity testing. This test is used   for clinical purposes. It should not be regarded as investigational or for   research.       Cholesterol   Date Value Ref Range Status   04/11/2025 164 <200 mg/dL Final   07/08/2021 150 <200  mg/dL Final     Albumin Urine mg/L   Date Value Ref Range Status   04/11/2025 <12.0 mg/L Final     Comment:     The reference ranges have not been established in urine albumin. The results should be integrated into the clinical context for interpretation.   04/21/2022 5 mg/L Final   04/08/2021 10 mg/L Final     Triglycerides   Date Value Ref Range Status   04/11/2025 100 <150 mg/dL Final   07/08/2021 44 <150 mg/dL Final     HDL Cholesterol   Date Value Ref Range Status   07/08/2021 60 >39 mg/dL Final     Direct Measure HDL   Date Value Ref Range Status   04/11/2025 51 >=40 mg/dL Final     LDL Cholesterol Calculated   Date Value Ref Range Status   04/11/2025 93 <100 mg/dL Final   07/08/2021 81 <100 mg/dL Final     Comment:     Desirable:       <100 mg/dl     Cholesterol/HDL Ratio   Date Value Ref Range Status   12/22/2014 3.3 0.0 - 5.0 Final     Non HDL Cholesterol   Date Value Ref Range Status   04/11/2025 113 <130 mg/dL Final   07/08/2021 90 <130 mg/dL Final     DEXA 5/4/2023: Most negative T score of -1.2 left femoral neck    DXA, 04/08/2021  Most negative Z score of -2.0 at level of L1-L3 (below normal range for men <50 years old)  Significantly decreased BMD at level of L1-L3 lumbar spine compared to previous exam (-5.9%)  Significantly decreased BMD at level of L1-L3 lumbar spine and right total hip compared to baseline exam on 12/22/2014 (-10.2% at lumbar spine, -2.5% at hip)     DXA, 05/02/2019  Most negative Z score of -1.6 at level lf lumbar spine  Significantly decreased BMD at lumbar spine, right and left total hip compared to previous exam (-7.8%)     DXA, 04/07/2017  Most negative Z score of -1.1 at level of lumbar spine  Significantly decreased BMD at level of lumbar spine compared to previous exam (-3.4%)     DXA, 12/22/2014 - Baseline Exam  Most negative Z score of -1.3 at level of lumbar spine           Assessment and Plan:   Philip is a 44 year old male with CF related bone disease    Low bone  density  Completed 3 years of therapy with Fosamax  On drug holiday from Fosamax since summer 2024  Bone density stable on most recent DEXA  Continue to follow with DEXA every 2 years  Counseled on calcium, vitamin D supplementation and weightbearing exercises    Vitamin D, testosterone and TSH in normal range on recent annual labs  Recent OGTT -normal    Return to clinic in 2 year with next DEXA scan    [facility]   Note: Chart documentation done in part with Dragon Voice Recognition software. Although reviewed after completion, some word and grammatical errors may remain.  Please consider this when interpreting information in this chart     Video visit done using [employer]  Video visit start time 11:14 AM  Video end time 11:26 AM  Patient location: Work  Provider patient: Off-site

## 2025-05-13 NOTE — PATIENT INSTRUCTIONS
Recommendations from today's MTM visit:                                                      1. Shar to get hepatic panel and CK due to recent symptoms. Upon lab results and symptom tolerance, may consider continuing Alyftrek or switching back to Trikafta.    Follow-up: after lab results tomorrow    My Clinical Pharmacist's contact information:                                                      Please feel free to contact me with any questions or concerns you have.      Ileana Kilgore, PharmD   Cystic Fibrosis MTM Pharmacist  [facility]

## 2025-05-13 NOTE — LETTER
2025       RE: Philip Delacruz  [address]  [facility] [address]     Dear Colleague,    Thank you for referring your patient, Philip Delacruz, to the [facility] [place] [facility]. Please see a copy of my visit note below.      CF Endocrinology Return Consultation: Low bone density    Patient: Philip Delacruz MRN# [id number]   YOB: 1980 Age: 44 year old   Date of Visit:2025          Problem list:     Patient Active Problem List    Diagnosis Date Noted     Pure hypercholesterolemia 2025     Priority: Medium     Small intestinal bacterial overgrowth 2018     Priority: Medium     Exocrine pancreatic insufficiency 04/10/2018     Priority: Medium     Pancreatic insufficiency 2018     Priority: Medium     Chronic sinusitis      Priority: Medium     Cystic fibrosis with pulmonary manifestations 2014     Priority: Medium     SWEAT TEST:  Date:  2014           Laboratory: [facility]  Sample #1   mg     30  mmol/L Cl  Sample #2   mg     27  mmol/L Cl     GENOTYPING:  Date:  2014        Laboratory:  [facility]  Genotype:  [id number]  Poly T Variant:  [ 7T ]/[9T  ]              HPI:   Philip is a 44 year old male with CF related bone disease     Summary of prior history copied from previous note: DEXA scan 2021 showed significant decline in bone density, about 5.9% at the lumbar spine. Lowest z score was -2.0 at lumbar spine L1-L3 with outlier of -3.5 at L4.    DXA from 21 with decrease in BMD at level of L1-L3 lumbar spine of -5.9% compared to 2019 and Z score of -2.0 from L1-L3 (outlier score of -3.5 in L4). No tobacco use, no excessive alcohol use, no family history of osteoporosis. Workup has been done to evaluate for potential secondary causes of bone loss. Recent TSH, testosterone, vitamin D level, and A1c were ok. Serum calcium, albumin,  phosphorus, and PTH within normal limits.  Subclinical Cushing's was considered, however 24-hour urine free cortisol is within normal limits  24-hour urine calcium was notable for hypercalciuria (380 mg).  Patient's diet does not appear to be high in calcium.  High urinary calcium could be related to high sodium diet/increase urinary sodium excretion. Of note, urine volume was somewhat high at 2.8     Patient started Fosamax 70 mg weekly in September 2021.    11/15/2021 repeat 24-hour calcium 0.38 g/24-hour specimen    Interval history:    I have reviewed the available past laboratory evaluations, imaging studies, and medical records available to me at this visit. History was obtained from the patient and the medical record.  I have reviewed the notes of the pulmonary care team entered into the medical record since I last saw the patient.    Reports side effects from switching to the new triple modulator therapy and is in contact with the pulmonary team    Patient started Fosamax 70 mg weekly in September 2021 and started drug holiday from Fosamax summer 2024  Takes MVW multivitamin, 1 tablet of calcium citrate and vitamin D 2000 IU daily.  No history of renal stones  Continues to exercise including [employer]   No history of osteoporotic fracture. Fell last week while roller skating, no injuries   His usual diet contains 1-2 serving of dairy     Recent DEXA  April 2025, lumbar spine Z-score -1.8, left femoral neck T-score -1, 2.2% increase at right hip compared to DEXA in 2023    Recent OGTT reviewed with patient.           Past Medical History:     Past Medical History:   Diagnosis Date     Chronic sinusitis      Congenital absence of vas deferens, bilateral      Cystic fibrosis (H)     Delta F508 and [id number]      History of COVID-19 01/2022    Back pain, chills and mild cough, resolved without intervention     Nasal polyps      Sinusitis, chronic             Past Surgical History:     Past Surgical History:    Procedure Laterality Date     COLONOSCOPY N/A 9/26/2022    Procedure: COLONOSCOPY, WITH POLYPECTOMY AND BIOPSY;  Surgeon: Bennett Fajardo MD;  Location: [facility]     HC TOOTH EXTRACTION W/FORCEP       NASAL/SINUS POLYPECTOMY       REPAIR PECTUS EXCAVATUM  1997     SINUS SURGERY  1992, 2002, 2004    Removed tubinates and polyps OSH     Sperm harvest                 Social History:     Social History     Social History Narrative    Lives in [place] with wife, Aimee, and sons, Shay and Serafin. Part time teaching special ed.              Family History:     Family History   Problem Relation Age of Onset     C.A.D. Maternal Grandfather      Diabetes Maternal Grandfather      Heart Disease Maternal Grandfather      Aneurysm Paternal Grandfather         Aortic     Gastrointestinal Disease Father         Reflux     Cancer Paternal Grandmother         Lymphoma     Diabetes Maternal Grandmother      Thyroid Disease Mother      Other Problems  () Other             Allergies:     No Known Allergies          Medications:     Current Outpatient Rx   Medication Sig Dispense Refill     acyclovir (ZOVIRAX) 400 MG tablet Take 1 tablet by mouth daily at 2 pm.       albuterol (PROAIR HFA/PROVENTIL HFA/VENTOLIN HFA) 108 (90 Base) MCG/ACT inhaler Inhale 2 puffs into the lungs every 6 hours as needed for shortness of breath, wheezing, cough or other (Before exercise). 18 g 11     atorvastatin (LIPITOR) 10 MG tablet Take 1 tablet (10 mg) by mouth daily. 90 tablet 3     Ca Cit Malate-Cholecalciferol (CALCIUM CITRATE MALATE-VIT D) 250-100 MG-UNIT TABS Take 1 tablet by mouth daily Activated calcium by Nutrikey       escitalopram (LEXAPRO) 20 MG tablet TAKE ONE TABLET BY MOUTH ONCE DAILY 30 tablet 11     fluticasone (FLONASE) 50 MCG/ACT nasal spray Spray 1 spray into both nostrils daily as needed for rhinitis or allergies.       ganciclovir (ZIRGAN) 0.15 % ophthalmic gel 1 drop.       lactobacillus rhamnosus, GG, (CULTURELL) capsule Take  1 capsule by mouth daily as needed. Bifido       lipase-protease-amylase (ZENPEP) 44260-57842-684711 units CPEP Take 5 capsules (125,000 Units) by mouth 3 times daily (with meals). Take 4-6 capsules with meals and 2-3 with snacks. Allow for 3 meals and 3 snacks per day. 900 capsule 11     mvw complete formulation (SOFTGELS) capsule Take 1 capsule by mouth daily       Nasal Moisturizer Combination (ALKALOL SALINE) SOLN as needed.       Omega-3 Fatty Acids (FISH OIL) 1200 MG CPDR Take 1 capsule by mouth daily.       TRIKAFTA 100-50-75 & 150 MG tablet pack TAKE TWO ORANGE TABLETS BY MOUTH IN THE MORNING AND ONE BLUE TABLET IN THE EVENING AS DIRECTED ON PACKAGE.  TAKE WITH FAT CONTAINING FOOD. 84 tablet 4     vanzacaftor-tezacaftor-deutivacaftor (ALYFTREK) 10- MG tablet Take 2 tablets by mouth daily. Take with fat-containing food. 56 tablet 1     Vitamin D3 (CHOLECALCIFEROL) 25 mcg (1000 units) tablet Take 1 tablet by mouth daily.       Social History     Socioeconomic History     Marital status:      Spouse name: Not on file     Number of children: Not on file     Years of education: Not on file     Highest education level: Not on file   Occupational History     Occupation: Teacher   Tobacco Use     Smoking status: Never     Passive exposure: Never     Smokeless tobacco: Never   Vaping Use     Vaping status: Never Used   Substance and Sexual Activity     Alcohol use: Yes     Comment: 1-2 drinks/month     Drug use: No     Comment: THC drink rarely     Sexual activity: Yes     Partners: Female     Birth control/protection: None   Other Topics Concern     Parent/sibling w/ CABG, MI or angioplasty before 65F 55M? Not Asked   Social History Narrative    Lives in [place] with wife, Aimee, and sons, Shay and Serafin. Part time teaching special ed.     Social Drivers of Health     Financial Resource Strain: Not on file   Food Insecurity: Not on file   Transportation Needs: Not on file   Physical Activity: Not on  file   Stress: Not on file   Social Connections: Not on file   Interpersonal Safety: Not on file   Housing Stability: Not on file             Review of Systems:             Physical Exam:   There were no vitals taken for this visit.  GENERAL: Healthy, alert and no distress  EYES: Eyes grossly normal to inspection.  No discharge or erythema, or obvious scleral/conjunctival abnormalities.  RESP: No audible wheeze, cough, or visible cyanosis.  No visible retractions or increased work of breathing.    SKIN: Visible skin clear. No significant rash, abnormal pigmentation or lesions.  NEURO: Cranial nerves grossly intact.  Mentation and speech appropriate for age.  PSYCH: Mentation appears normal, affect normal/bright, judgement and insight intact, normal speech and appearance well-groomed.          Laboratory results:     TSH   Date Value Ref Range Status   04/11/2025 2.27 0.30 - 4.20 uIU/mL Final   04/21/2022 0.62 0.40 - 4.00 mU/L Final   04/08/2021 1.47 0.40 - 4.00 mU/L Final     Triiodothyronine (T3)   Date Value Ref Range Status   07/16/2019 95 60 - 181 ng/dL Final     Tissue Transglutaminase Antibody IgA   Date Value Ref Range Status   10/03/2024 0.9 <7.0 U/mL Final     Comment:     Negative- The tTG-IgA assay has limited utility for patients with decreased levels of IgA. Screening for celiac disease should include IgA testing to rule out selective IgA deficiency and to guide selection and interpretation of serological testing. tTG-IgG testing may be positive in celiac disease patients with IgA deficiency.     Tissue Transglutaminase Antibody IgG   Date Value Ref Range Status   10/03/2024 <0.6 <7.0 U/mL Final     Comment:     Negative     Testosterone Total   Date Value Ref Range Status   04/11/2025 338 240 - 950 ng/dL Final   04/08/2021 438 240 - 950 ng/dL Final     Comment:     This test was developed and its performance characteristics determined by the   [facility].   It has not been cleared or approved by the FDA. The laboratory is regulated   under CLIA as qualified to perform high-complexity testing. This test is used   for clinical purposes. It should not be regarded as investigational or for   research.       Cholesterol   Date Value Ref Range Status   04/11/2025 164 <200 mg/dL Final   07/08/2021 150 <200 mg/dL Final     Albumin Urine mg/L   Date Value Ref Range Status   04/11/2025 <12.0 mg/L Final     Comment:     The reference ranges have not been established in urine albumin. The results should be integrated into the clinical context for interpretation.   04/21/2022 5 mg/L Final   04/08/2021 10 mg/L Final     Triglycerides   Date Value Ref Range Status   04/11/2025 100 <150 mg/dL Final   07/08/2021 44 <150 mg/dL Final     HDL Cholesterol   Date Value Ref Range Status   07/08/2021 60 >39 mg/dL Final     Direct Measure HDL   Date Value Ref Range Status   04/11/2025 51 >=40 mg/dL Final     LDL Cholesterol Calculated   Date Value Ref Range Status   04/11/2025 93 <100 mg/dL Final   07/08/2021 81 <100 mg/dL Final     Comment:     Desirable:       <100 mg/dl     Cholesterol/HDL Ratio   Date Value Ref Range Status   12/22/2014 3.3 0.0 - 5.0 Final     Non HDL Cholesterol   Date Value Ref Range Status   04/11/2025 113 <130 mg/dL Final   07/08/2021 90 <130 mg/dL Final     DEXA 5/4/2023: Most negative T score of -1.2 left femoral neck    DXA, 04/08/2021  Most negative Z score of -2.0 at level of L1-L3 (below normal range for men <50 years old)  Significantly decreased BMD at level of L1-L3 lumbar spine compared to previous exam (-5.9%)  Significantly decreased BMD at level of L1-L3 lumbar spine and right total hip compared to baseline exam on 12/22/2014 (-10.2% at lumbar spine, -2.5% at hip)     DXA, 05/02/2019  Most negative Z score of -1.6 at level lf lumbar spine  Significantly decreased BMD at lumbar spine, right and left total hip compared to previous  exam (-7.8%)     DXA, 04/07/2017  Most negative Z score of -1.1 at level of lumbar spine  Significantly decreased BMD at level of lumbar spine compared to previous exam (-3.4%)     DXA, 12/22/2014 - Baseline Exam  Most negative Z score of -1.3 at level of lumbar spine           Assessment and Plan:   Philip is a 44 year old male with CF related bone disease    Low bone density  Completed 3 years of therapy with Fosamax  On drug holiday from Fosamax since summer 2024  Bone density stable on most recent DEXA  Continue to follow with DEXA every 2 years  Counseled on calcium, vitamin D supplementation and weightbearing exercises    Vitamin D, testosterone and TSH in normal range on recent annual labs  Recent OGTT -normal    Return to clinic in 2 year with next DEXA scan    AVIS Mckeon   Note: Chart documentation done in part with Dragon Voice Recognition software. Although reviewed after completion, some word and grammatical errors may remain.  Please consider this when interpreting information in this chart     Video visit done using [employer]  Video visit start time 11:14 AM  Video end time 11:26 AM  Patient location: Work  Provider patient: Off-site        Again, thank you for allowing me to participate in the care of your patient.      Sincerely,    Khushboo Knapp MD

## 2025-05-13 NOTE — NURSING NOTE
Current patient location: Patient declined to provide     Is the patient currently in the state of [place]? YES    Visit mode: VIDEO    If the visit is dropped, the patient can be reconnected by:VIDEO VISIT: Text to cell phone:   Telephone Information:   Mobile [phone]       Will anyone else be joining the visit? NO  (If patient encounters technical issues they should call [phone]    Are changes needed to the allergy or medication list? No    Are refills needed on medications prescribed by this physician? NO    Rooming Documentation:  Questionnaire(s) not done per department protocol    Reason for visit: RECHECK Shelby Kocher VVF

## 2025-05-15 ENCOUNTER — RESULTS FOLLOW-UP (OUTPATIENT)
Dept: PHARMACY | Facility: CLINIC | Age: 45
End: 2025-05-15

## 2025-05-15 LAB
ALBUMIN SERPL BCG-MCNC: 4.6 G/DL (ref 3.5–5.2)
ALP SERPL-CCNC: 92 U/L (ref 40–150)
ALT SERPL W P-5'-P-CCNC: 31 U/L (ref 0–70)
AST SERPL W P-5'-P-CCNC: 33 U/L (ref 0–45)
BILIRUB DIRECT SERPL-MCNC: 0.13 MG/DL (ref 0–0.3)
BILIRUB SERPL-MCNC: 0.5 MG/DL
CK SERPL-CCNC: 189 U/L (ref 39–308)
PROT SERPL-MCNC: 7.3 G/DL (ref 6.4–8.3)

## 2025-05-19 ENCOUNTER — VIRTUAL VISIT (OUTPATIENT)
Dept: PHARMACY | Facility: CLINIC | Age: 45
End: 2025-05-19
Payer: COMMERCIAL

## 2025-05-19 DIAGNOSIS — R21 RASH: ICD-10-CM

## 2025-05-19 DIAGNOSIS — E84.0 CYSTIC FIBROSIS WITH PULMONARY MANIFESTATIONS (H): Primary | ICD-10-CM

## 2025-05-19 PROCEDURE — 99207 PR NO CHARGE LOS: CPT | Mod: 93 | Performed by: PHARMACIST

## 2025-05-19 RX ORDER — ELEXACAFTOR, TEZACAFTOR, AND IVACAFTOR 100-50-75
KIT ORAL
Qty: 84 TABLET | Refills: 4 | Status: SHIPPED | OUTPATIENT
Start: 2025-05-19

## 2025-05-19 NOTE — Clinical Note
Giacomo Hogue,  Shar's rash spread across his body over the weekend so he dc'd Alyftrek on Friday, then restarted Trikafta on Sunday. I asked that he repeat labs this week.  Ileana

## 2025-05-19 NOTE — PROGRESS NOTES
Medication Therapy Management (MTM) Encounter    ASSESSMENT:                            Medication Adherence/Access: No issues identified.    Rash  CF/CFTR modulator  Discussed future options which may include restarting Alyftrek (could consider dose reduction and titration to full dose) or continuing on Trikafta. Shar prefers to remain off Alyftrek due to side effects as he feels risk outweighs benefit of restarting Alyftrek at this time, but may reconsider in future.    PLAN:                            1. Co-produced plan with Shar to remain off Alyftrek and continue with Trikafta (full dose). Shar to monitor for resolution of rash and repeat labs. New Trikafta prescription sent to pharmacy. Will submit adverse event report to [employer].    Follow-up: repeat labs (CBC, hepatic panel) this week    SUBJECTIVE/OBJECTIVE:                          Shar Delacruz is a 44 year old male seen for a follow-up visit.     Reason for visit: Alyftrek Follow-up.    Allergies/ADRs: Reviewed in chart  Past Medical History: Reviewed in chart  Tobacco: He reports that he has never smoked. He has never been exposed to tobacco smoke. He has never used smokeless tobacco.  Alcohol: 1-2 beverages/month    Rash  CF/CFTR modulator  Trikafta (full dose)  Patient has reported side effects since starting Alyftrek which includes the following: diarrhea, muscle pain, rash. Over the weekend, his rash spread across his body from his toes up to neck, so he discontinued 5/16. He then restarted full dose Trikafta 5/18 and he notes rash seems to be improving. He has ~1 week of Trikafta remaining at home.  ----------------  I spent 10 minutes with this patient today. All changes were made via collaborative practice agreement with Dr. Lennie Schaffer MD.     A summary of these recommendations was sent via [employer].    Ileana Kilgore, PharmD   Cystic Fibrosis MTM Pharmacist  [facility]    Telemedicine Visit Details  The patient's  medications can be safely assessed via a telemedicine encounter.  Type of service:  Telephone visit  Originating Location (pt. Location): Home    Distant Location (provider location):  On-site  Start Time: 8:00 AM  End Time: 8:10 AM     Medication Therapy Recommendations  No medication therapy recommendations to display

## 2025-05-21 ENCOUNTER — LAB (OUTPATIENT)
Dept: LAB | Facility: CLINIC | Age: 45
End: 2025-05-21
Payer: COMMERCIAL

## 2025-05-21 ENCOUNTER — DOCUMENTATION ONLY (OUTPATIENT)
Dept: PULMONOLOGY | Facility: CLINIC | Age: 45
End: 2025-05-21

## 2025-05-21 DIAGNOSIS — E84.0 CYSTIC FIBROSIS WITH PULMONARY MANIFESTATIONS (H): ICD-10-CM

## 2025-05-21 LAB
ALBUMIN SERPL BCG-MCNC: 4.5 G/DL (ref 3.5–5.2)
ALP SERPL-CCNC: 87 U/L (ref 40–150)
ALT SERPL W P-5'-P-CCNC: 26 U/L (ref 0–70)
AST SERPL W P-5'-P-CCNC: 30 U/L (ref 0–45)
BASOPHILS # BLD AUTO: 0 10E3/UL (ref 0–0.2)
BASOPHILS NFR BLD AUTO: 0 %
BILIRUB SERPL-MCNC: 0.6 MG/DL
BILIRUBIN DIRECT (ROCHE PRO & PURE): 0.25 MG/DL (ref 0–0.45)
EOSINOPHIL # BLD AUTO: 0.1 10E3/UL (ref 0–0.7)
EOSINOPHIL NFR BLD AUTO: 1 %
ERYTHROCYTE [DISTWIDTH] IN BLOOD BY AUTOMATED COUNT: 11.5 % (ref 10–15)
GGT SERPL-CCNC: 20 U/L (ref 8–61)
HCT VFR BLD AUTO: 46.2 % (ref 40–53)
HGB BLD-MCNC: 16.1 G/DL (ref 13.3–17.7)
IMM GRANULOCYTES # BLD: 0 10E3/UL
IMM GRANULOCYTES NFR BLD: 0 %
LYMPHOCYTES # BLD AUTO: 2.5 10E3/UL (ref 0.8–5.3)
LYMPHOCYTES NFR BLD AUTO: 33 %
MCH RBC QN AUTO: 31.4 PG (ref 26.5–33)
MCHC RBC AUTO-ENTMCNC: 34.8 G/DL (ref 31.5–36.5)
MCV RBC AUTO: 90 FL (ref 78–100)
MONOCYTES # BLD AUTO: 0.5 10E3/UL (ref 0–1.3)
MONOCYTES NFR BLD AUTO: 7 %
NEUTROPHILS # BLD AUTO: 4.4 10E3/UL (ref 1.6–8.3)
NEUTROPHILS NFR BLD AUTO: 59 %
PLATELET # BLD AUTO: 210 10E3/UL (ref 150–450)
PROT SERPL-MCNC: 7.2 G/DL (ref 6.4–8.3)
RBC # BLD AUTO: 5.12 10E6/UL (ref 4.4–5.9)
WBC # BLD AUTO: 7.5 10E3/UL (ref 4–11)

## 2025-05-21 PROCEDURE — 85025 COMPLETE CBC W/AUTO DIFF WBC: CPT

## 2025-05-21 PROCEDURE — 82977 ASSAY OF GGT: CPT

## 2025-05-21 PROCEDURE — 36415 COLL VENOUS BLD VENIPUNCTURE: CPT

## 2025-05-21 PROCEDURE — 80076 HEPATIC FUNCTION PANEL: CPT

## 2025-05-21 NOTE — PROGRESS NOTES
Philip Delacruz has an upcoming lab appointment:    Future Appointments   Date Time Provider Department Center   5/21/2025  9:45 AM [facility]   7/17/2025 12:30 PM  [facility]   7/17/2025  1:00 PM Lennie Schaffer MD [facility]     Patient is scheduled for the following lab(s): Pt needs a lipid test too    There is no order available. Please review and place either future orders or HMPO (Review of Health Maintenance Protocol Orders), as appropriate.    Health Maintenance Due   Topic    ANNUAL REVIEW OF HM ORDERS     HIV SCREENING     HEPATITIS C SCREENING      Rex Tierney

## 2025-05-22 ENCOUNTER — RESULTS FOLLOW-UP (OUTPATIENT)
Dept: PHARMACY | Facility: CLINIC | Age: 45
End: 2025-05-22

## 2025-05-22 ENCOUNTER — CLINICAL UPDATE (OUTPATIENT)
Dept: PHARMACY | Facility: CLINIC | Age: 45
End: 2025-05-22
Payer: COMMERCIAL

## 2025-05-22 DIAGNOSIS — E84.0 CYSTIC FIBROSIS WITH PULMONARY MANIFESTATIONS (H): Primary | ICD-10-CM

## 2025-05-22 NOTE — PROGRESS NOTES
"Clinical Update:                                                    A chart review was conducted for Philip Delacruz.    Reason for Chart Review: Trikafta Lab Monitoring    Discussion: Philip has been on Trikafta since 12/13/2019. He switched to Alyftrek from 5/5/25 - 5/16/25 but discontinued due to side effects (rash, diarrhea, muscle pain) and restarted Trikafta 5/18/25. Per chart review, patient continues full dose Trikafta .  Patient has not reported side effects since restarting Trikafta.    Labs were reviewed from 5/21/2025 at [facility]. All modulator labs are within normal limits.    Lab Results   Component Value Date    ALT 26 05/21/2025    AST 30 05/21/2025    BILITOTAL 0.6 05/21/2025    DBIL 0.25 05/21/2025     05/13/2025     Lab Results   Component Value Date    WBC 7.5 05/21/2025    WBC 7.0 04/08/2021     Lab Results   Component Value Date    RBC 5.12 05/21/2025    RBC 5.32 04/08/2021     Lab Results   Component Value Date    HGB 16.1 05/21/2025    HGB 17.2 04/08/2021     Lab Results   Component Value Date    HCT 46.2 05/21/2025    HCT 47.7 04/08/2021     No components found for: \"MCT\"  Lab Results   Component Value Date    MCV 90 05/21/2025    MCV 90 04/08/2021     Lab Results   Component Value Date    MCH 31.4 05/21/2025    MCH 32.3 04/08/2021     Lab Results   Component Value Date    MCHC 34.8 05/21/2025    MCHC 36.1 04/08/2021     Lab Results   Component Value Date    RDW 11.5 05/21/2025    RDW 11.7 04/08/2021     Lab Results   Component Value Date     05/21/2025     04/08/2021     Plan:  1. Continue Trikafta   2. Recheck hepatic panel in 6 months         Ileana Kilgore, PharmD   Cystic Fibrosis MTM Pharmacist  [facility]      "

## 2025-06-04 RX ORDER — ACYCLOVIR 400 MG/1
400 TABLET ORAL
OUTPATIENT
Start: 2025-06-04

## 2025-06-15 ENCOUNTER — HEALTH MAINTENANCE LETTER (OUTPATIENT)
Age: 45
End: 2025-06-15

## 2025-06-17 ENCOUNTER — PHARMACY VISIT (OUTPATIENT)
Dept: ADMINISTRATIVE | Facility: CLINIC | Age: 45
End: 2025-06-17
Payer: COMMERCIAL

## 2025-06-17 NOTE — PROGRESS NOTES
Cystic Fibrosis Clinical Follow Up Assessment  I spoke with Shar today via text for [facility] TM assessment.     He is back on Trikafta after not tolerating side effects with Alyftrek.     Trikafta Full dose: He stated, 'since I got back on Trikafta, everything has been going well.' Denies SE or missed doses. PDC: 1.0     CF: No health, allergy or medication changes. No questions or concerns. Next OV 7/17/25.     TM will follow up next quarter, may consider biannual at that time.     Magda Carson, Pharm.D.[facility]  [address] [address]  [email or web address]  Office: [phone]

## 2025-07-09 NOTE — PROGRESS NOTES
[facility]  CF Clinic -Follow Up Visit-  Jul 10, 2025    Reason for Visit  Philip Delacruz is a 45 year old who is being seen for CF care           Assessment and Plan:   Philip Delacruz is a 45 year old with history of CF with normal PFTs, depression/anxiety, and CF related pancreatic insufficiency who is seen today for follow up of CF.     CF Pulmonary Disease: PFTs are baseline and have improved some since his last visit. He has normal PFTs at baseline although have noticed a slight trend downard in his FEV1 over the last 2 years, today they seem to have improved and are stable over the last year, FVC has also increased over the last year which I attribute to his increase in exercise and weight stability. He does note that he feels more winded with the same amount of exercise he could do   - Exercise and occasional aerobika for airway clearance.    Maintenance   Modulator: Trikafta  Mutation: [id number], PolyTVariant (7T/9T)  AW Clearance: Exercise  Bronchodilators: Albuterol MD prn usually before working out  Mucolytics: None  Antibiotics Inh: None  Antibiotics Oral: None  Other:  Exercise: Cross fit like exercise  Colonization Hx: Rhizobium (agrobacterium) radiobacter, Aspergillus versicolor, Serratia marcescens, staphylococcus aureus, moraxella (branhamella) catarrhalis   Annuals: Done and reviewed during this visit (April 2025)    Pulmonary/cystic fibrosis: Patient reports excellent exercise tolerance. PFTs are in the normal range and FEV1 has rebounded over last 6 months to his baseline and his FVC continues an upward trend.  He will continue using  exercise as his main form of airway clearance.  If there is progressive decline in PFTs, addition of more formal airway clearance will be pursued.He does still feel frustrated that he seems to get more winded doing similar exercise load to several years ago despite being consistnet with  working out the last 6 mos- a year. His weight has remained stable and it's unclear to me if this is truly a pulmonary change or an endurance/deconditioning change with the amount of weight he's increased in the last 5 years.  - Will monitor and if noticing no improvement with stable weight or weight loss in respiratory symptoms will pursue further work up/investigation  Hypertension: Has had elevated BPs up to 140s/90s this last year and family hx of mom's family of HTN. After working out more consistently he's more in the 130-135 systolic but still in 90s for diastolic.  He also has s/sx of sleep apnea  - Start lisinopril 10 mg  - repeat BMP in a week  - encouraged a PCP referral  ANDREAS: Witnessed apnea events by wife, HTN present for last year, weight up 30 lbs in last 4-5 years but stable in last year. STOP BANG score of 4  - Sleep medicine referral   CFTR Modulation: CFTR genotype is [id number], Poly T Variant:  [ 7T ]/[9T  ].  He has generally had an excellent response to Trikafta with decreased respiratory symptoms and rare exacerbations.  LFTs are within normal limits and CK is normal. Discussed alyftrek today, pamphlet given he will review with pharmacy too and if interested in making the switch in the future happy to trial.   GI: Patient reports frequent loose stool.  He did improve after AXR and miralax initiation after evidence of stool blockage. He has noticed some improvement with reduction in coffee. Started having  some more stool leakage again which sounds like itm ay be overflow with reduction in use of miralax.  - Increase miralax to 1 capful daily   - Offered AXR which he declined today  Pancreatic insufficiency: On Creon 94130 taking 6 capsules with meals and 2-3 capsules with snacks. RD to see today. Reviewed OGTT, no evidence of CFRD. Repeat annually. Vitamin labs pending.   CF-related sinus disease: The patient [facility].    Depression/anxiety:  Continue Lexapro.  Bone health: Continue calcium and vitamin D.  Alendronate was discontinued per patient and in congruence with endo recs. DEXA done 4/11/25.     Maintenance  Colonoscopy: 9/26/22 no polyps  DEXA: 4/11/25  Imms: up to date  Annuals: Done 4/11/25    Jul 10, 2025  CF Exacerbation  Absent      I personally spent 33 minutes in documentation, the interview and exam, and review of the chart/labs/imaging on Jul 10, 2025 not including time spent interpreting spirometry.      The longitudinal plan of care for cystic fibrosis and complications of this multisystem disease were addressed during this visit. Due to the added complexity in care, I will continue to support this patient in the subsequent management of this condition(s) and with the ongoing continuity of care of this condition      Lennie Schaffer MD  [facility]  Cystic Fibrosis Center  RN Coordinators: Justyna [phone]        CF Interval History:   Philip Delacruz is a 45 year old with history of  CF who is seen today for follow up of CF.   Has had a busy summer, 8 credits at [employer], did the family vacation at a lodge with in-laws, he's always very involved in the local fair.   Still having some issues with blood pressure. BP diastolic won't decrease <90. Has been exercising quite a bit with Systolic coming down [id number], 130-135/90s.   He feels like he has less exercise tolerance than he did ay ear ago, he works out 3-4 times a week at the gym doing a mix of cardio and cross fit exercises. He was able to go for a run twice this week at [employer] for 2.5-3 miles.   No coughing or sputum production, no wheezing.   He does snore and sometimes he wakes himself up with snoring/snorting. He generally feels rested when he wakes up. Wife has witnessed apnea events. He wonders with his high BP if this is contributing.  BMs are okay, every now and then he'll have some leakage which is spontaneous, unclear if it's enzyme related or not enough,  once a week to once a month. Doesn't seem to be related to fat in food. Wonders if related to dairy. In the past, he did have some overflow that contributed to the leakage, and the miralax seemed to help in the past and he notes that he hasn't been as consistent with its use lately. Denies bloating, nausea, or really any heartburn or abdominal discomfort.   No joint aches or pain other than neck/shoulders.     The patient was seen and examined by Lennie Schaffer MD          Review of Systems:     A complete ROS was otherwise negative except as noted in the HPI.           Past Medical and Surgical History:     Past Medical History:   Diagnosis Date    Chronic sinusitis     Congenital absence of vas deferens, bilateral     Cystic fibrosis (H)     Delta F508 and [id number]     History of COVID-19 01/2022    Back pain, chills and mild cough, resolved without intervention    Nasal polyps     Sinusitis, chronic      Past Surgical History:   Procedure Laterality Date    COLONOSCOPY N/A 9/26/2022    Procedure: COLONOSCOPY, WITH POLYPECTOMY AND BIOPSY;  Surgeon: Bennett Fajardo MD;  Location: [facility]    HC TOOTH EXTRACTION W/FORCEP      NASAL/SINUS POLYPECTOMY      REPAIR PECTUS EXCAVATUM  1997    SINUS SURGERY  1992, 2002, 2004    Removed tubinates and polyps OSH    Sperm harvest             Family History:     Family History   Problem Relation Age of Onset    C.A.D. Maternal Grandfather     Diabetes Maternal Grandfather     Heart Disease Maternal Grandfather     Aneurysm Paternal Grandfather         Aortic    Gastrointestinal Disease Father         Reflux    Cancer Paternal Grandmother         Lymphoma    Diabetes Maternal Grandmother     Thyroid Disease Mother     Other Problems  () Other             Social History:     Social History     Socioeconomic History    Marital status:      Spouse name: Not on file    Number of children: Not on file    Years of education: Not on file    Highest education level: Not on file    Occupational History    Occupation: Teacher   Tobacco Use    Smoking status: Never     Passive exposure: Never    Smokeless tobacco: Never   Vaping Use    Vaping status: Never Used   Substance and Sexual Activity    Alcohol use: Yes     Comment: 1-2 drinks/month    Drug use: No     Comment: THC drink rarely    Sexual activity: Yes     Partners: Female     Birth control/protection: None   Other Topics Concern    Parent/sibling w/ CABG, MI or angioplasty before 65F 55M? Not Asked   Social History Narrative    Lives in [place] with wife, Aimee, and sons, Shay and Serafin. Part time teaching special ed.     Social Drivers of Health     Financial Resource Strain: Not on file   Food Insecurity: Not on file   Transportation Needs: Not on file   Physical Activity: Not on file   Stress: Not on file   Social Connections: Not on file   Interpersonal Safety: Not on file   Housing Stability: Not on file            Medications:     Outpatient Encounter Medications as of 7/10/2025   Medication Sig Dispense Refill    acyclovir (ZOVIRAX) 400 MG tablet Take 1 tablet by mouth daily at 2 pm.      albuterol (PROAIR HFA/PROVENTIL HFA/VENTOLIN HFA) 108 (90 Base) MCG/ACT inhaler Inhale 2 puffs into the lungs every 6 hours as needed for shortness of breath, wheezing, cough or other (Before exercise). 18 g 11    atorvastatin (LIPITOR) 10 MG tablet Take 1 tablet (10 mg) by mouth daily. 90 tablet 3    Ca Cit Malate-Cholecalciferol (CALCIUM CITRATE MALATE-VIT D) 250-100 MG-UNIT TABS Take 1 tablet by mouth daily Activated calcium by Nutrikey      elexacaftor-tezacaftor-ivacaftor & ivacaftor (TRIKAFTA) 100-50-75 & 150 MG tablet pack Take 2 orange tablets in the morning and 1 light blue tablet in the evening. Swallow whole with fat-containing food. 84 tablet 4    escitalopram (LEXAPRO) 20 MG tablet TAKE ONE TABLET BY MOUTH ONCE DAILY 30 tablet 11    fluticasone (FLONASE) 50 MCG/ACT nasal spray Spray 1 spray into both nostrils daily as needed  for rhinitis or allergies.      ganciclovir (ZIRGAN) 0.15 % ophthalmic gel 1 drop.      lactobacillus rhamnosus, GG, (CULTURELL) capsule Take 1 capsule by mouth daily as needed. Bifido      lipase-protease-amylase (ZENPEP) 18460-13410-067215 units CPEP Take 5 capsules (125,000 Units) by mouth 3 times daily (with meals). Take 4-6 capsules with meals and 2-3 with snacks. Allow for 3 meals and 3 snacks per day. 900 capsule 11    mvw complete formulation (SOFTGELS) capsule Take 1 capsule by mouth daily      Nasal Moisturizer Combination (ALKALOL SALINE) SOLN as needed.      Omega-3 Fatty Acids (FISH OIL) 1200 MG CPDR Take 1 capsule by mouth daily.      Vitamin D3 (CHOLECALCIFEROL) 25 mcg (1000 units) tablet Take 1 tablet by mouth daily.       No facility-administered encounter medications on file as of 7/10/2025.              Allergies:   No Known Allergies         Physical Exam:   BP (!) 137/93 (BP Location: Right arm, Patient Position: Sitting, Cuff Size: Adult Regular)   Pulse 59   SpO2 97%     GENERAL: alert, NAD  HEENT: NCAT, EOMI, no scleral icterus, oral mucosa moist and without lesions  Neck: no cervical or supraclavicular adenopathy  Lungs: good air flow, no crackles, rhonchi or wheezing  CV: RRR, S1S2, no murmurs noted  Abdomen: BS mildly hypoactive, nontender, no HSM  Neuro: AAO X 3  Psychiatric: normal affect, good eye contact  Skin: no rash, jaundice or lesions on limited exam  Extremities: No clubbing, cyanosis or edema.  No digital edema, no synovitis or joint swelling.  No ulcers, skin thickening or fissure.         Data:   All laboratory and imaging data reviewed.      Recent Results (from the past week)   PFT General Lab Testing (Please always keep checked)    Collection Time: 07/10/25 12:35 PM   Result Value Ref Range    FVC-Pred 5.33 L    FEV1SVC-Pred 71 L    FEV1FEV6-Pred 81 %    FEV1FVC-Pred 80 %    FEFMax-Pred 10.67 L/sec    FEF2575-Pred 3.97 L/sec    FVC-Pre 5.01 L    FVC-%Pred-Pre 94 %     FEV1-Pre 4.19 L    FEV1-%Pred-Pre 98 %    FEV1FEV6-Pre 85 %    FEV1FVC-Pre 84 %    YZP4EEW-%Pred-Pre 104 %    FEFMax-Pre 9.99 L/sec    FEFMax-%Pred-Pre 93 %    FEF2575-Pre 4.96 L/sec    IJI0661-%Pred-Pre 124 %    FIFMax-Pre 7.69 L/sec    ExpTime-Pre 7.48 sec   Cystic Fibrosis Culture Aerobic Bacterial    Collection Time: 07/10/25  1:00 PM    Specimen: Throat; Swab   Result Value Ref Range    Culture Culture in progress              PFT Interpretation  Jul 10, 2025  Spirometry interpretation:  The spirometry is normal.  When compared to 4/11/25, the FEV1 and FVC have little change.  The testing meets ATS criteria.      Date Place TLC (%) FVC (%) FEV1 (%) FEV1/FVC DLCO (%) Note                                                                   Micro Hx:                 Imaging:       Lennie Schaffer MD  Pager: [phone]

## 2025-07-10 ENCOUNTER — OFFICE VISIT (OUTPATIENT)
Dept: PULMONOLOGY | Facility: CLINIC | Age: 45
End: 2025-07-10
Attending: INTERNAL MEDICINE
Payer: COMMERCIAL

## 2025-07-10 VITALS — DIASTOLIC BLOOD PRESSURE: 93 MMHG | SYSTOLIC BLOOD PRESSURE: 137 MMHG | HEART RATE: 59 BPM | OXYGEN SATURATION: 97 %

## 2025-07-10 DIAGNOSIS — E84.0 CYSTIC FIBROSIS WITH PULMONARY MANIFESTATIONS (H): Primary | ICD-10-CM

## 2025-07-10 DIAGNOSIS — I10 HYPERTENSION, UNSPECIFIED TYPE: ICD-10-CM

## 2025-07-10 DIAGNOSIS — E84.0 CYSTIC FIBROSIS WITH PULMONARY MANIFESTATIONS (H): ICD-10-CM

## 2025-07-10 DIAGNOSIS — G47.33 OBSTRUCTIVE SLEEP APNEA: ICD-10-CM

## 2025-07-10 LAB
EXPTIME-PRE: 7.48 SEC
FEF2575-%PRED-PRE: 124 %
FEF2575-PRE: 4.96 L/SEC
FEF2575-PRED: 3.97 L/SEC
FEFMAX-%PRED-PRE: 93 %
FEFMAX-PRE: 9.99 L/SEC
FEFMAX-PRED: 10.67 L/SEC
FEV1-%PRED-PRE: 98 %
FEV1-PRE: 4.19 L
FEV1FEV6-PRE: 85 %
FEV1FEV6-PRED: 81 %
FEV1FVC-PRE: 84 %
FEV1FVC-PRED: 80 %
FEV1SVC-PRED: 71 L
FIFMAX-PRE: 7.69 L/SEC
FVC-%PRED-PRE: 94 %
FVC-PRE: 5.01 L
FVC-PRED: 5.33 L
Lab: 104 %

## 2025-07-10 PROCEDURE — 99214 OFFICE O/P EST MOD 30 MIN: CPT | Mod: 25 | Performed by: INTERNAL MEDICINE

## 2025-07-10 PROCEDURE — 3080F DIAST BP >= 90 MM HG: CPT | Performed by: INTERNAL MEDICINE

## 2025-07-10 PROCEDURE — 3075F SYST BP GE 130 - 139MM HG: CPT | Performed by: INTERNAL MEDICINE

## 2025-07-10 PROCEDURE — 87070 CULTURE OTHR SPECIMN AEROBIC: CPT | Performed by: INTERNAL MEDICINE

## 2025-07-10 PROCEDURE — 99213 OFFICE O/P EST LOW 20 MIN: CPT | Performed by: INTERNAL MEDICINE

## 2025-07-10 PROCEDURE — 1126F AMNT PAIN NOTED NONE PRSNT: CPT | Performed by: INTERNAL MEDICINE

## 2025-07-10 RX ORDER — LISINOPRIL 10 MG/1
10 TABLET ORAL DAILY
Qty: 90 TABLET | Refills: 3 | Status: SHIPPED | OUTPATIENT
Start: 2025-07-10

## 2025-07-10 ASSESSMENT — PAIN SCALES - GENERAL: PAINLEVEL_OUTOF10: NO PAIN (0)

## 2025-07-10 NOTE — PATIENT INSTRUCTIONS
Cystic Fibrosis Self-Care Plan    RECOMMENDATIONS:             YOUR GOAL:   - Start lisinopril 10 mg and keep a blood pressure log as you have been and let us know if you're feeling lightheaded/dizzy or start having a new cough   - We'll check labs in a week  - Try hydrating quite a bit like a couple hours before you go work out and see if this helps with endurance (goal 80 oz at least in a day if working out)   - Sleep medicine referral for the obstructive sleep apnea  - Trial the miralax 1/2- 1 full cap every day, if issues persist let us know and we can start more of a work up  - If still feeling winded after losing 10-15 lbs and hydrating let us know        Cystic Fibrosis :    Bebe Vaughn   [phone]  [facility] [place] Nurse line:  Gladys Jansen  [phone]     [facility] [place] Fax Number:      [phone]         Cystic Fibrosis Respiratory Therapists:   Janeth Ryan                          [phone]          Loida Trinidad   [phone]  Cystic Fibrosis Dietitians:              Nisa Dumont              [phone]                            April Cardona                        [phone]   Cystic Fibrosis Diabetes Nurse:    Barbara Birmingham               [phone]    Cystic Fibrosis Social Workers:     Amber Davis              [phone]                     Debora Joseph               [phone]  Cystic Fibrosis Pharmacists:           Ileana Kilgore                              [phone]         Lennie Lara      [phone]   Cystic Fibrosis Genetic Counselor:   Faby Flores    [phone]    [place] [facility] [place] website:  [email or web address]    We have recently learned about an albuterol neb solution shortage due to a manufacturing delay. There is still a small supply coming in but not enough to meet the current demand. We do not yet have an estimate for when this will become widely available again.    We our asking our  CF community to do the following:    Please take time to check your supply of albuterol neb solution at home. We recommend keeping at least a 2-week supply of albuterol nebs at home in case of illness.    2.  If you have an albuterol inhaler at home, you can use 4 puffs of the inhaler with a spacer in place of the nebulized albuterol at the start of your treatments. It is important to use a spacer for the best technique. If you do not have a spacer at home or have questions on technique, we will be happy to review and send one to your home address. Please also take a moment to check that your albuterol HFA inhaler is available and not .  inhalers may be less effective as the medication loses its potency or power. In some instances your team may suggest another alternative instead of an albuterol inhaler.    3.  Please take care in requesting refills. Albuterol neb solution is a life-saving medication for patients having severe asthma attacks and other emergency respiratory conditions. Let s work together to make sure that albuterol neb solution is available to those who need it urgently.    Reach out to your care team with questions and to confirm your planned alternative for albuterol nebs. [employer] [facility] will be the fastest way to connect. If possible, please reserve the nursing line for more urgent concerns as we are short on nursing staff.    Here are some reputable sites with more information:    https://www.cidrap.[facility].edu/resilient-drug-supply/us-liquid-albuterol-soyivicb-znnlliex-hlnijb-after-major-supplier-shuts-down    https://www.[employer]//health/albuterol-shortage    https://www.ashp.org/drug-shortages/current-shortages/drug-shortage-detail.aspx?fk=428&loginreturnUrl=SSOCheckOnly       MRN: [id number]   Clinic Date: July 10, 2025   Patient: Philip Delacruz     Annual Studies:   IGG   Date Value Ref Range Status   2021 1,198 610 - 1,616 mg/dL Final     Immunoglobulin G   Date  Value Ref Range Status   04/05/2024 1,198 610 - 1,616 mg/dL Final     Insulin   Date Value Ref Range Status   04/11/2025 11.0 2.6 - 24.9 uU/mL Final   05/02/2019 9.0 3 - 25 mU/L Final     There are no preventive care reminders to display for this patient.    Pulmonary Function Tests  FEV1: amount of air you can blow out in 1 second  FVC: total amount of air you can take in and blow out    Your Goals:             Latest Ref Rng & Units 7/10/2025    12:35 PM   PFT   FVC L 5.01  P   FEV1 L 4.19  P   FVC% % 94  P   FEV1% % 98  P      P Preliminary result          Airway Clearance: The Most Important Way to Keep Your Lungs Healthy  Vest Settings:   Hill-Rom Frequencies: 8, 9, 10 Pressure 10 Then, Frequencies 18, 19, 20 Pressure 6     RespirTech: Quick Start with Pressure of     Do each frequency for 5 minutes; Deflate vest after each frequency & cough 3 times before beginning the next setting.    Vest and Neb Therapy should be done 1 times/day.    Good Nutrition Can Improve Lung Function and Overall Health    Take ALL of your vitamins with food    Take 1/2 of your enzymes before EVERY meal/snack and the other 1/2 mid-meal/snack    Wt Readings from Last 3 Encounters:   04/11/25 92.8 kg (204 lb 9.4 oz)   04/11/25 93.3 kg (205 lb 11.2 oz)   01/23/25 90.7 kg (200 lb)       There is no height or weight on file to calculate BMI.         National CF Foundation Recommendations for BMI in CF Adults: Women: at least 22 Men: at least 23        Controlling Blood Sugars Helps Prevent Lung Infections & Improves Nutrition  Test blood sugar:    In the morning before eating (goal is )    2 hours after a meal (goal is less than 150)    When pre-meal glucose is greater than 150 add correction    At bedtime (if less than 100 eat a snack with 15 grams of carbohydrates  Last A1C Results:   Hemoglobin A1C   Date Value Ref Range Status   04/11/2025 4.9 <5.7 % Final     Comment:     Normal <5.7%   Prediabetes 5.7-6.4%    Diabetes  6.5% or higher     Note: Adopted from ADA consensus guidelines.   04/08/2021 4.8 0 - 5.6 % Final     Comment:     Normal <5.7% Prediabetes 5.7-6.4%  Diabetes 6.5% or higher - adopted from ADA   consensus guidelines.           If diabetic, measure A1C every 6 months. Goal: Under 7%    Staying Healthy  Research:  If you are interested in learning about research opportunities or have questions, please contact the CF Research Team at [phone] or [email or web address].    CF Foundation:  Compass is a personalized resource service to help you with the insurance, financial, legal and other issues you are facing.  It's free, confidential and available to anyone with CF.  Ask your  for more information or contact Compass directly at 268-CJZFOCC ([phone]) or compass@cff.org, or learn more at cff.org/compass.

## 2025-07-10 NOTE — NURSING NOTE
Chief Complaint   Patient presents with    return cystic fibrosis     Medications reviewed and vital signs taken.   Christopher Servin, EMT

## 2025-07-10 NOTE — LETTER
7/10/2025      Philip Delacruz  [address]  [facility] [address]      Dear Colleague,    Thank you for referring your patient, Philip Delacruz, to the [facility] [place]. Please see a copy of my visit note below.    [facility]  CF Clinic -Follow Up Visit-  Jul 10, 2025    Reason for Visit  Philip Delacruz is a 45 year old who is being seen for CF care           Assessment and Plan:   Philip Delacruz is a 45 year old with history of CF with normal PFTs, depression/anxiety, and CF related pancreatic insufficiency who is seen today for follow up of CF.     CF Pulmonary Disease: PFTs are baseline and have improved some since his last visit. He has normal PFTs at baseline although have noticed a slight trend downard in his FEV1 over the last 2 years, today they seem to have improved and are stable over the last year, FVC has also increased over the last year which I attribute to his increase in exercise and weight stability. He does note that he feels more winded with the same amount of exercise he could do   - Exercise and occasional aerobika for airway clearance.    Maintenance   Modulator: Trikafta  Mutation: [id number], PolyTVariant (7T/9T)  AW Clearance: Exercise  Bronchodilators: Albuterol MD prn usually before working out  Mucolytics: None  Antibiotics Inh: None  Antibiotics Oral: None  Other:  Exercise: Cross fit like exercise  Colonization Hx: Rhizobium (agrobacterium) radiobacter, Aspergillus versicolor, Serratia marcescens, staphylococcus aureus, moraxella (branhamella) catarrhalis   Annuals: Done and reviewed during this visit (April 2025)    Pulmonary/cystic fibrosis: Patient reports excellent exercise tolerance. PFTs are in the normal range and FEV1 has rebounded over last 6 months to his baseline and his FVC continues an upward trend.  He will continue using  exercise  as his main form of airway clearance.  If there is progressive decline in PFTs, addition of more formal airway clearance will be pursued.He does still feel frustrated that he seems to get more winded doing similar exercise load to several years ago despite being consistnet with working out the last 6 mos- a year. His weight has remained stable and it's unclear to me if this is truly a pulmonary change or an endurance/deconditioning change with the amount of weight he's increased in the last 5 years.  - Will monitor and if noticing no improvement with stable weight or weight loss in respiratory symptoms will pursue further work up/investigation  Hypertension: Has had elevated BPs up to 140s/90s this last year and family hx of mom's family of HTN. After working out more consistently he's more in the 130-135 systolic but still in 90s for diastolic.  He also has s/sx of sleep apnea  - Start lisinopril 10 mg  - repeat BMP in a week  - encouraged a PCP referral  ANDREAS: Witnessed apnea events by wife, HTN present for last year, weight up 30 lbs in last 4-5 years but stable in last year. STOP BANG score of 4  - Sleep medicine referral   CFTR Modulation: CFTR genotype is [id number], Poly T Variant:  [ 7T ]/[9T  ].  He has generally had an excellent response to Trikafta with decreased respiratory symptoms and rare exacerbations.  LFTs are within normal limits and CK is normal. Discussed alyftrek today, pamphlet given he will review with pharmacy too and if interested in making the switch in the future happy to trial.   GI: Patient reports frequent loose stool.  He did improve after AXR and miralax initiation after evidence of stool blockage. He has noticed some improvement with reduction in coffee. Started having  some more stool leakage again which sounds like itm ay be overflow with reduction in use of miralax.  - Increase miralax to 1 capful daily   - Offered AXR which he declined today  Pancreatic insufficiency: On  [place] [address] taking 6 capsules with meals and 2-3 capsules with snacks. RD to see today. Reviewed OGTT, no evidence of CFRD. Repeat annually. Vitamin labs pending.   CF-related sinus disease: The patient is [facility].    Depression/anxiety: Continue Lexapro.  Bone health: Continue calcium and vitamin D.  Alendronate was discontinued per patient and in congruence with endo recs. DEXA done 4/11/25.     Maintenance  Colonoscopy: 9/26/22 no polyps  DEXA: 4/11/25  Imms: up to date  Annuals: Done 4/11/25    Jul 10, 2025  CF Exacerbation  Absent      I personally spent 33 minutes in documentation, the interview and exam, and review of the chart/labs/imaging on Jul 10, 2025 not including time spent interpreting spirometry.      The longitudinal plan of care for cystic fibrosis and complications of this multisystem disease were addressed during this visit. Due to the added complexity in care, I will continue to support this patient in the subsequent management of this condition(s) and with the ongoing continuity of care of this condition      Lennie Schaffer MD  [facility]  Cystic Fibrosis Center  RN Coordinators: Justyna [phone]        CF Interval History:   Philip Delacruz is a 45 year old with history of  CF who is seen today for follow up of CF.   Has had a busy summer, 8 credits at [employer], did the family vacation at a lodge with in-laws, he's always very involved in the local fair.   Still having some issues with blood pressure. BP diastolic won't decrease <90. Has been exercising quite a bit with Systolic coming down [id number], 130-135/90s.   He feels like he has less exercise tolerance than he did ay ear ago, he works out 3-4 times a week at the gym doing a mix of cardio and cross fit exercises. He was able to go for a run twice this week at [employer] for 2.5-3 miles.   No coughing or sputum production, no wheezing.   He does snore and sometimes he wakes himself  up with snoring/snorting. He generally feels rested when he wakes up. Wife has witnessed apnea events. He wonders with his high BP if this is contributing.  BMs are okay, every now and then he'll have some leakage which is spontaneous, unclear if it's enzyme related or not enough, once a week to once a month. Doesn't seem to be related to fat in food. Wonders if related to dairy. In the past, he did have some overflow that contributed to the leakage, and the miralax seemed to help in the past and he notes that he hasn't been as consistent with its use lately. Denies bloating, nausea, or really any heartburn or abdominal discomfort.   No joint aches or pain other than neck/shoulders.     The patient was seen and examined by Lennie Schaffer MD          Review of Systems:     A complete ROS was otherwise negative except as noted in the HPI.           Past Medical and Surgical History:     Past Medical History:   Diagnosis Date     Chronic sinusitis      Congenital absence of vas deferens, bilateral      Cystic fibrosis (H)     Delta F508 and [id number]      History of COVID-19 01/2022    Back pain, chills and mild cough, resolved without intervention     Nasal polyps      Sinusitis, chronic      Past Surgical History:   Procedure Laterality Date     COLONOSCOPY N/A 9/26/2022    Procedure: COLONOSCOPY, WITH POLYPECTOMY AND BIOPSY;  Surgeon: Bennett Fajardo MD;  Location: [facility]     HC TOOTH EXTRACTION W/FORCEP       NASAL/SINUS POLYPECTOMY       REPAIR PECTUS EXCAVATUM  1997     SINUS SURGERY  1992, 2002, 2004    Removed tubinates and polyps OSH     Sperm harvest             Family History:     Family History   Problem Relation Age of Onset     C.A.D. Maternal Grandfather      Diabetes Maternal Grandfather      Heart Disease Maternal Grandfather      Aneurysm Paternal Grandfather         Aortic     Gastrointestinal Disease Father         Reflux     Cancer Paternal Grandmother         Lymphoma     Diabetes Maternal  Grandmother      Thyroid Disease Mother      Other Problems  () Other             Social History:     Social History     Socioeconomic History     Marital status:      Spouse name: Not on file     Number of children: Not on file     Years of education: Not on file     Highest education level: Not on file   Occupational History     Occupation: Teacher   Tobacco Use     Smoking status: Never     Passive exposure: Never     Smokeless tobacco: Never   Vaping Use     Vaping status: Never Used   Substance and Sexual Activity     Alcohol use: Yes     Comment: 1-2 drinks/month     Drug use: No     Comment: THC drink rarely     Sexual activity: Yes     Partners: Female     Birth control/protection: None   Other Topics Concern     Parent/sibling w/ CABG, MI or angioplasty before 65F 55M? Not Asked   Social History Narrative    Lives in [place] with wife, Aimee, and sons, Shay and Serafin. Part time teaching special ed.     Social Drivers of Health     Financial Resource Strain: Not on file   Food Insecurity: Not on file   Transportation Needs: Not on file   Physical Activity: Not on file   Stress: Not on file   Social Connections: Not on file   Interpersonal Safety: Not on file   Housing Stability: Not on file            Medications:     Outpatient Encounter Medications as of 7/10/2025   Medication Sig Dispense Refill     acyclovir (ZOVIRAX) 400 MG tablet Take 1 tablet by mouth daily at 2 pm.       albuterol (PROAIR HFA/PROVENTIL HFA/VENTOLIN HFA) 108 (90 Base) MCG/ACT inhaler Inhale 2 puffs into the lungs every 6 hours as needed for shortness of breath, wheezing, cough or other (Before exercise). 18 g 11     atorvastatin (LIPITOR) 10 MG tablet Take 1 tablet (10 mg) by mouth daily. 90 tablet 3     Ca Cit Malate-Cholecalciferol (CALCIUM CITRATE MALATE-VIT D) 250-100 MG-UNIT TABS Take 1 tablet by mouth daily Activated calcium by Nutrikey       elexacaftor-tezacaftor-ivacaftor & ivacaftor (TRIKAFTA) 100-50-75 & 150 MG  tablet pack Take 2 orange tablets in the morning and 1 light blue tablet in the evening. Swallow whole with fat-containing food. 84 tablet 4     escitalopram (LEXAPRO) 20 MG tablet TAKE ONE TABLET BY MOUTH ONCE DAILY 30 tablet 11     fluticasone (FLONASE) 50 MCG/ACT nasal spray Spray 1 spray into both nostrils daily as needed for rhinitis or allergies.       ganciclovir (ZIRGAN) 0.15 % ophthalmic gel 1 drop.       lactobacillus rhamnosus, GG, (CULTURELL) capsule Take 1 capsule by mouth daily as needed. Bifido       lipase-protease-amylase (ZENPEP) 06389-41869-401130 units CPEP Take 5 capsules (125,000 Units) by mouth 3 times daily (with meals). Take 4-6 capsules with meals and 2-3 with snacks. Allow for 3 meals and 3 snacks per day. 900 capsule 11     mvw complete formulation (SOFTGELS) capsule Take 1 capsule by mouth daily       Nasal Moisturizer Combination (ALKALOL SALINE) SOLN as needed.       Omega-3 Fatty Acids (FISH OIL) 1200 MG CPDR Take 1 capsule by mouth daily.       Vitamin D3 (CHOLECALCIFEROL) 25 mcg (1000 units) tablet Take 1 tablet by mouth daily.       No facility-administered encounter medications on file as of 7/10/2025.              Allergies:   No Known Allergies         Physical Exam:   BP (!) 137/93 (BP Location: Right arm, Patient Position: Sitting, Cuff Size: Adult Regular)   Pulse 59   SpO2 97%     GENERAL: alert, NAD  HEENT: NCAT, EOMI, no scleral icterus, oral mucosa moist and without lesions  Neck: no cervical or supraclavicular adenopathy  Lungs: good air flow, no crackles, rhonchi or wheezing  CV: RRR, S1S2, no murmurs noted  Abdomen: BS mildly hypoactive, nontender, no HSM  Neuro: AAO X 3  Psychiatric: normal affect, good eye contact  Skin: no rash, jaundice or lesions on limited exam  Extremities: No clubbing, cyanosis or edema.  No digital edema, no synovitis or joint swelling.  No ulcers, skin thickening or fissure.         Data:   All laboratory and imaging data reviewed.       Recent Results (from the past week)   PFT General Lab Testing (Please always keep checked)    Collection Time: 07/10/25 12:35 PM   Result Value Ref Range    FVC-Pred 5.33 L    FEV1SVC-Pred 71 L    FEV1FEV6-Pred 81 %    FEV1FVC-Pred 80 %    FEFMax-Pred 10.67 L/sec    FEF2575-Pred 3.97 L/sec    FVC-Pre 5.01 L    FVC-%Pred-Pre 94 %    FEV1-Pre 4.19 L    FEV1-%Pred-Pre 98 %    FEV1FEV6-Pre 85 %    FEV1FVC-Pre 84 %    VNJ4TJA-%Pred-Pre 104 %    FEFMax-Pre 9.99 L/sec    FEFMax-%Pred-Pre 93 %    FEF2575-Pre 4.96 L/sec    [id number]-%Pred-Pre 124 %    FIFMax-Pre 7.69 L/sec    ExpTime-Pre 7.48 sec   Cystic Fibrosis Culture Aerobic Bacterial    Collection Time: 07/10/25  1:00 PM    Specimen: Throat; Swab   Result Value Ref Range    Culture Culture in progress              PFT Interpretation  Jul 10, 2025  Spirometry interpretation:  The spirometry is normal.  When compared to 4/11/25, the FEV1 and FVC have little change.  The testing meets ATS criteria.      Date Place TLC (%) FVC (%) FEV1 (%) FEV1/FVC DLCO (%) Note                                                                   Micro Hx:                 Imaging:       Lennie Schaffer MD  Pager: [phone]           Again, thank you for allowing me to participate in the care of your patient.        Sincerely,        Lennie Schaffer MD    Electronically signed

## 2025-07-14 ENCOUNTER — PATIENT OUTREACH (OUTPATIENT)
Dept: CARE COORDINATION | Facility: CLINIC | Age: 45
End: 2025-07-14
Payer: COMMERCIAL

## 2025-07-15 LAB — BACTERIA SPEC CULT: NORMAL

## 2025-07-23 ENCOUNTER — LAB (OUTPATIENT)
Dept: LAB | Facility: CLINIC | Age: 45
End: 2025-07-23
Payer: COMMERCIAL

## 2025-07-23 DIAGNOSIS — E84.0 CYSTIC FIBROSIS WITH PULMONARY MANIFESTATIONS (H): ICD-10-CM

## 2025-07-23 DIAGNOSIS — I10 HYPERTENSION, UNSPECIFIED TYPE: ICD-10-CM

## 2025-07-23 LAB
ALBUMIN SERPL BCG-MCNC: 4.6 G/DL (ref 3.5–5.2)
ALP SERPL-CCNC: 80 U/L (ref 40–150)
ALT SERPL W P-5'-P-CCNC: 26 U/L (ref 0–70)
ANION GAP SERPL CALCULATED.3IONS-SCNC: 8 MMOL/L (ref 7–15)
AST SERPL W P-5'-P-CCNC: 33 U/L (ref 0–45)
BILIRUB SERPL-MCNC: 0.8 MG/DL
BILIRUBIN DIRECT (ROCHE PRO & PURE): 0.3 MG/DL (ref 0–0.45)
BUN SERPL-MCNC: 24 MG/DL (ref 6–20)
CALCIUM SERPL-MCNC: 9.4 MG/DL (ref 8.8–10.4)
CHLORIDE SERPL-SCNC: 99 MMOL/L (ref 98–107)
CREAT SERPL-MCNC: 0.98 MG/DL (ref 0.67–1.17)
EGFRCR SERPLBLD CKD-EPI 2021: >90 ML/MIN/1.73M2
GLUCOSE SERPL-MCNC: 86 MG/DL (ref 70–99)
HCO3 SERPL-SCNC: 29 MMOL/L (ref 22–29)
POTASSIUM SERPL-SCNC: 3.9 MMOL/L (ref 3.4–5.3)
PROT SERPL-MCNC: 7.1 G/DL (ref 6.4–8.3)
SODIUM SERPL-SCNC: 136 MMOL/L (ref 135–145)

## 2025-07-23 PROCEDURE — 80053 COMPREHEN METABOLIC PANEL: CPT

## 2025-07-23 PROCEDURE — 82248 BILIRUBIN DIRECT: CPT

## 2025-07-23 PROCEDURE — 36415 COLL VENOUS BLD VENIPUNCTURE: CPT

## 2025-07-24 ENCOUNTER — CLINICAL UPDATE (OUTPATIENT)
Dept: PHARMACY | Facility: CLINIC | Age: 45
End: 2025-07-24
Payer: COMMERCIAL

## 2025-07-24 DIAGNOSIS — E84.0 CYSTIC FIBROSIS WITH PULMONARY MANIFESTATIONS (H): Primary | ICD-10-CM

## 2025-07-24 NOTE — PROGRESS NOTES
Clinical Update:                                                    A chart review was conducted for Shar Delacruz.    Reason for Chart Review: Trikafta Lab Monitoring    Discussion: Shar has been on Trikafta since 12/13/2019. He switched to Alyftrek from 5/5/25 - 5/16/25 but discontinued due to side effects (rash, diarrhea, muscle pain) and restarted Trikafta 5/18/25. Per chart review, patient continues full dose Trikafta .  Patient has not reported side effects since restarting Trikafta.    Labs were reviewed from 7/23/25 at [facility] as part of monitoring for his new lisinopril. All modulator labs are within normal limits.    Lab Results   Component Value Date    ALT 26 07/23/2025    AST 33 07/23/2025    BILITOTAL 0.8 07/23/2025    DBIL 0.30 07/23/2025    ALKPHOS 80 07/23/2025       Plan:  1. Continue Trikafta (refills on file; mychart to patient)  2. Recheck hepatic panel in 6 months  (orders on file)      Lennie Lara, [place]  Cystic Fibrosis MTM Pharmacist  [facility]  Voicemail: [phone]

## (undated) RX ORDER — FENTANYL CITRATE 50 UG/ML
INJECTION, SOLUTION INTRAMUSCULAR; INTRAVENOUS
Status: DISPENSED
Start: 2022-09-26